# Patient Record
Sex: FEMALE | Race: WHITE | NOT HISPANIC OR LATINO | Employment: OTHER | URBAN - METROPOLITAN AREA
[De-identification: names, ages, dates, MRNs, and addresses within clinical notes are randomized per-mention and may not be internally consistent; named-entity substitution may affect disease eponyms.]

---

## 2017-02-07 ENCOUNTER — ALLSCRIPTS OFFICE VISIT (OUTPATIENT)
Dept: OTHER | Facility: OTHER | Age: 77
End: 2017-02-07

## 2017-02-21 DIAGNOSIS — D72.829 ELEVATED WHITE BLOOD CELL COUNT: ICD-10-CM

## 2017-02-23 LAB
BASOPHILS # BLD AUTO: 0.9 %
BASOPHILS # BLD AUTO: 72 CELLS/UL (ref 0–200)
DEPRECATED RDW RBC AUTO: 13.7 % (ref 11–15)
EOSINOPHIL # BLD AUTO: 672 CELLS/UL (ref 15–500)
EOSINOPHIL # BLD AUTO: 8.4 %
HCT VFR BLD AUTO: 35 % (ref 35–45)
HGB BLD-MCNC: 11.5 G/DL (ref 11.7–15.5)
LYMPHOCYTES # BLD AUTO: 2264 CELLS/UL (ref 850–3900)
LYMPHOCYTES # BLD AUTO: 28.3 %
MCH RBC QN AUTO: 29.9 PG (ref 27–33)
MCHC RBC AUTO-ENTMCNC: 32.8 G/DL (ref 32–36)
MCV RBC AUTO: 91.1 FL (ref 80–100)
MONOCYTES # BLD AUTO: 456 CELLS/UL (ref 200–950)
MONOCYTES (HISTORICAL): 5.7 %
NEUTROPHILS # BLD AUTO: 4536 CELLS/UL (ref 1500–7800)
NEUTROPHILS # BLD AUTO: 56.7 %
PLATELET # BLD AUTO: 404 THOUSAND/UL (ref 140–400)
PMV BLD AUTO: 8.3 FL (ref 7.5–12.5)
RBC # BLD AUTO: 3.85 MILLION/UL (ref 3.8–5.1)
WBC # BLD AUTO: 8 THOUSAND/UL (ref 3.8–10.8)

## 2017-02-24 ENCOUNTER — GENERIC CONVERSION - ENCOUNTER (OUTPATIENT)
Dept: OTHER | Facility: OTHER | Age: 77
End: 2017-02-24

## 2017-04-20 ENCOUNTER — LAB CONVERSION - ENCOUNTER (OUTPATIENT)
Dept: OTHER | Facility: OTHER | Age: 77
End: 2017-04-20

## 2017-04-20 LAB
CREATININE, RANDOM URINE (HISTORICAL): 146 MG/DL (ref 20–320)
HBA1C MFR BLD HPLC: 6.4 % OF TOTAL HGB
MAGNESIUM, UR (HISTORICAL): 1.7 MG/DL
MICROALBUMIN/CREATININE RATIO (HISTORICAL): 12 MCG/MG CREAT
TSH SERPL DL<=0.05 MIU/L-ACNC: 1.61 MIU/L (ref 0.4–4.5)

## 2017-04-25 ENCOUNTER — GENERIC CONVERSION - ENCOUNTER (OUTPATIENT)
Dept: OTHER | Facility: OTHER | Age: 77
End: 2017-04-25

## 2017-05-02 ENCOUNTER — ALLSCRIPTS OFFICE VISIT (OUTPATIENT)
Dept: OTHER | Facility: OTHER | Age: 77
End: 2017-05-02

## 2017-06-20 ENCOUNTER — GENERIC CONVERSION - ENCOUNTER (OUTPATIENT)
Dept: OTHER | Facility: OTHER | Age: 77
End: 2017-06-20

## 2017-06-20 ENCOUNTER — HOSPITAL ENCOUNTER (OUTPATIENT)
Dept: BONE DENSITY | Facility: MEDICAL CENTER | Age: 77
Discharge: HOME | End: 2017-06-20
Payer: COMMERCIAL

## 2017-06-20 ENCOUNTER — HOSPITAL ENCOUNTER (OUTPATIENT)
Dept: MAMMOGRAPHY | Facility: MEDICAL CENTER | Age: 77
Discharge: HOME | End: 2017-06-20
Payer: COMMERCIAL

## 2017-06-20 DIAGNOSIS — Z12.31 VISIT FOR SCREENING MAMMOGRAM: ICD-10-CM

## 2017-06-20 DIAGNOSIS — Z78.0 ASYMPTOMATIC MENOPAUSAL STATE: ICD-10-CM

## 2017-06-20 DIAGNOSIS — Z00.00 ENCOUNTER FOR GENERAL ADULT MEDICAL EXAMINATION WITHOUT ABNORMAL FINDINGS: ICD-10-CM

## 2017-06-20 PROCEDURE — 77080 DXA BONE DENSITY AXIAL: CPT

## 2017-06-20 PROCEDURE — G0202 SCR MAMMO BI INCL CAD: HCPCS

## 2017-06-23 ENCOUNTER — GENERIC CONVERSION - ENCOUNTER (OUTPATIENT)
Dept: OTHER | Facility: OTHER | Age: 77
End: 2017-06-23

## 2017-08-11 ENCOUNTER — APPOINTMENT (OUTPATIENT)
Dept: LAB | Facility: CLINIC | Age: 77
End: 2017-08-11
Payer: COMMERCIAL

## 2017-08-11 ENCOUNTER — ALLSCRIPTS OFFICE VISIT (OUTPATIENT)
Dept: OTHER | Facility: OTHER | Age: 77
End: 2017-08-11

## 2017-08-11 ENCOUNTER — TRANSCRIBE ORDERS (OUTPATIENT)
Dept: LAB | Facility: CLINIC | Age: 77
End: 2017-08-11

## 2017-08-11 DIAGNOSIS — M54.9 DORSALGIA: ICD-10-CM

## 2017-08-11 DIAGNOSIS — M79.602 PAIN OF LEFT ARM: ICD-10-CM

## 2017-08-11 DIAGNOSIS — E87.1 HYPO-OSMOLALITY AND HYPONATREMIA: ICD-10-CM

## 2017-08-11 DIAGNOSIS — M54.10 RADICULOPATHY: ICD-10-CM

## 2017-08-11 DIAGNOSIS — R53.83 OTHER FATIGUE: ICD-10-CM

## 2017-08-11 DIAGNOSIS — D72.829 ELEVATED WHITE BLOOD CELL COUNT: ICD-10-CM

## 2017-08-11 DIAGNOSIS — R70.0 ELEVATED ERYTHROCYTE SEDIMENTATION RATE: ICD-10-CM

## 2017-08-11 DIAGNOSIS — D64.9 ANEMIA: ICD-10-CM

## 2017-08-11 DIAGNOSIS — D47.3 ESSENTIAL HEMORRHAGIC THROMBOCYTHEMIA (HCC): ICD-10-CM

## 2017-08-11 DIAGNOSIS — R07.9 CHEST PAIN: ICD-10-CM

## 2017-08-11 DIAGNOSIS — R42 DIZZINESS AND GIDDINESS: ICD-10-CM

## 2017-08-11 DIAGNOSIS — N91.2 AMENORRHEA: ICD-10-CM

## 2017-08-11 DIAGNOSIS — R05.9 COUGH: ICD-10-CM

## 2017-08-11 LAB
25(OH)D3 SERPL-MCNC: 19.5 NG/ML (ref 30–100)
ALBUMIN SERPL BCP-MCNC: 3.9 G/DL (ref 3.5–5)
ALP SERPL-CCNC: 48 U/L (ref 46–116)
ALT SERPL W P-5'-P-CCNC: 43 U/L (ref 12–78)
ANION GAP SERPL CALCULATED.3IONS-SCNC: 8 MMOL/L (ref 4–13)
AST SERPL W P-5'-P-CCNC: 34 U/L (ref 5–45)
BACTERIA UR QL AUTO: NORMAL /HPF
BASOPHILS # BLD AUTO: 0.06 THOUSANDS/ΜL (ref 0–0.1)
BASOPHILS NFR BLD AUTO: 1 % (ref 0–1)
BILIRUB SERPL-MCNC: 0.32 MG/DL (ref 0.2–1)
BILIRUB UR QL STRIP: NEGATIVE
BUN SERPL-MCNC: 11 MG/DL (ref 5–25)
CALCIUM SERPL-MCNC: 9.6 MG/DL (ref 8.3–10.1)
CHLORIDE SERPL-SCNC: 95 MMOL/L (ref 100–108)
CLARITY UR: CLEAR
CO2 SERPL-SCNC: 25 MMOL/L (ref 21–32)
COLOR UR: YELLOW
CREAT SERPL-MCNC: 0.71 MG/DL (ref 0.6–1.3)
EOSINOPHIL # BLD AUTO: 0.44 THOUSAND/ΜL (ref 0–0.61)
EOSINOPHIL NFR BLD AUTO: 4 % (ref 0–6)
ERYTHROCYTE [DISTWIDTH] IN BLOOD BY AUTOMATED COUNT: 13.4 % (ref 11.6–15.1)
ERYTHROCYTE [SEDIMENTATION RATE] IN BLOOD: 38 MM/HOUR (ref 0–20)
GFR SERPL CREATININE-BSD FRML MDRD: 83 ML/MIN/1.73SQ M
GLUCOSE P FAST SERPL-MCNC: 93 MG/DL (ref 65–99)
GLUCOSE UR STRIP-MCNC: NEGATIVE MG/DL
HCT VFR BLD AUTO: 33.6 % (ref 34.8–46.1)
HGB BLD-MCNC: 11.3 G/DL
HGB BLD-MCNC: 11.5 G/DL (ref 11.5–15.4)
HGB UR QL STRIP.AUTO: NEGATIVE
HYALINE CASTS #/AREA URNS LPF: NORMAL /LPF
KETONES UR STRIP-MCNC: NEGATIVE MG/DL
LEUKOCYTE ESTERASE UR QL STRIP: ABNORMAL
LYMPHOCYTES # BLD AUTO: 2.53 THOUSANDS/ΜL (ref 0.6–4.47)
LYMPHOCYTES NFR BLD AUTO: 23 % (ref 14–44)
MCH RBC QN AUTO: 30.6 PG (ref 26.8–34.3)
MCHC RBC AUTO-ENTMCNC: 34.2 G/DL (ref 31.4–37.4)
MCV RBC AUTO: 89 FL (ref 82–98)
MONOCYTES # BLD AUTO: 1.09 THOUSAND/ΜL (ref 0.17–1.22)
MONOCYTES NFR BLD AUTO: 10 % (ref 4–12)
NEUTROPHILS # BLD AUTO: 6.99 THOUSANDS/ΜL (ref 1.85–7.62)
NEUTS SEG NFR BLD AUTO: 62 % (ref 43–75)
NITRITE UR QL STRIP: NEGATIVE
NON-SQ EPI CELLS URNS QL MICRO: NORMAL /HPF
NRBC BLD AUTO-RTO: 0 /100 WBCS
PH UR STRIP.AUTO: 6 [PH] (ref 4.5–8)
PLATELET # BLD AUTO: 459 THOUSANDS/UL (ref 149–390)
PMV BLD AUTO: 9.4 FL (ref 8.9–12.7)
POTASSIUM SERPL-SCNC: 4.3 MMOL/L (ref 3.5–5.3)
PROT SERPL-MCNC: 7.9 G/DL (ref 6.4–8.2)
PROT UR STRIP-MCNC: ABNORMAL MG/DL
RBC # BLD AUTO: 3.76 MILLION/UL (ref 3.81–5.12)
RBC #/AREA URNS AUTO: NORMAL /HPF
SODIUM SERPL-SCNC: 128 MMOL/L (ref 136–145)
SP GR UR STRIP.AUTO: 1.02 (ref 1–1.03)
TSH SERPL DL<=0.05 MIU/L-ACNC: 1.13 UIU/ML (ref 0.36–3.74)
UROBILINOGEN UR QL STRIP.AUTO: 0.2 E.U./DL
VIT B12 SERPL-MCNC: 571 PG/ML (ref 100–900)
WBC # BLD AUTO: 11.15 THOUSAND/UL (ref 4.31–10.16)
WBC #/AREA URNS AUTO: NORMAL /HPF

## 2017-08-11 PROCEDURE — 36415 COLL VENOUS BLD VENIPUNCTURE: CPT

## 2017-08-11 PROCEDURE — 82607 VITAMIN B-12: CPT

## 2017-08-11 PROCEDURE — 86618 LYME DISEASE ANTIBODY: CPT

## 2017-08-11 PROCEDURE — 84443 ASSAY THYROID STIM HORMONE: CPT

## 2017-08-11 PROCEDURE — 81001 URINALYSIS AUTO W/SCOPE: CPT

## 2017-08-11 PROCEDURE — 85652 RBC SED RATE AUTOMATED: CPT

## 2017-08-11 PROCEDURE — 80053 COMPREHEN METABOLIC PANEL: CPT

## 2017-08-11 PROCEDURE — 82306 VITAMIN D 25 HYDROXY: CPT

## 2017-08-11 PROCEDURE — 85025 COMPLETE CBC W/AUTO DIFF WBC: CPT

## 2017-08-13 LAB
B BURGDOR IGG SER IA-ACNC: 0.29
B BURGDOR IGM SER IA-ACNC: 0.21

## 2017-08-16 ENCOUNTER — ALLSCRIPTS OFFICE VISIT (OUTPATIENT)
Dept: OTHER | Facility: OTHER | Age: 77
End: 2017-08-16

## 2017-08-23 ENCOUNTER — TRANSCRIBE ORDERS (OUTPATIENT)
Dept: ADMINISTRATIVE | Facility: HOSPITAL | Age: 77
End: 2017-08-23

## 2017-08-23 ENCOUNTER — APPOINTMENT (OUTPATIENT)
Dept: RADIOLOGY | Facility: MEDICAL CENTER | Age: 77
End: 2017-08-23
Payer: COMMERCIAL

## 2017-08-23 DIAGNOSIS — M54.10 RADICULOPATHY: ICD-10-CM

## 2017-08-23 PROCEDURE — 72110 X-RAY EXAM L-2 SPINE 4/>VWS: CPT

## 2017-08-26 ENCOUNTER — GENERIC CONVERSION - ENCOUNTER (OUTPATIENT)
Dept: OTHER | Facility: OTHER | Age: 77
End: 2017-08-26

## 2017-08-31 LAB
BASOPHILS # BLD AUTO: 1.2 %
BASOPHILS # BLD AUTO: 97 CELLS/UL (ref 0–200)
BUN SERPL-MCNC: 10 MG/DL (ref 7–25)
BUN/CREA RATIO (HISTORICAL): ABNORMAL (CALC) (ref 6–22)
CALCIUM SERPL-MCNC: 9.3 MG/DL (ref 8.6–10.4)
CHLORIDE SERPL-SCNC: 96 MMOL/L (ref 98–110)
CO2 SERPL-SCNC: 23 MMOL/L (ref 20–31)
CREAT SERPL-MCNC: 0.68 MG/DL (ref 0.6–0.93)
DEPRECATED RDW RBC AUTO: 13 % (ref 11–15)
EGFR AFRICAN AMERICAN (HISTORICAL): 98 ML/MIN/1.73M2
EGFR-AMERICAN CALC (HISTORICAL): 85 ML/MIN/1.73M2
EOSINOPHIL # BLD AUTO: 591 CELLS/UL (ref 15–500)
EOSINOPHIL # BLD AUTO: 7.3 %
GLUCOSE (HISTORICAL): 93 MG/DL (ref 65–99)
HCT VFR BLD AUTO: 31.9 % (ref 35–45)
HGB BLD-MCNC: 10.7 G/DL (ref 11.7–15.5)
LYMPHOCYTES # BLD AUTO: 1766 CELLS/UL (ref 850–3900)
LYMPHOCYTES # BLD AUTO: 21.8 %
MCH RBC QN AUTO: 29.9 PG (ref 27–33)
MCHC RBC AUTO-ENTMCNC: 33.5 G/DL (ref 32–36)
MCV RBC AUTO: 89.1 FL (ref 80–100)
MONOCYTES # BLD AUTO: 778 CELLS/UL (ref 200–950)
MONOCYTES (HISTORICAL): 9.6 %
NEUTROPHILS # BLD AUTO: 4868 CELLS/UL (ref 1500–7800)
NEUTROPHILS # BLD AUTO: 60.1 %
PLATELET # BLD AUTO: 427 THOUSAND/UL (ref 140–400)
PMV BLD AUTO: 9.6 FL (ref 7.5–12.5)
POTASSIUM SERPL-SCNC: 4.9 MMOL/L (ref 3.5–5.3)
RBC # BLD AUTO: 3.58 MILLION/UL (ref 3.8–5.1)
SODIUM SERPL-SCNC: 128 MMOL/L (ref 135–146)
WBC # BLD AUTO: 8.1 THOUSAND/UL (ref 3.8–10.8)

## 2017-09-01 ENCOUNTER — GENERIC CONVERSION - ENCOUNTER (OUTPATIENT)
Dept: OTHER | Facility: OTHER | Age: 77
End: 2017-09-01

## 2017-09-06 ENCOUNTER — GENERIC CONVERSION - ENCOUNTER (OUTPATIENT)
Dept: OTHER | Facility: OTHER | Age: 77
End: 2017-09-06

## 2017-09-08 ENCOUNTER — ALLSCRIPTS OFFICE VISIT (OUTPATIENT)
Dept: OTHER | Facility: OTHER | Age: 77
End: 2017-09-08

## 2017-09-13 ENCOUNTER — ALLSCRIPTS OFFICE VISIT (OUTPATIENT)
Dept: OTHER | Facility: OTHER | Age: 77
End: 2017-09-13

## 2017-10-11 ENCOUNTER — GENERIC CONVERSION - ENCOUNTER (OUTPATIENT)
Dept: OTHER | Facility: OTHER | Age: 77
End: 2017-10-11

## 2017-10-16 ENCOUNTER — GENERIC CONVERSION - ENCOUNTER (OUTPATIENT)
Dept: OTHER | Facility: OTHER | Age: 77
End: 2017-10-16

## 2017-10-16 DIAGNOSIS — D47.3 ESSENTIAL HEMORRHAGIC THROMBOCYTHEMIA (HCC): ICD-10-CM

## 2017-10-16 DIAGNOSIS — E87.1 HYPO-OSMOLALITY AND HYPONATREMIA: ICD-10-CM

## 2017-10-16 DIAGNOSIS — R05.9 COUGH: ICD-10-CM

## 2017-10-16 DIAGNOSIS — N91.2 AMENORRHEA: ICD-10-CM

## 2017-10-16 DIAGNOSIS — R70.0 ELEVATED ERYTHROCYTE SEDIMENTATION RATE: ICD-10-CM

## 2017-10-20 ENCOUNTER — ALLSCRIPTS OFFICE VISIT (OUTPATIENT)
Dept: OTHER | Facility: OTHER | Age: 77
End: 2017-10-20

## 2017-10-23 ENCOUNTER — GENERIC CONVERSION - ENCOUNTER (OUTPATIENT)
Dept: OTHER | Facility: OTHER | Age: 77
End: 2017-10-23

## 2017-10-26 NOTE — PROGRESS NOTES
Assessment  1  [place] (626 0) (N91 2)   2  Thrombocytosis (238 71) (D47 3)   3  Cough (786 2) (R05)   4  Hyponatremia (276 1) (E87 1)   5  Elevated sed rate (790 1) (R70 0)    Plan  [place], Cough, Elevated sed rate, Hyponatremia, Thrombocytosis    · Iron 325 (65 Fe) MG Oral Tablet; TAKE 1 TABLET Daily with stool softener of choice   Rx By: Debora Charles; Dispense: 100 Days ; #:100 Tablet; Refill: 0;For: [place], Cough, Elevated sed rate, Hyponatremia, Thrombocytosis; NANCY = N; Print Rx   · (1) CBC/PLT/DIFF; Status:Active; Requested BTM:46QGZ1737;    Perform:formerly [facility]; Due:16Oct2018; Ordered; For:[place], Cough, Elevated sed rate, Hyponatremia, Thrombocytosis; Ordered By:Suma Butler;   · (1) FOLATE; Status:Active; Requested MERRITT:66WJQ0710;    Perform:formerly [facility]; KMK:45MAQ8866; Ordered; For:[place], Cough, Elevated sed rate, Hyponatremia, Thrombocytosis; Ordered By:Scout Butler;   · (1) IRON SATURATION %, TIBC; Status:Active; Requested AQA:79OWL4868;    Perform:formerly [facility]; TGZ:22QSB9421; Ordered; For:[place], Cough, Elevated sed rate, Hyponatremia, Thrombocytosis; Ordered By:Suma Butler;   · (1) IRON; Status:Active; Requested NEW:02DXI7209;    Perform:formerly [facility]; QQX:79PFF3623; Ordered; For:[place], Cough, Elevated sed rate, Hyponatremia, Thrombocytosis; Ordered By:Scout Butler;   · (1) LD (LDH); Status:Active; Requested QSB:77GDO7125;    Perform:formerly [facility]; FVI:58VWM1104; Ordered; For:[place], Cough, Elevated sed rate, Hyponatremia, Thrombocytosis; Ordered By:Scout Butler;   · (1) PROTEIN ELECTRO, SERUM; Status:Active; Requested ALA:98HKP0037;    Perform:formerly [facility]; CCM:57ZDU9509; Ordered; For:[place], Cough, Elevated sed rate, Hyponatremia, Thrombocytosis; Ordered By:Scout Butler;   · (1) PROTEIN ELECTRO, URINE; Status:Active; Requested QFM:13UJF9267;    Perform:formerly [facility]; KFN:52EVU0902; Ordered; Roena Babinski, Cough, Elevated sed rate, Hyponatremia, Thrombocytosis; Ordered By:Abdulaziz Butler;   · (1) RETICULOCYTE COUNT; Status:Active; Requested ADP:45PDT4028;    Perform:[facility]; Due:29Cge1956; Ordered; For:[place], Cough, Elevated sed rate, Hyponatremia, Thrombocytosis; Ordered By:Abdulaziz Butler;   · * XR CHEST PA & LATERAL; Status:Active; Requested TTI:21LDN1098;    Perform:[facility]; EOC:36IUF9183; Ordered; For:[place], Cough, Elevated sed rate, Hyponatremia, Thrombocytosis; Ordered By:Abdulaziz Butler;   · Follow-up visit in 6 weeks Evaluation and Treatment  Follow-up  Status: Complete  Done:  81WID1587 10:00AM   Ordered; For: [place], Cough, Elevated sed rate, Hyponatremia, Thrombocytosis; Ordered By: Rachelle Rosas Performed:  Due: 93AIS7880; Last Updated By: Michel Day; 9/8/2017 3:10:59 PM  Wrist pain, right    · Hospital Referral Other Co-Management  *  Status: Active  Requested for: 95Kfz4076   Ordered; For: Wrist pain, right; Ordered By: Dany Crawford Performed:  Due: 66Rsl3354  are Referring to a non- Preferred Provider : Patient refused suggestion for      recommended provider  Care Summary provided  : Yes    Discussion/Summary  Discussion Summary:   Lily Casanova notes mild fatigue in the past year  On laboratory testing there is mild anemia i e  hemoglobin 10 7 and mild thrombocytosis i e  platelet count 135  There is no known GI blood loss  Further evaluation with fecal occult blood testing is recommended, however, the patient declines fecal occult blood testing  Iron studies and folic acid level are to be obtained and deficiencies corrected accordingly  SPEP and UPEP are to be obtained and in the case of monoclonal gammopathy, suspicion for an underlying plasma cell dyscrasia or chronic lymphoproliferative disorder would be raised   If these studies are unremarkable then bone marrow examination may be helpful to assess for potential underlying primary bone marrow disorder such as a myelodysplastic syndrome or myeloproliferative neoplasm  The patient prefers to defer bone marrow examination at this time  She is in agreement as to a therapeutic trial of iron sulfate 325 mg daily taken with a stool softener choice for the next 6 weeks  cough is likely a result of allergies, a chest x-ray is recommended, the patient declines chest x-ray at this time having had a chest x-ray done in 2017 while an inpatient at [facility], results of the previous chest x-ray have been requested  patient and her daughter [facility] who is with her in the office today are aware to seek medical attention for progressive cough, shortness of breath, excessive fatigue, or if other new problems arise  Otherwise, I plan to see her again in 6 weeks  Chief Complaint  Chief Complaint Free Text Note Form: Clem Sanchez was seen in consultation today in regards to mild anemia and mild thrombocytosis  History of Present Illness  HPI: She is 68years old and notes mild fatigue in the past year  She notes cough a few times per day which she attributes to allergies as well as sinus stuffiness  She accidentally tripped over and on even surface and fell forward onto outstretched right arm recently sustaining fracture to the right ulnar and radius requiring a plate and pins and mild facial bruising  Denies lightheadedness and was fully conscious with a falling episode  Interval History: medical history: Hyponatremia, mild, dates back to at least 2014 at which time serum sodium was 132, she has been following a fluid restricted diet  surgery history: Appendectomy while in high school  Left hand radial tendon release surgery  Right hand trigger finger surgery  history: Her father  at age 61 with heart disease  Her mother  at age 39 with heart disease  She has 2 sisters and a brother, all 3 of her siblings are in good health  She has 2 daughters both of whom are in good health     history: She smoked one pack of per day of cigarettes for approximately 30 years and quit cigarette smoking in 1992  She has about 1 alcoholic beverage per month at most  She worked with  for a [address] ([employer])  She's been  since 2003  Review of Systems  Complete-Female:   Constitutional: She notes mild fatigue in the past year  Eyes: no eyesight problems  ENT: She has sinus stuffiness attributable to allergies  , but-- no nosebleeds-- and-- no hearing loss  Cardiovascular: no chest pain-- and-- no lower extremity edema  Respiratory: She has cough a few times per day attributable to allergies  Gastrointestinal: Her appetite is been good  , but-- no abdominal pain,-- no constipation-- and-- no diarrhea  Musculoskeletal: She has arthritic pain of the hands, left shoulder and lower back  Integumentary: no rashes  Neurological: no headache-- and-- no difficulty walking  Hematologic/Lymphatic: no tendency for easy bruising  Active Problems  1  Acute UTI (599 0) (N39 0)   2  [place] (626 0) (N91 2)   3  Asymptomatic age-related postmenopausal state (V49 81) (Z78 0)   4  Benign essential hypertension (401 1) (I10)   5  Carotid artery plaque (433 10) (I65 29)   6  Carotid bruit (785 9) (R09 89)   7  Cataract, left (366 9) (H26 9)   8  Cataract, nuclear sclerotic senile, left (366 16) (H25 12)   9  Chest pain (786 50) (R07 9)   10  Cough (786 2) (R05)   11  Diabetes mellitus type II, controlled (250 00) (E11 9)   12  Displaced fracture of distal phalanx of right thumb, initial encounter   13  Dizziness (780 4) (R42)   14  Elevated CPK (790 5) (R74 8)   15  Elevated sed rate (790 1) (R70 0)   16  Encounter for screening mammogram for malignant neoplasm of breast (V76 12)    (Z12 31)   17  Encounter for special screening examination for genitourinary disorder (V81 6) (Z13 89)   18  Fatigue (780 79) (R53 83)   19  GERD without esophagitis (530 81) (K21 9)   20  Denied: History of mental disorder   21  Hyperlipidemia (272 4) (E78 5)   22  Hyponatremia (276 1) (E87 1)   23  Incontinence of urine in female (788 30) (R32)   24  Left arm pain (729 5) (M79 602)   25  Leukocytosis (288 60) (D72 829)   26  Levoscoliosis (737 39) (M41 80)   27  Low hemoglobin (285 9) (D64 9)   28  Pathological fracture, right radius, initial encounter for fracture (733 12) (M84 433A)   29  Radiculopathy (729 2) (M54 10)   30  Thrombocytosis (238 71) (D47 3)   31  Vasovagal syncope (780 2) (R55)   32  Vitamin D deficiency (268 9) (E55 9)   33  Wrist pain, right (719 43) (M25 531)    Past Medical History  1  History of Common cold (460) (J00)   2  Denied: History of mental disorder   3  History of Influenza vaccination administered at current visit (V04 81) (Z23)   4  History of Preoperative clearance (V72 84) (Z01 818)   5  History of Screening for colon cancer (V76 51) (Z12 11)   6  History of Visit for gynecologic examination (V72 31) (Z01 419)   7  History of Visit for screening mammogram (V76 12) (Z12 31)    Surgical History  1  History of Appendectomy   2  History of Cataract Surgery   3  History of Septoplasty    Family History  Mother    1  Family history of myocardial infarction (V17 3) (Z82 49)  Father    2  Family history of myocardial infarction (V17 3) (Z82 49)  Other    3  No family history of mental disorder    Social History   · Does not use illicit drugs (P07 88) (Z78 9)   · Former smoker (V15 82) (O30 427)   · Living alone (V60 3) (Z60 2)   · No secondhand smoke exposure (V49 89) (Z78 9)   · Retired   ·     Current Meds   1  Aspirin 81 MG TABS; TAKE 1 TABLET DAILY; Therapy: 47XPX1143 to (Evaluate:85Xod3601) Recorded   2  Atorvastatin Calcium 40 MG Oral Tablet; TAKE 1 TABLET DAILY (OFFICE VISIT NEEDED); Therapy: 06VWZ8250 to (Last Lera Homans)  Requested for: 25Oct2016 Ordered   3  BL Flax Seed Oil CAPS Recorded   4  Calcium + D 600-200 MG-UNIT TABS Recorded   5   [facility] Vitamin D TABS Recorded   6  Hydrocodone-Acetaminophen 5-325 MG Oral Tablet; Therapy: 15ZQD4297 to Recorded   7  Lisinopril 40 MG Oral Tablet; Take 1 tablet daily; Therapy: 94Qxq2048 to (Last Rx:64Crs7717)  Requested for: 11Vvc8932 Ordered   8  Meloxicam 15 MG Oral Tablet; TAKE 1 TABLET DAILY WITH FOOD; Therapy: 35Euc7075 to (Last Rx:46Npc4699)  Requested for: 68Osg8071 Ordered   9  Omeprazole 20 MG Oral Capsule Delayed Release; TAKE 1 CAPSULE Daily; Therapy: 44PBM4668 to (Evaluate:18Jfr8180)  Requested for: 56Ctf4582; Last   Rx:93Xrd3840 Ordered   10  Vitamin D3 13396 UNIT Oral Capsule; TAKE 1 CAPSULE Weekly; Therapy: 11SXK1437 to (Last Rx:17Ifk5988)  Requested for: 31Epk2258 Ordered    Allergies  1  No Known Drug Allergies    Vitals  Vital Signs    Recorded: 08Sep2017 02:21PM   Temperature 99 6 F   Heart Rate 120   Respiration 16   Systolic 652   Diastolic 68   Height 5 ft 1 8 in   Weight 145 lb    BMI Calculated 26 69   BSA Calculated 1 66   O2 Saturation 96   Pain Scale 0     Physical Exam    Constitutional She appears well  Eyes EOMI  Ears, Nose, Mouth, and Throat Oropharynx is clear  Pulmonary   Auscultation of lungs: Clear to auscultation  Cardiovascular Heart has regular rate and rhythm  -- No lower extremity edema  Abdomen   Abdomen: Non-tender, no masses  Liver and spleen: No hepatomegaly or splenomegaly  Lymphatic   Palpation of lymph nodes in neck: No lymphadenopathy  -- No axillary or inguinal lymphadenopathy  Musculoskeletal   Gait and station: Normal     Skin No ecchymoses  Neurologic Neurological is grossly intact  Psychiatric   Orientation to person, place, and time: Normal     Mood and affect: Normal     Additional Exam:  (Breast examination was fine  )         ECOG 1       Results/Data  Results Form:   Results   Radiology DEXA scan from June 20, 2017: Lumbar spine T score is -1 4 and is 18% higher than 2007, total hip T score is 0 1 and femoral neck T score is -1 0 and 5% higher than 2007  screening mammogram from June 20, 2017: There are scattered fibroglandular densities, no evidence of malignancy  Lab Results From August 30, 2017: Creatinine 0 68, sodium is 128, WBC is 8 1, hemoglobin 10 7 with MCV 89 1, platelets 933, ESR 38 (0?20)  August 11, 2017: Alkaline phosphatase is 48, AST 34, ALT 43, bili 0 32, vitamin B-12 is 571, TSH is 1 130  Health Management  Diabetes mellitus type II, controlled   *VB - Eye Exam; every 1 year; Next Due: I8042201; Overdue  *VB - Foot Exam; every 1 year; Last 67XWO6421; Next Due: 97TLC5484; Active  Urine Microalbumin/Creatinine - POC; every 1 year; Next Due: 76ZUI1309; Overdue  History of Screening for colon cancer   COLONOSCOPY; every 10 years; Next Due: 80HPR9677; Overdue  History of Visit for screening mammogram   Digital Bilateral Screening Mammogram With CAD; every 1 year; Last 64MAI4910; Next Due:  89MUU6443; Overdue  Health Maintenance   Medicare Annual Wellness Visit; every 1 year; Next Due: 85JZC6516; Overdue    Future Appointments    Date/Time Provider Specialty Site   10/20/2017 10:00 AM Vana Buerger, M D   Hematology [facility]cology [facility]   09/13/2017 09:00 AM Mary Beth Barrera MD [address] [place] [address]   11/07/2017 10:30 AM Mary Beth Barrera MD Family Medicine [address] [facility]     Signatures   Electronically signed by : KAYY Blankenship ; Sep  8 2017  3:42PM EST                       (Author)

## 2017-11-02 DIAGNOSIS — D72.829 ELEVATED WHITE BLOOD CELL COUNT: ICD-10-CM

## 2017-11-02 DIAGNOSIS — E11.9 TYPE 2 DIABETES MELLITUS WITHOUT COMPLICATIONS (HCC): ICD-10-CM

## 2017-11-02 DIAGNOSIS — D64.9 ANEMIA: ICD-10-CM

## 2017-11-03 ENCOUNTER — GENERIC CONVERSION - ENCOUNTER (OUTPATIENT)
Dept: FAMILY MEDICINE CLINIC | Facility: CLINIC | Age: 77
End: 2017-11-03

## 2017-12-05 ENCOUNTER — GENERIC CONVERSION - ENCOUNTER (OUTPATIENT)
Dept: OTHER | Facility: OTHER | Age: 77
End: 2017-12-05

## 2017-12-05 ENCOUNTER — LAB CONVERSION - ENCOUNTER (OUTPATIENT)
Dept: OTHER | Facility: OTHER | Age: 77
End: 2017-12-05

## 2017-12-05 LAB
A/G RATIO (HISTORICAL): 1.6 (CALC) (ref 1–2.5)
ALBUMIN SERPL BCP-MCNC: 4.6 G/DL (ref 3.6–5.1)
ALP SERPL-CCNC: 40 U/L (ref 33–130)
ALT SERPL W P-5'-P-CCNC: 19 U/L (ref 6–29)
ANA PATTERN 1 (HISTORICAL): ABNORMAL
ANA TITER 1 (HISTORICAL): ABNORMAL TITER
ANTI-NUCLEAR ANTIBODY (ANA) (HISTORICAL): POSITIVE
AST SERPL W P-5'-P-CCNC: 20 U/L (ref 10–35)
BASOPHILS # BLD AUTO: 1.4 %
BASOPHILS # BLD AUTO: 119 CELLS/UL (ref 0–200)
BILIRUB SERPL-MCNC: 0.3 MG/DL (ref 0.2–1.2)
BUN SERPL-MCNC: 14 MG/DL (ref 7–25)
BUN/CREA RATIO (HISTORICAL): ABNORMAL (CALC) (ref 6–22)
CALCIUM SERPL-MCNC: 10 MG/DL (ref 8.6–10.4)
CHLORIDE SERPL-SCNC: 99 MMOL/L (ref 98–110)
CHOLEST SERPL-MCNC: 165 MG/DL
CHOLEST/HDLC SERPL: 3.6 (CALC)
CK SERPL-CCNC: 157 U/L (ref 29–143)
CO2 SERPL-SCNC: 24 MMOL/L (ref 20–31)
CREAT SERPL-MCNC: 0.72 MG/DL (ref 0.6–0.93)
DEPRECATED RDW RBC AUTO: 14.1 % (ref 11–15)
EGFR AFRICAN AMERICAN (HISTORICAL): 94 ML/MIN/1.73M2
EGFR-AMERICAN CALC (HISTORICAL): 81 ML/MIN/1.73M2
EOSINOPHIL # BLD AUTO: 298 CELLS/UL (ref 15–500)
EOSINOPHIL # BLD AUTO: 3.5 %
ERYTHROCYTE SEDIMENTATION RATE (HISTORICAL): 39 MM/H
GAMMA GLOBULIN (HISTORICAL): 2.8 G/DL (CALC) (ref 1.9–3.7)
GLUCOSE (HISTORICAL): 99 MG/DL (ref 65–99)
HBA1C MFR BLD HPLC: 6 % OF TOTAL HGB
HCT VFR BLD AUTO: 34.3 % (ref 35–45)
HDLC SERPL-MCNC: 46 MG/DL
HGB BLD-MCNC: 11.5 G/DL (ref 11.7–15.5)
LYMPHOCYTES # BLD AUTO: 2244 CELLS/UL (ref 850–3900)
LYMPHOCYTES # BLD AUTO: 26.4 %
MCH RBC QN AUTO: 31.2 PG (ref 27–33)
MCHC RBC AUTO-ENTMCNC: 33.5 G/DL (ref 32–36)
MCV RBC AUTO: 93 FL (ref 80–100)
MONOCYTES # BLD AUTO: 629 CELLS/UL (ref 200–950)
MONOCYTES (HISTORICAL): 7.4 %
NEUTROPHILS # BLD AUTO: 5211 CELLS/UL (ref 1500–7800)
NEUTROPHILS # BLD AUTO: 61.3 %
NON-HDL-CHOL (CHOL-HDL) (HISTORICAL): 119 MG/DL (CALC)
PLATELET # BLD AUTO: 572 THOUSAND/UL (ref 140–400)
PMV BLD AUTO: 9.1 FL (ref 7.5–12.5)
POTASSIUM SERPL-SCNC: 4.8 MMOL/L (ref 3.5–5.3)
RBC # BLD AUTO: 3.69 MILLION/UL (ref 3.8–5.1)
RHEUMATOID FACTOR (HISTORICAL): <14 IU/ML
SODIUM SERPL-SCNC: 131 MMOL/L (ref 135–146)
TOTAL PROTEIN (HISTORICAL): 7.4 G/DL (ref 6.1–8.1)
TRIGL SERPL-MCNC: 467 MG/DL
WBC # BLD AUTO: 8.5 THOUSAND/UL (ref 3.8–10.8)

## 2017-12-15 ENCOUNTER — GENERIC CONVERSION - ENCOUNTER (OUTPATIENT)
Dept: OTHER | Facility: OTHER | Age: 77
End: 2017-12-15

## 2018-01-10 NOTE — RESULT NOTES
Message   WBC normalized, most likely it was a transiet elevatio ndue ot viral infection or so, no worries        Verified Results  (1) CBC/PLT/DIFF 36TUA2632 09:11AM Annabelle Sanders   REPORT COMMENT:  FASTING:NO     Test Name Result Flag Reference   WHITE BLOOD CELL COUNT 8 0 Thousand/uL  3 8-10 8   RED BLOOD CELL COUNT 3 85 Million/uL  3 80-5 10   HEMOGLOBIN 11 5 g/dL L 11 7-15 5   HEMATOCRIT 35 0 %  35 0-45 0   MCV 91 1 fL  80 0-100 0   MCH 29 9 pg  27 0-33 0   EOSINOPHILS 8 4 %     BASOPHILS 0 9 %     ABSOLUTE MONOCYTES 456 cells/uL  200-950   ABSOLUTE EOSINOPHILS 672 cells/uL H    ABSOLUTE BASOPHILS 72 cells/uL  0-200   NEUTROPHILS 56 7 %     LYMPHOCYTES 28 3 %     MONOCYTES 5 7 %     MCHC 32 8 g/dL  32 0-36 0   RDW 13 7 %  11 0-15 0   PLATELET COUNT 680 Thousand/uL H 140-400   MPV 8 3 fL  7 5-12 5   ABSOLUTE NEUTROPHILS 4536 cells/uL  9792-3352   ABSOLUTE LYMPHOCYTES 2264 cells/uL  850-3900

## 2018-01-11 NOTE — RESULT NOTES
Discussion/Summary   pt has an appointment to see Hem/onc on 9/8/17 to be discussed with the hematology   also pt will fu with me next week will discuss it then    I called pt and she feel better after her recent wirst surgery      Verified Results  (1) CBC/PLT/DIFF 81Ysm4323 10:35AM Annabelle Sanders   REPORT COMMENT:  FASTING:NO     Test Name Result Flag Reference   WHITE BLOOD CELL COUNT 8 1 Thousand/uL  3 8-10 8   RED BLOOD CELL COUNT 3 58 Million/uL L 3 80-5 10   HEMOGLOBIN 10 7 g/dL L 11 7-15 5   HEMATOCRIT 31 9 % L 35 0-45 0   MCV 89 1 fL  80 0-100 0   MCH 29 9 pg  27 0-33 0   MCHC 33 5 g/dL  32 0-36 0   RDW 13 0 %  11 0-15 0   PLATELET COUNT 556 Thousand/uL H 140-400   ABSOLUTE NEUTROPHILS 4868 cells/uL  6861-9405   ABSOLUTE LYMPHOCYTES 1766 cells/uL  850-3900   ABSOLUTE MONOCYTES 778 cells/uL  200-950   ABSOLUTE EOSINOPHILS 591 cells/uL H    ABSOLUTE BASOPHILS 97 cells/uL  0-200   NEUTROPHILS 60 1 %     LYMPHOCYTES 21 8 %     MONOCYTES 9 6 %     EOSINOPHILS 7 3 %     BASOPHILS 1 2 %     MPV 9 6 fL  7 5-12 5     (1) BASIC METABOLIC PROFILE 12BKT2104 10:35AM Erica Hair     Test Name Result Flag Reference   GLUCOSE 93 mg/dL  65-99   Fasting reference interval   UREA NITROGEN (BUN) 10 mg/dL  7-25   CREATININE 0 68 mg/dL  0 60-0 93   For patients >52years of age, the reference limit  for Creatinine is approximately 13% higher for people  identified as -American  eGFR NON-AFR  AMERICAN 85 mL/min/1 73m2  > OR = 60   eGFR AFRICAN AMERICAN 98 mL/min/1 73m2  > OR = 60   BUN/CREATININE RATIO   2-35   NOT APPLICABLE (calc)   SODIUM 128 mmol/L L 135-146   POTASSIUM 4 9 mmol/L  3 5-5 3   CHLORIDE 96 mmol/L L    CARBON DIOXIDE 23 mmol/L  20-31   CALCIUM 9 3 mg/dL  8 6-10 4   We received your handwritten test order for a chemistry  panel containing 8 to 13 analytes  We performed the AMA   defined Basic Metabolic Panel   If this is not what you   intended to order, please contact your local client   immediately so that we can  adjust our billing appropriately  You may also inquire  about alternative or additional testing

## 2018-01-11 NOTE — RESULT NOTES
Message   carotid us not changed from 2014      Verified Results  VAS CAROTID COMPLETE STUDY [id number] 08:29AM Shanae Cordoba Order Number: [id number]     Test Name Result Flag Reference   VAS CAROTID COMPLETE STUDY (Report)     [facility] REPORT   CLINICAL:   Indications: Bruit [R09 89]  Asymptomatic  Risk Factors   The patient has history of HTN, Diabetes, previous remote smoking, and   hyperlipidemia  Clinical   Left Pressure: 122/60 mm Hg  FINDINGS:      Right    Impression PSV EDV (cm/s) Ratio    Dist  ICA        140     32  1 12    Mid  ICA         95     28  0 76    Prox  ICA  1 - 49%   103     27  0 82    Dist CCA         90     21        Mid CCA         126     25  0 94    Prox CCA         133     21        Ext Carotid       122      7  0 97    Prox Vert         49      8        Subclavian        142      6           Left     Impression PSV EDV (cm/s) Ratio    Dist  ICA        128     36  1 15    Mid  ICA         109     31  0 98    Prox  ICA  1 - 49%   102     32  0 92    Dist CCA         121     30        Mid CCA         111     24  0 77    Prox CCA         144     25        Ext Carotid        95      9  0 86    Prox Vert         38      9        Subclavian        196     18                 CONCLUSION:      Impression      RIGHT SIDE: There is a less than 50% stenosis in the internal carotid artery  Vertebral artery flow is antegrade  No significant subclavian artery disease  Plaque Structure: Heterogenous      LEFT SIDE: There is a less than 50% stenosis in the internal carotid artery  Vertebral artery flow is antegrade  No significant subclavian artery disease  Plaque Structure: Homogenous      In comparison to the study of 7/21/2014, there is no significant interval   change in the disease process        Internal carotid artery stenosis determination by consensus criteria from:   Amanda Alcantar et al  Carotid Artery Stenosis: Gray-Scale and Doppler US Diagnosis   - Society of Radiologists in [address] [place] [address], Radiology 2003;   [phone]        SIGNATURE:   Electronically Signed by: Cristiane Aguirre on 2016-05-11 01:44:25 PM

## 2018-01-12 VITALS
HEART RATE: 120 BPM | TEMPERATURE: 99.6 F | OXYGEN SATURATION: 96 % | WEIGHT: 145 LBS | BODY MASS INDEX: 26.68 KG/M2 | SYSTOLIC BLOOD PRESSURE: 180 MMHG | HEIGHT: 62 IN | DIASTOLIC BLOOD PRESSURE: 68 MMHG | RESPIRATION RATE: 16 BRPM

## 2018-01-13 VITALS
HEIGHT: 62 IN | BODY MASS INDEX: 27.64 KG/M2 | SYSTOLIC BLOOD PRESSURE: 138 MMHG | HEART RATE: 88 BPM | WEIGHT: 150.2 LBS | DIASTOLIC BLOOD PRESSURE: 60 MMHG

## 2018-01-14 VITALS
SYSTOLIC BLOOD PRESSURE: 156 MMHG | BODY MASS INDEX: 27.43 KG/M2 | HEART RATE: 84 BPM | WEIGHT: 149 LBS | DIASTOLIC BLOOD PRESSURE: 84 MMHG

## 2018-01-14 VITALS
WEIGHT: 152.25 LBS | HEIGHT: 62 IN | DIASTOLIC BLOOD PRESSURE: 62 MMHG | HEART RATE: 68 BPM | RESPIRATION RATE: 16 BRPM | SYSTOLIC BLOOD PRESSURE: 118 MMHG | BODY MASS INDEX: 28.02 KG/M2

## 2018-01-14 NOTE — MISCELLANEOUS
Assessment    1  Benign essential hypertension (401 1) (I10)   2  Vasovagal syncope (780 2) (R55)   3  Hyponatremia (276 1) (E87 1)   4  Hyperlipidemia (272 4) (E78 5)   5  Diabetes mellitus type II, controlled (250 00) (E11 9)   6  Leukocytosis (288 60) (D72 829)    Plan  Leukocytosis    · (1) CBC/PLT/DIFF; Status:Active; Requested for:36Cbk6303;    Perform:[facility]; WAQ:38QOR9370; Ordered; For:Leukocytosis; Ordered By:Annabelle Sanders; Discussion/Summary  Discussion Summary:   1- HTN: very stable, cont with her current meds   off HCTZ due to her hyponatremia    2- Vasovagal syncope: had all the appropriate workup in the hospital   no further episodes   doing well overall   advised on well hydration, and changing position slowly     3- Leukocytosis; unclear etiology pt denied any sx and no sign of infection, fu on WBC in 2 weeks     4- Hyponatremia: stable, baseline     5- Hyperlipidemia: stable, cont with meds  Goals and Barriers: The patient has the current Goals: Weight control  Patient's Capacity to Self-Care: Patient is able to Self-Care  Medication SE Review and Pt Understands Tx: Possible side effects of new medications were reviewed with the patient/guardian today  The treatment plan was reviewed with the patient/guardian  The patient/guardian understands and agrees with the treatment plan      Chief Complaint  Chief Complaint Free Text Note Form: Hospital follow up re: Vasovagal Syncope  Pt states she was told she fainted and she was taken via ambulance to [facility]  All Cardiac testing and BW normal except a low sodium level with is normal for pt  kw   Chief Complaint Chronic Condition [facility]: The patient is here for an emergency room follow-up  History of Present Illness  TCM Communication [facility]: The patient is being contacted for follow-up after hospitalization and DISCHARGED FROM [facility] ON 01/29/17  She was hospitalized at [address]   The dates of hospitalization: 01/28/17- 01/29/17, date of admission: 01/28/17, date of discharge: 01/29/17  Diagnosis: SYNCOPE  She was discharged to home  She scheduled a follow up appointment  Follow-up appointments with other specialists: [facility]  The patient is currently asymptomatic  Counseling was provided to the patient  Communication performed and completed by Judah Hernandez   HPI: pt was at dinner drink small glass of wine and eat her usual dinner then she felt flushed and sweaty and lost her consciousness she don't remember but then she remember when she awaken on the wheel chair at the [employer]   pt went to ER and complete neuro and cardiac eval done and everything was negative      Review of Systems  Complete-Female:   Constitutional: No fever, no chills, feels well, no tiredness, no recent weight gain or weight loss  Eyes: No complaints of eye pain, no red eyes, no eyesight problems, no discharge, no dry eyes, no itching of eyes  ENT: no complaints of earache, no loss of hearing, no nose bleeds, no nasal discharge, no sore throat, no hoarseness  Cardiovascular: No complaints of slow heart rate, no fast heart rate, no chest pain, no palpitations, no leg claudication, no lower extremity edema  Respiratory: No complaints of shortness of breath, no wheezing, no cough, no SOB on exertion, no orthopnea, no PND  Gastrointestinal: No complaints of abdominal pain, no constipation, no nausea or vomiting, no diarrhea, no bloody stools  Genitourinary: No complaints of dysuria, no incontinence, no pelvic pain, no dysmenorrhea, no vaginal discharge or bleeding  Musculoskeletal: No complaints of arthralgias, no myalgias, no joint swelling or stiffness, no limb pain or swelling  Integumentary: No complaints of skin rash or lesions, no itching, no skin wounds, no breast pain or lump     Neurological: No complaints of headache, no confusion, no convulsions, no numbness, no dizziness or fainting, no tingling, no limb weakness, no difficulty walking  Psychiatric: Not suicidal, no sleep disturbance, no anxiety or depression, no change in personality, no emotional problems  Endocrine: No complaints of proptosis, no hot flashes, no muscle weakness, no deepening of the voice, no feelings of weakness  Hematologic/Lymphatic: No complaints of swollen glands, no swollen glands in the neck, does not bleed easily, does not bruise easily  Active Problems    1  Benign essential hypertension (401 1) (I10)   2  Carotid artery plaque (433 10) (I65 29)   3  Carotid bruit (785 9) (R09 89)   4  Cataract, left (366 9) (H26 9)   5  Cataract, nuclear sclerotic senile, left (366 16) (H25 12)   6  Diabetes mellitus type II, controlled (250 00) (E11 9)   7  Elevated CPK (790 5) (R74 8)   8  Denied: History of mental disorder   9  Hyperlipidemia (272 4) (E78 5)   10  Hyponatremia (276 1) (E87 1)   11  Incontinence of urine in female (788 30) (R32)    Past Medical History    1  History of Common cold (460) (J00)   2  Denied: History of mental disorder   3  History of Influenza vaccination administered at current visit (V04 81) (Z23)   4  History of Preoperative clearance (V72 84) (Z01 818)   5  History of Screening for colon cancer (V76 51) (Z12 11)   6  History of Visit for gynecologic examination (V72 31) (Z01 419)   7  History of Visit for screening mammogram (V76 12) (Z12 31)    Surgical History    1  History of Appendectomy   2  History of Cataract Surgery   3  History of Septoplasty  Surgical History Reviewed: The surgical history was reviewed and updated today  Family History  Mother    1  Family history of myocardial infarction (V17 3) (Z82 49)  Father    2  Family history of myocardial infarction (V17 3) (Z82 49)  Other    3  No family history of mental disorder  Family History Reviewed: The family history was reviewed and updated today         Social History    · Does not use illicit drugs (D38 21) (Z78 9)   · Former smoker (V15 82) (D29 291)   · Living alone (V60 3) (Z60 2)   · No secondhand smoke exposure (V49 89) (Z78 9)   · Retired   ·   Social History Reviewed: The social history was reviewed and updated today  The social history was reviewed and is unchanged  Current Meds   1  Aspirin 81 MG TABS; TAKE 1 TABLET DAILY; Therapy: 55QGO6675 to (Evaluate:17May2015) Recorded   2  Atenolol 50 MG Oral Tablet; take one tablet by mouth every day; Therapy: 89Azy7818 to (Evaluate:18Mar2017)  Requested for: 75OIH6599; Last   Rx:89Uff2535 Ordered   3  Atorvastatin Calcium 40 MG Oral Tablet; TAKE 1 TABLET DAILY (OFFICE VISIT NEEDED); Therapy: 85TUP8819 to (Last Kris Ember)  Requested for: 34Kse8625 Ordered   4  BL Flax Seed Oil CAPS Recorded   5  Calcium + D 600-200 MG-UNIT TABS Recorded   6  [employer] Vitamin D TABS Recorded   7  Lisinopril 40 MG Oral Tablet; Take 1 tablet daily; Therapy: 59Fud7829 to (Last Kris Ember)  Requested for: 61Vud0242 Ordered  Medication List Reviewed: The medication list was reviewed and updated today  Allergies    1  No Known Drug Allergies    Vitals  Signs   Recorded: 18ABC4671 10:39AM   Heart Rate: 76  Respiration: 16  Systolic: 734  Diastolic: 68  Height: 5 ft 1 8 in  Weight: 152 lb   BMI Calculated: 27 98  BSA Calculated: 1 7    Physical Exam    Constitutional   General appearance: No acute distress, well appearing and well nourished  Eyes   Conjunctiva and lids: No swelling, erythema or discharge  Pupils and irises: Equal, round and reactive to light  Ears, Nose, Mouth, and Throat   External inspection of ears and nose: Normal     Otoscopic examination: Tympanic membranes translucent with normal light reflex  Canals patent without erythema  Nasal mucosa, septum, and turbinates: Normal without edema or erythema  Oropharynx: Normal with no erythema, edema, exudate or lesions      Pulmonary   Respiratory effort: No increased work of breathing or signs of respiratory distress  Auscultation of lungs: Clear to auscultation  Cardiovascular   Palpation of heart: Normal PMI, no thrills  Auscultation of heart: Normal rate and rhythm, normal S1 and S2, without murmurs  Examination of extremities for edema and/or varicosities: Normal     Carotid pulses: Normal     Abdomen   Abdomen: Non-tender, no masses  Liver and spleen: No hepatomegaly or splenomegaly  Lymphatic   Palpation of lymph nodes in neck: No lymphadenopathy  Musculoskeletal   Gait and station: Normal     Digits and nails: Normal without clubbing or cyanosis  Inspection/palpation of joints, bones, and muscles: Normal     Skin   Skin and subcutaneous tissue: Normal without rashes or lesions  Psychiatric   Orientation to person, place, and time: Normal     Mood and affect: Normal          Health Management  Diabetes mellitus type II, controlled   *VB - Eye Exam; every 1 year; Next Due: [id number]; Active  *VB - Foot Exam; every 1 year; Last 43VEZ9011; Next Due: 35Gms0798; Active  Urine Microalbumin/Creatinine - POC; every 1 year; Next Due: 18TJB3464; Overdue  History of Screening for colon cancer   COLONOSCOPY; every 10 years; Next Due: 87ANJ0381; Overdue  History of Visit for screening mammogram   Digital Bilateral Screening Mammogram With CAD; every 1 year; Last 23ETP2001; Next Due:  83TEZ7444; Overdue  Health Maintenance   Medicare Annual Wellness Visit; every 1 year; Next Due: 36SVG7980;  Overdue    Future Appointments    Date/Time Provider Specialty Site   05/02/2017 09:30 AM Surendra Wallace MD [facility]     Signatures   Electronically signed by : Fidel Payne MD; Feb 7 2017 11:27AM EST                       (Author)

## 2018-01-15 VITALS
BODY MASS INDEX: 27.61 KG/M2 | DIASTOLIC BLOOD PRESSURE: 64 MMHG | SYSTOLIC BLOOD PRESSURE: 140 MMHG | TEMPERATURE: 98.7 F | HEART RATE: 120 BPM | WEIGHT: 150 LBS

## 2018-01-15 NOTE — MISCELLANEOUS
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)   3  Palpitation (785 1) (R00 2)   4  Sinus tachycardia (427 89) (R00 0)   5  Hyponatremia (276 1) (E87 1)   6  Leukocytosis, unspecified type (288 60) (D72 829)   7  Anxiety (300 00) (F41 9)   8  Benign essential hypertension (401 1) (I10)    Plan  Anxiety    · LORazepam 0 5 MG Oral Tablet; TAKE 1 TABLET EVERY 8 HOURS AS NEEDED   Rx By: Marga Ordaz; Dispense: 30 Days ; #:90 Tablet; Refill: 0; For: Anxiety; NANCY = N; Print Rx  Benign essential hypertension    · Lisinopril 40 MG Oral Tablet; Take 1 tablet daily   Rx By: Marga Ordaz; Dispense: 0 Days ; #:30 Tablet; Refill: 11; For: Benign essential hypertension; NANCY = N; Verified Transmission to [address] [place] [address]; Last Updated By: System Maktoob; 10/20/2017 1:19:58 PM  Fatigue, PMH: History of dizziness, Radiculopathy    · Meloxicam 15 MG Oral Tablet (Mobic)   Rx By: Marga Ordaz; Dispense: 0 Days ; #:15 Tablet; Refill: 0; For: Fatigue, PMH: History of dizziness, Radiculopathy; NANCY = N; Sent To: [facility]  Palpitation    · Metoprolol Succinate ER 25 MG Oral Tablet Extended Release 24 Hour   Rx By: Marga Ordaz; Dispense: 30 Days ; #:30 Tablet Extended Release 24 Hour; Refill: 5; For: Palpitation; NANCY = N; Record  Sinus tachycardia    · Metoprolol Tartrate 25 MG Oral Tablet; TAKE 1 TABLET TWICE DAILY   Rx By: Marga Ordaz; Dispense: 15 Days ; #:30 Tablet; Refill: 0; For: Sinus tachycardia; NANCY = N; Record  Vitamin D deficiency    · Vitamin D3 28579 UNIT Oral Capsule   Rx By: Marga Ordaz; Dispense: 0 Days ; #:8 Capsule; Refill: 0; For: Vitamin D deficiency; NANCY = N; Sent To: [facility]  Unlinked    · Hydrocodone-Acetaminophen 5-325 MG Oral Tablet   Dispense: 3 Days ; #:20 TABS; Refill: 0; NANCY = N; Record;  Last Updated By: Marga Ordaz; 10/20/2017 1:16:25 PM    Discussion/Summary  Discussion Summary:   Patient was admitted to [facility] from October 11 to October 16 2017   1  Left upper quadrant pain: Completely resolved, it was due to her splenic infarction but it resolved  2  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, patient was started on aspirin, her blood thinner was discontinued  3  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, patient will follow-up with the vascular surgeon next week  To continue with the aspirin  4  Hyponatremia: Patient's sodium was 124 at the time of the admission, Nephrology was consulted, need to obtain some of her records, patient was placed on fluid restriction, Then her sodium normalized by the time she left the hospital, her sodium was 136    5  Elevated heart rate: Cardiac workup and Cardiology consultation during her hospitalization was done, so to be sinus tachycardia patient was started on metoprolol 25 milligram b i d , her blood pressure is tolerating it well, to continue with that for now, patient is on Holter monitor for 2 weeks, have follow-up with Cardiology next week, she is scheduled for stress test as well next week, follow up on those studies and decide after  6  Hypertension: Blood pressure is very well controlled on the lisinopril, patient has been taking beta-blocker for the last few days and her blood pressure is doing well to continue for now, if dizziness, lightheadedness, patient to stop the metoprolol and to notify us immediately  7  Anemia, leukocytosis: Stable, her numbers normalized by the time of discharge, patient has regular follow-up with the Hematology as an outpatient  To continue with iron supplement      8  Questionable anxiety: Her palpitation response to the Ativan during the hospital admission, started on Ativan every 8 hours as needed, had a very long discussion with patient and her daughter about to use the medication while the to avoid any fall or drowsiness, patient to follow up in 2 months to discuss her symptoms of anxiety, if patient using Ativan a regular basis we may need to consider long-term antianxiety medication  Medication SE Review and Pt Understands Tx: Possible side effects of new medications were reviewed with the patient/guardian today  The treatment plan was reviewed with the patient/guardian  The patient/guardian understands and agrees with the treatment plan   Self Referrals:   Self Referrals: Yes Cardiology      Chief Complaint  Chief Complaint Free Text Note Form: pt is here for rupinder following hospital stay at [facility] for what started out as ruq pain   she now has a holter monitor on for two weeks   she states she was told there is blockage to a vessel in her spleen   she has upcomming appts with vascular and cardiology   cd      History of Present Illness  TCM Communication [facility]: The patient is being contacted for follow-up after hospitalization  She was hospitalized at [facility]  The date of admission: 10/11/2017, date of discharge: 10/16/2017  Diagnosis: abdominal pain, blood clot in spleen  She was discharged to home  Follow-up appointments with other specialists: Dr Gauri Jensen MD on 10/20/2017  Communication performed and completed by Mariah Rivera   HPI: Patient is here for follow-up after her recent hospitalization to [facility], patient was hospitalized for right upper quadrant pain, diagnosed with splenic infarction, and celiac artery occlusion, patient started on aspirin, patient feels okay, denied any dizziness, patient is confused about her medication, she was started on metoprolol and continue her lisinopril, her blood pressure is stable today,      Review of Systems  Complete-Female:   Constitutional: No fever, no chills, feels well, no tiredness, no recent weight gain or weight loss  Eyes: No complaints of eye pain, no red eyes, no eyesight problems, no discharge, no dry eyes, no itching of eyes     ENT: no complaints of earache, no loss of hearing, no nose bleeds, no nasal discharge, no sore throat, no hoarseness  Cardiovascular: No complaints of slow heart rate, no fast heart rate, no chest pain, no palpitations, no leg claudication, no lower extremity edema  Respiratory: No complaints of shortness of breath, no wheezing, no cough, no SOB on exertion, no orthopnea, no PND  Gastrointestinal: No complaints of abdominal pain, no constipation, no nausea or vomiting, no diarrhea, no bloody stools  Genitourinary: No complaints of dysuria, no incontinence, no pelvic pain, no dysmenorrhea, no vaginal discharge or bleeding  Musculoskeletal: No complaints of arthralgias, no myalgias, no joint swelling or stiffness, no limb pain or swelling  Integumentary: No complaints of skin rash or lesions, no itching, no skin wounds, no breast pain or lump  Neurological: No complaints of headache, no confusion, no convulsions, no numbness, no dizziness or fainting, no tingling, no limb weakness, no difficulty walking  Psychiatric: Not suicidal, no sleep disturbance, no anxiety or depression, no change in personality, no emotional problems  Endocrine: No complaints of proptosis, no hot flashes, no muscle weakness, no deepening of the voice, no feelings of weakness  Hematologic/Lymphatic: No complaints of swollen glands, no swollen glands in the neck, does not bleed easily, does not bruise easily  Active Problems    1  [place] (626 0) (N91 2)   2  Anemia (285 9) (D64 9)   3  Asymptomatic age-related postmenopausal state (V49 81) (Z78 0)   4  Benign essential hypertension (401 1) (I10)   5  Carotid artery plaque (433 10) (I65 29)   6  Carotid bruit (785 9) (R09 89)   7  Cataract, left (366 9) (H26 9)   8  Cataract, nuclear sclerotic senile, left (366 16) (H25 12)   9  Closed displaced fracture of carpal bone of right wrist with routine healing (V54 19)   (S62 101D)   10   Diabetes mellitus type II, controlled (250 00) (E11 9)   11  Elevated CPK (790 5) (R74 8)   12  Elevated sed rate (790 1) (R70 0)   13  Fatigue (780 79) (R53 83)   14  GERD without esophagitis (530 81) (K21 9)   15  Denied: History of mental disorder   16  Hyperlipidemia (272 4) (E78 5)   17  Hyponatremia (276 1) (E87 1)   18  Incontinence of urine in female (788 30) (R32)   19  Levoscoliosis (737 39) (M41 80)   20  Low hemoglobin (285 9) (D64 9)   21  Pathological fracture of right radius (733 12) (M84 433A)   22  Radiculopathy (729 2) (M54 10)   23  Thrombocytosis (238 71) (D47 3)   24  Vasovagal syncope (780 2) (R55)   25  Vitamin D deficiency (268 9) (E55 9)    Past Medical History    1  History of Acute UTI (599 0) (N39 0)   2  History of Common cold (460) (J00)   3  History of Displaced fracture of distal phalanx of right thumb, initial encounter   4  History of Encounter for special screening examination for genitourinary disorder (V81 6)   (Z13 89)   5  History of Encounter for special screening examination for genitourinary disorder (V81 6)   (Z13 89)   6  History of chest pain (V13 89) (Z87 898)   7  History of cough   8  History of dizziness (V13 89) (Z87 898)   9  Denied: History of mental disorder   10  History of screening mammography (V15 89) (Z92 89)   11  History of Influenza vaccination administered at current visit (V04 81) (Z23)   12  History of Left arm pain (729 5) (M79 602)   13  History of Medicare annual wellness visit, initial (V70 0) (Z00 00)   14  History of Preoperative clearance (V72 84) (Z01 818)   15  History of Screening for colon cancer (V76 51) (Z12 11)   16  History of Visit for gynecologic examination (V72 31) (Z01 419)   17  History of Visit for screening mammogram (V76 12) (Z12 31)   18  History of Wrist pain, right (719 43) (M25 531)    Surgical History    1  History of Appendectomy   2  History of Cataract Surgery   3  History of Septoplasty  Surgical History Reviewed:    The surgical history was reviewed and updated today  Family History  Mother    1  Family history of myocardial infarction (V17 3) (Z82 49)  Father    2  Family history of myocardial infarction (V17 3) (Z82 49)  Other    3  No family history of mental disorder  Family History Reviewed: The family history was reviewed and updated today  Social History    · Does not use illicit drugs (V97 30) (Z78 9)   · Former smoker (V15 82) (P25 210)   · Living alone (V60 3) (Z60 2)   · No secondhand smoke exposure (V49 89) (Z78 9)   · Retired   ·   Social History Reviewed: The social history was reviewed and updated today  The social history was reviewed and is unchanged  Current Meds   1  Aspirin 81 MG TABS; TAKE 1 TABLET DAILY; Therapy: 52VUM6064 to (Evaluate:20Sjg4939) Recorded   2  Atorvastatin Calcium 40 MG Oral Tablet; TAKE 1 TABLET DAILY (OFFICE VISIT NEEDED); Therapy: 41PPZ9963 to (Last Rx:12Oct2017)  Requested for: 34Wrs0103 Ordered   3  BL Flax Seed Oil CAPS Recorded   4  Calcium + D 600-200 MG-UNIT TABS Recorded   5  Hydrocodone-Acetaminophen 5-325 MG Oral Tablet; Therapy: 13BPU7117 to Recorded   6  Iron 325 (65 Fe) MG Oral Tablet; TAKE 1 TABLET Daily with stool softener of choice; Therapy: 99DAT3807 to (Evaluate:28Vdr9709); Last Rx:15Row0155 Ordered   7  Lisinopril 40 MG Oral Tablet; Take 1 tablet daily; Therapy: 43Vzr3234 to (Last Rx:05Sbc0700)  Requested for: 37Xwz5318 Ordered   8  Meloxicam 15 MG Oral Tablet; TAKE 1 TABLET DAILY WITH FOOD; Therapy: 73Lvq7656 to (Last Rx:02Opp9255)  Requested for: 56Vuh7253 Ordered   9  Omeprazole 20 MG Oral Capsule Delayed Release; TAKE 1 CAPSULE Daily; Therapy: 55PGW6445 to (Evaluate:10Zxh9546)  Requested for: 94Aaq0352; Last   Rx:10Yxu2914 Ordered   10  Vitamin C 500 MG Oral Capsule; Therapy: 94Kbw0151 to Recorded   11  Vitamin D3 48717 UNIT Oral Capsule; TAKE 1 CAPSULE Weekly;     Therapy: 89CDT2304 to (Last Rx:09Boc6721)  Requested for: 74Rrd7430 Ordered  Medication List Reviewed: The medication list was reviewed and updated today  Allergies    1  No Known Drug Allergies    Vitals  Signs   Recorded: 18CLX1030 12:44PM   Heart Rate: 80, L Radial  Pulse Quality: Regular, L Radial  Systolic: 062, LUE, Sitting  Diastolic: 70, LUE, Sitting  Height: 5 ft 1 8 in  Weight: 141 lb   BMI Calculated: 25 96  BSA Calculated: 1 64    Physical Exam    Constitutional   General appearance: No acute distress, well appearing and well nourished  Eyes   Conjunctiva and lids: No swelling, erythema or discharge  Ears, Nose, Mouth, and Throat   External inspection of ears and nose: Normal     Oropharynx: Normal with no erythema, edema, exudate or lesions  Pulmonary   Respiratory effort: No increased work of breathing or signs of respiratory distress  Auscultation of lungs: Clear to auscultation  Cardiovascular   Palpation of heart: Normal PMI, no thrills  Auscultation of heart: Normal rate and rhythm, normal S1 and S2, without murmurs  Examination of extremities for edema and/or varicosities: Normal     Carotid pulses: Normal     Abdomen   Abdomen: Non-tender, no masses  Liver and spleen: No hepatomegaly or splenomegaly  Lymphatic   Palpation of lymph nodes in neck: No lymphadenopathy  Musculoskeletal   Gait and station: Normal     Digits and nails: Normal without clubbing or cyanosis  Inspection/palpation of joints, bones, and muscles: Normal     Skin   Skin and subcutaneous tissue: Normal without rashes or lesions      Psychiatric   Orientation to person, place, and time: Normal     Mood and affect: Normal          Future Appointments    Date/Time Provider Specialty Site   12/14/2017 09:30 AM Raza Eduardo MD Family [facility] [place] [address]     Signatures   Electronically signed by : Lamin Cintron MD; Oct 20 2017  2:13PM EST                       (Author)

## 2018-01-16 NOTE — RESULT NOTES
Verified Results  * MAMMO SCREENING BILATERAL W CAD 20Jun2017 12:25PM Gena Joseph Order Number: [id number]    - Patient Instructions: To schedule this appointment, please contact Central Scheduling at [phone]  Do not wear any perfume, powder, lotion or deodorant on breast or underarm area  Please bring your doctors order, referral (if needed) and insurance information with you on the day of the test  Failure to bring this information may result in this test being rescheduled  Arrive 15 minutes prior to your appointment time to register  On the day of your test, please bring any prior mammogram or breast studies with you that were not performed at a [facility]  Failure to bring prior exams may result in your test needing to be rescheduled  Test Name Result Flag Reference   MAMMO SCREENING BILATERAL W CAD (Report)     Patient History:   Patient is postmenopausal    Family history of premenopausal breast cancer at age 55 in    sister, breast cancer at age 71 in paternal cousin  Patient is a former smoker  Patient's BMI is 26 5  Reason for exam: screening, asymptomatic  Mammo Screening Bilateral W CAD: June 20, 2017 - Check In #:    [de-identified]   Bilateral CC and MLO view(s) were taken  Technologist: SHY Bustamante (R)(M)   Prior study comparison: July 16, 2013, bilateral digital    screening mammogram performed at [address]  July 13, 2012, bilateral digital screening    mammogram performed at [address]  July 11, 2011, bilateral digital screening mammogram    performed at [address]  July 7, 2010, bilateral digital screening mammogram performed at [address]  July 6, 2009, bilateral    digital screening mammogram performed at [address]  There are scattered fibroglandular densities       No dominant soft tissue mass, architectural distortion or    suspicious calcifications are noted in either breast  The skin    and nipple contours are within normal limits  No evidence of malignancy  No significant changes when compared with prior studies  ACR BI-RADSï¾® Assessments: BiRad:1 - Negative     Recommendation:   Routine screening mammogram of both breasts in 1 year  A    reminder letter will be scheduled  Analyzed by CAD     8-10% of cancers will be missed on mammography  Management of a    palpable abnormality must be based on clinical grounds  Patients   will be notified of their results via letter from our facility  Accredited by [employer] of [employer] and FDA  Transcription Location:  M[address] Angel [address]: [id number]     Risk Value(s):   Tyrer-Cuzick 10 Year: 5 000%, Tyrer-Cuzick Lifetime: 5 000%,    Myriad Table: 2 6%, ROMEL 5 Year: 3 0%, NCI Lifetime: 6 0%, MRS    : Based on personal and/or family history,    consideration of hereditary risk assessment may be warranted     Signed by:   Verlin Felty, MD   6/20/17

## 2018-01-16 NOTE — RESULT NOTES
Discussion/Summary   xray showed Degenrative changes and levoscolisosis    I prefer she is seen by orthopedics      Verified Results  * XR SPINE LUMBAR MINIMUM 4 VIEWS NON INJURY [id number] 09:41AM Rigoberto Miller    Order Number: [id number]     Test Name Result Flag Reference   XR SPINE LUMBAR MINIMUM 4 VIEWS (Report)     LUMBAR SPINE     INDICATION: Lower back pain  Radiculopathy     COMPARISON: None     VIEWS: AP, lateral, bilateral oblique and coned down projections     IMAGES: 5     FINDINGS:     Levoscoliosis measuring approximately 44 degrees from T12 to L4, apex L2  There is no radiographic evidence of acute fracture or destructive osseous lesion  Severe degenerative changes throughout the lumbar spine, with disc space narrowing, posterior facet hypertrophy  Mild grade 1 anterolisthesis L5 on S1  Spondylolysis at L5 not excluded  Mild retrolisthesis L1 on L2 is likely degenerative  Visualized soft tissues appear unremarkable  IMPRESSION:   Levoscoliosis and severe degenerative changes above         Workstation performed: [id number]     Signed by:   Mary Fletcher MD   8/25/17

## 2018-01-16 NOTE — RESULT NOTES
Verified Results  (1) LIPID PANEL, FASTING 19Apr2017 09:10AM Annabelle Sanders     Test Name Result Flag Reference   CHOLESTEROL, TOTAL 149 mg/dL  125-200   HDL CHOLESTEROL 37 mg/dL L > OR = 46   TRIGLICERIDES 637 mg/dL H <150   LDL-CHOLESTEROL 68 mg/dL (calc)  <130   Desirable range <100 mg/dL for patients with CHD or  diabetes and <70 mg/dL for diabetic patients with  known heart disease  CHOL/HDLC RATIO 4 0 (calc)  < OR = 5 0   NON HDL CHOLESTEROL 112 mg/dL (calc)     Target for non-HDL cholesterol is 30 mg/dL higher than   LDL cholesterol target  (1) CK (CPK) 19Apr2017 09:10AM Sebastián Tomlinson     Test Name Result Flag Reference   CREATINE KINASE, TOTAL 393 U/L H      (Q) MICROALBUMIN, RANDOM URINE (W/CREATININE) [id number] 09:10AM Annabelle Sanders     Test Name Result Flag Reference   CREATININE, RANDOM URINE 146 mg/dL     MICROALBUMIN 1 7 mg/dL     Reference Range  Not established   MICROALBUMIN/CREATININE$RATIO, RANDOM URINE 12 mcg/mg creat  <30   The ADA defines abnormalities in albumin  excretion as follows:     Category         Result (mcg/mg creatinine)     Normal                    <30  Microalbuminuria            Clinical albuminuria   > OR = 300     The ADA recommends that at least two of three  specimens collected within a 3-6 month period be  abnormal before considering a patient to be  within a diagnostic category       (Q) TSH, 3RD GENERATION W/REFLEX TO FT4 19Apr2017 09:10AM Annabelle Sanders     Test Name Result Flag Reference   TSH W/REFLEX TO FT4 1 61 mIU/L  0 40-4 50     (Q) HEMOGLOBIN A1c 19Apr2017 09:10AM Annabelle Sanders   REPORT COMMENT:  FASTING:YES     Test Name Result Flag Reference   HEMOGLOBIN A1c 6 4 % of total Hgb H <5 7   For someone without known diabetes, a hemoglobin   A1c value between 5 7% and 6 4% is consistent with  prediabetes and should be confirmed with a   follow-up test      For someone with known diabetes, a value <7%  indicates that their diabetes is well controlled  A1c  targets should be individualized based on duration of  diabetes, age, comorbid conditions, and other  considerations  This assay result is consistent with an increased risk  of diabetes  Currently, no consensus exists regarding use of  hemoglobin A1c for diagnosis of diabetes for children

## 2018-01-18 NOTE — PROGRESS NOTES
Assessment    1  Medicare annual wellness visit, initial (V70 0) (Z00 00)   2  Closed displaced fracture of carpal bone of right wrist with routine healing (V54 19)   (S62 101D)   3  Fatigue (780 79) (R53 83)   4  Pathological fracture of right radius (733 12) (M84 433A)   5  Hyponatremia (276 1) (E87 1)   6  Anemia (285 9) (D64 9)   7  Benign essential hypertension (401 1) (I10)    Plan  PMH: Encounter for special screening examination for genitourinary disorder    · *VB - Urinary Incontinence Screen (Dx Z13 89 Screen for UI); Status:Complete -  Retrospective By Protocol Authorization;   Done: 65WIG4714 09:19AM    Discussion/Summary  Impression: Initial Annual Wellness Visit  Cardiovascular screening and counseling: the risks and benefits of screening were discussed and screening is current  Diabetes screening and counseling: the risks and benefits of screening were discussed and screening is current  Colorectal cancer screening and counseling: the risks and benefits of screening were discussed and screening is current  Breast cancer screening and counseling: the risks and benefits of screening were discussed and screening is current  Osteoporosis screening and counseling: the risks and benefits of screening were discussed and screening is current  Abdominal aortic aneurysm screening and counseling: the risks and benefits of screening were discussed  Immunizations: the risks and benefits of influenza vaccination were discussed with the patient, influenza vaccination is recommended annually, the risks and benefits of pneumococcal vaccination were discussed with the patient, the lifetime pneumococcal vaccine has been completed, the risks and benefits of the Tdap vaccine were discussed with the patient and Tdap vaccination up to date  Advance Directive Planning: complete and up to date  Advice and education were given regarding fall risk reduction and increasing physical activity   She was referred to hematology/oncology  Patient Discussion: plan discussed with the patient, follow-up visit needed in one year  Chief Complaint  patient here for an AWV  ak      History of Present Illness  The patient is being seen for the initial annual wellness visit  Medicare Screening and Risk Factors   Hospitalizations: she has been previously hospitalizied  Medicare Screening Tests Risk Questions   Drug and Alcohol Use: The patient is a former cigarette smoker and has never used smokeless tobacco  The patient reports never drinking alcohol  Alcohol concern:   The patient has no concerns about alcohol abuse  She has never used illicit drugs  Diet and Physical Activity: Current diet includes well balanced meals, 1 servings of fruit per day, 1 servings of vegetables per day, 1 servings of meat per day, 0 servings of whole grains per day, 2-3 servings of dairy products per day, 1 cups of coffee per day and 4 glasses water  She exercises infrequently  Exercise: walking  Mood Disorder and Cognitive Impairment Screening: She denies feeling down, depressed, or hopeless over the past two weeks  She denies feeling little interest or pleasure in doing things over the past two weeks  Cognitive impairment screening: denies difficulty learning/retaining new information, difficulty handling complex tasks, denies difficulty with reasoning, denies difficulty with spatial ability and orientation, denies difficulty with language and denies difficulty with behavior  Functional Ability/Level of Safety: Hearing is normal bilaterally, normal in the right ear and normal in the left ear  She denies hearing difficulties  She does not use a hearing aid   Activities of daily living details: does not need help using the phone, no transportation help needed, does not need help shopping, no meal preparation help needed, does not need help doing housework, does not need help doing laundry, does not need help managing medications and does not need help managing money  Fall risk factors: The patient fell 1 times in the past 12 months  Home safety risk factors:  no grab bars in the bathroom and no handrails on the stairs, but no unfamiliar surroundings, no loose rugs, no poor household lighting, no uneven floors and no household clutter  Advance Directives: Advance directives: living will and durable power of  for health care directives  Co-Managers and Medical Equipment/Suppliers: See Patient Care Team      Patient Care Team    Care Team Member Role Specialty Office Number   [facility]  Physician Assistant [phone] [address] [facility]  Physician Assistant [phone]   [facility]  Physician Assistant [phone]   Shruthi ABBOTT  Specialist Hematology Oncology [phone]     Review of Systems    Constitutional: malaise and fatigue, but no fever and no chills  Head and Face: negative  Eyes: negative  ENT: negative  Cardiovascular: negative  Respiratory: negative and no shortness of breath  Gastrointestinal: negative  Genitourinary: negative  Musculoskeletal: negative  Integumentary and Breasts: negative  Neurological: confusion and dizziness, but no fainting, no paresthesias, no saddle paresthesia and no leg numbness  Active Problems    1  [place] (626 0) (N91 2)   2  Asymptomatic age-related postmenopausal state (V49 81) (Z78 0)   3  Benign essential hypertension (401 1) (I10)   4  Carotid artery plaque (433 10) (I65 29)   5  Carotid bruit (785 9) (R09 89)   6  Cataract, left (366 9) (H26 9)   7  Cataract, nuclear sclerotic senile, left (366 16) (H25 12)   8  Closed displaced fracture of carpal bone of right wrist with routine healing (V54 19)   (S62 101D)   9  Diabetes mellitus type II, controlled (250 00) (E11 9)   10  Displaced fracture of distal phalanx of right thumb, initial encounter   11  Elevated CPK (790 5) (R74 8)   12  Elevated sed rate (790 1) (R70 0)   13  Fatigue (780 79) (R53 83)   14  GERD without esophagitis (530 81) (K21 9)   15  Denied: History of mental disorder   16  Hyperlipidemia (272 4) (E78 5)   17  Hyponatremia (276 1) (E87 1)   18  Incontinence of urine in female (788 30) (R32)   19  Levoscoliosis (737 39) (M41 80)   20  Low hemoglobin (285 9) (D64 9)   21  Pathological fracture of right radius (733 12) (M84 433A)   22  Radiculopathy (729 2) (M54 10)   23  Thrombocytosis (238 71) (D47 3)   24  Vasovagal syncope (780 2) (R55)   25  Vitamin D deficiency (268 9) (E55 9)   26  Wrist pain, right (719 43) (M25 531)    Past Medical History    1  History of Common cold (460) (J00)   2  Denied: History of mental disorder   3  History of Influenza vaccination administered at current visit (V04 81) (Z23)   4  History of Preoperative clearance (V72 84) (Z01 818)   5  History of Screening for colon cancer (V76 51) (Z12 11)   6  History of Visit for gynecologic examination (V72 31) (Z01 419)   7  History of Visit for screening mammogram (V76 12) (Z12 31)    The active problems and past medical history were reviewed and updated today  Surgical History    1  History of Appendectomy   2  History of Cataract Surgery   3  History of Septoplasty    The surgical history was reviewed and updated today  Family History  Mother    1  Family history of myocardial infarction (V17 3) (Z82 49)  Father    2  Family history of myocardial infarction (V17 3) (Z82 49)  Other    3  No family history of mental disorder    The family history was reviewed and updated today  Social History    · Does not use illicit drugs (R52 81) (Z78 9)   · Former smoker (V15 82) (A85 033)   · Living alone (V60 3) (Z60 2)   · No secondhand smoke exposure (V49 89) (Z78 9)   · Retired   ·   The social history was reviewed and updated today  The social history was reviewed and is unchanged  Current Meds   1  Aspirin 81 MG TABS; TAKE 1 TABLET DAILY;    Therapy: 45DBR9904 to (Evaluate:97Vkp5564) Recorded   2  Atorvastatin Calcium 40 MG Oral Tablet; TAKE 1 TABLET DAILY (OFFICE VISIT NEEDED); Therapy: 91QTB3585 to (Last Jose Owens)  Requested for: 98Ccw8963 Ordered   3  BL Flax Seed Oil CAPS Recorded   4  Calcium + D 600-200 MG-UNIT TABS Recorded   5  Hydrocodone-Acetaminophen 5-325 MG Oral Tablet; Therapy: 29KWL1005 to Recorded   6  Iron 325 (65 Fe) MG Oral Tablet; TAKE 1 TABLET Daily with stool softener of choice; Therapy: 85AHE4309 to (Evaluate:50Mco0582); Last Rx:17Lgh0211 Ordered   7  Lisinopril 40 MG Oral Tablet; Take 1 tablet daily; Therapy: 21Slh4811 to (Last Rx:37Wtm7025)  Requested for: 68Agk6097 Ordered   8  Meloxicam 15 MG Oral Tablet; TAKE 1 TABLET DAILY WITH FOOD; Therapy: 82Xvu5606 to (Last Rx:37Jyx8180)  Requested for: 22Dlw2332 Ordered   9  Omeprazole 20 MG Oral Capsule Delayed Release; TAKE 1 CAPSULE Daily; Therapy: 97CMW6682 to (Evaluate:89Zxp7520)  Requested for: 05Pbi6158; Last   Rx:96Bmu4784 Ordered   10  Vitamin C 500 MG Oral Capsule; Therapy: 65Tgj7630 to Recorded   11  Vitamin D3 35373 UNIT Oral Capsule; TAKE 1 CAPSULE Weekly; Therapy: 47RML3089 to (Last Rx:95Hca6017)  Requested for: 30Civ5920 Ordered    The medication list was reviewed and updated today  Allergies    1  No Known Drug Allergies    Immunizations  Influenza --- Angella Vasquez: [id number] Stakes: 07-Oct-2015; Series3: 25-Oct-2016   PPSV --- Angella Vasquez: 03-Nov-2005   Tetanus --- Series1: 20-May-1995     Vitals  Signs   Recorded: 13Sep2017 09:24AM   Heart Rate: 84  Systolic: 607  Diastolic: 84  Weight: 334 lb   BMI Calculated: 27 43  BSA Calculated: 1 68    Results/Data  PHQ-9 Adult Depression Screening 13Sep2017 09:22AM User, s     Test Name Result Flag Reference   PHQ-9 Adult Depression Score 1     Over the last two weeks, how often have you been bothered by any of the following problems?   Little interest or pleasure in doing things: Not at all - 0  Feeling down, depressed, or hopeless: Not at all - 0  Trouble falling or staying asleep, or sleeping too much: Not at all - 0  Feeling tired or having little energy: Several days - 1  Poor appetite or over eating: Not at all - 0  Feeling bad about yourself - or that you are a failure or have let yourself or your family down: Not at all - 0  Trouble concentrating on things, such as reading the newspaper or watching television: Not at all - 0  Moving or speaking so slowly that other people could have noticed  Or the opposite -  being so fidgety or restless that you have been moving around a lot more than usual: Not at all - 0  Thoughts that you would be better off dead, or of hurting yourself in some way: Not at all - 0   PHQ-9 Adult Depression Screening Negative     PHQ-9 Difficulty Level Not difficult at all     PHQ-9 Severity Minimal Depression       *VB - Urinary Incontinence Screen (Dx Z13 89 Screen for UI) [id number] 09:19AM Teresa Perera     Test Name Result Flag Reference   Urinary Incontinence Assessment 82Haz9782         Health Management  Diabetes mellitus type II, controlled   *VB - Eye Exam; every 1 year; Next Due: [id number]; Overdue  *VB - Foot Exam; every 1 year; Last 64SWB7016; Next Due: 74IMD1274; Active  Urine Microalbumin/Creatinine - POC; every 1 year; Next Due: 76BYP5960; Overdue  History of Screening for colon cancer   COLONOSCOPY; every 10 years; Next Due: 27OLS0503; Overdue  History of Visit for screening mammogram   Digital Bilateral Screening Mammogram With CAD; every 1 year; Last 05ZNN4066; Next Due:  09TFC5787; Overdue  Health Maintenance   Medicare Annual Wellness Visit; every 1 year; Next Due: 32NFU0126; Overdue    Future Appointments    Date/Time Provider Specialty Site   10/20/2017 10:00 AM KAYY Guy   Hematology Oncology [facility]   12/13/2017 09:30 AM Teresa Perera MD Family Medicine [facility]     Signatures   Electronically signed by : Jared Cuba, MD; Sep 14 2017 10:13PM EST                       (Author)

## 2018-01-18 NOTE — RESULT NOTES
Discussion/Summary   Dexa scan is normal there is alot of improvment since 2013     Verified Results  * DXA BONE DENSITY SPINE HIP AND PELVIS 20Jun2017 12:26PM Georgette Walsh Order Number: [id number]    - Patient Instructions: To schedule this appointment, please contact Central Scheduling at [phone]  Test Name Result Flag Reference   DXA BONE DENSITY SPINE HIP AND PELVIS (Report)     CENTRAL DXA SCAN     CLINICAL HISTORY:  68year old post-menopausal  female risk factors include estrogen deficiency  Osteopenia  TECHNIQUE: Bone densitometry was performed using a [employer] W bone densitometer  Regions of interest appear properly placed  There are no obvious fractures or other confounding variables which could limit the study  Degenerative or    osteoarthritic changes are noted on the spine image including scoliosis making the T score result probably unreliable  L2 is excluded from evaluation by the computer  COMPARISON: Follow-up     RESULTS:    LUMBAR SPINE: L1-L4:   BMD 1 005 gm/cm2   T-score normal, -0 4 and 18% higher than in 2007 and 13% higher than in 2003 statistically significant change   Z-score +2 1     LEFT TOTAL HIP:   BMD 0 953 gm/cm2   T-score normal, 0 1   Z-score +2 0     LEFT FEMORAL NECK:   BMD 0 740 gm/cm2   T-score below normal, -1 0 and 5% higher than in 2007 and unchanged from 2003  Z-score +1 2     The forearm BMD is 0 76 and the T score is normal, 0 4 and unchanged from 2007 and 4% higher than in 2003  The Z score is +3 1       IMPRESSION:   1  Based on the [facility] classification, this study identifies a low normal femoral neck density and the patient is considered at low risk for fracture  2  A daily intake of calcium of at least 1200 mg and vitamin D, 800-1000 IU, as well as weight bearing and muscle strengthening exercise, fall prevention and avoidance of tobacco and excessive alcohol intake as basic preventive measures are recommended       3  Repeat DXA scan on the same equipment in 24-36 months as clinically indicated  The 10 year risk of hip fracture is 1 7%, with the 10 year risk of major osteoporotic fracture being 10%, as calculated by the [facility] fracture risk assessment tool (FRAX)  The current NOF guidelines recommend treating patients with FRAX 10 year risk score    of >3% for hip fracture and >20% for major osteoporotic fracture  WHO CLASSIFICATION:   Normal (a T-score of -1 0 or higher)   Low bone mineral density (a T-score of less than -1 0 but higher than -2 5)   Osteoporosis (a T-score of -2 5 or less)   Severe osteoporosis (a T-score of -2 5 or less with a fragility fracture)      Thank you for allowing us the opportunity to participate in your patient care  The expanded DEXA report will no longer be arriving in your mail  If you desire to view the full report please contact [address] [place] [address] or access the PACS system         Workstation performed: [id number]     Signed by:   Jenn Cruz MD   6/22/17

## 2018-01-22 VITALS
RESPIRATION RATE: 16 BRPM | BODY MASS INDEX: 27.97 KG/M2 | HEART RATE: 76 BPM | WEIGHT: 152 LBS | SYSTOLIC BLOOD PRESSURE: 114 MMHG | HEIGHT: 62 IN | DIASTOLIC BLOOD PRESSURE: 68 MMHG

## 2018-01-22 VITALS
HEART RATE: 80 BPM | WEIGHT: 141 LBS | BODY MASS INDEX: 25.95 KG/M2 | SYSTOLIC BLOOD PRESSURE: 142 MMHG | HEIGHT: 62 IN | DIASTOLIC BLOOD PRESSURE: 70 MMHG

## 2018-01-23 NOTE — RESULT NOTES
Discussion/Summary   will discuss abnormal result at [address] 12/14/17      Verified Results  (1) CBC/PLT/DIFF [id number] 09:06AM Annabelle Sanders     Test Name Result Flag Reference   WHITE BLOOD CELL COUNT 8 5 Thousand/uL  3 8-10 8   RED BLOOD CELL COUNT 3 69 Million/uL L 3 80-5 10   HEMOGLOBIN 11 5 g/dL L 11 7-15 5   HEMATOCRIT 34 3 % L 35 0-45 0   MCV 93 0 fL  80 0-100 0   MCH 31 2 pg  27 0-33 0   MCHC 33 5 g/dL  32 0-36 0   RDW 14 1 %  11 0-15 0   PLATELET COUNT 882 Thousand/uL H 140-400   ABSOLUTE NEUTROPHILS 5211 cells/uL  8523-3287   ABSOLUTE LYMPHOCYTES 2244 cells/uL  850-3900   ABSOLUTE MONOCYTES 629 cells/uL  200-950   ABSOLUTE EOSINOPHILS 298 cells/uL     ABSOLUTE BASOPHILS 119 cells/uL  0-200   NEUTROPHILS 61 3 %     LYMPHOCYTES 26 4 %     MONOCYTES 7 4 %     EOSINOPHILS 3 5 %     BASOPHILS 1 4 %     MPV 9 1 fL  7 5-12 5     (1) LIPID PANEL, FASTING [id number] 09:06AM Annabelle Sanders     Test Name Result Flag Reference   CHOLESTEROL, TOTAL 165 mg/dL  <200   HDL CHOLESTEROL 46 mg/dL L >42   TRIGLICERIDES 815 mg/dL H <150   LDL-CHOLESTEROL (Report)     LDL cholesterol not calculated  Triglyceride levels  greater than 400 mg/dL invalidate calculated LDL results  Reference range: <100     Desirable range <100 mg/dL for patients with CHD or  diabetes and <70 mg/dL for diabetic patients with  known heart disease  LDL-C is now calculated using the Faisal-Multani   calculation, which is a validated novel method providing   better accuracy than the Friedewald equation in the   estimation of LDL-C  Eddie Marrero  [facility]  5217;[id number]): [id number]   ([email or web address])   CHOL/HDLC RATIO 3 6 (calc)  <5 0   NON HDL CHOLESTEROL 119 mg/dL (calc)  <130   For patients with diabetes plus 1 major ASCVD risk   factor, treating to a non-HDL-C goal of <100 mg/dL   (LDL-C of <70 mg/dL) is considered a therapeutic   option       (1) CK (CPK) [id number] 09:06AM Gilbert Bascom     Test Name Result Flag Reference   CREATINE KINASE, TOTAL 157 U/L H      (1) COMPREHENSIVE METABOLIC PANEL [id number] 92:04SP Potter Babinski     Test Name Result Flag Reference   GLUCOSE 99 mg/dL  65-99   Fasting reference interval   UREA NITROGEN (BUN) 14 mg/dL  7-25   CREATININE 0 72 mg/dL  0 60-0 93   For patients >52years of age, the reference limit  for Creatinine is approximately 13% higher for people  identified as -American  eGFR NON-AFR  AMERICAN 81 mL/min/1 73m2  > OR = 60   eGFR AFRICAN AMERICAN 94 mL/min/1 73m2  > OR = 60   BUN/CREATININE RATIO   4-09   NOT APPLICABLE (calc)   SODIUM 131 mmol/L L 135-146   POTASSIUM 4 8 mmol/L  3 5-5 3   CHLORIDE 99 mmol/L     CARBON DIOXIDE 24 mmol/L  20-31   CALCIUM 10 0 mg/dL  8 6-10 4   PROTEIN, TOTAL 7 4 g/dL  6 1-8 1   ALBUMIN 4 6 g/dL  3 6-5 1   GLOBULIN 2 8 g/dL (calc)  1 9-3 7   ALBUMIN/GLOBULIN RATIO 1 6 (calc)  1 0-2 5   BILIRUBIN, TOTAL 0 3 mg/dL  0 2-1 2   ALKALINE PHOSPHATASE 40 U/L     AST 20 U/L  10-35   ALT 19 U/L  6-29     (Q) SED RATE BY MODIFIED WESTERGREN [id number] 09:06AM Annabelle Sanders     Test Name Result Flag Reference   SED RATE BY MODIFIED$WESTERGREN 39 mm/h H < OR = 30     (Q) ROSALINDA SCREEN, IFA, WITH REFLEX TO TITER AND PATTERN [id number] 09:06AM Annabelle Sanders     Test Name Result Flag Reference   ROSALINDA SCREEN, IFA POSITIVE A NEGATIVE   ROSALINDA IFA is a first line screen for detecting the  presence of up to approximately 150 autoantibodies in  various autoimmune diseases  A positive ROSALINDA IFA result  is suggestive of autoimmune disease and reflexes to  titer and pattern  Further laboratory testing may be  considered if clinically indicated  Visit Physician FAQs for interpretation of all  antibodies in the [place], prevalence, and association  with diseases at [email or web address]  [email or web address]   ROSALINDA PATTERN HOMOGENEOUS A    Homogeneous pattern is associated with systemic lupus  erythematosus (SLE), drug induced lupus and juvenile  idiopathic arthritis  ROSALINDA TITER 1:40 titer H    A low level ROSALINDA titer may be present in pre-clinical  autoimmune diseases and normal individuals  Reference Range                  <1:40        Negative                  1:40-1:80    Low Antibody Level                  >1:80        Elevated Antibody Level     (1) RF QUANTITATION [id number] 09:06AM Annabelle Sanders     Test Name Result Flag Reference   RHEUMATOID FACTOR <14 IU/mL  <14     (Q) HEMOGLOBIN A1c [id number] 09:06AM Annabelle Sanders   REPORT COMMENT:  FASTING:YES     Test Name Result Flag Reference   HEMOGLOBIN A1c 6 0 % of total Hgb H <5 7   For someone without known diabetes, a hemoglobin   A1c value between 5 7% and 6 4% is consistent with  prediabetes and should be confirmed with a   follow-up test      For someone with known diabetes, a value <7%  indicates that their diabetes is well controlled  A1c  targets should be individualized based on duration of  diabetes, age, comorbid conditions, and other  considerations  This assay result is consistent with an increased risk  of diabetes  Currently, no consensus exists regarding use of  hemoglobin A1c for diagnosis of diabetes for children

## 2018-01-24 VITALS
DIASTOLIC BLOOD PRESSURE: 72 MMHG | SYSTOLIC BLOOD PRESSURE: 140 MMHG | HEIGHT: 61 IN | HEART RATE: 80 BPM | BODY MASS INDEX: 27.56 KG/M2 | WEIGHT: 146 LBS | RESPIRATION RATE: 16 BRPM

## 2018-02-21 DIAGNOSIS — K21.9 GERD WITHOUT ESOPHAGITIS: Primary | ICD-10-CM

## 2018-02-21 DIAGNOSIS — M54.10 RADICULOPATHY, UNSPECIFIED SPINAL REGION: ICD-10-CM

## 2018-02-21 PROBLEM — E55.9 VITAMIN D DEFICIENCY: Status: ACTIVE | Noted: 2017-08-16

## 2018-02-21 PROBLEM — R26.2 AMBULATORY DYSFUNCTION: Status: ACTIVE | Noted: 2017-12-15

## 2018-02-21 PROBLEM — R70.0 ELEVATED SED RATE: Status: ACTIVE | Noted: 2017-08-16

## 2018-02-21 PROBLEM — F41.9 ANXIETY: Status: ACTIVE | Noted: 2017-10-20

## 2018-02-21 PROBLEM — I70.8 OCCLUSION OF CELIAC ARTERY: Status: ACTIVE | Noted: 2017-10-20

## 2018-02-21 PROBLEM — M41.80 LEVOSCOLIOSIS: Status: ACTIVE | Noted: 2017-08-26

## 2018-02-21 RX ORDER — MELOXICAM 15 MG/1
TABLET ORAL
Qty: 30 TABLET | Refills: 1 | Status: SHIPPED | OUTPATIENT
Start: 2018-02-21 | End: 2018-04-28 | Stop reason: SDUPTHER

## 2018-02-21 RX ORDER — OMEPRAZOLE 20 MG/1
CAPSULE, DELAYED RELEASE ORAL
Qty: 30 CAPSULE | Refills: 5 | Status: SHIPPED | OUTPATIENT
Start: 2018-02-21 | End: 2018-08-06 | Stop reason: SDUPTHER

## 2018-03-08 DIAGNOSIS — I10 ESSENTIAL HYPERTENSION: Primary | ICD-10-CM

## 2018-04-09 ENCOUNTER — TELEPHONE (OUTPATIENT)
Dept: FAMILY MEDICINE CLINIC | Facility: CLINIC | Age: 78
End: 2018-04-09

## 2018-04-09 NOTE — TELEPHONE ENCOUNTER
Pt was last seen December, there were no labs ordered, her apt is next Wednesday if she needs labs can you put them in the computer and have one of the MA's call pt  Thank you!

## 2018-04-12 DIAGNOSIS — E78.5 HYPERLIPIDEMIA, UNSPECIFIED HYPERLIPIDEMIA TYPE: ICD-10-CM

## 2018-04-12 DIAGNOSIS — E11.9 CONTROLLED TYPE 2 DIABETES MELLITUS WITHOUT COMPLICATION, WITHOUT LONG-TERM CURRENT USE OF INSULIN (HCC): Primary | ICD-10-CM

## 2018-04-12 DIAGNOSIS — I10 BENIGN ESSENTIAL HYPERTENSION: ICD-10-CM

## 2018-04-12 DIAGNOSIS — R26.2 AMBULATORY DYSFUNCTION: ICD-10-CM

## 2018-04-12 DIAGNOSIS — E55.9 VITAMIN D DEFICIENCY: ICD-10-CM

## 2018-04-12 DIAGNOSIS — R70.0 ELEVATED SED RATE: ICD-10-CM

## 2018-04-12 NOTE — TELEPHONE ENCOUNTER
LMOM for pt  That blood work was ordered and that she can pick it up at office or if she goes to [facility] they will be able to look it up in the system

## 2018-04-17 LAB
ALBUMIN SERPL-MCNC: 4.5 G/DL (ref 3.6–5.1)
ALBUMIN/CREAT UR: 63 MCG/MG CREAT
ALBUMIN/GLOB SERPL: 1.5 (CALC) (ref 1–2.5)
ALP SERPL-CCNC: 48 U/L (ref 33–130)
ALT SERPL-CCNC: 32 U/L (ref 6–29)
AST SERPL-CCNC: 28 U/L (ref 10–35)
BASOPHILS # BLD AUTO: 112 CELLS/UL (ref 0–200)
BASOPHILS NFR BLD AUTO: 1.2 %
BILIRUB SERPL-MCNC: 0.3 MG/DL (ref 0.2–1.2)
BUN SERPL-MCNC: 15 MG/DL (ref 7–25)
BUN/CREAT SERPL: ABNORMAL (CALC) (ref 6–22)
CALCIUM SERPL-MCNC: 10 MG/DL (ref 8.6–10.4)
CHLORIDE SERPL-SCNC: 96 MMOL/L (ref 98–110)
CHOLEST SERPL-MCNC: 141 MG/DL
CHOLEST/HDLC SERPL: 3.2 (CALC)
CO2 SERPL-SCNC: 22 MMOL/L (ref 20–31)
CREAT SERPL-MCNC: 0.72 MG/DL (ref 0.6–0.93)
CREAT UR-MCNC: 136 MG/DL (ref 20–320)
EOSINOPHIL # BLD AUTO: 446 CELLS/UL (ref 15–500)
EOSINOPHIL NFR BLD AUTO: 4.8 %
ERYTHROCYTE [DISTWIDTH] IN BLOOD BY AUTOMATED COUNT: 12.2 % (ref 11–15)
ERYTHROCYTE [SEDIMENTATION RATE] IN BLOOD BY WESTERGREN METHOD: 29 MM/H
EST. AVERAGE GLUCOSE BLD GHB EST-MCNC: 131 (CALC)
EST. AVERAGE GLUCOSE BLD GHB EST-SCNC: 7.3 (CALC)
GLOBULIN SER CALC-MCNC: 3.1 G/DL (CALC) (ref 1.9–3.7)
GLUCOSE SERPL-MCNC: 111 MG/DL (ref 65–99)
HBA1C MFR BLD: 6.2 % OF TOTAL HGB
HCT VFR BLD AUTO: 34.5 % (ref 35–45)
HDLC SERPL-MCNC: 44 MG/DL
HGB BLD-MCNC: 11.8 G/DL (ref 11.7–15.5)
LDLC SERPL CALC-MCNC: 65 MG/DL (CALC)
LYMPHOCYTES # BLD AUTO: 2176 CELLS/UL (ref 850–3900)
LYMPHOCYTES NFR BLD AUTO: 23.4 %
MCH RBC QN AUTO: 31.8 PG (ref 27–33)
MCHC RBC AUTO-ENTMCNC: 34.2 G/DL (ref 32–36)
MCV RBC AUTO: 93 FL (ref 80–100)
MICROALBUMIN UR-MCNC: 8.5 MG/DL
MONOCYTES # BLD AUTO: 828 CELLS/UL (ref 200–950)
MONOCYTES NFR BLD AUTO: 8.9 %
NEUTROPHILS # BLD AUTO: 5738 CELLS/UL (ref 1500–7800)
NEUTROPHILS NFR BLD AUTO: 61.7 %
NONHDLC SERPL-MCNC: 97 MG/DL (CALC)
PLATELET # BLD AUTO: 517 THOUSAND/UL (ref 140–400)
PMV BLD REES-ECKER: 9.5 FL (ref 7.5–12.5)
POTASSIUM SERPL-SCNC: 4.9 MMOL/L (ref 3.5–5.3)
PROT SERPL-MCNC: 7.6 G/DL (ref 6.1–8.1)
RBC # BLD AUTO: 3.71 MILLION/UL (ref 3.8–5.1)
SL AMB EGFR AFRICAN AMERICAN: 94 ML/MIN/1.73M2
SL AMB EGFR NON AFRICAN AMERICAN: 81 ML/MIN/1.73M2
SODIUM SERPL-SCNC: 130 MMOL/L (ref 135–146)
TRIGL SERPL-MCNC: 275 MG/DL
TSH SERPL-ACNC: 1.06 MIU/L (ref 0.4–4.5)
WBC # BLD AUTO: 9.3 THOUSAND/UL (ref 3.8–10.8)

## 2018-04-18 ENCOUNTER — OFFICE VISIT (OUTPATIENT)
Dept: FAMILY MEDICINE CLINIC | Facility: CLINIC | Age: 78
End: 2018-04-18
Payer: COMMERCIAL

## 2018-04-18 VITALS
WEIGHT: 151 LBS | HEART RATE: 92 BPM | DIASTOLIC BLOOD PRESSURE: 58 MMHG | SYSTOLIC BLOOD PRESSURE: 130 MMHG | BODY MASS INDEX: 28.51 KG/M2 | HEIGHT: 61 IN

## 2018-04-18 DIAGNOSIS — E78.5 HYPERLIPIDEMIA, UNSPECIFIED HYPERLIPIDEMIA TYPE: ICD-10-CM

## 2018-04-18 DIAGNOSIS — E11.9 CONTROLLED TYPE 2 DIABETES MELLITUS WITHOUT COMPLICATION, WITHOUT LONG-TERM CURRENT USE OF INSULIN (HCC): ICD-10-CM

## 2018-04-18 DIAGNOSIS — I70.8 OCCLUSION OF CELIAC ARTERY: ICD-10-CM

## 2018-04-18 DIAGNOSIS — E55.9 VITAMIN D DEFICIENCY: ICD-10-CM

## 2018-04-18 DIAGNOSIS — K21.9 GERD WITHOUT ESOPHAGITIS: ICD-10-CM

## 2018-04-18 DIAGNOSIS — E87.1 HYPONATREMIA: ICD-10-CM

## 2018-04-18 DIAGNOSIS — F41.9 ANXIETY: Primary | ICD-10-CM

## 2018-04-18 DIAGNOSIS — I10 BENIGN ESSENTIAL HYPERTENSION: ICD-10-CM

## 2018-04-18 PROCEDURE — 99214 OFFICE O/P EST MOD 30 MIN: CPT | Performed by: FAMILY MEDICINE

## 2018-04-18 RX ORDER — LORAZEPAM 0.5 MG/1
0.5 TABLET ORAL EVERY 8 HOURS PRN
Qty: 30 TABLET | Refills: 0 | Status: SHIPPED | OUTPATIENT
Start: 2018-04-18 | End: 2018-05-07 | Stop reason: SDUPTHER

## 2018-04-18 RX ORDER — FAMOTIDINE 20 MG
1 TABLET ORAL EVERY EVENING
COMMUNITY
End: 2019-10-21 | Stop reason: ALTCHOICE

## 2018-04-18 RX ORDER — ATORVASTATIN CALCIUM 40 MG/1
1 TABLET, FILM COATED ORAL
COMMUNITY
Start: 2014-11-18 | End: 2018-08-13 | Stop reason: SDUPTHER

## 2018-04-18 RX ORDER — LISINOPRIL 40 MG/1
1 TABLET ORAL DAILY
COMMUNITY
Start: 2016-04-19 | End: 2018-07-25

## 2018-04-18 RX ORDER — LORAZEPAM 0.5 MG/1
1 TABLET ORAL EVERY 8 HOURS PRN
COMMUNITY
Start: 2017-10-20 | End: 2018-04-18 | Stop reason: SDUPTHER

## 2018-04-18 RX ORDER — DIPHENOXYLATE HYDROCHLORIDE AND ATROPINE SULFATE 2.5; .025 MG/1; MG/1
1 TABLET ORAL DAILY
COMMUNITY

## 2018-04-18 RX ORDER — ASCORBIC ACID 500 MG
TABLET ORAL
COMMUNITY
Start: 2017-09-13 | End: 2019-06-11

## 2018-04-18 NOTE — PATIENT INSTRUCTIONS
Fatigue   AMBULATORY CARE:   Fatigue  is mental and physical exhaustion that does not get better with rest  Fatigue may make daily activities difficult or cause extreme sleepiness  It is normal to feel tired sometimes, but long-term fatigue may be a sign of serious illness  Seek care immediately if:   · You have chest pain  · You have difficulty breathing  Contact your healthcare provider if:   · You have a cough that gets worse, or does not go away  · You see blood in your urine or bowel movement  · You have numbness or tingling around your mouth or in an arm or leg  · You faint, feel dizzy, or have vision changes  · You have swelling in your lymph nodes  · You are a woman and have vaginal bleeding that is not normal for you, or is not expected  · You lose weight without trying, or you have trouble eating  · You feel weak or have muscle pain  · You have pain or swelling in your joints  · You have questions or concerns about your condition or care  Manage fatigue:   · Keep a fatigue diary  Include anything that makes you feel more tired or less tired  Bring the diary with you to follow-up visits with your provider  · Exercise as directed  Exercise can help you feel more alert  Exercise can also help you manage stress or relieve depression  Try to get at least 30 minutes of exercise most days of the week  · Keep a regular sleep schedule  Go to bed and wake up at the same times every day  Limit naps to 1 hour each day  A nap can improve fatigue, but a long nap may make it harder to go to sleep at night  · Plan and limit your activities  Limit the number of activities such as shopping and cleaning you do each day  If possible, try to spread out your trips throughout the week  Plan ahead so you are not rushing to get something done  Only do activities that you have the energy to complete  Take breaks between activities  Ask for help if you need it   Another person may be able to drive you or help with daily activities  · Eat a variety of healthy foods  Healthy foods include fruits, vegetables, whole-grain breads, low-fat dairy products, beans, lean meats, and fish  Good nutrition can help manage fatigue  · Limit caffeine and alcohol  These can make it difficult to fall or stay asleep  Women should limit alcohol to 1 drink a day  Men should limit alcohol to 2 drinks a day  A drink of alcohol is 12 ounces of beer, 5 ounces of wine, or 1½ ounces of liquor  Ask our healthcare provider how much caffeine is safe for you  · Do not smoke  Nicotine and other chemicals in cigarettes and cigars can cause lung damage and increase fatigue  Ask your healthcare provider for information if you currently smoke and need help to quit  E-cigarettes or smokeless tobacco still contain nicotine  Talk to your healthcare provider before you use these products  Follow up with your healthcare provider as directed: You may need more tests  Your healthcare provider may refer you to a specialist  Write down your questions so you remember to ask them during your visits  © 2017 [address] [employer] Information is for End User's use only and may not be sold, redistributed or otherwise used for commercial purposes  All illustrations and images included in CareNotes® are the copyrighted property of [employer]  or Nicola Jha  The above information is an  only  It is not intended as medical advice for individual conditions or treatments  Talk to your doctor, nurse or pharmacist before following any medical regimen to see if it is safe and effective for you

## 2018-04-18 NOTE — PROGRESS NOTES
Assessment/Plan:    Anxiety  Well controlled, patient using lorazepam as needed and she use it rarely refills was given  Diabetes mellitus type II, controlled ([address] )  Very well controlled, continue with the diet modification, hemoglobin A1c at goal     Hyponatremia  Sodium is 130, it is her baseline, to continue with monitoring  Hyperlipidemia  Stable continue with statin  Elevated sed rate  Elevated sed rate, with a positive ROSALINDA, with fatigue and elevated CPK, patient need to be evaluated by the Rheumatology  Diagnoses and all orders for this visit:    Anxiety  -     LORazepam (ATIVAN) 0 5 mg tablet; Take 1 tablet (0 5 mg total) by mouth every 8 (eight) hours as needed for anxiety    Controlled type 2 diabetes mellitus without complication, without long-term current use of insulin ([facility])    Benign essential hypertension    Hyperlipidemia, unspecified hyperlipidemia type    Hyponatremia    Vitamin D deficiency    Occlusion of celiac artery    GERD without esophagitis    Other orders  -     aspirin 81 MG tablet; Take 1 tablet by mouth daily  -     atorvastatin (LIPITOR) 40 mg tablet; Take 1 tablet by mouth  -     Flaxseed, Linseed, (BL FLAX SEED OIL PO); Take by mouth  -     Calcium Carb-Cholecalciferol (CALCIUM + D3) 600-200 MG-UNIT TABS; Take by mouth  -     Vitamin D, Cholecalciferol, 1000 units CAPS; Take 1 tablet by mouth  -     Omega-3 Fatty Acids (FISH OIL PO); Take 2 g by mouth  -     lisinopril (ZESTRIL) 40 mg tablet; Take 1 tablet by mouth daily  -     Discontinue: LORazepam (ATIVAN) 0 5 mg tablet; Take 1 tablet by mouth every 8 (eight) hours as needed  -     multivitamin (THERAGRAN) TABS; Take 1 tablet by mouth  -     ascorbic acid (VITAMIN C) 500 mg tablet; Take by mouth          Subjective: Cc: patient here for a 4 month f/u re: ambulatory dysfunction, anxiety, HTN, Dm2, GERD, hyperlipidemia, Vitamin D deficiency   Patient states she is seeing Rheumatology on May 9th and is having carpal tunnel surgery on 4/26/18  Patient needs to review bloodwork results  ak     Patient ID: Jenniffer Hitchcock is a 68 y o  female  Patient is here for follow-up on her chronic condition include diabetes, hypertension, acid reflux, hyperlipidemia, patient is doing well overall, patient was diagnosed recently with carpal tunnel syndrome status post steroid injection, patient is scheduled for surgery next week  Patient has an appointment to see the Rheumatology in 2 weeks for her elevated sed rate  The following portions of the patient's history were reviewed and updated as appropriate: allergies, current medications, past family history, past medical history, past social history, past surgical history and problem list     Review of Systems   Constitutional: Negative for appetite change, chills and fever  HENT: Negative for congestion, sore throat and voice change  Eyes: Negative for pain and visual disturbance  Respiratory: Negative for cough  Cardiovascular: Negative for chest pain and palpitations  Gastrointestinal: Negative for abdominal pain, constipation, diarrhea and nausea  Endocrine: Negative for cold intolerance, heat intolerance and polyuria  Genitourinary: Negative for dysuria, enuresis, flank pain and frequency  Musculoskeletal: Negative for gait problem, joint swelling and neck pain  Skin: Negative for color change and wound  Neurological: Negative for dizziness, syncope, speech difficulty, numbness and headaches  Psychiatric/Behavioral: Negative for behavioral problems and confusion  The patient is not nervous/anxious  Objective:  Vitals:    04/18/18 1112   BP: 130/58   Pulse: 92   Weight: 68 5 kg (151 lb)   Height: 5' 1 2" (1 554 m)      Physical Exam   Constitutional: She is oriented to person, place, and time  She appears well-developed and well-nourished  HENT:   Head: Normocephalic and atraumatic     Eyes: Conjunctivae and EOM are normal  Pupils are equal, round, and reactive to light  Neck: Normal range of motion  Neck supple  Cardiovascular: Normal rate, regular rhythm, normal heart sounds and intact distal pulses  Pulmonary/Chest: Effort normal and breath sounds normal    Abdominal: Soft  Bowel sounds are normal    Musculoskeletal: Normal range of motion  Neurological: She is alert and oriented to person, place, and time  She has normal reflexes  Skin: Skin is warm and dry  Psychiatric: She has a normal mood and affect  Her behavior is normal    Vitals reviewed

## 2018-04-19 NOTE — ASSESSMENT & PLAN NOTE
Elevated sed rate, with a positive ROSALINDA, with fatigue and elevated CPK, patient need to be evaluated by the Rheumatology

## 2018-04-28 DIAGNOSIS — M54.10 RADICULOPATHY, UNSPECIFIED SPINAL REGION: ICD-10-CM

## 2018-05-02 RX ORDER — MELOXICAM 15 MG/1
TABLET ORAL
Qty: 30 TABLET | Refills: 1 | Status: SHIPPED | OUTPATIENT
Start: 2018-05-02 | End: 2018-06-21 | Stop reason: SDUPTHER

## 2018-05-07 DIAGNOSIS — F41.9 ANXIETY: ICD-10-CM

## 2018-05-08 RX ORDER — LORAZEPAM 0.5 MG/1
0.5 TABLET ORAL EVERY 8 HOURS PRN
Qty: 30 TABLET | Refills: 0 | Status: SHIPPED | OUTPATIENT
Start: 2018-05-08 | End: 2018-05-21 | Stop reason: SDUPTHER

## 2018-05-21 DIAGNOSIS — F41.9 ANXIETY: ICD-10-CM

## 2018-05-23 RX ORDER — LORAZEPAM 0.5 MG/1
0.5 TABLET ORAL EVERY 8 HOURS PRN
Qty: 30 TABLET | Refills: 0 | Status: SHIPPED | OUTPATIENT
Start: 2018-05-23 | End: 2018-06-19 | Stop reason: SDUPTHER

## 2018-06-15 ENCOUNTER — TELEPHONE (OUTPATIENT)
Dept: FAMILY MEDICINE CLINIC | Facility: CLINIC | Age: 78
End: 2018-06-15

## 2018-06-15 NOTE — TELEPHONE ENCOUNTER
BT Sorry this is a DM pt requesting a refill for her Lorazepam but it looks like pt  Is using too much May we refill if so pt uses [facility] @ [phone]

## 2018-06-16 NOTE — TELEPHONE ENCOUNTER
The patient may have 1 refill of her lorazepam but she needs to make an appointment with Dr Mitra Mendez when she gets back because she is using too much lorazepam

## 2018-06-19 DIAGNOSIS — F41.9 ANXIETY: ICD-10-CM

## 2018-06-19 RX ORDER — LORAZEPAM 0.5 MG/1
0.5 TABLET ORAL EVERY 8 HOURS PRN
Qty: 30 TABLET | Refills: 0 | Status: SHIPPED | OUTPATIENT
Start: 2018-06-19 | End: 2018-07-11 | Stop reason: SDUPTHER

## 2018-06-19 NOTE — TELEPHONE ENCOUNTER
Pt aware as per BT pt needs an appt  With DM to discuss the usage of the Lorazepam, Pt states that she has a f/u in July

## 2018-06-21 DIAGNOSIS — I10 ESSENTIAL HYPERTENSION: ICD-10-CM

## 2018-06-21 DIAGNOSIS — M54.10 RADICULOPATHY, UNSPECIFIED SPINAL REGION: ICD-10-CM

## 2018-06-21 RX ORDER — MELOXICAM 15 MG/1
TABLET ORAL
Qty: 30 TABLET | Refills: 1 | Status: SHIPPED | OUTPATIENT
Start: 2018-06-21 | End: 2018-06-22

## 2018-06-22 DIAGNOSIS — I10 ESSENTIAL HYPERTENSION: ICD-10-CM

## 2018-06-22 DIAGNOSIS — M54.10 RADICULOPATHY, UNSPECIFIED SPINAL REGION: ICD-10-CM

## 2018-06-22 RX ORDER — MELOXICAM 15 MG/1
TABLET ORAL
Qty: 30 TABLET | Refills: 1 | Status: SHIPPED | OUTPATIENT
Start: 2018-06-22 | End: 2018-10-04

## 2018-06-23 DIAGNOSIS — M54.10 RADICULOPATHY, UNSPECIFIED SPINAL REGION: ICD-10-CM

## 2018-06-23 DIAGNOSIS — I10 ESSENTIAL HYPERTENSION: ICD-10-CM

## 2018-06-24 RX ORDER — MELOXICAM 15 MG/1
TABLET ORAL
Qty: 30 TABLET | Refills: 1 | Status: SHIPPED | OUTPATIENT
Start: 2018-06-24 | End: 2018-07-11

## 2018-07-11 ENCOUNTER — OFFICE VISIT (OUTPATIENT)
Dept: FAMILY MEDICINE CLINIC | Facility: CLINIC | Age: 78
End: 2018-07-11
Payer: COMMERCIAL

## 2018-07-11 VITALS
SYSTOLIC BLOOD PRESSURE: 136 MMHG | BODY MASS INDEX: 27.97 KG/M2 | WEIGHT: 149 LBS | DIASTOLIC BLOOD PRESSURE: 62 MMHG | HEART RATE: 96 BPM

## 2018-07-11 DIAGNOSIS — I10 BENIGN ESSENTIAL HYPERTENSION: ICD-10-CM

## 2018-07-11 DIAGNOSIS — R42 DIZZINESS: Primary | ICD-10-CM

## 2018-07-11 DIAGNOSIS — E11.9 CONTROLLED TYPE 2 DIABETES MELLITUS WITHOUT COMPLICATION, WITHOUT LONG-TERM CURRENT USE OF INSULIN (HCC): ICD-10-CM

## 2018-07-11 DIAGNOSIS — F41.9 ANXIETY: ICD-10-CM

## 2018-07-11 DIAGNOSIS — E78.5 HYPERLIPIDEMIA, UNSPECIFIED HYPERLIPIDEMIA TYPE: ICD-10-CM

## 2018-07-11 DIAGNOSIS — E87.1 HYPONATREMIA: ICD-10-CM

## 2018-07-11 DIAGNOSIS — R53.83 FATIGUE, UNSPECIFIED TYPE: ICD-10-CM

## 2018-07-11 PROCEDURE — 99214 OFFICE O/P EST MOD 30 MIN: CPT | Performed by: FAMILY MEDICINE

## 2018-07-11 RX ORDER — LORAZEPAM 0.5 MG/1
0.5 TABLET ORAL EVERY 8 HOURS PRN
Qty: 30 TABLET | Refills: 0 | Status: SHIPPED | OUTPATIENT
Start: 2018-07-11 | End: 2018-08-08 | Stop reason: SDUPTHER

## 2018-07-11 RX ORDER — ATENOLOL 25 MG/1
25 TABLET ORAL DAILY
Qty: 30 TABLET | Refills: 5 | Status: SHIPPED | OUTPATIENT
Start: 2018-07-11 | End: 2018-07-25 | Stop reason: SDUPTHER

## 2018-07-11 NOTE — PROGRESS NOTES
Assessment/Plan:    Dizziness  Patient described it  as unsteadiness, she denied dizziness or lightheadedness  Cardiac and new logical examination is completely unremarkable, patient symptoms started in August when we change her medication from atenolol to metoprolol due to medication being in back order:  I advised patient to switch back from metoprolol to atenolol to see if that improve her overall symptoms  Metoprolol was completely discontinued written instruction was given to the patient to avoid medication confusion, restarted back on atenolol 25 mg daily, to return to the office in 2 weeks to assess her symptoms and to monitor her blood pressure  Fatigue  Patient feel not herself, tired most of the time, not motivated as per patient daughter:  Patient denied any depression but she cannot explain why she is tired, I advised patient to increase her fluid intake, switch from metoprolol to atenolol, sometimes with he a lot of fatigue and tiredness with the metoprolol in compared to the atenolol, if her fatigue continues will consider Cardiology and further workup  Follow up on the CBC to rule out anemia  Her hemoglobin from April was 11 6     Benign essential hypertension  Stable, continue with lisinopril 40 mg daily, switch from metoprolol to atenolol, to return to the office in 2 weeks to check her blood pressure after the medication change  Anxiety  Patient anxiety is controlled, she is taking Ativan more frequent than usual, we need to discuss that on the further visit to prevent overuse of benzos  Hyponatremia  Follow up on a BMP to rule out worsening hyponatremia  If sodium continued to be low will consider Nephrology consult  [facility])  Patient has regular follow-up with the vascular surgery, patient on aspirin and statin  Last seen in May 2018, scheduled for 1 year follow-up in 2019  Patient is symptoms free      Coronary atherosclerosis  Stable, patient has regular follow-up with the cardiologist   Last seen February 2018  Infarction of spleen  Was unclear if this traumatic or due to celiac artery stenosis/splenic artery stenosis:  Patient has regular follow-up with vascular surgeon, patient has no symptoms of pain  Diagnoses and all orders for this visit:    Dizziness  -     Basic metabolic panel    Benign essential hypertension  -     atenolol (TENORMIN) 25 mg tablet; Take 1 tablet (25 mg total) by mouth daily    Anxiety  -     LORazepam (ATIVAN) 0 5 mg tablet; Take 1 tablet (0 5 mg total) by mouth every 8 (eight) hours as needed for anxiety    Hyperlipidemia, unspecified hyperlipidemia type    Hyponatremia    Controlled type 2 diabetes mellitus without complication, without long-term current use of insulin ([facility])    Fatigue, unspecified type          Subjective: patient c/o feeling more unsteady and dizzy in the last few months  It has been happening since last August but getting worse and more frequent  Patient c/o feeling exhausted lately  Patient fell 3 times in the last year due to this issue  ak     Patient ID: Theodore Roach is a 68 y o  female  Patient is here with her daughter she does not feel well patient feel unsteady she has fallen 3 of 4 times lately, patient feel her symptoms started in August 2017, she does not remember any change since then, the only change that was done in August 2017 which change her atenolol to metoprolol due to the atenolol was on back order, denied any headache, shortness of breath, chest pain, numbness or weakness, change in her vision  Patient feel more anxious than usual, patient has very good family support  The following portions of the patient's history were reviewed and updated as appropriate: allergies, current medications, past family history, past medical history, past social history, past surgical history and problem list     Review of Systems   Constitutional: Negative for appetite change, chills and fever     HENT: Negative for congestion, sore throat and voice change  Eyes: Negative for pain and visual disturbance  Respiratory: Negative for cough  Cardiovascular: Negative for chest pain and palpitations  Gastrointestinal: Negative for abdominal pain, constipation, diarrhea and nausea  Endocrine: Negative for cold intolerance, heat intolerance and polyuria  Genitourinary: Negative for dysuria, enuresis, flank pain and frequency  Musculoskeletal: Negative for gait problem, joint swelling and neck pain  Skin: Negative for color change and wound  Neurological: Positive for dizziness and light-headedness  Negative for tremors, seizures, syncope, facial asymmetry, speech difficulty, weakness, numbness and headaches  Psychiatric/Behavioral: Negative for behavioral problems and confusion  The patient is not nervous/anxious  Objective:    /62 (Patient Position: Standing)   Pulse 96   Wt 67 6 kg (149 lb)   BMI 27 97 kg/m²      Physical Exam   Constitutional: She is oriented to person, place, and time  She appears well-developed and well-nourished  HENT:   Head: Normocephalic and atraumatic  Eyes: Conjunctivae and EOM are normal  Pupils are equal, round, and reactive to light  Neck: Normal range of motion  Neck supple  Cardiovascular: Normal rate, regular rhythm, normal heart sounds and intact distal pulses  Pulmonary/Chest: Effort normal and breath sounds normal    Abdominal: Soft  Bowel sounds are normal    Musculoskeletal: Normal range of motion  Neurological: She is alert and oriented to person, place, and time  She has normal reflexes  Skin: Skin is warm and dry  Psychiatric: She has a normal mood and affect  Her behavior is normal    Vitals reviewed

## 2018-07-13 PROBLEM — D75.839 THROMBOCYTOSIS: Status: ACTIVE | Noted: 2017-08-16

## 2018-07-13 PROBLEM — R53.83 FATIGUE: Status: ACTIVE | Noted: 2017-08-11

## 2018-07-13 PROBLEM — K55.1 SMA STENOSIS: Status: ACTIVE | Noted: 2017-10-23

## 2018-07-13 PROBLEM — R42 DIZZINESS: Status: ACTIVE | Noted: 2018-07-13

## 2018-07-13 PROBLEM — R00.0 TACHYCARDIA: Status: ACTIVE | Noted: 2017-11-07

## 2018-07-13 PROBLEM — D64.9 ANEMIA: Status: ACTIVE | Noted: 2017-09-13

## 2018-07-13 PROBLEM — D73.5 INFARCTION OF SPLEEN: Status: ACTIVE | Noted: 2017-10-11

## 2018-07-13 PROBLEM — R76.8 POSITIVE ANA (ANTINUCLEAR ANTIBODY): Status: ACTIVE | Noted: 2017-12-15

## 2018-07-13 PROBLEM — R00.0 SINUS TACHYCARDIA: Status: ACTIVE | Noted: 2017-10-20

## 2018-07-13 NOTE — ASSESSMENT & PLAN NOTE
Patient feel not herself, tired most of the time, not motivated as per patient daughter:  Patient denied any depression but she cannot explain why she is tired, I advised patient to increase her fluid intake, switch from metoprolol to atenolol, sometimes with he a lot of fatigue and tiredness with the metoprolol in compared to the atenolol, if her fatigue continues will consider Cardiology and further workup    Follow up on the CBC to rule out anemia  Her hemoglobin from April was 11 6

## 2018-07-13 NOTE — ASSESSMENT & PLAN NOTE
Patient described it  as unsteadiness, she denied dizziness or lightheadedness  Cardiac and new logical examination is completely unremarkable, patient symptoms started in August when we change her medication from atenolol to metoprolol due to medication being in back order:  I advised patient to switch back from metoprolol to atenolol to see if that improve her overall symptoms  Metoprolol was completely discontinued written instruction was given to the patient to avoid medication confusion, restarted back on atenolol 25 mg daily, to return to the office in 2 weeks to assess her symptoms and to monitor her blood pressure

## 2018-07-13 NOTE — ASSESSMENT & PLAN NOTE
Patient anxiety is controlled, she is taking Ativan more frequent than usual, we need to discuss that on the further visit to prevent overuse of benzos

## 2018-07-13 NOTE — ASSESSMENT & PLAN NOTE
Stable, continue with lisinopril 40 mg daily, switch from metoprolol to atenolol, to return to the office in 2 weeks to check her blood pressure after the medication change

## 2018-07-13 NOTE — ASSESSMENT & PLAN NOTE
Follow up on a BMP to rule out worsening hyponatremia  If sodium continued to be low will consider Nephrology consult

## 2018-07-13 NOTE — ASSESSMENT & PLAN NOTE
Was unclear if this traumatic or due to celiac artery stenosis/splenic artery stenosis:  Patient has regular follow-up with vascular surgeon, patient has no symptoms of pain

## 2018-07-13 NOTE — ASSESSMENT & PLAN NOTE
Patient has regular follow-up with the vascular surgery, patient on aspirin and statin  Last seen in May 2018, scheduled for 1 year follow-up in 2019  Patient is symptoms free

## 2018-07-13 NOTE — PATIENT INSTRUCTIONS
Lightheadedness   WHAT YOU NEED TO KNOW:   Lightheadedness is the feeling that you may faint, but you do not  Your heartbeat may be fast or feel like it flutters  Lightheadedness may occur when you take certain medicines, such as medicine to lower your blood pressure  Dehydration, low sodium, low blood sugar, an abnormal heart rhythm, and anxiety are other common causes  DISCHARGE INSTRUCTIONS:   Return to the emergency department if:   · You have sudden chest pain  · You have trouble breathing or shortness of breath  · You have vision changes, are sweating, and have nausea while you are sitting or lying down  · You feel flushed and your heart is fluttering  · You faint  Contact your healthcare provider if:   · You feel lightheaded often  · Your heart beats faster or slower than usual      · You have questions or concerns about your condition or care  Follow up with your healthcare provider as directed: You may need more tests to help find the cause of your lightheadedness  The tests will help healthcare providers plan the best treatment for you  Write down your questions so you remember to ask them during your visits  Self-care:  Talk with your healthcare provider about these and other ways to manage your symptoms:  · Lie down  when you feel lightheaded, your throat gets tight, or your vision changes  Raise your legs above the level of your heart  · Stand up slowly  Sit on the side of the bed or couch for a few minutes before you stand up  · Take slow, deep breaths when you feel lightheaded  This can help decrease the feeling that you might faint  · Ask if you need to avoid hot baths and saunas  These may make your symptoms worse  Watch for signs of low blood sugar: These include hunger, nervousness, sweating, and fast or fluttery heartbeats  Talk with your healthcare provider about ways to keep your blood sugar level steady    Check your blood pressure often:  You should do this especially if you take medicine to lower your blood pressure  Check your blood pressure when you are lying down and when you are standing  Ask how often to check during the day  Keep a record of your blood pressure numbers  Your healthcare provider may use the record to help plan your treatment  Keep a record of your lightheadedness episodes:  Include your symptoms and your activity before and after the episode  The record can help your healthcare provider find the cause of your lightheadedness and help you manage episodes  © 2017 [facility] Information is for End User's use only and may not be sold, redistributed or otherwise used for commercial purposes  All illustrations and images included in CareNotes® are the copyrighted property of A D [employer] M [employer]  or Nicola Jha  The above information is an  only  It is not intended as medical advice for individual conditions or treatments  Talk to your doctor, nurse or pharmacist before following any medical regimen to see if it is safe and effective for you

## 2018-07-25 ENCOUNTER — OFFICE VISIT (OUTPATIENT)
Dept: FAMILY MEDICINE CLINIC | Facility: CLINIC | Age: 78
End: 2018-07-25
Payer: COMMERCIAL

## 2018-07-25 VITALS
HEART RATE: 88 BPM | SYSTOLIC BLOOD PRESSURE: 128 MMHG | DIASTOLIC BLOOD PRESSURE: 68 MMHG | BODY MASS INDEX: 28.17 KG/M2 | WEIGHT: 149.2 LBS | HEIGHT: 61 IN

## 2018-07-25 DIAGNOSIS — R76.8 POSITIVE ANA (ANTINUCLEAR ANTIBODY): ICD-10-CM

## 2018-07-25 DIAGNOSIS — R53.83 FATIGUE, UNSPECIFIED TYPE: ICD-10-CM

## 2018-07-25 DIAGNOSIS — R05.9 COUGH: ICD-10-CM

## 2018-07-25 DIAGNOSIS — R00.0 SINUS TACHYCARDIA: ICD-10-CM

## 2018-07-25 DIAGNOSIS — E87.1 HYPONATREMIA: ICD-10-CM

## 2018-07-25 DIAGNOSIS — Z12.11 SCREENING FOR COLON CANCER: ICD-10-CM

## 2018-07-25 DIAGNOSIS — I10 BENIGN ESSENTIAL HYPERTENSION: Primary | ICD-10-CM

## 2018-07-25 PROCEDURE — 99214 OFFICE O/P EST MOD 30 MIN: CPT | Performed by: FAMILY MEDICINE

## 2018-07-25 RX ORDER — ATENOLOL 25 MG/1
50 TABLET ORAL DAILY
Qty: 30 TABLET | Refills: 0
Start: 2018-07-25 | End: 2018-08-01

## 2018-07-25 NOTE — PATIENT INSTRUCTIONS
Lightheadedness   AMBULATORY CARE:   Lightheadedness  is the feeling that you may faint, but you do not  Your heartbeat may be fast or feel like it flutters  Lightheadedness may occur when you take certain medicines, such as medicine to lower your blood pressure  Dehydration, low sodium, low blood sugar, an abnormal heart rhythm, and anxiety are other common causes  Seek care immediately if:   · You have sudden chest pain  · You have trouble breathing or shortness of breath  · You have vision changes, are sweating, and have nausea while you are sitting or lying down  · You feel flushed and your heart is fluttering  · You pass out  Contact your healthcare provider if:   · You feel lightheaded often  · Your heart beats faster or slower than usual      · You have questions or concerns about your condition or care  Manage your symptoms:  Talk with your healthcare provider about these and other ways to manage your symptoms:  · Lie down  when you feel lightheaded, your throat gets tight, or your vision changes  Raise your legs above the level of your heart  · Stand up slowly  Sit on the side of the bed or couch for a few minutes before you stand up  · Take slow, deep breaths when you feel lightheaded  This can help decrease the feeling that you might faint  · Ask if you need to avoid hot baths and saunas  These may make your symptoms worse  Watch for signs of low blood sugar: These include hunger, nervousness, sweating, and fast or fluttery heartbeats  Talk with your healthcare provider about ways to keep your blood sugar level steady  Check your blood pressure often:  You should do this especially if you take medicine to lower your blood pressure  Check your blood pressure when you are lying down and when you are standing  Ask how often to check during the day  Keep a record of your blood pressure numbers   Your healthcare provider may use the record to help plan your treatment  Keep a record of your lightheadedness episodes:  Include your symptoms and your activity before and after the episode  The record can help your healthcare provider find the cause of your lightheadedness and help you manage episodes  Follow up with your healthcare provider as directed: You may need more tests to help find the cause of your lightheadedness  The tests will help healthcare providers plan the best treatment for you  Write down your questions so you remember to ask them during your visits  © 2017 [facility] [facility] Information is for End User's use only and may not be sold, redistributed or otherwise used for commercial purposes  All illustrations and images included in CareNotes® are the copyrighted property of A [employer] A M [employer]  or Nicola Jha  The above information is an  only  It is not intended as medical advice for individual conditions or treatments  Talk to your doctor, nurse or pharmacist before following any medical regimen to see if it is safe and effective for you

## 2018-07-25 NOTE — PROGRESS NOTES
Assessment/Plan:    Benign essential hypertension  Blood pressure is very well controlled, lisinopril was discontinued due to the presence of a chronic cough which could be the reason for her cough, atenolol was increased to 50 mg daily, to follow up in 2 weeks for a nurse visit for blood pressure check  Patient to monitor her blood pressure closely at home  Dizziness  Patient denied dizziness but she described her symptoms are more of unsteadiness, unclear the etiology, follow up on a BMP to rule out worsening hyponatremia, blood pressure medication was adjusted  If symptoms continue to may need to consider neuro evaluation  Fatigue  Again this is unexplained, patient to follow up with Rheumatology, to return to the office in 3 months for another evaluation, follow up on a BMP  Cough  Chronic, patient using over-the-counter cough medication with some help, I am suspecting this could be due to the Ace inhibitor, the medication was discontinued, if no improvement in her symptoms in 3 months will consider further testing include chest x-ray and we may consider allergy medication trial        Diagnoses and all orders for this visit:    Benign essential hypertension  -     atenolol (TENORMIN) 25 mg tablet; Take 2 tablets (50 mg total) by mouth daily    Screening for colon cancer  -     Occult Blood, Fecal, IA; Future  -     Occult Blood, Fecal, IA    Sinus tachycardia    Positive ROSALINDA (antinuclear antibody)    Hyponatremia    Cough    Fatigue, unspecified type        Subjective: Follow up  Pt states she is still very fatigued and not feeling herself  She states she has no motivation to get up and moving and is still very unsteady on her feet  She has lost her balance and fallen with no injury recently  kw     Patient ID: Elvin Garcia is a 68 y o  female      For follow-up on fatigue and, unsteadiness, patient continued to have the same symptoms, patient had 1 fall over the last 2 weeks, no injuries, patient on lisinopril 40 mg daily, on atenolol 25 mg that started 2 weeks ago  Patient also complaining of dry cough going on for few months, patient using Mucinex DM with some help  No other symptoms  No shortness of breath no chest pain  The following portions of the patient's history were reviewed and updated as appropriate: allergies, current medications, past family history, past medical history, past social history, past surgical history and problem list     Review of Systems   Constitutional: Negative for appetite change, chills and fever  HENT: Negative for congestion, sore throat and voice change  Eyes: Negative for pain and visual disturbance  Respiratory: Positive for cough  Negative for apnea, chest tightness, shortness of breath, wheezing and stridor  Cardiovascular: Negative for chest pain and palpitations  Gastrointestinal: Negative for abdominal pain, constipation, diarrhea and nausea  Endocrine: Negative for cold intolerance, heat intolerance and polyuria  Genitourinary: Negative for dysuria, enuresis, flank pain and frequency  Musculoskeletal: Negative for gait problem, joint swelling and neck pain  Skin: Negative for color change and wound  Neurological: Negative for dizziness, syncope, speech difficulty, numbness and headaches  Psychiatric/Behavioral: Negative for behavioral problems and confusion  The patient is not nervous/anxious  Objective:      /68 (BP Location: Left arm)   Pulse 88   Ht 5' 1 2" (1 554 m)   Wt 67 7 kg (149 lb 3 2 oz)   BMI 28 01 kg/m²          Physical Exam   Constitutional: She is oriented to person, place, and time  She appears well-developed and well-nourished  HENT:   Head: Normocephalic and atraumatic  Eyes: Conjunctivae and EOM are normal  Pupils are equal, round, and reactive to light  Neck: Normal range of motion  Neck supple     Cardiovascular: Normal rate, regular rhythm, normal heart sounds and intact distal pulses  Pulses are no weak pulses  Pulses:       Dorsalis pedis pulses are 2+ on the right side, and 2+ on the left side  Posterior tibial pulses are 2+ on the right side, and 2+ on the left side  Pulmonary/Chest: Effort normal and breath sounds normal    Abdominal: Soft  Bowel sounds are normal    Musculoskeletal: Normal range of motion  Feet:   Right Foot:   Skin Integrity: Negative for ulcer, skin breakdown, erythema, warmth, callus or dry skin  Left Foot:   Skin Integrity: Negative for ulcer, skin breakdown, erythema, warmth, callus or dry skin  Neurological: She is alert and oriented to person, place, and time  She has normal reflexes  Skin: Skin is warm and dry  Psychiatric: She has a normal mood and affect  Her behavior is normal    Vitals reviewed  Patient's shoes and socks removed  Right Foot/Ankle   Right Foot Inspection  Skin Exam: skin normal and skin intact no dry skin, no warmth, no callus, no erythema, no maceration, no abnormal color, no pre-ulcer, no ulcer and no callus                          Toe Exam: ROM and strength within normal limitsno swelling, no tenderness, erythema and  no right toe deformity  Sensory   Vibration: intact  Proprioception: intact   Monofilament testing: intact  Vascular    The right DP pulse is 2+  The right PT pulse is 2+  Right Toe  - Comprehensive Exam  Ecchymosis: none    Left Foot/Ankle  Left Foot Inspection  Skin Exam: skin normal and skin intactno dry skin, no warmth, no erythema, no maceration, normal color, no pre-ulcer, no ulcer and no callus                         Toe Exam: ROM and strength within normal limitsno swelling, no tenderness, no erythema and no left toe deformity                   Sensory   Vibration: intact  Proprioception: intact  Monofilament: intact  Vascular    The left DP pulse is 2+  The left PT pulse is 2+  Left Toe  - Comprehensive Exam  Ecchymosis: none  Assign Risk Category:  No deformity present;  No loss of protective sensation;  No weak pulses       Risk: 0

## 2018-07-25 NOTE — ASSESSMENT & PLAN NOTE
Blood pressure is very well controlled, lisinopril was discontinued due to the presence of a chronic cough which could be the reason for her cough, atenolol was increased to 50 mg daily, to follow up in 2 weeks for a nurse visit for blood pressure check  Patient to monitor her blood pressure closely at home

## 2018-07-25 NOTE — ASSESSMENT & PLAN NOTE
Again this is unexplained, patient to follow up with Rheumatology, to return to the office in 3 months for another evaluation, follow up on a BMP

## 2018-07-25 NOTE — ASSESSMENT & PLAN NOTE
Chronic, patient using over-the-counter cough medication with some help, I am suspecting this could be due to the Ace inhibitor, the medication was discontinued, if no improvement in her symptoms in 3 months will consider further testing include chest x-ray and we may consider allergy medication trial

## 2018-07-25 NOTE — ASSESSMENT & PLAN NOTE
Patient denied dizziness but she described her symptoms are more of unsteadiness, unclear the etiology, follow up on a BMP to rule out worsening hyponatremia, blood pressure medication was adjusted  If symptoms continue to may need to consider neuro evaluation

## 2018-08-01 ENCOUNTER — TELEPHONE (OUTPATIENT)
Dept: FAMILY MEDICINE CLINIC | Facility: CLINIC | Age: 78
End: 2018-08-01

## 2018-08-01 DIAGNOSIS — I10 BENIGN ESSENTIAL HYPERTENSION: Primary | ICD-10-CM

## 2018-08-01 DIAGNOSIS — I10 HYPERTENSION, UNSPECIFIED TYPE: Primary | ICD-10-CM

## 2018-08-01 RX ORDER — ATENOLOL 50 MG/1
50 TABLET ORAL DAILY
Qty: 30 TABLET | Refills: 5 | Status: SHIPPED | OUTPATIENT
Start: 2018-08-01 | End: 2019-01-27 | Stop reason: SDUPTHER

## 2018-08-01 NOTE — TELEPHONE ENCOUNTER
Becky Mendoza [phone] called and said that atenolol was increased to 50mg once daily but the script was never sent to the pharmacy  She uses [facility] [address]  [facility] C[facility] is completely out of the medication  She also had a question as to when she should be giving the meds, am or pm  Please advise   Thank you

## 2018-08-01 NOTE — TELEPHONE ENCOUNTER
LMOM for patient that her Atenolol was sent to [facility]  And to take it in the morning per Dr Mckeon Murali

## 2018-08-04 DIAGNOSIS — K21.9 GERD WITHOUT ESOPHAGITIS: ICD-10-CM

## 2018-08-06 RX ORDER — OMEPRAZOLE 20 MG/1
CAPSULE, DELAYED RELEASE ORAL
Qty: 30 CAPSULE | Refills: 5 | Status: SHIPPED | OUTPATIENT
Start: 2018-08-06 | End: 2019-01-14 | Stop reason: SDUPTHER

## 2018-08-08 ENCOUNTER — CLINICAL SUPPORT (OUTPATIENT)
Dept: FAMILY MEDICINE CLINIC | Facility: CLINIC | Age: 78
End: 2018-08-08

## 2018-08-08 VITALS
HEART RATE: 80 BPM | WEIGHT: 148 LBS | DIASTOLIC BLOOD PRESSURE: 74 MMHG | HEIGHT: 60 IN | SYSTOLIC BLOOD PRESSURE: 160 MMHG | BODY MASS INDEX: 29.06 KG/M2

## 2018-08-08 DIAGNOSIS — I10 ESSENTIAL HYPERTENSION: Primary | ICD-10-CM

## 2018-08-08 DIAGNOSIS — F41.9 ANXIETY: ICD-10-CM

## 2018-08-08 RX ORDER — LOSARTAN POTASSIUM 50 MG/1
25 TABLET ORAL DAILY
Qty: 30 TABLET | Refills: 3 | Status: SHIPPED | OUTPATIENT
Start: 2018-08-08 | End: 2018-08-28 | Stop reason: SDUPTHER

## 2018-08-08 RX ORDER — LORAZEPAM 0.5 MG/1
0.5 TABLET ORAL DAILY PRN
Qty: 30 TABLET | Refills: 0 | Status: SHIPPED | OUTPATIENT
Start: 2018-08-08 | End: 2018-08-28 | Stop reason: SDUPTHER

## 2018-08-08 NOTE — PROGRESS NOTES
Pt presents for BP check per DM since med change  She reports feeling jittery today and feeling unstable walking  She did take her morning meds today including a lorazepam tablet  Her BP at present is 160/74  DM aware and has ordered Losartan 25mg to be taken in the morning  Pt is also instructed to cut down or eliminate cough syrup as she reports improvement in cough  If she absolutely needs to use a cough syrup she is to use one formulated for HBP  She is to schedule appt with DM in 2 weeks for follow up  --bb

## 2018-08-10 RX ORDER — ATENOLOL 50 MG/1
50 TABLET ORAL DAILY
Qty: 30 TABLET | Refills: 5 | Status: SHIPPED | OUTPATIENT
Start: 2018-08-10 | End: 2018-08-28

## 2018-08-13 DIAGNOSIS — E78.5 HYPERLIPIDEMIA, UNSPECIFIED HYPERLIPIDEMIA TYPE: Primary | ICD-10-CM

## 2018-08-13 RX ORDER — ATORVASTATIN CALCIUM 40 MG/1
TABLET, FILM COATED ORAL
Qty: 90 TABLET | Refills: 3 | Status: SHIPPED | OUTPATIENT
Start: 2018-08-13 | End: 2020-01-27

## 2018-08-17 ENCOUNTER — TRANSCRIBE ORDERS (OUTPATIENT)
Dept: FAMILY MEDICINE CLINIC | Facility: CLINIC | Age: 78
End: 2018-08-17

## 2018-08-17 DIAGNOSIS — R74.8 ABNORMAL LEVELS OF OTHER SERUM ENZYMES: ICD-10-CM

## 2018-08-17 DIAGNOSIS — R76.8 OTHER SPECIFIED ABNORMAL IMMUNOLOGICAL FINDINGS IN SERUM: ICD-10-CM

## 2018-08-17 DIAGNOSIS — D64.9 ANEMIA: Primary | ICD-10-CM

## 2018-08-17 DIAGNOSIS — D73.5 INFARCTION OF SPLEEN: ICD-10-CM

## 2018-08-28 ENCOUNTER — OFFICE VISIT (OUTPATIENT)
Dept: FAMILY MEDICINE CLINIC | Facility: CLINIC | Age: 78
End: 2018-08-28
Payer: COMMERCIAL

## 2018-08-28 VITALS
HEIGHT: 65 IN | BODY MASS INDEX: 24.32 KG/M2 | WEIGHT: 146 LBS | RESPIRATION RATE: 20 BRPM | HEART RATE: 72 BPM | SYSTOLIC BLOOD PRESSURE: 130 MMHG | DIASTOLIC BLOOD PRESSURE: 62 MMHG

## 2018-08-28 DIAGNOSIS — R76.8 POSITIVE ANA (ANTINUCLEAR ANTIBODY): ICD-10-CM

## 2018-08-28 DIAGNOSIS — R53.83 FATIGUE, UNSPECIFIED TYPE: ICD-10-CM

## 2018-08-28 DIAGNOSIS — I10 BENIGN ESSENTIAL HYPERTENSION: ICD-10-CM

## 2018-08-28 DIAGNOSIS — R05.9 COUGH: ICD-10-CM

## 2018-08-28 DIAGNOSIS — E11.9 TYPE 2 DIABETES MELLITUS WITHOUT COMPLICATION, WITH LONG-TERM CURRENT USE OF INSULIN (HCC): Primary | ICD-10-CM

## 2018-08-28 DIAGNOSIS — I10 ESSENTIAL HYPERTENSION: ICD-10-CM

## 2018-08-28 DIAGNOSIS — F41.9 ANXIETY: ICD-10-CM

## 2018-08-28 DIAGNOSIS — Z79.4 TYPE 2 DIABETES MELLITUS WITHOUT COMPLICATION, WITH LONG-TERM CURRENT USE OF INSULIN (HCC): Primary | ICD-10-CM

## 2018-08-28 PROBLEM — R68.89 FORGETFULNESS: Status: ACTIVE | Noted: 2018-08-28

## 2018-08-28 LAB
LEFT EYE DIABETIC RETINOPATHY: NORMAL
LEFT EYE DIABETIC RETINOPATHY: NORMAL
LEFT EYE IMAGE QUALITY: NORMAL
RIGHT EYE DIABETIC RETINOPATHY: NORMAL
RIGHT EYE DIABETIC RETINOPATHY: NORMAL
RIGHT EYE IMAGE QUALITY: NORMAL
SEVERITY (EYE EXAM): NORMAL

## 2018-08-28 PROCEDURE — 92250 FUNDUS PHOTOGRAPHY W/I&R: CPT | Performed by: FAMILY MEDICINE

## 2018-08-28 PROCEDURE — 3075F SYST BP GE 130 - 139MM HG: CPT | Performed by: FAMILY MEDICINE

## 2018-08-28 PROCEDURE — 99214 OFFICE O/P EST MOD 30 MIN: CPT | Performed by: FAMILY MEDICINE

## 2018-08-28 PROCEDURE — 3008F BODY MASS INDEX DOCD: CPT | Performed by: FAMILY MEDICINE

## 2018-08-28 PROCEDURE — 3078F DIAST BP <80 MM HG: CPT | Performed by: FAMILY MEDICINE

## 2018-08-28 RX ORDER — LORAZEPAM 0.5 MG/1
0.5 TABLET ORAL DAILY PRN
Qty: 30 TABLET | Refills: 0 | Status: SHIPPED | OUTPATIENT
Start: 2018-08-28 | End: 2018-09-07 | Stop reason: SDUPTHER

## 2018-08-28 RX ORDER — ESCITALOPRAM OXALATE 5 MG/1
5 TABLET ORAL DAILY
Qty: 30 TABLET | Refills: 3 | Status: SHIPPED | OUTPATIENT
Start: 2018-08-28 | End: 2018-12-13 | Stop reason: SDUPTHER

## 2018-08-28 RX ORDER — LOSARTAN POTASSIUM 50 MG/1
50 TABLET ORAL DAILY
Qty: 30 TABLET | Refills: 0
Start: 2018-08-28 | End: 2018-10-02 | Stop reason: SDUPTHER

## 2018-08-28 NOTE — ASSESSMENT & PLAN NOTE
Much improved with the discontinuation of the lisinopril  To continue off the lisinopril, patient still have a tickle in the throat specially after prolonged talking    If her symptoms continue we may consider ENT referral

## 2018-08-28 NOTE — ASSESSMENT & PLAN NOTE
This seem to be out of control lately, patient need to use her Ativan on daily basis, patient actually use 30 day supply in a 20 days period, I had a very long discussion with patient about the use of the Ativan, if patient need her Ativan on daily bases that may she need to be on long-term antianxiety medication decided to start patient on Lexapro 5 milligram daily,  To return to the office in 3 months to re-evaluate

## 2018-08-28 NOTE — ASSESSMENT & PLAN NOTE
Blood pressure is controlled today in the office, but the home readings were elevated consistently, advised patient to increase her losartan to 50 milligram daily  Patient to keep checking her blood pressure and if it continued to be elevated to call us for further medication adjustment

## 2018-08-28 NOTE — PROGRESS NOTES
Assessment/Plan:    Benign essential hypertension  Blood pressure is controlled today in the office, but the home readings were elevated consistently, advised patient to increase her losartan to 50 milligram daily  Patient to keep checking her blood pressure and if it continued to be elevated to call us for further medication adjustment  Cough  Much improved with the discontinuation of the lisinopril  To continue off the lisinopril, patient still have a tickle in the throat specially after prolonged talking  If her symptoms continue we may consider ENT referral     Positive ROSALINDA (antinuclear antibody)  Patient was seen by the rheumatologist, more blood work to be done  Anxiety  This seem to be out of control lately, patient need to use her Ativan on daily basis, patient actually use 30 day supply in a 20 days period, I had a very long discussion with patient about the use of the Ativan, if patient need her Ativan on daily bases that may she need to be on long-term antianxiety medication decided to start patient on Lexapro 5 milligram daily,  To return to the office in 3 months to re-evaluate  Forgetfulness  Patient and daughter are concerned about it, it is not affecting her ADLs or her overall function,  Discussed with patient if it is a concern we could do a formal testing for her memory including mini-mental status exam and referred to [facility] patient would like to hold off that now  I explained to the patient also this could be due to her uncontrolled anxiety/depression, with the medication things can be reversed  Diagnoses and all orders for this visit:    Benign essential hypertension    Cough    Fatigue, unspecified type    Positive ROSALINDA (antinuclear antibody)    Anxiety  -     escitalopram (LEXAPRO) 5 mg tablet; Take 1 tablet (5 mg total) by mouth daily  -     LORazepam (ATIVAN) 0 5 mg tablet;  Take 1 tablet (0 5 mg total) by mouth daily as needed for anxiety for up to 1 dose    Essential hypertension  -     losartan (COZAAR) 50 mg tablet; Take 1 tablet (50 mg total) by mouth daily          Subjective: pt here for a follow up and to review blood pressure pressure  SHY Bates     Patient ID: Charna Olszewski is a 68 y o  female  Patient is here for follow-up on hypertension, medication adjustment was done recently, patient on losartan 25 milligram as well as atenolol, patient complaining of her anxiety and jitteriness every morning,, patient need to use Ativan on daily basis  She is asking for refills  Hypertension   This is a chronic problem  The current episode started more than 1 year ago  The problem has been waxing and waning since onset  The problem is uncontrolled  Pertinent negatives include no anxiety, blurred vision, chest pain, headaches, malaise/fatigue, neck pain, orthopnea, palpitations, peripheral edema, PND, shortness of breath or sweats  There are no associated agents to hypertension  Risk factors for coronary artery disease include diabetes mellitus  The current treatment provides mild improvement  There are no compliance problems  The following portions of the patient's history were reviewed and updated as appropriate: allergies, current medications, past family history, past medical history, past social history, past surgical history and problem list     Review of Systems   Constitutional: Negative for appetite change, chills, fever and malaise/fatigue  HENT: Negative for congestion, sore throat and voice change  Eyes: Negative for blurred vision, pain and visual disturbance  Respiratory: Negative for cough and shortness of breath  Cardiovascular: Negative for chest pain, palpitations, orthopnea and PND  Gastrointestinal: Negative for abdominal pain, constipation, diarrhea and nausea  Endocrine: Negative for cold intolerance, heat intolerance and polyuria  Genitourinary: Negative for dysuria, enuresis, flank pain and frequency  Musculoskeletal: Negative for gait problem, joint swelling and neck pain  Skin: Negative for color change and wound  Neurological: Negative for dizziness, syncope, speech difficulty, numbness and headaches  Psychiatric/Behavioral: Negative for behavioral problems and confusion  The patient is not nervous/anxious  Objective:    Vitals:    08/28/18 1337   BP: 130/62   BP Location: Left arm   Patient Position: Sitting   Cuff Size: Large   Pulse: 72   Resp: 20   Weight: 66 2 kg (146 lb)   Height: 5' 5" (1 651 m)      Physical Exam   Constitutional: She is oriented to person, place, and time  She appears well-developed and well-nourished  HENT:   Head: Normocephalic and atraumatic  Eyes: Conjunctivae and EOM are normal  Pupils are equal, round, and reactive to light  Neck: Normal range of motion  Neck supple  Cardiovascular: Normal rate, regular rhythm, normal heart sounds and intact distal pulses  Pulmonary/Chest: Effort normal and breath sounds normal    Abdominal: Soft  Bowel sounds are normal    Musculoskeletal: Normal range of motion  Neurological: She is alert and oriented to person, place, and time  She has normal reflexes  Skin: Skin is warm and dry  Psychiatric: She has a normal mood and affect  Her behavior is normal    Vitals reviewed

## 2018-08-28 NOTE — ASSESSMENT & PLAN NOTE
Patient and daughter are concerned about it, it is not affecting her ADLs or her overall function,  Discussed with patient if it is a concern we could do a formal testing for her memory including mini-mental status exam and referred to [facility] patient would like to hold off that now  I explained to the patient also this could be due to her uncontrolled anxiety/depression, with the medication things can be reversed

## 2018-08-28 NOTE — PATIENT INSTRUCTIONS
Anxiety   WHAT YOU SHOULD KNOW:   Anxiety is a condition that causes you to feel excessive worry, uneasiness, or fear  Family or work stress, smoking, caffeine, and alcohol can increase your risk for anxiety  Certain medicines or health conditions can also increase your risk  Anxiety may begin gradually, and can become a long-term condition if it is not managed or treated  AFTER YOU LEAVE:   Medicines:   · Medicines  can help you feel more calm and relaxed, and decrease your symptoms  · Take your medicine as directed  Contact your healthcare provider if you think your medicine is not helping or if you have side effects  Tell him if you are allergic to any medicine  Keep a list of the medicines, vitamins, and herbs you take  Include the amounts, and when and why you take them  Bring the list or the pill bottles to follow-up visits  Carry your medicine list with you in case of an emergency  Follow up with your healthcare provider within 2 weeks or as directed:  Write down your questions so you remember to ask them during your visits  Manage anxiety:   · Go to counseling as directed  Cognitive behavioral therapy can help you understand and change how you react to events that trigger your symptoms  · Find ways to manage your symptoms  Activities such as exercise, meditation, or listening to music can help you relax  · Practice deep breathing  Breathing can change how your body reacts to stress  Focus on taking slow, deep breaths several times a day, or during an anxiety attack  Breathe in through your nose, and out through your mouth  · Avoid caffeine  Caffeine can make your symptoms worse  Avoid foods or drinks that are meant to increase your energy level  · Limit or avoid alcohol  Ask your healthcare provider if alcohol is safe for you  You may not be able to drink alcohol if you take certain anxiety or depression medicines  Limit alcohol to 1 drink per day if you are a woman   Limit alcohol to 2 drinks per day if you are a man  A drink of alcohol is 12 ounces of beer, 5 ounces of wine, or 1½ ounces of liquor  Contact your healthcare provider if:   · Your symptoms get worse or do not get better with treatment  · You think your medicine may be causing side effects  · Your anxiety keeps you from doing your regular daily activities  · You have new symptoms since your last visit  · You have questions or concerns about your condition or care  Seek care immediately or call 911 if:   · You have chest pain, tightness, or heaviness that may spread to your shoulders, arms, jaw, neck, or back  · You feel like hurting yourself or someone else  · You feel dizzy, lightheaded, or faint  © 2014 [address] [employer] is for End User's use only and may not be sold, redistributed or otherwise used for commercial purposes  All illustrations and images included in CareNotes® are the copyrighted property of A D A M [employer] Inc  or Nicola Jha  The above information is an  only  It is not intended as medical advice for individual conditions or treatments  Talk to your doctor, nurse or pharmacist before following any medical regimen to see if it is safe and effective for you

## 2018-09-04 DIAGNOSIS — M54.10 RADICULOPATHY, UNSPECIFIED SPINAL REGION: ICD-10-CM

## 2018-09-04 RX ORDER — MELOXICAM 15 MG/1
TABLET ORAL
Qty: 30 TABLET | Refills: 1 | Status: SHIPPED | OUTPATIENT
Start: 2018-09-04 | End: 2018-10-04

## 2018-09-06 DIAGNOSIS — F41.9 ANXIETY: ICD-10-CM

## 2018-09-06 NOTE — TELEPHONE ENCOUNTER
DM, Pharmacy states that pts Rx for Ativan 0 5mg is on b/o, Can we prescribe 1 mg and have pt  Take 1/2? Pt uses [facility] @ [phone]

## 2018-09-12 RX ORDER — LORAZEPAM 1 MG/1
0.5 TABLET ORAL DAILY PRN
Qty: 30 TABLET | Refills: 0 | Status: SHIPPED | OUTPATIENT
Start: 2018-09-12 | End: 2019-02-25 | Stop reason: SDUPTHER

## 2018-10-02 ENCOUNTER — OFFICE VISIT (OUTPATIENT)
Dept: FAMILY MEDICINE CLINIC | Facility: CLINIC | Age: 78
End: 2018-10-02
Payer: COMMERCIAL

## 2018-10-02 VITALS
DIASTOLIC BLOOD PRESSURE: 60 MMHG | TEMPERATURE: 99.5 F | HEART RATE: 76 BPM | WEIGHT: 147 LBS | SYSTOLIC BLOOD PRESSURE: 152 MMHG | BODY MASS INDEX: 24.46 KG/M2

## 2018-10-02 DIAGNOSIS — I10 ESSENTIAL HYPERTENSION: ICD-10-CM

## 2018-10-02 DIAGNOSIS — R42 LOSS OF EQUILIBRIUM: ICD-10-CM

## 2018-10-02 DIAGNOSIS — R42 DIZZINESS: Primary | ICD-10-CM

## 2018-10-02 DIAGNOSIS — R41.0 CONFUSION: ICD-10-CM

## 2018-10-02 DIAGNOSIS — R53.83 FATIGUE, UNSPECIFIED TYPE: ICD-10-CM

## 2018-10-02 DIAGNOSIS — I10 BENIGN ESSENTIAL HYPERTENSION: ICD-10-CM

## 2018-10-02 DIAGNOSIS — R70.0 ELEVATED SED RATE: ICD-10-CM

## 2018-10-02 DIAGNOSIS — E78.5 HYPERLIPIDEMIA, UNSPECIFIED HYPERLIPIDEMIA TYPE: ICD-10-CM

## 2018-10-02 DIAGNOSIS — R25.1 EPISODE OF SHAKING: ICD-10-CM

## 2018-10-02 DIAGNOSIS — R26.89 BALANCE PROBLEM: ICD-10-CM

## 2018-10-02 PROCEDURE — 99215 OFFICE O/P EST HI 40 MIN: CPT | Performed by: FAMILY MEDICINE

## 2018-10-02 RX ORDER — LOSARTAN POTASSIUM 50 MG/1
50 TABLET ORAL 2 TIMES DAILY
Qty: 60 TABLET | Refills: 3 | Status: SHIPPED | OUTPATIENT
Start: 2018-10-02 | End: 2019-01-27 | Stop reason: SDUPTHER

## 2018-10-02 RX ORDER — LOSARTAN POTASSIUM 50 MG/1
50 TABLET ORAL 2 TIMES DAILY
Qty: 30 TABLET | Refills: 0
Start: 2018-10-02 | End: 2018-10-02 | Stop reason: SDUPTHER

## 2018-10-02 NOTE — PATIENT INSTRUCTIONS
Fall Prevention for Older Adults   WHAT YOU NEED TO KNOW:   What is fall prevention? Fall prevention includes ways to make your home and other areas safer  It also includes ways you can move more carefully to prevent a fall  What increases my risk for falls? As you age, your muscles weaken and your risk for falls increases  Your risk also increases if you take medicines that make you sleepy or dizzy  You may also be at risk if you have vision or joint problems, have low blood pressure, or are not active  How can I help protect myself from falls? Falls are a common cause of injury for older adults  Do the following to help protect yourself:  · Stay active  Exercise can help strengthen your muscles and improve your balance  Your healthcare provider may recommend water aerobics, walking, or [employer]  He may also recommend physical therapy to improve your coordination  Never start an exercise program without asking your healthcare provider first      · Wear shoes that fit well and have soles that   Wear shoes both inside and outside  Use slippers with good   Avoid shoes with high heels  · Use assistive devices as directed  Your healthcare provider may suggest that you use a cane or walker to help you keep your balance  You may need to have grab bars put in your bathroom near the toilet or in the shower  · Stand or sit up slowly  This may help you keep your balance and prevent falls  · Wear a personal alarm  This is a device that allows you to call 911 if you need help  Ask your healthcare provider for more information  · Manage your medical conditions  Keep all appointments with your healthcare providers  Visit your eye doctor as directed  How can I make my home safer? · Add items to prevent falls in the bathroom  Put nonslip strips on your bath or shower floor to prevent you from slipping  Use a bath mat if you do not have carpet in the bathroom   This will prevent you from falling when you step out of the bath or shower  Use a shower seat so you do not need to stand while you shower  Sit on the toilet or a chair in your bathroom to dry yourself and put on clothing  This will prevent you from losing your balance from drying or dressing yourself while you are standing  · Keep paths clear  Remove books, shoes, and other objects from walkways and stairs  Place cords for telephones and lamps out of the way so that you do not need to walk over them  Tape them down if you cannot move them  Remove small rugs  If you cannot remove a rug, secure it with double-sided tape  This will prevent you from tripping  · Install bright lights in your home  Use night lights to help light paths to the bathroom or kitchen  Always turn on the light before you start walking  · Keep items you use often on shelves within reach  Do not use a step stool to help you reach an item  · Paint or place reflective tape on the edges of your stairs  This will help you see the stairs better  Call 911 or have someone else call if:   · You have fallen and are unconscious  · You have fallen and cannot move part of your body  When should I contact my healthcare provider? · You have fallen and have pain or a headache  · You have questions or concerns about your condition or care  CARE AGREEMENT:   You have the right to help plan your care  Learn about your health condition and how it may be treated  Discuss treatment options with your caregivers to decide what care you want to receive  You always have the right to refuse treatment  The above information is an  only  It is not intended as medical advice for individual conditions or treatments  Talk to your doctor, nurse or pharmacist before following any medical regimen to see if it is safe and effective for you    © 2017 [employer] Information is for End User's use only and may not be sold, redistributed or otherwise used for commercial purposes  All illustrations and images included in CareNotes® are the copyrighted property of [employer]  or [employer]

## 2018-10-02 NOTE — PROGRESS NOTES
Assessment/Plan:    Benign essential hypertension  Blood pressure seems to be out control lately, advised patient to increase her losartan to 50 mg twice a day, to continue with atenolol 50 mg daily  To keep monitoring her blood pressure  Balance problem  Patient seem to be unsteady alot recently, unclear why, discussed with patient and her daughter about the fall precautions, home physical therapy to be started to assess her gait and her need for any assistive devices  Episode of shaking  Episode of shaking on early in the morning unclear will need due for this, follow up on routine blood work, MRI to be done, patient to check her blood sugar in the morning to rule out hypoglycemic reason for her shaking, initially it was thought to be anxiety related and that why patient taking Lexapro but if it continue on the medication will consider discontinuation of the Lexapro next visit  Confusion  Daughter noticed daytime confusion, this could be medication related, I advised patient to take Lexapro at night, to take Ativan as needed at night, follow up on MRI, routine blood work, if her symptoms continue with the medication change then will consider discontinuation of the Lexapro  [facility]  Diagnoses and all orders for this visit:    Dizziness  -     CBC and differential  -     Comprehensive metabolic panel  -     MRI brain w wo contrast; Future  -     Ambulatory referral to Neurology; Future    Essential hypertension  -     Discontinue: losartan (COZAAR) 50 mg tablet; Take 1 tablet (50 mg total) by mouth 2 (two) times a day  -     losartan (COZAAR) 50 mg tablet;  Take 1 tablet (50 mg total) by mouth 2 (two) times a day for 30 days    Fatigue, unspecified type  -     Ferritin    Hyperlipidemia, unspecified hyperlipidemia type  -     Lipid panel    Elevated sed rate    Benign essential hypertension    Balance problem  -     MRI brain w wo contrast; Future  -     Ambulatory referral to Neurology; Future  -     Ambulatory referral to Physical Therapy; Future    Loss of equilibrium  -     MRI brain w wo contrast; Future  -     Ambulatory referral to Neurology; Future  -     Ambulatory referral to Physical Therapy; Future    Confusion  -     Folate  -     Vitamin B12  -     Vitamin D 25 hydroxy  -     TSH, 3rd generation with Free T4 reflex  -     Sedimentation rate, automated  -     MRI brain w wo contrast; Future  -     Ambulatory referral to Neurology; Future    Episode of shaking  -     Hemoglobin A1C  -     MRI brain w wo contrast; Future  -     Ambulatory referral to Neurology; Future          Subjective: patient would like to review meds due to her BP's being high intermittently  Patient has almost fallen when bending over as well as being very unsteady when walking that is daily  ak     Patient ID: Emma Boyce is a 68 y o  female  Patient is here with her 2 daughters, patient is concerned about her elevated blood pressure numbers at home, patient is compliant with her medication  Patient continued to complain of shaking episodes specially in early morning, no seizure activity,  Patient daughter noticed that she is more confused during the day for, she is back to her baseline in the evening and at night  Patient feel unsteady and have trouble balancing herself  The following portions of the patient's history were reviewed and updated as appropriate: allergies, current medications, past family history, past medical history, past social history, past surgical history and problem list     Review of Systems   Constitutional: Negative for appetite change, chills and fever  Patient do not feel herself   HENT: Negative for congestion, ear discharge, ear pain, sore throat and voice change  Eyes: Negative for pain and visual disturbance     Respiratory: Negative for cough, choking, chest tightness, shortness of breath, wheezing and stridor  Cardiovascular: Negative for chest pain and palpitations  Gastrointestinal: Negative for abdominal pain, constipation, diarrhea and nausea  Endocrine: Negative for cold intolerance, heat intolerance and polyuria  Genitourinary: Negative for dysuria, enuresis, flank pain and frequency  Musculoskeletal: Negative for gait problem, joint swelling and neck pain  Skin: Negative for color change and wound  Neurological: Positive for tremors and light-headedness  Negative for dizziness, seizures, syncope, speech difficulty, weakness, numbness and headaches  Psychiatric/Behavioral: Positive for confusion and decreased concentration  Negative for behavioral problems  The patient is not nervous/anxious  Objective:    /60   Pulse 76   Temp 99 5 °F (37 5 °C)   Wt 66 7 kg (147 lb)   BMI 24 46 kg/m²      Physical Exam   Constitutional: She is oriented to person, place, and time  She appears well-developed and well-nourished  HENT:   Head: Normocephalic and atraumatic  Right Ear: External ear normal    Left Ear: External ear normal    Eyes: Pupils are equal, round, and reactive to light  Conjunctivae and EOM are normal    Neck: Normal range of motion  Neck supple  Cardiovascular: Normal rate, regular rhythm, normal heart sounds and intact distal pulses  Pulmonary/Chest: Effort normal and breath sounds normal    Abdominal: Soft  Bowel sounds are normal    Musculoskeletal: Normal range of motion  Walk with  A cane   Neurological: She is alert and oriented to person, place, and time  She has normal reflexes  Skin: Skin is warm and dry  Psychiatric: She has a normal mood and affect  Her behavior is normal    Vitals reviewed

## 2018-10-03 ENCOUNTER — TELEPHONE (OUTPATIENT)
Dept: NEUROLOGY | Facility: CLINIC | Age: 78
End: 2018-10-03

## 2018-10-04 LAB
25(OH)D3 SERPL-MCNC: 44 NG/ML (ref 30–100)
ALBUMIN SERPL-MCNC: 4.3 G/DL (ref 3.6–5.1)
ALBUMIN/GLOB SERPL: 1.5 (CALC) (ref 1–2.5)
ALP SERPL-CCNC: 48 U/L (ref 33–130)
ALT SERPL-CCNC: 31 U/L (ref 6–29)
AST SERPL-CCNC: 28 U/L (ref 10–35)
BASOPHILS # BLD AUTO: 125 CELLS/UL (ref 0–200)
BASOPHILS NFR BLD AUTO: 1.4 %
BILIRUB SERPL-MCNC: 0.4 MG/DL (ref 0.2–1.2)
BUN SERPL-MCNC: 8 MG/DL (ref 7–25)
BUN/CREAT SERPL: ABNORMAL (CALC) (ref 6–22)
CALCIUM SERPL-MCNC: 9.6 MG/DL (ref 8.6–10.4)
CHLORIDE SERPL-SCNC: 94 MMOL/L (ref 98–110)
CHOLEST SERPL-MCNC: 129 MG/DL
CHOLEST/HDLC SERPL: 2.6 (CALC)
CO2 SERPL-SCNC: 27 MMOL/L (ref 20–32)
CREAT SERPL-MCNC: 0.64 MG/DL (ref 0.6–0.93)
EOSINOPHIL # BLD AUTO: 418 CELLS/UL (ref 15–500)
EOSINOPHIL NFR BLD AUTO: 4.7 %
ERYTHROCYTE [DISTWIDTH] IN BLOOD BY AUTOMATED COUNT: 12.4 % (ref 11–15)
ERYTHROCYTE [SEDIMENTATION RATE] IN BLOOD BY WESTERGREN METHOD: 17 MM/H
FERRITIN SERPL-MCNC: 54 NG/ML (ref 20–288)
FOLATE SERPL-MCNC: >24 NG/ML
GLOBULIN SER CALC-MCNC: 2.9 G/DL (CALC) (ref 1.9–3.7)
GLUCOSE SERPL-MCNC: 115 MG/DL (ref 65–99)
HBA1C MFR BLD: 6.1 % OF TOTAL HGB
HCT VFR BLD AUTO: 34.6 % (ref 35–45)
HDLC SERPL-MCNC: 50 MG/DL
HGB BLD-MCNC: 11.8 G/DL (ref 11.7–15.5)
LDLC SERPL CALC-MCNC: 52 MG/DL (CALC)
LYMPHOCYTES # BLD AUTO: 2092 CELLS/UL (ref 850–3900)
LYMPHOCYTES NFR BLD AUTO: 23.5 %
MCH RBC QN AUTO: 31.9 PG (ref 27–33)
MCHC RBC AUTO-ENTMCNC: 34.1 G/DL (ref 32–36)
MCV RBC AUTO: 93.5 FL (ref 80–100)
MONOCYTES # BLD AUTO: 712 CELLS/UL (ref 200–950)
MONOCYTES NFR BLD AUTO: 8 %
NEUTROPHILS # BLD AUTO: 5554 CELLS/UL (ref 1500–7800)
NEUTROPHILS NFR BLD AUTO: 62.4 %
NONHDLC SERPL-MCNC: 79 MG/DL (CALC)
PLATELET # BLD AUTO: 462 THOUSAND/UL (ref 140–400)
PMV BLD REES-ECKER: 9.3 FL (ref 7.5–12.5)
POTASSIUM SERPL-SCNC: 4.3 MMOL/L (ref 3.5–5.3)
PROT SERPL-MCNC: 7.2 G/DL (ref 6.1–8.1)
RBC # BLD AUTO: 3.7 MILLION/UL (ref 3.8–5.1)
SL AMB EGFR AFRICAN AMERICAN: 100 ML/MIN/1.73M2
SL AMB EGFR NON AFRICAN AMERICAN: 86 ML/MIN/1.73M2
SODIUM SERPL-SCNC: 131 MMOL/L (ref 135–146)
TRIGL SERPL-MCNC: 206 MG/DL
TSH SERPL-ACNC: 1.14 MIU/L (ref 0.4–4.5)
WBC # BLD AUTO: 8.9 THOUSAND/UL (ref 3.8–10.8)

## 2018-10-04 NOTE — ASSESSMENT & PLAN NOTE
Daughter noticed daytime confusion, this could be medication related, I advised patient to take Lexapro at night, to take Ativan as needed at night, follow up on MRI, routine blood work, if her symptoms continue with the medication change then will consider discontinuation of the Lexapro  Also [facility]

## 2018-10-04 NOTE — ASSESSMENT & PLAN NOTE
Blood pressure seems to be out control lately, advised patient to increase her losartan to 50 mg twice a day, to continue with atenolol 50 mg daily  To keep monitoring her blood pressure

## 2018-10-04 NOTE — ASSESSMENT & PLAN NOTE
Patient seem to be unsteady alot recently, unclear why, discussed with patient and her daughter about the fall precautions, home physical therapy to be started to assess her gait and her need for any assistive devices

## 2018-10-04 NOTE — ASSESSMENT & PLAN NOTE
Episode of shaking on early in the morning unclear will need due for this, follow up on routine blood work, MRI to be done, patient to check her blood sugar in the morning to rule out hypoglycemic reason for her shaking, initially it was thought to be anxiety related and that why patient taking Lexapro but if it continue on the medication will consider discontinuation of the Lexapro next visit

## 2018-10-09 NOTE — PROGRESS NOTES
DEPARTMENT OF NEUROLOGICAL SCIENCES  [address] DISORDERS [facility]         NEW PATIENT EVALUATION NOTE    Patient: Kia Ford  Medical Record Number: # [id number]  YOB: 1940  Date of visit: 10/10/2018    Referring provider: Deon Pimentel MD        HISTORY OF PRESENT ILLNESS:  Ms Janet Rodriguez is a 68 y o  right handed female who has been referred to the [facility] for evaluation of gait imbalance and memory  The patient was accompanied today  History was obtained from patient and daughter    Main bothersome symptoms are:   1  Balance and gait instability  She started in Aug 2017 with a fall and broke her wrist  She was hospitalized for the wrist surgery through Oct 2017  One of the daughters recounts patient was told that pt was given an accidental extra dose of fentanyl while in the recovery room and afterwards her daughters think she has been less active as a result, "sitting around more" and then with more unsteadiness and feeling uncomfortable about walking  She endorses higher chance of falling when she bends forward to  items, including difficulty to get back up; frequency about once a month where she would fall "flat on my face" and forwards  Daughter endorse she tends to walk faster and unable to catch herself  No known family history of parkinson's disease  She is out of breath when she navigates stairs  Patient herself states she is watching more TV by choice, complaining of fatigue during the day  She does not go to [employer] as often as she did before because she is self-conscious about her walking  2  Memory  Starting since Spring 2018 with slow decline of memory; forgetful at times  Denies misplacing personal objects, denies forgetting people's names  Endorses some word finding difficulty   She stopped driving after the fall in Aug 2017, and denies wandering   At baseline now, does not cook and does not do any task alone but with her daughter  All ADLs ok, except she needs to have someone near her while showering  No known family history of dementia  No treatment yet for the memory started before  Patient herself is deferring the history to both her daughters today  She was started on Lexapro by her PCP for anxiety; denies depression right now  She admits she is not eating well; consuming TV dinners often and fast food  - MRI Brain w/wo contrast ordered and pending  Hyposmia: denies  RBD (REM semi-purposeful body movements): denies  Gait Disturbance:  See above  Constipation:   denies  Dementia:  Forgetfulness as above  Swallowing difficulties:  denies  Dysautonomia:  denies  Gaze Palsy: denies  Exercise Therapy:  Mostly sedentary during the day, preferring to watch television and passively spend the day  She endorses reluctance to get up as "watching tv is so much more fun"       Family History: Number of First Degree Relatives With Parkinsonism: none    REVIEW OF PAST MEDICAL, SOCIAL AND FAMILY HISTORY:  This is the list of problems as per our Medical Records:    Patient Active Problem List    Diagnosis Date Noted    Balance problem 10/02/2018    Confusion 10/02/2018    Episode of shaking 10/02/2018    Forgetfulness 08/28/2018    Cough 07/25/2018    Dizziness 07/13/2018    Ambulatory dysfunction 12/15/2017    Positive ROSALINDA (antinuclear antibody) 12/15/2017    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    Anxiety 10/20/2017    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    Sinus tachycardia 10/20/2017    Infarction of spleen 10/11/2017    Anemia 09/13/2017    Levoscoliosis 08/26/2017    Elevated sed rate 08/16/2017    Vitamin D deficiency 08/16/2017    Radiculopathy 08/16/2017    Thrombocytosis ([address] ) 08/16/2017    Fatigue 08/11/2017    GERD without esophagitis 02/07/2017    Carotid artery plaque 04/19/2016    Diabetes mellitus type II, controlled ([address] ) 03/24/2015    Hyponatremia 11/18/2014    Benign essential hypertension 07/17/2014    Hyperlipidemia 07/17/2014    Carotid bruit 07/17/2014    Asthma 11/03/2009    Coronary atherosclerosis 11/03/2009       Past Medical History:   Diagnosis Date    Arthritis     Confusion 10/2/2018    Depression     Displaced fracture of distal phalanx of right thumb     GERD (gastroesophageal reflux disease)     Heart attack ([facility] [address]     Hyperlipidemia     Hypertension         Past Surgical History:   Procedure Laterality Date    APPENDECTOMY      CATARACT EXTRACTION      SEPTOPLASTY          No Known Allergies     Outpatient Encounter Prescriptions as of 10/10/2018   Medication Sig Dispense Refill    ascorbic acid (VITAMIN C) 500 mg tablet Take by mouth      aspirin 81 MG tablet Take 1 tablet by mouth daily      atenolol (TENORMIN) 50 mg tablet Take 1 tablet (50 mg total) by mouth daily 30 tablet 5    atorvastatin (LIPITOR) 40 mg tablet TAKE ONE TABLET BY MOUTH EVERY DAY   OFFICE VISIT NEEDED 90 tablet 3    Calcium Carb-Cholecalciferol (CALCIUM + D3) 600-200 MG-UNIT TABS Take by mouth      escitalopram (LEXAPRO) 5 mg tablet Take 1 tablet (5 mg total) by mouth daily 30 tablet 3    Flaxseed, Linseed, (BL FLAX SEED OIL PO) Take by mouth      LORazepam (ATIVAN) 1 mg tablet Take 0 5 tablets (0 5 mg total) by mouth daily as needed for anxiety 30 tablet 0    losartan (COZAAR) 50 mg tablet Take 1 tablet (50 mg total) by mouth 2 (two) times a day for 30 days 60 tablet 3    multivitamin (THERAGRAN) TABS Take 1 tablet by mouth      Omega-3 Fatty Acids (FISH OIL PO) Take 2 g by mouth      omeprazole (PriLOSEC) 20 mg delayed release capsule TAKE ONE CAPSULE DAILY 30 capsule 5    Vitamin D, Cholecalciferol, 1000 units CAPS Take 1 tablet by mouth       No facility-administered encounter medications on file as of 10/10/2018          Social History   Substance Use Topics    Smoking status: Former Smoker     Quit date: 1996    Smokeless tobacco: Never Used      Comment: no secondhand smoke exposure    Alcohol use Yes      Comment: social   Living alone, retired  worker  High school graduate  Family History   Problem Relation Age of Onset    Heart attack Mother 39    Heart attack Father 61    No Known Problems Other     Breast cancer Sister     Alcohol abuse Daughter         history of        REVIEW OF SYSTEMS:  The patient has entered data on an intake form regarding present illness, past medical and surgical history, medications, allergies, family and social history, and a full review of 14 systems  I have reviewed this form with the patient, and all the relevant information has been included on this note  The full review of systems was negative except as stated in HPI and below  Constitutional: Positive for fatigue  Negative for appetite change and fever  HENT: Negative  Negative for hearing loss, tinnitus, trouble swallowing and voice change  Eyes: Negative  Negative for photophobia and pain  Respiratory: Positive for shortness of breath  Cardiovascular: Negative  Negative for palpitations  Gastrointestinal: Negative  Negative for nausea  Endocrine: Negative  Negative for cold intolerance and heat intolerance  Genitourinary: Positive for frequency  Negative for dysuria and urgency  Musculoskeletal: Positive for back pain and gait problem (balance problems, difficulty walking, immobility or loss of function, falls)  Negative for myalgias and neck pain  Skin: Negative  Allergic/Immunologic: Negative  Neurological: Negative for dizziness, tremors, seizures, syncope, facial asymmetry, speech difficulty, weakness and numbness  Hematological: Negative  Does not bruise/bleed easily  Psychiatric/Behavioral: Positive for confusion and sleep disturbance (Snoring, waking up at night)  Negative for hallucinations  The patient is nervous/anxious           Mood Swings      PHYSICAL EXAMINATION:     Vital signs:  /71 (BP Location: Right arm, Patient Position: Sitting, Cuff Size: Standard)   Pulse (!) 118   Ht 5' 2" (1 575 m)   Wt 67 kg (147 lb 12 8 oz)   BMI 27 03 kg/m²     General:  Well-appearing, well nourished, pleasant patient in no acute distress  Mood and Fund of Knowledge are appropriate  Head:  Normocephalic, atraumatic  Oropharynx and conjunctiva are clear  Speech  No hypophonia or bradylalia  No scanning speech  Language: Comprehension intact  Neck:  Supple, strong 5/5 forward flexion and retroflexion  Extremities: Range of motion is normal       Cognitive and Mental Exam:  - Initially said president was "Latasha Dean" but then picked "Trump" out of a multiple choice    Points MAX   Visuospatial  ----------   Trails A  0 1   Cube Drawing  0 1   Clock Drawing  1 3   Naming Objects  2 3   Attention  ----------   Digit Span  2 2   Letter Reading  1 1   Serial 7s  2 3   Language  ----------   Repetition  1 2   Fluency  0 1   Abstraction  2 2   Delayed Recall  2 5   Orientation                6            6               19 30    +1 high school education so 19-20   Had issues with drawing the clock properly and overall with visuospatial, attention and memory deficits  Cranial Nerves:    Funduscopic examination reveals no papilledema  Direct and consensual light reflexes were normal  No afferent pupillary defect  Visual fields are full to confrontation  Extraocular movements were full, with normal pursuit and saccades  Pupils were equal, reactive to light symmetrically  Facial sensation to light touch was intact  Face is symmetric with normal strength  Hearing was assessed using the Calibrated Finger Rub Auditory Screening Test (CALFRAST) and was not abnormal (Better than CALFRAST-Strong-70)  Palate is up going bilaterally and symmetrically  Neck muscles are strong  Tongue protrusion is at midline with normal movements  No dysarthria        Motor:    MDS-UPDRS III:   Speech: 0  Facial Expression: 0  Tremor (Head): 0  Rest Tremor Severity (RUE/LUE/RLE/LLE/Lip): 0  Action Tremor of Hands (R): 0  Action Tremor of Hands (L): 0  Finger tapping (R): 1  Finger tapping (L): 1  Hand clenching (R): 0  Hand clenching (L): 0  (2nd finger with dystonic posturing, slightly bent)  NICK Hand (R): 0  NICK Hand (L): 0  Toe Tapping (R):  0  Toe Tapping (L):  0  Leg Agility (R):  0  Leg Agility (L):  0  Rigidity (Neck): 2  Rigidity (RUE): 1  Rigidity (LUE): 1  Rigidity (RLE): 0  (poor relaxation, required multiple coaxing)  Rigidity (LLE): 0  Arising From Chair: 0  Posture: 1  Gait: 0  Freezing of Gait: 0  Postural Stability: 2  Body Bradykinesia: 2  -------------------------------------------------------------------------------------  11    Muscle Strength Right Left  Muscle Strength Right Left   Deltoid 5/5 5/5  Hip Adductors 5/5 5/5   Biceps 5/5 5/5  Hip Abductors 5/5 5/5   Triceps 5/5 5/5  Knee Extensors 5/5 5/5   Wrist Extensors 5/5 5/5  Knee Flexors 5/5 5/5   Wrist Flexors 5/5 5/5  Ankle Extensors 5/5 5/5    5/5 5/5  Ankle Flexors 5/5 5/5   Finger Abductors 5/5 5/5       Hip Flexors 5/5 5/5   Hip Extensors 5/5 5/5     Sensory   Intact to LT, vibration    Coordination:  Finger-to-nose-finger: intention tremor bilaterally with distinct phases, slows down when approaches target  Sequential Finger Movements: normal  Hand rhythm tapping: normal  Finger-following-finger: normal     Gait:  Normal Rise from chair  Walks with cane, small hesitant steps without swaying, straight path and mild difficulty turning, Pull test of 2  Mildly decreased left arm swing  Reflexes:    Right Left   Biceps 2/4 2/4   Brachioradialis 2/4 2/4   Triceps 2/4 2/4   Knee 2/4 2/4   Ankle 2/4 2/4      Plantar cutaneous reflex:  Right: flexor  Left: flexor      REVIEW OF ANCILLARY TESTS:     No results found for this or any previous visit  Diagnoses for this encounter:  1   Unstable gait  Ambulatory referral to Neurology    TSH, 3rd generation with Free T4 reflex    Vitamin D Panel    Vitamin B12    Comprehensive metabolic panel    Magnesium    PTH, intact    Vitamin B1, whole blood   2  Inactivity  Ambulatory referral to Neurology   3  Forgetfulness  Ambulatory referral to Neurology   4  Exercise intolerance  Ambulatory referral to Neurology   5  Falls infrequently  Ambulatory referral to Neurology       ASSESSMENT     Impression of this lady who was previously very independent with acute worsening of gait and balance post-surgical recovery from wrist operation  She also had a more recent history of forgetfulness per patient's daughters, who also endorse an observed lack of motivation to break out of a comfortable, passive lifestyle she is currently living  Patient says she is embarrassed about being seen in public with her walking issues, and has not tried Physical Therapy yet nor is she active at home  On examination she is actually strong in her muscle groups and no clear ataxia  Discussed with patient and family that given the acuity of symptoms and persistence of her issues, she most likely had deconditioning post-op and has remained at a lower area of functioning partly by choice  She tells me she does not enjoy cooking as much and prefers eating instant meals due to the convenience as cooking is too "difficult" and "tv is too fun to give up " Her motivation to exercise is low because of low energy, which itself is caused by inactivity  Her wrist surgery does not explain her overall limitation of function  She does have some slowness in her movements but does not resemble parkinsonism  Her MOCA ws 20/30 and low but part of it may be lower motivation rather a progressive dementia  Will continue to observe  PLAN     · Check TSH, free T4, PTH, AST, ALT, magnesium, vitamin D, B1, B12, E,   · She has Physical Therapy ordered already by PCP, but told that home PT would not be covered by insurance   Her daughters will inquire to insurance company if going to a site for PT would be covered instead  This is a very important part of her recovery and safety at home, both in getting motivated to exercise and safe techniques to try at home  · Will check the MRI brain when completed  · Encouraged adherence to better nutrition and avoidance of tv dinners  · Encouraged continued hydration at home  · Thank you very much for sending me this interesting patient  · The patient has been instructed to call us about any new neurological problems or medication side effects  · Return to Clinic on 3-4 months    Darren Bunch MD  Movement Disorders  Department of Neurological [address] [facility]    A total of 60 minutes were spent face-to-face with this patient, of which at least 50% was spent on counseling and coordination of care  We discussed the natural history of the patient's condition, differential diagnosis, level of diagnostic certainty, treatment alternatives and their side effects and possible complications

## 2018-10-10 ENCOUNTER — OFFICE VISIT (OUTPATIENT)
Dept: NEUROLOGY | Facility: CLINIC | Age: 78
End: 2018-10-10
Payer: COMMERCIAL

## 2018-10-10 VITALS
SYSTOLIC BLOOD PRESSURE: 158 MMHG | HEART RATE: 118 BPM | DIASTOLIC BLOOD PRESSURE: 71 MMHG | WEIGHT: 147.8 LBS | BODY MASS INDEX: 27.2 KG/M2 | HEIGHT: 62 IN

## 2018-10-10 DIAGNOSIS — Z72.3 INACTIVITY: ICD-10-CM

## 2018-10-10 DIAGNOSIS — R26.81 UNSTABLE GAIT: Primary | ICD-10-CM

## 2018-10-10 DIAGNOSIS — R68.89 EXERCISE INTOLERANCE: ICD-10-CM

## 2018-10-10 DIAGNOSIS — R68.89 FORGETFULNESS: ICD-10-CM

## 2018-10-10 DIAGNOSIS — Z91.81 FALLS INFREQUENTLY: ICD-10-CM

## 2018-10-10 PROCEDURE — 99205 OFFICE O/P NEW HI 60 MIN: CPT | Performed by: PSYCHIATRY & NEUROLOGY

## 2018-10-10 NOTE — LETTER
October 10, 2018     [facility], [address]    Patient: Talya Torres   YOB: 1940   Date of Visit: 10/10/2018       Dear Dr Kimberly Tavera: Thank you for referring Emmanuelle Berg to me for evaluation  Below are my notes for this consultation  If you have questions, please do not hesitate to call me  I look forward to following your patient along with you  Sincerely,        Beverley Mcduffie MD        CC: No Recipients  [facility] [place]  10/10/2018  2:33 PM  Sign at close encounter  Review of Systems   Constitutional: Positive for fatigue  Negative for appetite change and fever  HENT: Negative  Negative for hearing loss, tinnitus, trouble swallowing and voice change  Eyes: Negative  Negative for photophobia and pain  Respiratory: Positive for shortness of breath  Cardiovascular: Negative  Negative for palpitations  Gastrointestinal: Negative  Negative for nausea  Endocrine: Negative  Negative for cold intolerance and heat intolerance  Genitourinary: Positive for frequency  Negative for dysuria and urgency  Musculoskeletal: Positive for back pain and gait problem (balance problems, difficulty walking, immobility or loss of function, falls)  Negative for myalgias and neck pain  Skin: Negative  Allergic/Immunologic: Negative  Neurological: Negative for dizziness, tremors, seizures, syncope, facial asymmetry, speech difficulty, weakness and numbness  Hematological: Negative  Does not bruise/bleed easily  Psychiatric/Behavioral: Positive for confusion and sleep disturbance (Snoring, waking up at night)  Negative for hallucinations  The patient is nervous/anxious           Mood Swings         Beverley Mcduffie MD  10/10/2018  5:31 PM  Sign at close encounter  [facility] PATIENT EVALUATION NOTE    Patient: [address] [facility] Record Number: # [id number]  YOB: 1940  Date of visit: 10/10/2018    Referring provider: Karla Saravia MD        HISTORY OF PRESENT ILLNESS:  Ms Phyllis Eduardo is a 68 y o  right handed female who has been referred to the [facility] for evaluation of gait imbalance and memory  The patient was accompanied today  History was obtained from patient and daughter    Main bothersome symptoms are:   1  Balance and gait instability  She started in Aug 2017 with a fall and broke her wrist  She was hospitalized for the wrist surgery through Oct 2017  One of the daughters recounts patient was told that pt was given an accidental extra dose of fentanyl while in the recovery room and afterwards her daughters think she has been less active as a result, "sitting around more" and then with more unsteadiness and feeling uncomfortable about walking  She endorses higher chance of falling when she bends forward to  items, including difficulty to get back up; frequency about once a month where she would fall "flat on my face" and forwards  Daughter endorse she tends to walk faster and unable to catch herself  No known family history of parkinson's disease  She is out of breath when she navigates stairs  Patient herself states she is watching more TV by choice, complaining of fatigue during the day  She does not go to [employer] as often as she did before because she is self-conscious about her walking  2  Memory  Starting since Spring 2018 with slow decline of memory; forgetful at times  Denies misplacing personal objects, denies forgetting people's names  Endorses some word finding difficulty  She stopped driving after the fall in Aug 2017, and denies wandering   At baseline now, does not cook and does not do any task alone but with her daughter  All ADLs ok, except she needs to have someone near her while showering  No known family history of dementia  No treatment yet for the memory started before   Patient herself is deferring the history to both her daughters today  She was started on Lexapro by her PCP for anxiety; denies depression right now  She admits she is not eating well; consuming TV dinners often and fast food  - MRI Brain w/wo contrast ordered and pending  Hyposmia: denies  RBD (REM semi-purposeful body movements): denies  Gait Disturbance:  See above  Constipation:   denies  Dementia:  Forgetfulness as above  Swallowing difficulties:  denies  Dysautonomia:  denies  Gaze Palsy: denies  Exercise Therapy:  Mostly sedentary during the day, preferring to watch television and passively spend the day  She endorses reluctance to get up as "watching tv is so much more fun"       Family History: Number of First Degree Relatives With Parkinsonism: none    REVIEW OF PAST MEDICAL, SOCIAL AND FAMILY HISTORY:  This is the list of problems as per our Medical Records:    Patient Active Problem List    Diagnosis Date Noted    Balance problem 10/02/2018    Confusion 10/02/2018    Episode of shaking 10/02/2018    Forgetfulness 08/28/2018    Cough 07/25/2018    Dizziness 07/13/2018    Ambulatory dysfunction 12/15/2017    Positive ROSALINDA (antinuclear antibody) 12/15/2017    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[facility] [address] ) 10/23/2017    Anxiety 10/20/2017    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    Sinus tachycardia 10/20/2017    Infarction of spleen 10/11/2017    Anemia 09/13/2017    Levoscoliosis 08/26/2017    Elevated sed rate 08/16/2017    Vitamin D deficiency 08/16/2017    Radiculopathy 08/16/2017    Thrombocytosis ([address] ) 08/16/2017    Fatigue 08/11/2017    GERD without esophagitis 02/07/2017    Carotid artery plaque 04/19/2016    Diabetes mellitus type II, controlled ([facility] [address] ) 03/24/2015    Hyponatremia 11/18/2014    Benign essential hypertension 07/17/2014    Hyperlipidemia 07/17/2014    Carotid bruit 07/17/2014    Asthma 11/03/2009    Coronary atherosclerosis 11/03/2009       Past Medical History:   Diagnosis Date    Arthritis     Confusion 10/2/2018    Depression     Displaced fracture of distal phalanx of right thumb     GERD (gastroesophageal reflux disease)     Heart attack ([facility] [address] )     Hyperlipidemia     Hypertension         Past Surgical History:   Procedure Laterality Date    APPENDECTOMY      CATARACT EXTRACTION      SEPTOPLASTY          No Known Allergies     Outpatient Encounter Prescriptions as of 10/10/2018   Medication Sig Dispense Refill    ascorbic acid (VITAMIN C) 500 mg tablet Take by mouth      aspirin 81 MG tablet Take 1 tablet by mouth daily      atenolol (TENORMIN) 50 mg tablet Take 1 tablet (50 mg total) by mouth daily 30 tablet 5    atorvastatin (LIPITOR) 40 mg tablet TAKE ONE TABLET BY MOUTH EVERY DAY   OFFICE VISIT NEEDED 90 tablet 3    Calcium Carb-Cholecalciferol (CALCIUM + D3) 600-200 MG-UNIT TABS Take by mouth      escitalopram (LEXAPRO) 5 mg tablet Take 1 tablet (5 mg total) by mouth daily 30 tablet 3    Flaxseed, Linseed, (BL FLAX SEED OIL PO) Take by mouth      LORazepam (ATIVAN) 1 mg tablet Take 0 5 tablets (0 5 mg total) by mouth daily as needed for anxiety 30 tablet 0    losartan (COZAAR) 50 mg tablet Take 1 tablet (50 mg total) by mouth 2 (two) times a day for 30 days 60 tablet 3    multivitamin (THERAGRAN) TABS Take 1 tablet by mouth      Omega-3 Fatty Acids (FISH OIL PO) Take 2 g by mouth      omeprazole (PriLOSEC) 20 mg delayed release capsule TAKE ONE CAPSULE DAILY 30 capsule 5    Vitamin D, Cholecalciferol, 1000 units CAPS Take 1 tablet by mouth       No facility-administered encounter medications on file as of 10/10/2018  Social History   Substance Use Topics    Smoking status: Former Smoker     Quit date: 1996    Smokeless tobacco: Never Used      Comment: no secondhand smoke exposure    Alcohol use Yes      Comment: social   Living alone, retired  worker  High school graduate       Family History   Problem Relation Age of Onset    Heart attack Mother 39    Heart attack Father 61    No Known Problems Other     Breast cancer Sister     Alcohol abuse Daughter         history of        REVIEW OF SYSTEMS:  The patient has entered data on an intake form regarding present illness, past medical and surgical history, medications, allergies, family and social history, and a full review of 14 systems  I have reviewed this form with the patient, and all the relevant information has been included on this note  The full review of systems was negative except as stated in HPI and below  Constitutional: Positive for fatigue  Negative for appetite change and fever  HENT: Negative  Negative for hearing loss, tinnitus, trouble swallowing and voice change  Eyes: Negative  Negative for photophobia and pain  Respiratory: Positive for shortness of breath  Cardiovascular: Negative  Negative for palpitations  Gastrointestinal: Negative  Negative for nausea  Endocrine: Negative  Negative for cold intolerance and heat intolerance  Genitourinary: Positive for frequency  Negative for dysuria and urgency  Musculoskeletal: Positive for back pain and gait problem (balance problems, difficulty walking, immobility or loss of function, falls)  Negative for myalgias and neck pain  Skin: Negative  Allergic/Immunologic: Negative  Neurological: Negative for dizziness, tremors, seizures, syncope, facial asymmetry, speech difficulty, weakness and numbness  Hematological: Negative  Does not bruise/bleed easily  Psychiatric/Behavioral: Positive for confusion and sleep disturbance (Snoring, waking up at night)  Negative for hallucinations  The patient is nervous/anxious           Mood Swings      PHYSICAL EXAMINATION:     Vital signs:  /71 (BP Location: Right arm, Patient Position: Sitting, Cuff Size: Standard)   Pulse (!) 118   Ht 5' 2" (1 575 m)   Wt 67 kg (147 lb 12 8 oz)   BMI 27 03 kg/m²      General:  Well-appearing, well nourished, pleasant patient in no acute distress  Mood and Fund of Knowledge are appropriate  Head:  Normocephalic, atraumatic  Oropharynx and conjunctiva are clear  Speech  No hypophonia or bradylalia  No scanning speech  Language: Comprehension intact  Neck:  Supple, strong 5/5 forward flexion and retroflexion  Extremities: Range of motion is normal       Cognitive and Mental Exam:  - Initially said president was "Abena Frausto" but then picked "Trump" out of a multiple choice    Points MAX   Visuospatial  ----------   Trails A  0 1   Cube Drawing  0 1   Clock Drawing  1 3   Naming Objects  2 3   Attention  ----------   Digit Span  2 2   Letter Reading  1 1   Serial 7s  2 3   Language  ----------   Repetition  1 2   Fluency  0 1   Abstraction  2 2   Delayed Recall  2 5   Orientation                6            6               19 30    +1 high school education so 19-20   Had issues with drawing the clock properly and overall with visuospatial, attention and memory deficits  Cranial Nerves:    Funduscopic examination reveals no papilledema  Direct and consensual light reflexes were normal  No afferent pupillary defect  Visual fields are full to confrontation  Extraocular movements were full, with normal pursuit and saccades  Pupils were equal, reactive to light symmetrically  Facial sensation to light touch was intact  Face is symmetric with normal strength  Hearing was assessed using the Calibrated Finger Rub Auditory Screening Test (CALFRAST) and was not abnormal (Better than CALFRAST-Strong-70)  Palate is up going bilaterally and symmetrically  Neck muscles are strong  Tongue protrusion is at midline with normal movements  No dysarthria        Motor:    MDS-UPDRS III:   Speech: 0  Facial Expression: 0  Tremor (Head):  0  Rest Tremor Severity (RUE/LUE/RLE/LLE/Lip): 0  Action Tremor of Hands (R): 0  Action Tremor of Hands (L): 0  Finger tapping (R): 1  Finger tapping (L): 1  Hand clenching (R): 0  Hand clenching (L): 0  (2nd finger with dystonic posturing, slightly bent)  NICK Hand (R): 0  NICK Hand (L): 0  Toe Tapping (R):   0  Toe Tapping (L):   0  Leg Agility (R):   0  Leg Agility (L):   0  Rigidity (Neck): 2  Rigidity (RUE): 1  Rigidity (LUE): 1  Rigidity (RLE): 0  (poor relaxation, required multiple coaxing)  Rigidity (LLE): 0  Arising From Chair: 0  Posture: 1  Gait: 0  Freezing of Gait: 0  Postural Stability: 2  Body Bradykinesia: 2  -------------------------------------------------------------------------------------  11    Muscle Strength Right Left  Muscle Strength Right Left   Deltoid 5/5 5/5  Hip Adductors 5/5 5/5   Biceps 5/5 5/5  Hip Abductors 5/5 5/5   Triceps 5/5 5/5  Knee Extensors 5/5 5/5   Wrist Extensors 5/5 5/5  Knee Flexors 5/5 5/5   Wrist Flexors 5/5 5/5  Ankle Extensors 5/5 5/5    5/5 5/5  Ankle Flexors 5/5 5/5   Finger Abductors 5/5 5/5       Hip Flexors 5/5 5/5   Hip Extensors 5/5 5/5     Sensory   Intact to LT, vibration    Coordination:  Finger-to-nose-finger: intention tremor bilaterally with distinct phases, slows down when approaches target  Sequential Finger Movements: normal  Hand rhythm tapping: normal  Finger-following-finger: normal     Gait:  Normal Rise from chair  Walks with cane, small hesitant steps without swaying, straight path and mild difficulty turning, Pull test of 2  Mildly decreased left arm swing  Reflexes:    Right Left   Biceps 2/4 2/4   Brachioradialis 2/4 2/4   Triceps 2/4 2/4   Knee 2/4 2/4   Ankle 2/4 2/4      Plantar cutaneous reflex:  Right: flexor  Left: flexor      REVIEW OF ANCILLARY TESTS:     No results found for this or any previous visit  Diagnoses for this encounter:  1  Unstable gait  Ambulatory referral to Neurology    TSH, 3rd generation with Free T4 reflex    Vitamin D Panel    Vitamin B12    Comprehensive metabolic panel    Magnesium    PTH, intact    Vitamin B1, whole blood   2  Inactivity  Ambulatory referral to Neurology   3  Forgetfulness  Ambulatory referral to Neurology   4  Exercise intolerance  Ambulatory referral to Neurology   5  Falls infrequently  Ambulatory referral to Neurology       ASSESSMENT     Impression of this lady who was previously very independent with acute worsening of gait and balance post-surgical recovery from wrist operation  She also had a more recent history of forgetfulness per patient's daughters, who also endorse an observed lack of motivation to break out of a comfortable, passive lifestyle she is currently living  Patient says she is embarrassed about being seen in public with her walking issues, and has not tried Physical Therapy yet nor is she active at home  On examination she is actually strong in her muscle groups and no clear ataxia  Discussed with patient and family that given the acuity of symptoms and persistence of her issues, she most likely had deconditioning post-op and has remained at a lower area of functioning partly by choice  She tells me she does not enjoy cooking as much and prefers eating instant meals due to the convenience as cooking is too "difficult" and "tv is too fun to give up " Her motivation to exercise is low because of low energy, which itself is caused by inactivity  Her wrist surgery does not explain her overall limitation of function  She does have some slowness in her movements but does not resemble parkinsonism  Her MOCA ws 20/30 and low but part of it may be lower motivation rather a progressive dementia  Will continue to observe  PLAN     · Check TSH, free T4, PTH, AST, ALT, magnesium, vitamin D, B1, B12, E,   · She has Physical Therapy ordered already by PCP, but told that home PT would not be covered by insurance  Her daughters will inquire to insurance company if going to a site for PT would be covered instead  This is a very important part of her recovery and safety at home, both in getting motivated to exercise and safe techniques to try at home  · Will check the MRI brain when completed  · Encouraged adherence to better nutrition and avoidance of tv dinners  · Encouraged continued hydration at home  · Thank you very much for sending me this interesting patient  · The patient has been instructed to call us about any new neurological problems or medication side effects  · Return to Clinic on 3-4 months    Stacy Marquez MD  Movement Disorders  Department of Neurological [address] [facility]    A total of 60 minutes were spent face-to-face with this patient, of which at least 50% was spent on counseling and coordination of care  We discussed the natural history of the patient's condition, differential diagnosis, level of diagnostic certainty, treatment alternatives and their side effects and possible complications

## 2018-10-10 NOTE — PROGRESS NOTES
Review of Systems   Constitutional: Positive for fatigue  Negative for appetite change and fever  HENT: Negative  Negative for hearing loss, tinnitus, trouble swallowing and voice change  Eyes: Negative  Negative for photophobia and pain  Respiratory: Positive for shortness of breath  Cardiovascular: Negative  Negative for palpitations  Gastrointestinal: Negative  Negative for nausea  Endocrine: Negative  Negative for cold intolerance and heat intolerance  Genitourinary: Positive for frequency  Negative for dysuria and urgency  Musculoskeletal: Positive for back pain and gait problem (balance problems, difficulty walking, immobility or loss of function, falls)  Negative for myalgias and neck pain  Skin: Negative  Allergic/Immunologic: Negative  Neurological: Negative for dizziness, tremors, seizures, syncope, facial asymmetry, speech difficulty, weakness and numbness  Hematological: Negative  Does not bruise/bleed easily  Psychiatric/Behavioral: Positive for confusion and sleep disturbance (Snoring, waking up at night)  Negative for hallucinations  The patient is nervous/anxious           Mood Swings

## 2018-10-10 NOTE — PATIENT INSTRUCTIONS
· Check TSH, free T4, PTH, AST, ALT, magnesium, vitamin D, B1, B12, E,   · She has Physical Therapy ordered already by PCP, but told that home PT would not be covered by insurance  Her daughters will inquire to insurance company if going to a site for PT would be covered instead  This is a very important part of her recovery and safety at home  · Will check the MRI brain when completed  · Encouraged adherence to better nutrition and avoidance of tv dinners  · Thank you very much for sending me this interesting patient  · The patient has been instructed to call us about any new neurological problems or medication side effects    · Return to Clinic on 3-4 months

## 2018-10-14 ENCOUNTER — HOSPITAL ENCOUNTER (OUTPATIENT)
Dept: MRI IMAGING | Facility: HOSPITAL | Age: 78
Discharge: HOME | End: 2018-10-14
Payer: COMMERCIAL

## 2018-10-14 DIAGNOSIS — R41.0 CONFUSION: ICD-10-CM

## 2018-10-14 DIAGNOSIS — R42 LOSS OF EQUILIBRIUM: ICD-10-CM

## 2018-10-14 DIAGNOSIS — R25.1 EPISODE OF SHAKING: ICD-10-CM

## 2018-10-14 DIAGNOSIS — R26.89 BALANCE PROBLEM: ICD-10-CM

## 2018-10-14 DIAGNOSIS — R42 DIZZINESS: ICD-10-CM

## 2018-10-14 PROCEDURE — 70553 MRI BRAIN STEM W/O & W/DYE: CPT

## 2018-10-14 PROCEDURE — A9585 GADOBUTROL INJECTION: HCPCS | Performed by: FAMILY MEDICINE

## 2018-10-14 RX ADMIN — GADOBUTROL 7 ML: 604.72 INJECTION INTRAVENOUS at 14:27

## 2018-10-15 ENCOUNTER — TELEPHONE (OUTPATIENT)
Dept: FAMILY MEDICINE CLINIC | Facility: CLINIC | Age: 78
End: 2018-10-15

## 2018-10-16 ENCOUNTER — TELEPHONE (OUTPATIENT)
Dept: FAMILY MEDICINE CLINIC | Facility: CLINIC | Age: 78
End: 2018-10-16

## 2018-10-16 NOTE — TELEPHONE ENCOUNTER
Patient MRI was reviewed showed normal pressure hydrocephalus, I called patient daughter  Janay Meadows to her about the finding on the MRI, patient to be seen by Neurology sooner than 3 months  I will forward the result to the neurologist as well

## 2018-10-17 ENCOUNTER — TELEPHONE (OUTPATIENT)
Dept: NEUROLOGY | Facility: CLINIC | Age: 78
End: 2018-10-17

## 2018-10-17 NOTE — TELEPHONE ENCOUNTER
----- Message from Brian Weston MD sent at 10/16/2018  7:49 PM EDT -----  Regarding: FW: MRI ABNORMALITY  Hello, please call this patient's family back to see if can schedule a return visit with me in 1-2 weeks regarding the MRI results and treatment options  PCP has already reviewed the MRI results with them, but I agree that we should discuss it sooner  Do you know if I would have privileges or equipment to do outpatient lumbar puncture if needed? Thank you    ----- Message -----  From: Zonia Amado MD  Sent: 10/16/2018   5:24 PM  To: Brian Weston MD  Subject: MRI ABNORMALITY                                  Dear Dr Kwadwo Douglas saw my patient Tin Yañez last week   I ordered MRI of the brain which have some abnormalities, NPH is the most significant finding and it explains her symptoms well  I called the patient daughter and notified with the MRI, they mentioned that you would like to see them back in 3 months, I feel that she may need to be seen sooner, I advised her if she did not hear back from you office to call your office to schedule sooner appointment  I do know if you recommend neurosurgery follow-up right away or after she see you  Let me know your opinion       Zonia ABBOTT

## 2018-10-24 ENCOUNTER — OFFICE VISIT (OUTPATIENT)
Dept: NEUROLOGY | Facility: CLINIC | Age: 78
End: 2018-10-24
Payer: COMMERCIAL

## 2018-10-24 VITALS
WEIGHT: 149.4 LBS | HEIGHT: 62 IN | BODY MASS INDEX: 27.49 KG/M2 | DIASTOLIC BLOOD PRESSURE: 58 MMHG | HEART RATE: 84 BPM | SYSTOLIC BLOOD PRESSURE: 150 MMHG

## 2018-10-24 DIAGNOSIS — F02.80 DEMENTIA ASSOCIATED WITH NORMAL PRESSURE HYDROCEPHALUS (HCC): Primary | ICD-10-CM

## 2018-10-24 DIAGNOSIS — G91.2 NPH (NORMAL PRESSURE HYDROCEPHALUS) (HCC): ICD-10-CM

## 2018-10-24 DIAGNOSIS — G91.2 DEMENTIA ASSOCIATED WITH NORMAL PRESSURE HYDROCEPHALUS (HCC): Primary | ICD-10-CM

## 2018-10-24 DIAGNOSIS — R26.81 GAIT INSTABILITY: ICD-10-CM

## 2018-10-24 DIAGNOSIS — E87.1 HYPONATREMIA: ICD-10-CM

## 2018-10-24 PROCEDURE — 99214 OFFICE O/P EST MOD 30 MIN: CPT | Performed by: PSYCHIATRY & NEUROLOGY

## 2018-10-24 NOTE — LETTER
October 24, 2018     Olu Cowart, [address]    Patient: Emani Mora   YOB: 1940   Date of Visit: 10/24/2018       Dear Dr Emily Lamb:    I saw Domi Zhu today and here are the notes from the discussion I had with her about the MRI findings  She preferred to see you first before arriving at an answer  She has an appointment with you next week  If you have questions, please do not hesitate to call me  I look forward to following your patient along with you  Sincerely,        Janiya Yoon MD        CC: No Recipients  Janiya Yoon MD  10/24/2018  7:55 PM  Sign at close encounter  [address] PATIENT EVALUATION NOTE    Patient: [facility] Record Number: # [id number]  YOB: 1940  Date of visit: 10/24/2018    Referring provider: No ref  provider found    ASSESSMENT     1  Dementia associated with normal pressure hydrocephalus      possible   2  Gait instability       Discussed with patient regarding the MRI images and the presence of marked cortical atrophy with also the presence of larger ventricles than expected for age  There is the question of hydrocephalus ex vacuo but cannot necessarily rule out normal pressure hydrocephalus  She does have the triad of gait instability, memory changes, and also urinary incontinence that was not previously explored (requiring a pad underneath her)  Part of her symptoms may still be deconditioning and by her own apathy, but she would be better served by a NPH evaluation with a high volume spinal tap and opening pressure measurement to rule out this reversible cause of dementia  Discussed the procedure and risks and benefits thereof including pain, bleeding, low pressure headache, and infection   The family agreed that it would potentially help with diagnosis and improve her quality of life if NPH was confirmed and moving onto a neurosurgical consultation for shunt placement  Patient herself wished to defer final decision to proceed until discussing further with her PCP Dr Jonelle Kocher first  Inquired with office staff and outpatient clinic not set up to accommodate LP - usually done in the [facility] itself  PLAN     · Discussed the risks, benefits and indications of the spinal tap procedure  Patient preferred to speak to Dr Jonelle Kocher PCP again before arriving at a conclusion  She have an upcoming appt with her 10/31/18  After doing so, they will contact us and we will then schedule an appointment at [facility] for high volume spinal tap procedure (30-60cc CSF removed) with opening pressure measurement and then return to clinic for re-evaluation  Should examination show improvement post-tap, then will proceed with NSGY evaluation for  shunt  · They will continue to obtain the previous lab work ordered: TSH, free T4, PTH, AST, ALT, magnesium, vitamin D, B1, B12, E,   · She should proceed with Physical Therapy if able to  Her daughters will inquire to insurance company if going to a site for PT would be covered instead  This is a very important part of her recovery and safety at home, both in getting motivated to exercise and safe techniques to try at home  · Encouraged adherence to better nutrition and avoidance of tv dinners  · Encouraged continued hydration at home  · The patient has been instructed to call us about any new neurological problems or medication side effects  A total of 30 minutes were spent face-to-face with this patient, of which at least 50% was spent on counseling and coordination of care  We discussed the natural history of the patient's condition, differential diagnosis, level of diagnostic certainty, treatment alternatives and their side effects and possible complications       SUBJECTIVE:  Ms Hector Donohue is a 68 y o  right handed female who [facility] imbalance and memory; MRI f/u  In the interim, she and her family report no apparent change from before  They are here after provider requested to discuss the MRI results with them       REVIEW OF PAST MEDICAL, SOCIAL AND FAMILY HISTORY:  This is the list of problems as per our Medical Records:    Patient Active Problem List    Diagnosis Date Noted    Balance problem 10/02/2018    Confusion 10/02/2018    Episode of shaking 10/02/2018    Forgetfulness 08/28/2018    Cough 07/25/2018    Dizziness 07/13/2018    Ambulatory dysfunction 12/15/2017    Positive ROSALINDA (antinuclear antibody) 12/15/2017    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    Anxiety 10/20/2017    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    Sinus tachycardia 10/20/2017    Infarction of spleen 10/11/2017    Anemia 09/13/2017    Levoscoliosis 08/26/2017    Elevated sed rate 08/16/2017    Vitamin D deficiency 08/16/2017    Radiculopathy 08/16/2017    Thrombocytosis ([address] ) 08/16/2017    Fatigue 08/11/2017    GERD without esophagitis 02/07/2017    Carotid artery plaque 04/19/2016    Diabetes mellitus type II, controlled ([address] ) 03/24/2015    Hyponatremia 11/18/2014    Benign essential hypertension 07/17/2014    Hyperlipidemia 07/17/2014    Carotid bruit 07/17/2014    Asthma 11/03/2009    Coronary atherosclerosis 11/03/2009       Past Medical History:   Diagnosis Date    Arthritis     Confusion 10/2/2018    Depression     Displaced fracture of distal phalanx of right thumb     GERD (gastroesophageal reflux disease)     Heart attack ([address] )     Hyperlipidemia     Hypertension         Past Surgical History:   Procedure Laterality Date    APPENDECTOMY      CATARACT EXTRACTION      SEPTOPLASTY          No Known Allergies     Outpatient Encounter Prescriptions as of 10/24/2018   Medication Sig Dispense Refill    ascorbic acid (VITAMIN C) 500 mg tablet Take by mouth      aspirin 81 MG tablet Take 1 tablet by mouth daily      atenolol (TENORMIN) 50 mg tablet Take 1 tablet (50 mg total) by mouth daily 30 tablet 5    atorvastatin (LIPITOR) 40 mg tablet TAKE ONE TABLET BY MOUTH EVERY DAY   OFFICE VISIT NEEDED 90 tablet 3    Calcium Carb-Cholecalciferol (CALCIUM + D3) 600-200 MG-UNIT TABS Take by mouth      escitalopram (LEXAPRO) 5 mg tablet Take 1 tablet (5 mg total) by mouth daily 30 tablet 3    Flaxseed, Linseed, (BL FLAX SEED OIL PO) Take by mouth      LORazepam (ATIVAN) 1 mg tablet Take 0 5 tablets (0 5 mg total) by mouth daily as needed for anxiety 30 tablet 0    losartan (COZAAR) 50 mg tablet Take 1 tablet (50 mg total) by mouth 2 (two) times a day for 30 days 60 tablet 3    multivitamin (THERAGRAN) TABS Take 1 tablet by mouth      Omega-3 Fatty Acids (FISH OIL PO) Take 2 g by mouth      omeprazole (PriLOSEC) 20 mg delayed release capsule TAKE ONE CAPSULE DAILY 30 capsule 5    Vitamin D, Cholecalciferol, 1000 units CAPS Take 1 tablet by mouth       No facility-administered encounter medications on file as of 10/24/2018  Social History   Substance Use Topics    Smoking status: Former Smoker     Quit date: 1996    Smokeless tobacco: Never Used      Comment: no secondhand smoke exposure    Alcohol use Yes      Comment: social   Living alone, retired  worker  High school graduate  Family History   Problem Relation Age of Onset    Heart attack Mother 39    Heart attack Father 61    No Known Problems Other     Breast cancer Sister     Alcohol abuse Daughter         history of        REVIEW OF SYSTEMS:  The patient has entered data on an intake form regarding present illness, past medical and surgical history, medications, allergies, family and social history, and a full review of 14 systems  I have reviewed this form with the patient, and all the relevant information has been included on this note  The full review of systems was negative except as stated in HPI and below  Constitutional: Negative  Negative for appetite change and fever  HENT: Negative  Negative for hearing loss, tinnitus, trouble swallowing and voice change  Eyes: Negative  Negative for photophobia and pain  Respiratory: Negative  Negative for shortness of breath  Cardiovascular: Negative  Negative for palpitations  Gastrointestinal: Negative  Negative for nausea  Endocrine: Negative  Negative for cold intolerance and heat intolerance  Genitourinary: Positive for frequency  Negative for dysuria and urgency  Loss of bladder control     Musculoskeletal: Positive for gait problem (immobility or loss of function, difficulty walking, falls)  Negative for myalgias and neck pain  Skin: Negative  Allergic/Immunologic: Negative  Neurological: Negative for dizziness, tremors, seizures, syncope, facial asymmetry, speech difficulty, weakness and numbness  Memory problems     Hematological: Negative  Does not bruise/bleed easily  Psychiatric/Behavioral: Positive for confusion  Negative for hallucinations  PHYSICAL EXAMINATION:     Vital signs:  /58 (BP Location: Right arm, Patient Position: Sitting, Cuff Size: Standard)   Pulse 84   Ht 5' 2" (1 575 m)   Wt 67 8 kg (149 lb 6 4 oz)   BMI 27 33 kg/m²      General:  Well-appearing, well nourished, pleasant patient in no acute distress  Mood and Fund of Knowledge are appropriate  Head:  Normocephalic, atraumatic  Oropharynx and conjunctiva are clear  Speech  No hypophonia or bradylalia  No scanning speech  Language: Comprehension intact  Neck:  Supple, strong 5/5 forward flexion and retroflexion  Extremities: Range of motion is normal       Cognitive and Mental Exam:  Alert, awake, responsive and follows all commands        Cranial Nerves:    Funduscopic examination reveals no papilledema, discs visualized  Direct and consensual light reflexes were normal  No afferent pupillary defect  Visual fields are full to confrontation  Extraocular movements were full, with normal pursuit and saccades  Pupils were equal, reactive to light symmetrically  Facial sensation to light touch was intact  Face is symmetric with normal strength  Hearing was assessed using the Calibrated Finger Rub Auditory Screening Test (CALFRAST) and was not abnormal (Better than CALFRAST-Strong-70)  Palate is up going bilaterally and symmetrically  Neck muscles are strong  Tongue protrusion is at midline with normal movements  No dysarthria  Motor:    Minimal rigidity, no significant bradykinesia and no tremor  Muscle Strength Right Left  Muscle Strength Right Left   Deltoid 5/5 5/5  Hip Adductors 5/5 5/5   Biceps 5/5 5/5  Hip Abductors 5/5 5/5   Triceps 5/5 5/5  Knee Extensors 5/5 5/5   Wrist Extensors 5/5 5/5  Knee Flexors 5/5 5/5   Wrist Flexors 5/5 5/5  Ankle Extensors 5/5 5/5    5/5 5/5  Ankle Flexors 5/5 5/5   Finger Abductors 5/5 5/5       Hip Flexors 5/5 5/5   Hip Extensors 5/5 5/5     Coordination:  Finger-to-nose-finger: intention tremor bilaterally with distinct phases, slows down when approaches target  Gait:  Normal Rise from chair  Walks with cane, small hesitant steps without swaying, straight path and mild difficulty turning, Pull test of 2  Mildly decreased left arm swing  Reflexes:    Right Left   Biceps 2/4 2/4   Brachioradialis 2/4 2/4   Triceps 2/4 2/4   Knee 2/4 2/4   Ankle 2/4 2/4      Plantar cutaneous reflex:  Right: flexor  Left: flexor      REVIEW OF ANCILLARY TESTS:   MRI brain 10/14/18: 1  Imaging findings highly suggestive of normal pressure hydrocephalus  Mild periventricular T2 prolongation raising the question of mild transependymal flow CSF  2  No pathologic intracranial enhancement or evidence of acute infarction  3  Nonspecific cerebral white matter signal abnormality suggestive of mild chronic small vessel ischemic disease likely related to hypertension and/or diabetes

## 2018-10-24 NOTE — PROGRESS NOTES
Review of Systems   Constitutional: Negative  Negative for appetite change and fever  HENT: Negative  Negative for hearing loss, tinnitus, trouble swallowing and voice change  Eyes: Negative  Negative for photophobia and pain  Respiratory: Negative  Negative for shortness of breath  Cardiovascular: Negative  Negative for palpitations  Gastrointestinal: Negative  Negative for nausea  Endocrine: Negative  Negative for cold intolerance and heat intolerance  Genitourinary: Positive for frequency  Negative for dysuria and urgency  Loss of bladder control     Musculoskeletal: Positive for gait problem (immobility or loss of function, difficulty walking, falls)  Negative for myalgias and neck pain  Skin: Negative  Allergic/Immunologic: Negative  Neurological: Negative for dizziness, tremors, seizures, syncope, facial asymmetry, speech difficulty, weakness and numbness  Memory problems     Hematological: Negative  Does not bruise/bleed easily  Psychiatric/Behavioral: Positive for confusion  Negative for hallucinations

## 2018-11-01 ENCOUNTER — OFFICE VISIT (OUTPATIENT)
Dept: FAMILY MEDICINE CLINIC | Facility: CLINIC | Age: 78
End: 2018-11-01
Payer: COMMERCIAL

## 2018-11-01 VITALS
BODY MASS INDEX: 27.7 KG/M2 | SYSTOLIC BLOOD PRESSURE: 150 MMHG | HEIGHT: 62 IN | HEART RATE: 88 BPM | WEIGHT: 150.5 LBS | DIASTOLIC BLOOD PRESSURE: 70 MMHG

## 2018-11-01 DIAGNOSIS — I10 BENIGN ESSENTIAL HYPERTENSION: ICD-10-CM

## 2018-11-01 DIAGNOSIS — G91.2 NORMAL PRESSURE HYDROCEPHALUS (HCC): Primary | ICD-10-CM

## 2018-11-01 PROCEDURE — 99214 OFFICE O/P EST MOD 30 MIN: CPT | Performed by: FAMILY MEDICINE

## 2018-11-01 NOTE — PATIENT INSTRUCTIONS
Ventriculoperitoneal Shunt Placement for Hydrocephalus in Adults   WHAT YOU NEED TO KNOW:   Ventriculoperitoneal () shunt placement is surgery to help remove extra cerebrospinal fluid (CSF) in your brain  DISCHARGE INSTRUCTIONS:   Call 911 for any of the following:   · You have a seizure  Seek care immediately if:   · Blood soaks through your bandage  · Your stitches or staples come apart  · You have a stiff neck or trouble thinking clearly  · You have a severe headache  · You suddenly start to vomit  Contact your healthcare provider if:   · You have a fever  · Your wounds become swollen, red, more painful, or have pus coming from them  · You have questions or concerns about your condition or care  Care for your wound as directed: You may need to carefully wash the wound with soap and water  Dry the area and put on new, clean bandages as directed  Change your bandages when they get wet or dirty  Activity:  You may need to rest after surgery  Your healthcare provider will tell you when you can return to your daily activities  Follow up with your healthcare provider as directed: You will need to return to have your stitches removed  Your healthcare provider will also check your progress  Write down your questions so you remember to ask them during your visits  © 2017 [address] [facility] Information is for End User's use only and may not be sold, redistributed or otherwise used for commercial purposes  All illustrations and images included in CareNotes® are the copyrighted property of [employer]  or [employer]  The above information is an  only  It is not intended as medical advice for individual conditions or treatments  Talk to your doctor, nurse or pharmacist before following any medical regimen to see if it is safe and effective for you

## 2018-11-01 NOTE — PROGRESS NOTES
Assessment/Plan:    Normal pressure hydrocephalus  Today visit was mainly counseling about the recent diagnosis, treatment options and consequences, discussed with patient and her family members include her sister and her 2 daughters  About normal pressure hydrocephalus, patient was seen by the neurologist and advised on lumbar puncture, patient was asking if it is safe to proceed with a lumbar puncture, explained to the patient the benefit outweighs the risk, it is a diagnostic test to help confirm the diagnosis and to see if her symptoms will improve, patient understand the risk the side effect of the procedure and she decided not to proceed with spinal tap, if the spinal tap show improvement in her symptoms patient then to proceed with  shunt after discussing with the neurosurgeon then  I explained to the patient the side effect of the lumbar puncture include post lumbar puncture headache,  Patient understand and decided to proceed with the spinal tap, I called the neurologist office and spoke with Dr Deepika Russo, the neurologist office will contact the patient to arrange for the lumbar puncture  Spent 40 minutes answering questions and discussing with the family member  Benign essential hypertension  Blood pressure is elevated today but this could be all due to the stress from the recent diagnosis and treatment options, blood pressure at home seem to be more reasonable, advised patient and her daughter to keep monitoring her blood pressure if continue to be high to increase the atenolol to 100 mg a day  Diagnoses and all orders for this visit:    Normal pressure hydrocephalus    Benign essential hypertension          Subjective: pt is here to review test results and treatment~cd     Patient ID: Maximiliano Sahni is a 68 y o  female      Patient is here with her sister and her 2 daughters to discuss her recent diagnosis of normal pressure hydrocephalus, patient was seen by the neurologist, and recommended to have spinal tap, I may be shunt depend on her response, patient is very hesitant to go for any invasive procedure, she is very concerned, she does not like needles in general, she still struggle with her gait difficulties and imbalance, she sways, she continued to be forgetful, she been incontinent for a while but never mention it  Denied any other symptoms        The following portions of the patient's history were reviewed and updated as appropriate: allergies, current medications, past family history, past medical history, past social history, past surgical history and problem list     Review of Systems   Constitutional: Negative for activity change, appetite change, chills, diaphoresis, fatigue and fever  HENT: Negative for congestion, postnasal drip, sinus pressure, sore throat and voice change  Eyes: Negative for pain, redness and visual disturbance  Respiratory: Negative for cough, chest tightness, shortness of breath and wheezing  Cardiovascular: Negative for chest pain, palpitations and leg swelling  Gastrointestinal: Negative for abdominal pain, constipation, diarrhea and nausea  Endocrine: Negative for cold intolerance, heat intolerance and polyuria  Genitourinary: Negative for dysuria, enuresis, flank pain and frequency  Musculoskeletal: Negative for gait problem, joint swelling and neck pain  Skin: Negative for color change and wound  Neurological: Negative for dizziness, syncope, speech difficulty, numbness and headaches  Psychiatric/Behavioral: Negative for behavioral problems and confusion  The patient is not nervous/anxious  Objective:    Vitals:    11/01/18 1140 11/01/18 1332   BP: (!) 180/60 150/70   BP Location: Left arm    Patient Position: Sitting    Cuff Size: Standard    Pulse: 88    Weight: 68 3 kg (150 lb 8 oz)    Height: 5' 2" (1 575 m)       Physical Exam   Constitutional: She is oriented to person, place, and time   She appears well-developed and well-nourished  HENT:   Head: Normocephalic and atraumatic  Eyes: Pupils are equal, round, and reactive to light  Conjunctivae and EOM are normal    Neck: Normal range of motion  Neck supple  Cardiovascular: Normal rate, regular rhythm, normal heart sounds and intact distal pulses  Pulmonary/Chest: Effort normal and breath sounds normal    Abdominal: Soft  Bowel sounds are normal    Musculoskeletal: Normal range of motion  Neurological: She is alert and oriented to person, place, and time  She has normal reflexes  Skin: Skin is warm and dry  Psychiatric: She has a normal mood and affect  Her behavior is normal    Nursing note and vitals reviewed

## 2018-11-01 NOTE — ASSESSMENT & PLAN NOTE
Today visit was mainly counseling about the recent diagnosis, treatment options and consequences, discussed with patient and her family members include her sister and her 2 daughters  About normal pressure hydrocephalus, patient was seen by the neurologist and advised on lumbar puncture, patient was asking if it is safe to proceed with a lumbar puncture, explained to the patient the benefit outweighs the risk, it is a diagnostic test to help confirm the diagnosis and to see if her symptoms will improve, patient understand the risk the side effect of the procedure and she decided not to proceed with spinal tap, if the spinal tap show improvement in her symptoms patient then to proceed with  shunt after discussing with the neurosurgeon then  I explained to the patient the side effect of the lumbar puncture include post lumbar puncture headache,  Patient understand and decided to proceed with the spinal tap, I called the neurologist office and spoke with Dr Allison Sousa, the neurologist office will contact the patient to arrange for the lumbar puncture  Spent 40 minutes answering questions and discussing with the family member

## 2018-11-01 NOTE — ASSESSMENT & PLAN NOTE
Blood pressure is elevated today but this could be all due to the stress from the recent diagnosis and treatment options, blood pressure at home seem to be more reasonable, advised patient and her daughter to keep monitoring her blood pressure if continue to be high to increase the atenolol to 100 mg a day

## 2018-11-03 LAB
1,25(OH)2D SERPL-MCNC: 18 PG/ML (ref 18–72)
1,25(OH)2D2 SERPL-MCNC: <8 PG/ML
1,25(OH)2D3 SERPL-MCNC: 18 PG/ML
25(OH)D3+25(OH)D2 SERPL-MCNC: 47 NG/ML (ref 30–100)
ALBUMIN SERPL-MCNC: 4.2 G/DL (ref 3.6–5.1)
ALBUMIN/GLOB SERPL: 1.4 (CALC) (ref 1–2.5)
ALP SERPL-CCNC: 48 U/L (ref 33–130)
ALT SERPL-CCNC: 24 U/L (ref 6–29)
AST SERPL-CCNC: 24 U/L (ref 10–35)
BILIRUB SERPL-MCNC: 0.3 MG/DL (ref 0.2–1.2)
BUN SERPL-MCNC: 12 MG/DL (ref 7–25)
BUN/CREAT SERPL: ABNORMAL (CALC) (ref 6–22)
CALCIUM SERPL-MCNC: 10 MG/DL (ref 8.6–10.4)
CHLORIDE SERPL-SCNC: 96 MMOL/L (ref 98–110)
CO2 SERPL-SCNC: 26 MMOL/L (ref 20–32)
CREAT SERPL-MCNC: 0.81 MG/DL (ref 0.6–0.93)
GLOBULIN SER CALC-MCNC: 3 G/DL (CALC) (ref 1.9–3.7)
GLUCOSE SERPL-MCNC: 102 MG/DL (ref 65–139)
MAGNESIUM SERPL-MCNC: 1.5 MG/DL (ref 1.5–2.5)
POTASSIUM SERPL-SCNC: 4.6 MMOL/L (ref 3.5–5.3)
PROT SERPL-MCNC: 7.2 G/DL (ref 6.1–8.1)
SL AMB EGFR AFRICAN AMERICAN: 81 ML/MIN/1.73M2
SL AMB EGFR NON AFRICAN AMERICAN: 70 ML/MIN/1.73M2
SODIUM SERPL-SCNC: 132 MMOL/L (ref 135–146)
TSH SERPL-ACNC: 1.4 MIU/L (ref 0.4–4.5)
VIT B1 BLD-SCNC: 111 NMOL/L (ref 78–185)
VIT B12 SERPL-MCNC: 761 PG/ML (ref 200–1100)
VITAMIN D2 SERPL-MCNC: 4 NG/ML
VITAMIN D3 SERPL-MCNC: 43 NG/ML

## 2018-11-05 ENCOUNTER — TELEPHONE (OUTPATIENT)
Dept: NEUROLOGY | Facility: CLINIC | Age: 78
End: 2018-11-05

## 2018-11-05 NOTE — TELEPHONE ENCOUNTER
----- Message from Dno Quijano MD sent at 11/3/2018  6:26 PM EDT -----  Regarding: Lab results / New labs / NPH eval  Please let patient or her family know know that her blood tests were normal except for a consistently mild to moderate hyponatremia that is not new, but not worse than before  A low sodium has the potential for confusional state as well as making even seizures more likely  I will order some urine and serum studies to help characterize this and contact Dr Aysha Seth  She had mentioned Nephrology consult for this, and will have her opinion  I've also contacted my colleagues and I am putting in a consult to Neurosurgery in the system for the normal pressure hydrocephalus evaluation - it involves a several day stay with the spinal tap followed by examination and it is done at the [address] [place] [address] I believe  It usually is done with a Dr Aiyana Arthur, a neurosurgeon who oversee that        Thank you    ----- Message -----  From: Jose Us Results In  Sent: 10/30/2018  12:15 AM  To: Don Quijano MD

## 2018-11-05 NOTE — TELEPHONE ENCOUNTER
Patients daughter Jamal Gonzalez made aware of below  Requesting that labwork be sent home  Completed at this time  Informed Jamal Gonzalez that neurosurgery will contact her to schedule  Verbalized clear understanding

## 2018-11-07 ENCOUNTER — TELEPHONE (OUTPATIENT)
Dept: NEUROSURGERY | Facility: CLINIC | Age: 78
End: 2018-11-07

## 2018-11-07 DIAGNOSIS — R26.9 GAIT DISTURBANCE: Primary | ICD-10-CM

## 2018-11-07 NOTE — PROGRESS NOTES
Patient scheduled for the following appts:    Tuesday, November 27th (SLT):    PT @ 0680  APPT WITH DR @ 4438    Verified info over the phone with patient's daughter, Gale Riedel

## 2018-11-27 ENCOUNTER — OFFICE VISIT (OUTPATIENT)
Dept: PHYSICAL THERAPY | Facility: CLINIC | Age: 78
End: 2018-11-27
Payer: COMMERCIAL

## 2018-11-27 ENCOUNTER — OFFICE VISIT (OUTPATIENT)
Dept: NEUROSURGERY | Facility: CLINIC | Age: 78
End: 2018-11-27
Payer: COMMERCIAL

## 2018-11-27 VITALS
WEIGHT: 150 LBS | BODY MASS INDEX: 27.6 KG/M2 | RESPIRATION RATE: 16 BRPM | DIASTOLIC BLOOD PRESSURE: 76 MMHG | HEIGHT: 62 IN | SYSTOLIC BLOOD PRESSURE: 118 MMHG | TEMPERATURE: 98.6 F

## 2018-11-27 DIAGNOSIS — G91.2 NPH (NORMAL PRESSURE HYDROCEPHALUS) (HCC): ICD-10-CM

## 2018-11-27 DIAGNOSIS — G91.2 DEMENTIA ASSOCIATED WITH NORMAL PRESSURE HYDROCEPHALUS (HCC): ICD-10-CM

## 2018-11-27 DIAGNOSIS — R26.89 BALANCE PROBLEM: Primary | ICD-10-CM

## 2018-11-27 DIAGNOSIS — F02.80 DEMENTIA ASSOCIATED WITH NORMAL PRESSURE HYDROCEPHALUS (HCC): ICD-10-CM

## 2018-11-27 DIAGNOSIS — R26.9 GAIT DISTURBANCE: Primary | ICD-10-CM

## 2018-11-27 PROCEDURE — 97162 PT EVAL MOD COMPLEX 30 MIN: CPT | Performed by: PHYSICAL THERAPIST

## 2018-11-27 PROCEDURE — G8979 MOBILITY GOAL STATUS: HCPCS | Performed by: PHYSICAL THERAPIST

## 2018-11-27 PROCEDURE — 99205 OFFICE O/P NEW HI 60 MIN: CPT | Performed by: NEUROLOGICAL SURGERY

## 2018-11-27 PROCEDURE — G8978 MOBILITY CURRENT STATUS: HCPCS | Performed by: PHYSICAL THERAPIST

## 2018-11-27 PROCEDURE — G8980 MOBILITY D/C STATUS: HCPCS | Performed by: PHYSICAL THERAPIST

## 2018-11-27 PROCEDURE — 4040F PNEUMOC VAC/ADMIN/RCVD: CPT | Performed by: NEUROLOGICAL SURGERY

## 2018-11-27 NOTE — PROGRESS NOTES
Office Note - Neurosurgery   Emani Mora 66 y o  female MRN: [id number]      Assessment:    Patient is gradually worsening  I reviewed the history, physical examination and imaging in detail today  The clinical, radiographic and investigations support the diagnosis of possible to probable NPH based on 2005 INPH Guidelines  We discussed that normal pressure hydrocephalus is generally progressive  Management involved placement of right-sided ventriculoperitoneal shunt  However, high-volume lumbar puncture may provide some indication as to the extent to which a shunt may be helpful in managing her symptoms  The relative risks, benefits alternatives to both were discussed in detail with the patient and her family today  I also encouraged the patient and family to follow up with her PCP with respect to home safety evaluation and consideration for assisted living situations given her apparent high fall risk  Once all questions were answered to their satisfaction, they wish to take some time to consider their options  They will contact this office should they have additional questions or should they wish to proceed with surgery versus high-volume lumbar puncture  Otherwise, they will follow up on a p r n  basis  Thank you for referring your patient to the [address] [place] [address] Normal Pressure Hydrocephalus Program      Expected postoperative course, including activity restrictions, expected pain and postoperative medication were reviewed  Patient provided verbal consent to surgical procedure and signed consent form: No    History, physical examination and diagnostic tests were reviewed and questions answered  Diagnosis, care plan and treatment options were discussed  The patient and family member patient, sister and daughter understand instructions and will follow up as directed      Plan:    Follow-up: prn    Problem List Items Addressed This Visit        Other    Balance problem - Primary Other Visit Diagnoses     Dementia associated with normal pressure hydrocephalus        possible    NPH (normal pressure hydrocephalus)              Subjective/Objective     Chief Complaint    Difficulties with gait and balance  HPI    Pleasant 77-year-old woman accompanied by her sister and daughter today  She has a 1-2 year history of progressive difficulties with gait and walking and balance  This has become much worse over the past 6 months  She has also developed urinary incontinence and seems to be somewhat more forgetful  While she is still living independently at home, she has had increasing falls  There is no significant history of severe traumatic brain injury, intracranial hemorrhage or meningitis  She denies any pain, weakness or numbness in her legs but describes difficulties coordinating lower extremity movement when standing and walking  She presents today for evaluation at the integrated normal pressure hydrocephalus program         ROS    Review of Systems   HENT: Negative  Eyes: Negative  Respiratory: Positive for chest tightness and shortness of breath  Cardiovascular: Negative  Gastrointestinal: Negative  Endocrine: Negative  Genitourinary: Positive for urgency (urinary incontinence)  Musculoskeletal: Negative  Skin: Negative  Allergic/Immunologic: Negative  Neurological: Positive for weakness (Patient fell yesterday  lost balance  )  Hematological: Negative  Psychiatric/Behavioral: Positive for decreased concentration  The patient is nervous/anxious  Family History    Family History   Problem Relation Age of Onset    Heart attack Mother 39    Heart attack Father 61    No Known Problems Other     Breast cancer Sister     Alcohol abuse Daughter         history of       Social History    Social History     Social History    Marital status:       Spouse name: N/A    Number of children: N/A    Years of education: N/A Occupational History    Retired      Social History Main Topics    Smoking status: Former Smoker     Quit date: 1996    Smokeless tobacco: Never Used      Comment: no secondhand smoke exposure    Alcohol use Yes      Comment: social    Drug use: No    Sexual activity: Not on file     Other Topics Concern    Not on file     Social History Narrative    Living alone           Past Medical History    Past Medical History:   Diagnosis Date    Arthritis     Confusion 10/2/2018    Depression     Displaced fracture of distal phalanx of right thumb     GERD (gastroesophageal reflux disease)     Heart attack ([facility] [address]     Hyperlipidemia     Hypertension        Surgical History    Past Surgical History:   Procedure Laterality Date    APPENDECTOMY      CATARACT EXTRACTION      SEPTOPLASTY         Medications      Current Outpatient Prescriptions:     ascorbic acid (VITAMIN C) 500 mg tablet, Take by mouth, Disp: , Rfl:     aspirin 81 MG tablet, Take 1 tablet by mouth daily, Disp: , Rfl:     atenolol (TENORMIN) 50 mg tablet, Take 1 tablet (50 mg total) by mouth daily, Disp: 30 tablet, Rfl: 5    atorvastatin (LIPITOR) 40 mg tablet, TAKE ONE TABLET BY MOUTH EVERY DAY    OFFICE VISIT NEEDED, Disp: 90 tablet, Rfl: 3    Calcium Carb-Cholecalciferol (CALCIUM + D3) 600-200 MG-UNIT TABS, Take by mouth, Disp: , Rfl:     escitalopram (LEXAPRO) 5 mg tablet, Take 1 tablet (5 mg total) by mouth daily, Disp: 30 tablet, Rfl: 3    Flaxseed, Linseed, (BL FLAX SEED OIL PO), Take by mouth, Disp: , Rfl:     multivitamin (THERAGRAN) TABS, Take 1 tablet by mouth, Disp: , Rfl:     Omega-3 Fatty Acids (FISH OIL PO), Take 2 g by mouth, Disp: , Rfl:     omeprazole (PriLOSEC) 20 mg delayed release capsule, TAKE ONE CAPSULE DAILY, Disp: 30 capsule, Rfl: 5    Vitamin D, Cholecalciferol, 1000 units CAPS, Take 1 tablet by mouth, Disp: , Rfl:     LORazepam (ATIVAN) 1 mg tablet, Take 0 5 tablets (0 5 mg total) by mouth daily as needed for anxiety (Patient not taking: Reported on 11/27/2018 ), Disp: 30 tablet, Rfl: 0    losartan (COZAAR) 50 mg tablet, Take 1 tablet (50 mg total) by mouth 2 (two) times a day for 30 days, Disp: 60 tablet, Rfl: 3    Allergies    No Known Allergies    The following portions of the patient's history were reviewed and updated as appropriate: allergies, current medications, past family history, past medical history, past social history, past surgical history and problem list     Investigations    I personally reviewed the MRI, NPH PT and NPH Cognitive results with the patient:    MRI of the brain without contrast dated October 14th, 2018  Age-appropriate degree of cortical atrophy  Ventricular system is somewhat enlarged but not necessarily under proportion to degree of cortical atrophy  Minimal white matter signal change  No intra-axial or extra-axial lesions  NPH scale dated 11/27/2018, 10/15  PT gait assessment dated 11/27/2018  TUG 23 sec , TUGc 40 sec, DGI 13/24  MoCA dated 11/27/2018, 25/30  Physical Exam    Vitals:  Blood pressure 118/76, temperature 98 6 °F (37 °C), resp  rate 16, height 5' 2" (1 575 m), weight 68 kg (150 lb)  ,Body mass index is 27 44 kg/m²  Physical Exam   Constitutional: She is oriented to person, place, and time  She appears well-developed and well-nourished  No distress  Neurological: She is alert and oriented to person, place, and time  No cranial nerve deficit or sensory deficit  Coordination abnormal    No pronator or parietal drift  Full power in lower extremities  Normal vibration sensation at both medial malleoli  Able to stand with minimal assistance  Walks with a somewhat short shuffling gait with toes pointed outward  On bloc turn in 3 steps  Skin: Skin is warm and dry  Psychiatric: She has a normal mood and affect  Her behavior is normal    Vitals reviewed  Neurologic Exam     Mental Status   Oriented to person, place, and time

## 2018-11-27 NOTE — PROGRESS NOTES
PT Evaluation     Today's date: 2018  Patient name: Yulia Bergeron  : 1940  MRN: [id number]  Referring provider: Ming Boston MD  Dx:   Encounter Diagnosis     ICD-10-CM    1  Gait disturbance R26 9 Ambulatory referral to Physical Therapy                  Assessment  Assessment details: Please refer to the NPH Exam form for all objective measures  Patient was seen for a gait and balance assessment/screen w/ signs of LOB during ambulation, based on functional testing Pt presents as a moderate fall risk, for further details refer to objective information  She will be seen for re-assessment of her gait/balance as needed as part of the management of her condition  It is also recommended, for potential HHA for assistance at home or PT to assess the home and help w/ safety concerns  Thank you for this pleasant referral       Impairments: abnormal coordination, abnormal gait and activity intolerance    Goals  STG  1  Return as needed     LTG  1  Return to therapy PRN    Plan  Treatment plan discussed with: patient and family        Subjective Evaluation    History of Present Illness  Date of onset: 2016  Mechanism of injury: Pt is a 66 y o  female presenting w/ gait and ambulatory dysfunction w/ loss of balance and signs of minor memory loss w/o changes in behavior or cognition (A&Ox3)  Pt is starting not to be able to walk long distances and has progressively started to get worse w/ ambulation requiring use of RW inside the house  Pt lives alone at home in a 2 level home w/ 6+7 steps from entrance to main level, Pt's family tries to check in as often as possible w/ help for at least 1-2 hours a day  Pt states he can still shower (I) but prefers to have a family member there for safety, but requires assistance w/ dressing and changing for LE clothes  Has noticed weakness in UE and LE, w/o any symptoms of numbness and tingling, but does have bladder incontinence occasionally       Neurological signs: carpal tunnel syndrome R side w/ increased cold sensitivity 1 year ago after fracture, bladder incontinence, min memory loss  Red Flags: none  PMH: distal phalanx fx of R side, MI in 40s  Recurrent probem    Quality of life: good    Social Support  Steps to enter house: no  Stairs in house: yes (6+7)   Lives in: multiple-level home  Lives with: alone    Employment status: not working    Diagnostic Tests  MRI studies: abnormal  Patient Goals  Patient goals for therapy: decreased edema, improved balance and increased strength          Objective     Ambulation     Comments     Gait:Pt presents w/ gait changes requiring assistance from RW and (S) w/ decreased step height, foot clearance, renita, and poor rotation of shoulders  TUG: RW: 30 07 seconds required cues to sit down   SPC: 23 48 seconds decreased safety  Both trials required (B) arm rests for sit to stand  TUG-co seconds requiring RW and cuing to continue w/ counting down from previous number  DGI:  increased fall risk, w/ showing signs of LOB in vertical head turns, changing gait speed, turning, and overall use of RW for all ambulation  Stairs: (B) use of hand rails w/ (S) for safety  Retropulsion:-       PT/OT Neuro Exam  Neurologic Exam     Gait, Coordination, and Reflexes   Gait:Pt presents w/ gait changes requiring assistance from RW and (S) w/ decreased step height, foot clearance, renita, and poor rotation of shoulders  TUG: RW: 30 07 seconds required cues to sit down   SPC: 23 48 seconds decreased safety   Both trials required (B) arm rests for sit to stand  TUG-co seconds requiring RW and cuing to continue w/ counting down from previous number  DGI:  increased fall risk, w/ showing signs of LOB in vertical head turns, changing gait speed, turning, and overall use of RW for all ambulation  Stairs: (B) use of hand rails w/ (S) for safety  Retropulsion:-

## 2018-11-27 NOTE — LETTER
November 27, 2018     [address], [address] [facility] [address]    Patient: Allen Couch   YOB: 1940   Date of Visit: 11/27/2018       Dear Dr Rosenberg Blissfield:    Thank you for referring Vianney Patient to me for evaluation  Below are my notes for this consultation  If you have questions, please do not hesitate to call me  I look forward to following your patient along with you  Sincerely,        Hyacinth Kat MD        CC: MD Hyacinth Joaquin MD  11/27/2018  1:52 PM  Sign at close encounter  Office Note - Neurosurgery   Allen Couch 66 y o  female MRN: [id number]      Assessment:    Patient is gradually worsening  I reviewed the history, physical examination and imaging in detail today  The clinical, radiographic and investigations support the diagnosis of possible to probable NPH based on 2005 INPH Guidelines  We discussed that normal pressure hydrocephalus is generally progressive  Management involved placement of right-sided ventriculoperitoneal shunt  However, high-volume lumbar puncture may provide some indication as to the extent to which a shunt may be helpful in managing her symptoms  The relative risks, benefits alternatives to both were discussed in detail with the patient and her family today  I also encouraged the patient and family to follow up with her PCP with respect to home safety evaluation and consideration for assisted living situations given her apparent high fall risk  Once all questions were answered to their satisfaction, they wish to take some time to consider their options  They will contact this office should they have additional questions or should they wish to proceed with surgery versus high-volume lumbar puncture  Otherwise, they will follow up on a p r n  basis      Thank you for referring your patient to the [address] [place] [address] Normal Pressure Hydrocephalus Program      Expected postoperative course, including activity restrictions, expected pain and postoperative medication were reviewed  Patient provided verbal consent to surgical procedure and signed consent form: No    History, physical examination and diagnostic tests were reviewed and questions answered  Diagnosis, care plan and treatment options were discussed  The patient and family member patient, sister and daughter understand instructions and will follow up as directed  Plan:    Follow-up: prn    Problem List Items Addressed This Visit        Other    Balance problem - Primary      Other Visit Diagnoses     Dementia associated with normal pressure hydrocephalus        possible    NPH (normal pressure hydrocephalus)              Subjective/Objective     Chief Complaint    Difficulties with gait and balance  HPI    Pleasant 29-year-old woman accompanied by her sister and daughter today  She has a 1-2 year history of progressive difficulties with gait and walking and balance  This has become much worse over the past 6 months  She has also developed urinary incontinence and seems to be somewhat more forgetful  While she is still living independently at home, she has had increasing falls  There is no significant history of severe traumatic brain injury, intracranial hemorrhage or meningitis  She denies any pain, weakness or numbness in her legs but describes difficulties coordinating lower extremity movement when standing and walking  She presents today for evaluation at the integrated normal pressure hydrocephalus program         ROS    Review of Systems   HENT: Negative  Eyes: Negative  Respiratory: Positive for chest tightness and shortness of breath  Cardiovascular: Negative  Gastrointestinal: Negative  Endocrine: Negative  Genitourinary: Positive for urgency (urinary incontinence)  Musculoskeletal: Negative  Skin: Negative  Allergic/Immunologic: Negative  Neurological: Positive for weakness (Patient fell yesterday    lost balance  )  Hematological: Negative  Psychiatric/Behavioral: Positive for decreased concentration  The patient is nervous/anxious  Family History    Family History   Problem Relation Age of Onset    Heart attack Mother 39    Heart attack Father 61    No Known Problems Other     Breast cancer Sister     Alcohol abuse Daughter         history of       Social History    Social History     Social History    Marital status:      Spouse name: N/A    Number of children: N/A    Years of education: N/A     Occupational History    Retired      Social History Main Topics    Smoking status: Former Smoker     Quit date: 1996    Smokeless tobacco: Never Used      Comment: no secondhand smoke exposure    Alcohol use Yes      Comment: social    Drug use: No    Sexual activity: Not on file     Other Topics Concern    Not on file     Social History Narrative    Living alone           Past Medical History    Past Medical History:   Diagnosis Date    Arthritis     Confusion 10/2/2018    Depression     Displaced fracture of distal phalanx of right thumb     GERD (gastroesophageal reflux disease)     Heart attack ([facility] [address]     Hyperlipidemia     Hypertension        Surgical History    Past Surgical History:   Procedure Laterality Date    APPENDECTOMY      CATARACT EXTRACTION      SEPTOPLASTY         Medications      Current Outpatient Prescriptions:     ascorbic acid (VITAMIN C) 500 mg tablet, Take by mouth, Disp: , Rfl:     aspirin 81 MG tablet, Take 1 tablet by mouth daily, Disp: , Rfl:     atenolol (TENORMIN) 50 mg tablet, Take 1 tablet (50 mg total) by mouth daily, Disp: 30 tablet, Rfl: 5    atorvastatin (LIPITOR) 40 mg tablet, TAKE ONE TABLET BY MOUTH EVERY DAY    OFFICE VISIT NEEDED, Disp: 90 tablet, Rfl: 3    Calcium Carb-Cholecalciferol (CALCIUM + D3) 600-200 MG-UNIT TABS, Take by mouth, Disp: , Rfl:     escitalopram (LEXAPRO) 5 mg tablet, Take 1 tablet (5 mg total) by mouth daily, Disp: 30 tablet, Rfl: 3    Flaxseed, Linseed, (BL FLAX SEED OIL PO), Take by mouth, Disp: , Rfl:     multivitamin (THERAGRAN) TABS, Take 1 tablet by mouth, Disp: , Rfl:     Omega-3 Fatty Acids (FISH OIL PO), Take 2 g by mouth, Disp: , Rfl:     omeprazole (PriLOSEC) 20 mg delayed release capsule, TAKE ONE CAPSULE DAILY, Disp: 30 capsule, Rfl: 5    Vitamin D, Cholecalciferol, 1000 units CAPS, Take 1 tablet by mouth, Disp: , Rfl:     LORazepam (ATIVAN) 1 mg tablet, Take 0 5 tablets (0 5 mg total) by mouth daily as needed for anxiety (Patient not taking: Reported on 11/27/2018 ), Disp: 30 tablet, Rfl: 0    losartan (COZAAR) 50 mg tablet, Take 1 tablet (50 mg total) by mouth 2 (two) times a day for 30 days, Disp: 60 tablet, Rfl: 3    Allergies    No Known Allergies    The following portions of the patient's history were reviewed and updated as appropriate: allergies, current medications, past family history, past medical history, past social history, past surgical history and problem list     Investigations    I personally reviewed the MRI, NPH PT and NPH Cognitive results with the patient:    MRI of the brain without contrast dated October 14th, 2018  Age-appropriate degree of cortical atrophy  Ventricular system is somewhat enlarged but not necessarily under proportion to degree of cortical atrophy  Minimal white matter signal change  No intra-axial or extra-axial lesions  NPH scale dated 11/27/2018, 10/15  PT gait assessment dated 11/27/2018  TUG 23 sec , TUGc 40 sec, DGI 13/24  MoCA dated 11/27/2018, 25/30  Physical Exam    Vitals:  Blood pressure 118/76, temperature 98 6 °F (37 °C), resp  rate 16, height 5' 2" (1 575 m), weight 68 kg (150 lb)  ,Body mass index is 27 44 kg/m²  Physical Exam   Constitutional: She is oriented to person, place, and time  She appears well-developed and well-nourished  No distress  Neurological: She is alert and oriented to person, place, and time   No cranial nerve deficit or sensory deficit  Coordination abnormal    No pronator or parietal drift  Full power in lower extremities  Normal vibration sensation at both medial malleoli  Able to stand with minimal assistance  Walks with a somewhat short shuffling gait with toes pointed outward  On bloc turn in 3 steps  Skin: Skin is warm and dry  Psychiatric: She has a normal mood and affect  Her behavior is normal    Vitals reviewed  Neurologic Exam     Mental Status   Oriented to person, place, and time

## 2018-11-28 ENCOUNTER — TELEPHONE (OUTPATIENT)
Dept: NEUROLOGY | Facility: CLINIC | Age: 78
End: 2018-11-28

## 2018-11-28 NOTE — TELEPHONE ENCOUNTER
----- Message from Cuca Patterson MD sent at 11/27/2018  2:27 PM EST -----  Regarding: follow up  Given the evaluation today, please ask them to either  1  Contact the Neurosurgical Team for the shunt vs high volume LP if they decide to proceed with it and then call us afterwards to make a follow up appt 1 month after the procedure is done  2  Trena Chairez, follow up with me in 2 months from now       Thanks    ----- Message -----  From: Cynthia Noe MD  Sent: 11/27/2018   1:52 PM  To: Brittnee Melchor MD, Cuca Patterson MD

## 2018-11-28 NOTE — TELEPHONE ENCOUNTER
I called and spoke with pt's daughter Devorah James, she plans to contact NS to set up LP  Transferred her to scheduling to set up f/u with dr Rod Sake

## 2018-12-05 ENCOUNTER — TELEPHONE (OUTPATIENT)
Dept: NEUROSURGERY | Facility: CLINIC | Age: 78
End: 2018-12-05

## 2018-12-05 DIAGNOSIS — R41.3 MEMORY IMPAIRMENT: ICD-10-CM

## 2018-12-05 DIAGNOSIS — R26.9 GAIT DISTURBANCE: Primary | ICD-10-CM

## 2018-12-05 LAB — OSMOLALITY SERPL: 272 MOSM/KG (ref 278–305)

## 2018-12-05 NOTE — TELEPHONE ENCOUNTER
Spoke with Justin Waite patients daughter who states she is confused and thought that our team was going to call her back once we clarified with Dr Sergio Medel regarding orders for LP  I apologized to patient for the confusion and instructed that she will need to speak with Neurosurgery regarding scheduling her LP per Dr Mari Genao note  Verbalized clear understanding  I provided phone number for NS and transferred patient to NS to schedule LP at this time  Justin Waite will call back to schedule with Dr Sergio Medel after LP scheduled

## 2018-12-06 ENCOUNTER — TELEPHONE (OUTPATIENT)
Dept: NEUROSURGERY | Facility: CLINIC | Age: 78
End: 2018-12-06

## 2018-12-06 ENCOUNTER — DOCUMENTATION (OUTPATIENT)
Dept: NEUROSURGERY | Facility: CLINIC | Age: 78
End: 2018-12-06

## 2018-12-06 LAB
OSMOLALITY UR: 239 MOSM/KG (ref 50–1200)
SODIUM UR-SCNC: 59 MMOL/L (ref 28–272)

## 2018-12-06 NOTE — TELEPHONE ENCOUNTER
Patient's daughter, Devorah James called and was interested in scheduling a high volume LP for the patient  The following appts were scheduled:    Thurs, Jan 10th (SLT):    LP @ 0800 (arrive 0630)    PT @ 10am     Appt with  @ 11am     Confirmed info over phone with daughter and emailed her letter to [email or web address] per her request

## 2018-12-13 DIAGNOSIS — F41.9 ANXIETY: ICD-10-CM

## 2018-12-13 RX ORDER — ESCITALOPRAM OXALATE 5 MG/1
TABLET ORAL
Qty: 30 TABLET | Refills: 3 | Status: SHIPPED | OUTPATIENT
Start: 2018-12-13 | End: 2019-04-10 | Stop reason: SDUPTHER

## 2018-12-18 ENCOUNTER — TELEPHONE (OUTPATIENT)
Dept: NEUROLOGY | Facility: CLINIC | Age: 78
End: 2018-12-18

## 2018-12-18 NOTE — TELEPHONE ENCOUNTER
----- Message from Joey Armstrong MD sent at 12/16/2018  2:36 PM EST -----  Regarding: Lab results, plan  I had reviewed her latest urine sodium and osmolality labs taken for evaluation of her persistent mild hyponatremia in order to assess for a neurologic cause for it  Given the algorithm found at:  Diagnosis and Management of Sodium Disorders: Hyponatremia and Hypernatremia  [facility]  2015 Mar 1;91(5):299-307  [email or web address]  [email or web address] html     She has a low serum osmolality of 272 mOsm/kg, normal urine osmolality, likely hypovolemia and urinary sodium of 59 mEq/L (under 100mEq)  She does not take any diuretics, have a hx of brain trauma or complain of diarrhea/vomiting to my memory  This may be related to decreased sodium intake or renal loss and not a syndrome of inappropriate ADH release or cerebral salt wasting  I will forward this to Dr Isabel Berry for her review  I see they have arranged for an LP on Jan 10th already, and should keep that appointment  We should make a return visit with me following the LP, perhaps within a month   Thanks    ----- Message -----  From: Eleonora Chacon Results In  Sent: 12/6/2018  10:15 AM  To: Joey Armstrong MD

## 2018-12-19 ENCOUNTER — OFFICE VISIT (OUTPATIENT)
Dept: FAMILY MEDICINE CLINIC | Facility: CLINIC | Age: 78
End: 2018-12-19
Payer: COMMERCIAL

## 2018-12-19 VITALS
HEART RATE: 76 BPM | BODY MASS INDEX: 28.71 KG/M2 | RESPIRATION RATE: 16 BRPM | WEIGHT: 156 LBS | HEIGHT: 62 IN | DIASTOLIC BLOOD PRESSURE: 64 MMHG | SYSTOLIC BLOOD PRESSURE: 118 MMHG

## 2018-12-19 DIAGNOSIS — E87.1 HYPONATREMIA: Primary | ICD-10-CM

## 2018-12-19 DIAGNOSIS — F41.9 ANXIETY: ICD-10-CM

## 2018-12-19 DIAGNOSIS — G91.2 NORMAL PRESSURE HYDROCEPHALUS (HCC): ICD-10-CM

## 2018-12-19 DIAGNOSIS — E78.5 HYPERLIPIDEMIA, UNSPECIFIED HYPERLIPIDEMIA TYPE: ICD-10-CM

## 2018-12-19 DIAGNOSIS — R05.9 COUGH: ICD-10-CM

## 2018-12-19 DIAGNOSIS — I25.10 ATHEROSCLEROSIS OF CORONARY ARTERY OF NATIVE HEART, ANGINA PRESENCE UNSPECIFIED, UNSPECIFIED VESSEL OR LESION TYPE: ICD-10-CM

## 2018-12-19 DIAGNOSIS — I10 BENIGN ESSENTIAL HYPERTENSION: ICD-10-CM

## 2018-12-19 PROBLEM — R25.1 EPISODE OF SHAKING: Status: RESOLVED | Noted: 2018-10-02 | Resolved: 2018-12-19

## 2018-12-19 PROBLEM — R42 DIZZINESS: Status: RESOLVED | Noted: 2018-07-13 | Resolved: 2018-12-19

## 2018-12-19 PROBLEM — R00.0 SINUS TACHYCARDIA: Status: RESOLVED | Noted: 2017-10-20 | Resolved: 2018-12-19

## 2018-12-19 PROCEDURE — 1160F RVW MEDS BY RX/DR IN RCRD: CPT | Performed by: FAMILY MEDICINE

## 2018-12-19 PROCEDURE — 3074F SYST BP LT 130 MM HG: CPT | Performed by: FAMILY MEDICINE

## 2018-12-19 PROCEDURE — 1036F TOBACCO NON-USER: CPT | Performed by: FAMILY MEDICINE

## 2018-12-19 PROCEDURE — 3008F BODY MASS INDEX DOCD: CPT | Performed by: FAMILY MEDICINE

## 2018-12-19 PROCEDURE — 99214 OFFICE O/P EST MOD 30 MIN: CPT | Performed by: FAMILY MEDICINE

## 2018-12-19 PROCEDURE — 3078F DIAST BP <80 MM HG: CPT | Performed by: FAMILY MEDICINE

## 2018-12-19 RX ORDER — FLUTICASONE PROPIONATE 50 MCG
1 SPRAY, SUSPENSION (ML) NASAL DAILY
Qty: 16 G | Refills: 0 | Status: SHIPPED | OUTPATIENT
Start: 2018-12-19 | End: 2019-03-27

## 2018-12-19 NOTE — PROGRESS NOTES
Assessment/Plan:    Hyponatremia  Chronic, complete evaluation but was done by the neurologist include urine and serum osmolality which showing the trend of hypovolemic hyponatremia  Advised patient on adequate hydration  Follow-up on routine blood work  If continued to be low or new symptoms then will consider nephrology follow-up  Cough  Thought to be related to the lisinopril, her symptoms improve much after the discontinuation of the lisinopril, but now her symptoms are back, it could be allergy related, advised to use Flonase for the next 2 weeks  Chest x-ray, CT lung from October 2017 were not significant  I was concerned about the cough with the hyponatremia but imaging was normal   If continued then will consider repeat imaging of the chest     Hyperlipidemia  Stable continue with statin    Normal pressure hydrocephalus  Has regular follow-up with the neurologist, patient has an appointment for lumbar puncture on January 10th, to proceed with that procedure, the discuss the shunt after the lumbar puncture    Benign essential hypertension  Very stable continue with the atenolol and losartan  Anxiety  Seem to be very stable on the Lexapro 5 mg daily  Diagnoses and all orders for this visit:    Hyponatremia    Normal pressure hydrocephalus    Cough  -     fluticasone (FLONASE) 50 mcg/act nasal spray; 1 spray into each nostril daily    Benign essential hypertension    Atherosclerosis of coronary artery of native heart, angina presence unspecified, unspecified vessel or lesion type    Hyperlipidemia, unspecified hyperlipidemia type    Anxiety          Subjective: Pt here for a follow up and to review blood work results  SHY Bates     Patient ID: Liz Escamilla is a 66 y o  female      Patient here for follow-up on her chronic condition include hypertension hyperlipidemia, anxiety, normal pressure hydrocephalus for which undergoing complete evaluation by the neurologist and the neurosurgeon, patient is scheduled for lumbar puncture on January 10, 2019, and ended to be followed by shin it afterward, patient also here for follow-up on hyponatremia which is chronic, patient has no symptoms of dizziness lightheadedness confusion or seizure, patient drink enough amount of water per day, estimated about 36-40 oz a day  Her early morning shakiness resolved completely after the use of the Lexapro, she use Ativan less frequently  Her cough improved for a while after we stopped the lisinopril but over the last few weeks she started having a dry cough again feel her mouth dry  Denied any shortness of breath chest pain or wheezing  The following portions of the patient's history were reviewed and updated as appropriate: allergies, current medications, past family history, past medical history, past social history, past surgical history and problem list     Review of Systems   Constitutional: Negative for activity change, appetite change, chills, diaphoresis, fatigue and fever  HENT: Negative for congestion, postnasal drip, sinus pressure, sore throat and voice change  Eyes: Negative for pain, redness and visual disturbance  Respiratory: Negative for cough, chest tightness, shortness of breath and wheezing  Cardiovascular: Negative for chest pain, palpitations and leg swelling  Gastrointestinal: Negative for abdominal pain, constipation, diarrhea and nausea  Endocrine: Negative for cold intolerance, heat intolerance and polyuria  Genitourinary: Negative for dysuria, enuresis, flank pain and frequency  Musculoskeletal: Negative for gait problem, joint swelling and neck pain  Skin: Negative for color change and wound  Neurological: Negative for dizziness, syncope, speech difficulty, numbness and headaches  Psychiatric/Behavioral: Negative for behavioral problems and confusion  The patient is not nervous/anxious            Objective:      Vitals:    12/19/18 1133   BP: 118/64   BP Location: Left arm   Patient Position: Sitting   Cuff Size: Large   Pulse: 76   Resp: 16   Weight: 70 8 kg (156 lb)   Height: 5' 2" (1 575 m)      Physical Exam   Constitutional: She is oriented to person, place, and time  She appears well-developed and well-nourished  HENT:   Head: Normocephalic and atraumatic  Eyes: Pupils are equal, round, and reactive to light  Conjunctivae and EOM are normal    Neck: Normal range of motion  Neck supple  Cardiovascular: Normal rate, regular rhythm, normal heart sounds and intact distal pulses  Pulmonary/Chest: Effort normal and breath sounds normal    Abdominal: Soft  Bowel sounds are normal    Musculoskeletal: Normal range of motion  Neurological: She is alert and oriented to person, place, and time  She has normal reflexes  Skin: Skin is warm and dry  Psychiatric: She has a normal mood and affect  Her behavior is normal    Nursing note and vitals reviewed

## 2018-12-19 NOTE — ASSESSMENT & PLAN NOTE
Has regular follow-up with the neurologist, patient has an appointment for lumbar puncture on January 10th, to proceed with that procedure, the discuss the shunt after the lumbar puncture

## 2018-12-19 NOTE — ASSESSMENT & PLAN NOTE
Chronic, complete evaluation but was done by the neurologist include urine and serum osmolality which showing the trend of hypovolemic hyponatremia  Advised patient on adequate hydration  Follow-up on routine blood work  If continued to be low or new symptoms then will consider nephrology follow-up

## 2018-12-19 NOTE — ASSESSMENT & PLAN NOTE
Thought to be related to the lisinopril, her symptoms improve much after the discontinuation of the lisinopril, but now her symptoms are back, it could be allergy related, advised to use Flonase for the next 2 weeks    Chest x-ray, CT lung from October 2017 were not significant  I was concerned about the cough with the hyponatremia but imaging was normal   If continued then will consider repeat imaging of the chest

## 2018-12-19 NOTE — PATIENT INSTRUCTIONS
Chronic Cough   AMBULATORY CARE:   A chronic cough  is a cough that lasts more than 4 weeks in children or 8 weeks in adults  Signs and symptoms you may also have:   · Wheezing and shortness of breath    · A runny or stuffy nose    · Pain or itching in your throat    · Red, swollen, watery eyes    · A raspy or hoarse voice    · Heartburn or a sour taste in your mouth  Call 911 for any of the following:   · You cough up blood  · You faint when you cough  · You have trouble breathing  Contact your healthcare provider if:   · You have new or worsening symptoms  · You have severe pain when you take a deep breath  · You become very tired after a coughing fit  · You have trouble sleeping because of the coughing  · You have questions or concerns about your condition or care  Treatment for a chronic cough  may depend on the cause  You may need medicine to stop your cough, treat allergies or acid reflux, or decrease swelling in your airways  You will need antibiotics if your cough is caused by a respiratory infection  If you take medicine that causes a chronic cough, it may be stopped or changed  You may need speech therapy  A speech therapist can teach you ways to control your cough  Self-care:   · Prevent acid reflex  Acid reflux can make your chronic cough worse  Raise your head and upper back when you sleep  Place 2 or more pillows behind your head or sleep in a recliner  Do not lie down for at least 1 hour after you eat  Do not have foods or drinks that increase heartburn  Ask your healthcare provider for other ways to prevent acid reflux  · Do not smoke  Encourage your adolescent child not to smoke  Nicotine and other chemicals in cigarettes and cigars can cause lung damage  They can also make your cough worse  Ask your healthcare provider for information if you currently smoke and need help to quit  E-cigarettes or smokeless tobacco still contain nicotine   Talk to your healthcare provider before you use these products  · Stay away from secondhand smoke  Do not let people smoke in your car, home, or near your child  Do not stand near someone that is smoking  This includes anyone that is smoking an E-cigarrete  · Avoid anything that triggers your allergies or irritates your throat  Allergens and irritants can make your chronic cough worse  Allergens may include dust mites, pollen, pet dander, or mold  Wear a mask if you work around pollutants or irritants  Ask your healthcare provider for more ways to decrease your exposure to allergens or irritants  · Drink plenty of liquids as directed  Liquids may help relieve throat discomfort that causes you to cough  Add honey to tea or hot water to help ease your throat pain  Ask how much liquid to drink each day and which liquids are best for you  Follow up with your healthcare provider as directed: You may need to return for more tests  Your healthcare provider may refer to you other specialists  Write down your questions so you remember to ask them during your visits  © 2017 [facility] Information is for End User's use only and may not be sold, redistributed or otherwise used for commercial purposes  All illustrations and images included in CareNotes® are the copyrighted property of [employer]  or [employer]ola J[employer]  The above information is an  only  It is not intended as medical advice for individual conditions or treatments  Talk to your doctor, nurse or pharmacist before following any medical regimen to see if it is safe and effective for you

## 2018-12-31 NOTE — PROGRESS NOTES
Dee Simon will be discharged from PT as she is not in further need but will follow up as needed for further gait evaluation

## 2019-01-03 ENCOUNTER — TELEPHONE (OUTPATIENT)
Dept: RADIOLOGY | Facility: HOSPITAL | Age: 79
End: 2019-01-03

## 2019-01-10 ENCOUNTER — OFFICE VISIT (OUTPATIENT)
Dept: NEUROSURGERY | Facility: CLINIC | Age: 79
End: 2019-01-10
Payer: COMMERCIAL

## 2019-01-10 ENCOUNTER — HOSPITAL ENCOUNTER (OUTPATIENT)
Dept: RADIOLOGY | Facility: HOSPITAL | Age: 79
Discharge: HOME | End: 2019-01-10
Attending: NEUROLOGICAL SURGERY
Payer: COMMERCIAL

## 2019-01-10 ENCOUNTER — HOSPITAL ENCOUNTER (OUTPATIENT)
Dept: RADIOLOGY | Facility: HOSPITAL | Age: 79
Discharge: HOME | End: 2019-01-10
Attending: NEUROLOGICAL SURGERY | Admitting: NEUROLOGICAL SURGERY
Payer: COMMERCIAL

## 2019-01-10 ENCOUNTER — OFFICE VISIT (OUTPATIENT)
Dept: PHYSICAL THERAPY | Facility: CLINIC | Age: 79
End: 2019-01-10
Payer: COMMERCIAL

## 2019-01-10 VITALS
WEIGHT: 156 LBS | SYSTOLIC BLOOD PRESSURE: 166 MMHG | HEIGHT: 62 IN | BODY MASS INDEX: 28.71 KG/M2 | OXYGEN SATURATION: 98 % | DIASTOLIC BLOOD PRESSURE: 69 MMHG | RESPIRATION RATE: 18 BRPM | HEART RATE: 75 BPM | TEMPERATURE: 98.1 F

## 2019-01-10 VITALS
SYSTOLIC BLOOD PRESSURE: 158 MMHG | BODY MASS INDEX: 29.08 KG/M2 | RESPIRATION RATE: 16 BRPM | WEIGHT: 158 LBS | TEMPERATURE: 98.8 F | DIASTOLIC BLOOD PRESSURE: 85 MMHG | HEART RATE: 72 BPM | HEIGHT: 62 IN

## 2019-01-10 DIAGNOSIS — G91.9 HYDROCEPHALUS (HCC): ICD-10-CM

## 2019-01-10 DIAGNOSIS — G91.2 NORMAL PRESSURE HYDROCEPHALUS (HCC): Primary | ICD-10-CM

## 2019-01-10 DIAGNOSIS — R26.9 GAIT ABNORMALITY: Primary | ICD-10-CM

## 2019-01-10 LAB
APTT PPP: 25 SECONDS (ref 26–38)
INR PPP: 0.96 (ref 0.86–1.17)
PLATELET # BLD AUTO: 387 THOUSANDS/UL (ref 149–390)
PMV BLD AUTO: 9.6 FL (ref 8.9–12.7)
PROTHROMBIN TIME: 12.5 SECONDS (ref 11.8–14.2)

## 2019-01-10 PROCEDURE — 62270 DX LMBR SPI PNXR: CPT

## 2019-01-10 PROCEDURE — 99215 OFFICE O/P EST HI 40 MIN: CPT | Performed by: NEUROLOGICAL SURGERY

## 2019-01-10 PROCEDURE — 97161 PT EVAL LOW COMPLEX 20 MIN: CPT | Performed by: PHYSICAL THERAPIST

## 2019-01-10 PROCEDURE — 85610 PROTHROMBIN TIME: CPT | Performed by: RADIOLOGY

## 2019-01-10 PROCEDURE — 85049 AUTOMATED PLATELET COUNT: CPT | Performed by: RADIOLOGY

## 2019-01-10 PROCEDURE — G8979 MOBILITY GOAL STATUS: HCPCS | Performed by: PHYSICAL THERAPIST

## 2019-01-10 PROCEDURE — 85730 THROMBOPLASTIN TIME PARTIAL: CPT | Performed by: RADIOLOGY

## 2019-01-10 PROCEDURE — G8978 MOBILITY CURRENT STATUS: HCPCS | Performed by: PHYSICAL THERAPIST

## 2019-01-10 RX ORDER — LIDOCAINE HYDROCHLORIDE 10 MG/ML
5 INJECTION, SOLUTION INFILTRATION; PERINEURAL ONCE
Status: COMPLETED | OUTPATIENT
Start: 2019-01-10 | End: 2019-01-10

## 2019-01-10 RX ADMIN — LIDOCAINE HYDROCHLORIDE ANHYDROUS 5 ML: 10 INJECTION, SOLUTION INFILTRATION at 08:30

## 2019-01-10 NOTE — PROGRESS NOTES
PT Evaluation     Today's date: 1/10/2019  Patient name: Florian Brasher  : 1940  MRN: [id number]  Referring provider: Levon Engle MD  Dx:   Encounter Diagnosis     ICD-10-CM    1  Gait abnormality R26 9                   Assessment  Assessment details: Pt presents with signs of high fall risk given her recent history as well as objective findings today  It appears pt has excellent home support even though she lives independently  Pt at this time if not receiving PT intervention, she may benefit consultation of this as well as what Dr Mara Rust may deem necessary at this time  Pt requires no follow up at this time unless there is another meeting with physician or requires PT evaluation however this facility may not be likely most appropriate given home location in [place]  Thank you very much for this kind and motivated referral    Impairments: abnormal gait  Understanding of Dx/Px/POC: good   Prognosis: good    Goals  STG 4 weeks: Follow up prn  LTG 8 Weeks: Follow up prn  Plan  Plan details: Follow up prn  Patient would benefit from: skilled physical therapy  Duration in weeks: 6  Treatment plan discussed with: patient and family        Subjective Evaluation    History of Present Illness  Date of onset: 1/10/2019  Mechanism of injury: Pt is a 66 yofemale who presents today s/o lumbar Puncture with her sister Anna Jewell and her daughter Scotty aButista  She states that she is feeling well, no HA, no dizziness, no Light headedness and overall feeling well  She presents with RW and denies any falls within the last month  Pt reports that she lives by her self within a bi level home  She states that there is a first floor set up  Pt reports that there are railings on both sides, she indicates that she does stairs independently, but she is not driving  She follows up with Dr Mara Rust later today      Pain  No pain reported    Social Support  Steps to enter house: yes ([place]ailing)  Lives in: multiple-level home  Lives with: alone      Diagnostic Tests  MRI studies: abnormal  Patient Goals  Patient/family treatment goals: Improve balance  Objective     Ambulation     Comments   TUG: with RW 28 3" with RW CG  TUG Co 45" with RW CG  DGI:   For all other details please refer to NPH form                Precautions: Falls, NPH, Hx of MI, HTN

## 2019-01-10 NOTE — PROGRESS NOTES
Office Note - Neurosurgery   Emma Boyce 66 y o  female MRN: [id number]      Assessment:    Patient is stable  I reviewed the history, physical examination and imaging in detail today  The clinical, radiographic and investigations support the diagnosis of possible NPH based on 2005 INPH Guidelines  She seems to have had some subjective improvement in her gait, though there is no objective improvement  Would recommend the patient and her family consider any improvements in her gait, balance and cognition over the next 1-2 days prior to considering any surgical intervention    This patient is a candidate for a right frontal ventriculoperitoneal shunt insertion  The degree of improvement is difficult to estimate and may be related to the extent that other diagnoses are contributing to clinical presentation  There is no alternative treatment for NPH, however overall function may be improved with additional treatment for other diagnoses  The goals of surgery:    1  Gait and balance are most likely to improve  Cognitive changes and incontinence are less likely to improve  The degree of improvement is difficult to predict and may be partial or negligible  The risks of surgery:    1  Risk of general anesthetic, infection and bleeding  2  Risk of neurological injury with new pain, weakness, numbness, speech or vision difficulties  Stroke, hemorrhage or seizures can occur  3  Risk of shunt malfunction or over-drainage, producing subdural hematoma,  requiring additional surgery or reprogramming  4  Risk of injury to thoracic and intraperitoneal structures  Once all questions were answered to their satisfaction, they wish to take some time to consider their options      Thank you for referring your patient to the [address] [place] [address] Normal Pressure Hydrocephalus Program        Expected postoperative course, including activity restrictions, expected pain and postoperative medication were reviewed  Patient provided verbal consent to surgical procedure and signed consent form: Yes  The patient and family will see whether not the LP significantly improve her function and ambulation over the next 1-2 days prior to deciding whether not to proceed with surgery  History, physical examination and diagnostic tests were reviewed and questions answered  Diagnosis, care plan and treatment options were discussed  The patient, daughter and sister understand instructions and will follow up as directed  Plan:    Follow-up: prn    Problem List Items Addressed This Visit        Nervous and Auditory    Normal pressure hydrocephalus - Primary          Subjective/Objective     Chief Complaint    Difficulties with gait and balance  HPI    Patient underwent high-volume lumbar puncture earlier today  She currently denies any headaches  She presents today for ongoing follow-up through the integrated normal pressure hydrocephalus program   Both she and her family feels that her gait is somewhat improved following the lumbar puncture compared to baseline  They have noticed no change in cognition  ROS  ROS personally reviewed  Review of Systems   Constitutional: Negative for fatigue  HENT: Negative  Eyes: Negative  Respiratory: Positive for chest tightness (with activity ) and shortness of breath (with activity )  Cardiovascular: Negative  Gastrointestinal: Negative  Endocrine: Negative  Genitourinary: Positive for urgency (urninary incontinence )  Negative for frequency  Musculoskeletal: Positive for gait problem  Skin: Negative  Allergic/Immunologic: Negative  Neurological: Positive for weakness  Hematological: Bruises/bleeds easily (patient on ASA 81 and fish oil)  Psychiatric/Behavioral: Positive for decreased concentration  The patient is nervous/anxious          Family History    Family History   Problem Relation Age of Onset    Heart attack Mother 39    Heart attack Father 61    No Known Problems Other     Breast cancer Sister     Alcohol abuse Daughter         history of    Heart attack Brother        Social History    Social History     Social History    Marital status:      Spouse name: N/A    Number of children: N/A    Years of education: N/A     Occupational History    Retired      Social History Main Topics    Smoking status: Former Smoker     Quit date: 1996    Smokeless tobacco: Never Used      Comment: no secondhand smoke exposure    Alcohol use Yes      Comment: social    Drug use: No    Sexual activity: Not on file     Other Topics Concern    Not on file     Social History Narrative    Living alone           Past Medical History    Past Medical History:   Diagnosis Date    Arthritis     Confusion 10/2/2018    Depression     Displaced fracture of distal phalanx of right thumb     GERD (gastroesophageal reflux disease)     Heart attack ([facility] [address] )     Hyperlipidemia     Hypertension     Shortness of breath        Surgical History    Past Surgical History:   Procedure Laterality Date    APPENDECTOMY      CATARACT EXTRACTION      COLONOSCOPY      SEPTOPLASTY      WRIST FRACTURE SURGERY Right        Medications      Current Outpatient Prescriptions:     ascorbic acid (VITAMIN C) 500 mg tablet, Take by mouth, Disp: , Rfl:     aspirin 81 MG tablet, Take 1 tablet by mouth daily, Disp: , Rfl:     atenolol (TENORMIN) 50 mg tablet, Take 1 tablet (50 mg total) by mouth daily, Disp: 30 tablet, Rfl: 5    atorvastatin (LIPITOR) 40 mg tablet, TAKE ONE TABLET BY MOUTH EVERY DAY    OFFICE VISIT NEEDED, Disp: 90 tablet, Rfl: 3    Calcium Carb-Cholecalciferol (CALCIUM + D3) 600-200 MG-UNIT TABS, Take by mouth, Disp: , Rfl:     escitalopram (LEXAPRO) 5 mg tablet, TAKE ONE TABLET BY MOUTH EVERY DAY, Disp: 30 tablet, Rfl: 3    Flaxseed, Linseed, (BL FLAX SEED OIL PO), Take by mouth, Disp: , Rfl:     fluticasone (FLONASE) 50 mcg/act nasal spray, 1 spray into each nostril daily, Disp: 16 g, Rfl: 0    LORazepam (ATIVAN) 1 mg tablet, Take 0 5 tablets (0 5 mg total) by mouth daily as needed for anxiety, Disp: 30 tablet, Rfl: 0    losartan (COZAAR) 50 mg tablet, Take 1 tablet (50 mg total) by mouth 2 (two) times a day for 30 days, Disp: 60 tablet, Rfl: 3    multivitamin (THERAGRAN) TABS, Take 1 tablet by mouth, Disp: , Rfl:     Omega-3 Fatty Acids (FISH OIL PO), Take 2 g by mouth, Disp: , Rfl:     omeprazole (PriLOSEC) 20 mg delayed release capsule, TAKE ONE CAPSULE DAILY, Disp: 30 capsule, Rfl: 5    Vitamin D, Cholecalciferol, 1000 units CAPS, Take 1 tablet by mouth, Disp: , Rfl:   No current facility-administered medications for this visit  Allergies    No Known Allergies    The following portions of the patient's history were reviewed and updated as appropriate: allergies, current medications, past family history, past medical history, past social history, past surgical history and problem list     Investigations    I personally reviewed the LP, NPH PT and NPH Cognitive results with the patient:    High-volume lumbar puncture dated January 10th, 2019  Opening pressure 28 cm of water  20 mL of clear CSF removed  NPH scale dated 1/10/2019, 7/15  PT gait assessment dated 1/10/2019  TUG 28 sec , TUGc 37 sec, DGI 13/24   MoCA dated 1/10/2019, 22/30  Physical Exam    Vitals:  Blood pressure 158/85, pulse 72, temperature 98 8 °F (37 1 °C), resp  rate 16, height 5' 2" (1 575 m), weight 71 7 kg (158 lb), not currently breastfeeding  ,Body mass index is 28 9 kg/m²  Physical Exam   Constitutional: She appears well-developed and well-nourished  No distress  Cardiovascular: Normal rate, regular rhythm and normal heart sounds  Pulmonary/Chest: Effort normal and breath sounds normal    Abdominal: Soft  She exhibits no distension  There is no tenderness  Neurological: She is alert  No cranial nerve deficit or sensory deficit   Coordination abnormal    Able to stand without assistant  Walks with a wide-based gait  Shuffling appears to be improved compared to last visit  On bloc turn in 3 steps  Skin: Skin is warm and dry  Psychiatric: She has a normal mood and affect  Her behavior is normal    Vitals reviewed      Neurologic Exam

## 2019-01-10 NOTE — LETTER
January 10, 2019     [place] Deng, [address]    Patient: Naida Pino   YOB: 1940   Date of Visit: 1/10/2019       Dear Dr Joan Aguilar: Thank you for referring Fransisco Curry to me for evaluation  Below are my notes for this consultation  If you have questions, please do not hesitate to call me  I look forward to following your patient along with you  Sincerely,        Graciela Zhang MD        CC: No Recipients  Graciela Zhang MD  1/10/2019  1:48 PM  Addendum  Office Note - Neurosurgery   Naida Pino 66 y o  female MRN: [id number]      Assessment:    Patient is stable  I reviewed the history, physical examination and imaging in detail today  The clinical, radiographic and investigations support the diagnosis of possible NPH based on 2005 INPH Guidelines  She seems to have had some subjective improvement in her gait, though there is no objective improvement  Would recommend the patient and her family consider any improvements in her gait, balance and cognition over the next 1-2 days prior to considering any surgical intervention    This patient is a candidate for a right frontal ventriculoperitoneal shunt insertion  The degree of improvement is difficult to estimate and may be related to the extent that other diagnoses are contributing to clinical presentation  There is no alternative treatment for NPH, however overall function may be improved with additional treatment for other diagnoses  The goals of surgery:    1  Gait and balance are most likely to improve  Cognitive changes and incontinence are less likely to improve  The degree of improvement is difficult to predict and may be partial or negligible  The risks of surgery:    1  Risk of general anesthetic, infection and bleeding  2  Risk of neurological injury with new pain, weakness, numbness, speech or vision difficulties  Stroke, hemorrhage or seizures can occur    3  Risk of shunt malfunction or over-drainage, producing subdural hematoma,  requiring additional surgery or reprogramming  4  Risk of injury to thoracic and intraperitoneal structures  Once all questions were answered to their satisfaction, they wish to take some time to consider their options  Thank you for referring your patient to the [address] [place] [address] Normal Pressure Hydrocephalus Program        Expected postoperative course, including activity restrictions, expected pain and postoperative medication were reviewed  Patient provided verbal consent to surgical procedure and signed consent form: Yes  The patient and family will see whether not the LP significantly improve her function and ambulation over the next 1-2 days prior to deciding whether not to proceed with surgery  History, physical examination and diagnostic tests were reviewed and questions answered  Diagnosis, care plan and treatment options were discussed  The patient, daughter and sister understand instructions and will follow up as directed  Plan:    Follow-up: prn    Problem List Items Addressed This Visit        Nervous and Auditory    Normal pressure hydrocephalus - Primary          Subjective/Objective     Chief Complaint    Difficulties with gait and balance  HPI    Patient underwent high-volume lumbar puncture earlier today  She currently denies any headaches  She presents today for ongoing follow-up through the integrated normal pressure hydrocephalus program   Both she and her family feels that her gait is somewhat improved following the lumbar puncture compared to baseline  They have noticed no change in cognition  ROS  ROS personally reviewed  Review of Systems   Constitutional: Negative for fatigue  HENT: Negative  Eyes: Negative  Respiratory: Positive for chest tightness (with activity ) and shortness of breath (with activity )  Cardiovascular: Negative  Gastrointestinal: Negative      Endocrine: Negative  Genitourinary: Positive for urgency (urninary incontinence )  Negative for frequency  Musculoskeletal: Positive for gait problem  Skin: Negative  Allergic/Immunologic: Negative  Neurological: Positive for weakness  Hematological: Bruises/bleeds easily (patient on ASA 81 and fish oil)  Psychiatric/Behavioral: Positive for decreased concentration  The patient is nervous/anxious  Family History    Family History   Problem Relation Age of Onset    Heart attack Mother 39    Heart attack Father 61    No Known Problems Other     Breast cancer Sister     Alcohol abuse Daughter         history of    Heart attack Brother        Social History    Social History     Social History    Marital status:       Spouse name: N/A    Number of children: N/A    Years of education: N/A     Occupational History    Retired      Social History Main Topics    Smoking status: Former Smoker     Quit date: 1996    Smokeless tobacco: Never Used      Comment: no secondhand smoke exposure    Alcohol use Yes      Comment: social    Drug use: No    Sexual activity: Not on file     Other Topics Concern    Not on file     Social History Narrative    Living alone           Past Medical History    Past Medical History:   Diagnosis Date    Arthritis     Confusion 10/2/2018    Depression     Displaced fracture of distal phalanx of right thumb     GERD (gastroesophageal reflux disease)     Heart attack ([address] )     Hyperlipidemia     Hypertension     Shortness of breath        Surgical History    Past Surgical History:   Procedure Laterality Date    APPENDECTOMY      CATARACT EXTRACTION      COLONOSCOPY      SEPTOPLASTY      WRIST FRACTURE SURGERY Right        Medications      Current Outpatient Prescriptions:     ascorbic acid (VITAMIN C) 500 mg tablet, Take by mouth, Disp: , Rfl:     aspirin 81 MG tablet, Take 1 tablet by mouth daily, Disp: , Rfl:     atenolol (TENORMIN) 50 mg tablet, Take 1 tablet (50 mg total) by mouth daily, Disp: 30 tablet, Rfl: 5    atorvastatin (LIPITOR) 40 mg tablet, TAKE ONE TABLET BY MOUTH EVERY DAY   OFFICE VISIT NEEDED, Disp: 90 tablet, Rfl: 3    Calcium Carb-Cholecalciferol (CALCIUM + D3) 600-200 MG-UNIT TABS, Take by mouth, Disp: , Rfl:     escitalopram (LEXAPRO) 5 mg tablet, TAKE ONE TABLET BY MOUTH EVERY DAY, Disp: 30 tablet, Rfl: 3    Flaxseed, Linseed, (BL FLAX SEED OIL PO), Take by mouth, Disp: , Rfl:     fluticasone (FLONASE) 50 mcg/act nasal spray, 1 spray into each nostril daily, Disp: 16 g, Rfl: 0    LORazepam (ATIVAN) 1 mg tablet, Take 0 5 tablets (0 5 mg total) by mouth daily as needed for anxiety, Disp: 30 tablet, Rfl: 0    losartan (COZAAR) 50 mg tablet, Take 1 tablet (50 mg total) by mouth 2 (two) times a day for 30 days, Disp: 60 tablet, Rfl: 3    multivitamin (THERAGRAN) TABS, Take 1 tablet by mouth, Disp: , Rfl:     Omega-3 Fatty Acids (FISH OIL PO), Take 2 g by mouth, Disp: , Rfl:     omeprazole (PriLOSEC) 20 mg delayed release capsule, TAKE ONE CAPSULE DAILY, Disp: 30 capsule, Rfl: 5    Vitamin D, Cholecalciferol, 1000 units CAPS, Take 1 tablet by mouth, Disp: , Rfl:   No current facility-administered medications for this visit  Allergies    No Known Allergies    The following portions of the patient's history were reviewed and updated as appropriate: allergies, current medications, past family history, past medical history, past social history, past surgical history and problem list     Investigations    I personally reviewed the LP, NPH PT and NPH Cognitive results with the patient:    High-volume lumbar puncture dated January 10th, 2019  Opening pressure 28 cm of water  20 mL of clear CSF removed  NPH scale dated 1/10/2019, 7/15  PT gait assessment dated 1/10/2019  TUG 28 sec , TUGc 37 sec, DGI 13/24   MoCA dated 1/10/2019, 22/30            Physical Exam    Vitals:  Blood pressure 158/85, pulse 72, temperature 98 8 °F (37 1 °C), resp  rate 16, height 5' 2" (1 575 m), weight 71 7 kg (158 lb), not currently breastfeeding  ,Body mass index is 28 9 kg/m²  Physical Exam   Constitutional: She appears well-developed and well-nourished  No distress  Cardiovascular: Normal rate, regular rhythm and normal heart sounds  Pulmonary/Chest: Effort normal and breath sounds normal    Abdominal: Soft  She exhibits no distension  There is no tenderness  Neurological: She is alert  No cranial nerve deficit or sensory deficit  Coordination abnormal    Able to stand without assistant  Walks with a wide-based gait  Shuffling appears to be improved compared to last visit  On bloc turn in 3 steps  Skin: Skin is warm and dry  Psychiatric: She has a normal mood and affect  Her behavior is normal    Vitals reviewed      Neurologic Exam

## 2019-01-14 DIAGNOSIS — K21.9 GERD WITHOUT ESOPHAGITIS: ICD-10-CM

## 2019-01-14 RX ORDER — OMEPRAZOLE 20 MG/1
20 CAPSULE, DELAYED RELEASE ORAL DAILY
Qty: 30 CAPSULE | Refills: 5 | Status: SHIPPED | OUTPATIENT
Start: 2019-01-14 | End: 2019-06-11

## 2019-01-27 DIAGNOSIS — I10 ESSENTIAL HYPERTENSION: ICD-10-CM

## 2019-01-27 DIAGNOSIS — I10 HYPERTENSION, UNSPECIFIED TYPE: ICD-10-CM

## 2019-01-28 RX ORDER — LOSARTAN POTASSIUM 50 MG/1
TABLET ORAL
Qty: 60 TABLET | Refills: 3 | Status: SHIPPED | OUTPATIENT
Start: 2019-01-28 | End: 2019-06-05 | Stop reason: SDUPTHER

## 2019-01-28 RX ORDER — ATENOLOL 50 MG/1
TABLET ORAL
Qty: 30 TABLET | Refills: 5 | Status: SHIPPED | OUTPATIENT
Start: 2019-01-28 | End: 2019-12-04

## 2019-01-29 NOTE — PROGRESS NOTES
PT Discharge    Today's date: 2019  Patient name: Nacho Ames  : 1940  MRN: [id number]  Referring provider: Bran Mustafa MD  Dx:   Encounter Diagnosis     ICD-10-CM    1  Gait abnormality R26 9 PT plan of care cert/re-cert       Start Time: 1000  Stop Time: 1030  Total time in clinic (min): 30 minutes    Assessment/Plan Pt has not been present since 1/10/19  Pt's chart will be DC in compliance of facility policy as all Charts are DC within 30 days of last scheduled visit          Subjective    Objective    Flowsheet Rows      Most Recent Value   PT/OT G-Codes   Assessment Type  Evaluation   G code set  Mobility: Walking & Moving Around   Mobility: Walking and Moving Around Current Status ()  CK   Mobility: Walking and Moving Around Goal Status ()  CK

## 2019-02-11 NOTE — PROGRESS NOTES
DEPARTMENT OF NEUROLOGICAL SCIENCES  [address] DISORDERS CLINIC         RETURN PATIENT NOTE    Patient: Yang Hernandez  Medical Record Number: # [id number]  YOB: 1940  Date of visit: 2/13/2019    Referring provider: No ref  provider found    ASSESSMENT     1  Balance problem  Ambulatory Referral to Home Health   2  Dementia without behavioral disturbance, unspecified dementia type  Ambulatory referral to Speech Therapy   3  Inactivity        This patient with 1 5 yr hx of balance and gait issues, still without consistent signs of a parkinsonism and having had 1x low-volume LP with modest subjective immediate benefit, now returns having symptoms mostly returned to previous levels  Pt remains apprehensive about shunt placement  She admits herself that she has not been very physically active; daughter agrees this is something to improve upon  Her children are concerned about her functioning as pt lives alone and daughters visit frequently to help her with various IADLs  Previous attempt to get her Home Health PT was rejected by insurance  Her hyponatremia is mild and not expected as severe enough to cause cognitive issues  At this point, it remains unclear as to the level of benefit she would thompson from the shunt - she had only 20 cc of CSF taken and less than usual amount due to arthritis in spine with mixed reports of only motor improvement per herself and her daughters  No improvement in cognition or urinary symptoms following LP however  Now that it has been a month she has returned to previous level  Given that pt still is not very amenable to a shunt, will not pursue this further now  This does not preclude our re-consideration of a shunt, however, should she change her mind later on  Instead, we will proceed as below  PLAN     · Referral to Speech Therapy for cognitive rehabilitation therapy     · Encouraged increased level of ambulation for longer distances, preferably under supervision, and avoiding compromising her hand  on walker to avoid spills and falls  Her one fall recently was due to this reason  · Written order for home safety evaluation as pt has stairs in home and lives alone  · Would discuss with Dr Gerri Brower about her urinary urge incontinence to be treated as possible overactive bladder  · The patient has been instructed to call us about any new neurological problems or medication side effects  · Return in 3 months  A total of 35 minutes were spent face-to-face with this patient, of which at least 50% was spent on counseling and coordination of care  We discussed the natural history of the patient's condition, differential diagnosis, level of diagnostic certainty, treatment alternatives and their side effects and possible complications  SUBJECTIVE:     Ms Nora Grimes is a 66 y o  right handed female who [facility] the [facility] for evaluation of gait imbalance and memory; f/u after spinal tap  Last visit in 10/24/18  Interim History  Her blood tests were normal except for a consistently mild to moderate hyponatremia that is not new, but not worse than before  Reviewed her latest urine sodium and osmolality labs taken for evaluation of her persistent mild hyponatremia, suggestive of decreased Na intake  She had high volume tap performed 1/10/19 by Dr Roberta Caceres (please refer to his note)  No objective difference seen immediately at the time but some subjective improvement in gait not cognition  They held off pursuing the shunt immediately as pt was reluctant to do so  In the interval month, she feels her gait is overall worsening towards her previous status now  She admits she is still not ambulating very much at home  She is dropping some things more  +Fall in Jan 2019, knocking over a chair  She endorses urinary urge frequency, wearing a pad, voiding small amounts each time  Overactive bladder       REVIEW OF PAST MEDICAL, SOCIAL AND FAMILY HISTORY:  This is the list of problems as per our Medical Records:    Patient Active Problem List    Diagnosis Date Noted    Normal pressure hydrocephalus 11/01/2018    Balance problem 10/02/2018    Confusion 10/02/2018    Forgetfulness 08/28/2018    Cough 07/25/2018    Ambulatory dysfunction 12/15/2017    Positive ROSALINDA (antinuclear antibody) 12/15/2017    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    Anxiety 10/20/2017    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    Infarction of spleen 10/11/2017    Anemia 09/13/2017    Levoscoliosis 08/26/2017    Elevated sed rate 08/16/2017    Vitamin D deficiency 08/16/2017    Radiculopathy 08/16/2017    Thrombocytosis ([address] ) 08/16/2017    Fatigue 08/11/2017    GERD without esophagitis 02/07/2017    Carotid artery plaque 04/19/2016    Hyponatremia 11/18/2014    Benign essential hypertension 07/17/2014    Hyperlipidemia 07/17/2014    Carotid bruit 07/17/2014    Asthma 11/03/2009    Coronary atherosclerosis 11/03/2009       Past Medical History:   Diagnosis Date    Arthritis     Confusion 10/2/2018    Depression     Displaced fracture of distal phalanx of right thumb     GERD (gastroesophageal reflux disease)     Heart attack ([address] )     Hyperlipidemia     Hypertension     Shortness of breath         Past Surgical History:   Procedure Laterality Date    APPENDECTOMY      CATARACT EXTRACTION      COLONOSCOPY      FL LUMBAR PUNCTURE  1/10/2019    SEPTOPLASTY      WRIST FRACTURE SURGERY Right         No Known Allergies     Outpatient Encounter Medications as of 2/13/2019   Medication Sig Dispense Refill    ascorbic acid (VITAMIN C) 500 mg tablet Take by mouth      aspirin 81 MG tablet Take 1 tablet by mouth daily      atenolol (TENORMIN) 50 mg tablet TAKE ONE TABLET BY MOUTH DAILY 30 tablet 5    atorvastatin (LIPITOR) 40 mg tablet TAKE ONE TABLET BY MOUTH EVERY DAY    OFFICE VISIT NEEDED 90 tablet 3    Calcium Carb-Cholecalciferol (CALCIUM + D3) 600-200 MG-UNIT TABS Take by mouth      escitalopram (LEXAPRO) 5 mg tablet TAKE ONE TABLET BY MOUTH EVERY DAY 30 tablet 3    Flaxseed, Linseed, (BL FLAX SEED OIL PO) Take by mouth      fluticasone (FLONASE) 50 mcg/act nasal spray 1 spray into each nostril daily 16 g 0    LORazepam (ATIVAN) 1 mg tablet Take 0 5 tablets (0 5 mg total) by mouth daily as needed for anxiety 30 tablet 0    losartan (COZAAR) 50 mg tablet TAKE ONE TABLET BY MOUTH TWO TIMES A DAY FOR 30 DAYS 60 tablet 3    multivitamin (THERAGRAN) TABS Take 1 tablet by mouth      Omega-3 Fatty Acids (FISH OIL PO) Take 2 g by mouth      omeprazole (PriLOSEC) 20 mg delayed release capsule Take 1 capsule (20 mg total) by mouth daily 30 capsule 5    Vitamin D, Cholecalciferol, 1000 units CAPS Take 1 tablet by mouth       No facility-administered encounter medications on file as of 2019  Social History     Tobacco Use    Smoking status: Former Smoker     Last attempt to quit:      Years since quittin 1    Smokeless tobacco: Never Used    Tobacco comment: no secondhand smoke exposure   Substance Use Topics    Alcohol use: Yes     Comment: social   Living alone, retired  worker  High school graduate  Family History   Problem Relation Age of Onset    Heart attack Mother 39    Heart attack Father 61    No Known Problems Other     Breast cancer Sister     Alcohol abuse Daughter         history of    Heart attack Brother         REVIEW OF SYSTEMS:  The patient has entered data on an intake form regarding present illness, past medical and surgical history, medications, allergies, family and social history, and a full review of 14 systems  I have reviewed this form with the patient, and all the relevant information has been included on this note  The full review of systems was negative except as stated in HPI and below  Constitutional: Positive for fatigue  HENT: Negative  Eyes: Negative  Respiratory: Negative  Cardiovascular: Negative  Gastrointestinal: Negative  Endocrine: Negative  Genitourinary: Negative  Musculoskeletal: Positive for back pain and gait problem  Patient is moving slower now than before  Patient states she had back pain a day ago  Skin: Negative  Allergic/Immunologic: Negative  Neurological: Positive for numbness  Patient states she has numbness in her hand right only  Hematological: Negative  Psychiatric/Behavioral:        Patient states that she is still the same with her memory  FOCUSED PHYSICAL EXAMINATION:     Vital signs:  /72 (BP Location: Left arm, Patient Position: Sitting, Cuff Size: Adult)   Pulse 82   Resp 16   Ht 5' 2" (1 575 m)   Wt 72 6 kg (160 lb)   BMI 29 26 kg/m²     General:  Utilizing front wheel walker  Well-appearing, well nourished, pleasant patient in no acute distress  Mood and Fund of Knowledge are appropriate  Head:  Normocephalic, atraumatic  Oropharynx and conjunctiva are clear  Speech  No hypophonia or bradylalia  No scanning speech  Language: Comprehension intact  Neck:  Supple, strong 5/5 forward flexion and retroflexion  Extremities: Range of motion is normal       Cognitive and Mental Exam:  Alert, awake, responsive and follows all commands  She know location ("Neurology office") but took 2 tries to get year (said "[birth year]-" before correcting herself), and said it was "October" for the month before correcting herself to "February" later  After saying October, she said "Easter" as the main upcoming holiday  Cranial Nerves:    Visual fields are full to confrontation  Extraocular movements were full, with normal pursuit and saccades  Pupils were equal, reactive to light symmetrically  Facial sensation to light touch was intact  Face is symmetric with normal strength  Hearing is normal  Palate is up going bilaterally and symmetrically     Neck muscles are strong  Tongue protrusion is at midline with normal movements  No dysarthria  Motor:    MDS-UPDRS III:   Speech: 0  Facial Expression: 0  Tremor (Head):  0  Rest Tremor Severity (RUE/LUE/RLE/LLE/Lip): 0/0/0/0/0  Action Tremor of Hands (R): 0  Action Tremor of Hands (L): 0  Finger tapping (R): 1  Finger tapping (L): 1  Hand clenching (R): 1  Hand clenching (L): 1  NICK Hand (R): 1  NICK Hand (L): 1  Toe Tapping (R):  2  Toe Tapping (L):  2  Leg Agility (R):  2  Leg Agility (L):  2  Rigidity (Neck): 2   Significant paratonia present  Rigidity (RUE): 1 Significant paratonia present  Rigidity (LUE): 1 Significant paratonia present  Rigidity (RLE): 1 Significant paratonia present   Rigidity (LLE): 1 Significant paratonia present  Arising From Chair: 1  Posture: 2  Gait: 2  Freezing of Gait: 0  Postural Stability: 2  Body Bradykinesia: 2  Minimal rigidity, no significant bradykinesia and no tremor  Muscle Strength Right Left  Muscle Strength Right Left   Deltoid 5/5 5/5  Hip Adductors 5/5 5/5   Biceps 5/5 5/5  Hip Abductors 5/5 5/5   Triceps 5/5 5/5  Knee Extensors 5/5 5/5   Wrist Extensors 5/5 5/5  Knee Flexors 5/5 5/5   Wrist Flexors 5/5 5/5  Ankle Extensors 5/5 5/5    5/5 5/5  Ankle Flexors 5/5 5/5   Finger Abductors 5/5 5/5       Hip Flexors 5/5 5/5   Hip Extensors 5/5 5/5     Coordination:  Finger-to-nose-finger: intention tremor bilaterally with distinct phases, slows down when approaches target  Gait:  small hesitant steps without swaying BUT able to  feet better when attention is drawn to it  Mild difficulty turning, Pull test of 2 (falls straight back)  Mildly decreased left arm swing  Reflexes:    Right Left   Biceps 2/4 2/4   Brachioradialis 2/4 2/4   Triceps 2/4 2/4   Knee 2/4 2/4   Ankle 2/4 2/4      Plantar cutaneous reflex:  Right: flexor  Left: flexor      REVIEW OF ANCILLARY TESTS:   MRI brain 10/14/18: 1  Imaging findings highly suggestive of normal pressure hydrocephalus  Mild periventricular T2 prolongation raising the question of mild transependymal flow CSF  2  No pathologic intracranial enhancement or evidence of acute infarction  3  Nonspecific cerebral white matter signal abnormality suggestive of mild chronic small vessel ischemic disease likely related to hypertension and/or diabetes

## 2019-02-13 ENCOUNTER — OFFICE VISIT (OUTPATIENT)
Dept: NEUROLOGY | Facility: CLINIC | Age: 79
End: 2019-02-13
Payer: COMMERCIAL

## 2019-02-13 VITALS
HEART RATE: 82 BPM | DIASTOLIC BLOOD PRESSURE: 72 MMHG | BODY MASS INDEX: 29.44 KG/M2 | SYSTOLIC BLOOD PRESSURE: 165 MMHG | HEIGHT: 62 IN | RESPIRATION RATE: 16 BRPM | WEIGHT: 160 LBS

## 2019-02-13 DIAGNOSIS — R26.89 BALANCE PROBLEM: Primary | ICD-10-CM

## 2019-02-13 DIAGNOSIS — F03.90 DEMENTIA WITHOUT BEHAVIORAL DISTURBANCE, UNSPECIFIED DEMENTIA TYPE (HCC): ICD-10-CM

## 2019-02-13 DIAGNOSIS — Z72.3 INACTIVITY: ICD-10-CM

## 2019-02-13 PROCEDURE — 99214 OFFICE O/P EST MOD 30 MIN: CPT | Performed by: PSYCHIATRY & NEUROLOGY

## 2019-02-13 NOTE — PROGRESS NOTES
Review of Systems   Constitutional: Positive for fatigue  HENT: Negative  Eyes: Negative  Respiratory: Negative  Cardiovascular: Negative  Gastrointestinal: Negative  Endocrine: Negative  Genitourinary: Negative  Musculoskeletal: Positive for back pain and gait problem  Patient is moving slower now than before  Patient states she had back pain a day ago  Skin: Negative  Allergic/Immunologic: Negative  Neurological: Positive for numbness  Patient states she has numbness in her hand right only  Hematological: Negative  Psychiatric/Behavioral:        Patient states that she is still the same with her memory

## 2019-02-13 NOTE — PATIENT INSTRUCTIONS
· Referral to Speech Therapy for cognitive rehabilitation therapy  · Encouraged increased level of ambulation for longer distances, and avoiding compromising her hand  on walker to avoid spills  · Written order for home safety evaluation  · The patient has been instructed to call us about any new neurological problems or medication side effects  · Return in 3 months

## 2019-02-13 NOTE — LETTER
February 13, 2019     John Barker MD  [facility] [address] [address] [employer]    Patient: Blanca Aguilar   YOB: 1940   Date of Visit: 2/13/2019       Dear Dr Caro Paulino: Thank you for referring Oliver Jarrett to me for evaluation  Below are my notes for this consultation  The Lumbar Puncture had limited improvement in her symptoms - at this point was going to hold pursuing shunt placement unless pt more amenable and instead address issues separately  Her urinary symptoms resemble an urge incontinence - do you think she would benefit from being treated for as an overactive bladder? If you have questions, please do not hesitate to call me  I look forward to following your patient along with you  Sincerely,        Kallie Echavarria MD        CC: No Recipients  Kallie Echavarria MD  2/13/2019  6:57 PM  Sign at close encounter  [address] PATIENT NOTE    Patient: [facility] Record Number: # [id number]  YOB: 1940  Date of visit: 2/13/2019    Referring provider: No ref  provider found    ASSESSMENT     1  Balance problem  Ambulatory Referral to Home Health   2  Dementia without behavioral disturbance, unspecified dementia type  Ambulatory referral to Speech Therapy   3  Inactivity        This patient with 1 5 yr hx of balance and gait issues, still without consistent signs of a parkinsonism and having had 1x low-volume LP with modest subjective immediate benefit, now returns having symptoms mostly returned to previous levels  Pt remains apprehensive about shunt placement  She admits herself that she has not been very physically active; daughter agrees this is something to improve upon  Her children are concerned about her functioning as pt lives alone and daughters visit frequently to help her with various IADLs   Previous attempt to get her Home Health PT was rejected by insurance  Her hyponatremia is mild and not expected as severe enough to cause cognitive issues  At this point, it remains unclear as to the level of benefit she would thompson from the shunt - she had only 20 cc of CSF taken and less than usual amount due to arthritis in spine with mixed reports of only motor improvement per herself and her daughters  No improvement in cognition or urinary symptoms following LP however  Now that it has been a month she has returned to previous level  Given that pt still is not very amenable to a shunt, will not pursue this further now  This does not preclude our re-consideration of a shunt, however, should she change her mind later on  Instead, we will proceed as below  PLAN     · Referral to Speech Therapy for cognitive rehabilitation therapy  · Encouraged increased level of ambulation for longer distances, preferably under supervision, and avoiding compromising her hand  on walker to avoid spills and falls  Her one fall recently was due to this reason  · Written order for home safety evaluation as pt has stairs in home and lives alone  · Would discuss with Dr Belén Lepe about her urinary urge incontinence to be treated as possible overactive bladder  · The patient has been instructed to call us about any new neurological problems or medication side effects  · Return in 3 months  A total of 35 minutes were spent face-to-face with this patient, of which at least 50% was spent on counseling and coordination of care  We discussed the natural history of the patient's condition, differential diagnosis, level of diagnostic certainty, treatment alternatives and their side effects and possible complications  SUBJECTIVE:     Ms Violeta Perry is a 66 y o  right handed female who returns to the [facility] for evaluation of gait imbalance and memory; f/u after spinal tap  Last visit in 10/24/18       Interim History  Her blood tests were normal except for a consistently mild to moderate hyponatremia that is not new, but not worse than before  Reviewed her latest urine sodium and osmolality labs taken for evaluation of her persistent mild hyponatremia, suggestive of decreased Na intake  She had high volume tap performed 1/10/19 by Dr Aiyana Arthur (please refer to his note)  No objective difference seen immediately at the time but some subjective improvement in gait not cognition  They held off pursuing the shunt immediately as pt was reluctant to do so  In the interval month, she feels her gait is overall worsening towards her previous status now  She admits she is still not ambulating very much at home  She is dropping some things more  +Fall in Jan 2019, knocking over a chair  She endorses urinary urge frequency, wearing a pad, voiding small amounts each time  Overactive bladder       REVIEW OF PAST MEDICAL, SOCIAL AND FAMILY HISTORY:  This is the list of problems as per our Medical Records:    Patient Active Problem List    Diagnosis Date Noted    Normal pressure hydrocephalus 11/01/2018    Balance problem 10/02/2018    Confusion 10/02/2018    Forgetfulness 08/28/2018    Cough 07/25/2018    Ambulatory dysfunction 12/15/2017    Positive ROSALINDA (antinuclear antibody) 12/15/2017    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    Anxiety 10/20/2017    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    Infarction of spleen 10/11/2017    Anemia 09/13/2017    Levoscoliosis 08/26/2017    Elevated sed rate 08/16/2017    Vitamin D deficiency 08/16/2017    Radiculopathy 08/16/2017    Thrombocytosis ([address] ) 08/16/2017    Fatigue 08/11/2017    GERD without esophagitis 02/07/2017    Carotid artery plaque 04/19/2016    Hyponatremia 11/18/2014    Benign essential hypertension 07/17/2014    Hyperlipidemia 07/17/2014    Carotid bruit 07/17/2014    Asthma 11/03/2009    Coronary atherosclerosis 11/03/2009       Past Medical History:   Diagnosis Date    Arthritis     Confusion 10/2/2018  Depression     Displaced fracture of distal phalanx of right thumb     GERD (gastroesophageal reflux disease)     Heart attack ([address] )     Hyperlipidemia     Hypertension     Shortness of breath         Past Surgical History:   Procedure Laterality Date    APPENDECTOMY      CATARACT EXTRACTION      COLONOSCOPY      FL LUMBAR PUNCTURE  1/10/2019    SEPTOPLASTY      WRIST FRACTURE SURGERY Right         No Known Allergies     Outpatient Encounter Medications as of 2019   Medication Sig Dispense Refill    ascorbic acid (VITAMIN C) 500 mg tablet Take by mouth      aspirin 81 MG tablet Take 1 tablet by mouth daily      atenolol (TENORMIN) 50 mg tablet TAKE ONE TABLET BY MOUTH DAILY 30 tablet 5    atorvastatin (LIPITOR) 40 mg tablet TAKE ONE TABLET BY MOUTH EVERY DAY   OFFICE VISIT NEEDED 90 tablet 3    Calcium Carb-Cholecalciferol (CALCIUM + D3) 600-200 MG-UNIT TABS Take by mouth      escitalopram (LEXAPRO) 5 mg tablet TAKE ONE TABLET BY MOUTH EVERY DAY 30 tablet 3    Flaxseed, Linseed, (BL FLAX SEED OIL PO) Take by mouth      fluticasone (FLONASE) 50 mcg/act nasal spray 1 spray into each nostril daily 16 g 0    LORazepam (ATIVAN) 1 mg tablet Take 0 5 tablets (0 5 mg total) by mouth daily as needed for anxiety 30 tablet 0    losartan (COZAAR) 50 mg tablet TAKE ONE TABLET BY MOUTH TWO TIMES A DAY FOR 30 DAYS 60 tablet 3    multivitamin (THERAGRAN) TABS Take 1 tablet by mouth      Omega-3 Fatty Acids (FISH OIL PO) Take 2 g by mouth      omeprazole (PriLOSEC) 20 mg delayed release capsule Take 1 capsule (20 mg total) by mouth daily 30 capsule 5    Vitamin D, Cholecalciferol, 1000 units CAPS Take 1 tablet by mouth       No facility-administered encounter medications on file as of 2019          Social History     Tobacco Use    Smoking status: Former Smoker     Last attempt to quit:      Years since quittin 1    Smokeless tobacco: Never Used    Tobacco comment: no secondhand smoke exposure   Substance Use Topics    Alcohol use: Yes     Comment: social   Living alone, retired  worker  High school graduate  Family History   Problem Relation Age of Onset    Heart attack Mother 39    Heart attack Father 61    No Known Problems Other     Breast cancer Sister     Alcohol abuse Daughter         history of    Heart attack Brother         REVIEW OF SYSTEMS:  The patient has entered data on an intake form regarding present illness, past medical and surgical history, medications, allergies, family and social history, and a full review of 14 systems  I have reviewed this form with the patient, and all the relevant information has been included on this note  The full review of systems was negative except as stated in HPI and below  Constitutional: Positive for fatigue  HENT: Negative  Eyes: Negative  Respiratory: Negative  Cardiovascular: Negative  Gastrointestinal: Negative  Endocrine: Negative  Genitourinary: Negative  Musculoskeletal: Positive for back pain and gait problem  Patient is moving slower now than before  Patient states she had back pain a day ago  Skin: Negative  Allergic/Immunologic: Negative  Neurological: Positive for numbness  Patient states she has numbness in her hand right only  Hematological: Negative  Psychiatric/Behavioral:        Patient states that she is still the same with her memory  FOCUSED PHYSICAL EXAMINATION:     Vital signs:  /72 (BP Location: Left arm, Patient Position: Sitting, Cuff Size: Adult)   Pulse 82   Resp 16   Ht 5' 2" (1 575 m)   Wt 72 6 kg (160 lb)   BMI 29 26 kg/m²      General:  Utilizing front wheel walker  Well-appearing, well nourished, pleasant patient in no acute distress  Mood and Fund of Knowledge are appropriate  Head:  Normocephalic, atraumatic  Oropharynx and conjunctiva are clear  Speech  No hypophonia or bradylalia  No scanning speech  Language: Comprehension intact  Neck:  Supple, strong 5/5 forward flexion and retroflexion  Extremities: Range of motion is normal       Cognitive and Mental Exam:  Alert, awake, responsive and follows all commands  She know location ("Neurology office") but took 2 tries to get year (said "199-" before correcting herself), and said it was "October" for the month before correcting herself to "February" later  After saying October, she said "Easter" as the main upcoming holiday  Cranial Nerves:    Visual fields are full to confrontation  Extraocular movements were full, with normal pursuit and saccades  Pupils were equal, reactive to light symmetrically  Facial sensation to light touch was intact  Face is symmetric with normal strength  Hearing is normal  Palate is up going bilaterally and symmetrically  Neck muscles are strong  Tongue protrusion is at midline with normal movements  No dysarthria  Motor:    MDS-UPDRS III:   Speech: 0  Facial Expression: 0  Tremor (Head):  0  Rest Tremor Severity (RUE/LUE/RLE/LLE/Lip): 0/0/0/0/0  Action Tremor of Hands (R): 0  Action Tremor of Hands (L): 0  Finger tapping (R): 1  Finger tapping (L): 1  Hand clenching (R): 1  Hand clenching (L): 1  NICK Hand (R): 1  NICK Hand (L): 1  Toe Tapping (R):   2  Toe Tapping (L):   2  Leg Agility (R):   2  Leg Agility (L):   2  Rigidity (Neck): 2   Significant paratonia present  Rigidity (RUE): 1 Significant paratonia present  Rigidity (LUE): 1 Significant paratonia present  Rigidity (RLE): 1 Significant paratonia present   Rigidity (LLE): 1 Significant paratonia present  Arising From Chair: 1  Posture: 2  Gait: 2  Freezing of Gait: 0  Postural Stability: 2  Body Bradykinesia: 2  Minimal rigidity, no significant bradykinesia and no tremor       Muscle Strength Right Left  Muscle Strength Right Left   Deltoid 5/5 5/5  Hip Adductors 5/5 5/5   Biceps 5/5 5/5  Hip Abductors 5/5 5/5   Triceps 5/5 5/5  Knee Extensors 5/5 5/5   Wrist Extensors 5/5 5/5  Knee Flexors 5/5 5/5   Wrist Flexors 5/5 5/5  Ankle Extensors 5/5 5/5    5/5 5/5  Ankle Flexors 5/5 5/5   Finger Abductors 5/5 5/5       Hip Flexors 5/5 5/5   Hip Extensors 5/5 5/5     Coordination:  Finger-to-nose-finger: intention tremor bilaterally with distinct phases, slows down when approaches target  Gait:  small hesitant steps without swaying BUT able to  feet better when attention is drawn to it  Mild difficulty turning, Pull test of 2 (falls straight back)  Mildly decreased left arm swing  Reflexes:    Right Left   Biceps 2/4 2/4   Brachioradialis 2/4 2/4   Triceps 2/4 2/4   Knee 2/4 2/4   Ankle 2/4 2/4      Plantar cutaneous reflex:  Right: flexor  Left: flexor      REVIEW OF ANCILLARY TESTS:   MRI brain 10/14/18: 1  Imaging findings highly suggestive of normal pressure hydrocephalus  Mild periventricular T2 prolongation raising the question of mild transependymal flow CSF  2  No pathologic intracranial enhancement or evidence of acute infarction  3  Nonspecific cerebral white matter signal abnormality suggestive of mild chronic small vessel ischemic disease likely related to hypertension and/or diabetes

## 2019-02-14 ENCOUNTER — TELEPHONE (OUTPATIENT)
Dept: NEUROLOGY | Facility: CLINIC | Age: 79
End: 2019-02-14

## 2019-02-14 NOTE — TELEPHONE ENCOUNTER
The safety eval is no longer done w/[facility], we can see if social work is able to assist w/this    Re home health referals:   Based on face to face it does sound like pt meets homebound status, Krystal Loza will have therapist go out to home for PT & they will eval need for speech therapy, if she does meet criteria for home care speech they will contact us for a new order     [facility]

## 2019-02-14 NOTE — TELEPHONE ENCOUNTER
Call from home health  PT referal was for home health & speech was for outpatient, they are conflicting & must both be for home or outpatient    Is pt technically home bound?- she must be able to leave the house for doctor's visits only & it must be an effort for her to leave the house, if she does meet this criteria, please update speech for home health  If pt does not meet this criteria please update PT for outpatient    Ext [phone]

## 2019-02-14 NOTE — TELEPHONE ENCOUNTER
FELIPE phoned Renato at [facility] at  to get clarification regarding the safety evaluation  Renato stated that years ago, [facility] had received a kassie to just offer home safety evaluations, but same is no longer available  Renato did state that she was able to process the referral for patient under PT, as there was documentation to substantiate that patient was homebound, so that the PT will be conducting a home safety evaluation as part of their evaluation when seeing patient  Renato stated that if needs were identified that the therapist would likely consult the MSW  Renato did advise that there is about a 9 day delay in services for PT to start in patient's area  If this is not sufficient, FELIPE is aware of an agency that will come out to the home called [employer] that offers a "Safety and Security" assessment by a certified care manager but there is an out of pocket expense associated with this  Please advise

## 2019-02-14 NOTE — TELEPHONE ENCOUNTER
MSW discussed with Dr Alexis Preciado - he is agreeable to have [facility] provide PT related to her gait dysfunction and that while they are in the home, that they will conduct a home safety evaluation  Dr Alexis Preciado was made aware of the the 9 day delay and was ok with same, but asked that patient's family be made aware of this  MSW phoned patient's daughter Godfrey Coronado at [phone]  No answer  MSW did leave patient's daughter a detailed message of the information above  MSW did provide this writer's name and number and will be available to patient/family as needed

## 2019-02-14 NOTE — TELEPHONE ENCOUNTER
The referral was for someone to make look and evaluate for her home safety as pt does not wish to leave her home and nobody else can actively monitor her  She is a fall risk, with walker but is able to ambulate outside of her home  Daughters are concerned about her living alone  She is not home-bound  So with the above, it would be just "Ambulatory Referral to Physical Therapy" in Epic and then placing home safety evaluation in the comments, correct?  Thanks

## 2019-02-25 DIAGNOSIS — F41.9 ANXIETY: ICD-10-CM

## 2019-02-27 RX ORDER — LORAZEPAM 1 MG/1
0.5 TABLET ORAL DAILY PRN
Qty: 30 TABLET | Refills: 0 | Status: SHIPPED | OUTPATIENT
Start: 2019-02-27 | End: 2019-06-11

## 2019-03-01 ENCOUNTER — TELEPHONE (OUTPATIENT)
Dept: NEUROLOGY | Facility: CLINIC | Age: 79
End: 2019-03-01

## 2019-03-18 ENCOUNTER — TELEPHONE (OUTPATIENT)
Dept: FAMILY MEDICINE CLINIC | Facility: CLINIC | Age: 79
End: 2019-03-18

## 2019-03-18 NOTE — TELEPHONE ENCOUNTER
Pt left message on rx line for a refill on meloxicam 15mg one daily? Looks like pt was on this prior but not one thid any more  Please advise   SHY Bates

## 2019-03-19 NOTE — TELEPHONE ENCOUNTER
No refill on meloxicam   Pt had orthopedic surgery last week , she may need to call ortho for pain meds if needed

## 2019-03-27 ENCOUNTER — APPOINTMENT (OUTPATIENT)
Dept: RADIOLOGY | Facility: MEDICAL CENTER | Age: 79
End: 2019-03-27
Payer: COMMERCIAL

## 2019-03-27 ENCOUNTER — OFFICE VISIT (OUTPATIENT)
Dept: FAMILY MEDICINE CLINIC | Facility: CLINIC | Age: 79
End: 2019-03-27
Payer: COMMERCIAL

## 2019-03-27 VITALS
RESPIRATION RATE: 20 BRPM | SYSTOLIC BLOOD PRESSURE: 120 MMHG | DIASTOLIC BLOOD PRESSURE: 64 MMHG | HEIGHT: 62 IN | OXYGEN SATURATION: 98 % | HEART RATE: 72 BPM | WEIGHT: 150 LBS | BODY MASS INDEX: 27.6 KG/M2 | TEMPERATURE: 97.8 F

## 2019-03-27 DIAGNOSIS — R05.9 COUGH: ICD-10-CM

## 2019-03-27 DIAGNOSIS — R53.83 FATIGUE, UNSPECIFIED TYPE: ICD-10-CM

## 2019-03-27 DIAGNOSIS — Z00.00 MEDICARE ANNUAL WELLNESS VISIT, SUBSEQUENT: Primary | ICD-10-CM

## 2019-03-27 DIAGNOSIS — R41.0 CONFUSION: ICD-10-CM

## 2019-03-27 DIAGNOSIS — J18.9 WALKING PNEUMONIA: ICD-10-CM

## 2019-03-27 DIAGNOSIS — E78.5 HYPERLIPIDEMIA, UNSPECIFIED HYPERLIPIDEMIA TYPE: ICD-10-CM

## 2019-03-27 DIAGNOSIS — I10 BENIGN ESSENTIAL HYPERTENSION: ICD-10-CM

## 2019-03-27 DIAGNOSIS — G91.2 NORMAL PRESSURE HYDROCEPHALUS (HCC): ICD-10-CM

## 2019-03-27 PROCEDURE — 3074F SYST BP LT 130 MM HG: CPT | Performed by: FAMILY MEDICINE

## 2019-03-27 PROCEDURE — 1125F AMNT PAIN NOTED PAIN PRSNT: CPT | Performed by: FAMILY MEDICINE

## 2019-03-27 PROCEDURE — 1170F FXNL STATUS ASSESSED: CPT | Performed by: FAMILY MEDICINE

## 2019-03-27 PROCEDURE — 71046 X-RAY EXAM CHEST 2 VIEWS: CPT

## 2019-03-27 PROCEDURE — 99214 OFFICE O/P EST MOD 30 MIN: CPT | Performed by: FAMILY MEDICINE

## 2019-03-27 PROCEDURE — 3078F DIAST BP <80 MM HG: CPT | Performed by: FAMILY MEDICINE

## 2019-03-27 PROCEDURE — G0439 PPPS, SUBSEQ VISIT: HCPCS | Performed by: FAMILY MEDICINE

## 2019-03-27 RX ORDER — LEVOFLOXACIN 500 MG/1
500 TABLET, FILM COATED ORAL EVERY 24 HOURS
Qty: 7 TABLET | Refills: 0 | Status: SHIPPED | OUTPATIENT
Start: 2019-03-27 | End: 2019-04-03

## 2019-03-27 NOTE — ASSESSMENT & PLAN NOTE
Seem to be worsening lately, that could be due to delirium from her recent infection, to return to the office in 2 weeks to assess her cognition further    Discussed with patient and her daughter to stop some of the over-the-counter medication like fish oil and flaxseed tablet and calcium with vitamin-D tablet, patient to use the Ativan as needed

## 2019-03-27 NOTE — PROGRESS NOTES
Assessment and Plan:    Walking pneumonia  Pt with atypical pneumonia  Started on Levaquin 500 mg daily, follow up on stat chest x-ray  Can use over-the-counter Coricidin HBP for the cough  Advised on warm water and salt gargles  Patient energy and appetite will gradually improve,    If symptoms not better we may need to consider steroid  Confusion  Seem to be worsening lately, that could be due to delirium from her recent infection, to return to the office in 2 weeks to assess her cognition further  Discussed with patient and her daughter to stop some of the over-the-counter medication like fish oil and flaxseed tablet and calcium with vitamin-D tablet, patient to use the Ativan as needed    Normal pressure hydrocephalus  Patient has regular follow-up with the neurosurgeon status post lumbar puncture has regular follow-up with Neurology, patient and her specialist did not notice any improvement after lumbar function, the shunt was put on hold for now  Hyperlipidemia  Continue with statin, to start fish oil and flaxseed  Diagnoses and all orders for this visit:    Medicare annual wellness visit, subsequent    Walking pneumonia  -     XR chest pa & lateral; Future  -     levofloxacin (LEVAQUIN) 500 mg tablet; Take 1 tablet (500 mg total) by mouth every 24 hours for 7 days    Cough  -     XR chest pa & lateral; Future    Fatigue, unspecified type    Confusion  -     XR chest pa & lateral; Future    Benign essential hypertension    Normal pressure hydrocephalus    Hyperlipidemia, unspecified hyperlipidemia type              Subjective:      Patient ID: Shelley Guerrero is a 66 y o  female  CC:    Chief Complaint   Patient presents with    Follow-up     pt here today with her 2 daughter with concerns, they state she has little energy and doesnt want to eat      Cough     x 1 week    Fatigue    Cold Like Symptoms     x 1 week       HPI:    Cough, sore throat, confusion, fatigue, tired, has no appetite   Patient here also for follow-up on her confusion that is getting worse over the last few days, patient been losing a lot of weight has no appetite has no energy for the last 2 weeks, her daughters do not know if it is related to her acute illness or it is an ongoing process, Sx started last week   Patient here also for follow-up on hypertension hyperlipidemia  The following portions of the patient's history were reviewed and updated as appropriate: allergies, current medications, past family history, past medical history, past social history, past surgical history and problem list       Review of Systems   Constitutional: Positive for appetite change, chills, fatigue and unexpected weight change  Negative for activity change, diaphoresis and fever  HENT: Negative for congestion, ear discharge, ear pain, postnasal drip, sinus pressure, sore throat and voice change  Eyes: Negative for pain, redness and visual disturbance  Respiratory: Positive for cough  Negative for chest tightness, shortness of breath and wheezing  Cardiovascular: Negative for chest pain, palpitations and leg swelling  Gastrointestinal: Negative for abdominal pain, constipation, diarrhea and nausea  Endocrine: Negative for cold intolerance, heat intolerance and polyuria  Genitourinary: Positive for urgency  Negative for difficulty urinating, dyspareunia, dysuria, enuresis, flank pain, frequency and pelvic pain  Musculoskeletal: Positive for gait problem and myalgias  Negative for joint swelling and neck pain  Skin: Negative for color change and wound  Neurological: Negative for syncope, facial asymmetry, speech difficulty, light-headedness, numbness and headaches  Psychiatric/Behavioral: Positive for confusion and decreased concentration  Negative for behavioral problems  The patient is not nervous/anxious and is not hyperactive            Data to review:       Objective:    Vitals:    03/27/19 1139   BP: 120/64   BP Location: Left arm   Patient Position: Sitting   Cuff Size: Large   Pulse: 72   Resp: 20   Temp: 97 8 °F (36 6 °C)   TempSrc: Temporal   SpO2: 98%   Weight: 68 kg (150 lb)   Height: 5' 2" (1 575 m)        Physical Exam   Constitutional: She is oriented to person, place, and time  She appears well-developed and well-nourished  HENT:   Head: Normocephalic and atraumatic  Eyes: Pupils are equal, round, and reactive to light  Conjunctivae and EOM are normal    Neck: Normal range of motion  Neck supple  Cardiovascular: Normal rate, regular rhythm, normal heart sounds and intact distal pulses  Pulmonary/Chest: Effort normal  She has wheezes  She has rales  crep on the left lower base    Abdominal: Soft  Bowel sounds are normal    Musculoskeletal: Normal range of motion  Neurological: She is alert and oriented to person, place, and time  She has normal reflexes  Skin: Skin is warm and dry  Psychiatric: She has a normal mood and affect  Her behavior is normal    Nursing note and vitals reviewed

## 2019-03-27 NOTE — ASSESSMENT & PLAN NOTE
Patient has regular follow-up with the neurosurgeon status post lumbar puncture has regular follow-up with Neurology, patient and her specialist did not notice any improvement after lumbar function, the shunt was put on hold for now

## 2019-03-27 NOTE — PATIENT INSTRUCTIONS
Pneumonia   WHAT YOU NEED TO KNOW:   Pneumonia is an infection in your lungs caused by bacteria, viruses, fungi, or parasites  You can become infected if you come in contact with someone who is sick  You can get pneumonia if you recently had surgery or needed a ventilator to help you breathe  Pneumonia can also be caused by accidentally inhaling saliva or small pieces of food  Pneumonia may cause mild symptoms, or it can be severe and life-threatening  DISCHARGE INSTRUCTIONS:   Return to the emergency department if:   · You cough up blood  · Your heart beats more than 100 beats in 1 minute  · You are very tired, confused, and cannot think clearly  · You have chest pain or trouble breathing  · Your lips or fingernails turn gray or blue  Contact your healthcare provider if:   · Your symptoms are the same or get worse 48 hours after you start antibiotics  · Your fever is not below 99°F (37 2°C) 48 hours after you start antibiotics  · You have a fever higher than 101°F (38 3°C)  · You cannot eat, or you have loss of appetite, nausea, or are vomiting  · You have questions or concerns about your condition or care  Medicines:   · Antibiotics  treat pneumonia caused by bacteria  · Acetaminophen  decreases pain and fever  It is available without a doctor's order  Ask how much to take and how often to take it  Follow directions  Read the labels of all other medicines you are using to see if they also contain acetaminophen, or ask your doctor or pharmacist  Acetaminophen can cause liver damage if not taken correctly  Do not use more than 4 grams (4,000 milligrams) total of acetaminophen in one day  · NSAIDs , such as ibuprofen, help decrease swelling, pain, and fever  This medicine is available with or without a doctor's order  NSAIDs can cause stomach bleeding or kidney problems in certain people   If you take blood thinner medicine, always ask your healthcare provider if NSAIDs are safe for you  Always read the medicine label and follow directions  · Take your medicine as directed  Contact your healthcare provider if you think your medicine is not helping or if you have side effects  Tell him or her if you are allergic to any medicine  Keep a list of the medicines, vitamins, and herbs you take  Include the amounts, and when and why you take them  Bring the list or the pill bottles to follow-up visits  Carry your medicine list with you in case of an emergency  Follow up with your healthcare provider as directed: You will need to return for more tests  Write down your questions so you remember to ask them during your visits  Manage your symptoms:   · Rest as needed  Rest often throughout the day  Alternate times of activity with times of rest     · Drink liquids as directed  Ask how much liquid to drink each day and which liquids are best for you  Liquids help thin your mucus, which may make it easier for you to cough it up  · Do not smoke  Avoid secondhand smoke  Smoking increases your risk for pneumonia  Smoking also makes it harder for you to get better after you have had pneumonia  Ask your healthcare provider for information if you need help to quit smoking  · Use a cool mist humidifier  A humidifier will help increase air moisture in your home  This may make it easier for you to breathe and help decrease your cough  · Keep your head elevated  You may be able to breathe better if you lie down with the head of your bed up  Prevent pneumonia:   · Prevent the spread of germs  Wash your hands often with soap and water  Use gel hand cleanser when there is no soap and water available  Do not touch your eyes, nose, or mouth unless you have washed your hands first  Cover your mouth when you cough  Cough into a tissue or your shirtsleeve so you do not spread germs from your hands  If you are sick, stay away from others as much as possible  · Limit alcohol    Women should limit alcohol to 1 drink a day  Men should limit alcohol to 2 drinks a day  A drink of alcohol is 12 ounces of beer, 5 ounces of wine, or 1½ ounces of liquor  · Ask about vaccines  You may need a vaccine to help prevent pneumonia  Get an influenza (flu) vaccine every year as soon as it becomes available  © 2017 [facility] Information is for End User's use only and may not be sold, redistributed or otherwise used for commercial purposes  All illustrations and images included in CareNotes® are the copyrighted property of [employer] , [employer]  or Nicola Jha  The above information is an  only  It is not intended as medical advice for individual conditions or treatments  Talk to your doctor, nurse or pharmacist before following any medical regimen to see if it is safe and effective for you

## 2019-03-27 NOTE — PROGRESS NOTES
Assessment and Plan:    Problem List Items Addressed This Visit     Fatigue    Cough    Relevant Orders    XR chest pa & lateral    Confusion    Relevant Orders    XR chest pa & lateral      Other Visit Diagnoses     Medicare annual wellness visit, subsequent    -  Primary    Walking pneumonia        Relevant Medications    levofloxacin (LEVAQUIN) 500 mg tablet    Other Relevant Orders    XR chest pa & lateral        Health Maintenance Due   Topic Date Due    Medicare Annual Wellness Visit (AWV)  1940    SLP PLAN OF CARE  1940    BMI: Followup Plan  11/24/1958    Pneumococcal PPSV23/PCV13 65+ Years / High and Highest Risk (2 of 2 - PPSV23) 12/11/2017    HEMOGLOBIN A1C  04/03/2019         HPI:  Terry Mahajan is a 66 y o  female here for her Subsequent Wellness Visit      Patient Active Problem List   Diagnosis    Ambulatory dysfunction    Anxiety    Benign essential hypertension    Carotid artery plaque    Elevated sed rate    GERD without esophagitis    Hyperlipidemia    Hyponatremia    Levoscoliosis    Occlusion of celiac artery    Vitamin D deficiency    Radiculopathy    Anemia    Asthma    Carotid bruit    Coronary atherosclerosis    Infarction of spleen    Positive ROSALINDA (antinuclear antibody)    Thrombocytosis (HCC)    Fatigue    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HCC)    Cough    Forgetfulness    Balance problem    Confusion    Normal pressure hydrocephalus     Past Medical History:   Diagnosis Date    Arthritis     Confusion 10/2/2018    Depression     Displaced fracture of distal phalanx of right thumb     GERD (gastroesophageal reflux disease)     Heart attack ([address] )     Hyperlipidemia     Hypertension     Shortness of breath      Past Surgical History:   Procedure Laterality Date    APPENDECTOMY      CATARACT EXTRACTION      COLONOSCOPY      FL LUMBAR PUNCTURE  1/10/2019    SEPTOPLASTY      WRIST FRACTURE SURGERY Right      Family History   Problem Relation Age of Onset  Heart attack Mother 39    Heart attack Father 61    No Known Problems Other     Breast cancer Sister     Alcohol abuse Daughter         history of    Heart attack Brother      Social History     Tobacco Use   Smoking Status Former Smoker    Last attempt to quit:     Years since quittin 2   Smokeless Tobacco Never Used   Tobacco Comment    no secondhand smoke exposure     Social History     Substance and Sexual Activity   Alcohol Use Yes    Comment: social      Social History     Substance and Sexual Activity   Drug Use No       Current Outpatient Medications   Medication Sig Dispense Refill    ascorbic acid (VITAMIN C) 500 mg tablet Take by mouth      aspirin 81 MG tablet Take 1 tablet by mouth daily      atenolol (TENORMIN) 50 mg tablet TAKE ONE TABLET BY MOUTH DAILY 30 tablet 5    atorvastatin (LIPITOR) 40 mg tablet TAKE ONE TABLET BY MOUTH EVERY DAY   OFFICE VISIT NEEDED 90 tablet 3    escitalopram (LEXAPRO) 5 mg tablet TAKE ONE TABLET BY MOUTH EVERY DAY 30 tablet 3    fluticasone (FLONASE) 50 mcg/act nasal spray 1 spray into each nostril daily 16 g 0    LORazepam (ATIVAN) 1 mg tablet Take 0 5 tablets (0 5 mg total) by mouth daily as needed for anxiety 30 tablet 0    losartan (COZAAR) 50 mg tablet TAKE ONE TABLET BY MOUTH TWO TIMES A DAY FOR 30 DAYS 60 tablet 3    multivitamin (THERAGRAN) TABS Take 1 tablet by mouth      omeprazole (PriLOSEC) 20 mg delayed release capsule Take 1 capsule (20 mg total) by mouth daily 30 capsule 5    Vitamin D, Cholecalciferol, 1000 units CAPS Take 1 tablet by mouth      levofloxacin (LEVAQUIN) 500 mg tablet Take 1 tablet (500 mg total) by mouth every 24 hours for 7 days 7 tablet 0     No current facility-administered medications for this visit        No Known Allergies  Immunization History   Administered Date(s) Administered     Influenza (IM) Preservative Free 10/02/2018    INFLUENZA 2005, 2006, 10/09/2007, 10/09/2008, 2009, 10/21/2010, 11/04/2011, 09/20/2012, 09/13/2013, 10/07/2015, 10/25/2016, 10/12/2017, 10/02/2018    Influenza Split High Dose Preservative Free IM 10/07/2015, 10/25/2016, 10/12/2017    Influenza TIV (IM) 12/12/2014, 10/02/2017    Pneumococcal Conjugate 13-Valent 10/16/2017    Pneumococcal Polysaccharide PPV23 11/03/2005    TD (adult) Preservative Free 05/20/1995    Td (adult), Unspecified 05/20/1995    Td (adult), adsorbed 05/20/1995       Patient Care Team:  Bre Butterfield MD as PCP - General  GIOVANA Briggs PA-C Jessie Gens, PA-C Norene Leyland, MD    Medicare Screening Tests and Risk Assessments:  Julio Mary is here for her Subsequent Wellness visit  Last Medicare Wellness visit information reviewed, patient interviewed and updates made to the record today  Health Risk Assessment:  Patient rates overall health as poor  Patient feels that their physical health rating is Much worse  Eyesight was rated as Same  Hearing was rated as Same  Patient feels that their emotional and mental health rating is Same  Pain experienced by patient in the last 7 days has been None  Patient's pain rating has been 1/10  Patient states that she has experienced no weight loss or gain in last 6 months  Emotional/Mental Health:  Patient has been feeling nervous/anxious  PHQ-9 Depression Screening:    Frequency of the following problems over the past two weeks:      1  Little interest or pleasure in doing things: 0 - not at all  PHQ-2 Score: 0          Broken Bones/Falls: Fall Risk Assessment:    In the past year, patient has experienced: No history of falling in past year          Bladder/Bowel:  Patient has not leaked urine accidently in the last six months  Patient reports no loss of bowel control  Immunizations:  Patient has had a flu vaccination within the last year  Patient has received a pneumonia shot  Patient has received a shingles shot    Patient has received tetanus/diphtheria shot  Home Safety:  Patient has trouble with stairs inside or outside of their home  Patient currently reports that there are no safety hazards present in home, working smoke alarms, working carbon monoxide detectors  Preventative Screenings:   No breast cancer screening performed, no colon cancer screen completed, no cholesterol screen completed, no glaucoma eye exam completed    Nutrition:  Current diet: Regular and Limited junk food with servings of the following:    Medications:  Patient is not currently taking any over-the-counter supplements  Patient is not able to manage medications  Lifestyle Choices:  Patient reports no tobacco use  Patient has not smoked or used tobacco in the past   Patient reports no alcohol use  Patient does not drive a vehicle  Patient does not wear seat belt  Activities of Daily Living:  Can get out of bed by his or her self, able to dress self, able to make own meals, unable to do own shopping, able to bathe self, unable to do laundry/housekeeping, unable to manage own money and other related tasks    Previous Hospitalizations:  Hospitalization or ED visit in past 12 months  Number of hospitalizations within the last year: 1-2        Advanced Directives:  Patient has completed advanced directive          Preventative Screening/Counseling:      Cardiovascular:      General: Risks and Benefits Discussed and Screening Current          Diabetes:      General: Risks and Benefits Discussed and Screening Current          Colorectal Cancer:      General: Risks and Benefits Discussed and Patient Declines          Breast Cancer:      General: Risks and Benefits Discussed and Patient Declines          Cervical Cancer:      General: Risks and Benefits Discussed, Screening Not Indicated and Patient Declines          Osteoporosis:      General: Risks and Benefits Discussed and Screening Current          AAA:      General: Screening Not Indicated Glaucoma:      General: Risks and Benefits Discussed and Screening Current          HIV:      General: Risks and Benefits Discussed and Screening Not Indicated          Hepatitis C:      General: Screening Not Indicated and Risks and Benefits Discussed        Advanced Directives:   Patient has living will for healthcare, has durable POA for healthcare, patient has an advanced directive  Immunizations:      Influenza: Risks & Benefits Discussed and Influenza UTD This Year      Pneumococcal: Risks & Benefits Discussed and Lifetime Vaccine Completed      Other Preventative Counseling (Non-Medicare):   Fall Prevention and Increase physical activity      Referrals:  Referral(s) to: Neurology

## 2019-03-27 NOTE — ASSESSMENT & PLAN NOTE
Pt with atypical pneumonia  Started on Levaquin 500 mg daily, follow up on stat chest x-ray  Can use over-the-counter Coricidin HBP for the cough  Advised on warm water and salt gargles  Patient energy and appetite will gradually improve,    If symptoms not better we may need to consider steroid

## 2019-04-04 ENCOUNTER — TELEPHONE (OUTPATIENT)
Dept: FAMILY MEDICINE CLINIC | Facility: CLINIC | Age: 79
End: 2019-04-04

## 2019-04-10 ENCOUNTER — OFFICE VISIT (OUTPATIENT)
Dept: FAMILY MEDICINE CLINIC | Facility: CLINIC | Age: 79
End: 2019-04-10
Payer: COMMERCIAL

## 2019-04-10 VITALS
HEART RATE: 68 BPM | WEIGHT: 149 LBS | RESPIRATION RATE: 16 BRPM | HEIGHT: 62 IN | SYSTOLIC BLOOD PRESSURE: 118 MMHG | BODY MASS INDEX: 27.42 KG/M2 | DIASTOLIC BLOOD PRESSURE: 62 MMHG

## 2019-04-10 DIAGNOSIS — F33.1 MODERATE EPISODE OF RECURRENT MAJOR DEPRESSIVE DISORDER (HCC): ICD-10-CM

## 2019-04-10 DIAGNOSIS — R26.2 AMBULATORY DYSFUNCTION: ICD-10-CM

## 2019-04-10 DIAGNOSIS — E87.1 HYPONATREMIA: ICD-10-CM

## 2019-04-10 DIAGNOSIS — F41.9 ANXIETY: ICD-10-CM

## 2019-04-10 DIAGNOSIS — R53.83 FATIGUE, UNSPECIFIED TYPE: Primary | ICD-10-CM

## 2019-04-10 PROCEDURE — 1160F RVW MEDS BY RX/DR IN RCRD: CPT | Performed by: FAMILY MEDICINE

## 2019-04-10 PROCEDURE — 99214 OFFICE O/P EST MOD 30 MIN: CPT | Performed by: FAMILY MEDICINE

## 2019-04-10 PROCEDURE — 1036F TOBACCO NON-USER: CPT | Performed by: FAMILY MEDICINE

## 2019-04-10 RX ORDER — ESCITALOPRAM OXALATE 10 MG/1
10 TABLET ORAL DAILY
Qty: 30 TABLET | Refills: 1 | Status: SHIPPED | OUTPATIENT
Start: 2019-04-10 | End: 2019-04-16 | Stop reason: SDUPTHER

## 2019-04-11 LAB
25(OH)D3 SERPL-MCNC: 42 NG/ML (ref 30–100)
ALBUMIN SERPL-MCNC: 4.2 G/DL (ref 3.6–5.1)
ALBUMIN/GLOB SERPL: 1.6 (CALC) (ref 1–2.5)
ALP SERPL-CCNC: 45 U/L (ref 33–130)
ALT SERPL-CCNC: 36 U/L (ref 6–29)
AST SERPL-CCNC: 38 U/L (ref 10–35)
BASOPHILS # BLD AUTO: 96 CELLS/UL (ref 0–200)
BASOPHILS NFR BLD AUTO: 1.1 %
BILIRUB SERPL-MCNC: 0.4 MG/DL (ref 0.2–1.2)
BUN SERPL-MCNC: 18 MG/DL (ref 7–25)
BUN/CREAT SERPL: 18 (CALC) (ref 6–22)
CALCIUM SERPL-MCNC: 9.9 MG/DL (ref 8.6–10.4)
CHLORIDE SERPL-SCNC: 94 MMOL/L (ref 98–110)
CO2 SERPL-SCNC: 26 MMOL/L (ref 20–32)
CREAT SERPL-MCNC: 0.98 MG/DL (ref 0.6–0.93)
EOSINOPHIL # BLD AUTO: 209 CELLS/UL (ref 15–500)
EOSINOPHIL NFR BLD AUTO: 2.4 %
ERYTHROCYTE [DISTWIDTH] IN BLOOD BY AUTOMATED COUNT: 13 % (ref 11–15)
ERYTHROCYTE [SEDIMENTATION RATE] IN BLOOD BY WESTERGREN METHOD: 14 MM/H
GLOBULIN SER CALC-MCNC: 2.7 G/DL (CALC) (ref 1.9–3.7)
GLUCOSE SERPL-MCNC: 106 MG/DL (ref 65–139)
HBA1C MFR BLD: 6.3 % OF TOTAL HGB
HCT VFR BLD AUTO: 32.7 % (ref 35–45)
HGB BLD-MCNC: 11.3 G/DL (ref 11.7–15.5)
LYMPHOCYTES # BLD AUTO: 1897 CELLS/UL (ref 850–3900)
LYMPHOCYTES NFR BLD AUTO: 21.8 %
MCH RBC QN AUTO: 31.1 PG (ref 27–33)
MCHC RBC AUTO-ENTMCNC: 34.6 G/DL (ref 32–36)
MCV RBC AUTO: 90.1 FL (ref 80–100)
MONOCYTES # BLD AUTO: 931 CELLS/UL (ref 200–950)
MONOCYTES NFR BLD AUTO: 10.7 %
NEUTROPHILS # BLD AUTO: 5568 CELLS/UL (ref 1500–7800)
NEUTROPHILS NFR BLD AUTO: 64 %
PLATELET # BLD AUTO: 483 THOUSAND/UL (ref 140–400)
PMV BLD REES-ECKER: 9.9 FL (ref 7.5–12.5)
POTASSIUM SERPL-SCNC: 4.9 MMOL/L (ref 3.5–5.3)
PROT SERPL-MCNC: 6.9 G/DL (ref 6.1–8.1)
RBC # BLD AUTO: 3.63 MILLION/UL (ref 3.8–5.1)
SL AMB EGFR AFRICAN AMERICAN: 64 ML/MIN/1.73M2
SL AMB EGFR NON AFRICAN AMERICAN: 55 ML/MIN/1.73M2
SODIUM SERPL-SCNC: 129 MMOL/L (ref 135–146)
TSH SERPL-ACNC: 1.22 MIU/L (ref 0.4–4.5)
VIT B12 SERPL-MCNC: 750 PG/ML (ref 200–1100)
WBC # BLD AUTO: 8.7 THOUSAND/UL (ref 3.8–10.8)

## 2019-04-12 DIAGNOSIS — R76.8 POSITIVE ANA (ANTINUCLEAR ANTIBODY): Primary | ICD-10-CM

## 2019-04-12 DIAGNOSIS — E87.1 HYPONATREMIA: ICD-10-CM

## 2019-04-12 PROBLEM — F33.1 MODERATE EPISODE OF RECURRENT MAJOR DEPRESSIVE DISORDER (HCC): Status: ACTIVE | Noted: 2019-04-12

## 2019-04-12 PROBLEM — D75.839 THROMBOCYTOSIS: Status: ACTIVE | Noted: 2017-01-28

## 2019-04-12 PROBLEM — D64.9 LOW HEMOGLOBIN: Status: ACTIVE | Noted: 2017-05-02

## 2019-04-13 DIAGNOSIS — F41.9 ANXIETY: ICD-10-CM

## 2019-04-16 DIAGNOSIS — F41.9 ANXIETY: ICD-10-CM

## 2019-04-16 RX ORDER — ESCITALOPRAM OXALATE 5 MG/1
5 TABLET ORAL DAILY
Qty: 30 TABLET | Refills: 3 | OUTPATIENT
Start: 2019-04-16

## 2019-04-16 RX ORDER — ESCITALOPRAM OXALATE 10 MG/1
10 TABLET ORAL DAILY
Qty: 30 TABLET | Refills: 1 | Status: SHIPPED | OUTPATIENT
Start: 2019-04-16 | End: 2019-07-31 | Stop reason: SDUPTHER

## 2019-04-17 ENCOUNTER — OFFICE VISIT (OUTPATIENT)
Dept: FAMILY MEDICINE CLINIC | Facility: CLINIC | Age: 79
End: 2019-04-17
Payer: COMMERCIAL

## 2019-04-17 VITALS
SYSTOLIC BLOOD PRESSURE: 138 MMHG | DIASTOLIC BLOOD PRESSURE: 60 MMHG | BODY MASS INDEX: 27.79 KG/M2 | HEART RATE: 68 BPM | WEIGHT: 151 LBS | HEIGHT: 62 IN

## 2019-04-17 DIAGNOSIS — R26.2 AMBULATORY DYSFUNCTION: ICD-10-CM

## 2019-04-17 DIAGNOSIS — F33.1 MODERATE EPISODE OF RECURRENT MAJOR DEPRESSIVE DISORDER (HCC): ICD-10-CM

## 2019-04-17 DIAGNOSIS — E87.1 HYPONATREMIA: ICD-10-CM

## 2019-04-17 DIAGNOSIS — Z91.81 HISTORY OF RECENT FALL: Primary | ICD-10-CM

## 2019-04-17 PROCEDURE — 99214 OFFICE O/P EST MOD 30 MIN: CPT | Performed by: FAMILY MEDICINE

## 2019-04-19 ENCOUNTER — TELEPHONE (OUTPATIENT)
Dept: FAMILY MEDICINE CLINIC | Facility: CLINIC | Age: 79
End: 2019-04-19

## 2019-04-20 PROBLEM — Z91.81 HISTORY OF RECENT FALL: Status: ACTIVE | Noted: 2019-04-20

## 2019-04-22 ENCOUNTER — EVALUATION (OUTPATIENT)
Dept: PHYSICAL THERAPY | Facility: REHABILITATION | Age: 79
End: 2019-04-22
Payer: COMMERCIAL

## 2019-04-22 DIAGNOSIS — R26.9 GAIT ABNORMALITY: Primary | ICD-10-CM

## 2019-04-22 DIAGNOSIS — R29.898 WEAKNESS OF BOTH LOWER EXTREMITIES: ICD-10-CM

## 2019-04-22 PROCEDURE — 97163 PT EVAL HIGH COMPLEX 45 MIN: CPT | Performed by: PHYSICAL THERAPIST

## 2019-04-25 ENCOUNTER — OFFICE VISIT (OUTPATIENT)
Dept: PHYSICAL THERAPY | Facility: REHABILITATION | Age: 79
End: 2019-04-25
Payer: COMMERCIAL

## 2019-04-25 ENCOUNTER — CONSULT (OUTPATIENT)
Dept: NEPHROLOGY | Facility: CLINIC | Age: 79
End: 2019-04-25
Payer: COMMERCIAL

## 2019-04-25 VITALS
BODY MASS INDEX: 26.24 KG/M2 | SYSTOLIC BLOOD PRESSURE: 130 MMHG | WEIGHT: 142.6 LBS | HEART RATE: 94 BPM | HEIGHT: 62 IN | DIASTOLIC BLOOD PRESSURE: 72 MMHG

## 2019-04-25 DIAGNOSIS — R26.9 GAIT ABNORMALITY: Primary | ICD-10-CM

## 2019-04-25 DIAGNOSIS — R80.9 PROTEINURIA, UNSPECIFIED TYPE: ICD-10-CM

## 2019-04-25 DIAGNOSIS — E22.2 SIADH (SYNDROME OF INAPPROPRIATE ADH PRODUCTION) (HCC): ICD-10-CM

## 2019-04-25 DIAGNOSIS — E87.1 HYPONATREMIA: Primary | ICD-10-CM

## 2019-04-25 DIAGNOSIS — N18.2 CHRONIC KIDNEY DISEASE, STAGE 2 (MILD): ICD-10-CM

## 2019-04-25 DIAGNOSIS — R29.898 WEAKNESS OF BOTH LOWER EXTREMITIES: ICD-10-CM

## 2019-04-25 LAB
SL AMB  POCT GLUCOSE, UA: ABNORMAL
SL AMB LEUKOCYTE ESTERASE,UA: ABNORMAL
SL AMB POCT BILIRUBIN,UA: ABNORMAL
SL AMB POCT BLOOD,UA: ABNORMAL
SL AMB POCT CLARITY,UA: CLEAR
SL AMB POCT COLOR,UA: YELLOW
SL AMB POCT KETONES,UA: ABNORMAL
SL AMB POCT NITRITE,UA: ABNORMAL
SL AMB POCT PH,UA: 7
SL AMB POCT SPECIFIC GRAVITY,UA: 1.01
SL AMB POCT URINE PROTEIN: 15
SL AMB POCT UROBILINOGEN: ABNORMAL

## 2019-04-25 PROCEDURE — 97530 THERAPEUTIC ACTIVITIES: CPT

## 2019-04-25 PROCEDURE — 81002 URINALYSIS NONAUTO W/O SCOPE: CPT | Performed by: INTERNAL MEDICINE

## 2019-04-25 PROCEDURE — 97112 NEUROMUSCULAR REEDUCATION: CPT

## 2019-04-25 PROCEDURE — 97116 GAIT TRAINING THERAPY: CPT

## 2019-04-25 PROCEDURE — 99204 OFFICE O/P NEW MOD 45 MIN: CPT | Performed by: INTERNAL MEDICINE

## 2019-04-25 RX ORDER — SODIUM CHLORIDE 1000 MG
1 TABLET, SOLUBLE MISCELLANEOUS 2 TIMES DAILY
Qty: 60 TABLET | Refills: 4 | Status: SHIPPED | OUTPATIENT
Start: 2019-04-25 | End: 2019-04-30 | Stop reason: SDUPTHER

## 2019-04-29 ENCOUNTER — OFFICE VISIT (OUTPATIENT)
Dept: PHYSICAL THERAPY | Facility: REHABILITATION | Age: 79
End: 2019-04-29
Payer: COMMERCIAL

## 2019-04-29 DIAGNOSIS — R26.9 GAIT ABNORMALITY: Primary | ICD-10-CM

## 2019-04-29 DIAGNOSIS — R29.898 WEAKNESS OF BOTH LOWER EXTREMITIES: ICD-10-CM

## 2019-04-29 PROCEDURE — 97110 THERAPEUTIC EXERCISES: CPT | Performed by: PHYSICAL THERAPIST

## 2019-04-29 PROCEDURE — 97530 THERAPEUTIC ACTIVITIES: CPT | Performed by: PHYSICAL THERAPIST

## 2019-04-29 PROCEDURE — 97116 GAIT TRAINING THERAPY: CPT | Performed by: PHYSICAL THERAPIST

## 2019-04-30 ENCOUNTER — TELEPHONE (OUTPATIENT)
Dept: NEPHROLOGY | Facility: CLINIC | Age: 79
End: 2019-04-30

## 2019-04-30 DIAGNOSIS — E87.1 HYPONATREMIA: ICD-10-CM

## 2019-04-30 DIAGNOSIS — E22.2 SIADH (SYNDROME OF INAPPROPRIATE ADH PRODUCTION) (HCC): ICD-10-CM

## 2019-04-30 LAB
BUN SERPL-MCNC: 13 MG/DL (ref 7–25)
BUN/CREAT SERPL: ABNORMAL (CALC) (ref 6–22)
CALCIUM SERPL-MCNC: 9.8 MG/DL (ref 8.6–10.4)
CHLORIDE SERPL-SCNC: 93 MMOL/L (ref 98–110)
CO2 SERPL-SCNC: 27 MMOL/L (ref 20–32)
CORTIS AM PEAK SERPL-MCNC: 24.9 MCG/DL
CREAT SERPL-MCNC: 0.8 MG/DL (ref 0.6–0.93)
GLUCOSE SERPL-MCNC: 122 MG/DL (ref 65–99)
POTASSIUM SERPL-SCNC: 4.5 MMOL/L (ref 3.5–5.3)
SL AMB EGFR AFRICAN AMERICAN: 82 ML/MIN/1.73M2
SL AMB EGFR NON AFRICAN AMERICAN: 71 ML/MIN/1.73M2
SODIUM SERPL-SCNC: 130 MMOL/L (ref 135–146)
TSH SERPL-ACNC: 1.66 MIU/L (ref 0.4–4.5)
URATE SERPL-MCNC: 5.6 MG/DL (ref 2.5–7)

## 2019-04-30 RX ORDER — SODIUM CHLORIDE 1000 MG
1 TABLET, SOLUBLE MISCELLANEOUS 3 TIMES DAILY
Qty: 90 TABLET | Refills: 4 | Status: SHIPPED | OUTPATIENT
Start: 2019-04-30 | End: 2019-10-04 | Stop reason: SDUPTHER

## 2019-05-01 LAB
OSMOLALITY UR: 357 MOSM/KG (ref 50–1200)
SODIUM UR-SCNC: 59 MMOL/L (ref 28–272)

## 2019-05-02 ENCOUNTER — APPOINTMENT (OUTPATIENT)
Dept: PHYSICAL THERAPY | Facility: REHABILITATION | Age: 79
End: 2019-05-02
Payer: COMMERCIAL

## 2019-05-06 ENCOUNTER — OFFICE VISIT (OUTPATIENT)
Dept: PHYSICAL THERAPY | Facility: REHABILITATION | Age: 79
End: 2019-05-06
Payer: COMMERCIAL

## 2019-05-06 DIAGNOSIS — R29.898 WEAKNESS OF BOTH LOWER EXTREMITIES: ICD-10-CM

## 2019-05-06 DIAGNOSIS — R26.9 GAIT ABNORMALITY: Primary | ICD-10-CM

## 2019-05-06 PROCEDURE — 97112 NEUROMUSCULAR REEDUCATION: CPT

## 2019-05-06 PROCEDURE — 97116 GAIT TRAINING THERAPY: CPT

## 2019-05-06 PROCEDURE — 97530 THERAPEUTIC ACTIVITIES: CPT

## 2019-05-09 ENCOUNTER — APPOINTMENT (OUTPATIENT)
Dept: PHYSICAL THERAPY | Facility: REHABILITATION | Age: 79
End: 2019-05-09
Payer: COMMERCIAL

## 2019-05-09 ENCOUNTER — OFFICE VISIT (OUTPATIENT)
Dept: BEHAVIORAL/MENTAL HEALTH CLINIC | Facility: CLINIC | Age: 79
End: 2019-05-09
Payer: COMMERCIAL

## 2019-05-09 DIAGNOSIS — F32.4 MAJOR DEPRESSIVE DISORDER WITH SINGLE EPISODE, IN PARTIAL REMISSION (HCC): Primary | ICD-10-CM

## 2019-05-09 PROBLEM — F33.1 MODERATE EPISODE OF RECURRENT MAJOR DEPRESSIVE DISORDER (HCC): Status: RESOLVED | Noted: 2019-04-12 | Resolved: 2019-05-09

## 2019-05-09 PROCEDURE — 3725F SCREEN DEPRESSION PERFORMED: CPT | Performed by: PSYCHIATRY & NEUROLOGY

## 2019-05-09 PROCEDURE — 90834 PSYTX W PT 45 MINUTES: CPT | Performed by: PSYCHIATRY & NEUROLOGY

## 2019-05-13 ENCOUNTER — OFFICE VISIT (OUTPATIENT)
Dept: PHYSICAL THERAPY | Facility: REHABILITATION | Age: 79
End: 2019-05-13
Payer: COMMERCIAL

## 2019-05-13 DIAGNOSIS — R29.898 WEAKNESS OF BOTH LOWER EXTREMITIES: ICD-10-CM

## 2019-05-13 DIAGNOSIS — R26.9 GAIT ABNORMALITY: Primary | ICD-10-CM

## 2019-05-13 PROCEDURE — 97116 GAIT TRAINING THERAPY: CPT | Performed by: PHYSICAL THERAPIST

## 2019-05-13 PROCEDURE — 97530 THERAPEUTIC ACTIVITIES: CPT | Performed by: PHYSICAL THERAPIST

## 2019-05-13 PROCEDURE — 97112 NEUROMUSCULAR REEDUCATION: CPT | Performed by: PHYSICAL THERAPIST

## 2019-05-15 LAB
BUN SERPL-MCNC: 12 MG/DL (ref 7–25)
BUN/CREAT SERPL: ABNORMAL (CALC) (ref 6–22)
CALCIUM SERPL-MCNC: 9.5 MG/DL (ref 8.6–10.4)
CHLORIDE SERPL-SCNC: 96 MMOL/L (ref 98–110)
CO2 SERPL-SCNC: 28 MMOL/L (ref 20–32)
CREAT SERPL-MCNC: 0.75 MG/DL (ref 0.6–0.93)
GLUCOSE SERPL-MCNC: 95 MG/DL (ref 65–99)
MAGNESIUM SERPL-MCNC: 1.5 MG/DL (ref 1.5–2.5)
POTASSIUM SERPL-SCNC: 4.6 MMOL/L (ref 3.5–5.3)
SL AMB EGFR AFRICAN AMERICAN: 88 ML/MIN/1.73M2
SL AMB EGFR NON AFRICAN AMERICAN: 76 ML/MIN/1.73M2
SODIUM SERPL-SCNC: 133 MMOL/L (ref 135–146)

## 2019-05-16 ENCOUNTER — APPOINTMENT (OUTPATIENT)
Dept: PHYSICAL THERAPY | Facility: REHABILITATION | Age: 79
End: 2019-05-16
Payer: COMMERCIAL

## 2019-05-16 ENCOUNTER — TELEPHONE (OUTPATIENT)
Dept: NEPHROLOGY | Facility: CLINIC | Age: 79
End: 2019-05-16

## 2019-05-16 LAB
CREAT UR-MCNC: 118 MG/DL (ref 20–275)
PROT UR-MCNC: 34 MG/DL (ref 5–24)
PROT/CREAT UR: 288 MG/G CREAT (ref 21–161)

## 2019-05-20 ENCOUNTER — EVALUATION (OUTPATIENT)
Dept: PHYSICAL THERAPY | Facility: REHABILITATION | Age: 79
End: 2019-05-20
Payer: COMMERCIAL

## 2019-05-20 DIAGNOSIS — R29.898 WEAKNESS OF BOTH LOWER EXTREMITIES: ICD-10-CM

## 2019-05-20 DIAGNOSIS — R26.9 GAIT ABNORMALITY: Primary | ICD-10-CM

## 2019-05-20 PROCEDURE — 97116 GAIT TRAINING THERAPY: CPT | Performed by: PHYSICAL THERAPIST

## 2019-05-20 PROCEDURE — 97140 MANUAL THERAPY 1/> REGIONS: CPT | Performed by: PHYSICAL THERAPIST

## 2019-05-22 ENCOUNTER — OFFICE VISIT (OUTPATIENT)
Dept: NEUROLOGY | Facility: CLINIC | Age: 79
End: 2019-05-22
Payer: COMMERCIAL

## 2019-05-22 VITALS
HEIGHT: 62 IN | DIASTOLIC BLOOD PRESSURE: 68 MMHG | HEART RATE: 85 BPM | BODY MASS INDEX: 28.19 KG/M2 | SYSTOLIC BLOOD PRESSURE: 130 MMHG | WEIGHT: 153.2 LBS

## 2019-05-22 DIAGNOSIS — R46.89 COGNITIVE AND BEHAVIORAL CHANGES: ICD-10-CM

## 2019-05-22 DIAGNOSIS — R41.89 COGNITIVE AND BEHAVIORAL CHANGES: ICD-10-CM

## 2019-05-22 DIAGNOSIS — G91.2 NORMAL PRESSURE HYDROCEPHALUS (HCC): Primary | ICD-10-CM

## 2019-05-22 PROCEDURE — 99215 OFFICE O/P EST HI 40 MIN: CPT | Performed by: PSYCHIATRY & NEUROLOGY

## 2019-05-23 ENCOUNTER — APPOINTMENT (OUTPATIENT)
Dept: PHYSICAL THERAPY | Facility: REHABILITATION | Age: 79
End: 2019-05-23
Payer: COMMERCIAL

## 2019-05-29 ENCOUNTER — OFFICE VISIT (OUTPATIENT)
Dept: PHYSICAL THERAPY | Facility: REHABILITATION | Age: 79
End: 2019-05-29
Payer: COMMERCIAL

## 2019-05-29 DIAGNOSIS — R26.9 GAIT ABNORMALITY: Primary | ICD-10-CM

## 2019-05-29 DIAGNOSIS — R29.898 WEAKNESS OF BOTH LOWER EXTREMITIES: ICD-10-CM

## 2019-05-29 PROCEDURE — 97112 NEUROMUSCULAR REEDUCATION: CPT

## 2019-05-29 PROCEDURE — 97110 THERAPEUTIC EXERCISES: CPT

## 2019-06-03 ENCOUNTER — APPOINTMENT (OUTPATIENT)
Dept: PHYSICAL THERAPY | Facility: REHABILITATION | Age: 79
End: 2019-06-03
Payer: COMMERCIAL

## 2019-06-03 DIAGNOSIS — I10 ESSENTIAL HYPERTENSION: ICD-10-CM

## 2019-06-03 LAB
BUN SERPL-MCNC: 10 MG/DL (ref 7–25)
BUN/CREAT SERPL: ABNORMAL (CALC) (ref 6–22)
CALCIUM SERPL-MCNC: 9.8 MG/DL (ref 8.6–10.4)
CHLORIDE SERPL-SCNC: 95 MMOL/L (ref 98–110)
CO2 SERPL-SCNC: 28 MMOL/L (ref 20–32)
CREAT SERPL-MCNC: 0.7 MG/DL (ref 0.6–0.93)
GLUCOSE SERPL-MCNC: 102 MG/DL (ref 65–99)
MAGNESIUM SERPL-MCNC: 1.5 MG/DL (ref 1.5–2.5)
POTASSIUM SERPL-SCNC: 4.7 MMOL/L (ref 3.5–5.3)
SL AMB EGFR AFRICAN AMERICAN: 96 ML/MIN/1.73M2
SL AMB EGFR NON AFRICAN AMERICAN: 83 ML/MIN/1.73M2
SODIUM SERPL-SCNC: 133 MMOL/L (ref 135–146)

## 2019-06-04 ENCOUNTER — TELEPHONE (OUTPATIENT)
Dept: NEPHROLOGY | Facility: CLINIC | Age: 79
End: 2019-06-04

## 2019-06-04 DIAGNOSIS — I10 ESSENTIAL HYPERTENSION: ICD-10-CM

## 2019-06-04 LAB
CREAT UR-MCNC: 84 MG/DL (ref 20–275)
PROT UR-MCNC: 18 MG/DL (ref 5–24)
PROT/CREAT UR: 214 MG/G CREAT (ref 21–161)

## 2019-06-05 ENCOUNTER — OFFICE VISIT (OUTPATIENT)
Dept: PHYSICAL THERAPY | Facility: REHABILITATION | Age: 79
End: 2019-06-05
Payer: COMMERCIAL

## 2019-06-05 DIAGNOSIS — R26.9 GAIT ABNORMALITY: Primary | ICD-10-CM

## 2019-06-05 DIAGNOSIS — R29.898 WEAKNESS OF BOTH LOWER EXTREMITIES: ICD-10-CM

## 2019-06-05 DIAGNOSIS — I10 ESSENTIAL HYPERTENSION: ICD-10-CM

## 2019-06-05 PROCEDURE — 97110 THERAPEUTIC EXERCISES: CPT

## 2019-06-05 PROCEDURE — 97530 THERAPEUTIC ACTIVITIES: CPT

## 2019-06-05 PROCEDURE — 97112 NEUROMUSCULAR REEDUCATION: CPT

## 2019-06-05 PROCEDURE — 97116 GAIT TRAINING THERAPY: CPT

## 2019-06-06 RX ORDER — LOSARTAN POTASSIUM 50 MG/1
TABLET ORAL
Qty: 60 TABLET | Refills: 3 | Status: SHIPPED | OUTPATIENT
Start: 2019-06-06 | End: 2019-10-04 | Stop reason: SDUPTHER

## 2019-06-06 RX ORDER — LOSARTAN POTASSIUM 50 MG/1
50 TABLET ORAL DAILY
Qty: 60 TABLET | Refills: 5 | Status: SHIPPED | OUTPATIENT
Start: 2019-06-06 | End: 2019-06-11

## 2019-06-06 RX ORDER — LOSARTAN POTASSIUM 50 MG/1
50 TABLET ORAL 2 TIMES DAILY
Qty: 60 TABLET | Refills: 5 | Status: SHIPPED | OUTPATIENT
Start: 2019-06-06 | End: 2019-06-11

## 2019-06-10 ENCOUNTER — OFFICE VISIT (OUTPATIENT)
Dept: PHYSICAL THERAPY | Facility: REHABILITATION | Age: 79
End: 2019-06-10
Payer: COMMERCIAL

## 2019-06-10 DIAGNOSIS — R29.898 WEAKNESS OF BOTH LOWER EXTREMITIES: ICD-10-CM

## 2019-06-10 DIAGNOSIS — R26.9 GAIT ABNORMALITY: Primary | ICD-10-CM

## 2019-06-10 PROCEDURE — 97112 NEUROMUSCULAR REEDUCATION: CPT | Performed by: PHYSICAL THERAPIST

## 2019-06-10 PROCEDURE — 97110 THERAPEUTIC EXERCISES: CPT | Performed by: PHYSICAL THERAPIST

## 2019-06-10 PROCEDURE — 97116 GAIT TRAINING THERAPY: CPT | Performed by: PHYSICAL THERAPIST

## 2019-06-11 ENCOUNTER — OFFICE VISIT (OUTPATIENT)
Dept: FAMILY MEDICINE CLINIC | Facility: CLINIC | Age: 79
End: 2019-06-11
Payer: COMMERCIAL

## 2019-06-11 VITALS
HEIGHT: 62 IN | WEIGHT: 154 LBS | BODY MASS INDEX: 28.34 KG/M2 | SYSTOLIC BLOOD PRESSURE: 144 MMHG | HEART RATE: 80 BPM | DIASTOLIC BLOOD PRESSURE: 60 MMHG

## 2019-06-11 DIAGNOSIS — F41.9 ANXIETY: ICD-10-CM

## 2019-06-11 DIAGNOSIS — Z79.899 MEDICATION MANAGEMENT: ICD-10-CM

## 2019-06-11 DIAGNOSIS — R41.0 CONFUSION: Primary | ICD-10-CM

## 2019-06-11 DIAGNOSIS — E87.1 HYPONATREMIA: ICD-10-CM

## 2019-06-11 DIAGNOSIS — E78.5 HYPERLIPIDEMIA, UNSPECIFIED HYPERLIPIDEMIA TYPE: ICD-10-CM

## 2019-06-11 DIAGNOSIS — K21.9 GERD WITHOUT ESOPHAGITIS: ICD-10-CM

## 2019-06-11 DIAGNOSIS — K55.1 SMA STENOSIS: ICD-10-CM

## 2019-06-11 DIAGNOSIS — R68.89 FORGETFULNESS: ICD-10-CM

## 2019-06-11 DIAGNOSIS — R53.83 FATIGUE, UNSPECIFIED TYPE: ICD-10-CM

## 2019-06-11 DIAGNOSIS — I10 BENIGN ESSENTIAL HYPERTENSION: ICD-10-CM

## 2019-06-11 DIAGNOSIS — R26.89 BALANCE PROBLEM: ICD-10-CM

## 2019-06-11 DIAGNOSIS — G91.2 NORMAL PRESSURE HYDROCEPHALUS (HCC): ICD-10-CM

## 2019-06-11 PROBLEM — J18.9 WALKING PNEUMONIA: Status: RESOLVED | Noted: 2019-03-27 | Resolved: 2019-06-11

## 2019-06-11 PROCEDURE — 1160F RVW MEDS BY RX/DR IN RCRD: CPT | Performed by: FAMILY MEDICINE

## 2019-06-11 PROCEDURE — 99214 OFFICE O/P EST MOD 30 MIN: CPT | Performed by: FAMILY MEDICINE

## 2019-06-11 RX ORDER — RANITIDINE 150 MG/1
150 TABLET ORAL 2 TIMES DAILY
Qty: 60 TABLET | Refills: 2 | Status: SHIPPED | OUTPATIENT
Start: 2019-06-11 | End: 2019-08-02 | Stop reason: ALTCHOICE

## 2019-06-12 ENCOUNTER — OFFICE VISIT (OUTPATIENT)
Dept: PHYSICAL THERAPY | Facility: REHABILITATION | Age: 79
End: 2019-06-12
Payer: COMMERCIAL

## 2019-06-12 DIAGNOSIS — R26.9 GAIT ABNORMALITY: Primary | ICD-10-CM

## 2019-06-12 DIAGNOSIS — R29.898 WEAKNESS OF BOTH LOWER EXTREMITIES: ICD-10-CM

## 2019-06-12 PROBLEM — Z79.899 MEDICATION MANAGEMENT: Status: ACTIVE | Noted: 2019-06-12

## 2019-06-12 PROBLEM — R05.9 COUGH: Status: RESOLVED | Noted: 2018-07-25 | Resolved: 2019-06-12

## 2019-06-12 PROCEDURE — 97530 THERAPEUTIC ACTIVITIES: CPT

## 2019-06-12 PROCEDURE — 97110 THERAPEUTIC EXERCISES: CPT

## 2019-06-12 PROCEDURE — 97112 NEUROMUSCULAR REEDUCATION: CPT

## 2019-06-17 ENCOUNTER — EVALUATION (OUTPATIENT)
Dept: PHYSICAL THERAPY | Facility: REHABILITATION | Age: 79
End: 2019-06-17
Payer: COMMERCIAL

## 2019-06-17 DIAGNOSIS — R29.898 WEAKNESS OF BOTH LOWER EXTREMITIES: ICD-10-CM

## 2019-06-17 DIAGNOSIS — R26.9 GAIT ABNORMALITY: Primary | ICD-10-CM

## 2019-06-17 PROCEDURE — 97110 THERAPEUTIC EXERCISES: CPT | Performed by: PHYSICAL THERAPIST

## 2019-06-17 PROCEDURE — 97140 MANUAL THERAPY 1/> REGIONS: CPT | Performed by: PHYSICAL THERAPIST

## 2019-06-19 ENCOUNTER — APPOINTMENT (OUTPATIENT)
Dept: PHYSICAL THERAPY | Facility: REHABILITATION | Age: 79
End: 2019-06-19
Payer: COMMERCIAL

## 2019-06-26 ENCOUNTER — OFFICE VISIT (OUTPATIENT)
Dept: NEPHROLOGY | Facility: CLINIC | Age: 79
End: 2019-06-26
Payer: COMMERCIAL

## 2019-06-26 VITALS
HEIGHT: 62 IN | BODY MASS INDEX: 28.16 KG/M2 | DIASTOLIC BLOOD PRESSURE: 58 MMHG | WEIGHT: 153 LBS | HEART RATE: 62 BPM | SYSTOLIC BLOOD PRESSURE: 140 MMHG

## 2019-06-26 DIAGNOSIS — I10 BENIGN ESSENTIAL HYPERTENSION: Primary | ICD-10-CM

## 2019-06-26 DIAGNOSIS — E87.1 HYPONATREMIA: ICD-10-CM

## 2019-06-26 PROCEDURE — 99214 OFFICE O/P EST MOD 30 MIN: CPT | Performed by: NURSE PRACTITIONER

## 2019-07-10 ENCOUNTER — OFFICE VISIT (OUTPATIENT)
Dept: FAMILY MEDICINE CLINIC | Facility: CLINIC | Age: 79
End: 2019-07-10
Payer: COMMERCIAL

## 2019-07-10 ENCOUNTER — TELEPHONE (OUTPATIENT)
Dept: NEUROLOGY | Facility: CLINIC | Age: 79
End: 2019-07-10

## 2019-07-10 ENCOUNTER — TELEPHONE (OUTPATIENT)
Dept: FAMILY MEDICINE CLINIC | Facility: CLINIC | Age: 79
End: 2019-07-10

## 2019-07-10 VITALS
HEIGHT: 62 IN | BODY MASS INDEX: 28.34 KG/M2 | SYSTOLIC BLOOD PRESSURE: 142 MMHG | WEIGHT: 154 LBS | DIASTOLIC BLOOD PRESSURE: 60 MMHG | HEART RATE: 72 BPM

## 2019-07-10 DIAGNOSIS — R41.89 COGNITIVE AND BEHAVIORAL CHANGES: ICD-10-CM

## 2019-07-10 DIAGNOSIS — R26.2 AMBULATORY DYSFUNCTION: ICD-10-CM

## 2019-07-10 DIAGNOSIS — R46.89 COGNITIVE AND BEHAVIORAL CHANGES: ICD-10-CM

## 2019-07-10 DIAGNOSIS — G91.2 NORMAL PRESSURE HYDROCEPHALUS (HCC): Primary | ICD-10-CM

## 2019-07-10 DIAGNOSIS — E87.1 HYPONATREMIA: ICD-10-CM

## 2019-07-10 DIAGNOSIS — R26.81 UNSTABLE GAIT: ICD-10-CM

## 2019-07-10 DIAGNOSIS — I10 BENIGN ESSENTIAL HYPERTENSION: ICD-10-CM

## 2019-07-10 PROCEDURE — 99215 OFFICE O/P EST HI 40 MIN: CPT | Performed by: FAMILY MEDICINE

## 2019-07-10 RX ORDER — OMEPRAZOLE 20 MG/1
20 CAPSULE, DELAYED RELEASE ORAL
COMMUNITY
Start: 2019-07-08 | End: 2019-10-21 | Stop reason: ALTCHOICE

## 2019-07-10 NOTE — PATIENT INSTRUCTIONS
Problem List Items Addressed This Visit     Ambulatory dysfunction     Patient is having significant amount of issues with regard to ambulatory dysfunction  These are likely secondary to NPH  Continue PT  Follow with Neurosurgery and Neurology  Relevant Orders    Ambulatory referral to Neurology    Benign essential hypertension     BP today is stable  No changes  Relevant Orders    Ambulatory referral to Neurology    Basic metabolic panel    Hyponatremia     Last month was normal  I would recommend recheck BMP  Follow with Nephrology  Relevant Orders    Basic metabolic panel    Normal pressure hydrocephalus - Primary     Continued issues with memory  At this point, the biggest issue that I have with her safety  From the standpoint his safety, she probably does need to have more close monitoring  She currently lives alone  I recommend that her stove and microwave be considered for disconnecting  With regard to any chance of her staying in her home long term, I feel that the shunt represents her best chance  I did review this with her, her daughters, and her sister  At this point, we will have her see Neurology for LP, and Neurosurgery for re-evaluation for shunt           Relevant Orders    Ambulatory referral to Neurology    Ambulatory referral to Neurosurgery    Basic metabolic panel

## 2019-07-10 NOTE — TELEPHONE ENCOUNTER
Received a call from David kaplan/PCP's office  Patient had an appt today and PCP office is requesting sooner appt due to her worsening memory/cognition/balance sxs  Please review PCP's  7/10/19 office visit notes  Per note, patients daughter states she's fallen twice since last month due to balance issues and more confusion recently over the last month or so  Patient's f/u is 9/25/19     Please advise  PCP office requested we call patient directly

## 2019-07-10 NOTE — ASSESSMENT & PLAN NOTE
Continued issues with memory  At this point, the biggest issue that I have with her safety  From the standpoint his safety, she probably does need to have more close monitoring  She currently lives alone  I recommend that her stove and microwave be considered for disconnecting  With regard to any chance of her staying in her home long term, I feel that the shunt represents her best chance  I did review this with her, her daughters, and her sister  At this point, we will have her see Neurology for LP, and Neurosurgery for re-evaluation for shunt

## 2019-07-10 NOTE — PROGRESS NOTES
Assessment and Plan:    Problem List Items Addressed This Visit     Ambulatory dysfunction     Patient is having significant amount of issues with regard to ambulatory dysfunction  These are likely secondary to NPH  Continue PT  Follow with Neurosurgery and Neurology  Relevant Orders    Ambulatory referral to Neurology    Benign essential hypertension     BP today is stable  No changes  Relevant Orders    Ambulatory referral to Neurology    Basic metabolic panel    Hyponatremia     Last month was normal  I would recommend recheck BMP  Follow with Nephrology  Relevant Orders    Basic metabolic panel    Normal pressure hydrocephalus - Primary     Continued issues with memory  At this point, the biggest issue that I have with her safety  From the standpoint his safety, she probably does need to have more close monitoring  She currently lives alone  I recommend that her stove and microwave be considered for disconnecting  With regard to any chance of her staying in her home long term, I feel that the shunt represents her best chance  I did review this with her, her daughters, and her sister  At this point, we will have her see Neurology for LP, and Neurosurgery for re-evaluation for shunt  Relevant Orders    Ambulatory referral to Neurology    Ambulatory referral to Neurosurgery    Basic metabolic panel                 Diagnoses and all orders for this visit:    Normal pressure hydrocephalus  -     Ambulatory referral to Neurology; Future  -     Ambulatory referral to Neurosurgery; Future  -     Basic metabolic panel; Future  -     Basic metabolic panel    Hyponatremia  -     Basic metabolic panel; Future  -     Basic metabolic panel    Ambulatory dysfunction  -     Ambulatory referral to Neurology; Future    Benign essential hypertension  -     Ambulatory referral to Neurology; Future  -     Basic metabolic panel;  Future  -     Basic metabolic panel    Other orders  - omeprazole (PriLOSEC) 20 mg delayed release capsule              Subjective:      Patient ID: Theodore Roach is a 66 y o  female  CC:    Chief Complaint   Patient presents with    Memory Loss     patient;s daughter states her memory is getting worse, using deoderant on her legs, days are confusing to her sometimes, mixing toothpaste and denture cream, etc  Patients sister and daughter thought about a possiblity of  getting another spinal tap  ak    Medication Refill     patient would like to refill Prilosec instead of Zantac    Anxiety     patients daughter states her Lexapro isnt working and would like to switch her to [employer]  Daughter is giving her 1/3 of her pill once in a while  ak    Fall     patients daughter states she's fallen twice since last month due to  balance issues  ak       HPI:    Patient has been having more issues with regard to confusion lately  Mostly it appears to be related to date and time per her daughter  Unfortunately, there were some issues with this understanding what thing she should use for where  More specifically, she was using denture cream instead of toothpaste, and at another time she was using deodorant instead of lotion on her thighs  This has been getting worse over the last month or so  Reviewed NPH briefly  Reviewed hyponatremia  Her last sodium was in June and at that point was stable  Reviewed safety in the home as well  Using Lexapro 10mg for anxiety and depression  Seems to help per family            The following portions of the patient's history were reviewed and updated as appropriate: allergies, current medications, past family history, past medical history, past social history, past surgical history and problem list       Review of Systems   Unable to perform ROS: Dementia         Data to review:       Objective:    Vitals:    07/10/19 1202   BP: 142/60   Pulse: 72   Weight: 69 9 kg (154 lb)   Height: 5' 2" (1 575 m)        Physical Exam Constitutional: She appears well-developed and well-nourished  HENT:   Head: Normocephalic and atraumatic  Cardiovascular: Normal rate, regular rhythm and normal heart sounds  Pulses:       Carotid pulses are 2+ on the right side, and 2+ on the left side  Pulmonary/Chest: Effort normal and breath sounds normal  She has no wheezes  She has no rales  She exhibits no tenderness  Nursing note and vitals reviewed

## 2019-07-10 NOTE — TELEPHONE ENCOUNTER
From my last note, the plan was to make the referral back for repeat LP of higher volume than last time if she was seeing a decline  I did not see any signs consistent with a parkinsonism suspicion in visits past  She had been doing better as of 5/22 - please ask if she had changes in her meds or routine prior to the worsening  I will order another CT scan first  We can see her back probably next week 7/17 @ 11:30am OVL slot if it can be scheduled before then  If the main concern is the worsening of her previous symptoms and if the CT head findings support it, then I think they should proceed with another tap

## 2019-07-10 NOTE — ASSESSMENT & PLAN NOTE
Patient is having significant amount of issues with regard to ambulatory dysfunction  These are likely secondary to NPH  Continue PT  Follow with Neurosurgery and Neurology

## 2019-07-11 ENCOUNTER — TELEPHONE (OUTPATIENT)
Dept: NEUROSURGERY | Facility: CLINIC | Age: 79
End: 2019-07-11

## 2019-07-11 ENCOUNTER — TRANSCRIBE ORDERS (OUTPATIENT)
Dept: NEUROSURGERY | Facility: CLINIC | Age: 79
End: 2019-07-11

## 2019-07-11 DIAGNOSIS — R26.9 GAIT DISTURBANCE: Primary | ICD-10-CM

## 2019-07-11 LAB
BUN SERPL-MCNC: 10 MG/DL (ref 7–25)
BUN/CREAT SERPL: ABNORMAL (CALC) (ref 6–22)
CALCIUM SERPL-MCNC: 9.6 MG/DL (ref 8.6–10.4)
CHLORIDE SERPL-SCNC: 95 MMOL/L (ref 98–110)
CO2 SERPL-SCNC: 24 MMOL/L (ref 20–32)
CREAT SERPL-MCNC: 0.65 MG/DL (ref 0.6–0.93)
GLUCOSE SERPL-MCNC: 100 MG/DL (ref 65–139)
POTASSIUM SERPL-SCNC: 4.6 MMOL/L (ref 3.5–5.3)
SL AMB EGFR AFRICAN AMERICAN: 99 ML/MIN/1.73M2
SL AMB EGFR NON AFRICAN AMERICAN: 85 ML/MIN/1.73M2
SODIUM SERPL-SCNC: 130 MMOL/L (ref 135–146)

## 2019-07-11 NOTE — TELEPHONE ENCOUNTER
Received new referral for patient for NPH clinic  Called patient daughter to obtain more info  Patient had an LP in 01/2019 and at that time they were not interested in surgery  Now daughter said patient is re-thinking the surgery  Scheduled patient for a follow up with Dr Cristino Mosquera through [facility] for 8/2/19  PT @ [phone]  DR @ [phone]    She said patient had recent head CT at [facility] and she is going to work on getting the disc with the images in the meantime

## 2019-07-14 DIAGNOSIS — I10 BENIGN ESSENTIAL HYPERTENSION: ICD-10-CM

## 2019-07-14 RX ORDER — ATENOLOL 50 MG/1
TABLET ORAL
Qty: 30 TABLET | Refills: 5 | Status: SHIPPED | OUTPATIENT
Start: 2019-07-14 | End: 2019-08-02 | Stop reason: ALTCHOICE

## 2019-07-17 ENCOUNTER — OFFICE VISIT (OUTPATIENT)
Dept: NEPHROLOGY | Facility: CLINIC | Age: 79
End: 2019-07-17
Payer: COMMERCIAL

## 2019-07-17 VITALS
BODY MASS INDEX: 27.97 KG/M2 | SYSTOLIC BLOOD PRESSURE: 140 MMHG | HEIGHT: 62 IN | WEIGHT: 152 LBS | DIASTOLIC BLOOD PRESSURE: 52 MMHG | HEART RATE: 80 BPM

## 2019-07-17 DIAGNOSIS — E87.1 HYPONATREMIA: Primary | ICD-10-CM

## 2019-07-17 DIAGNOSIS — N18.2 STAGE 2 CHRONIC KIDNEY DISEASE: ICD-10-CM

## 2019-07-17 DIAGNOSIS — I10 BENIGN ESSENTIAL HYPERTENSION: ICD-10-CM

## 2019-07-17 PROCEDURE — 99214 OFFICE O/P EST MOD 30 MIN: CPT | Performed by: PHYSICIAN ASSISTANT

## 2019-07-17 NOTE — PATIENT INSTRUCTIONS
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  She takes salt tablets 1g three times a day and follows a fluid restriction of <1200ml/day  Usually her sodium ranges from 130 to 133  Patient/PCP is concerned that sodium level dropped from 133 to 130 on last blood work  I would recommend adding lasix 10mg daily in the future if it continues to drop  For now, drop in sodium level is likely secondary to her drinking more  She will get a jug to measure her fluid intake per day  Hypertension- BP acceptable  Chronic Kidney Disease stage II- Creatinine stable between 0 6-0 9  Follow up with Dr Wade Rollins in September  Please call the office with any questions or concerns

## 2019-07-17 NOTE — PROGRESS NOTES
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  She takes salt tablets 1g three times a day and follows a fluid restriction of <1200ml/day  Usually her sodium ranges from 130 to 133  Patient/PCP is concerned that sodium level dropped from 133 to 130 on last blood work  I would recommend adding lasix 10mg daily in the future if it continues to drop  For now, drop in sodium level is likely secondary to her drinking more  She will get a jug to measure her fluid intake per day  Hypertension- BP acceptable  Chronic Kidney Disease stage II- Creatinine stable between 0 6-0 9  Follow up with Dr English Degree in September  Please call the office with any questions or concerns  Reason for Visit: Follow-up and Hypertension    HPI: Dewey Alvarez is a 66 y o  female who is here for follow up of hyponatremia  She last saw Rolly Gallagher in late June  She has been feeling well and has no acute complaints  She presents with her daughter and her great grandson  She states she may have been drinking more fluid than usual due to the heat  We discussed getting a large container to fill with the amount of water she is allowed and pouring out from there throughout the day as needed  ROS: A complete review of systems was performed and was negative unless otherwise noted in the history of present illness  Allergies:   Patient has no known allergies      Medications:     Current Outpatient Medications:     aspirin 81 MG tablet, Take 1 tablet by mouth daily, Disp: , Rfl:     atenolol (TENORMIN) 50 mg tablet, TAKE ONE TABLET BY MOUTH DAILY, Disp: 30 tablet, Rfl: 5    atenolol (TENORMIN) 50 mg tablet, TAKE ONE TABLET BY MOUTH EVERY DAY, Disp: 30 tablet, Rfl: 5   atorvastatin (LIPITOR) 40 mg tablet, TAKE ONE TABLET BY MOUTH EVERY DAY   OFFICE VISIT NEEDED, Disp: 90 tablet, Rfl: 3    escitalopram (LEXAPRO) 10 mg tablet, Take 1 tablet (10 mg total) by mouth daily, Disp: 30 tablet, Rfl: 1    losartan (COZAAR) 50 mg tablet, TAKE ONE TABLET BY MOUTH TWO TIMES A DAY FOR 30 DAYS, Disp: 60 tablet, Rfl: 3    multivitamin (THERAGRAN) TABS, Take 1 tablet by mouth, Disp: , Rfl:     omeprazole (PriLOSEC) 20 mg delayed release capsule, Take 20 mg by mouth daily , Disp: , Rfl:     sodium chloride 1 g tablet, Take 1 tablet (1 g total) by mouth 3 (three) times a day, Disp: 90 tablet, Rfl: 4    Vitamin D, Cholecalciferol, 1000 units CAPS, Take 1 tablet by mouth, Disp: , Rfl:     ranitidine (ZANTAC) 150 mg tablet, Take 1 tablet (150 mg total) by mouth 2 (two) times a day for 30 days (Patient not taking: Reported on 7/17/2019), Disp: 60 tablet, Rfl: 2    Past Medical History:   Diagnosis Date    Arthritis     Confusion 10/2/2018    Depression     Displaced fracture of distal phalanx of right thumb     GERD (gastroesophageal reflux disease)     Heart attack (HCC)     Hyperlipidemia     Hypertension     Moderate episode of recurrent major depressive disorder (HCC) 4/12/2019    Shortness of breath     Stage 2 chronic kidney disease 7/17/2019     Past Surgical History:   Procedure Laterality Date    APPENDECTOMY      CATARACT EXTRACTION      COLONOSCOPY      FL LUMBAR PUNCTURE  1/10/2019    SEPTOPLASTY      WRIST FRACTURE SURGERY Right      Family History   Problem Relation Age of Onset    Heart attack Mother 39    Heart attack Father 61    No Known Problems Other     Breast cancer Sister     Alcohol abuse Daughter         history of    Heart attack Brother       reports that she quit smoking about 23 years ago  She has never used smokeless tobacco  She reports that she drinks alcohol  She reports that she does not use drugs      Physical Exam:   Vitals:    07/17/19 1312 07/17/19 1323   BP:  140/52   BP Location:  Left arm   Patient Position:  Sitting   Cuff Size:  Standard   Pulse:  80   Weight: 68 9 kg (152 lb)    Height: 5' 2" (1 575 m)      Body mass index is 27 8 kg/m²  General: NAD  Neuro: AAO  Neck: supple  Skin: no rash  Heart: RRR  Lungs: CTAB  Abdomen: soft nt nd  Extremities: no edema    Procedure:  No results found for this or any previous visit  Labs reviewed      Lab Results   Component Value Date    CALCIUM 9 6 07/10/2019     (L) 12/01/2017    K 4 6 07/10/2019    CO2 24 07/10/2019    CL 95 (L) 07/10/2019    BUN 10 07/10/2019    CREATININE 0 65 07/10/2019       Results from last 7 days   Lab Units 07/10/19  1407   POTASSIUM mmol/L 4 6   CHLORIDE mmol/L 95*   CO2 mmol/L 24   BUN mg/dL 10   CREATININE mg/dL 0 65   SL AMB CALCIUM mg/dL 9 6

## 2019-07-18 NOTE — TELEPHONE ENCOUNTER
Since the CT is during my PTO, I can best see them on 8/2 as well, in the morning say the 9:30am slot since [facility] is at 10:45am

## 2019-07-18 NOTE — TELEPHONE ENCOUNTER
I called home # and someone answered however bad connection and call disconnected  Will need to try again  It looks like CT scheduled 7/22  Pt was also referred to [facility] by PCP for NPH and has an appt with them on 8/2  Please advise on neuro appt

## 2019-07-19 NOTE — TELEPHONE ENCOUNTER
Spoke to the daughter Elbert Gomez  She stated she is currently driving and will call back later to schedule an appt with Dr Garza Creeusebio   Appointment time is on hold for patient 8/22/19 in the [facility]

## 2019-07-22 ENCOUNTER — HOSPITAL ENCOUNTER (OUTPATIENT)
Dept: CT IMAGING | Facility: HOSPITAL | Age: 79
Discharge: HOME | End: 2019-07-22
Attending: PSYCHIATRY & NEUROLOGY
Payer: COMMERCIAL

## 2019-07-22 DIAGNOSIS — R41.89 COGNITIVE AND BEHAVIORAL CHANGES: ICD-10-CM

## 2019-07-22 DIAGNOSIS — R26.81 UNSTABLE GAIT: ICD-10-CM

## 2019-07-22 DIAGNOSIS — G91.2 NORMAL PRESSURE HYDROCEPHALUS (HCC): ICD-10-CM

## 2019-07-22 DIAGNOSIS — R46.89 COGNITIVE AND BEHAVIORAL CHANGES: ICD-10-CM

## 2019-07-22 PROCEDURE — 70450 CT HEAD/BRAIN W/O DYE: CPT

## 2019-07-25 ENCOUNTER — TELEPHONE (OUTPATIENT)
Dept: NEPHROLOGY | Facility: CLINIC | Age: 79
End: 2019-07-25

## 2019-07-25 LAB
BUN SERPL-MCNC: 10 MG/DL (ref 7–25)
BUN/CREAT SERPL: ABNORMAL (CALC) (ref 6–22)
CALCIUM SERPL-MCNC: 9.7 MG/DL (ref 8.6–10.4)
CHLORIDE SERPL-SCNC: 98 MMOL/L (ref 98–110)
CO2 SERPL-SCNC: 28 MMOL/L (ref 20–32)
CREAT SERPL-MCNC: 0.72 MG/DL (ref 0.6–0.93)
GLUCOSE SERPL-MCNC: 108 MG/DL (ref 65–99)
POTASSIUM SERPL-SCNC: 4.6 MMOL/L (ref 3.5–5.3)
SL AMB EGFR AFRICAN AMERICAN: 93 ML/MIN/1.73M2
SL AMB EGFR NON AFRICAN AMERICAN: 80 ML/MIN/1.73M2
SODIUM SERPL-SCNC: 133 MMOL/L (ref 135–146)

## 2019-07-25 NOTE — TELEPHONE ENCOUNTER
----- Message from [address] [place] [address] sent at 7/25/2019  2:53 PM EDT -----  Please let Alex Levy know that sodium looks good- up to 133  No change to plan of care

## 2019-07-31 ENCOUNTER — TELEPHONE (OUTPATIENT)
Dept: NEUROLOGY | Facility: CLINIC | Age: 79
End: 2019-07-31

## 2019-07-31 DIAGNOSIS — F41.9 ANXIETY: ICD-10-CM

## 2019-07-31 RX ORDER — ESCITALOPRAM OXALATE 10 MG/1
TABLET ORAL
Qty: 30 TABLET | Refills: 1 | Status: SHIPPED | OUTPATIENT
Start: 2019-07-31 | End: 2019-10-04 | Stop reason: SDUPTHER

## 2019-07-31 NOTE — TELEPHONE ENCOUNTER
----- Message from Deepa Pritchard MD sent at 7/28/2019  6:25 PM EDT -----  Regarding: CT head results  I have reviewed the latest CT head results  Though the report did not directly comment on any comparison between this study and the 2018 MRI brain, on my viewing it appears that the ventricular dilatation remains prominent and similar to 2018  This is probably why she is worse again  I still believe that the re-evaluation with neurosurgery team as arranged may be the most helpful in this case and I will be following up with the pt and family after the discussion       If they have any further new events or symptoms, they can ask me      ----- Message -----  From: Interface, Radiology Results In  Sent: 7/24/2019  11:13 AM EDT  To: Deepa Pritchard MD

## 2019-07-31 NOTE — TELEPHONE ENCOUNTER
Pt's daughter Karlo Ames made aware of the below  She stated that the patient is starting to seem more agreeable to getting the shunt put in now and just wanted to make you aware

## 2019-08-01 DIAGNOSIS — F41.9 ANXIETY: ICD-10-CM

## 2019-08-01 RX ORDER — ESCITALOPRAM OXALATE 10 MG/1
TABLET ORAL
Qty: 30 TABLET | Refills: 1 | Status: SHIPPED | OUTPATIENT
Start: 2019-08-01 | End: 2019-08-02 | Stop reason: ALTCHOICE

## 2019-08-02 ENCOUNTER — OFFICE VISIT (OUTPATIENT)
Dept: PHYSICAL THERAPY | Facility: CLINIC | Age: 79
End: 2019-08-02
Payer: COMMERCIAL

## 2019-08-02 ENCOUNTER — OFFICE VISIT (OUTPATIENT)
Dept: NEUROSURGERY | Facility: CLINIC | Age: 79
End: 2019-08-02
Payer: COMMERCIAL

## 2019-08-02 VITALS
RESPIRATION RATE: 18 BRPM | HEIGHT: 62 IN | HEART RATE: 76 BPM | SYSTOLIC BLOOD PRESSURE: 158 MMHG | DIASTOLIC BLOOD PRESSURE: 74 MMHG | BODY MASS INDEX: 28.49 KG/M2 | TEMPERATURE: 99.6 F | WEIGHT: 154.8 LBS

## 2019-08-02 DIAGNOSIS — R26.9 ABNORMALITY OF GAIT: Primary | ICD-10-CM

## 2019-08-02 DIAGNOSIS — G91.2 NORMAL PRESSURE HYDROCEPHALUS (HCC): ICD-10-CM

## 2019-08-02 PROCEDURE — 97161 PT EVAL LOW COMPLEX 20 MIN: CPT | Performed by: PHYSICAL THERAPIST

## 2019-08-02 PROCEDURE — 99215 OFFICE O/P EST HI 40 MIN: CPT | Performed by: NEUROLOGICAL SURGERY

## 2019-08-02 PROCEDURE — 4040F PNEUMOC VAC/ADMIN/RCVD: CPT | Performed by: NEUROLOGICAL SURGERY

## 2019-08-02 RX ORDER — CEFAZOLIN SODIUM 2 G/50ML
2000 SOLUTION INTRAVENOUS ONCE
Status: CANCELLED | OUTPATIENT
Start: 2019-08-02 | End: 2019-08-02

## 2019-08-02 RX ORDER — CHLORHEXIDINE GLUCONATE 0.12 MG/ML
15 RINSE ORAL ONCE
Status: CANCELLED | OUTPATIENT
Start: 2019-08-02 | End: 2019-08-02

## 2019-08-02 NOTE — PROGRESS NOTES
PT Evaluation     Today's date: 2019  Patient name: Suresh Jansen  : 1940  MRN: [id number]  Referring provider: Zan Begum MD  Dx:   Encounter Diagnosis     ICD-10-CM    1  Abnormality of gait R26 9                   Assessment  Assessment details: Please refer to the NPH Exam form for all objective measures  Patient was seen for a gait and balance assessment/screen  She will be seen for re-assessment of their gait/balance as needed as part of the management of their condition  Thank you for this pleasant referral       Impairments: abnormal gait, impaired balance and safety issue    Goals  Patient to be reassessed as needed in 4-6 weeks    Plan  Plan details: Patient to return only as needed        Subjective Evaluation    History of Present Illness  Onset date: ~ 1 year  Mechanism of injury: Chief Complaint: Difficulty walking    History:  Pt was diagnosed with NPH about a year ago  She saw Dr Becka Newberry and was recommended to have a shunt placed  She declined this, but feels she has worsened and now feels she is ready to have a shunt placed so she is returning to Dr Becka Newberry  She lives alone in a bi-level with 13 steps to enter  Family lives close by  She does not drive  She uses a walker for home mobility and his a minimal community ambulator, basically not walking outside her home  She feels a worsening of her gait as well as more confusion  She has incontinence and notes this has also worsened        PMH:      :    Quality of life: good    Pain  No pain reported          Objective     Ambulation     Comments   NPH Exam Finding:  TU min 12 sec RW, path deviation, difficulty turning  TUG Cognitive: 1 min 11 sec, RW, same as above, unable to count  DGI:   Stairs: Step to, B handrails  Retropulsion: (+)               Precautions: NPH, Fall Risk

## 2019-08-08 ENCOUNTER — TRANSCRIBE ORDERS (OUTPATIENT)
Dept: FAMILY MEDICINE CLINIC | Facility: CLINIC | Age: 79
End: 2019-08-08

## 2019-08-08 DIAGNOSIS — R79.9 ABNORMAL FINDING OF BLOOD CHEMISTRY: Primary | ICD-10-CM

## 2019-08-12 ENCOUNTER — TELEPHONE (OUTPATIENT)
Dept: NEUROSURGERY | Facility: CLINIC | Age: 79
End: 2019-08-12

## 2019-08-12 DIAGNOSIS — Z98.2 S/P VP SHUNT: Primary | ICD-10-CM

## 2019-08-12 DIAGNOSIS — R26.9 GAIT DISTURBANCE: Primary | ICD-10-CM

## 2019-08-12 DIAGNOSIS — G91.2 NPH (NORMAL PRESSURE HYDROCEPHALUS) (HCC): ICD-10-CM

## 2019-08-12 DIAGNOSIS — R26.9 GAIT DISTURBANCE: ICD-10-CM

## 2019-08-12 NOTE — TELEPHONE ENCOUNTER
S/W patient's daughter, Eloisa Mondragon, scheduled patient for 2 week POV, CT Head, and 4 week POV      2 week POV with nurse, is scheduled for Tuesday, Oct 8 at 1 pm at the [address] [place] [address] Head scheduled for Tuesday, Oct 15 at 1:45 pm at the [facility]    4 week POV (PT at 10 am followed by appointment with Dr Radha Naranjo at 11 am) at the [facility], Eloisa Mondragon aware and agreeable

## 2019-08-14 ENCOUNTER — HOSPITAL ENCOUNTER (INPATIENT)
Facility: HOSPITAL | Age: 79
LOS: 6 days | Discharge: NURSING, REHAB OR LONG-TERM CARE | DRG: 057 | End: 2019-08-20
Attending: EMERGENCY MEDICINE | Admitting: INTERNAL MEDICINE
Payer: COMMERCIAL

## 2019-08-14 ENCOUNTER — HOSPITAL ENCOUNTER (OUTPATIENT)
Dept: NON INVASIVE DIAGNOSTICS | Facility: HOSPITAL | Age: 79
Discharge: HOME | DRG: 057 | End: 2019-08-14
Attending: NEUROLOGICAL SURGERY
Payer: COMMERCIAL

## 2019-08-14 ENCOUNTER — APPOINTMENT (OUTPATIENT)
Dept: LAB | Facility: HOSPITAL | Age: 79
DRG: 057 | End: 2019-08-14
Attending: NEUROLOGICAL SURGERY
Payer: COMMERCIAL

## 2019-08-14 ENCOUNTER — APPOINTMENT (EMERGENCY)
Dept: CT IMAGING | Facility: HOSPITAL | Age: 79
DRG: 057 | End: 2019-08-14
Payer: COMMERCIAL

## 2019-08-14 ENCOUNTER — APPOINTMENT (EMERGENCY)
Dept: RADIOLOGY | Facility: HOSPITAL | Age: 79
DRG: 057 | End: 2019-08-14
Payer: COMMERCIAL

## 2019-08-14 DIAGNOSIS — E87.1 CHRONIC HYPONATREMIA: Chronic | ICD-10-CM

## 2019-08-14 DIAGNOSIS — G91.2 NPH (NORMAL PRESSURE HYDROCEPHALUS) (HCC): ICD-10-CM

## 2019-08-14 DIAGNOSIS — M54.10 RADICULOPATHY, UNSPECIFIED SPINAL REGION: ICD-10-CM

## 2019-08-14 DIAGNOSIS — Z01.818 PRE-PROCEDURAL EXAMINATION: ICD-10-CM

## 2019-08-14 DIAGNOSIS — R26.2 AMBULATORY DYSFUNCTION: Primary | ICD-10-CM

## 2019-08-14 PROBLEM — E87.5 HYPERKALEMIA: Status: ACTIVE | Noted: 2019-08-14

## 2019-08-14 PROBLEM — D72.829 LEUKOCYTOSIS: Status: ACTIVE | Noted: 2019-08-14

## 2019-08-14 PROBLEM — R29.6 RECURRENT FALLS: Status: ACTIVE | Noted: 2019-08-14

## 2019-08-14 LAB
ALBUMIN SERPL BCP-MCNC: 3.4 G/DL (ref 3.5–5)
ALBUMIN SERPL BCP-MCNC: 3.7 G/DL (ref 3.5–5)
ALP SERPL-CCNC: 46 U/L (ref 46–116)
ALP SERPL-CCNC: 53 U/L (ref 46–116)
ALT SERPL W P-5'-P-CCNC: 41 U/L (ref 12–78)
ALT SERPL W P-5'-P-CCNC: 43 U/L (ref 12–78)
ANION GAP SERPL CALCULATED.3IONS-SCNC: 11 MMOL/L (ref 4–13)
ANION GAP SERPL CALCULATED.3IONS-SCNC: 11 MMOL/L (ref 4–13)
APTT PPP: 23 SECONDS (ref 23–37)
AST SERPL W P-5'-P-CCNC: 34 U/L (ref 5–45)
AST SERPL W P-5'-P-CCNC: 83 U/L (ref 5–45)
BASOPHILS # BLD AUTO: 0.08 THOUSANDS/ΜL (ref 0–0.1)
BASOPHILS # BLD MANUAL: 0 THOUSAND/UL (ref 0–0.1)
BASOPHILS NFR BLD AUTO: 1 % (ref 0–1)
BASOPHILS NFR MAR MANUAL: 0 % (ref 0–1)
BILIRUB SERPL-MCNC: 0.48 MG/DL (ref 0.2–1)
BILIRUB SERPL-MCNC: 0.54 MG/DL (ref 0.2–1)
BILIRUB UR QL STRIP: NEGATIVE
BUN SERPL-MCNC: 12 MG/DL (ref 5–25)
BUN SERPL-MCNC: 13 MG/DL (ref 5–25)
CALCIUM SERPL-MCNC: 9 MG/DL (ref 8.3–10.1)
CALCIUM SERPL-MCNC: 9.3 MG/DL (ref 8.3–10.1)
CHLORIDE SERPL-SCNC: 95 MMOL/L (ref 100–108)
CHLORIDE SERPL-SCNC: 97 MMOL/L (ref 100–108)
CLARITY UR: NORMAL
CO2 SERPL-SCNC: 21 MMOL/L (ref 21–32)
CO2 SERPL-SCNC: 25 MMOL/L (ref 21–32)
COLOR UR: YELLOW
CREAT SERPL-MCNC: 0.45 MG/DL (ref 0.6–1.3)
CREAT SERPL-MCNC: 0.73 MG/DL (ref 0.6–1.3)
EOSINOPHIL # BLD AUTO: 0.08 THOUSAND/ΜL (ref 0–0.61)
EOSINOPHIL # BLD MANUAL: 0 THOUSAND/UL (ref 0–0.4)
EOSINOPHIL NFR BLD AUTO: 1 % (ref 0–6)
EOSINOPHIL NFR BLD MANUAL: 0 % (ref 0–6)
ERYTHROCYTE [DISTWIDTH] IN BLOOD BY AUTOMATED COUNT: 12.5 % (ref 11.6–15.1)
ERYTHROCYTE [DISTWIDTH] IN BLOOD BY AUTOMATED COUNT: 12.5 % (ref 11.6–15.1)
EST. AVERAGE GLUCOSE BLD GHB EST-MCNC: 146 MG/DL
GFR SERPL CREATININE-BSD FRML MDRD: 79 ML/MIN/1.73SQ M
GFR SERPL CREATININE-BSD FRML MDRD: 96 ML/MIN/1.73SQ M
GLUCOSE P FAST SERPL-MCNC: 94 MG/DL (ref 65–99)
GLUCOSE SERPL-MCNC: 96 MG/DL (ref 65–140)
GLUCOSE UR STRIP-MCNC: NEGATIVE MG/DL
HBA1C MFR BLD: 6.7 % (ref 4.2–6.3)
HCT VFR BLD AUTO: 31.5 % (ref 34.8–46.1)
HCT VFR BLD AUTO: 35.5 % (ref 34.8–46.1)
HGB BLD-MCNC: 10.6 G/DL (ref 11.5–15.4)
HGB BLD-MCNC: 11.5 G/DL (ref 11.5–15.4)
HGB UR QL STRIP.AUTO: NEGATIVE
IMM GRANULOCYTES # BLD AUTO: 0.12 THOUSAND/UL (ref 0–0.2)
IMM GRANULOCYTES NFR BLD AUTO: 1 % (ref 0–2)
INR PPP: 0.97 (ref 0.84–1.19)
KETONES UR STRIP-MCNC: NEGATIVE MG/DL
LEUKOCYTE ESTERASE UR QL STRIP: NEGATIVE
LYMPHOCYTES # BLD AUTO: 1.41 THOUSANDS/ΜL (ref 0.6–4.47)
LYMPHOCYTES # BLD AUTO: 1.88 THOUSAND/UL (ref 0.6–4.47)
LYMPHOCYTES # BLD AUTO: 11 % (ref 14–44)
LYMPHOCYTES NFR BLD AUTO: 8 % (ref 14–44)
MCH RBC QN AUTO: 31.4 PG (ref 26.8–34.3)
MCH RBC QN AUTO: 31.6 PG (ref 26.8–34.3)
MCHC RBC AUTO-ENTMCNC: 32.4 G/DL (ref 31.4–37.4)
MCHC RBC AUTO-ENTMCNC: 33.7 G/DL (ref 31.4–37.4)
MCV RBC AUTO: 94 FL (ref 82–98)
MCV RBC AUTO: 97 FL (ref 82–98)
MONOCYTES # BLD AUTO: 0.17 THOUSAND/UL (ref 0–1.22)
MONOCYTES # BLD AUTO: 0.95 THOUSAND/ΜL (ref 0.17–1.22)
MONOCYTES NFR BLD AUTO: 6 % (ref 4–12)
MONOCYTES NFR BLD: 1 % (ref 4–12)
NEUTROPHILS # BLD AUTO: 14.29 THOUSANDS/ΜL (ref 1.85–7.62)
NEUTROPHILS # BLD MANUAL: 15 THOUSAND/UL (ref 1.85–7.62)
NEUTS BAND NFR BLD MANUAL: 18 % (ref 0–8)
NEUTS SEG NFR BLD AUTO: 70 % (ref 43–75)
NEUTS SEG NFR BLD AUTO: 83 % (ref 43–75)
NITRITE UR QL STRIP: NEGATIVE
NRBC BLD AUTO-RTO: 0 /100 WBCS
NRBC BLD AUTO-RTO: 0 /100 WBCS
PH UR STRIP.AUTO: 7.5 [PH]
PLATELET # BLD AUTO: 236 THOUSANDS/UL (ref 149–390)
PLATELET # BLD AUTO: 412 THOUSANDS/UL (ref 149–390)
PLATELET BLD QL SMEAR: ABNORMAL
PMV BLD AUTO: 10.3 FL (ref 8.9–12.7)
PMV BLD AUTO: 9.6 FL (ref 8.9–12.7)
POTASSIUM SERPL-SCNC: 4.9 MMOL/L (ref 3.5–5.3)
POTASSIUM SERPL-SCNC: 6 MMOL/L (ref 3.5–5.3)
PROT SERPL-MCNC: 7.1 G/DL (ref 6.4–8.2)
PROT SERPL-MCNC: 7.6 G/DL (ref 6.4–8.2)
PROT UR STRIP-MCNC: NEGATIVE MG/DL
PROTHROMBIN TIME: 13 SECONDS (ref 11.6–14.5)
RBC # BLD AUTO: 3.35 MILLION/UL (ref 3.81–5.12)
RBC # BLD AUTO: 3.66 MILLION/UL (ref 3.81–5.12)
RBC MORPH BLD: NORMAL
SODIUM SERPL-SCNC: 127 MMOL/L (ref 136–145)
SODIUM SERPL-SCNC: 133 MMOL/L (ref 136–145)
SP GR UR STRIP.AUTO: 1.02 (ref 1–1.03)
TOTAL CELLS COUNTED SPEC: 100
TSH SERPL DL<=0.05 MIU/L-ACNC: 1.49 UIU/ML (ref 0.36–3.74)
UROBILINOGEN UR QL STRIP.AUTO: 0.2 E.U./DL
WBC # BLD AUTO: 16.93 THOUSAND/UL (ref 4.31–10.16)
WBC # BLD AUTO: 17.05 THOUSAND/UL (ref 4.31–10.16)

## 2019-08-14 PROCEDURE — 81003 URINALYSIS AUTO W/O SCOPE: CPT | Performed by: EMERGENCY MEDICINE

## 2019-08-14 PROCEDURE — 85027 COMPLETE CBC AUTOMATED: CPT

## 2019-08-14 PROCEDURE — 80053 COMPREHEN METABOLIC PANEL: CPT | Performed by: EMERGENCY MEDICINE

## 2019-08-14 PROCEDURE — 85025 COMPLETE CBC W/AUTO DIFF WBC: CPT | Performed by: EMERGENCY MEDICINE

## 2019-08-14 PROCEDURE — 84443 ASSAY THYROID STIM HORMONE: CPT | Performed by: EMERGENCY MEDICINE

## 2019-08-14 PROCEDURE — 72125 CT NECK SPINE W/O DYE: CPT

## 2019-08-14 PROCEDURE — 99285 EMERGENCY DEPT VISIT HI MDM: CPT | Performed by: EMERGENCY MEDICINE

## 2019-08-14 PROCEDURE — 87086 URINE CULTURE/COLONY COUNT: CPT | Performed by: NURSE PRACTITIONER

## 2019-08-14 PROCEDURE — 99285 EMERGENCY DEPT VISIT HI MDM: CPT

## 2019-08-14 PROCEDURE — 73030 X-RAY EXAM OF SHOULDER: CPT

## 2019-08-14 PROCEDURE — 71046 X-RAY EXAM CHEST 2 VIEWS: CPT

## 2019-08-14 PROCEDURE — 85610 PROTHROMBIN TIME: CPT

## 2019-08-14 PROCEDURE — 80053 COMPREHEN METABOLIC PANEL: CPT

## 2019-08-14 PROCEDURE — 36415 COLL VENOUS BLD VENIPUNCTURE: CPT

## 2019-08-14 PROCEDURE — 85730 THROMBOPLASTIN TIME PARTIAL: CPT

## 2019-08-14 PROCEDURE — 93005 ELECTROCARDIOGRAM TRACING: CPT

## 2019-08-14 PROCEDURE — 85007 BL SMEAR W/DIFF WBC COUNT: CPT

## 2019-08-14 PROCEDURE — 70450 CT HEAD/BRAIN W/O DYE: CPT

## 2019-08-14 PROCEDURE — 99223 1ST HOSP IP/OBS HIGH 75: CPT | Performed by: NURSE PRACTITIONER

## 2019-08-14 PROCEDURE — 83036 HEMOGLOBIN GLYCOSYLATED A1C: CPT

## 2019-08-14 NOTE — PROGRESS NOTES
PT Discharge    Today's date: 2019  Patient name: Donna Cross  : 1940  MRN: [id number]  Referring provider: Peng Ramirez MD  Dx:   Encounter Diagnosis     ICD-10-CM    1  Abnormality of gait R26 9 PT plan of care cert/re-cert      patient was last seen on 19 with no further contact from patient  Patient to be discharged from formal therapy at this time    HANY

## 2019-08-15 LAB
ANION GAP SERPL CALCULATED.3IONS-SCNC: 12 MMOL/L (ref 4–13)
ATRIAL RATE: 84 BPM
ATRIAL RATE: 84 BPM
BASOPHILS # BLD AUTO: 0.09 THOUSANDS/ΜL (ref 0–0.1)
BASOPHILS NFR BLD AUTO: 1 % (ref 0–1)
BUN SERPL-MCNC: 12 MG/DL (ref 5–25)
CALCIUM SERPL-MCNC: 9.2 MG/DL (ref 8.3–10.1)
CHLORIDE SERPL-SCNC: 97 MMOL/L (ref 100–108)
CO2 SERPL-SCNC: 23 MMOL/L (ref 21–32)
CREAT SERPL-MCNC: 0.73 MG/DL (ref 0.6–1.3)
EOSINOPHIL # BLD AUTO: 0.11 THOUSAND/ΜL (ref 0–0.61)
EOSINOPHIL NFR BLD AUTO: 1 % (ref 0–6)
ERYTHROCYTE [DISTWIDTH] IN BLOOD BY AUTOMATED COUNT: 12.8 % (ref 11.6–15.1)
GFR SERPL CREATININE-BSD FRML MDRD: 79 ML/MIN/1.73SQ M
GLUCOSE SERPL-MCNC: 114 MG/DL (ref 65–140)
HCT VFR BLD AUTO: 33.1 % (ref 34.8–46.1)
HGB BLD-MCNC: 10.7 G/DL (ref 11.5–15.4)
IMM GRANULOCYTES # BLD AUTO: 0.07 THOUSAND/UL (ref 0–0.2)
IMM GRANULOCYTES NFR BLD AUTO: 1 % (ref 0–2)
LYMPHOCYTES # BLD AUTO: 1.48 THOUSANDS/ΜL (ref 0.6–4.47)
LYMPHOCYTES NFR BLD AUTO: 12 % (ref 14–44)
MCH RBC QN AUTO: 31.5 PG (ref 26.8–34.3)
MCHC RBC AUTO-ENTMCNC: 32.3 G/DL (ref 31.4–37.4)
MCV RBC AUTO: 97 FL (ref 82–98)
MONOCYTES # BLD AUTO: 0.98 THOUSAND/ΜL (ref 0.17–1.22)
MONOCYTES NFR BLD AUTO: 8 % (ref 4–12)
NEUTROPHILS # BLD AUTO: 9.49 THOUSANDS/ΜL (ref 1.85–7.62)
NEUTS SEG NFR BLD AUTO: 77 % (ref 43–75)
NRBC BLD AUTO-RTO: 0 /100 WBCS
P AXIS: 69 DEGREES
P AXIS: 76 DEGREES
PLATELET # BLD AUTO: 326 THOUSANDS/UL (ref 149–390)
PMV BLD AUTO: 9.7 FL (ref 8.9–12.7)
POTASSIUM SERPL-SCNC: 3.9 MMOL/L (ref 3.5–5.3)
POTASSIUM SERPL-SCNC: 4.1 MMOL/L (ref 3.5–5.3)
PR INTERVAL: 158 MS
PR INTERVAL: 174 MS
QRS AXIS: 32 DEGREES
QRS AXIS: 70 DEGREES
QRSD INTERVAL: 70 MS
QRSD INTERVAL: 80 MS
QT INTERVAL: 344 MS
QT INTERVAL: 366 MS
QTC INTERVAL: 406 MS
QTC INTERVAL: 432 MS
RBC # BLD AUTO: 3.4 MILLION/UL (ref 3.81–5.12)
SODIUM SERPL-SCNC: 132 MMOL/L (ref 136–145)
T WAVE AXIS: 64 DEGREES
T WAVE AXIS: 67 DEGREES
VENTRICULAR RATE: 84 BPM
VENTRICULAR RATE: 84 BPM
WBC # BLD AUTO: 12.22 THOUSAND/UL (ref 4.31–10.16)

## 2019-08-15 PROCEDURE — 80048 BASIC METABOLIC PNL TOTAL CA: CPT | Performed by: NURSE PRACTITIONER

## 2019-08-15 PROCEDURE — 93010 ELECTROCARDIOGRAM REPORT: CPT | Performed by: INTERNAL MEDICINE

## 2019-08-15 PROCEDURE — 99232 SBSQ HOSP IP/OBS MODERATE 35: CPT | Performed by: INTERNAL MEDICINE

## 2019-08-15 PROCEDURE — 85025 COMPLETE CBC W/AUTO DIFF WBC: CPT | Performed by: NURSE PRACTITIONER

## 2019-08-15 PROCEDURE — 99222 1ST HOSP IP/OBS MODERATE 55: CPT | Performed by: INTERNAL MEDICINE

## 2019-08-15 PROCEDURE — 99222 1ST HOSP IP/OBS MODERATE 55: CPT | Performed by: PHYSICIAN ASSISTANT

## 2019-08-15 PROCEDURE — 84132 ASSAY OF SERUM POTASSIUM: CPT | Performed by: NURSE PRACTITIONER

## 2019-08-15 RX ORDER — SODIUM CHLORIDE 1000 MG
1 TABLET, SOLUBLE MISCELLANEOUS 3 TIMES DAILY
Status: DISCONTINUED | OUTPATIENT
Start: 2019-08-15 | End: 2019-08-20 | Stop reason: HOSPADM

## 2019-08-15 RX ORDER — MELATONIN
1000 DAILY
Status: DISCONTINUED | OUTPATIENT
Start: 2019-08-15 | End: 2019-08-20 | Stop reason: HOSPADM

## 2019-08-15 RX ORDER — LOSARTAN POTASSIUM 50 MG/1
50 TABLET ORAL 2 TIMES DAILY
Status: DISCONTINUED | OUTPATIENT
Start: 2019-08-15 | End: 2019-08-20 | Stop reason: HOSPADM

## 2019-08-15 RX ORDER — ONDANSETRON 2 MG/ML
4 INJECTION INTRAMUSCULAR; INTRAVENOUS EVERY 6 HOURS PRN
Status: DISCONTINUED | OUTPATIENT
Start: 2019-08-15 | End: 2019-08-15 | Stop reason: SDUPTHER

## 2019-08-15 RX ORDER — PANTOPRAZOLE SODIUM 20 MG/1
20 TABLET, DELAYED RELEASE ORAL
Status: DISCONTINUED | OUTPATIENT
Start: 2019-08-15 | End: 2019-08-20 | Stop reason: HOSPADM

## 2019-08-15 RX ORDER — ATORVASTATIN CALCIUM 40 MG/1
40 TABLET, FILM COATED ORAL
Status: DISCONTINUED | OUTPATIENT
Start: 2019-08-15 | End: 2019-08-20 | Stop reason: HOSPADM

## 2019-08-15 RX ORDER — ASPIRIN 81 MG/1
81 TABLET, CHEWABLE ORAL DAILY
Status: DISCONTINUED | OUTPATIENT
Start: 2019-08-15 | End: 2019-08-20 | Stop reason: HOSPADM

## 2019-08-15 RX ORDER — ESCITALOPRAM OXALATE 10 MG/1
10 TABLET ORAL DAILY
Status: DISCONTINUED | OUTPATIENT
Start: 2019-08-15 | End: 2019-08-20 | Stop reason: HOSPADM

## 2019-08-15 RX ORDER — ONDANSETRON 2 MG/ML
4 INJECTION INTRAMUSCULAR; INTRAVENOUS EVERY 6 HOURS PRN
Status: DISCONTINUED | OUTPATIENT
Start: 2019-08-15 | End: 2019-08-20 | Stop reason: HOSPADM

## 2019-08-15 RX ORDER — ACETAMINOPHEN 325 MG/1
650 TABLET ORAL EVERY 6 HOURS PRN
Status: DISCONTINUED | OUTPATIENT
Start: 2019-08-15 | End: 2019-08-20 | Stop reason: HOSPADM

## 2019-08-15 RX ORDER — ACETAMINOPHEN 325 MG/1
650 TABLET ORAL EVERY 6 HOURS PRN
Status: DISCONTINUED | OUTPATIENT
Start: 2019-08-15 | End: 2019-08-15 | Stop reason: SDUPTHER

## 2019-08-15 RX ORDER — ATENOLOL 50 MG/1
50 TABLET ORAL DAILY
Status: DISCONTINUED | OUTPATIENT
Start: 2019-08-15 | End: 2019-08-20 | Stop reason: HOSPADM

## 2019-08-15 RX ADMIN — PANTOPRAZOLE SODIUM 20 MG: 20 TABLET, DELAYED RELEASE ORAL at 06:12

## 2019-08-15 RX ADMIN — ASPIRIN 81 MG 81 MG: 81 TABLET ORAL at 09:12

## 2019-08-15 RX ADMIN — LOSARTAN POTASSIUM 50 MG: 50 TABLET ORAL at 17:12

## 2019-08-15 RX ADMIN — ESCITALOPRAM OXALATE 10 MG: 10 TABLET ORAL at 09:12

## 2019-08-15 RX ADMIN — VITAMIN D, TAB 1000IU (100/BT) 1000 UNITS: 25 TAB at 09:12

## 2019-08-15 RX ADMIN — LOSARTAN POTASSIUM 50 MG: 50 TABLET ORAL at 09:12

## 2019-08-15 RX ADMIN — ATORVASTATIN CALCIUM 40 MG: 40 TABLET, FILM COATED ORAL at 16:11

## 2019-08-15 RX ADMIN — SODIUM CHLORIDE TAB 1 GM 1 G: 1 TAB at 16:11

## 2019-08-15 RX ADMIN — SODIUM CHLORIDE TAB 1 GM 1 G: 1 TAB at 01:01

## 2019-08-15 RX ADMIN — ATENOLOL 50 MG: 50 TABLET ORAL at 17:12

## 2019-08-15 RX ADMIN — SODIUM CHLORIDE TAB 1 GM 1 G: 1 TAB at 09:12

## 2019-08-15 RX ADMIN — ENOXAPARIN SODIUM 40 MG: 40 INJECTION SUBCUTANEOUS at 09:12

## 2019-08-15 RX ADMIN — Medication 1 TABLET: at 09:12

## 2019-08-15 RX ADMIN — SODIUM CHLORIDE TAB 1 GM 1 G: 1 TAB at 20:16

## 2019-08-15 NOTE — ASSESSMENT & PLAN NOTE
WBC 16 9, UA negative  CXR negative for acute cardiopulmonary disease  Afebrile, monitor for s/s infection  Monitor CBC

## 2019-08-15 NOTE — ED ATTENDING ATTESTATION
Daria Obando MD, saw and evaluated the patient  I have discussed the patient with the resident/non-physician practitioner and agree with the resident's/non-physician practitioner's findings, Plan of Care, and MDM as documented in the resident's/non-physician practitioner's note, except where noted  All available labs and Radiology studies were reviewed  I was present for key portions of any procedure(s) performed by the resident/non-physician practitioner and I was immediately available to provide assistance  At this point I agree with the current assessment done in the Emergency Department  I have conducted an independent evaluation of this patient a history and physical is as follows:    75-year-old female presents for evaluation of bilateral shoulder pain after suffering multiple falls  Patient has a history of normal pressure hydrocephalus and is to have a shunt placed next month  Patient states that she is unsteady when she is walks and has had multiple falls  Ten systems reviewed otherwise negative  On exam no distress, HEENT normal, neuro normal, nontender palpation or cervical thoracic and lumbar spines, cardiac normal, abdomen normal, pelvis stable, nontender palpation, extremity exam unremarkable except for bilateral shoulder discomfort,    Medical decision making;-multiple falls-will do CT head, EKG, cardiac workup  Critical Care Time  Procedures

## 2019-08-15 NOTE — UTILIZATION REVIEW
Initial Clinical Review    Admission: Date/Time/Statement: 8/14/19 @ 2207     Orders Placed This Encounter   Procedures    Inpatient Admission (expected length of stay for this patient Order details is greater than two midnights)     Standing Status:   Standing     Number of Occurrences:   1     Order Specific Question:   Admitting Physician     Answer:   Aviva Helton [[id number]]     Order Specific Question:   Level of Care     Answer:   Med Surg [[id number]]     Order Specific Question:   Estimated length of stay     Answer:   More than 2 Midnights     Order Specific Question:   Certification     Answer:   I certify that inpatient services are medically necessary for this patient for a duration of greater than two midnights  See H&P and MD Progress Notes for additional information about the patient's course of treatment  ED Arrival Information     Expected Arrival Acuity Means of Arrival Escorted By Service Admission Type    - 8/14/2019 19:49 Urgent Walk-In Family Member Hospitalist Urgent    Arrival Complaint    Fall-Shoulder pain        Chief Complaint   Patient presents with    Multiple Falls     family reports multiple falls over the past week, hx of hydrocephalus, scheduled for shunt september 25th, family reports pt had unwitnessed fall around 6pm, bruise noted to patient right cheek, pt offers no complaints but per family pt was complaining of right and left shoulder pain and some rib panio     Assessment/Plan:    66  Y O female  Presents to ED from home after a fall the evening of adm  States she  Lost her balance and fell, admits to recurrent  Falls and leg weakness  States she  Hit her face and  L shoulder, ecchymosis noted on  Face and  Shoulder  Pt is confused at baseline, Dtr  states  Pt is scheduled for  Surgery  Next month for  NPH  PMH  Is      CKD  Stage  2,  NPH, chronic  Hyponatremia  And  HTN    ADMIT  IP   MED SURG  LOC  With    Recurrent  Falls, leukocytosis  And plan is  Monitor labs, PT/OT and fall precautions  Per  Nephrology  Consult:     Acute on chronic hyponatremia:  Chronicity likely secondary to SIADH  Specimen was hemolyzed so sNa level likely not accurate  Per record review she is on 1 g sodium chloride tablets 3 times a day and is to follow a less than 1 2 L per day fluid restriction   -baseline sodium 130-133   -presented with serum sodium of 127(likely falsely low), improving today to 132  -continue with 1 2 L per day fluid restriction   -continue on sodium chloride tablets 1 g t i d  -will continue to monitor serum sodium level      CKD stage 2:  -baseline creatinine 0 6-0 9 per record review   -continue to avoid nephrotoxins  -will continue to monitor BMP   -UA:  Negative      Hyperkalemia:  Serum potassium level was 6 0, noted to be moderately hemolyzed  Repeat potassium improved to 4 1   -will continue to monitor serum potassium level  Per  Neuro  Consult:       66year-old with a history of NPH who is scheduled for  shunt next month who presents with worsening imbalance, confusion, incontinence, and frequent falls  [facility] suggests communicating hydrocephalus that is stable compared to prior [facility] completed in July  Suspect symptoms are related to NPH, recommend PT evaluation      Plan:  - Patient is scheduled for right frontal  shunt with Dr Omkar Thrasher on 9/25   - Suggest for primary team to contact Neurosurgery regarding possibly scheduling shunt placement to an earlier date   - PT/OT  - Continue ASA 81mg  - Continue Lipitor 40mg  - Fall precautions  - Patient should continue to follow up outpatient with Dr Donnelly Essex   Patient has an appointment scheduled for 8/22   - Supportive care and medical management as per primary team       ED Triage Vitals [08/14/19 2002]   Temperature Pulse Respirations Blood Pressure SpO2   99 4 °F (37 4 °C) 82 17 160/70 98 %      Temp Source Heart Rate Source Patient Position - Orthostatic VS BP Location FiO2 (%)   Oral Monitor Sitting Right arm --      Pain Score       No Pain        Wt Readings from Last 1 Encounters:   08/14/19 76 kg (167 lb 8 8 oz)     Additional Vital Signs:   /15/19 0728  97 8 °F (36 6 °C)  84  18  153/67  97 %  None (Room air)  Lying   08/14/19 2355  97 5 °F (36 4 °C)  85  18  159/70  95 %       08/14/19 2232    88  18  151/54    None (Room air)  Lying   08/14/19 2002  99 4 °F (37 4 °C)  82  17  160/70  98 %  None (Room air)  Sitting           Pertinent Labs/Diagnostic Test Results:   Results from last 7 days   Lab Units 08/15/19  0449 08/14/19  2029 08/14/19  1031   WBC Thousand/uL 12 22* 16 93* 17 05*   HEMOGLOBIN g/dL 10 7* 10 6* 11 5   HEMATOCRIT % 33 1* 31 5* 35 5   PLATELETS Thousands/uL 326 236 412*   NEUTROS ABS Thousands/µL 9 49* 14 29*  --    BANDS PCT %  --   --  18*         Results from last 7 days   Lab Units 08/15/19  0449 08/15/19  0044 08/14/19 2029 08/14/19  1031   SODIUM mmol/L 132*  --  127* 133*   POTASSIUM mmol/L 3 9 4 1 6 0* 4 9   CHLORIDE mmol/L 97*  --  95* 97*   CO2 mmol/L 23  --  21 25   ANION GAP mmol/L 12  --  11 11   BUN mg/dL 12  --  13 12   CREATININE mg/dL 0 73  --  0 45* 0 73   EGFR ml/min/1 73sq m 79  --  96 79   CALCIUM mg/dL 9 2  --  9 0 9 3     Results from last 7 days   Lab Units 08/14/19 2029 08/14/19  1031   AST U/L 83* 34   ALT U/L 43 41   ALK PHOS U/L 46 53   TOTAL PROTEIN g/dL 7 1 7 6   ALBUMIN g/dL 3 4* 3 7   TOTAL BILIRUBIN mg/dL 0 54 0 48         Results from last 7 days   Lab Units 08/15/19  0449 08/14/19  2029   GLUCOSE RANDOM mg/dL 114 96         Results from last 7 days   Lab Units 08/14/19  1031   HEMOGLOBIN A1C % 6 7*   EAG mg/dl 146               Results from last 7 days   Lab Units 08/14/19  1031   PROTIME seconds 13 0   INR  0 97   PTT seconds 23     Results from last 7 days   Lab Units 08/14/19 2029   TSH 3RD GENERATON uIU/mL 1 492           Results from last 7 days   Lab Units 08/14/19 2050 08/14/19  1031   CLARITY UA  Slightly Cloudy Slightly Cloudy   COLOR UA Yellow Yellow   SPEC GRAV UA  1 020 1 010   PH UA  7 5 7 0   GLUCOSE UA mg/dl Negative Negative   KETONES UA mg/dl Negative Negative   BLOOD UA  Negative Small*   PROTEIN UA mg/dl Negative Negative   NITRITE UA  Negative Negative   BILIRUBIN UA  Negative Negative   UROBILINOGEN UA E U /dl 0 2 0 2   LEUKOCYTES UA  Negative Trace*   WBC UA /hpf  --  4-10*   RBC UA /hpf  --  None Seen   BACTERIA UA /hpf  --  None Seen   EPITHELIAL CELLS WET PREP /hpf  --  Occasional           Results from last 7 days   Lab Units 08/14/19  1031   TOTAL COUNTED  100       CXR:  No  Acute  Abnormality  X ray  R  Shoulder:    No Fracture  Ct head:      Ventricular prominence out of proportion sulcal prominence suggesting some degree of communicating hydrocephalus  Findings are unchanged from the prior study        Ct  C/spine:  No fracture    Present on Admission:   Chronic hyponatremia   Hyperkalemia   Leukocytosis   Benign essential hypertension   Stage 2 chronic kidney disease   Normal pressure hydrocephalus      Admitting Diagnosis: Chronic hyponatremia [E87 1]  NPH (normal pressure hydrocephalus) [G91 2]  Shoulder pain [M25 519]  Ambulatory dysfunction [R26 2]  Age/Sex: 66 y o  female  Admission Orders:    Current Facility-Administered Medications:  acetaminophen 650 mg Oral Q6H PRN   aspirin 81 mg Oral Daily   atenolol 50 mg Oral Daily   atorvastatin 40 mg Oral Daily With Dinner   cholecalciferol 1,000 Units Oral Daily   enoxaparin 40 mg Subcutaneous Daily   escitalopram 10 mg Oral Daily   losartan 50 mg Oral BID   multivitamin-minerals 1 tablet Oral Daily   ondansetron 4 mg Intravenous Q6H PRN   pantoprazole 20 mg Oral Early Morning   sodium chloride 1 g Oral TID       IP CONSULT TO CASE MANAGEMENT  IP CONSULT TO NEUROLOGY  IP CONSULT TO CASE MANAGEMENT  IP CONSULT TO NEPHROLOGY     FR    Network Utilization Review Department  Phone: [phone]; Fax [phone]  Baljinder@"[email or web address]  ATTENTION: Please call with any questions or concerns to [phone]  and carefully listen to the prompts so that you are directed to the right person  Send all requests for admission clinical reviews, approved or denied determinations and any other requests to fax [phone]   All voicemails are confidential

## 2019-08-15 NOTE — CONSULTS
Consultation - Neurology   Louis Meraz 66 y o  female MRN: [id number]  Unit/Bed#: E5 -01 Encounter: [id number]      Assessment/Plan   Assessment:  3 66year-old with a history of NPH who is scheduled for  shunt next month who presents with worsening imbalance, confusion, incontinence, and frequent falls  [address] suggests communicating hydrocephalus that is stable compared to prior [address] completed in July  Suspect symptoms are related to NPH, recommend PT evaluation  Plan:  - Patient is scheduled for right frontal  shunt with Dr Shannon Carey on 9/25   - Suggest for primary team to contact Neurosurgery regarding possibly scheduling shunt placement to an earlier date   - PT/OT  - Continue ASA 81mg  - Continue Lipitor 40mg  - Fall precautions  - Patient should continue to follow up outpatient with Dr Verneice Goltz  Patient has an appointment scheduled for 8/22   - Supportive care and medical management as per primary team    Results:  1  CT head (8/14): Ventricular prominence out of proportion sulcal prominence suggesting some degree of communicating hydrocephalus  Findings are unchanged from the prior study  2  CT head: (7/22): Ventricular prominence out of proportion to sulcal prominence suggests some degree of communicating hydrocephalus  No acute intracranial abnormality  History of Present Illness     Reason for Consult / Principal Problem: NPH  Hx and PE limited by: None  HPI: Louis Meraz is a 66 y o   female with a PMH of normal pressure hydrocephalus, HLD, HTN, hypothyroidism, and CAD who presented to [facility] on 8/14 for multiple falls  Patient underwent an unwitnessed fall around 6:00pm on 8/14 and sustained a bruise on her right cheek and bilateral shoulder and rib pain  No LOC  Family report increased falls, imbalance, and worsening confusion and incontinence recently  Patient lives alone and uses a walker at home and a wheelchair when she goes places  She is a patient at the [place] [address] [facility] clinic   Patient underwent a low volume spinal tap without significant objective improvement in gait and cognition, however, family reported that they believe her gait was more steady and that she was more alert for several days following the procedure  Patient also sees outpatient Neurologist, Dr Verneice Goltz  Her last appointment with Dr Verneice Goltz was on   Patient is scheduled to undergo right frontal  shunt with Dr Shannon Carey on   Today, patient is alert and answers questions appropriately  Patient reports that she is doing okay  She denies chest pain, shortness of breath, abdominal pain, and numbness  Family report that patient has chronic left sided weakness  Inpatient consult to Neurology  Consult performed by: Jimmy Cantu PA-C  Consult ordered by: BEBETO Blunt        ROS:  A 12 point ROS was completed  Other than the above mentioned complaints in the HPI, all remaining systems were negative      Historical Information   Past Medical History:   Diagnosis Date    Arthritis     Confusion 10/2/2018    Depression     Displaced fracture of distal phalanx of right thumb     GERD (gastroesophageal reflux disease)     [facility] [address] [facility]     Hyperlipidemia     Hypertension     Moderate episode of recurrent major depressive disorder ([facility] [address] ) 2019    Shortness of breath     Stage 2 chronic kidney disease 2019     Past Surgical History:   Procedure Laterality Date    APPENDECTOMY      CATARACT EXTRACTION      COLONOSCOPY      FL LUMBAR PUNCTURE  1/10/2019    SEPTOPLASTY      WRIST FRACTURE SURGERY Right      Social History   Social History     Substance and Sexual Activity   Alcohol Use Yes    Comment: social     Social History     Substance and Sexual Activity   Drug Use No     Social History     Tobacco Use   Smoking Status Former Smoker    Last attempt to quit: Inocencia Vargas Years since quittin 6   Smokeless Tobacco Never Used   Tobacco Comment    no secondhand smoke exposure     Family History:   Family History   Problem Relation Age of Onset    Heart attack Mother 39    Heart attack Father 61    No Known Problems Other     Breast cancer Sister     Alcohol abuse Daughter         history of    Heart attack Brother        Review of previous medical records was completed  Meds/Allergies   current meds:   Current Facility-Administered Medications   Medication Dose Route Frequency    acetaminophen (TYLENOL) tablet 650 mg  650 mg Oral Q6H PRN    acetaminophen (TYLENOL) tablet 650 mg  650 mg Oral Q6H PRN    aspirin chewable tablet 81 mg  81 mg Oral Daily    atenolol (TENORMIN) tablet 50 mg  50 mg Oral Daily    atorvastatin (LIPITOR) tablet 40 mg  40 mg Oral Daily With Dinner    cholecalciferol (VITAMIN D3) tablet 1,000 Units  1,000 Units Oral Daily    enoxaparin (LOVENOX) subcutaneous injection 40 mg  40 mg Subcutaneous Daily    enoxaparin (LOVENOX) subcutaneous injection 40 mg  40 mg Subcutaneous Daily    escitalopram (LEXAPRO) tablet 10 mg  10 mg Oral Daily    losartan (COZAAR) tablet 50 mg  50 mg Oral BID    multivitamin-minerals (CENTRUM) tablet 1 tablet  1 tablet Oral Daily    ondansetron (ZOFRAN) injection 4 mg  4 mg Intravenous Q6H PRN    ondansetron (ZOFRAN) injection 4 mg  4 mg Intravenous Q6H PRN    pantoprazole (PROTONIX) EC tablet 20 mg  20 mg Oral Early Morning    sodium chloride tablet 1 g  1 g Oral TID    and PTA meds:   Prior to Admission Medications   Prescriptions Last Dose Informant Patient Reported? Taking? Vitamin D, Cholecalciferol, 1000 units CAPS  Self Yes No   Sig: Take 1 tablet by mouth   aspirin 81 MG tablet  Self Yes No   Sig: Take 1 tablet by mouth daily   atenolol (TENORMIN) 50 mg tablet  Self No No   Sig: TAKE ONE TABLET BY MOUTH DAILY   atorvastatin (LIPITOR) 40 mg tablet  Self No No   Sig: TAKE ONE TABLET BY MOUTH EVERY DAY    OFFICE VISIT NEEDED   escitalopram (LEXAPRO) 10 mg tablet   No No   Sig: TAKE ONE TABLET BY MOUTH ONCE DAILY   losartan (COZAAR) 50 mg tablet  Self No No   Sig: TAKE ONE TABLET BY MOUTH TWO TIMES A DAY FOR 30 DAYS   multivitamin (THERAGRAN) TABS  Self Yes No   Sig: Take 1 tablet by mouth   omeprazole (PriLOSEC) 20 mg delayed release capsule   Yes No   Sig: Take 20 mg by mouth daily    sodium chloride 1 g tablet  Self No No   Sig: Take 1 tablet (1 g total) by mouth 3 (three) times a day      Facility-Administered Medications: None       No Known Allergies    Objective   Vitals:Blood pressure 153/67, pulse 84, temperature 97 8 °F (36 6 °C), temperature source Tympanic, resp  rate 18, height 5' 4" (1 626 m), weight 76 kg (167 lb 8 8 oz), SpO2 97 %, not currently breastfeeding  ,Body mass index is 28 76 kg/m²  Intake/Output Summary (Last 24 hours) at 8/15/2019 0849  Last data filed at 8/15/2019 0401  Gross per 24 hour   Intake 630 ml   Output    Net 630 ml       Invasive Devices: Invasive Devices     Peripheral Intravenous Line            Peripheral IV 08/14/19 Left Arm less than 1 day              Physical Exam   Constitutional: She is oriented to person, place, and time  No distress  Elderly female resting comfortably in bedside chair  HENT:   Head: Normocephalic and atraumatic  Right Ear: External ear normal    Left Ear: External ear normal    Nose: Nose normal    Mouth/Throat: Oropharynx is clear and moist    Eyes: Pupils are equal, round, and reactive to light  EOM are normal    Cardiovascular: Normal rate and normal heart sounds  Pulmonary/Chest: Effort normal  No respiratory distress  Neurological: She is alert and oriented to person, place, and time  She has a normal Finger-Nose-Finger Test    Skin: Skin is warm and dry  She is not diaphoretic  Neurologic Exam     Mental Status   Oriented to person, place, and time     Patient is alert and oriented to person, place, town, and date  Patient is able to state the names of the family members in the room  Patient is able to state how many children she has and who is the oldest  Patient is unable to state the president's name  Patient is able to name simple objects  Patient follows all commands appropriately     Cranial Nerves     CN III, IV, VI   Pupils are equal, round, and reactive to light  Extraocular motions are normal      CN V   Facial sensation intact  CN VII   Facial expression full, symmetric  CN VIII   Hearing: intact    CN XII   CN XII normal      Motor Exam   Muscle bulk: normal  Overall muscle tone: normal  Right arm pronator drift: absent  Left arm pronator drift: present    Strength   Strength 5/5 except as noted  LUE strength: 4/5       Sensory Exam   Light touch normal    Vibration normal    Temperature sensation intact     Gait, Coordination, and Reflexes     Coordination   Finger to nose coordination: normal    Tremor   Resting tremor: absent    Reflexes   Right ankle clonus: absent  Left ankle clonus: absent  Trace reflexes throughout     Lab Results: I have personally reviewed pertinent reports  Imaging Studies: I have personally reviewed pertinent reports  and I have personally reviewed pertinent films in PACS  EKG, Pathology, and Other Studies: I have personally reviewed pertinent reports      VTE Prophylaxis: Enoxaparin (Lovenox)    Code Status: Level 1 - Full Code  Advance Directive and Living Will:      Power of :    POLST:

## 2019-08-15 NOTE — ASSESSMENT & PLAN NOTE
Reports increase in frequency of falls; known history of ambulatory dysfunction, on outpatient PT  CT spine negative for fracture or malalignment  PT OT consult  Case management consult, may need higher level care  Fall precautions

## 2019-08-15 NOTE — CASE MANAGEMENT
CM met with patient, daughter and sister to assess and address discharge needs  Patient deferred assessment questions to be answered by daughter and sister  Primary caregiver-daughter Geraldine Coronado and sister  Advance Directive-Living Will-copy requested for chart from daughter [address]- Unclear if there is an official POA, papers are with kevin Ng and request was made by other daughterEmma to bring in for patient's medical record   Pharmacy-[facility] [address]  PCP- Yuan Staff, PAEberC  Financial Concerns-none identified  Mental Health/Drug/ETOH- Denies MH or D&A abuse history per patient's daughter  Prior Living Arrangements/ADL's/Mobility/Home Set-Up- Lives alone in bilevel home; front of house has 7 steps with no railing and not used by patient, enters through garage with 7 steps then landing and 6 steps to top floor with railing on both sides of steps, none at landing  Patient is independent to bathe, toilet and feed self but daughter Emma Marino does all shopping, cleaning, transportation and other daughterDaniela does bills  Patient's daughterEmma comes daily from 9am -1 or 2pm and other family comes when able during the evenings  Patient uses a rollator to ambulate and a wheelchair outside of home  Prior Services for HHC/DME/Infusion/Private-Patient's family denies previous STR placement  Patient previously used [facility] and ambulatory PT at [address]  DME in home: , [facility], shower chair, wheelchair, and rollator  CM discussed discharge goals with patient and family  Patient for discharge to STR when medically cleared  CM provided choices to patient's daughterEmma and will place referrals when choices given  CM asked if family wanted to transport patient to STR or to use [facility] and daughter stated that she would transport patient at time of discharge  At this time there are no other identified discharge needs   CM department will continue to follow through discharge  CM reviewed d/c planning process including the following: identifying help at home, patient preference for d/c planning needs, availability of treatment team to discuss questions or concerns patient and/or family may have regarding understanding medications and recognizing signs and symptoms once discharged  CM also encouraged patient to follow up with all recommended appointments after discharge  Patient advised of importance for patient and family to participate in managing patient's medical well being

## 2019-08-15 NOTE — ASSESSMENT & PLAN NOTE
Hyponatremia secondary to SIADH  Na 127; chronically low    As per Outpatient Nephrology, baseline 130-133, if continues to drop will add Lasix 10 mg daily  On NaCl 1g tablet t i d , continue  Continue fluid restriction 1200ml/d  Monitor BMP  Nephrology consult

## 2019-08-15 NOTE — ASSESSMENT & PLAN NOTE
Followed outpatient by Neurosurgery, scheduled for right frontal  shunt insertion next month  CT head: Ventricular prominence out of proportion sulcal prominence suggesting some degree of communicating hydrocephalus; findings are unchanged from prior study    Neurology consult

## 2019-08-15 NOTE — CONSULTS
Consultation - Nephrology   Dewey Alvarez 66 y o  female MRN: [id number]  Unit/Bed#: E5 -01 Encounter: [id number]    ASSESSMENT/PLAN:  Acute on chronic hyponatremia:  Chronicity likely secondary to SIADH  Specimen was hemolyzed so sNa level likely not accurate  Per record review she is on 1 g sodium chloride tablets 3 times a day and is to follow a less than 1 2 L per day fluid restriction   -baseline sodium 130-133   -presented with serum sodium of 127(likely falsely low), improving today to 132  -continue with 1 2 L per day fluid restriction   -continue on sodium chloride tablets 1 g t i d  -will continue to monitor serum sodium level  CKD stage 2:  -baseline creatinine 0 6-0 9 per record review   -continue to avoid nephrotoxins  -will continue to monitor BMP   -UA:  Negative  Hyperkalemia:  Serum potassium level was 6 0, noted to be moderately hemolyzed  Repeat potassium improved to 4 1   -will continue to monitor serum potassium level  Hypertension:  Blood pressure is slightly above goal   -continue on atenolol 50 mg daily, losartan 50 mg b i d  Larayne Chroman - continue to monitor, may need adjustment in BP medication for continued elevation  Recurrent falls: R/T NPH  Plan for safe discharge to facility versus home alone    - PT/OT following   - case management for placement needs  Normal pressure hydrocephalus:  neurosurgery following    - planned for shunt placement in September  HISTORY OF PRESENT ILLNESS:  Requesting Physician: Ariel Paredes DO  Reason for Consult: Acute on chronic hyopnatremia    Dewey Alvarez is a 66y o  year old female with past medical history of hyponatremia, CKD stage 2, hypertension, hyper kalemia, and normal pressure hydrocephalus, who was admitted to [facility] after presenting S/P fall  She does have a history of frequent falls related 10 NPH  A renal consultation is requested today for assistance in the management of acute on chronic hyponatremia    Per record review, the patient follows in our office with Dr Maddy Alfonso   Her chronic hyponatremia secondary to SIADH      PAST MEDICAL HISTORY:  Past Medical History:   Diagnosis Date    Arthritis     Confusion 10/2/2018    Depression     Displaced fracture of distal phalanx of right thumb     GERD (gastroesophageal reflux disease)     [facility] [address] [facility]     Hyperlipidemia     Hypertension     Moderate episode of recurrent major depressive disorder ([facility] [address] ) 2019    Shortness of breath     Stage 2 chronic kidney disease 2019       PAST SURGICAL HISTORY:  Past Surgical History:   Procedure Laterality Date    APPENDECTOMY      CATARACT EXTRACTION      COLONOSCOPY      FL LUMBAR PUNCTURE  1/10/2019    SEPTOPLASTY      WRIST FRACTURE SURGERY Right        ALLERGIES:  No Known Allergies    SOCIAL HISTORY:  Social History     Substance and Sexual Activity   Alcohol Use Yes    Comment: social     Social History     Substance and Sexual Activity   Drug Use No     Social History     Tobacco Use   Smoking Status Former Smoker    Last attempt to quit: Yaquelin Captuo Years since quittin 6   Smokeless Tobacco Never Used   Tobacco Comment    no secondhand smoke exposure       FAMILY HISTORY:  Family History   Problem Relation Age of Onset    Heart attack Mother 39    Heart attack Father 61    No Known Problems Other     Breast cancer Sister     Alcohol abuse Daughter         history of    Heart attack Brother        MEDICATIONS:    Current Facility-Administered Medications:     acetaminophen (TYLENOL) tablet 650 mg, 650 mg, Oral, Q6H PRN, BEBETO Grimes    acetaminophen (TYLENOL) tablet 650 mg, 650 mg, Oral, Q6H PRN, BEBETO Grimes    aspirin chewable tablet 81 mg, 81 mg, Oral, Daily, BEBETO Grimes, 81 mg at 08/15/19 0912    atenolol (TENORMIN) tablet 50 mg, 50 mg, Oral, Daily, BEBETO Grimes    atorvastatin (LIPITOR) tablet 40 mg, 40 mg, Oral, Daily With Dinner, Cynthia Schwalbe, CRNP    cholecalciferol (VITAMIN D3) tablet 1,000 Units, 1,000 Units, Oral, Daily, Cynthia Schwalbe, CRNP, 1,000 Units at 08/15/19 0912    enoxaparin (LOVENOX) subcutaneous injection 40 mg, 40 mg, Subcutaneous, Daily, Cynthia Schwalbe, CRNP, 40 mg at 08/15/19 0912    escitalopram (LEXAPRO) tablet 10 mg, 10 mg, Oral, Daily, Cynthia Schwalbe, CRNP, 10 mg at 08/15/19 0912    losartan (COZAAR) tablet 50 mg, 50 mg, Oral, BID, Cynthia Schwalbe, CRNP, 50 mg at 08/15/19 0912    multivitamin-minerals (CENTRUM) tablet 1 tablet, 1 tablet, Oral, Daily, Cynthia Schwalbe, CRNP, 1 tablet at 08/15/19 0912    ondansetron (ZOFRAN) injection 4 mg, 4 mg, Intravenous, Q6H PRN, Cynthia Schwalbe, CRNP    ondansetron [facility]) injection 4 mg, 4 mg, Intravenous, Q6H PRN, Cynthia Schwalbe, CRNP    pantoprazole (PROTONIX) EC tablet 20 mg, 20 mg, Oral, Early Morning, Cynthia Schwalbe, CRNP, 20 mg at 08/15/19 3486    sodium chloride tablet 1 g, 1 g, Oral, TID, Cynthia Schwalbe, CRNP, 1 g at 08/15/19 8458    REVIEW OF SYSTEMS:  A complete review of systems was performed and found to be negative unless otherwise noted in the history of present illness  General: No fevers, chills  Cardiovascular:  - chest pain, - leg edema  Respiratory: No cough, sputum production,  - shortness of breath  Gastrointestinal:  - nausea/vomiting,  - diarrhea,  - abdominal pain  Genitourinary: No hematuria  No foamy urine    No dysuria    PHYSICAL EXAM:  Current Weight: Weight - Scale: 76 kg (167 lb 8 8 oz)  First Weight: Weight - Scale: 76 kg (167 lb 8 8 oz)  Vitals:    08/14/19 2002 08/14/19 2232 08/14/19 2355 08/15/19 0728   BP: 160/70 151/54 159/70 153/67   BP Location: Right arm Right arm  Right arm   Pulse: 82 88 85 84   Resp: 17 18 18 18   Temp: 99 4 °F (37 4 °C)  97 5 °F (36 4 °C) 97 8 °F (36 6 °C)   TempSrc: Oral  Temporal Tympanic   SpO2: 98%  95% 97%   Weight:   76 kg (167 lb 8 8 oz)    Height:   5' 4" (1 626 m)        Intake/Output Summary (Last 24 hours) at 8/15/2019 0922  Last data filed at 8/15/2019 0401  Gross per 24 hour   Intake 630 ml   Output    Net 630 ml     General: NAD  Skin: warm, dry, intact, no rash  HEENT: Moist mucous membranes, sclera anicteric, normocephalic, atraumatic  Neck: No apparent JVD appreciated  Chest:lung sounds clear B/L, on RA   CVS:Regular rate and rhythm, no murmer   Abdomen: Soft, round, non-tender,   Extremities: No B/L LE edema present  Neuro: alert, forgetful   Psych: appropriate mood and affect     Invasive Devices:      Lab Results:   Results from last 7 days   Lab Units 08/15/19  0449 08/15/19  0044 08/14/19 2029 08/14/19  1031   WBC Thousand/uL 12 22*  --  16 93* 17 05*   HEMOGLOBIN g/dL 10 7*  --  10 6* 11 5   HEMATOCRIT % 33 1*  --  31 5* 35 5   PLATELETS Thousands/uL 326  --  236 412*   POTASSIUM mmol/L 3 9 4 1 6 0* 4 9   CHLORIDE mmol/L 97*  --  95* 97*   CO2 mmol/L 23  --  21 25   BUN mg/dL 12  --  13 12   CREATININE mg/dL 0 73  --  0 45* 0 73   CALCIUM mg/dL 9 2  --  9 0 9 3   ALK PHOS U/L  --   --  46 53   ALT U/L  --   --  43 41   AST U/L  --   --  83* 34

## 2019-08-15 NOTE — CASE MANAGEMENT
CM met with patient's daughter, Clare Griffin who provided copy of Living Will and durable POA for finances and healthcare decisions-Bhavna is Healthcare Agent and sister, SARA Ward is first alternate healthcare agent  CM placed copy of documents with stickers to be scanned into EMR  CM provided a refined list of choices for STR from [facility] based on patient location and insurance  Patient's daughter will discuss with her sister and CM will follow up tomorrow for choices  CM department will continue to follow through discharge

## 2019-08-15 NOTE — H&P
H&P- Salvador Mc 1940, 66 y o  female MRN: [id number]    Unit/Bed#: E5 -01 Encounter: [id number]    Primary Care Provider: Eloise Galeana PA-C   Date and time admitted to hospital: 8/14/2019  8:01 PM      * Recurrent falls  Assessment & Plan  Reports increase in frequency of falls; known history of ambulatory dysfunction, on outpatient PT  CT spine negative for fracture or malalignment  PT OT consult  Case management consult, may need higher level care  Fall precautions    Leukocytosis  Assessment & Plan  WBC 16 9, UA negative  CXR negative for acute cardiopulmonary disease  Afebrile, monitor for s/s infection  Monitor CBC    Stage 2 chronic kidney disease  Assessment & Plan  Creatinine at baseline  Avoid NSAIDs and nephrotoxins  Monitor BMP    Normal pressure hydrocephalus  Assessment & Plan  Followed outpatient by Neurosurgery, scheduled for right frontal  shunt insertion next month  CT head: Ventricular prominence out of proportion sulcal prominence suggesting some degree of communicating hydrocephalus; findings are unchanged from prior study  Neurology consult    Chronic hyponatremia  Assessment & Plan  Hyponatremia secondary to SIADH  Na 127; chronically low  As per Outpatient Nephrology, baseline 130-133, if continues to drop will add Lasix 10 mg daily  On NaCl 1g tablet t i d , continue  Continue fluid restriction 1200ml/d  Monitor BMP  Nephrology consult    Hyperkalemia  Assessment & Plan  K 6 0, moderately hemolyzed, will repeat serum K    Benign essential hypertension  Assessment & Plan  Continue atenolol and losartan    VTE Prophylaxis: Enoxaparin (Lovenox)  / reason for no mechanical VTE prophylaxis ac   Code Status: FC  POLST: POLST is not applicable to this patient  Discussion with family:  2 daughters and sister at bedside    Anticipated Length of Stay:  Patient will be admitted on an Inpatient basis with an anticipated length of stay of  > 2 midnights     Justification for Hospital Stay:  Recurrent falls    Total Time for Visit, including Counseling / Coordination of Care: 1 hour  Greater than 50% of this total time spent on direct patient counseling and coordination of care  Chief Complaint:   Fall    History of Present Illness:    Isha Manuel is a 66 y o  female who presents with c/o fall, states she lost balance and fell this evening; reports recurrent falls and leg weakness  Patient A&Ox3 person/place/year only, has baseline confusion, history obtained with the help of her daughter who states she has recurrent falls due to NPH and is scheduled for surgery next month; states her mother was participating in outpatient PT although released due to lack of improvement  Lives alone and ambulates with walker, uses wheelchair for long distance  Patient reports hitting her face and left shoulder, has ecchymosis on left cheek and left shoulder, denies headache, pain with movement or decreased ROM  Patient denies fever, abdominal pain, NVCD or dysuria  Review of Systems:    Review of Systems   Constitutional: Negative  HENT: Negative  Respiratory: Positive for cough  Negative for shortness of breath  SAENZ   Cardiovascular: Negative  Gastrointestinal: Negative  Genitourinary: Negative  Musculoskeletal: Positive for gait problem  Neurological: Positive for weakness  Psychiatric/Behavioral: Negative          Past Medical and Surgical History:     Past Medical History:   Diagnosis Date    Arthritis     Confusion 10/2/2018    Depression     Displaced fracture of distal phalanx of right thumb     GERD (gastroesophageal reflux disease)     Heart attack ([address] )     Hyperlipidemia     Hypertension     Moderate episode of recurrent major depressive disorder ([facility] [address] ) 4/12/2019    Shortness of breath     Stage 2 chronic kidney disease 7/17/2019       Past Surgical History:   Procedure Laterality Date    APPENDECTOMY      CATARACT EXTRACTION      COLONOSCOPY  FL LUMBAR PUNCTURE  1/10/2019    SEPTOPLASTY      WRIST FRACTURE SURGERY Right        Meds/Allergies:    Prior to Admission medications    Medication Sig Start Date End Date Taking? Authorizing Provider   aspirin 81 MG tablet Take 1 tablet by mouth daily 14   Historical Provider, MD   atenolol (TENORMIN) 50 mg tablet TAKE ONE TABLET BY MOUTH DAILY 19   Nathaly Coto MD   atorvastatin (LIPITOR) 40 mg tablet TAKE ONE TABLET BY MOUTH EVERY DAY   OFFICE VISIT NEEDED 18   Nathaly Coto MD   escitalopram (LEXAPRO) 10 mg tablet TAKE ONE TABLET BY MOUTH ONCE DAILY 19   Jin Gaxiola PA-C   losartan (COZAAR) 50 mg tablet TAKE ONE TABLET BY MOUTH TWO TIMES A DAY FOR 30 DAYS 19   Nathaly Coto MD   multivitamin [facility]) TABS Take 1 tablet by mouth    Historical Provider, MD   omeprazole (PriLOSEC) 20 mg delayed release capsule Take 20 mg by mouth daily  19   Historical Provider, MD   sodium chloride 1 g tablet Take 1 tablet (1 g total) by mouth 3 (three) times a day 19   Jamie Lopez MD   Vitamin D, Cholecalciferol, 1000 units CAPS Take 1 tablet by mouth    Historical Provider, MD     I have reviewed home medications with patient family member  Allergies: No Known Allergies    Social History:     Marital Status:     Occupation:  Retired  Patient Pre-hospital Living Situation:  Resides at home alone Patient Pre-hospital Level of Mobility: Marrianne Gaudy or wheelchair  Patient Pre-hospital Diet Restrictions:   Substance Use History:   Social History     Substance and Sexual Activity   Alcohol Use Yes    Comment: social     Social History     Tobacco Use   Smoking Status Former Smoker    Last attempt to quit: [facility] Years since quittin 6   Smokeless Tobacco Never Used   Tobacco Comment    no secondhand smoke exposure     Social History     Substance and Sexual Activity   Drug Use No       Family History:    Family History   Problem Relation Age of Onset    Heart attack Mother 39    Heart attack Father 61    No Known Problems Other     Breast cancer Sister     Alcohol abuse Daughter         history of    Heart attack Brother        Physical Exam:     Vitals:   Blood Pressure: 159/70 (08/14/19 2355)  Pulse: 85 (08/14/19 2355)  Temperature: 97 5 °F (36 4 °C) (08/14/19 2355)  Temp Source: Oral (08/14/19 2002)  Respirations: 18 (08/14/19 2355)  SpO2: 95 % (08/14/19 2355)    Physical Exam   Constitutional: She is oriented to person, place, and time  She appears well-developed  No distress  Eyes: No scleral icterus  Neck: Neck supple  Cardiovascular: Normal rate, regular rhythm, normal heart sounds and intact distal pulses  No murmur heard  Pulmonary/Chest: Effort normal and breath sounds normal  No stridor  No respiratory distress  She has no wheezes  She has no rales  Abdominal: Soft  Bowel sounds are normal  She exhibits no distension and no mass  There is no tenderness  There is no guarding  Musculoskeletal: She exhibits no edema  Neurological: She is alert and oriented to person, place, and time  Baseline confusion   Skin: Skin is warm and dry  She is not diaphoretic  Psychiatric: Her behavior is normal    Confused     Additional Data:     Lab Results: I have personally reviewed pertinent reports        Results from last 7 days   Lab Units 08/14/19 2029 08/14/19  1031   WBC Thousand/uL 16 93* 17 05*   HEMOGLOBIN g/dL 10 6* 11 5   HEMATOCRIT % 31 5* 35 5   PLATELETS Thousands/uL 236 412*   BANDS PCT %  --  18*   NEUTROS PCT % 83*  --    LYMPHS PCT % 8*  --    LYMPHO PCT %  --  11*   MONOS PCT % 6  --    MONO PCT %  --  1*   EOS PCT % 1 0     Results from last 7 days   Lab Units 08/14/19 2029   SODIUM mmol/L 127*   POTASSIUM mmol/L 6 0*   CHLORIDE mmol/L 95*   CO2 mmol/L 21   BUN mg/dL 13   CREATININE mg/dL 0 45*   ANION GAP mmol/L 11   CALCIUM mg/dL 9 0   ALBUMIN g/dL 3 4*   TOTAL BILIRUBIN mg/dL 0 54   ALK PHOS U/L 46   ALT U/L 43   AST U/L 83*   GLUCOSE RANDOM mg/dL 96     Results from last 7 days   Lab Units 08/14/19  1031   INR  0 97         Results from last 7 days   Lab Units 08/14/19  1031   HEMOGLOBIN A1C % 6 7*           Imaging: I have personally reviewed pertinent reports  XR chest 2 views   ED Interpretation by Nicole Moreno MD (08/14 2316)   Primary reviewed: no acute abnormality      XR shoulder 2+ views RIGHT   ED Interpretation by Nicole Moreno MD (08/14 2316)   Primary reviewed: no acute abnormality      CT head without contrast   Final Result by La Parker DO (08/14 2123)      Ventricular prominence out of proportion sulcal prominence suggesting some degree of communicating hydrocephalus  Findings are unchanged from the prior study  Workstation performed: [id number]         CT cervical spine without contrast   Final Result by La Parker DO (08/14 2126)      No cervical spine fracture or traumatic malalignment  Degenerative changes are noted  Workstation performed: [id number]             EKG, Pathology, and Other Studies Reviewed on Admission:   · CT    Allscripts / Epic Records Reviewed: Yes     ** Please Note: This note has been constructed using a voice recognition system   **

## 2019-08-15 NOTE — ED PROVIDER NOTES
ASSESSMENT AND PLAN    Garnet Dance is a 66 y o  female with a history of normal pressure hydrocephalus who presents for evaluation of increased frequency of falls, increased confusion  On arrival, the patient is hemodynamically stable and well-appearing without acute distress, with a nontoxic appearance  On exam , the patient has no signs of external trauma  She has a nonfocal neurologic exam   The physical exam is otherwise unremarkable   -Labwork  largely unremarkable  -CT of the head unchanged  -discussed with neurosurgeon on-call, Dr Rendon Deem  He will review the patient's imaging studies, and decide at that point whether not the patient should have her shunt placement scheduled sooner  States that there is no need for emergent operative intervention given the patient's condition  -will admit to the medicine team for further management  I discussed this case in detail with the admitting team     History  Chief Complaint   Patient presents with    Multiple Falls     family reports multiple falls over the past week, hx of hydrocephalus, scheduled for shunt september 25th, family reports pt had unwitnessed fall around 6pm, bruise noted to patient right cheek, pt offers no complaints but per family pt was complaining of right and left shoulder pain and some rib panio     HPI this is a 60-year-old female, with history of normal pressure hydrocephalus for which she has scheduled on 09/25 for shunt placement with Dr Angela White  She also has a history of chronic aspirin use, hyperlipidemia, hypertension, hypothyroidism, and CAD  The patient reports with multiple falls  Per family, the patient has had increased unsteadiness, increased confusion, and increased falls compared to her normal frequency  They state that she has fallen several times this week, and has become more of a hand full due to her increased confusion    They state specifically they are unable to care for her any further due to her the severity of her symptoms  She currently lives alone  The patient states that when she fell, she struck the front of her head, and face  She did not lose consciousness  The patient states that she feels very unsteady when she walks  She denies any fevers, chills, chest pain, shortness of breath, nausea vomiting or diarrhea or abdominal pain, dysuria, urinary frequency, urinary urgency  She does have right-sided shoulder pain, as well as left-sided shoulder ecchymosis, but no other musculoskeletal pain/symptoms elsewhere  Prior to Admission Medications   Prescriptions Last Dose Informant Patient Reported? Taking? Vitamin D, Cholecalciferol, 1000 units CAPS  Self Yes No   Sig: Take 1 tablet by mouth   aspirin 81 MG tablet  Self Yes No   Sig: Take 1 tablet by mouth daily   atenolol (TENORMIN) 50 mg tablet  Self No No   Sig: TAKE ONE TABLET BY MOUTH DAILY   atorvastatin (LIPITOR) 40 mg tablet  Self No No   Sig: TAKE ONE TABLET BY MOUTH EVERY DAY    OFFICE VISIT NEEDED   escitalopram (LEXAPRO) 10 mg tablet   No No   Sig: TAKE ONE TABLET BY MOUTH ONCE DAILY   losartan (COZAAR) 50 mg tablet  Self No No   Sig: TAKE ONE TABLET BY MOUTH TWO TIMES A DAY FOR 30 DAYS   multivitamin (THERAGRAN) TABS  Self Yes No   Sig: Take 1 tablet by mouth   omeprazole (PriLOSEC) 20 mg delayed release capsule   Yes No   Sig: Take 20 mg by mouth daily    sodium chloride 1 g tablet  Self No No   Sig: Take 1 tablet (1 g total) by mouth 3 (three) times a day      Facility-Administered Medications: None       Past Medical History:   Diagnosis Date    Arthritis     Confusion 10/2/2018    Depression     Displaced fracture of distal phalanx of right thumb     GERD (gastroesophageal reflux disease)     [facility] [address] [facility]     Hyperlipidemia     Hypertension     Moderate episode of recurrent major depressive disorder ([facility] [address] ) 4/12/2019    Shortness of breath     Stage 2 chronic kidney disease 7/17/2019       Past Surgical History:   Procedure Laterality Date    APPENDECTOMY      CATARACT EXTRACTION      COLONOSCOPY      FL LUMBAR PUNCTURE  1/10/2019    SEPTOPLASTY      WRIST FRACTURE SURGERY Right        Family History   Problem Relation Age of Onset    Heart attack Mother 39    Heart attack Father 61    No Known Problems Other     Breast cancer Sister     Alcohol abuse Daughter         history of    Heart attack Brother      I have reviewed and agree with the history as documented  Social History     Tobacco Use    Smoking status: Former Smoker     Last attempt to quit:      Years since quittin 6    Smokeless tobacco: Never Used    Tobacco comment: no secondhand smoke exposure   Substance Use Topics    Alcohol use: Yes     Comment: social    Drug use: No        Review of Systems   Constitutional: Negative for chills and fever  HENT: Negative for congestion and rhinorrhea  Eyes: Negative for photophobia and visual disturbance  Respiratory: Negative for cough and shortness of breath  Cardiovascular: Negative for chest pain and palpitations  Gastrointestinal: Negative for abdominal pain, diarrhea, nausea and vomiting  Genitourinary: Negative for dysuria and frequency  Musculoskeletal: Negative for neck pain and neck stiffness  Skin: Negative for pallor and rash  Neurological: Positive for weakness  Negative for light-headedness and headaches  All other systems reviewed and are negative        Physical Exam  ED Triage Vitals [19]   Temperature Pulse Respirations Blood Pressure SpO2   99 4 °F (37 4 °C) 82 17 160/70 98 %      Temp Source Heart Rate Source Patient Position - Orthostatic VS BP Location FiO2 (%)   Oral Monitor Sitting Right arm --      Pain Score       No Pain             Orthostatic Vital Signs  Vitals:    19 2232 19 2355 08/15/19 0728 08/15/19 1500   BP: 151/54 159/70 153/67 146/67   Pulse: 88 85 84 94   Patient Position - Orthostatic VS: Lying  Lying Lying       Physical Exam   Constitutional: She is oriented to person, place, and time  Awake and alert, confused, but pleasant  Nontoxic  HENT:   Head: Normocephalic and atraumatic  Mouth/Throat: Oropharynx is clear and moist  No oropharyngeal exudate  No hemotympanum bilaterally  No anterior septal hematoma  No malocclusion  Eyes: Pupils are equal, round, and reactive to light  EOM are normal  Right eye exhibits no discharge  Left eye exhibits no discharge  No scleral icterus  Neck: Normal range of motion  Neck supple  No JVD present  No tracheal deviation present  No C-spine, T-spine, or L-spine tenderness, step-off, or deformity   Cardiovascular: Normal rate, regular rhythm and normal heart sounds  No murmur heard  Pulmonary/Chest: Effort normal  No stridor  No respiratory distress  She has no wheezes  She has no rales  Abdominal: Soft  She exhibits no distension and no mass  There is no tenderness  Musculoskeletal: Normal range of motion  She exhibits no edema or deformity  Neurological: She is alert and oriented to person, place, and time  No cranial nerve deficit  She exhibits normal muscle tone  Strength is 5/5 and equal in all extremities bilaterally  Sensation grossly intact and equal in all extremities  No cranial nerve deficits appreciated  Skin: Skin is warm and dry  No rash noted  No pallor         ED Medications  Medications   aspirin chewable tablet 81 mg (81 mg Oral Given 8/15/19 0912)   atenolol (TENORMIN) tablet 50 mg (50 mg Oral Given 8/15/19 1712)   atorvastatin (LIPITOR) tablet 40 mg (40 mg Oral Given 8/15/19 1611)   escitalopram (LEXAPRO) tablet 10 mg (10 mg Oral Given 8/15/19 0912)   losartan (COZAAR) tablet 50 mg (50 mg Oral Given 8/15/19 1712)   multivitamin-minerals (CENTRUM) tablet 1 tablet (1 tablet Oral Given 8/15/19 0912)   pantoprazole (PROTONIX) EC tablet 20 mg (20 mg Oral Given 8/15/19 0612)   sodium chloride tablet 1 g (1 g Oral Given 8/15/19 2016)   cholecalciferol (VITAMIN D3) tablet 1,000 Units (1,000 Units Oral Given 8/15/19 0912)   ondansetron (ZOFRAN) injection 4 mg (has no administration in time range)   enoxaparin (LOVENOX) subcutaneous injection 40 mg (40 mg Subcutaneous Given 8/15/19 0912)   acetaminophen (TYLENOL) tablet 650 mg (has no administration in time range)       Diagnostic Studies  Results Reviewed     Procedure Component Value Units Date/Time    Urine culture [[id number]] Collected:  08/14/19 2050    Lab Status:   In process Specimen:  Urine, Clean Catch Updated:  08/14/19 2359    Comprehensive metabolic panel [[id number]]  (Abnormal) Collected:  08/14/19 2029    Lab Status:  Final result Specimen:  Blood from Arm, Right Updated:  08/14/19 2134     Sodium 127 mmol/L      Potassium 6 0 mmol/L      Chloride 95 mmol/L      CO2 21 mmol/L      ANION GAP 11 mmol/L      BUN 13 mg/dL      Creatinine 0 45 mg/dL      Glucose 96 mg/dL      Calcium 9 0 mg/dL      AST 83 U/L      ALT 43 U/L      Alkaline Phosphatase 46 U/L      Total Protein 7 1 g/dL      Albumin 3 4 g/dL      Total Bilirubin 0 54 mg/dL      eGFR 96 ml/min/1 73sq m     Narrative:       Radhames guidelines for Chronic Kidney Disease (CKD):     Stage 1 with normal or high GFR (GFR > 90 mL/min/1 73 square meters)    Stage 2 Mild CKD (GFR = 60-89 mL/min/1 73 square meters)    Stage 3A Moderate CKD (GFR = 45-59 mL/min/1 73 square meters)    Stage 3B Moderate CKD (GFR = 30-44 mL/min/1 73 square meters)    Stage 4 Severe CKD (GFR = 15-29 mL/min/1 73 square meters)    Stage 5 End Stage CKD (GFR <15 mL/min/1 73 square meters)  Note: GFR calculation is accurate only with a steady state creatinine    TSH, 3rd generation with Free T4 reflex [[id number]]  (Normal) Collected:  08/14/19 2029    Lab Status:  Final result Specimen:  Blood from Arm, Right Updated:  08/14/19 2126     TSH 3RD GENERATON 1 492 uIU/mL     Narrative:       Patients undergoing fluorescein dye angiography may retain small amounts of fluorescein in the body for 48-72 hours post procedure  Samples containing fluorescein can produce falsely depressed TSH values  If the patient had this procedure,a specimen should be resubmitted post fluorescein clearance  UA w Reflex to Microscopic w Reflex to Culture [[id number]] Collected:  08/14/19 2050    Lab Status:  Final result Specimen:  Urine, Clean Catch Updated:  08/14/19 2057     Color, UA Yellow     Clarity, UA Slightly Cloudy     Specific Marvell, UA 1 020     pH, UA 7 5     Leukocytes, UA Negative     Nitrite, UA Negative     Protein, UA Negative mg/dl      Glucose, UA Negative mg/dl      Ketones, UA Negative mg/dl      Urobilinogen, UA 0 2 E U /dl      Bilirubin, UA Negative     Blood, UA Negative    CBC and differential [[id number]]  (Abnormal) Collected:  08/14/19 2029    Lab Status:  Final result Specimen:  Blood from Arm, Right Updated:  08/14/19 2041     WBC 16 93 Thousand/uL      RBC 3 35 Million/uL      Hemoglobin 10 6 g/dL      Hematocrit 31 5 %      MCV 94 fL      MCH 31 6 pg      MCHC 33 7 g/dL      RDW 12 5 %      MPV 10 3 fL      Platelets 767 Thousands/uL      nRBC 0 /100 WBCs      Neutrophils Relative 83 %      Immat GRANS % 1 %      Lymphocytes Relative 8 %      Monocytes Relative 6 %      Eosinophils Relative 1 %      Basophils Relative 1 %      Neutrophils Absolute 14 29 Thousands/µL      Immature Grans Absolute 0 12 Thousand/uL      Lymphocytes Absolute 1 41 Thousands/µL      Monocytes Absolute 0 95 Thousand/µL      Eosinophils Absolute 0 08 Thousand/µL      Basophils Absolute 0 08 Thousands/µL                  XR chest 2 views   ED Interpretation by Corrine Mora MD (08/14 2316)   Primary reviewed: no acute abnormality      Final Result by Lila Funez MD (08/15 9013)      No acute cardiopulmonary disease              Workstation performed: [id number]         XR shoulder 2+ views RIGHT   ED Interpretation by Corrine Mora MD (08/14 2316)   Primary reviewed: no acute abnormality      Final Result by Raj Harris MD (08/15 2783)      No acute osseous abnormality  This report is in agreement with the preliminary interpretation  Workstation performed: [id number]         CT head without contrast   Final Result by Lefty Goncalves DO (08/14 2123)      Ventricular prominence out of proportion sulcal prominence suggesting some degree of communicating hydrocephalus  Findings are unchanged from the prior study  Workstation performed: [id number]         CT cervical spine without contrast   Final Result by Lefty Goncalves DO (08/14 2126)      No cervical spine fracture or traumatic malalignment  Degenerative changes are noted  Workstation performed: [id number]               Procedures  Procedures        ED Course                               MDM    Disposition  Final diagnoses:   Ambulatory dysfunction   NPH (normal pressure hydrocephalus)     Time reflects when diagnosis was documented in both MDM as applicable and the Disposition within this note     Time User Action Codes Description Comment    8/14/2019 10:07 PM Beverley Candelario Add [R26 2] Ambulatory dysfunction     8/14/2019 10:07 PM Crescencio Adrien, [address] [place] [address] [G91 2] NPH (normal pressure hydrocephalus)     8/14/2019 11:12 PM Lakeisha Mendes Add [E87 1] Chronic hyponatremia     8/14/2019 11:12 PM Lakeisha Mendes Modify [E87 1] Chronic hyponatremia       ED Disposition     ED Disposition Condition Date/Time Comment    Admit Stable Wed Aug 14, 2019 10:07 PM Case was discussed with NICKIE and the patient's admission status was agreed to be Admission Status: inpatient status to the service of Dr Ugo Costello           Follow-up Information    None         Current Discharge Medication List      CONTINUE these medications which have NOT CHANGED    Details   aspirin 81 MG tablet Take 1 tablet by mouth daily      atenolol (TENORMIN) 50 mg tablet TAKE ONE TABLET BY MOUTH DAILY  Qty: 30 tablet, Refills: 5    Associated Diagnoses: Hypertension, unspecified type      atorvastatin (LIPITOR) 40 mg tablet TAKE ONE TABLET BY MOUTH EVERY DAY   OFFICE VISIT NEEDED  Qty: 90 tablet, Refills: 3    Associated Diagnoses: Hyperlipidemia, unspecified hyperlipidemia type      escitalopram (LEXAPRO) 10 mg tablet TAKE ONE TABLET BY MOUTH ONCE DAILY  Qty: 30 tablet, Refills: 1    Associated Diagnoses: Anxiety      losartan (COZAAR) 50 mg tablet TAKE ONE TABLET BY MOUTH TWO TIMES A DAY FOR 30 DAYS  Qty: 60 tablet, Refills: 3    Associated Diagnoses: Essential hypertension      multivitamin (THERAGRAN) TABS Take 1 tablet by mouth      omeprazole (PriLOSEC) 20 mg delayed release capsule Take 20 mg by mouth daily       sodium chloride 1 g tablet Take 1 tablet (1 g total) by mouth 3 (three) times a day  Qty: 90 tablet, Refills: 4    Associated Diagnoses: Hyponatremia; SIADH (syndrome of inappropriate ADH production) (HCC)      Vitamin D, Cholecalciferol, 1000 units CAPS Take 1 tablet by mouth           No discharge procedures on file  ED Provider  Attending physically available and evaluated Theodore Roach I managed the patient along with the ED Attending      Electronically Signed by         Tiago Howe MD  08/15/19 2029

## 2019-08-15 NOTE — PLAN OF CARE
Problem: Potential for Falls  Goal: Patient will remain free of falls  Description  INTERVENTIONS:  - Assess patient frequently for physical needs  -  Identify cognitive and physical deficits and behaviors that affect risk of falls    -  [place] fall precautions as indicated by assessment   - Educate patient/family on patient safety including physical limitations  - Instruct patient to call for assistance with activity based on assessment  - Modify environment to reduce risk of injury  - Consider OT/PT consult to assist with strengthening/mobility  Outcome: Progressing     Problem: PAIN - ADULT  Goal: Verbalizes/displays adequate comfort level or baseline comfort level  Description  Interventions:  - Encourage patient to monitor pain and request assistance  - Assess pain using appropriate pain scale  - Administer analgesics based on type and severity of pain and evaluate response  - Implement non-pharmacological measures as appropriate and evaluate response  - Consider cultural and social influences on pain and pain management  - Notify physician/advanced practitioner if interventions unsuccessful or patient reports new pain  Outcome: Progressing     Problem: INFECTION - ADULT  Goal: Absence or prevention of progression during hospitalization  Description  INTERVENTIONS:  - Assess and monitor for signs and symptoms of infection  - Monitor lab/diagnostic results  - Monitor all insertion sites, i e  indwelling lines, tubes, and drains  - Monitor endotracheal if appropriate and nasal secretions for changes in amount and color  - [place] appropriate cooling/warming therapies per order  - Administer medications as ordered  - Instruct and encourage patient and family to use good hand hygiene technique  - Identify and instruct in appropriate isolation precautions for identified infection/condition  Outcome: Progressing  Goal: Absence of fever/infection during neutropenic period  Description  INTERVENTIONS:  - Monitor WBC    Outcome: Progressing     Problem: SAFETY ADULT  Goal: Maintain or return to baseline ADL function  Description  INTERVENTIONS:  -  Assess patient's ability to carry out ADLs; assess patient's baseline for ADL function and identify physical deficits which impact ability to perform ADLs (bathing, care of mouth/teeth, toileting, grooming, dressing, etc )  - Assess/evaluate cause of self-care deficits   - Assess range of motion  - Assess patient's mobility; develop plan if impaired  - Assess patient's need for assistive devices and provide as appropriate  - Encourage maximum independence but intervene and supervise when necessary  - Involve family in performance of ADLs  - Assess for home care needs following discharge   - Consider OT consult to assist with ADL evaluation and planning for discharge  - Provide patient education as appropriate  Outcome: Progressing  Goal: Maintain or return mobility status to optimal level  Description  INTERVENTIONS:  - Assess patient's baseline mobility status (ambulation, transfers, stairs, etc )    - Identify cognitive and physical deficits and behaviors that affect mobility  - Identify mobility aids required to assist with transfers and/or ambulation (gait belt, sit-to-stand, lift, walker, cane, etc )  - [place] fall precautions as indicated by assessment  - Record patient progress and toleration of activity level on Mobility SBAR; progress patient to next Phase/Stage  - Instruct patient to call for assistance with activity based on assessment  - Consider rehabilitation consult to assist with strengthening/weightbearing, etc   Outcome: Progressing     Problem: DISCHARGE PLANNING  Goal: Discharge to home or other facility with appropriate resources  Description  INTERVENTIONS:  - Identify barriers to discharge w/patient and caregiver  - Arrange for needed discharge resources and transportation as appropriate  - Identify discharge learning needs (meds, wound care, etc )  - Arrange for interpretive services to assist at discharge as needed  - Refer to Case Management Department for coordinating discharge planning if the patient needs post-hospital services based on physician/advanced practitioner order or complex needs related to functional status, cognitive ability, or social support system  Outcome: Progressing     Problem: Knowledge Deficit  Goal: Patient/family/caregiver demonstrates understanding of disease process, treatment plan, medications, and discharge instructions  Description  Complete learning assessment and assess knowledge base  Interventions:  - Provide teaching at level of understanding  - Provide teaching via preferred learning methods  Outcome: Progressing     Problem: NEUROSENSORY - ADULT  Goal: Achieves stable or improved neurological status  Description  INTERVENTIONS  - Monitor and report changes in neurological status  - Monitor vital signs such as temperature, blood pressure, glucose, and any other labs ordered   - Initiate measures to prevent increased intracranial pressure  - Monitor for seizure activity and implement precautions if appropriate      Outcome: Progressing  Goal: Achieves maximal functionality and self care  Description  INTERVENTIONS  - Monitor swallowing and airway patency with patient fatigue and changes in neurological status  - Encourage and assist patient to increase activity and self care     - Encourage visually impaired, hearing impaired and aphasic patients to use assistive/communication devices  Outcome: Progressing     Problem: METABOLIC, FLUID AND ELECTROLYTES - ADULT  Goal: Electrolytes maintained within normal limits  Description  INTERVENTIONS:  - Monitor labs and assess patient for signs and symptoms of electrolyte imbalances  - Administer electrolyte replacement as ordered  - Monitor response to electrolyte replacements, including repeat lab results as appropriate  - Instruct patient on fluid and nutrition as appropriate  Outcome: Progressing  Goal: Fluid balance maintained  Description  INTERVENTIONS:  - Monitor labs   - Monitor I/O and WT  - Instruct patient on fluid and nutrition as appropriate  - Assess for signs & symptoms of volume excess or deficit  Outcome: Progressing     Problem: MUSCULOSKELETAL - ADULT  Goal: Maintain or return mobility to safest level of function  Description  INTERVENTIONS:  - Assess patient's ability to carry out ADLs; assess patient's baseline for ADL function and identify physical deficits which impact ability to perform ADLs (bathing, care of mouth/teeth, toileting, grooming, dressing, etc )  - Assess/evaluate cause of self-care deficits   - Assess range of motion  - Assess patient's mobility  - Assess patient's need for assistive devices and provide as appropriate  - Encourage maximum independence but intervene and supervise when necessary  - Involve family in performance of ADLs  - Assess for home care needs following discharge   - Consider OT consult to assist with ADL evaluation and planning for discharge  - Provide patient education as appropriate  Outcome: Progressing  Goal: Maintain proper alignment of affected body part  Description  INTERVENTIONS:  - Support, maintain and protect limb and body alignment  - Provide patient/ family with appropriate education  Outcome: Progressing

## 2019-08-15 NOTE — PROGRESS NOTES
[facility] Progress Note  Patient: Elvin Garcia 66 y o  female   MRN: [id number]  PCP: Jadiel Estrada PA-C  Unit/Bed#: E5 -01 Encounter: [id number]  Date Of Visit: 08/15/19      Assessment/plan  1  Recurrent falls- likely due to nph  Await pt/ot eval      2  Leukocytosis- likely reactive  It is resolving  3  ckd2- stable  Check bmp in am    4  Normal pressure hydrocephalus- pt has surgery for  shunt on 9/25 with neurosurgery  Appreciate neurology recommendations  nph is stable from previous exam      5 chronic hyponatremia- appreciate nephrology recommendations  It is due to siadh  Continue fluid restriction and salt tabs    6  Pseudohyperkalemia due to hemolyzed blood    7  htn- stable  Continue current treatment    dispo- daughter at bedside  Pt will likely need str placement until surgery in sept    Subjective:   Pt seen and examined  Pt is still confused at times and wobbly on her feet  No other problems  No f/c no cp no sob no n/v/d no abd pain  Objective:     Vitals: Blood pressure 146/67, pulse 94, temperature 97 9 °F (36 6 °C), temperature source Tympanic, resp  rate 18, height 5' 4" (1 626 m), weight 76 kg (167 lb 8 8 oz), SpO2 97 %, not currently breastfeeding  ,Body mass index is 28 76 kg/m²  Lab, Imaging and other studies:  Results from last 7 days   Lab Units 08/15/19  0449 08/14/19  1031   WBC Thousand/uL 12 22*   < > 17 05*   HEMOGLOBIN g/dL 10 7*   < > 11 5   HEMATOCRIT % 33 1*   < > 35 5   PLATELETS Thousands/uL 326   < > 412*   INR   --   --  0 97    < > = values in this interval not displayed  Results from last 7 days   Lab Units 08/15/19  0449 08/14/19  2029   POTASSIUM mmol/L 3 9   < > 6 0*   CHLORIDE mmol/L 97*  --  95*   CO2 mmol/L 23  --  21   BUN mg/dL 12  --  13   CREATININE mg/dL 0 73  --  0 45*   CALCIUM mg/dL 9 2  --  9 0   ALK PHOS U/L  --   --  46   ALT U/L  --   --  43   AST U/L  --   --  83*    < > = values in this interval not displayed  No results found for: Daniel Jones      Xr Chest 2 Views    Result Date: 8/15/2019  Narrative: CHEST INDICATION:   falls  COMPARISON:  3/27/2019 EXAM PERFORMED/VIEWS:  XR CHEST PA & LATERAL FINDINGS: Cardiomediastinal silhouette appears unremarkable  The lungs are clear  No pneumothorax or pleural effusion  Osseous structures appear within normal limits for patient age  Impression: No acute cardiopulmonary disease  Workstation performed: [id number]     Xr Shoulder 2+ Views Right    Result Date: 8/15/2019  Narrative: RIGHT SHOULDER INDICATION:   falls  COMPARISON:  None VIEWS:  XR SHOULDER 2+ VW RIGHT FINDINGS: There is no acute fracture or dislocation  Moderate osteoarthritis acromioclavicular joint  No lytic or blastic lesions are seen  Soft tissues are unremarkable  Impression: No acute osseous abnormality  This report is in agreement with the preliminary interpretation  Workstation performed: [id number]     Ct Head Without Contrast    Result Date: 8/14/2019  Narrative: CT BRAIN - WITHOUT CONTRAST INDICATION:   falls  COMPARISON:  July 22, 2019 TECHNIQUE:  CT examination of the brain was performed  In addition to axial images, coronal 2D reformatted images were created and submitted for interpretation  Radiation dose length product (DLP) for this visit:  906 mGy-cm   This examination, like all CT scans performed in the [facility], was performed utilizing techniques to minimize radiation dose exposure, including the use of iterative reconstruction and automated exposure control  IMAGE QUALITY:  Diagnostic  FINDINGS: PARENCHYMA:  No intracranial mass, mass effect or midline shift  No CT signs of acute infarction  No acute parenchymal hemorrhage  VENTRICLES AND EXTRA-AXIAL SPACES:  Ventricles are prominent out of proportion to sulcal prominence with crowding of sulci at the skull vertex    Some degree of communicating hydrocephalus/normal pressure hydrocephalus should be considered superimposed on  cerebral volume loss  VISUALIZED ORBITS AND PARANASAL SINUSES:  Bilateral lens replacements  CALVARIUM AND EXTRACRANIAL SOFT TISSUES:  Normal      Impression: Ventricular prominence out of proportion sulcal prominence suggesting some degree of communicating hydrocephalus  Findings are unchanged from the prior study  Workstation performed: [id number]     Ct Head Wo Contrast    Result Date: 7/24/2019  Narrative: CT BRAIN - WITHOUT CONTRAST INDICATION:   G91 2: (Idiopathic) normal pressure hydrocephalus R41 89: Other symptoms and signs involving cognitive functions and awareness R46 89: Other symptoms and signs involving appearance and behavior R26 81: Unsteadiness on feet  COMPARISON:  None  TECHNIQUE:  CT examination of the brain was performed  In addition to axial images, coronal 2D reformatted images were created and submitted for interpretation  Radiation dose length product (DLP) for this visit:  946 49 mGy-cm   This examination, like all CT scans performed in the [facility], was performed utilizing techniques to minimize radiation dose exposure, including the use of iterative  reconstruction and automated exposure control  IMAGE QUALITY:  Diagnostic  FINDINGS: PARENCHYMA:  No intracranial mass, mass effect or midline shift  No CT signs of acute infarction  No acute parenchymal hemorrhage  VENTRICLES AND EXTRA-AXIAL SPACES:  Ventricles are prominent out of proportion to sulcal prominence  Diffuse cerebral volume loss with some degree of superimposed communicating hydrocephalus should be considered  VISUALIZED ORBITS AND PARANASAL SINUSES:  Bilateral lens implants  CALVARIUM AND EXTRACRANIAL SOFT TISSUES:  Normal      Impression: Ventricular prominence out of proportion to sulcal prominence suggests some degree of communicating hydrocephalus  No acute intracranial abnormality   Workstation performed: [id number]     Ct Cervical Spine Without Contrast    Result Date: 8/14/2019  Narrative: CT CERVICAL SPINE - WITHOUT CONTRAST INDICATION:   falls  COMPARISON:  None  TECHNIQUE:  CT examination of the cervical spine was performed without intravenous contrast   Contiguous axial images were obtained  Sagittal and coronal reconstructions were performed  Radiation dose length product (DLP) for this visit:  602 mGy-cm   This examination, like all CT scans performed in the [facility], was performed utilizing techniques to minimize radiation dose exposure, including the use of iterative reconstruction and automated exposure control  IMAGE QUALITY:  Diagnostic  FINDINGS: ALIGNMENT:  Straightening of the normal cervical lordosis  VERTEBRAL BODIES:  No fracture  DEGENERATIVE CHANGES:  Moderate loss of disc height C5-6 C6-7 compatible with degenerative disc disease  Mild multilevel facet degenerative change  PREVERTEBRAL AND PARASPINAL SOFT TISSUES:  Unremarkable  THORACIC INLET:  Normal      Impression: No cervical spine fracture or traumatic malalignment  Degenerative changes are noted    Workstation performed: [id number]       Scheduled Meds:   Current Facility-Administered Medications:  acetaminophen 650 mg Oral Q6H PRN Ronelle Aase, CRNP   aspirin 81 mg Oral Daily Ronelle Aase, CRNP   atenolol 50 mg Oral Daily Ronelle Aase, CRNP   atorvastatin 40 mg Oral Daily With [employer], CRKATIE   cholecalciferol 1,000 Units Oral Daily Ronelle Aase, CRNP   enoxaparin 40 mg Subcutaneous Daily Ronelle Aase, CRNP   escitalopram 10 mg Oral Daily Ronelle Aase, CRNP   losartan 50 mg Oral BID Ronelle Aase, CRNP   multivitamin-minerals 1 tablet Oral Daily Ronelle Aase, CRNP   ondansetron 4 mg Intravenous Q6H PRN Ronelle Aase, CRNP   pantoprazole 20 mg Oral Early Morning Ronelle Aase, CRNP   sodium chloride 1 g Oral TID Ronelle Aase, CRNP     Continuous Infusions:    PRN Meds:   acetaminophen    ondansetron      Physical exam:  Physical Exam  General appearance: alert and oriented, in no acute distress  Head: Normocephalic, without obvious abnormality, atraumatic  Eyes: conjunctivae/corneas clear  PERRL, EOM's intact  Fundi benign    Neck: no adenopathy, no carotid bruit, no JVD, supple, symmetrical, trachea midline and thyroid not enlarged, symmetric, no tenderness/mass/nodules  Lungs: clear to auscultation bilaterally  Heart: regular rate and rhythm, S1, S2 normal, no murmur, click, rub or gallop  Abdomen: soft, non-tender; bowel sounds normal; no masses,  no organomegaly  Extremities: extremities normal, warm and well-perfused; no cyanosis, clubbing, or edema  Pulses: 2+ and symmetric  Skin: Skin color, texture, turgor normal  No rashes or lesions  Neurologic: Mental status: Alert, oriented, thought content appropriate      VTE Pharmacologic Prophylaxis: Enoxaparin (Lovenox)  VTE Mechanical Prophylaxis: sequential compression device    Counseling / Coordination of Care  Total floor / unit time spent today 20 minutes      Current Length of Stay: 1 day(s)    Current Patient Status: Inpatient     Code Status: Level 1 - Full Code

## 2019-08-16 ENCOUNTER — APPOINTMENT (INPATIENT)
Dept: CT IMAGING | Facility: HOSPITAL | Age: 79
DRG: 057 | End: 2019-08-16
Payer: COMMERCIAL

## 2019-08-16 LAB
ANION GAP SERPL CALCULATED.3IONS-SCNC: 10 MMOL/L (ref 4–13)
BACTERIA UR CULT: NORMAL
BUN SERPL-MCNC: 13 MG/DL (ref 5–25)
CALCIUM SERPL-MCNC: 9.1 MG/DL (ref 8.3–10.1)
CHLORIDE SERPL-SCNC: 98 MMOL/L (ref 100–108)
CO2 SERPL-SCNC: 25 MMOL/L (ref 21–32)
CREAT SERPL-MCNC: 0.74 MG/DL (ref 0.6–1.3)
GFR SERPL CREATININE-BSD FRML MDRD: 78 ML/MIN/1.73SQ M
GLUCOSE SERPL-MCNC: 131 MG/DL (ref 65–140)
POTASSIUM SERPL-SCNC: 4.2 MMOL/L (ref 3.5–5.3)
SODIUM SERPL-SCNC: 133 MMOL/L (ref 136–145)

## 2019-08-16 PROCEDURE — 97166 OT EVAL MOD COMPLEX 45 MIN: CPT

## 2019-08-16 PROCEDURE — 97163 PT EVAL HIGH COMPLEX 45 MIN: CPT

## 2019-08-16 PROCEDURE — G8988 SELF CARE GOAL STATUS: HCPCS

## 2019-08-16 PROCEDURE — G8979 MOBILITY GOAL STATUS: HCPCS

## 2019-08-16 PROCEDURE — G8978 MOBILITY CURRENT STATUS: HCPCS

## 2019-08-16 PROCEDURE — G8987 SELF CARE CURRENT STATUS: HCPCS

## 2019-08-16 PROCEDURE — 99232 SBSQ HOSP IP/OBS MODERATE 35: CPT | Performed by: INTERNAL MEDICINE

## 2019-08-16 PROCEDURE — 74177 CT ABD & PELVIS W/CONTRAST: CPT

## 2019-08-16 PROCEDURE — 80048 BASIC METABOLIC PNL TOTAL CA: CPT | Performed by: INTERNAL MEDICINE

## 2019-08-16 RX ADMIN — SODIUM CHLORIDE TAB 1 GM 1 G: 1 TAB at 21:54

## 2019-08-16 RX ADMIN — Medication 1 TABLET: at 09:29

## 2019-08-16 RX ADMIN — SODIUM CHLORIDE TAB 1 GM 1 G: 1 TAB at 18:59

## 2019-08-16 RX ADMIN — LOSARTAN POTASSIUM 50 MG: 50 TABLET ORAL at 09:29

## 2019-08-16 RX ADMIN — ATENOLOL 50 MG: 50 TABLET ORAL at 19:00

## 2019-08-16 RX ADMIN — IOHEXOL 100 ML: 350 INJECTION, SOLUTION INTRAVENOUS at 18:02

## 2019-08-16 RX ADMIN — ASPIRIN 81 MG 81 MG: 81 TABLET ORAL at 09:29

## 2019-08-16 RX ADMIN — IOHEXOL 50 ML: 240 INJECTION, SOLUTION INTRATHECAL; INTRAVASCULAR; INTRAVENOUS; ORAL at 18:02

## 2019-08-16 RX ADMIN — ENOXAPARIN SODIUM 40 MG: 40 INJECTION SUBCUTANEOUS at 09:29

## 2019-08-16 RX ADMIN — ATORVASTATIN CALCIUM 40 MG: 40 TABLET, FILM COATED ORAL at 18:59

## 2019-08-16 RX ADMIN — PANTOPRAZOLE SODIUM 20 MG: 20 TABLET, DELAYED RELEASE ORAL at 05:25

## 2019-08-16 RX ADMIN — VITAMIN D, TAB 1000IU (100/BT) 1000 UNITS: 25 TAB at 09:29

## 2019-08-16 RX ADMIN — LOSARTAN POTASSIUM 50 MG: 50 TABLET ORAL at 19:00

## 2019-08-16 RX ADMIN — ESCITALOPRAM OXALATE 10 MG: 10 TABLET ORAL at 09:29

## 2019-08-16 RX ADMIN — SODIUM CHLORIDE TAB 1 GM 1 G: 1 TAB at 09:29

## 2019-08-16 NOTE — PLAN OF CARE
Problem: Potential for Falls  Goal: Patient will remain free of falls  Description  INTERVENTIONS:  - Assess patient frequently for physical needs  -  Identify cognitive and physical deficits and behaviors that affect risk of falls    -  [place] fall precautions as indicated by assessment   - Educate patient/family on patient safety including physical limitations  - Instruct patient to call for assistance with activity based on assessment  - Modify environment to reduce risk of injury  - Consider OT/PT consult to assist with strengthening/mobility  Outcome: Progressing     Problem: PAIN - ADULT  Goal: Verbalizes/displays adequate comfort level or baseline comfort level  Description  Interventions:  - Encourage patient to monitor pain and request assistance  - Assess pain using appropriate pain scale  - Administer analgesics based on type and severity of pain and evaluate response  - Implement non-pharmacological measures as appropriate and evaluate response  - Consider cultural and social influences on pain and pain management  - Notify physician/advanced practitioner if interventions unsuccessful or patient reports new pain  Outcome: Progressing     Problem: INFECTION - ADULT  Goal: Absence or prevention of progression during hospitalization  Description  INTERVENTIONS:  - Assess and monitor for signs and symptoms of infection  - Monitor lab/diagnostic results  - Monitor all insertion sites, i e  indwelling lines, tubes, and drains  - Monitor endotracheal if appropriate and nasal secretions for changes in amount and color  - [place] appropriate cooling/warming therapies per order  - Administer medications as ordered  - Instruct and encourage patient and family to use good hand hygiene technique  - Identify and instruct in appropriate isolation precautions for identified infection/condition  Outcome: Progressing  Goal: Absence of fever/infection during neutropenic period  Description  INTERVENTIONS:  - Monitor WBC    Outcome: Progressing     Problem: SAFETY ADULT  Goal: Maintain or return to baseline ADL function  Description  INTERVENTIONS:  -  Assess patient's ability to carry out ADLs; assess patient's baseline for ADL function and identify physical deficits which impact ability to perform ADLs (bathing, care of mouth/teeth, toileting, grooming, dressing, etc )  - Assess/evaluate cause of self-care deficits   - Assess range of motion  - Assess patient's mobility; develop plan if impaired  - Assess patient's need for assistive devices and provide as appropriate  - Encourage maximum independence but intervene and supervise when necessary  - Involve family in performance of ADLs  - Assess for home care needs following discharge   - Consider OT consult to assist with ADL evaluation and planning for discharge  - Provide patient education as appropriate  Outcome: Progressing  Goal: Maintain or return mobility status to optimal level  Description  INTERVENTIONS:  - Assess patient's baseline mobility status (ambulation, transfers, stairs, etc )    - Identify cognitive and physical deficits and behaviors that affect mobility  - Identify mobility aids required to assist with transfers and/or ambulation (gait belt, sit-to-stand, lift, walker, cane, etc )  - [place] fall precautions as indicated by assessment  - Record patient progress and toleration of activity level on Mobility SBAR; progress patient to next Phase/Stage  - Instruct patient to call for assistance with activity based on assessment  - Consider rehabilitation consult to assist with strengthening/weightbearing, etc   Outcome: Progressing     Problem: DISCHARGE PLANNING  Goal: Discharge to home or other facility with appropriate resources  Description  INTERVENTIONS:  - Identify barriers to discharge w/patient and caregiver  - Arrange for needed discharge resources and transportation as appropriate  - Identify discharge learning needs (meds, wound care, etc )  - Arrange for interpretive services to assist at discharge as needed  - Refer to Case Management Department for coordinating discharge planning if the patient needs post-hospital services based on physician/advanced practitioner order or complex needs related to functional status, cognitive ability, or social support system  Outcome: Progressing     Problem: Knowledge Deficit  Goal: Patient/family/caregiver demonstrates understanding of disease process, treatment plan, medications, and discharge instructions  Description  Complete learning assessment and assess knowledge base  Interventions:  - Provide teaching at level of understanding  - Provide teaching via preferred learning methods  Outcome: Progressing     Problem: NEUROSENSORY - ADULT  Goal: Achieves stable or improved neurological status  Description  INTERVENTIONS  - Monitor and report changes in neurological status  - Monitor vital signs such as temperature, blood pressure, glucose, and any other labs ordered   - Initiate measures to prevent increased intracranial pressure  - Monitor for seizure activity and implement precautions if appropriate      Outcome: Progressing  Goal: Achieves maximal functionality and self care  Description  INTERVENTIONS  - Monitor swallowing and airway patency with patient fatigue and changes in neurological status  - Encourage and assist patient to increase activity and self care     - Encourage visually impaired, hearing impaired and aphasic patients to use assistive/communication devices  Outcome: Progressing     Problem: METABOLIC, FLUID AND ELECTROLYTES - ADULT  Goal: Electrolytes maintained within normal limits  Description  INTERVENTIONS:  - Monitor labs and assess patient for signs and symptoms of electrolyte imbalances  - Administer electrolyte replacement as ordered  - Monitor response to electrolyte replacements, including repeat lab results as appropriate  - Instruct patient on fluid and nutrition as appropriate  Outcome: Progressing  Goal: Fluid balance maintained  Description  INTERVENTIONS:  - Monitor labs   - Monitor I/O and WT  - Instruct patient on fluid and nutrition as appropriate  - Assess for signs & symptoms of volume excess or deficit  Outcome: Progressing     Problem: MUSCULOSKELETAL - ADULT  Goal: Maintain or return mobility to safest level of function  Description  INTERVENTIONS:  - Assess patient's ability to carry out ADLs; assess patient's baseline for ADL function and identify physical deficits which impact ability to perform ADLs (bathing, care of mouth/teeth, toileting, grooming, dressing, etc )  - Assess/evaluate cause of self-care deficits   - Assess range of motion  - Assess patient's mobility  - Assess patient's need for assistive devices and provide as appropriate  - Encourage maximum independence but intervene and supervise when necessary  - Involve family in performance of ADLs  - Assess for home care needs following discharge   - Consider OT consult to assist with ADL evaluation and planning for discharge  - Provide patient education as appropriate  Outcome: Progressing  Goal: Maintain proper alignment of affected body part  Description  INTERVENTIONS:  - Support, maintain and protect limb and body alignment  - Provide patient/ family with appropriate education  Outcome: Progressing     Problem: Nutrition/Hydration-ADULT  Goal: Nutrient/Hydration intake appropriate for improving, restoring or maintaining nutritional needs  Description  Monitor and assess patient's nutrition/hydration status for malnutrition  Collaborate with interdisciplinary team and initiate plan and interventions as ordered  Monitor patient's weight and dietary intake as ordered or per policy  Utilize nutrition screening tool and intervene as necessary  Determine patient's food preferences and provide high-protein, high-caloric foods as appropriate       INTERVENTIONS:  - Monitor oral intake, urinary output, labs, and treatment plans  - Assess nutrition and hydration status and recommend course of action  - Evaluate amount of meals eaten  - Assist patient with eating if necessary   - Allow adequate time for meals  - Recommend/ encourage appropriate diets, oral nutritional supplements, and vitamin/mineral supplements  - Order, calculate, and assess calorie counts as needed  - Recommend, monitor, and adjust tube feedings and TPN/PPN based on assessed needs  - Assess need for intravenous fluids  - Provide specific nutrition/hydration education as appropriate  - Include patient/family/caregiver in decisions related to nutrition  Outcome: Progressing     Problem: Prexisting or High Potential for Compromised Skin Integrity  Goal: Skin integrity is maintained or improved  Description  INTERVENTIONS:  - Identify patients at risk for skin breakdown  - Assess and monitor skin integrity  - Assess and monitor nutrition and hydration status  - Monitor labs   - Assess for incontinence   - Turn and reposition patient  - Assist with mobility/ambulation  - Relieve pressure over bony prominences  - Avoid friction and shearing  - Provide appropriate hygiene as needed including keeping skin clean and dry  - Evaluate need for skin moisturizer/barrier cream  - Collaborate with interdisciplinary team   - Patient/family teaching  - Consider wound care consult   Outcome: Progressing

## 2019-08-16 NOTE — CASE MANAGEMENT
CM met with patient's daughter, Jean Castaneda and she gave choices to CM that were discussed with her sister, Hernesto Cheney; 1st choice: [facility], 2nd Choice: [facility], 3rd choice: [facility] in [facility]  CM placed referrals in [address] as requested  CM department will continue to follow through discharge
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
## 2019-08-16 NOTE — PLAN OF CARE
Problem: Potential for Falls  Goal: Patient will remain free of falls  Description  INTERVENTIONS:  - Assess patient frequently for physical needs  -  Identify cognitive and physical deficits and behaviors that affect risk of falls    -  [place] fall precautions as indicated by assessment   - Educate patient/family on patient safety including physical limitations  - Instruct patient to call for assistance with activity based on assessment  - Modify environment to reduce risk of injury  - Consider OT/PT consult to assist with strengthening/mobility  Outcome: Progressing     Problem: PAIN - ADULT  Goal: Verbalizes/displays adequate comfort level or baseline comfort level  Description  Interventions:  - Encourage patient to monitor pain and request assistance  - Assess pain using appropriate pain scale  - Administer analgesics based on type and severity of pain and evaluate response  - Implement non-pharmacological measures as appropriate and evaluate response  - Consider cultural and social influences on pain and pain management  - Notify physician/advanced practitioner if interventions unsuccessful or patient reports new pain  Outcome: Progressing     Problem: INFECTION - ADULT  Goal: Absence or prevention of progression during hospitalization  Description  INTERVENTIONS:  - Assess and monitor for signs and symptoms of infection  - Monitor lab/diagnostic results  - Monitor all insertion sites, i e  indwelling lines, tubes, and drains  - Monitor endotracheal if appropriate and nasal secretions for changes in amount and color  - [place] appropriate cooling/warming therapies per order  - Administer medications as ordered  - Instruct and encourage patient and family to use good hand hygiene technique  - Identify and instruct in appropriate isolation precautions for identified infection/condition  Outcome: Progressing  Goal: Absence of fever/infection during neutropenic period  Description  INTERVENTIONS:  - Monitor WBC    Outcome: Progressing     Problem: SAFETY ADULT  Goal: Maintain or return to baseline ADL function  Description  INTERVENTIONS:  -  Assess patient's ability to carry out ADLs; assess patient's baseline for ADL function and identify physical deficits which impact ability to perform ADLs (bathing, care of mouth/teeth, toileting, grooming, dressing, etc )  - Assess/evaluate cause of self-care deficits   - Assess range of motion  - Assess patient's mobility; develop plan if impaired  - Assess patient's need for assistive devices and provide as appropriate  - Encourage maximum independence but intervene and supervise when necessary  - Involve family in performance of ADLs  - Assess for home care needs following discharge   - Consider OT consult to assist with ADL evaluation and planning for discharge  - Provide patient education as appropriate  Outcome: Progressing  Goal: Maintain or return mobility status to optimal level  Description  INTERVENTIONS:  - Assess patient's baseline mobility status (ambulation, transfers, stairs, etc )    - Identify cognitive and physical deficits and behaviors that affect mobility  - Identify mobility aids required to assist with transfers and/or ambulation (gait belt, sit-to-stand, lift, walker, cane, etc )  - [place] fall precautions as indicated by assessment  - Record patient progress and toleration of activity level on Mobility SBAR; progress patient to next Phase/Stage  - Instruct patient to call for assistance with activity based on assessment  - Consider rehabilitation consult to assist with strengthening/weightbearing, etc   Outcome: Progressing     Problem: DISCHARGE PLANNING  Goal: Discharge to home or other facility with appropriate resources  Description  INTERVENTIONS:  - Identify barriers to discharge w/patient and caregiver  - Arrange for needed discharge resources and transportation as appropriate  - Identify discharge learning needs (meds, wound care, etc )  - Arrange for interpretive services to assist at discharge as needed  - Refer to Case Management Department for coordinating discharge planning if the patient needs post-hospital services based on physician/advanced practitioner order or complex needs related to functional status, cognitive ability, or social support system  Outcome: Progressing     Problem: Knowledge Deficit  Goal: Patient/family/caregiver demonstrates understanding of disease process, treatment plan, medications, and discharge instructions  Description  Complete learning assessment and assess knowledge base  Interventions:  - Provide teaching at level of understanding  - Provide teaching via preferred learning methods  Outcome: Progressing     Problem: NEUROSENSORY - ADULT  Goal: Achieves stable or improved neurological status  Description  INTERVENTIONS  - Monitor and report changes in neurological status  - Monitor vital signs such as temperature, blood pressure, glucose, and any other labs ordered   - Initiate measures to prevent increased intracranial pressure  - Monitor for seizure activity and implement precautions if appropriate      Outcome: Progressing  Goal: Achieves maximal functionality and self care  Description  INTERVENTIONS  - Monitor swallowing and airway patency with patient fatigue and changes in neurological status  - Encourage and assist patient to increase activity and self care     - Encourage visually impaired, hearing impaired and aphasic patients to use assistive/communication devices  Outcome: Progressing     Problem: METABOLIC, FLUID AND ELECTROLYTES - ADULT  Goal: Electrolytes maintained within normal limits  Description  INTERVENTIONS:  - Monitor labs and assess patient for signs and symptoms of electrolyte imbalances  - Administer electrolyte replacement as ordered  - Monitor response to electrolyte replacements, including repeat lab results as appropriate  - Instruct patient on fluid and nutrition as appropriate  Outcome: Progressing  Goal: Fluid balance maintained  Description  INTERVENTIONS:  - Monitor labs   - Monitor I/O and WT  - Instruct patient on fluid and nutrition as appropriate  - Assess for signs & symptoms of volume excess or deficit  Outcome: Progressing     Problem: MUSCULOSKELETAL - ADULT  Goal: Maintain or return mobility to safest level of function  Description  INTERVENTIONS:  - Assess patient's ability to carry out ADLs; assess patient's baseline for ADL function and identify physical deficits which impact ability to perform ADLs (bathing, care of mouth/teeth, toileting, grooming, dressing, etc )  - Assess/evaluate cause of self-care deficits   - Assess range of motion  - Assess patient's mobility  - Assess patient's need for assistive devices and provide as appropriate  - Encourage maximum independence but intervene and supervise when necessary  - Involve family in performance of ADLs  - Assess for home care needs following discharge   - Consider OT consult to assist with ADL evaluation and planning for discharge  - Provide patient education as appropriate  Outcome: Progressing  Goal: Maintain proper alignment of affected body part  Description  INTERVENTIONS:  - Support, maintain and protect limb and body alignment  - Provide patient/ family with appropriate education  Outcome: Progressing     Problem: Nutrition/Hydration-ADULT  Goal: Nutrient/Hydration intake appropriate for improving, restoring or maintaining nutritional needs  Description  Monitor and assess patient's nutrition/hydration status for malnutrition  Collaborate with interdisciplinary team and initiate plan and interventions as ordered  Monitor patient's weight and dietary intake as ordered or per policy  Utilize nutrition screening tool and intervene as necessary  Determine patient's food preferences and provide high-protein, high-caloric foods as appropriate       INTERVENTIONS:  - Monitor oral intake, urinary output, labs, and treatment plans  - Assess nutrition and hydration status and recommend course of action  - Evaluate amount of meals eaten  - Assist patient with eating if necessary   - Allow adequate time for meals  - Recommend/ encourage appropriate diets, oral nutritional supplements, and vitamin/mineral supplements  - Order, calculate, and assess calorie counts as needed  - Recommend, monitor, and adjust tube feedings and TPN/PPN based on assessed needs  - Assess need for intravenous fluids  - Provide specific nutrition/hydration education as appropriate  - Include patient/family/caregiver in decisions related to nutrition  Outcome: Progressing     Problem: Prexisting or High Potential for Compromised Skin Integrity  Goal: Skin integrity is maintained or improved  Description  INTERVENTIONS:  - Identify patients at risk for skin breakdown  - Assess and monitor skin integrity  - Assess and monitor nutrition and hydration status  - Monitor labs   - Assess for incontinence   - Turn and reposition patient  - Assist with mobility/ambulation  - Relieve pressure over bony prominences  - Avoid friction and shearing  - Provide appropriate hygiene as needed including keeping skin clean and dry  - Evaluate need for skin moisturizer/barrier cream  - Collaborate with interdisciplinary team   - Patient/family teaching  - Consider wound care consult   Outcome: Progressing

## 2019-08-16 NOTE — PROGRESS NOTES
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  Specimen was hemolyzed so sNa level likely not accurate  Per record review she is on 1 g sodium chloride tablets 3 times a day and is to follow a less than 1 2 L per day fluid restriction   -baseline sodium 130-133   -presented with serum sodium of 127(likely falsely low), improved to 133    -continue with 1 2 L per day fluid restriction   -continue on sodium chloride tablets 1 g t i d  Solange El    CKD stage II:  -baseline creatinine 0 6-0 9 per record review   -continue to avoid nephrotoxins  -will continue to monitor BMP   -UA:  Negative      Hyperkalemia:  Serum potassium level was 6 0, noted to be moderately hemolyzed  -will continue to monitor serum potassium level      Hypertension:  Blood pressure is slightly above goal   -continue on atenolol 50 mg daily, losartan 50 mg b i d  Solange El - continue to monitor, may need adjustment in BP medication for continued elevation       Recurrent falls: R/T [facility]  Plan for safe discharge to facility versus home alone    - PT/OT following   - case management for placement needs       Normal pressure hydrocephalus:  neurosurgery following    - planned for shunt placement in September  Disposition:  Okay to discharge from renal with OP follow up  Will sign off  Please re-consult if needed  Thank you  SUBJECTIVE:  The patient is doing well today  She is sitting out of bed in the chair  She currently denies any complaints      OBJECTIVE:  Current Weight: Weight - Scale: 76 kg (167 lb 8 8 oz)  Vitals:    08/15/19 0728 08/15/19 1500 08/15/19 2330 08/16/19 0737   BP: 153/67 146/67 137/63 155/70   BP Location: Right arm Right arm Right arm Right arm   Pulse: 84 94 75 78   Resp: 18 18 18 18   Temp: 97 8 °F (36 6 °C) 97 9 °F (36 6 °C) 97 6 °F (36 4 °C) 97 6 °F (36 4 °C)   TempSrc: Tympanic Tympanic Temporal Tympanic   SpO2: 97% 97% 96% 95%   Weight:       Height:           Intake/Output Summary (Last 24 hours) at 8/16/2019 0856  Last data filed at 8/15/2019 2021  Gross per 24 hour   Intake 320 ml   Output    Net 320 ml     General: No apparent distress  Skin: warm, dry, intact, no rash  HEENT: Moist mucous membranes, sclera anicteric, normocephalic  Neck: No apparent JVD appreciated  Chest: Lung sounds clear B/L, on RA  Heart: Regular rate and rhythm, No murmer  Abdomen: Soft, round, NT, +BS  Extremities: No B/L LE edema present  Neuro: Alert, forgetful  Psych: Appropriate mood and affect    Medications:    Current Facility-Administered Medications:     acetaminophen (TYLENOL) tablet 650 mg, 650 mg, Oral, Q6H PRN, BEBETO Hebert    aspirin chewable tablet 81 mg, 81 mg, Oral, Daily, BEBETO Hebert, 81 mg at 08/15/19 0912    atenolol (TENORMIN) tablet 50 mg, 50 mg, Oral, Daily, BEBETO Hebert, 50 mg at 08/15/19 1712    atorvastatin (LIPITOR) tablet 40 mg, 40 mg, Oral, Daily With Dinner, BEBETO Hebert, 40 mg at 08/15/19 1611    cholecalciferol (VITAMIN D3) tablet 1,000 Units, 1,000 Units, Oral, Daily, BEBETO Hebert, 1,000 Units at 08/15/19 0912    enoxaparin (LOVENOX) subcutaneous injection 40 mg, 40 mg, Subcutaneous, Daily, BEBETO Hebert, 40 mg at 08/15/19 0912    escitalopram (LEXAPRO) tablet 10 mg, 10 mg, Oral, Daily, BEBETO Hebert, 10 mg at 08/15/19 0912    losartan (COZAAR) tablet 50 mg, 50 mg, Oral, BID, BEBETO Hebert, 50 mg at 08/15/19 1712    multivitamin-minerals (CENTRUM) tablet 1 tablet, 1 tablet, Oral, Daily, BEBETO Hebert, 1 tablet at 08/15/19 0912    ondansetron (ZOFRAN) injection 4 mg, 4 mg, Intravenous, Q6H PRN, BEBETO Hebert    pantoprazole (PROTONIX) EC tablet 20 mg, 20 mg, Oral, Early Morning, BEBETO Hebert, 20 mg at 08/16/19 0525    sodium chloride tablet 1 g, 1 g, Oral, TID, Mark Else, CRNP, 1 g at 08/15/19 2016    Laboratory Results:  Results from last 7 days   Lab Units 08/16/19  0447 08/15/19  0449 08/15/19  0044 08/14/19 2029 08/14/19  1031   WBC Thousand/uL  --  12 22*  --  16 93* 17 05*   HEMOGLOBIN g/dL  --  10 7*  --  10 6* 11 5   HEMATOCRIT %  --  33 1*  --  31 5* 35 5   PLATELETS Thousands/uL  --  326  --  236 412*   POTASSIUM mmol/L 4 2 3 9 4 1 6 0* 4 9   CHLORIDE mmol/L 98* 97*  --  95* 97*   CO2 mmol/L 25 23  --  21 25   BUN mg/dL 13 12  --  13 12   CREATININE mg/dL 0 74 0 73  --  0 45* 0 73   CALCIUM mg/dL 9 1 9 2  --  9 0 9 3

## 2019-08-16 NOTE — CASE MANAGEMENT
CM spoke to Fadumo Lim at [facility] (patient's first choice) who confirmed that they will not have a female bed over the weekend but will hold bed for patient to come on Monday and submit for authorization  Fadumo Lim requested updated PT/OT notes to be done on Sunday or first thing Monday morning to submit for authorization and to have the CM call her [phone] on Monday to confirm discharge to [employer] at [facility]  CM updated [facility] physician and PT/OT  CM department will continue to follow through discharge

## 2019-08-16 NOTE — OCCUPATIONAL THERAPY NOTE
[address]  Evaluation     Patient Name: Theodore Roach  [id number] Date: 8/16/2019  Problem List  Patient Active Problem List   Diagnosis    Ambulatory dysfunction    Anxiety    Benign essential hypertension    Carotid artery plaque    Elevated sed rate    GERD without esophagitis    Hyperlipidemia    Chronic hyponatremia    Levoscoliosis    Occlusion of celiac artery    Vitamin D deficiency    Radiculopathy    Low hemoglobin    Asthma    Carotid bruit    Coronary atherosclerosis    Splenic infarction    Positive ROSALINDA (antinuclear antibody)    Thrombocytosis (HCC)    Fatigue    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HCC)    Forgetfulness    Balance problem    Confusion    Normal pressure hydrocephalus    Cataract, nuclear sclerotic senile, left    History of recent fall    Medication management    Stage 2 chronic kidney disease    Recurrent falls    Hyperkalemia    Leukocytosis     Past Medical History  Past Medical History:   Diagnosis Date    Arthritis     Confusion 10/2/2018    Depression     Displaced fracture of distal phalanx of right thumb     GERD (gastroesophageal reflux disease)     [facility] [address] )     Hyperlipidemia     Hypertension     Moderate episode of recurrent major depressive disorder ([facility] [address] ) 4/12/2019    Shortness of breath     Stage 2 chronic kidney disease 7/17/2019     Past Surgical History  Past Surgical History:   Procedure Laterality Date    APPENDECTOMY      CATARACT EXTRACTION      COLONOSCOPY      FL LUMBAR PUNCTURE  1/10/2019    SEPTOPLASTY      WRIST FRACTURE SURGERY Right            08/16/19 0908   Note Type   Note type Eval only   Restrictions/Precautions   Weight Bearing Precautions Per Order No   Other Precautions Fall Risk;Multiple lines;Cognitive; Chair Alarm; Bed Alarm   Pain Assessment   Pain Assessment No/denies pain   Pain Score No Pain   Home Living   Type of Home House   Home Layout Multi-level; Able to live on main level with bedroom/bathroom  (bi-level home; 7 [address])   Bathroom Shower/Tub Walk-in shower   Bathroom Toilet Standard   Bathroom Equipment Grab bars in shower   [address] Walker;Cane;Wheelchair-manual  (Rollator)   Additional Comments Pt lives alone in a bi-level house with 7 [address]  Pt reports dtr comes daily from 9AM-1PM to assist as needed, as well as another local dtr and sister  Prior Function   Level of [place] Independent with ADLs and functional mobility   Lives With Alone   Receives Help From Family  (dtr visits daily form 9AM-1PM)   ADL Assistance Independent   IADLs Needs assistance   Falls in the last 6 months 5 to 10   Vocational Retired   Comments At baseline, pt was I w/ ADLs and functional mobility/transfers w/ use of Rollator, required assist w/ IADLs, (-) , and reports 6-45 falls PTA  Lifestyle   Autonomy At baseline, pt was I w/ ADLs and functional mobility/transfers w/ use of Rollator, required assist w/ IADLs, (-) , and reports 9-57 falls PTA  Reciprocal Relationships Lives alone; 2 dtrs, local sister   Service to Others Retired   Intrinsic Gratification Watching TV   Psychosocial   Psychosocial (WDL) WDL   ADL   Where Assessed Chair   Eating Assistance 5  Supervision/Setup   Grooming Assistance 5  Supervision/Setup   [address] 5  Supervision/Setup   LB [address] 4  Minimal Assistance   [address] 5  Supervision/Setup    [facility] 3  Moderate Assistance   [address]  4  [facility] 4  Minimal Assistance   Bed Mobility   Supine to Sit Unable to assess   Sit to Supine Unable to assess   Additional Comments N/A  Pt seated OOB in chair at start/end of session  chair alarm activated at end of session  All needs met and pt reports no further questions for OT at this time   Transfers   Sit to Stand 4  Minimal assistance   Additional items Assist x 1; Armrests; Increased time required;Verbal cues   Stand to Sit 4  Minimal assistance   Additional items Assist x 1; Armrests; Increased time required;Verbal cues   Additional Comments Cues for safe technique and hand placement   Functional Mobility   Functional Mobility 4  Minimal assistance   Additional Comments Assist x1   Additional items Rolling walker   Balance   Static Sitting Fair +   Dynamic Sitting Fair   Static Standing Fair -   Dynamic Standing Poor +   Ambulatory Poor +   Activity Tolerance   Activity Tolerance Patient limited by fatigue   Medical Staff Made Aware SAMANTA Patton   Nurse Made Aware yes; MAYRA Belle   RUE Assessment   RUE Assessment WFL   RUE Strength   RUE Overall Strength Within Functional Limits - able to perform ADL tasks with strength  (3+/5 throughout)   LUE Assessment   LUE Assessment WFL   LUE Strength   LUE Overall Strength Within Functional Limits - able to perform ADL tasks with strength  (3+/5 throughout)   Hand Function   Gross Motor Coordination Functional   Fine Motor Coordination Functional   Sensation   Light Touch No apparent deficits   Proprioception   Proprioception No apparent deficits   Vision-Basic Assessment   Current Vision Wears glasses only for reading   Vision - Complex Assessment   Ocular Range of Motion [facility] Able to read clock/calendar on wall without difficulty   Perception   Inattention/Neglect Appears intact   Cognition   Overall Cognitive Status Impaired   Arousal/Participation Alert; Cooperative   Attention Attends with cues to redirect   Orientation Level Oriented to person;Oriented to time;Disoriented to place   Memory Decreased recall of precautions;Decreased recall of recent events;Decreased short term memory   Following Commands Follows one step commands without difficulty   Assessment   Limitation Decreased ADL status; Decreased UE strength;Decreased Safe judgement during ADL;Decreased cognition;Decreased endurance;Decreased self-care trans;Decreased high-level ADLs Prognosis Good   Assessment Pt is a 66 y o  female seen for OT evaluation s/p adm to [address] on 8/14/2019 s/p Recurrent falls   Imaging (-) for acute findings  Comorbidities affecting pts functional performance include a significant PMH of Arthritis, Confusion, Depression, Heart attack, HLD, HTN, SOB, CKD stage 2, and moderate episode of recurrent major depressive disorder  Pt with active OT orders and activity orders for Up with assistance  Pt lives alone in a bi-level house with 7 [facility]  Pt reports dtr comes daily from 9AM-1PM to assist as needed, as well as another local dtr and sister  At baseline, pt was I w/ ADLs and functional mobility/transfers w/ use of Rollator, required assist w/ IADLs, (-) , and reports 0-31 falls PTA  Upon evaluation, pt currently requires Supervision for UB ADLs, Mod-Min A for LB ADLs, Min A for toileting, Min A for functional mobility/transfers 2* the following deficits impacting occupational performance: weakness, decreased strength, decreased balance, decreased tolerance, impaired memory, decreased safety awareness and increased pain  These impairments, as well at pts steps to enter environment, limited home support, difficulty performing ADLS, difficulty performing IADLS  and limited insight into deficits limit pts ability to safely engage in all baseline areas of occupation  Pt scored overall 50/100 on the Barthel Index  Based on the aforementioned OT evaluation, functional performance deficits, and assessments, pt has been identified as a Moderate complexity evaluation  Pt to continue to benefit from continued acute OT services during hospital stay to address defined deficits and to maximize level of functional independence in the following Occupational Performance areas: grooming, bathing/shower, toilet hygiene, dressing, medication management, health maintenance, functional mobility, community mobility, clothing management and social participation   From OT standpoint, recommend STR upon D/C  OT will continue to follow pt 3-5x/wk to address the following goals to  w/in 10-14 days:   Goals   Patient Goals To get better   LTG Time Frame 10-14   Long Term Goal Please refer to LTGs listed below   Plan   Treatment Interventions ADL retraining;Functional transfer training;UE strengthening/ROM; Endurance training;Patient/family training;Equipment evaluation/education; Compensatory technique education; Energy conservation; Activityengagement   Goal Expiration Date 19   Treatment Day 0   OT Frequency 3-5x/wk   Recommendation   OT Discharge Recommendation Short Term Rehab   OT - OK to Discharge Yes  (when medically cleared to rehab)   Barthel Index   Feeding 10   Bathing 0   Grooming Score 5   Dressing Score 5   Bladder Score 5   Bowels Score 10   Toilet Use Score 5   Transfers (Bed/Chair) Score 10   Mobility (Level Surface) Score 0   Stairs Score 0   Barthel Index Score 50   Modified [place] Scale   Modified [place] Scale 4       GOALS    1) Pt will improve activity tolerance to G for min 30 min txment sessions for increase engagement in functional tasks    2) Pt will complete UB/LB dressing/self care w/ mod I using adaptive device and DME as needed    3) Pt will complete bathing w/ Mod I w/ use of AE and DME as needed    4) Pt will complete toileting w/ mod I w/ G hygiene/thoroughness using DME as needed    5) Pt will improve functional transfers to Mod I on/off all surfaces using DME as needed w/ G balance/safety     6) Pt will improve functional mobility during ADL/IADL/leisure tasks to Mod I using DME as needed w/ G balance/safety     7) Pt will verbalize 3 potential fall hazards and identify appropriate compensatory techniques to decrease fall risk in home environment     8) Pt will be attentive 100% of the time during ongoing cognitive assessment w/ G participation to assist w/ safe d/c planning/recommendations    9) Pt will demonstrate G carryover of pt/caregiver education and training as appropriate w/o cues w/ good tolerance to increase safety during functional tasks    10) Pt will demonstrate 100% carryover of energy conservation techniques t/o functional I/ADL/leisure tasks w/o cues s/p skilled education to increase endurance during functional tasks     11) Pt will increase BUE strength by 1MM grade to increase independence in ADLs and transfers      Katia Metzger, OTR/L

## 2019-08-16 NOTE — PLAN OF CARE
Problem: PHYSICAL THERAPY ADULT  Goal: Performs mobility at highest level of function for planned discharge setting  See evaluation for individualized goals  Description  Treatment/Interventions: Functional transfer training, LE strengthening/ROM, Elevations, Therapeutic exercise, Endurance training, Patient/family training, Equipment eval/education, Bed mobility, Gait training, Compensatory technique education, Continued evaluation, Spoke to nursing, OT  Equipment Recommended: Shanda Santa       See flowsheet documentation for full assessment, interventions and recommendations  Note:   Prognosis: Fair  Problem List: Decreased strength, Decreased endurance, Impaired balance, Decreased mobility, Decreased coordination, Decreased cognition, Impaired judgement, Decreased safety awareness, Impaired tone  Assessment: Pt is 65 y/o female admitted with recurrent falls, likely related to NPH and ambulatory dysfunction  HX of CKD, hyponatremia, ambulatory dysfunction  PT consulted  Up with assist orders  Imaging negative for fx  Prior to admission resides alone in bilevel home   7 [address] with main floor accomodation  + hx of falls  Support from local daughter and receiving OPPT PTA  Ambulates with rollator prior to admission household distances, outside of home using WC 2* fatigue  Currently presents with functional limitations related to decreased cognition, safety awareness, balance, strength, coordination, tone, activity tolerance and overall mobility  Currently requires Reba for transfers, cues for safety with hand placement  Ambulation with RW support with Reba of 2  Cues to remain inside SHIRLEY of RW, decreased foot clearance, step length, L foot lag with swing, decreased pace  Needs A for RW management to improve fluency  Fatigues with mobility  Given impairments will benefit from ongoing PT and rehab as lives alone and needs to achieve independent function  Will follow and progress    Barriers to Discharge: Inaccessible home environment, Decreased caregiver support     Recommendation: Short-term skilled PT     PT - OK to Discharge: No(to home, YES to rhb when medically stable )    See flowsheet documentation for full assessment

## 2019-08-16 NOTE — PROGRESS NOTES
[facility] Progress Note  Patient: Valencia Elizalde 66 y o  female   MRN: [id number]  PCP: Jacqueline Quiroga PA-C  Unit/Bed#: E5 -01 Encounter: [id number]  Date Of Visit: 08/16/19      Assessment/plan  1  Recurrent falls- likely due to nph  Appreciate pt/ot eval  Pt will need str       2  Leukocytosis- likely reactive  It is resolving  Urine culture shows mixed contaminents  Will check cbc in am       3  ckd2- stable       4  Normal pressure hydrocephalus- pt has surgery for  shunt on 9/25 with neurosurgery  Appreciate neurology recommendations  nph is stable from previous exam       5 chronic hyponatremia- appreciate nephrology recommendations  It is due to siadh  Continue fluid restriction and salt tabs  Stable       6  Pseudohyperkalemia due to hemolyzed blood     7  htn- stable  Continue current treatment    8  Enlarged spleen- will check ct abd/pelvis     dispo- pt will need str       Subjective:   Pt seen and examined  Pt is improved today  No f/c no cp no sob no n/v/d no abd pain  Less confused today  Objective:     Vitals: Blood pressure 155/70, pulse 78, temperature 97 6 °F (36 4 °C), temperature source Tympanic, resp  rate 18, height 5' 4" (1 626 m), weight 76 kg (167 lb 8 8 oz), SpO2 95 %, not currently breastfeeding  ,Body mass index is 28 76 kg/m²  Lab, Imaging and other studies:  Results from last 7 days   Lab Units 08/15/19  0449 08/14/19  1031   WBC Thousand/uL 12 22*   < > 17 05*   HEMOGLOBIN g/dL 10 7*   < > 11 5   HEMATOCRIT % 33 1*   < > 35 5   PLATELETS Thousands/uL 326   < > 412*   INR   --   --  0 97    < > = values in this interval not displayed       Results from last 7 days   Lab Units 08/16/19  0447  08/14/19  2029   POTASSIUM mmol/L 4 2   < > 6 0*   CHLORIDE mmol/L 98*   < > 95*   CO2 mmol/L 25   < > 21   BUN mg/dL 13   < > 13   CREATININE mg/dL 0 74   < > 0 45*   CALCIUM mg/dL 9 1   < > 9 0   ALK PHOS U/L  --   --  46   ALT U/L  --   --  43   AST U/L  --   -- 83*    < > = values in this interval not displayed  Lab Results   Component Value Date    URINECX 40,000-49,000 cfu/ml  08/14/2019     Scheduled Meds:   Current Facility-Administered Medications:  acetaminophen 650 mg Oral Q6H PRN Domi Francis, CRNP   aspirin 81 mg Oral Daily Domi Francis, CRNP   atenolol 50 mg Oral Daily Domi Francis, CRNP   atorvastatin 40 mg Oral Daily With [employer], CRNP   cholecalciferol 1,000 Units Oral Daily Domi Francis, CRNP   enoxaparin 40 mg Subcutaneous Daily Domi Francis, CRNP   escitalopram 10 mg Oral Daily Domi Francis, CRNP   losartan 50 mg Oral BID Domi Francis, CRNP   multivitamin-minerals 1 tablet Oral Daily Domi Francis, CRNP   ondansetron 4 mg Intravenous Q6H PRN Domi Francis, CRNP   pantoprazole 20 mg Oral Early Morning Domi Francis, CRNP   sodium chloride 1 g Oral TID Domi Francis, CRNP     Continuous Infusions:    PRN Meds:   acetaminophen    ondansetron      Physical exam:  Physical Exam  General appearance: alert and oriented, in no acute distress  Head: Normocephalic, without obvious abnormality, atraumatic  Eyes: conjunctivae/corneas clear  PERRL, EOM's intact  Fundi benign    Neck: no adenopathy, no carotid bruit, no JVD, supple, symmetrical, trachea midline and thyroid not enlarged, symmetric, no tenderness/mass/nodules  Lungs: clear to auscultation bilaterally  Heart: regular rate and rhythm, S1, S2 normal, no murmur, click, rub or gallop  Abdomen: soft, non-tender; bowel sounds normal; no masses,  no organomegaly  Extremities: extremities normal, warm and well-perfused; no cyanosis, clubbing, or edema  Pulses: 2+ and symmetric  Skin: Skin color, texture, turgor normal  No rashes or lesions  Neurologic: Mental status: awake alert oriented x2 person, place      VTE Pharmacologic Prophylaxis: Enoxaparin (Lovenox)  VTE Mechanical Prophylaxis: sequential compression device    Counseling / Coordination of Care  Total floor / unit time spent today 20 minutes      Current Length of Stay: 2 day(s)    Current Patient Status: Inpatient       Code Status: Level 1 - Full Code

## 2019-08-17 LAB
ANION GAP SERPL CALCULATED.3IONS-SCNC: 10 MMOL/L (ref 4–13)
BUN SERPL-MCNC: 12 MG/DL (ref 5–25)
CALCIUM SERPL-MCNC: 9.4 MG/DL (ref 8.3–10.1)
CHLORIDE SERPL-SCNC: 98 MMOL/L (ref 100–108)
CO2 SERPL-SCNC: 25 MMOL/L (ref 21–32)
CREAT SERPL-MCNC: 0.75 MG/DL (ref 0.6–1.3)
ERYTHROCYTE [DISTWIDTH] IN BLOOD BY AUTOMATED COUNT: 12.5 % (ref 11.6–15.1)
GFR SERPL CREATININE-BSD FRML MDRD: 77 ML/MIN/1.73SQ M
GLUCOSE SERPL-MCNC: 125 MG/DL (ref 65–140)
HCT VFR BLD AUTO: 34.6 % (ref 34.8–46.1)
HGB BLD-MCNC: 11.4 G/DL (ref 11.5–15.4)
MCH RBC QN AUTO: 31.3 PG (ref 26.8–34.3)
MCHC RBC AUTO-ENTMCNC: 32.9 G/DL (ref 31.4–37.4)
MCV RBC AUTO: 95 FL (ref 82–98)
PLATELET # BLD AUTO: 363 THOUSANDS/UL (ref 149–390)
PMV BLD AUTO: 9.6 FL (ref 8.9–12.7)
POTASSIUM SERPL-SCNC: 4.2 MMOL/L (ref 3.5–5.3)
RBC # BLD AUTO: 3.64 MILLION/UL (ref 3.81–5.12)
SODIUM SERPL-SCNC: 133 MMOL/L (ref 136–145)
WBC # BLD AUTO: 8.14 THOUSAND/UL (ref 4.31–10.16)

## 2019-08-17 PROCEDURE — 80048 BASIC METABOLIC PNL TOTAL CA: CPT | Performed by: INTERNAL MEDICINE

## 2019-08-17 PROCEDURE — 85027 COMPLETE CBC AUTOMATED: CPT | Performed by: INTERNAL MEDICINE

## 2019-08-17 RX ADMIN — ATORVASTATIN CALCIUM 40 MG: 40 TABLET, FILM COATED ORAL at 16:39

## 2019-08-17 RX ADMIN — SODIUM CHLORIDE TAB 1 GM 1 G: 1 TAB at 20:21

## 2019-08-17 RX ADMIN — LOSARTAN POTASSIUM 50 MG: 50 TABLET ORAL at 16:39

## 2019-08-17 RX ADMIN — ATENOLOL 50 MG: 50 TABLET ORAL at 16:39

## 2019-08-17 RX ADMIN — Medication 1 TABLET: at 08:34

## 2019-08-17 RX ADMIN — ENOXAPARIN SODIUM 40 MG: 40 INJECTION SUBCUTANEOUS at 08:34

## 2019-08-17 RX ADMIN — VITAMIN D, TAB 1000IU (100/BT) 1000 UNITS: 25 TAB at 08:34

## 2019-08-17 RX ADMIN — SODIUM CHLORIDE TAB 1 GM 1 G: 1 TAB at 16:39

## 2019-08-17 RX ADMIN — SODIUM CHLORIDE TAB 1 GM 1 G: 1 TAB at 08:34

## 2019-08-17 RX ADMIN — ESCITALOPRAM OXALATE 10 MG: 10 TABLET ORAL at 08:34

## 2019-08-17 RX ADMIN — PANTOPRAZOLE SODIUM 20 MG: 20 TABLET, DELAYED RELEASE ORAL at 05:24

## 2019-08-17 RX ADMIN — LOSARTAN POTASSIUM 50 MG: 50 TABLET ORAL at 08:34

## 2019-08-17 RX ADMIN — ASPIRIN 81 MG 81 MG: 81 TABLET ORAL at 08:34

## 2019-08-17 NOTE — PLAN OF CARE
Problem: Potential for Falls  Goal: Patient will remain free of falls  Description  INTERVENTIONS:  - Assess patient frequently for physical needs  -  Identify cognitive and physical deficits and behaviors that affect risk of falls    -  [place] fall precautions as indicated by assessment   - Educate patient/family on patient safety including physical limitations  - Instruct patient to call for assistance with activity based on assessment  - Modify environment to reduce risk of injury  - Consider OT/PT consult to assist with strengthening/mobility  Outcome: Progressing     Problem: PAIN - ADULT  Goal: Verbalizes/displays adequate comfort level or baseline comfort level  Description  Interventions:  - Encourage patient to monitor pain and request assistance  - Assess pain using appropriate pain scale  - Administer analgesics based on type and severity of pain and evaluate response  - Implement non-pharmacological measures as appropriate and evaluate response  - Consider cultural and social influences on pain and pain management  - Notify physician/advanced practitioner if interventions unsuccessful or patient reports new pain  Outcome: Progressing     Problem: INFECTION - ADULT  Goal: Absence or prevention of progression during hospitalization  Description  INTERVENTIONS:  - Assess and monitor for signs and symptoms of infection  - Monitor lab/diagnostic results  - Monitor all insertion sites, i e  indwelling lines, tubes, and drains  - Monitor endotracheal if appropriate and nasal secretions for changes in amount and color  - [place] appropriate cooling/warming therapies per order  - Administer medications as ordered  - Instruct and encourage patient and family to use good hand hygiene technique  - Identify and instruct in appropriate isolation precautions for identified infection/condition  Outcome: Progressing  Goal: Absence of fever/infection during neutropenic period  Description  INTERVENTIONS:  - Monitor WBC    Outcome: Progressing     Problem: SAFETY ADULT  Goal: Maintain or return to baseline ADL function  Description  INTERVENTIONS:  -  Assess patient's ability to carry out ADLs; assess patient's baseline for ADL function and identify physical deficits which impact ability to perform ADLs (bathing, care of mouth/teeth, toileting, grooming, dressing, etc )  - Assess/evaluate cause of self-care deficits   - Assess range of motion  - Assess patient's mobility; develop plan if impaired  - Assess patient's need for assistive devices and provide as appropriate  - Encourage maximum independence but intervene and supervise when necessary  - Involve family in performance of ADLs  - Assess for home care needs following discharge   - Consider OT consult to assist with ADL evaluation and planning for discharge  - Provide patient education as appropriate  Outcome: Progressing  Goal: Maintain or return mobility status to optimal level  Description  INTERVENTIONS:  - Assess patient's baseline mobility status (ambulation, transfers, stairs, etc )    - Identify cognitive and physical deficits and behaviors that affect mobility  - Identify mobility aids required to assist with transfers and/or ambulation (gait belt, sit-to-stand, lift, walker, cane, etc )  - [place] fall precautions as indicated by assessment  - Record patient progress and toleration of activity level on Mobility SBAR; progress patient to next Phase/Stage  - Instruct patient to call for assistance with activity based on assessment  - Consider rehabilitation consult to assist with strengthening/weightbearing, etc   Outcome: Progressing     Problem: DISCHARGE PLANNING  Goal: Discharge to home or other facility with appropriate resources  Description  INTERVENTIONS:  - Identify barriers to discharge w/patient and caregiver  - Arrange for needed discharge resources and transportation as appropriate  - Identify discharge learning needs (meds, wound care, etc )  - Arrange for interpretive services to assist at discharge as needed  - Refer to Case Management Department for coordinating discharge planning if the patient needs post-hospital services based on physician/advanced practitioner order or complex needs related to functional status, cognitive ability, or social support system  Outcome: Progressing     Problem: Knowledge Deficit  Goal: Patient/family/caregiver demonstrates understanding of disease process, treatment plan, medications, and discharge instructions  Description  Complete learning assessment and assess knowledge base  Interventions:  - Provide teaching at level of understanding  - Provide teaching via preferred learning methods  Outcome: Progressing     Problem: NEUROSENSORY - ADULT  Goal: Achieves stable or improved neurological status  Description  INTERVENTIONS  - Monitor and report changes in neurological status  - Monitor vital signs such as temperature, blood pressure, glucose, and any other labs ordered   - Initiate measures to prevent increased intracranial pressure  - Monitor for seizure activity and implement precautions if appropriate      Outcome: Progressing  Goal: Achieves maximal functionality and self care  Description  INTERVENTIONS  - Monitor swallowing and airway patency with patient fatigue and changes in neurological status  - Encourage and assist patient to increase activity and self care     - Encourage visually impaired, hearing impaired and aphasic patients to use assistive/communication devices  Outcome: Progressing     Problem: METABOLIC, FLUID AND ELECTROLYTES - ADULT  Goal: Electrolytes maintained within normal limits  Description  INTERVENTIONS:  - Monitor labs and assess patient for signs and symptoms of electrolyte imbalances  - Administer electrolyte replacement as ordered  - Monitor response to electrolyte replacements, including repeat lab results as appropriate  - Instruct patient on fluid and nutrition as appropriate  Outcome: Progressing  Goal: Fluid balance maintained  Description  INTERVENTIONS:  - Monitor labs   - Monitor I/O and WT  - Instruct patient on fluid and nutrition as appropriate  - Assess for signs & symptoms of volume excess or deficit  Outcome: Progressing     Problem: MUSCULOSKELETAL - ADULT  Goal: Maintain or return mobility to safest level of function  Description  INTERVENTIONS:  - Assess patient's ability to carry out ADLs; assess patient's baseline for ADL function and identify physical deficits which impact ability to perform ADLs (bathing, care of mouth/teeth, toileting, grooming, dressing, etc )  - Assess/evaluate cause of self-care deficits   - Assess range of motion  - Assess patient's mobility  - Assess patient's need for assistive devices and provide as appropriate  - Encourage maximum independence but intervene and supervise when necessary  - Involve family in performance of ADLs  - Assess for home care needs following discharge   - Consider OT consult to assist with ADL evaluation and planning for discharge  - Provide patient education as appropriate  Outcome: Progressing  Goal: Maintain proper alignment of affected body part  Description  INTERVENTIONS:  - Support, maintain and protect limb and body alignment  - Provide patient/ family with appropriate education  Outcome: Progressing     Problem: Nutrition/Hydration-ADULT  Goal: Nutrient/Hydration intake appropriate for improving, restoring or maintaining nutritional needs  Description  Monitor and assess patient's nutrition/hydration status for malnutrition  Collaborate with interdisciplinary team and initiate plan and interventions as ordered  Monitor patient's weight and dietary intake as ordered or per policy  Utilize nutrition screening tool and intervene as necessary  Determine patient's food preferences and provide high-protein, high-caloric foods as appropriate       INTERVENTIONS:  - Monitor oral intake, urinary output, labs, and treatment plans  - Assess nutrition and hydration status and recommend course of action  - Evaluate amount of meals eaten  - Assist patient with eating if necessary   - Allow adequate time for meals  - Recommend/ encourage appropriate diets, oral nutritional supplements, and vitamin/mineral supplements  - Order, calculate, and assess calorie counts as needed  - Recommend, monitor, and adjust tube feedings and TPN/PPN based on assessed needs  - Assess need for intravenous fluids  - Provide specific nutrition/hydration education as appropriate  - Include patient/family/caregiver in decisions related to nutrition  Outcome: Progressing     Problem: Prexisting or High Potential for Compromised Skin Integrity  Goal: Skin integrity is maintained or improved  Description  INTERVENTIONS:  - Identify patients at risk for skin breakdown  - Assess and monitor skin integrity  - Assess and monitor nutrition and hydration status  - Monitor labs   - Assess for incontinence   - Turn and reposition patient  - Assist with mobility/ambulation  - Relieve pressure over bony prominences  - Avoid friction and shearing  - Provide appropriate hygiene as needed including keeping skin clean and dry  - Evaluate need for skin moisturizer/barrier cream  - Collaborate with interdisciplinary team   - Patient/family teaching  - Consider wound care consult   Outcome: Progressing

## 2019-08-17 NOTE — PLAN OF CARE
Problem: Potential for Falls  Goal: Patient will remain free of falls  Description  INTERVENTIONS:  - Assess patient frequently for physical needs  -  Identify cognitive and physical deficits and behaviors that affect risk of falls    -  [place] fall precautions as indicated by assessment   - Educate patient/family on patient safety including physical limitations  - Instruct patient to call for assistance with activity based on assessment  - Modify environment to reduce risk of injury  - Consider OT/PT consult to assist with strengthening/mobility  Outcome: Progressing     Problem: PAIN - ADULT  Goal: Verbalizes/displays adequate comfort level or baseline comfort level  Description  Interventions:  - Encourage patient to monitor pain and request assistance  - Assess pain using appropriate pain scale  - Administer analgesics based on type and severity of pain and evaluate response  - Implement non-pharmacological measures as appropriate and evaluate response  - Consider cultural and social influences on pain and pain management  - Notify physician/advanced practitioner if interventions unsuccessful or patient reports new pain  Outcome: Progressing     Problem: INFECTION - ADULT  Goal: Absence or prevention of progression during hospitalization  Description  INTERVENTIONS:  - Assess and monitor for signs and symptoms of infection  - Monitor lab/diagnostic results  - Monitor all insertion sites, i e  indwelling lines, tubes, and drains  - Monitor endotracheal if appropriate and nasal secretions for changes in amount and color  - [place] appropriate cooling/warming therapies per order  - Administer medications as ordered  - Instruct and encourage patient and family to use good hand hygiene technique  - Identify and instruct in appropriate isolation precautions for identified infection/condition  Outcome: Progressing  Goal: Absence of fever/infection during neutropenic period  Description  INTERVENTIONS:  - Monitor WBC    Outcome: Progressing     Problem: SAFETY ADULT  Goal: Maintain or return to baseline ADL function  Description  INTERVENTIONS:  -  Assess patient's ability to carry out ADLs; assess patient's baseline for ADL function and identify physical deficits which impact ability to perform ADLs (bathing, care of mouth/teeth, toileting, grooming, dressing, etc )  - Assess/evaluate cause of self-care deficits   - Assess range of motion  - Assess patient's mobility; develop plan if impaired  - Assess patient's need for assistive devices and provide as appropriate  - Encourage maximum independence but intervene and supervise when necessary  - Involve family in performance of ADLs  - Assess for home care needs following discharge   - Consider OT consult to assist with ADL evaluation and planning for discharge  - Provide patient education as appropriate  Outcome: Progressing  Goal: Maintain or return mobility status to optimal level  Description  INTERVENTIONS:  - Assess patient's baseline mobility status (ambulation, transfers, stairs, etc )    - Identify cognitive and physical deficits and behaviors that affect mobility  - Identify mobility aids required to assist with transfers and/or ambulation (gait belt, sit-to-stand, lift, walker, cane, etc )  - [place] fall precautions as indicated by assessment  - Record patient progress and toleration of activity level on Mobility SBAR; progress patient to next Phase/Stage  - Instruct patient to call for assistance with activity based on assessment  - Consider rehabilitation consult to assist with strengthening/weightbearing, etc   Outcome: Progressing     Problem: DISCHARGE PLANNING  Goal: Discharge to home or other facility with appropriate resources  Description  INTERVENTIONS:  - Identify barriers to discharge w/patient and caregiver  - Arrange for needed discharge resources and transportation as appropriate  - Identify discharge learning needs (meds, wound care, etc )  - Arrange for interpretive services to assist at discharge as needed  - Refer to Case Management Department for coordinating discharge planning if the patient needs post-hospital services based on physician/advanced practitioner order or complex needs related to functional status, cognitive ability, or social support system  Outcome: Progressing     Problem: Knowledge Deficit  Goal: Patient/family/caregiver demonstrates understanding of disease process, treatment plan, medications, and discharge instructions  Description  Complete learning assessment and assess knowledge base  Interventions:  - Provide teaching at level of understanding  - Provide teaching via preferred learning methods  Outcome: Progressing     Problem: NEUROSENSORY - ADULT  Goal: Achieves stable or improved neurological status  Description  INTERVENTIONS  - Monitor and report changes in neurological status  - Monitor vital signs such as temperature, blood pressure, glucose, and any other labs ordered   - Initiate measures to prevent increased intracranial pressure  - Monitor for seizure activity and implement precautions if appropriate      Outcome: Progressing  Goal: Achieves maximal functionality and self care  Description  INTERVENTIONS  - Monitor swallowing and airway patency with patient fatigue and changes in neurological status  - Encourage and assist patient to increase activity and self care     - Encourage visually impaired, hearing impaired and aphasic patients to use assistive/communication devices  Outcome: Progressing     Problem: METABOLIC, FLUID AND ELECTROLYTES - ADULT  Goal: Electrolytes maintained within normal limits  Description  INTERVENTIONS:  - Monitor labs and assess patient for signs and symptoms of electrolyte imbalances  - Administer electrolyte replacement as ordered  - Monitor response to electrolyte replacements, including repeat lab results as appropriate  - Instruct patient on fluid and nutrition as appropriate  Outcome: Progressing  Goal: Fluid balance maintained  Description  INTERVENTIONS:  - Monitor labs   - Monitor I/O and WT  - Instruct patient on fluid and nutrition as appropriate  - Assess for signs & symptoms of volume excess or deficit  Outcome: Progressing     Problem: MUSCULOSKELETAL - ADULT  Goal: Maintain or return mobility to safest level of function  Description  INTERVENTIONS:  - Assess patient's ability to carry out ADLs; assess patient's baseline for ADL function and identify physical deficits which impact ability to perform ADLs (bathing, care of mouth/teeth, toileting, grooming, dressing, etc )  - Assess/evaluate cause of self-care deficits   - Assess range of motion  - Assess patient's mobility  - Assess patient's need for assistive devices and provide as appropriate  - Encourage maximum independence but intervene and supervise when necessary  - Involve family in performance of ADLs  - Assess for home care needs following discharge   - Consider OT consult to assist with ADL evaluation and planning for discharge  - Provide patient education as appropriate  Outcome: Progressing  Goal: Maintain proper alignment of affected body part  Description  INTERVENTIONS:  - Support, maintain and protect limb and body alignment  - Provide patient/ family with appropriate education  Outcome: Progressing     Problem: Nutrition/Hydration-ADULT  Goal: Nutrient/Hydration intake appropriate for improving, restoring or maintaining nutritional needs  Description  Monitor and assess patient's nutrition/hydration status for malnutrition  Collaborate with interdisciplinary team and initiate plan and interventions as ordered  Monitor patient's weight and dietary intake as ordered or per policy  Utilize nutrition screening tool and intervene as necessary  Determine patient's food preferences and provide high-protein, high-caloric foods as appropriate       INTERVENTIONS:  - Monitor oral intake, urinary output, labs, and treatment plans  - Assess nutrition and hydration status and recommend course of action  - Evaluate amount of meals eaten  - Assist patient with eating if necessary   - Allow adequate time for meals  - Recommend/ encourage appropriate diets, oral nutritional supplements, and vitamin/mineral supplements  - Order, calculate, and assess calorie counts as needed  - Recommend, monitor, and adjust tube feedings and TPN/PPN based on assessed needs  - Assess need for intravenous fluids  - Provide specific nutrition/hydration education as appropriate  - Include patient/family/caregiver in decisions related to nutrition  Outcome: Progressing     Problem: Prexisting or High Potential for Compromised Skin Integrity  Goal: Skin integrity is maintained or improved  Description  INTERVENTIONS:  - Identify patients at risk for skin breakdown  - Assess and monitor skin integrity  - Assess and monitor nutrition and hydration status  - Monitor labs   - Assess for incontinence   - Turn and reposition patient  - Assist with mobility/ambulation  - Relieve pressure over bony prominences  - Avoid friction and shearing  - Provide appropriate hygiene as needed including keeping skin clean and dry  - Evaluate need for skin moisturizer/barrier cream  - Collaborate with interdisciplinary team   - Patient/family teaching  - Consider wound care consult   Outcome: Progressing

## 2019-08-17 NOTE — PLAN OF CARE
Problem: Potential for Falls  Goal: Patient will remain free of falls  Description  INTERVENTIONS:  - Assess patient frequently for physical needs  -  Identify cognitive and physical deficits and behaviors that affect risk of falls    -  [place] fall precautions as indicated by assessment   - Educate patient/family on patient safety including physical limitations  - Instruct patient to call for assistance with activity based on assessment  - Modify environment to reduce risk of injury  - Consider OT/PT consult to assist with strengthening/mobility  Outcome: Progressing     Problem: PAIN - ADULT  Goal: Verbalizes/displays adequate comfort level or baseline comfort level  Description  Interventions:  - Encourage patient to monitor pain and request assistance  - Assess pain using appropriate pain scale  - Administer analgesics based on type and severity of pain and evaluate response  - Implement non-pharmacological measures as appropriate and evaluate response  - Consider cultural and social influences on pain and pain management  - Notify physician/advanced practitioner if interventions unsuccessful or patient reports new pain  Outcome: Progressing     Problem: INFECTION - ADULT  Goal: Absence or prevention of progression during hospitalization  Description  INTERVENTIONS:  - Assess and monitor for signs and symptoms of infection  - Monitor lab/diagnostic results  - Monitor all insertion sites, i e  indwelling lines, tubes, and drains  - Monitor endotracheal if appropriate and nasal secretions for changes in amount and color  - [place] appropriate cooling/warming therapies per order  - Administer medications as ordered  - Instruct and encourage patient and family to use good hand hygiene technique  - Identify and instruct in appropriate isolation precautions for identified infection/condition  Outcome: Progressing  Goal: Absence of fever/infection during neutropenic period  Description  INTERVENTIONS:  - Monitor WBC    Outcome: Progressing     Problem: SAFETY ADULT  Goal: Maintain or return to baseline ADL function  Description  INTERVENTIONS:  -  Assess patient's ability to carry out ADLs; assess patient's baseline for ADL function and identify physical deficits which impact ability to perform ADLs (bathing, care of mouth/teeth, toileting, grooming, dressing, etc )  - Assess/evaluate cause of self-care deficits   - Assess range of motion  - Assess patient's mobility; develop plan if impaired  - Assess patient's need for assistive devices and provide as appropriate  - Encourage maximum independence but intervene and supervise when necessary  - Involve family in performance of ADLs  - Assess for home care needs following discharge   - Consider OT consult to assist with ADL evaluation and planning for discharge  - Provide patient education as appropriate  Outcome: Progressing  Goal: Maintain or return mobility status to optimal level  Description  INTERVENTIONS:  - Assess patient's baseline mobility status (ambulation, transfers, stairs, etc )    - Identify cognitive and physical deficits and behaviors that affect mobility  - Identify mobility aids required to assist with transfers and/or ambulation (gait belt, sit-to-stand, lift, walker, cane, etc )  - [place] fall precautions as indicated by assessment  - Record patient progress and toleration of activity level on Mobility SBAR; progress patient to next Phase/Stage  - Instruct patient to call for assistance with activity based on assessment  - Consider rehabilitation consult to assist with strengthening/weightbearing, etc   Outcome: Progressing     Problem: DISCHARGE PLANNING  Goal: Discharge to home or other facility with appropriate resources  Description  INTERVENTIONS:  - Identify barriers to discharge w/patient and caregiver  - Arrange for needed discharge resources and transportation as appropriate  - Identify discharge learning needs (meds, wound care, etc )  - Arrange for interpretive services to assist at discharge as needed  - Refer to Case Management Department for coordinating discharge planning if the patient needs post-hospital services based on physician/advanced practitioner order or complex needs related to functional status, cognitive ability, or social support system  Outcome: Progressing     Problem: Knowledge Deficit  Goal: Patient/family/caregiver demonstrates understanding of disease process, treatment plan, medications, and discharge instructions  Description  Complete learning assessment and assess knowledge base  Interventions:  - Provide teaching at level of understanding  - Provide teaching via preferred learning methods  Outcome: Progressing     Problem: NEUROSENSORY - ADULT  Goal: Achieves stable or improved neurological status  Description  INTERVENTIONS  - Monitor and report changes in neurological status  - Monitor vital signs such as temperature, blood pressure, glucose, and any other labs ordered   - Initiate measures to prevent increased intracranial pressure  - Monitor for seizure activity and implement precautions if appropriate      Outcome: Progressing  Goal: Achieves maximal functionality and self care  Description  INTERVENTIONS  - Monitor swallowing and airway patency with patient fatigue and changes in neurological status  - Encourage and assist patient to increase activity and self care     - Encourage visually impaired, hearing impaired and aphasic patients to use assistive/communication devices  Outcome: Progressing     Problem: METABOLIC, FLUID AND ELECTROLYTES - ADULT  Goal: Electrolytes maintained within normal limits  Description  INTERVENTIONS:  - Monitor labs and assess patient for signs and symptoms of electrolyte imbalances  - Administer electrolyte replacement as ordered  - Monitor response to electrolyte replacements, including repeat lab results as appropriate  - Instruct patient on fluid and nutrition as appropriate  Outcome: Progressing  Goal: Fluid balance maintained  Description  INTERVENTIONS:  - Monitor labs   - Monitor I/O and WT  - Instruct patient on fluid and nutrition as appropriate  - Assess for signs & symptoms of volume excess or deficit  Outcome: Progressing     Problem: MUSCULOSKELETAL - ADULT  Goal: Maintain or return mobility to safest level of function  Description  INTERVENTIONS:  - Assess patient's ability to carry out ADLs; assess patient's baseline for ADL function and identify physical deficits which impact ability to perform ADLs (bathing, care of mouth/teeth, toileting, grooming, dressing, etc )  - Assess/evaluate cause of self-care deficits   - Assess range of motion  - Assess patient's mobility  - Assess patient's need for assistive devices and provide as appropriate  - Encourage maximum independence but intervene and supervise when necessary  - Involve family in performance of ADLs  - Assess for home care needs following discharge   - Consider OT consult to assist with ADL evaluation and planning for discharge  - Provide patient education as appropriate  Outcome: Progressing  Goal: Maintain proper alignment of affected body part  Description  INTERVENTIONS:  - Support, maintain and protect limb and body alignment  - Provide patient/ family with appropriate education  Outcome: Progressing     Problem: Nutrition/Hydration-ADULT  Goal: Nutrient/Hydration intake appropriate for improving, restoring or maintaining nutritional needs  Description  Monitor and assess patient's nutrition/hydration status for malnutrition  Collaborate with interdisciplinary team and initiate plan and interventions as ordered  Monitor patient's weight and dietary intake as ordered or per policy  Utilize nutrition screening tool and intervene as necessary  Determine patient's food preferences and provide high-protein, high-caloric foods as appropriate       INTERVENTIONS:  - Monitor oral intake, urinary output, labs, and treatment plans  - Assess nutrition and hydration status and recommend course of action  - Evaluate amount of meals eaten  - Assist patient with eating if necessary   - Allow adequate time for meals  - Recommend/ encourage appropriate diets, oral nutritional supplements, and vitamin/mineral supplements  - Order, calculate, and assess calorie counts as needed  - Recommend, monitor, and adjust tube feedings and TPN/PPN based on assessed needs  - Assess need for intravenous fluids  - Provide specific nutrition/hydration education as appropriate  - Include patient/family/caregiver in decisions related to nutrition  Outcome: Progressing     Problem: Prexisting or High Potential for Compromised Skin Integrity  Goal: Skin integrity is maintained or improved  Description  INTERVENTIONS:  - Identify patients at risk for skin breakdown  - Assess and monitor skin integrity  - Assess and monitor nutrition and hydration status  - Monitor labs   - Assess for incontinence   - Turn and reposition patient  - Assist with mobility/ambulation  - Relieve pressure over bony prominences  - Avoid friction and shearing  - Provide appropriate hygiene as needed including keeping skin clean and dry  - Evaluate need for skin moisturizer/barrier cream  - Collaborate with interdisciplinary team   - Patient/family teaching  - Consider wound care consult   Outcome: Progressing

## 2019-08-18 PROCEDURE — 97535 SELF CARE MNGMENT TRAINING: CPT

## 2019-08-18 PROCEDURE — 97116 GAIT TRAINING THERAPY: CPT

## 2019-08-18 PROCEDURE — 99232 SBSQ HOSP IP/OBS MODERATE 35: CPT | Performed by: INTERNAL MEDICINE

## 2019-08-18 PROCEDURE — 97530 THERAPEUTIC ACTIVITIES: CPT

## 2019-08-18 PROCEDURE — G0515 COGNITIVE SKILLS DEVELOPMENT: HCPCS

## 2019-08-18 PROCEDURE — 97110 THERAPEUTIC EXERCISES: CPT

## 2019-08-18 RX ADMIN — ESCITALOPRAM OXALATE 10 MG: 10 TABLET ORAL at 09:58

## 2019-08-18 RX ADMIN — ASPIRIN 81 MG 81 MG: 81 TABLET ORAL at 09:58

## 2019-08-18 RX ADMIN — PANTOPRAZOLE SODIUM 20 MG: 20 TABLET, DELAYED RELEASE ORAL at 05:11

## 2019-08-18 RX ADMIN — SODIUM CHLORIDE TAB 1 GM 1 G: 1 TAB at 20:40

## 2019-08-18 RX ADMIN — ENOXAPARIN SODIUM 40 MG: 40 INJECTION SUBCUTANEOUS at 09:58

## 2019-08-18 RX ADMIN — Medication 1 TABLET: at 09:58

## 2019-08-18 RX ADMIN — ATENOLOL 50 MG: 50 TABLET ORAL at 17:00

## 2019-08-18 RX ADMIN — VITAMIN D, TAB 1000IU (100/BT) 1000 UNITS: 25 TAB at 09:58

## 2019-08-18 RX ADMIN — ATORVASTATIN CALCIUM 40 MG: 40 TABLET, FILM COATED ORAL at 17:00

## 2019-08-18 RX ADMIN — SODIUM CHLORIDE TAB 1 GM 1 G: 1 TAB at 09:58

## 2019-08-18 RX ADMIN — LOSARTAN POTASSIUM 50 MG: 50 TABLET ORAL at 17:00

## 2019-08-18 RX ADMIN — LOSARTAN POTASSIUM 50 MG: 50 TABLET ORAL at 09:58

## 2019-08-18 RX ADMIN — SODIUM CHLORIDE TAB 1 GM 1 G: 1 TAB at 17:00

## 2019-08-18 NOTE — PHYSICAL THERAPY NOTE
PHYSICAL THERAPY NOTE          Patient Name: Karen Bates  RIXJJ'H Date: 8/18/2019     07:47-08:16= 29 minutes     08/18/19 0816   Pain Assessment   Pain Assessment No/denies pain   Restrictions/Precautions   Weight Bearing Precautions Per Order No   Other Precautions Cognitive; Chair Alarm; Bed Alarm; Fall Risk   General   Chart Reviewed Yes   Response to Previous Treatment Patient with no complaints from previous session  Family/Caregiver Present No   Cognition   Overall Cognitive Status Impaired   Arousal/Participation Cooperative   Attention Attends with cues to redirect   Following Commands Follows one step commands with increased time or repetition   Comments delayed motor processing  Subjective   Subjective Agreeable to therapy  Tired  Increased time for responses  Bed Mobility   Additional Comments received sitting OOB in recliner chair, alarm in place  Transfers   Sit to Stand 3  Moderate assistance   Additional items Assist x 1; Increased time required;Verbal cues;Armrests  (Reba x 1 from chair, modA from rollator walker seat)   Stand to Sit 4  Minimal assistance   Additional items Assist x 1; Increased time required;Verbal cues   Additional Comments increased difficulty for sit to stand transitions from rollator walker p seated rest with ambulation  Dizziness reported 156/62 BP despite  2 trials on rollator sit to stand  Step by step cues needed with cues for safe brake management  Ambulation/Elevation   Gait pattern Improper Weight shift;Decreased foot clearance;Decreased L stance; Short stride; Excessively slow  (decreased LLE advancement)   Gait Assistance 4  Minimal assist   Additional items Assist x 1;Verbal cues; Tactile cues  (with moderate VC)   Assistive Device Other (Comment)  (rollator walker )   Distance Amb with rollator walker 50 ft x 2 with seated rest on rollator   + SAENZ  Sats on RA 98%    BP 156/62  Increased cues for brake management with transitoins  Also to remain centered in rollator( tends to stand to L of rollator))   Balance   Static Sitting Fair +   Dynamic Sitting Fair   Static Standing Fair -   Dynamic Standing Poor +   Ambulatory Poor +   Endurance Deficit   Endurance Deficit Yes   Endurance Deficit Description fatigue, SAENZ  /62, RA O2 sat 98%   Activity Tolerance   Activity Tolerance Patient limited by fatigue;Treatment limited secondary to medical complications (Comment)   Medical Staff Made Aware NurseJd OT-Valdo   Nurse Made Aware yes   Exercises   Knee AROM Long Arc Quad Sitting;10 reps;AROM; Bilateral   Ankle Pumps Sitting;10 reps;AROM; Bilateral   Heel Cord Stretch Sitting;10 reps;AROM; Bilateral  (heel raises)   Marching Sitting;10 reps;AROM; Bilateral   Assessment   Prognosis Fair   Problem List Decreased strength;Decreased endurance; Impaired balance;Decreased mobility; Decreased coordination;Decreased cognition; Impaired judgement;Decreased safety awareness; Impaired tone   Assessment Seen for progression of mobility and strengthening  Continues to require minimal to moderate assistance for sit to stand transitions  Michael from chair, modA from rollator walker  Increased time and delayed motor planning noted with increased cues needed to complete transfers  Gait slowed, decreased foot clearance  Decreased fluidity in LLE swing and foot clearance noted  Cues for proper brake management needed both with ambulation and transfers  Also with tendency to remain to L  Side of rollator with cues to improve center when using AD  Tolerated seated exercise following rest, but fatigue noted  Requires demonstration to complete repetitions and AAROM on LLE hip flexion ex  Given impairments in strength, balance, activity tolerance, coordination, cognition and overall mobility will continue to benefit from STR in order to optimize functional independence and reduce risk for fall  Barriers to Discharge Inaccessible home environment;Decreased caregiver support   Goals   Patient Goals get better   STG Expiration Date 08/26/19   Treatment Day 1   Plan   Treatment/Interventions Functional transfer training;LE strengthening/ROM; Elevations; Therapeutic exercise; Endurance training;Patient/family training;Equipment eval/education; Bed mobility;Gait training; Compensatory technique education;Continued evaluation;Spoke to nursing;OT   Progress Slow progress, decreased activity tolerance   PT Frequency Other (Comment)  (4-5x/wk)   Recommendation   Recommendation Short-term skilled PT   Equipment Recommended Walker   PT - OK to Discharge Yes  (to [facility] when medically stable   No to home alone )   Jose Maria Melendez PT

## 2019-08-18 NOTE — PLAN OF CARE
Problem: PHYSICAL THERAPY ADULT  Goal: Performs mobility at highest level of function for planned discharge setting  See evaluation for individualized goals  Description  Treatment/Interventions: Functional transfer training, LE strengthening/ROM, Elevations, Therapeutic exercise, Endurance training, Patient/family training, Equipment eval/education, Bed mobility, Gait training, Compensatory technique education, Continued evaluation, Spoke to nursing, OT  Equipment Recommended: Piter Mu       See flowsheet documentation for full assessment, interventions and recommendations  Outcome: Progressing  Note:   Prognosis: Fair  Problem List: Decreased strength, Decreased endurance, Impaired balance, Decreased mobility, Decreased coordination, Decreased cognition, Impaired judgement, Decreased safety awareness, Impaired tone  Assessment: Seen for progression of mobility and strengthening  Continues to require minimal to moderate assistance for sit to stand transitions  Michael from chair, modA from rollator walker  Increased time and delayed motor planning noted with increased cues needed to complete transfers  Gait slowed, decreased foot clearance  Decreased fluidity in LLE swing and foot clearance noted  Cues for proper brake management needed both with ambulation and transfers  Also with tendency to remain to L  Side of rollator with cues to improve center when using AD  Tolerated seated exercise following rest, but fatigue noted  Requires demonstration to complete repetitions and AAROM on LLE hip flexion ex  Given impairments in strength, balance, activity tolerance, coordination, cognition and overall mobility will continue to benefit from STR in order to optimize functional independence and reduce risk for fall    Barriers to Discharge: Inaccessible home environment, Decreased caregiver support     Recommendation: Short-term skilled PT     PT - OK to Discharge: Yes    See flowsheet documentation for full assessment

## 2019-08-18 NOTE — PLAN OF CARE
Problem: OCCUPATIONAL THERAPY ADULT  Goal: Performs self-care activities at highest level of function for planned discharge setting  See evaluation for individualized goals  Description  Treatment Interventions: ADL retraining, Functional transfer training, UE strengthening/ROM, Endurance training, Patient/family training, Equipment evaluation/education, Compensatory technique education, Energy conservation, Activityengagement          See flowsheet documentation for full assessment, interventions and recommendations  Note:   Limitation: Decreased ADL status, Decreased UE strength, Decreased Safe judgement during ADL, Decreased cognition, Decreased endurance, Decreased self-care trans, Decreased high-level ADLs  Prognosis: Good  Assessment: Pt seen for 40 min tx session with focus on functional balance, functional mobility, ADL status, education, and cognition  Pt able to tolerate OOB mobility, sitting balance=g-/f+, standing balance=f-/p+  Pt required verbal/physical assistance to maintain transfers/walker safety  Moderate cognitive deficits noted--i e executive function, visuospatial, memory, attention, language, and orientation  Pt with mild awareness of cognitive deficits  Pt would benefit from inpt rehab to improve her overall level of independence  Pt pleasant and cooperative with tx session  Will continue        OT Discharge Recommendation: Short Term Rehab  OT - OK to Discharge: Yes(when medically cleared to rehab)

## 2019-08-18 NOTE — PLAN OF CARE
Problem: Potential for Falls  Goal: Patient will remain free of falls  Description  INTERVENTIONS:  - Assess patient frequently for physical needs  -  Identify cognitive and physical deficits and behaviors that affect risk of falls    -  [place] fall precautions as indicated by assessment   - Educate patient/family on patient safety including physical limitations  - Instruct patient to call for assistance with activity based on assessment  - Modify environment to reduce risk of injury  - Consider OT/PT consult to assist with strengthening/mobility  8/18/2019 0721 by Leeroy Steele RN  Outcome: Progressing  8/18/2019 0714 by Leeroy Steele RN  Outcome: Progressing     Problem: PAIN - ADULT  Goal: Verbalizes/displays adequate comfort level or baseline comfort level  Description  Interventions:  - Encourage patient to monitor pain and request assistance  - Assess pain using appropriate pain scale  - Administer analgesics based on type and severity of pain and evaluate response  - Implement non-pharmacological measures as appropriate and evaluate response  - Consider cultural and social influences on pain and pain management  - Notify physician/advanced practitioner if interventions unsuccessful or patient reports new pain  8/18/2019 0721 by Leeroy Steele RN  Outcome: Progressing  8/18/2019 0714 by Leeroy Steele RN  Outcome: Progressing     Problem: INFECTION - ADULT  Goal: Absence or prevention of progression during hospitalization  Description  INTERVENTIONS:  - Assess and monitor for signs and symptoms of infection  - Monitor lab/diagnostic results  - Monitor all insertion sites, i e  indwelling lines, tubes, and drains  - Monitor endotracheal if appropriate and nasal secretions for changes in amount and color  - [place] appropriate cooling/warming therapies per order  - Administer medications as ordered  - Instruct and encourage patient and family to use good hand hygiene technique  - Identify and instruct in appropriate isolation precautions for identified infection/condition  8/18/2019 0721 by Sammie Null RN  Outcome: Progressing  8/18/2019 0714 by Sammie Null RN  Outcome: Progressing  Goal: Absence of fever/infection during neutropenic period  Description  INTERVENTIONS:  - Monitor WBC    8/18/2019 0721 by Sammie Null RN  Outcome: Progressing  8/18/2019 0714 by Sammie Null RN  Outcome: Progressing     Problem: SAFETY ADULT  Goal: Maintain or return to baseline ADL function  Description  INTERVENTIONS:  -  Assess patient's ability to carry out ADLs; assess patient's baseline for ADL function and identify physical deficits which impact ability to perform ADLs (bathing, care of mouth/teeth, toileting, grooming, dressing, etc )  - Assess/evaluate cause of self-care deficits   - Assess range of motion  - Assess patient's mobility; develop plan if impaired  - Assess patient's need for assistive devices and provide as appropriate  - Encourage maximum independence but intervene and supervise when necessary  - Involve family in performance of ADLs  - Assess for home care needs following discharge   - Consider OT consult to assist with ADL evaluation and planning for discharge  - Provide patient education as appropriate  8/18/2019 0721 by Sammie Null RN  Outcome: Progressing  8/18/2019 0714 by Sammie Null RN  Outcome: Progressing  Goal: Maintain or return mobility status to optimal level  Description  INTERVENTIONS:  - Assess patient's baseline mobility status (ambulation, transfers, stairs, etc )    - Identify cognitive and physical deficits and behaviors that affect mobility  - Identify mobility aids required to assist with transfers and/or ambulation (gait belt, sit-to-stand, lift, walker, cane, etc )  - [place] fall precautions as indicated by assessment  - Record patient progress and toleration of activity level on Mobility SBAR; progress patient to next Phase/Stage  - Instruct patient to call for assistance with activity based on assessment  - Consider rehabilitation consult to assist with strengthening/weightbearing, etc   8/18/2019 0721 by Blaine Winston RN  Outcome: Progressing  8/18/2019 0714 by Blaine Winston RN  Outcome: Progressing     Problem: DISCHARGE PLANNING  Goal: Discharge to home or other facility with appropriate resources  Description  INTERVENTIONS:  - Identify barriers to discharge w/patient and caregiver  - Arrange for needed discharge resources and transportation as appropriate  - Identify discharge learning needs (meds, wound care, etc )  - Arrange for interpretive services to assist at discharge as needed  - Refer to Case Management Department for coordinating discharge planning if the patient needs post-hospital services based on physician/advanced practitioner order or complex needs related to functional status, cognitive ability, or social support system  8/18/2019 0721 by Blaine Winston RN  Outcome: Progressing  8/18/2019 0714 by Blaine Winston RN  Outcome: Progressing     Problem: Knowledge Deficit  Goal: Patient/family/caregiver demonstrates understanding of disease process, treatment plan, medications, and discharge instructions  Description  Complete learning assessment and assess knowledge base    Interventions:  - Provide teaching at level of understanding  - Provide teaching via preferred learning methods  8/18/2019 0721 by Blaine Winston RN  Outcome: Progressing  8/18/2019 0714 by Blaine Winston RN  Outcome: Progressing     Problem: NEUROSENSORY - ADULT  Goal: Achieves stable or improved neurological status  Description  INTERVENTIONS  - Monitor and report changes in neurological status  - Monitor vital signs such as temperature, blood pressure, glucose, and any other labs ordered   - Initiate measures to prevent increased intracranial pressure  - Monitor for seizure activity and implement precautions if appropriate      8/18/2019 0721 by Blaine Winston RN  Outcome: Progressing  8/18/2019 4514 by Ahsan Bass RN  Outcome: Progressing  Goal: Achieves maximal functionality and self care  Description  INTERVENTIONS  - Monitor swallowing and airway patency with patient fatigue and changes in neurological status  - Encourage and assist patient to increase activity and self care     - Encourage visually impaired, hearing impaired and aphasic patients to use assistive/communication devices  8/18/2019 0721 by Ahsan Bass RN  Outcome: Progressing  8/18/2019 0714 by Ahsan Bass RN  Outcome: Progressing     Problem: METABOLIC, FLUID AND ELECTROLYTES - ADULT  Goal: Electrolytes maintained within normal limits  Description  INTERVENTIONS:  - Monitor labs and assess patient for signs and symptoms of electrolyte imbalances  - Administer electrolyte replacement as ordered  - Monitor response to electrolyte replacements, including repeat lab results as appropriate  - Instruct patient on fluid and nutrition as appropriate  8/18/2019 0721 by Ahsan Bass RN  Outcome: Progressing  8/18/2019 0714 by Ahsan Bass RN  Outcome: Progressing  Goal: Fluid balance maintained  Description  INTERVENTIONS:  - Monitor labs   - Monitor I/O and WT  - Instruct patient on fluid and nutrition as appropriate  - Assess for signs & symptoms of volume excess or deficit  8/18/2019 0721 by Ahsan Bass RN  Outcome: Progressing  8/18/2019 0714 by Ahsan Bass RN  Outcome: Progressing     Problem: MUSCULOSKELETAL - ADULT  Goal: Maintain or return mobility to safest level of function  Description  INTERVENTIONS:  - Assess patient's ability to carry out ADLs; assess patient's baseline for ADL function and identify physical deficits which impact ability to perform ADLs (bathing, care of mouth/teeth, toileting, grooming, dressing, etc )  - Assess/evaluate cause of self-care deficits   - Assess range of motion  - Assess patient's mobility  - Assess patient's need for assistive devices and provide as appropriate  - Encourage maximum independence but intervene and supervise when necessary  - Involve family in performance of ADLs  - Assess for home care needs following discharge   - Consider OT consult to assist with ADL evaluation and planning for discharge  - Provide patient education as appropriate  8/18/2019 0721 by Edwar Fung RN  Outcome: Progressing  8/18/2019 0714 by Edwar Fung RN  Outcome: Progressing  Goal: Maintain proper alignment of affected body part  Description  INTERVENTIONS:  - Support, maintain and protect limb and body alignment  - Provide patient/ family with appropriate education  8/18/2019 0721 by Edwar Fung RN  Outcome: Progressing  8/18/2019 0714 by Edwar Fung RN  Outcome: Progressing     Problem: Nutrition/Hydration-ADULT  Goal: Nutrient/Hydration intake appropriate for improving, restoring or maintaining nutritional needs  Description  Monitor and assess patient's nutrition/hydration status for malnutrition  Collaborate with interdisciplinary team and initiate plan and interventions as ordered  Monitor patient's weight and dietary intake as ordered or per policy  Utilize nutrition screening tool and intervene as necessary  Determine patient's food preferences and provide high-protein, high-caloric foods as appropriate       INTERVENTIONS:  - Monitor oral intake, urinary output, labs, and treatment plans  - Assess nutrition and hydration status and recommend course of action  - Evaluate amount of meals eaten  - Assist patient with eating if necessary   - Allow adequate time for meals  - Recommend/ encourage appropriate diets, oral nutritional supplements, and vitamin/mineral supplements  - Order, calculate, and assess calorie counts as needed  - Recommend, monitor, and adjust tube feedings and TPN/PPN based on assessed needs  - Assess need for intravenous fluids  - Provide specific nutrition/hydration education as appropriate  - Include patient/family/caregiver in decisions related to nutrition  8/18/2019 0721 by Jasmyne Soriano RN  Outcome: Progressing  8/18/2019 0714 by Jasmyne Soriano RN  Outcome: Progressing     Problem: Prexisting or High Potential for Compromised Skin Integrity  Goal: Skin integrity is maintained or improved  Description  INTERVENTIONS:  - Identify patients at risk for skin breakdown  - Assess and monitor skin integrity  - Assess and monitor nutrition and hydration status  - Monitor labs   - Assess for incontinence   - Turn and reposition patient  - Assist with mobility/ambulation  - Relieve pressure over bony prominences  - Avoid friction and shearing  - Provide appropriate hygiene as needed including keeping skin clean and dry  - Evaluate need for skin moisturizer/barrier cream  - Collaborate with interdisciplinary team   - Patient/family teaching  - Consider wound care consult   8/18/2019 0721 by Jasmyne Soriano RN  Outcome: Progressing  8/18/2019 0714 by Jasmyne Soriano RN  Outcome: Progressing

## 2019-08-18 NOTE — OCCUPATIONAL THERAPY NOTE
OccupationalTherapy Progress Note(xcww=[id number])     Patient Name: Karen Bates  VTESM'O Date: 8/18/2019  Problem List  Patient Active Problem List   Diagnosis    Ambulatory dysfunction    Anxiety    Benign essential hypertension    Carotid artery plaque    Elevated sed rate    GERD without esophagitis    Hyperlipidemia    Chronic hyponatremia    Levoscoliosis    Occlusion of celiac artery    Vitamin D deficiency    Radiculopathy    Low hemoglobin    Asthma    Carotid bruit    Coronary atherosclerosis    Splenic infarction    Positive ROSALINDA (antinuclear antibody)    Thrombocytosis (HCC)    Fatigue    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HCC)    Forgetfulness    Balance problem    Confusion    Normal pressure hydrocephalus    Cataract, nuclear sclerotic senile, left    History of recent fall    Medication management    Stage 2 chronic kidney disease    Recurrent falls    Hyperkalemia    Leukocytosis        08/18/19 0830   Restrictions/Precautions   Weight Bearing Precautions Per Order No   Other Precautions Fall Risk;Cognitive; Chair Alarm   Pain Assessment   Pain Assessment No/denies pain   ADL   Where Assessed Chair   LB Dressing Assistance 4  Minimal Assistance   LB Dressing Deficit Thread LLE into pants   Functional Standing Tolerance   Time 1-2mins   Transfers   Sit to Stand 3  Moderate assistance   Additional items Assist x 1; Increased time required;Verbal cues   Stand to Sit 4  Minimal assistance   Additional items Assist x 1; Increased time required;Verbal cues   Additional Comments bp's=156/62-after ambulation   Functional Mobility   Functional Mobility 4  Minimal assistance   Additional Comments x1   Additional items Rolling walker   Cognition   Overall Cognitive Status Impaired   Arousal/Participation Alert   Attention Attends with cues to redirect   Orientation Level Oriented to person;Oriented to place   Memory Decreased short term memory;Decreased recall of precautions   Following Commands Follows one step commands with increased time or repetition   Cognition Assessment Tools MOCA   Score 16   Activity Tolerance   Activity Tolerance Patient limited by fatigue   Medical Staff Made Aware nsg, P T  Assessment   Assessment Pt seen for 40 min tx session with focus on functional balance, functional mobility, ADL status, education, and cognition  Pt able to tolerate OOB mobility, sitting balance=g-/f+, standing balance=f-/p+  Pt required verbal/physical assistance to maintain transfers/walker safety  Moderate cognitive deficits noted--i e executive function, visuospatial, memory, attention, language, and orientation  Pt with mild awareness of cognitive deficits  Pt would benefit from inpt rehab to improve her overall level of independence  Pt pleasant and cooperative with tx session  Will continue  Plan   Treatment Interventions ADL retraining;Functional transfer training; Endurance training;Cognitive reorientation;Patient/family training;Equipment evaluation/education; Compensatory technique education   Goal Expiration Date 08/30/19   Treatment Day 1   OT Frequency 3-5x/wk   Recommendation   OT Discharge Recommendation Short Term Rehab   B[place] Index   Feeding 10   Bathing 0   Grooming Score 5   Dressing Score 5   Bladder Score 5   Bowels Score 10   Toilet Use Score 5   Transfers (Bed/Chair) Score 10   Mobility (Level Surface) Score 0   Stairs Score 0   B[place] Index Score 50   Mariana Hand, OT

## 2019-08-18 NOTE — PLAN OF CARE
Problem: Potential for Falls  Goal: Patient will remain free of falls  Description  INTERVENTIONS:  - Assess patient frequently for physical needs  -  Identify cognitive and physical deficits and behaviors that affect risk of falls    -  [place] fall precautions as indicated by assessment   - Educate patient/family on patient safety including physical limitations  - Instruct patient to call for assistance with activity based on assessment  - Modify environment to reduce risk of injury  - Consider OT/PT consult to assist with strengthening/mobility  Outcome: Progressing     Problem: PAIN - ADULT  Goal: Verbalizes/displays adequate comfort level or baseline comfort level  Description  Interventions:  - Encourage patient to monitor pain and request assistance  - Assess pain using appropriate pain scale  - Administer analgesics based on type and severity of pain and evaluate response  - Implement non-pharmacological measures as appropriate and evaluate response  - Consider cultural and social influences on pain and pain management  - Notify physician/advanced practitioner if interventions unsuccessful or patient reports new pain  Outcome: Progressing     Problem: INFECTION - ADULT  Goal: Absence or prevention of progression during hospitalization  Description  INTERVENTIONS:  - Assess and monitor for signs and symptoms of infection  - Monitor lab/diagnostic results  - Monitor all insertion sites, i e  indwelling lines, tubes, and drains  - Monitor endotracheal if appropriate and nasal secretions for changes in amount and color  - [place] appropriate cooling/warming therapies per order  - Administer medications as ordered  - Instruct and encourage patient and family to use good hand hygiene technique  - Identify and instruct in appropriate isolation precautions for identified infection/condition  Outcome: Progressing  Goal: Absence of fever/infection during neutropenic period  Description  INTERVENTIONS:  - Monitor WBC    Outcome: Progressing     Problem: SAFETY ADULT  Goal: Maintain or return to baseline ADL function  Description  INTERVENTIONS:  -  Assess patient's ability to carry out ADLs; assess patient's baseline for ADL function and identify physical deficits which impact ability to perform ADLs (bathing, care of mouth/teeth, toileting, grooming, dressing, etc )  - Assess/evaluate cause of self-care deficits   - Assess range of motion  - Assess patient's mobility; develop plan if impaired  - Assess patient's need for assistive devices and provide as appropriate  - Encourage maximum independence but intervene and supervise when necessary  - Involve family in performance of ADLs  - Assess for home care needs following discharge   - Consider OT consult to assist with ADL evaluation and planning for discharge  - Provide patient education as appropriate  Outcome: Progressing  Goal: Maintain or return mobility status to optimal level  Description  INTERVENTIONS:  - Assess patient's baseline mobility status (ambulation, transfers, stairs, etc )    - Identify cognitive and physical deficits and behaviors that affect mobility  - Identify mobility aids required to assist with transfers and/or ambulation (gait belt, sit-to-stand, lift, walker, cane, etc )  - [place] fall precautions as indicated by assessment  - Record patient progress and toleration of activity level on Mobility SBAR; progress patient to next Phase/Stage  - Instruct patient to call for assistance with activity based on assessment  - Consider rehabilitation consult to assist with strengthening/weightbearing, etc   Outcome: Progressing     Problem: DISCHARGE PLANNING  Goal: Discharge to home or other facility with appropriate resources  Description  INTERVENTIONS:  - Identify barriers to discharge w/patient and caregiver  - Arrange for needed discharge resources and transportation as appropriate  - Identify discharge learning needs (meds, wound care, etc )  - Arrange for interpretive services to assist at discharge as needed  - Refer to Case Management Department for coordinating discharge planning if the patient needs post-hospital services based on physician/advanced practitioner order or complex needs related to functional status, cognitive ability, or social support system  Outcome: Progressing     Problem: Knowledge Deficit  Goal: Patient/family/caregiver demonstrates understanding of disease process, treatment plan, medications, and discharge instructions  Description  Complete learning assessment and assess knowledge base  Interventions:  - Provide teaching at level of understanding  - Provide teaching via preferred learning methods  Outcome: Progressing     Problem: NEUROSENSORY - ADULT  Goal: Achieves stable or improved neurological status  Description  INTERVENTIONS  - Monitor and report changes in neurological status  - Monitor vital signs such as temperature, blood pressure, glucose, and any other labs ordered   - Initiate measures to prevent increased intracranial pressure  - Monitor for seizure activity and implement precautions if appropriate      Outcome: Progressing  Goal: Achieves maximal functionality and self care  Description  INTERVENTIONS  - Monitor swallowing and airway patency with patient fatigue and changes in neurological status  - Encourage and assist patient to increase activity and self care     - Encourage visually impaired, hearing impaired and aphasic patients to use assistive/communication devices  Outcome: Progressing     Problem: METABOLIC, FLUID AND ELECTROLYTES - ADULT  Goal: Electrolytes maintained within normal limits  Description  INTERVENTIONS:  - Monitor labs and assess patient for signs and symptoms of electrolyte imbalances  - Administer electrolyte replacement as ordered  - Monitor response to electrolyte replacements, including repeat lab results as appropriate  - Instruct patient on fluid and nutrition as appropriate  Outcome: Progressing  Goal: Fluid balance maintained  Description  INTERVENTIONS:  - Monitor labs   - Monitor I/O and WT  - Instruct patient on fluid and nutrition as appropriate  - Assess for signs & symptoms of volume excess or deficit  Outcome: Progressing     Problem: MUSCULOSKELETAL - ADULT  Goal: Maintain or return mobility to safest level of function  Description  INTERVENTIONS:  - Assess patient's ability to carry out ADLs; assess patient's baseline for ADL function and identify physical deficits which impact ability to perform ADLs (bathing, care of mouth/teeth, toileting, grooming, dressing, etc )  - Assess/evaluate cause of self-care deficits   - Assess range of motion  - Assess patient's mobility  - Assess patient's need for assistive devices and provide as appropriate  - Encourage maximum independence but intervene and supervise when necessary  - Involve family in performance of ADLs  - Assess for home care needs following discharge   - Consider OT consult to assist with ADL evaluation and planning for discharge  - Provide patient education as appropriate  Outcome: Progressing  Goal: Maintain proper alignment of affected body part  Description  INTERVENTIONS:  - Support, maintain and protect limb and body alignment  - Provide patient/ family with appropriate education  Outcome: Progressing     Problem: Nutrition/Hydration-ADULT  Goal: Nutrient/Hydration intake appropriate for improving, restoring or maintaining nutritional needs  Description  Monitor and assess patient's nutrition/hydration status for malnutrition  Collaborate with interdisciplinary team and initiate plan and interventions as ordered  Monitor patient's weight and dietary intake as ordered or per policy  Utilize nutrition screening tool and intervene as necessary  Determine patient's food preferences and provide high-protein, high-caloric foods as appropriate       INTERVENTIONS:  - Monitor oral intake, urinary output, labs, and treatment plans  - Assess nutrition and hydration status and recommend course of action  - Evaluate amount of meals eaten  - Assist patient with eating if necessary   - Allow adequate time for meals  - Recommend/ encourage appropriate diets, oral nutritional supplements, and vitamin/mineral supplements  - Order, calculate, and assess calorie counts as needed  - Recommend, monitor, and adjust tube feedings and TPN/PPN based on assessed needs  - Assess need for intravenous fluids  - Provide specific nutrition/hydration education as appropriate  - Include patient/family/caregiver in decisions related to nutrition  Outcome: Progressing     Problem: Prexisting or High Potential for Compromised Skin Integrity  Goal: Skin integrity is maintained or improved  Description  INTERVENTIONS:  - Identify patients at risk for skin breakdown  - Assess and monitor skin integrity  - Assess and monitor nutrition and hydration status  - Monitor labs   - Assess for incontinence   - Turn and reposition patient  - Assist with mobility/ambulation  - Relieve pressure over bony prominences  - Avoid friction and shearing  - Provide appropriate hygiene as needed including keeping skin clean and dry  - Evaluate need for skin moisturizer/barrier cream  - Collaborate with interdisciplinary team   - Patient/family teaching  - Consider wound care consult   Outcome: Progressing

## 2019-08-18 NOTE — H&P (VIEW-ONLY)
[facility] Progress Note  Patient: Deepika Caballero 66 y o  female   MRN: [id number]  PCP: Berta Jim PA-C  Unit/Bed#: E5 -01 Encounter: [id number]  Date Of Visit: 08/18/19      Assessment/plan  1  Recurrent falls- likely due to nph  Appreciate pt/ot eval  Pt will need str       2  Leukocytosis- likely reactive  It has resolved  Urine culture shows mixed contaminents       3  ckd2- stable       4  Normal pressure hydrocephalus- pt has surgery for  shunt on 9/25 with neurosurgery  Appreciate neurology recommendations  nph is stable from previous exam       5 chronic hyponatremia- appreciate nephrology recommendations  It is due to siadh  Continue fluid restriction and salt tabs  Stable       6  htn- stable  Continue current treatment     7  Enlarged spleen- pt had been scheduled for ct abd/pelvis for work up prior to surgery  This was obtained for surgery       dispo- pt will need str  Subjective:   Pt seen and examined  Pt doing well  No problems reported  No f/c no cp no sob no n/v/d no abd pain  Objective:     Vitals: Blood pressure 168/72, pulse 100, temperature 98 4 °F (36 9 °C), temperature source Tympanic, resp  rate 20, height 5' 4" (1 626 m), weight 76 kg (167 lb 8 8 oz), SpO2 96 %, not currently breastfeeding  ,Body mass index is 28 76 kg/m²  Lab, Imaging and other studies:  Results from last 7 days   Lab Units 08/17/19  0556  08/14/19  1031   WBC Thousand/uL 8 14   < > 17 05*   HEMOGLOBIN g/dL 11 4*   < > 11 5   HEMATOCRIT % 34 6*   < > 35 5   PLATELETS Thousands/uL 363   < > 412*   INR   --   --  0 97    < > = values in this interval not displayed       Results from last 7 days   Lab Units 08/17/19  0556  08/14/19  2029   POTASSIUM mmol/L 4 2   < > 6 0*   CHLORIDE mmol/L 98*   < > 95*   CO2 mmol/L 25   < > 21   BUN mg/dL 12   < > 13   CREATININE mg/dL 0 75   < > 0 45*   CALCIUM mg/dL 9 4   < > 9 0   ALK PHOS U/L  --   --  46   ALT U/L  --   --  43   AST U/L  --   -- 83*    < > = values in this interval not displayed  Lab Results   Component Value Date    URINECX 40,000-49,000 cfu/ml  08/14/2019     Scheduled Meds:   Current Facility-Administered Medications:  acetaminophen 650 mg Oral Q6H PRN [place], CRNP   aspirin 81 mg Oral Daily [place], CRNP   atenolol 50 mg Oral Daily Cinthya Hockey, CRNP   atorvastatin 40 mg Oral Daily With 202-206 [facility], CRNP   cholecalciferol 1,000 Units Oral Daily [place], CRNP   enoxaparin 40 mg Subcutaneous Daily [place], CRNP   escitalopram 10 mg Oral Daily [place], CRNP   losartan 50 mg Oral BID [place], CRNP   multivitamin-minerals 1 tablet Oral Daily [place], CRNP   ondansetron 4 mg Intravenous Q6H PRN [place], CRNP   pantoprazole 20 mg Oral Early Morning [place], CRNP   sodium chloride 1 g Oral TID [place], CRNP     Continuous Infusions:    PRN Meds:   acetaminophen    ondansetron      Physical exam:  Physical Exam  General appearance: alert and oriented, in no acute distress  Head: Normocephalic, without obvious abnormality, atraumatic  Eyes: conjunctivae/corneas clear  PERRL, EOM's intact  Fundi benign    Neck: no adenopathy, no carotid bruit, no JVD, supple, symmetrical, trachea midline and thyroid not enlarged, symmetric, no tenderness/mass/nodules  Lungs: clear to auscultation bilaterally  Heart: regular rate and rhythm, S1, S2 normal, no murmur, click, rub or gallop  Abdomen: soft, non-tender; bowel sounds normal; no masses,  no organomegaly  Extremities: extremities normal, warm and well-perfused; no cyanosis, clubbing, or edema  Pulses: 2+ and symmetric  Skin: Skin color, texture, turgor normal  No rashes or lesions  Neurologic: Mental status: Alert, oriented, thought content appropriate      VTE Pharmacologic Prophylaxis: Enoxaparin (Lovenox)  VTE Mechanical Prophylaxis: sequential compression device    Counseling / Coordination of Care  Total floor / unit time spent today 20 minutes      Current Length of Stay: 4 day(s)    Current Patient Status: Inpatient     Code Status: Level 1 - Full Code

## 2019-08-18 NOTE — PROGRESS NOTES
[facility] Progress Note  Patient: Suresh Jansen 66 y o  female   MRN: [id number]  PCP: Yuan Condon PA-C  Unit/Bed#: E5 -01 Encounter: [id number]  Date Of Visit: 08/18/19      Assessment/plan  1  Recurrent falls- likely due to nph  Appreciate pt/ot eval  Pt will need str       2  Leukocytosis- likely reactive  It has resolved  Urine culture shows mixed contaminents       3  ckd2- stable       4  Normal pressure hydrocephalus- pt has surgery for  shunt on 9/25 with neurosurgery  Appreciate neurology recommendations  nph is stable from previous exam       5 chronic hyponatremia- appreciate nephrology recommendations  It is due to siadh  Continue fluid restriction and salt tabs  Stable       6  htn- stable  Continue current treatment     7  Enlarged spleen- pt had been scheduled for ct abd/pelvis for work up prior to surgery  This was obtained for surgery       dispo- pt will need str  Subjective:   Pt seen and examined  Pt doing well  No problems reported  No f/c no cp no sob no n/v/d no abd pain  Objective:     Vitals: Blood pressure 168/72, pulse 100, temperature 98 4 °F (36 9 °C), temperature source Tympanic, resp  rate 20, height 5' 4" (1 626 m), weight 76 kg (167 lb 8 8 oz), SpO2 96 %, not currently breastfeeding  ,Body mass index is 28 76 kg/m²  Lab, Imaging and other studies:  Results from last 7 days   Lab Units 08/17/19  0556  08/14/19  1031   WBC Thousand/uL 8 14   < > 17 05*   HEMOGLOBIN g/dL 11 4*   < > 11 5   HEMATOCRIT % 34 6*   < > 35 5   PLATELETS Thousands/uL 363   < > 412*   INR   --   --  0 97    < > = values in this interval not displayed       Results from last 7 days   Lab Units 08/17/19  0556  08/14/19  2029   POTASSIUM mmol/L 4 2   < > 6 0*   CHLORIDE mmol/L 98*   < > 95*   CO2 mmol/L 25   < > 21   BUN mg/dL 12   < > 13   CREATININE mg/dL 0 75   < > 0 45*   CALCIUM mg/dL 9 4   < > 9 0   ALK PHOS U/L  --   --  46   ALT U/L  --   --  43   AST U/L  --   -- 83*    < > = values in this interval not displayed  Lab Results   Component Value Date    URINECX 40,000-49,000 cfu/ml  08/14/2019     Scheduled Meds:   Current Facility-Administered Medications:  acetaminophen 650 mg Oral Q6H PRN Tedra Keep, CRNP   aspirin 81 mg Oral Daily Tedra Keep, CRNP   atenolol 50 mg Oral Daily Tedra Keep, CRNP   atorvastatin 40 mg Oral Daily With 202-206 [facility], CRNP   cholecalciferol 1,000 Units Oral Daily Tedra Keep, CRNP   enoxaparin 40 mg Subcutaneous Daily Tedra Keep, CRNP   escitalopram 10 mg Oral Daily Tedra Keep, CRNP   losartan 50 mg Oral BID Tedra Keep, CRNP   multivitamin-minerals 1 tablet Oral Daily Tedra Keep, CRNP   ondansetron 4 mg Intravenous Q6H PRN Tedra Keep, CRNP   pantoprazole 20 mg Oral Early Morning Tedra Keep, CRNP   sodium chloride 1 g Oral TID Tedra Keep, CRNP     Continuous Infusions:    PRN Meds:   acetaminophen    ondansetron      Physical exam:  Physical Exam  General appearance: alert and oriented, in no acute distress  Head: Normocephalic, without obvious abnormality, atraumatic  Eyes: conjunctivae/corneas clear  PERRL, EOM's intact  Fundi benign    Neck: no adenopathy, no carotid bruit, no JVD, supple, symmetrical, trachea midline and thyroid not enlarged, symmetric, no tenderness/mass/nodules  Lungs: clear to auscultation bilaterally  Heart: regular rate and rhythm, S1, S2 normal, no murmur, click, rub or gallop  Abdomen: soft, non-tender; bowel sounds normal; no masses,  no organomegaly  Extremities: extremities normal, warm and well-perfused; no cyanosis, clubbing, or edema  Pulses: 2+ and symmetric  Skin: Skin color, texture, turgor normal  No rashes or lesions  Neurologic: Mental status: Alert, oriented, thought content appropriate      VTE Pharmacologic Prophylaxis: Enoxaparin (Lovenox)  VTE Mechanical Prophylaxis: sequential compression device    Counseling / Coordination of Care  Total floor / unit time spent today 20 minutes      Current Length of Stay: 4 day(s)    Current Patient Status: Inpatient     Code Status: Level 1 - Full Code

## 2019-08-19 PROCEDURE — 99232 SBSQ HOSP IP/OBS MODERATE 35: CPT | Performed by: INTERNAL MEDICINE

## 2019-08-19 PROCEDURE — 90732 PPSV23 VACC 2 YRS+ SUBQ/IM: CPT | Performed by: INTERNAL MEDICINE

## 2019-08-19 PROCEDURE — G0009 ADMIN PNEUMOCOCCAL VACCINE: HCPCS | Performed by: INTERNAL MEDICINE

## 2019-08-19 RX ORDER — ACETAMINOPHEN 325 MG/1
650 TABLET ORAL EVERY 6 HOURS PRN
Qty: 30 TABLET | Refills: 0
Start: 2019-08-19

## 2019-08-19 RX ADMIN — ACETAMINOPHEN 650 MG: 325 TABLET ORAL at 16:33

## 2019-08-19 RX ADMIN — ASPIRIN 81 MG 81 MG: 81 TABLET ORAL at 08:19

## 2019-08-19 RX ADMIN — LOSARTAN POTASSIUM 50 MG: 50 TABLET ORAL at 08:18

## 2019-08-19 RX ADMIN — SODIUM CHLORIDE TAB 1 GM 1 G: 1 TAB at 16:08

## 2019-08-19 RX ADMIN — ENOXAPARIN SODIUM 40 MG: 40 INJECTION SUBCUTANEOUS at 08:18

## 2019-08-19 RX ADMIN — VITAMIN D, TAB 1000IU (100/BT) 1000 UNITS: 25 TAB at 08:18

## 2019-08-19 RX ADMIN — ATORVASTATIN CALCIUM 40 MG: 40 TABLET, FILM COATED ORAL at 16:08

## 2019-08-19 RX ADMIN — SODIUM CHLORIDE TAB 1 GM 1 G: 1 TAB at 08:18

## 2019-08-19 RX ADMIN — SODIUM CHLORIDE TAB 1 GM 1 G: 1 TAB at 21:39

## 2019-08-19 RX ADMIN — PNEUMOCOCCAL VACCINE POLYVALENT 0.5 ML
25; 25; 25; 25; 25; 25; 25; 25; 25; 25; 25; 25; 25; 25; 25; 25; 25; 25; 25; 25; 25; 25; 25 INJECTION, SOLUTION INTRAMUSCULAR; SUBCUTANEOUS at 10:35

## 2019-08-19 RX ADMIN — LOSARTAN POTASSIUM 50 MG: 50 TABLET ORAL at 17:29

## 2019-08-19 RX ADMIN — PANTOPRAZOLE SODIUM 20 MG: 20 TABLET, DELAYED RELEASE ORAL at 05:38

## 2019-08-19 RX ADMIN — Medication 1 TABLET: at 08:19

## 2019-08-19 RX ADMIN — ESCITALOPRAM OXALATE 10 MG: 10 TABLET ORAL at 08:19

## 2019-08-19 RX ADMIN — ATENOLOL 50 MG: 50 TABLET ORAL at 17:29

## 2019-08-19 NOTE — DISCHARGE SUMMARY
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  4  Essential hypertension  5  Chronic kidney disease stage 2  6  History of splenomegaly, not confirmed on CT scanning  7  Atherosclerosis of the aorta, celiac axis, and superior mesenteric artery  Consulting Physicians:  1  Dr Jesus Ro, nephrology  2  Dr Blayne Sparks, neurology    Procedures Performed:   None    HPI:  The patient is a 70-year-old woman who lost her balance and fell on the evening of admission  She said that her legs became weak  She has had several recent falls  She has ambulatory dysfunction related to normal pressure hydrocephalus  She has been evaluated by neuro surgery and shunting is planned at the end of September  She was admitted for further evaluation and care  Hospital Course: The patient was admitted to the hospital and observed carefully  Evaluation revealed no evidence of serious traumatic injury  She had x-rays in the emergency room including CT scan of the head, cervical spine, and x-rays of the chest and right shoulder  The patient was evaluated by Neurology who agreed that the patient's fall was related to gait dysfunction from normal pressure hydrocephalus  The patient was evaluated and treated by physical and occupational therapy  Fortunately, she remained stable throughout her hospital course  She was transferred to [facility] for further rehab  The patient has a history of hyponatremia related to SIADH  This was stable throughout her hospital stay  The patient has a history of splenomegaly    It was planned that a CT scan would be done as an outpatient before her impending shunt surgery  This was completed during her hospitalization and did not confirm splenomegaly  It was noted that there was atherosclerosis of the abdominal aorta, celiac axis, and superior mesenteric artery  This was known previously and the patient is followed by [facility]  On the day of discharge the patient was feeling well  Vital signs were stable  Lungs were clear  Cardiac exam revealed regular rhythm  The abdomen was soft and nontender  Disposition:  The patient was transferred to [facility] for further care on August 19th  She will follow a regular diet  Her activity will be as tolerated  She will be under the care of the physicians at [facility] during her stay there  It is anticipated that she will resume her primary care with Dr Schwartz and associates when she is discharged  I did contact the neuro surgery office to appraise them of the situation in case surgery should be expedited  Discharge instructions/Information to patient and family:   See after visit summary for information provided to patient and family  Provisions for Follow-Up Care:  See after visit summary for information related to follow-up care and any pertinent home health orders  Planned Readmission: No    Discharge Statement   I spent 40 minutes discharging the patient  This time was spent on the day of discharge  I had direct contact with the patient on the day of discharge  Discharge Medications:  See after visit summary for reconciled discharge medications provided to patient and family

## 2019-08-19 NOTE — SOCIAL WORK
CM checked in with [facility] and still no insurance auth  CM dept will check back in the morning   CM notified pt

## 2019-08-19 NOTE — SOCIAL WORK
CM sent PT/OT notes, as requested, to [facility] so they could submit for insurance authorization   requested that CM complete PASSR for pt, which was done and sent back via [facility]  CM dept waiting on [facility] to obtain insurance auth notification  Once approved, daughter will transport pt

## 2019-08-20 ENCOUNTER — TRANSITIONAL CARE MANAGEMENT (OUTPATIENT)
Dept: FAMILY MEDICINE CLINIC | Facility: CLINIC | Age: 79
End: 2019-08-20

## 2019-08-20 VITALS
BODY MASS INDEX: 28.6 KG/M2 | OXYGEN SATURATION: 97 % | HEART RATE: 79 BPM | TEMPERATURE: 98.2 F | WEIGHT: 167.55 LBS | HEIGHT: 64 IN | RESPIRATION RATE: 18 BRPM | SYSTOLIC BLOOD PRESSURE: 168 MMHG | DIASTOLIC BLOOD PRESSURE: 72 MMHG

## 2019-08-20 PROCEDURE — 97530 THERAPEUTIC ACTIVITIES: CPT

## 2019-08-20 PROCEDURE — 97535 SELF CARE MNGMENT TRAINING: CPT

## 2019-08-20 PROCEDURE — 99239 HOSP IP/OBS DSCHRG MGMT >30: CPT | Performed by: INTERNAL MEDICINE

## 2019-08-20 PROCEDURE — 97110 THERAPEUTIC EXERCISES: CPT

## 2019-08-20 PROCEDURE — 97116 GAIT TRAINING THERAPY: CPT

## 2019-08-20 RX ADMIN — ENOXAPARIN SODIUM 40 MG: 40 INJECTION SUBCUTANEOUS at 08:40

## 2019-08-20 RX ADMIN — Medication 1 TABLET: at 08:40

## 2019-08-20 RX ADMIN — ASPIRIN 81 MG 81 MG: 81 TABLET ORAL at 08:40

## 2019-08-20 RX ADMIN — SODIUM CHLORIDE TAB 1 GM 1 G: 1 TAB at 08:40

## 2019-08-20 RX ADMIN — LOSARTAN POTASSIUM 50 MG: 50 TABLET ORAL at 08:40

## 2019-08-20 RX ADMIN — VITAMIN D, TAB 1000IU (100/BT) 1000 UNITS: 25 TAB at 08:40

## 2019-08-20 RX ADMIN — PANTOPRAZOLE SODIUM 20 MG: 20 TABLET, DELAYED RELEASE ORAL at 05:34

## 2019-08-20 RX ADMIN — ESCITALOPRAM OXALATE 10 MG: 10 TABLET ORAL at 08:40

## 2019-08-20 NOTE — DISCHARGE SUMMARY
Originally, it was planned that the patient would be discharged yesterday  A discharge summary was dictated yesterday  Nothing has changed clinically since that time  The patient has remained well over night  She had no chest pain, shortness of breath, nausea, or vomiting  Physical examination revealed the patient to be in no distress  Vital signs were stable  Lungs were clear  Cardiac exam revealed regular rhythm  The abdomen was soft and nontender  There was no edema  The patient had a thorough medical evaluation during her hospitalization  Baseline laboratory data is acceptable  There was concern about a urinary tract infection but this was not confirmed by culture  Because of a past history of thrombotic problems, the patient was evaluated by Hematology as an outpatient  They suggested a CT scan of the abdomen to assistance surgical planning but did not recommend any specific anticoagulation  The patient had a CT scan of her abdomen and pelvis during her hospitalization  This revealed atherosclerosis of the aorta, the celiac axis, and the superior mesenteric artery but no other major abnormalities  Electrocardiogram was normal     I discussed the situation with Coby Head, the neurosurgical surgical scheduler  I also discussed the situation with Dr Demetris Mathew  It is unlikely that the patient's surgery can be scheduled sooner than it is currently scheduled, on September 25th  In either case, the patient may benefit from physical therapy preoperatively  Currently, her medical condition is adequately compensated to allow surgery to proceed with reasonable safety  The patient is being transferred to [facility] today  Diet and activity will be as tolerated  She will be under the care of the physicians there during her stay  She will resume care with her primary care physician upon her discharge  As mentioned, plans for  shunting are currently set for September 25th

## 2019-08-20 NOTE — PHYSICAL THERAPY NOTE
Physical Therapy Progress Note     08/20/19 1045   Pain Assessment   Pain Assessment No/denies pain   Pain Score No Pain   Restrictions/Precautions   Weight Bearing Precautions Per Order No   Other Precautions Cognitive; Chair Alarm; Bed Alarm; Fall Risk   General   Chart Reviewed Yes   Response to Previous Treatment Patient reporting fatigue but able to participate  Family/Caregiver Present Yes   Subjective   Subjective Willing to participate in therapy this AM    Bed Mobility   Sit to Supine 4  Minimal assistance   Additional items Assist x 1;HOB elevated; Bedrails;Leg ; Increased time required;Verbal cues;LE management   Transfers   Sit to Stand 4  Minimal assistance   Additional items Assist x 1; Armrests; Increased time required;Verbal cues   Stand to Sit 4  Minimal assistance   Additional items Assist x 1; Armrests; Increased time required;Verbal cues   Toilet transfer 4  Minimal assistance   Additional items Assist x 1; Armrests; Increased time required;Verbal cues;Standard toilet   Ambulation/Elevation   Gait pattern Decreased foot clearance; Improper Weight shift; Short stride; Excessively slow; Inconsistent renita;Decreased L stance; Forward Flexion   Gait Assistance 4  Minimal assist   Additional items Assist x 1;Verbal cues; Tactile cues   Assistive Device   (rollator )   Distance 60' x 2 with a seated resting period in between gait trials  Balance   Static Sitting Fair +   Dynamic Sitting Fair   Static Standing Fair -   Dynamic Standing Poor +   Ambulatory Poor +   Endurance Deficit   Endurance Deficit Yes   Endurance Deficit Description fatigue/SAENZ   Activity Tolerance   Activity Tolerance Patient limited by fatigue   Nurse Made Aware Yes   Exercises   TKR Sitting;10 reps;AAROM; Bilateral   Assessment   Prognosis Fair   Problem List Decreased strength;Decreased endurance;Decreased range of motion; Impaired balance;Decreased mobility; Decreased cognition; Impaired judgement;Decreased safety awareness; Obesity; Decreased skin integrity   Assessment Pt  seated in bedside chair upon my arrival  Pt  reporting increased fatigue, however agreeable to therapeutic intervention  O2 saturation measured throughout treatment session on RA between 95-97% even with reports of SAENZ by pt  Performance of HEP seated in bedside chair with cues provided for proper completion  Progressed with transfers continuing to require A of therapist with cues for hand placement/technique  Progressed with an amb  trial with use of rollator and A of therapist with cues provided for LE sequencing  Pt  continues to require A with braking mechanism of rollator at this time  Pt  required a seated resting period in between gait trials  Continued with a second amb  trial with eventual return to room  Performance of br trial, with female OTR present  Pt  returned to supine in bed at end of treatment session with alarm active  PT will continue to recommend d/c to rehab when medically stable for continued improvement of noted impairments above  Barriers to Discharge Inaccessible home environment;Decreased caregiver support   Barriers to Discharge Comments CHELLY   Goals   Patient Goals To go to rehab  STG Expiration Date 08/26/19   Treatment Day 2   Plan   Treatment/Interventions Functional transfer training;LE strengthening/ROM; Therapeutic exercise; Endurance training;Bed mobility;Gait training;Spoke to nursing;Spoke to case management;OT;Family   Progress Slow progress, decreased activity tolerance   PT Frequency Other (Comment)  (4-5x/wk)   Recommendation   Recommendation Short-term skilled PT   Equipment Recommended Walker  (RW)   PT - OK to Discharge Yes  (if d/c to rehab when medically stable )     Sly Sandoval, FEDERICO

## 2019-08-20 NOTE — PLAN OF CARE
Problem: PHYSICAL THERAPY ADULT  Goal: Performs mobility at highest level of function for planned discharge setting  See evaluation for individualized goals  Description  Treatment/Interventions: Functional transfer training, LE strengthening/ROM, Elevations, Therapeutic exercise, Endurance training, Patient/family training, Equipment eval/education, Bed mobility, Gait training, Compensatory technique education, Continued evaluation, Spoke to nursing, OT  Equipment Recommended: Jennifer Myers       See flowsheet documentation for full assessment, interventions and recommendations  Outcome: Progressing  Note:   Prognosis: Fair  Problem List: Decreased strength, Decreased endurance, Decreased range of motion, Impaired balance, Decreased mobility, Decreased cognition, Impaired judgement, Decreased safety awareness, Obesity, Decreased skin integrity  Assessment: Pt  seated in bedside chair upon my arrival  Pt  reporting increased fatigue, however agreeable to therapeutic intervention  O2 saturation measured throughout treatment session on RA between 95-97% even with reports of SAENZ by pt  Performance of HEP seated in bedside chair with cues provided for proper completion  Progressed with transfers continuing to require A of therapist with cues for hand placement/technique  Progressed with an amb  trial with use of rollator and A of therapist with cues provided for LE sequencing  Pt  continues to require A with braking mechanism of rollator at this time  Pt  required a seated resting period in between gait trials  Continued with a second amb  trial with eventual return to room  Performance of br trial, with female OTR present  Pt  returned to supine in bed at end of treatment session with alarm active  PT will continue to recommend d/c to rehab when medically stable for continued improvement of noted impairments above     Barriers to Discharge: Inaccessible home environment, Decreased caregiver support  Barriers to Discharge Comments: CHELLY  Recommendation: Short-term skilled PT     PT - OK to Discharge: Yes(if d/c to rehab when medically stable )    See flowsheet documentation for full assessment

## 2019-08-20 NOTE — PLAN OF CARE
Problem: OCCUPATIONAL THERAPY ADULT  Goal: Performs self-care activities at highest level of function for planned discharge setting  See evaluation for individualized goals  Description  Treatment Interventions: ADL retraining, Functional transfer training, UE strengthening/ROM, Endurance training, Patient/family training, Equipment evaluation/education, Compensatory technique education, Energy conservation, Activityengagement          See flowsheet documentation for full assessment, interventions and recommendations  8/20/2019 1123 by Corrine November, OT  Outcome: Progressing  Note:   Limitation: Decreased ADL status, Decreased UE strength, Decreased Safe judgement during ADL, Decreased cognition, Decreased endurance, Decreased self-care trans, Decreased high-level ADLs  Prognosis: Good  Assessment: Pt seen for OT treatment session this AM focusing on functional activity tolerance, ADLs, functional mobility/transfres, and energy conservation education  Pt alert and cooperative throughout session  Pt seated OOB in chair at start of session  Transfers (sit<>stand, RW<>standard toilet) completed with Min A with cues for safe technique, hand placement, and placement of rollator during transitions  Min A required for functional mobility with cues for safe body positioning within rollator  Toileting tasks completed with Mod A with CGA for clothing management up/down 2* decreased dynamic standing balance demonstrated and assist for thoroughness during perineal hygiene  Light grooming tasks completed with Min A while standing at sink to wash/dry hands with CGA for steadying  UB dressing completed with Min A with cues for sequencing and assist to bring gown down/around in back  LB dressing completed with Mod A  Pt able to don/doff B/L socks with Supervision, however required assist to thread BLEs into underwear and assist to pull underwear over hips  Pt lying supine at end of session with bed alarm activated   Call bell and phone within reach  All needs met and pt reports no further questions for OT at this time  Continue to recommend STR upon D/C  OT to follow pt on caseload  OT Discharge Recommendation: Short Term Rehab  OT - OK to Discharge: Yes(when medically cleared to rehab)    8/20/2019 1123 by Arianne Oneal OT  Note:   Limitation: Decreased ADL status, Decreased UE strength, Decreased Safe judgement during ADL, Decreased cognition, Decreased endurance, Decreased self-care trans, Decreased high-level ADLs  Prognosis: Good  Assessment: Pt seen for OT treatment session this AM focusing on functional activity tolerance, ADLs, functional mobility/transfres, and energy conservation education  Pt alert and cooperative throughout session  Pt seated OOB in chair at start of session  Transfers (sit<>stand, RW<>standard toilet) completed with Min A with cues for safe technique, hand placement, and placement of rollator during transitions  Min A required for functional mobility with cues for safe body positioning within rollator  Toileting tasks completed with Mod A with CGA for clothing management up/down 2* decreased dynamic standing balance demonstrated and assist for thoroughness during perineal hygiene  Light grooming tasks completed with Min A while standing at sink to wash/dry hands with CGA for steadying  UB dressing completed with Min A with cues for sequencing and assist to bring gown down/around in back  LB dressing completed with Mod A  Pt able to don/doff B/L socks with Supervision, however required assist to thread BLEs into underwear and assist to pull underwear over hips  Pt lying supine at end of session with bed alarm activated  Call bell and phone within reach  All needs met and pt reports no further questions for OT at this time  Continue to recommend STR upon D/C  OT to follow pt on caseload        OT Discharge Recommendation: Short Term Rehab  OT - OK to Discharge: Yes(when medically cleared to rehab)

## 2019-08-20 NOTE — NURSING NOTE
Reviewed discharge instructions with patient and her daughter  Discussed plans for continuing care at [facility]  Patient demonstrated understanding  Patient planning to leave with daughter to [facility]  Will continue to monitor

## 2019-08-26 ENCOUNTER — TELEPHONE (OUTPATIENT)
Dept: NEPHROLOGY | Facility: CLINIC | Age: 79
End: 2019-08-26

## 2019-08-26 NOTE — TELEPHONE ENCOUNTER
Patient's family member called to cancel Brenda's appointment with Dr Tonya Aparicio on 9/3 because that is the date of her surgery  That appointment was for a follow up/clearance for her surgery  She stated that Porter Murrell was recently seen in the hospital and cleared by the doctors in the hospital  She will call back to reschedule

## 2019-08-27 DIAGNOSIS — N18.32 CHRONIC KIDNEY DISEASE (CKD) STAGE G3B/A2, MODERATELY DECREASED GLOMERULAR FILTRATION RATE (GFR) BETWEEN 30-44 ML/MIN/1.73 SQUARE METER AND ALBUMINURIA CREATININE RATIO BETWEEN 30-299 MG/G (HCC): Primary | ICD-10-CM

## 2019-08-27 DIAGNOSIS — E87.1 HYPONATREMIA: ICD-10-CM

## 2019-08-27 NOTE — TELEPHONE ENCOUNTER
Talked with patient's daughter and gave her Dr Garth Nelson recommendations  Pt currently at [facility] facility  Daughter will provide us with fax number to fax Dr Garth Nelson recommendations to the facility as they are now handling patient's medications

## 2019-08-27 NOTE — TELEPHONE ENCOUNTER
Please ask patient to do BMP 4 days after surgery  Schedule follow up 2 weeks after surgery with repeat BMP  Please ask her to hold losartan on the day of surgery as well as the prior day to decrease the risk of worsening renal function  Last labs reviewed cr 0 7, sodium stable at 133

## 2019-08-29 RX ORDER — BISACODYL 10 MG
10 SUPPOSITORY, RECTAL RECTAL AS NEEDED
COMMUNITY
End: 2019-10-21 | Stop reason: ALTCHOICE

## 2019-08-30 ENCOUNTER — DOCUMENTATION (OUTPATIENT)
Dept: NEUROSURGERY | Facility: CLINIC | Age: 79
End: 2019-08-30

## 2019-09-02 ENCOUNTER — ANESTHESIA EVENT (OUTPATIENT)
Dept: PERIOP | Facility: HOSPITAL | Age: 79
DRG: 032 | End: 2019-09-02
Payer: COMMERCIAL

## 2019-09-02 NOTE — ANESTHESIA PREPROCEDURE EVALUATION
Review of Systems/Medical History  Patient summary reviewed  Chart reviewed  No history of anesthetic complications     Cardiovascular  Hyperlipidemia, Hypertension , Past MI ,    Pulmonary  Asthma ,        GI/Hepatic    GERD ,        Chronic kidney disease stage 2,        Endo/Other     GYN       Hematology  Negative hematology ROS      Musculoskeletal    Arthritis     Neurology      Comment: NPH Psychology   Anxiety, Depression ,              Physical Exam    Airway    Mallampati score: II  TM Distance: >3 FB  Neck ROM: full     Dental       Cardiovascular      Pulmonary      Other Findings        Anesthesia Plan  ASA Score- 3     Anesthesia Type- general with ASA Monitors  Additional Monitors:   Airway Plan: ETT  Plan Factors-    Induction- intravenous  Postoperative Plan-     Informed Consent- Anesthetic plan and risks discussed with patient  I personally reviewed this patient with the CRNA  Discussed and agreed on the Anesthesia Plan with the CRNA  Karen Ceja

## 2019-09-03 ENCOUNTER — HOSPITAL ENCOUNTER (INPATIENT)
Facility: HOSPITAL | Age: 79
LOS: 2 days | Discharge: NURSING, REHAB OR LONG-TERM CARE | DRG: 032 | End: 2019-09-05
Attending: NEUROLOGICAL SURGERY | Admitting: NEUROLOGICAL SURGERY
Payer: COMMERCIAL

## 2019-09-03 ENCOUNTER — ANESTHESIA (OUTPATIENT)
Dept: PERIOP | Facility: HOSPITAL | Age: 79
DRG: 032 | End: 2019-09-03
Payer: COMMERCIAL

## 2019-09-03 ENCOUNTER — APPOINTMENT (INPATIENT)
Dept: RADIOLOGY | Facility: HOSPITAL | Age: 79
DRG: 032 | End: 2019-09-03
Payer: COMMERCIAL

## 2019-09-03 DIAGNOSIS — G91.2 NORMAL PRESSURE HYDROCEPHALUS (HCC): ICD-10-CM

## 2019-09-03 DIAGNOSIS — K55.1 SMA STENOSIS: ICD-10-CM

## 2019-09-03 DIAGNOSIS — I65.29 CAROTID ATHEROSCLEROSIS, UNSPECIFIED LATERALITY: Primary | ICD-10-CM

## 2019-09-03 DIAGNOSIS — N18.2 STAGE 2 CHRONIC KIDNEY DISEASE: ICD-10-CM

## 2019-09-03 DIAGNOSIS — E87.1 CHRONIC HYPONATREMIA: Chronic | ICD-10-CM

## 2019-09-03 DIAGNOSIS — I70.8 OCCLUSION OF CELIAC ARTERY: ICD-10-CM

## 2019-09-03 DIAGNOSIS — K59.03 DRUG-INDUCED CONSTIPATION: ICD-10-CM

## 2019-09-03 LAB
GLUCOSE SERPL-MCNC: 127 MG/DL (ref 65–140)
GLUCOSE SERPL-MCNC: 144 MG/DL (ref 65–140)
HBV SURFACE AG SER QL: NORMAL
HCV AB SER QL: NORMAL
HIV 1+2 AB+HIV1 P24 AG SERPL QL IA: NORMAL
HIV1 P24 AG SER QL: NORMAL

## 2019-09-03 PROCEDURE — 87340 HEPATITIS B SURFACE AG IA: CPT | Performed by: NEUROLOGICAL SURGERY

## 2019-09-03 PROCEDURE — 97163 PT EVAL HIGH COMPLEX 45 MIN: CPT

## 2019-09-03 PROCEDURE — 87205 SMEAR GRAM STAIN: CPT | Performed by: NEUROLOGICAL SURGERY

## 2019-09-03 PROCEDURE — G8978 MOBILITY CURRENT STATUS: HCPCS

## 2019-09-03 PROCEDURE — G8979 MOBILITY GOAL STATUS: HCPCS

## 2019-09-03 PROCEDURE — 70250 X-RAY EXAM OF SKULL: CPT

## 2019-09-03 PROCEDURE — 62223 ESTABLISH BRAIN CAVITY SHUNT: CPT | Performed by: COLON & RECTAL SURGERY

## 2019-09-03 PROCEDURE — 00163J6 BYPASS CEREBRAL VENTRICLE TO PERITONEAL CAVITY WITH SYNTHETIC SUBSTITUTE, PERCUTANEOUS APPROACH: ICD-10-PCS | Performed by: NEUROLOGICAL SURGERY

## 2019-09-03 PROCEDURE — 86803 HEPATITIS C AB TEST: CPT | Performed by: NEUROLOGICAL SURGERY

## 2019-09-03 PROCEDURE — 87806 HIV AG W/HIV1&2 ANTB W/OPTIC: CPT | Performed by: NEUROLOGICAL SURGERY

## 2019-09-03 PROCEDURE — 87070 CULTURE OTHR SPECIMN AEROBIC: CPT | Performed by: NEUROLOGICAL SURGERY

## 2019-09-03 PROCEDURE — 99233 SBSQ HOSP IP/OBS HIGH 50: CPT | Performed by: INTERNAL MEDICINE

## 2019-09-03 PROCEDURE — 82948 REAGENT STRIP/BLOOD GLUCOSE: CPT

## 2019-09-03 PROCEDURE — 49324 LAP INSERT TUNNEL IP CATH: CPT | Performed by: COLON & RECTAL SURGERY

## 2019-09-03 PROCEDURE — 0WJG4ZZ INSPECTION OF PERITONEAL CAVITY, PERCUTANEOUS ENDOSCOPIC APPROACH: ICD-10-PCS | Performed by: COLON & RECTAL SURGERY

## 2019-09-03 PROCEDURE — 71046 X-RAY EXAM CHEST 2 VIEWS: CPT

## 2019-09-03 PROCEDURE — 74018 RADEX ABDOMEN 1 VIEW: CPT

## 2019-09-03 PROCEDURE — 62223 ESTABLISH BRAIN CAVITY SHUNT: CPT | Performed by: NEUROLOGICAL SURGERY

## 2019-09-03 DEVICE — BACTISEAL VP CATHETER KIT: Type: IMPLANTABLE DEVICE | Site: ABDOMEN | Status: FUNCTIONAL

## 2019-09-03 DEVICE — VALVE CERTAS PLUS PROGRAMMABLE IN-LINE VALVE WITH SIPHONGUARD DEVICE: Type: IMPLANTABLE DEVICE | Site: FRONTAL LOBE | Status: FUNCTIONAL

## 2019-09-03 RX ORDER — ATORVASTATIN CALCIUM 40 MG/1
40 TABLET, FILM COATED ORAL
Status: DISCONTINUED | OUTPATIENT
Start: 2019-09-03 | End: 2019-09-05 | Stop reason: HOSPADM

## 2019-09-03 RX ORDER — CHLORHEXIDINE GLUCONATE 0.12 MG/ML
15 RINSE ORAL ONCE
Status: COMPLETED | OUTPATIENT
Start: 2019-09-03 | End: 2019-09-03

## 2019-09-03 RX ORDER — SODIUM CHLORIDE, SODIUM LACTATE, POTASSIUM CHLORIDE, CALCIUM CHLORIDE 600; 310; 30; 20 MG/100ML; MG/100ML; MG/100ML; MG/100ML
125 INJECTION, SOLUTION INTRAVENOUS CONTINUOUS
Status: DISCONTINUED | OUTPATIENT
Start: 2019-09-03 | End: 2019-09-03

## 2019-09-03 RX ORDER — ONDANSETRON 2 MG/ML
4 INJECTION INTRAMUSCULAR; INTRAVENOUS EVERY 6 HOURS PRN
Status: DISCONTINUED | OUTPATIENT
Start: 2019-09-03 | End: 2019-09-05 | Stop reason: HOSPADM

## 2019-09-03 RX ORDER — PROPOFOL 10 MG/ML
INJECTION, EMULSION INTRAVENOUS CONTINUOUS PRN
Status: DISCONTINUED | OUTPATIENT
Start: 2019-09-03 | End: 2019-09-03 | Stop reason: SURG

## 2019-09-03 RX ORDER — ONDANSETRON 2 MG/ML
4 INJECTION INTRAMUSCULAR; INTRAVENOUS ONCE AS NEEDED
Status: DISCONTINUED | OUTPATIENT
Start: 2019-09-03 | End: 2019-09-03 | Stop reason: HOSPADM

## 2019-09-03 RX ORDER — FENTANYL CITRATE/PF 50 MCG/ML
50 SYRINGE (ML) INJECTION
Status: DISCONTINUED | OUTPATIENT
Start: 2019-09-03 | End: 2019-09-03 | Stop reason: HOSPADM

## 2019-09-03 RX ORDER — LOSARTAN POTASSIUM 25 MG/1
25 TABLET ORAL DAILY
Status: DISCONTINUED | OUTPATIENT
Start: 2019-09-03 | End: 2019-09-05 | Stop reason: HOSPADM

## 2019-09-03 RX ORDER — CEFAZOLIN SODIUM 1 G/3ML
INJECTION, POWDER, FOR SOLUTION INTRAMUSCULAR; INTRAVENOUS AS NEEDED
Status: DISCONTINUED | OUTPATIENT
Start: 2019-09-03 | End: 2019-09-03 | Stop reason: SURG

## 2019-09-03 RX ORDER — LABETALOL 20 MG/4 ML (5 MG/ML) INTRAVENOUS SYRINGE
10 ONCE
Status: COMPLETED | OUTPATIENT
Start: 2019-09-03 | End: 2019-09-03

## 2019-09-03 RX ORDER — MAGNESIUM HYDROXIDE 1200 MG/15ML
LIQUID ORAL AS NEEDED
Status: DISCONTINUED | OUTPATIENT
Start: 2019-09-03 | End: 2019-09-03 | Stop reason: HOSPADM

## 2019-09-03 RX ORDER — ESCITALOPRAM OXALATE 10 MG/1
10 TABLET ORAL DAILY
Status: DISCONTINUED | OUTPATIENT
Start: 2019-09-03 | End: 2019-09-05 | Stop reason: HOSPADM

## 2019-09-03 RX ORDER — SENNOSIDES 8.6 MG
1 TABLET ORAL DAILY
Status: DISCONTINUED | OUTPATIENT
Start: 2019-09-03 | End: 2019-09-05 | Stop reason: HOSPADM

## 2019-09-03 RX ORDER — ACETAMINOPHEN 325 MG/1
975 TABLET ORAL EVERY 6 HOURS SCHEDULED
Status: DISCONTINUED | OUTPATIENT
Start: 2019-09-03 | End: 2019-09-05 | Stop reason: HOSPADM

## 2019-09-03 RX ORDER — CEFAZOLIN SODIUM 2 G/50ML
2000 SOLUTION INTRAVENOUS ONCE
Status: COMPLETED | OUTPATIENT
Start: 2019-09-03 | End: 2019-09-03

## 2019-09-03 RX ORDER — HYDROMORPHONE HCL/PF 1 MG/ML
0.5 SYRINGE (ML) INJECTION
Status: DISCONTINUED | OUTPATIENT
Start: 2019-09-03 | End: 2019-09-03 | Stop reason: HOSPADM

## 2019-09-03 RX ORDER — PROPOFOL 10 MG/ML
INJECTION, EMULSION INTRAVENOUS AS NEEDED
Status: DISCONTINUED | OUTPATIENT
Start: 2019-09-03 | End: 2019-09-03 | Stop reason: SURG

## 2019-09-03 RX ORDER — PANTOPRAZOLE SODIUM 20 MG/1
20 TABLET, DELAYED RELEASE ORAL
Status: DISCONTINUED | OUTPATIENT
Start: 2019-09-03 | End: 2019-09-05 | Stop reason: HOSPADM

## 2019-09-03 RX ORDER — GINSENG 100 MG
CAPSULE ORAL AS NEEDED
Status: DISCONTINUED | OUTPATIENT
Start: 2019-09-03 | End: 2019-09-03 | Stop reason: HOSPADM

## 2019-09-03 RX ORDER — ATENOLOL 50 MG/1
50 TABLET ORAL DAILY
Status: DISCONTINUED | OUTPATIENT
Start: 2019-09-03 | End: 2019-09-05 | Stop reason: HOSPADM

## 2019-09-03 RX ORDER — LIDOCAINE HYDROCHLORIDE 10 MG/ML
INJECTION, SOLUTION INFILTRATION; PERINEURAL AS NEEDED
Status: DISCONTINUED | OUTPATIENT
Start: 2019-09-03 | End: 2019-09-03 | Stop reason: SURG

## 2019-09-03 RX ORDER — SODIUM CHLORIDE, SODIUM LACTATE, POTASSIUM CHLORIDE, CALCIUM CHLORIDE 600; 310; 30; 20 MG/100ML; MG/100ML; MG/100ML; MG/100ML
75 INJECTION, SOLUTION INTRAVENOUS CONTINUOUS
Status: DISCONTINUED | OUTPATIENT
Start: 2019-09-03 | End: 2019-09-03

## 2019-09-03 RX ORDER — FENTANYL CITRATE 50 UG/ML
INJECTION, SOLUTION INTRAMUSCULAR; INTRAVENOUS AS NEEDED
Status: DISCONTINUED | OUTPATIENT
Start: 2019-09-03 | End: 2019-09-03 | Stop reason: SURG

## 2019-09-03 RX ORDER — LIDOCAINE HYDROCHLORIDE 10 MG/ML
INJECTION, SOLUTION INFILTRATION; PERINEURAL AS NEEDED
Status: DISCONTINUED | OUTPATIENT
Start: 2019-09-03 | End: 2019-09-03 | Stop reason: HOSPADM

## 2019-09-03 RX ORDER — SODIUM CHLORIDE 1000 MG
1 TABLET, SOLUBLE MISCELLANEOUS 3 TIMES DAILY
Status: DISCONTINUED | OUTPATIENT
Start: 2019-09-03 | End: 2019-09-05 | Stop reason: HOSPADM

## 2019-09-03 RX ORDER — CEFAZOLIN SODIUM 2 G/50ML
2000 SOLUTION INTRAVENOUS EVERY 8 HOURS
Status: COMPLETED | OUTPATIENT
Start: 2019-09-03 | End: 2019-09-04

## 2019-09-03 RX ORDER — LIDOCAINE HYDROCHLORIDE AND EPINEPHRINE 10; 10 MG/ML; UG/ML
INJECTION, SOLUTION INFILTRATION; PERINEURAL AS NEEDED
Status: DISCONTINUED | OUTPATIENT
Start: 2019-09-03 | End: 2019-09-03 | Stop reason: HOSPADM

## 2019-09-03 RX ORDER — DEXAMETHASONE SODIUM PHOSPHATE 10 MG/ML
INJECTION, SOLUTION INTRAMUSCULAR; INTRAVENOUS AS NEEDED
Status: DISCONTINUED | OUTPATIENT
Start: 2019-09-03 | End: 2019-09-03 | Stop reason: SURG

## 2019-09-03 RX ORDER — DOCUSATE SODIUM 100 MG/1
100 CAPSULE, LIQUID FILLED ORAL 2 TIMES DAILY
Status: DISCONTINUED | OUTPATIENT
Start: 2019-09-03 | End: 2019-09-05 | Stop reason: HOSPADM

## 2019-09-03 RX ORDER — SODIUM CHLORIDE 9 MG/ML
INJECTION, SOLUTION INTRAVENOUS AS NEEDED
Status: DISCONTINUED | OUTPATIENT
Start: 2019-09-03 | End: 2019-09-03 | Stop reason: HOSPADM

## 2019-09-03 RX ORDER — ROCURONIUM BROMIDE 10 MG/ML
INJECTION, SOLUTION INTRAVENOUS AS NEEDED
Status: DISCONTINUED | OUTPATIENT
Start: 2019-09-03 | End: 2019-09-03 | Stop reason: SURG

## 2019-09-03 RX ORDER — ONDANSETRON 2 MG/ML
INJECTION INTRAMUSCULAR; INTRAVENOUS AS NEEDED
Status: DISCONTINUED | OUTPATIENT
Start: 2019-09-03 | End: 2019-09-03 | Stop reason: SURG

## 2019-09-03 RX ADMIN — Medication 1 TABLET: at 12:33

## 2019-09-03 RX ADMIN — PROPOFOL 50 MG: 10 INJECTION, EMULSION INTRAVENOUS at 09:15

## 2019-09-03 RX ADMIN — PROPOFOL 100 MCG/KG/MIN: 10 INJECTION, EMULSION INTRAVENOUS at 08:49

## 2019-09-03 RX ADMIN — PHENYLEPHRINE HYDROCHLORIDE 100 MCG: 10 INJECTION INTRAVENOUS at 09:29

## 2019-09-03 RX ADMIN — ACETAMINOPHEN 975 MG: 325 TABLET ORAL at 17:04

## 2019-09-03 RX ADMIN — SODIUM CHLORIDE, SODIUM LACTATE, POTASSIUM CHLORIDE, AND CALCIUM CHLORIDE 75 ML/HR: .6; .31; .03; .02 INJECTION, SOLUTION INTRAVENOUS at 12:13

## 2019-09-03 RX ADMIN — PHENYLEPHRINE HYDROCHLORIDE 100 MCG: 10 INJECTION INTRAVENOUS at 09:31

## 2019-09-03 RX ADMIN — ACETAMINOPHEN 975 MG: 325 TABLET ORAL at 12:33

## 2019-09-03 RX ADMIN — PROPOFOL 140 MG: 10 INJECTION, EMULSION INTRAVENOUS at 08:46

## 2019-09-03 RX ADMIN — CHLORHEXIDINE GLUCONATE 0.12% ORAL RINSE 15 ML: 1.2 LIQUID ORAL at 08:09

## 2019-09-03 RX ADMIN — SODIUM CHLORIDE, SODIUM LACTATE, POTASSIUM CHLORIDE, AND CALCIUM CHLORIDE 125 ML/HR: .6; .31; .03; .02 INJECTION, SOLUTION INTRAVENOUS at 08:09

## 2019-09-03 RX ADMIN — ROCURONIUM BROMIDE 30 MG: 10 INJECTION INTRAVENOUS at 08:46

## 2019-09-03 RX ADMIN — DEXAMETHASONE SODIUM PHOSPHATE 8 MG: 10 INJECTION, SOLUTION INTRAMUSCULAR; INTRAVENOUS at 08:46

## 2019-09-03 RX ADMIN — Medication 1 G: at 21:08

## 2019-09-03 RX ADMIN — CEFAZOLIN SODIUM 2000 MG: 2 SOLUTION INTRAVENOUS at 08:09

## 2019-09-03 RX ADMIN — ROCURONIUM BROMIDE 20 MG: 10 INJECTION INTRAVENOUS at 09:09

## 2019-09-03 RX ADMIN — PROPOFOL 30 MG: 10 INJECTION, EMULSION INTRAVENOUS at 09:24

## 2019-09-03 RX ADMIN — Medication 1 G: at 16:51

## 2019-09-03 RX ADMIN — ATORVASTATIN CALCIUM 40 MG: 40 TABLET, FILM COATED ORAL at 21:08

## 2019-09-03 RX ADMIN — DOCUSATE SODIUM 100 MG: 100 CAPSULE, LIQUID FILLED ORAL at 17:04

## 2019-09-03 RX ADMIN — CEFAZOLIN SODIUM 2000 MG: 2 SOLUTION INTRAVENOUS at 16:51

## 2019-09-03 RX ADMIN — SUGAMMADEX 200 MG: 100 INJECTION, SOLUTION INTRAVENOUS at 09:51

## 2019-09-03 RX ADMIN — ONDANSETRON 4 MG: 2 INJECTION INTRAMUSCULAR; INTRAVENOUS at 09:43

## 2019-09-03 RX ADMIN — CEFAZOLIN SODIUM 1000 MG: 1 INJECTION, POWDER, FOR SOLUTION INTRAMUSCULAR; INTRAVENOUS at 09:14

## 2019-09-03 RX ADMIN — LABETALOL 20 MG/4 ML (5 MG/ML) INTRAVENOUS SYRINGE 10 MG: at 10:15

## 2019-09-03 RX ADMIN — FENTANYL CITRATE 50 MCG: 50 INJECTION INTRAMUSCULAR; INTRAVENOUS at 09:15

## 2019-09-03 RX ADMIN — LIDOCAINE HYDROCHLORIDE 50 MG: 10 INJECTION, SOLUTION INFILTRATION; PERINEURAL at 08:46

## 2019-09-03 RX ADMIN — PANTOPRAZOLE SODIUM 20 MG: 20 TABLET, DELAYED RELEASE ORAL at 12:37

## 2019-09-03 RX ADMIN — FENTANYL CITRATE 50 MCG: 50 INJECTION INTRAMUSCULAR; INTRAVENOUS at 08:46

## 2019-09-03 RX ADMIN — ROCURONIUM BROMIDE 5 MG: 10 INJECTION INTRAVENOUS at 09:33

## 2019-09-03 RX ADMIN — SODIUM CHLORIDE, SODIUM LACTATE, POTASSIUM CHLORIDE, AND CALCIUM CHLORIDE: .6; .31; .03; .02 INJECTION, SOLUTION INTRAVENOUS at 08:38

## 2019-09-03 RX ADMIN — LOSARTAN POTASSIUM 25 MG: 25 TABLET, FILM COATED ORAL at 12:35

## 2019-09-03 RX ADMIN — Medication 1 G: at 13:24

## 2019-09-03 NOTE — INTERVAL H&P NOTE
H&P reviewed  After examining the patient I find no changes in the patients condition since the H&P had been written  Patient personally seen and examined  Neurological examination unchanged compared to last office/progress note, with the following exceptions:    BP (!) 182/74   Pulse 69   Temp 97 9 °F (36 6 °C) (Temporal)   Resp 17   Ht 5' 2" (1 575 m)   Wt 70 8 kg (156 lb)   SpO2 99%   BMI 28 53 kg/m²      None  Patient has stopped all antiplatelet/anticoagulation medication  Currently residing in a rehab facility within a nursing home  Anticipate discharge back to that setting  Code status has been discussed with the patient's family  Results of recent CT scan of the abdomen were reviewed with the patient and family and discussed within the context of this procedure  Post operative instructions and medications have been reviewed with the patient and family  Assessment and Plan:    All questions have been answered to the patient and family satisfaction  Plan to proceed with insertion of right frontal ventriculoperitoneal shunt  They are in agreement with proceeding

## 2019-09-03 NOTE — QUICK NOTE
CT scan images reviewed, report copied below  Minimal sigmoid diverticular inflammation, last colonoscopy in system 2/2006, overdue  Denies fevers, abdomen soft, nontender  Discussed with Dr Erika uGtierrez, will proceed as scheduled, Will place colonoscopy recall for postop within 3months  Normal spleen appearance      Scattered colonic diverticula  Minimal fatty infiltration about a sigmoid diverticula could relate to mild diverticulitis      Diffuse atherosclerotic plaque throughout the aorta and its branches  Moderate to severe atherosclerotic plaque at the celiac axis and superior mesenteric artery origins

## 2019-09-03 NOTE — OP NOTE
OPERATIVE REPORT  PATIENT NAME: Suresh Jansen    :  1940  MRN: [id number]  Pt Location: AN OR ROOM 04    SURGERY DATE: 9/3/2019    Surgeon(s) and Role:  Panel 1:     Kwasi Mckenna MD - Primary  Panel 2:     * Dmitry Lai MD - Primary    Preop Diagnosis:  Normal pressure hydrocephalus [G91 2]    Post-Op Diagnosis Codes:     * Normal pressure hydrocephalus [G91 2]    Procedure:  1  Insertion of right frontal ventriculoperitoneal shunt normal pressure hydrocephalus  Certas plus valve set to 6  Specimen(s):  ID Type Source Tests Collected by Time Destination   A : CSF CULTURE / GRAM STAIN Cerebrospinal Fluid Brain CSF CULTURE AND GRAM STAIN Ca Nelson MD 9/3/2019 6014        Estimated Blood Loss:   Minimal    Drains:  * No LDAs found *    Anesthesia Type:   General    Operative Indications:  Normal pressure hydrocephalus [G91 2]    Operative Findings:  Clear CSF under moderate pressure  Complications:   None    Procedure and Technique:  Clinical Note    The goals and alternatives to the procedure listed above were discussed  The risks of surgery were discussed in detail with patient and family  1  Risk of general anesthetic with possible respiratory and cardiac complications  Risk of infection and bleeding were described  2  Risk of neurological injury with new pain, weakness or numbness in the arms and legs, difficulties with speech, language and vision  The risk of seizures was described  Risk of hemorrhage requiring reoperation was described  3  Risk of shunt malfunction including over drainage requiring reprogramming of the shunt for surgical revision of the shunt  4  Risk of injury to injure thoracic and intra-abdominal structures was described  Once all questions were answered to their satisfaction, they asked to proceed with surgery  Operating Room Note    The patient was brought to the operating room and transferred onto the OR table and placed under general anesthetic  All appropriate lines were placed  Care was taken to insure that all pressure points were padded and the neck was in a neutral position  The head was placed horseshoe head mcpherson and turned to the left  The review side of head was shaved  An incision was planned in the mid pupillary line at the coronal suture while additional incisions were planned behind the ear  The skin was then prepped and draped in usual sterile fashion  Please see Dr Oscar Duque note regarding the laparoscopic approach to the peritoneal cavity and placement of the distal catheter  A full surgical timeout was undertaken identifying the site, side and type of surgery  It was confirmed that the patient received preoperative antibiotics  All incisions were infiltrated with 1% lidocaine with 100,000 of epinephrine  The incision behind the ear was incised with a 15 blade  Monopolar cautery was used to dissect the outer table of the skull  A shunt passer was then passed from the abdominal incision to the incision behind the right ear over the chest wall and clavicle  The distal catheter was passed through the shunt passer and the shunt passer was then removed  The incision over the coronal suture was incised with a 15 blade  Monopolar cautery was used to dissect the outer table of the skull  A matchstick bur was used to drill a bur hole at the coronal suture  The dura was cauterized with bipolar cautery and bone wax was used for hemostasis of the bony edges  A uterine packing forceps was used to make a subgaleal pocket then passed from the incision in thefrontal area to the incision behind the ear  The distal catheter was then pulled from the incision behind the ear to the frontal incision at the coronal suture  The distal catheter was then primed with saline  A fresh 15 blade was used to incise the dura  Bipolar cautery was used for hemostasis of the dural leaflets and debby   A ventriculostomy catheter was placed perpendicular to the outer table of the skull and advanced in the mid pupillary line to 5 centimeters from the inner table of the skull  There was egress of CSF, which was collected for Gram stain  The catheter was then cut at 10 cm and secured to the proximal end of the shunt valve, which had been set to 6, with a silk tie  The distal catheter was then connected to the distal and to the shunt valve and secured with silk tie  The shunt valve was then placed in the subgaleal pocket and excess distal catheter was pulled through to the abdomen  The reservoir was pumped and there is egress of clear CSF from the distal catheter into the peritoneal cavity  All surgical sites were irrigated copiously with antibiotic irrigation  Inverted Vicryl suture was used to approximate the galea at the incision behind the ear and at the coronal suture  Staples were used to approximate the skin edges at the incision behind the ear  Monocryl was used to approximate the skin edges at the incision at the coronal suture  The count was correct at the end of the case and there were no complications  The patient was carefully extubated and transferred onto the operating room [facility]  They were transferred to the operating room where they were noted to be hemodynamically stable and neurologically unchanged  They will be admitted to ICU for ongoing observation  The results of surgery were discussed with the patient and family      I was present for the entire procedure    Patient Disposition:  PACU     SIGNATURE: Tiesha Zaragoza MD  DATE: September 3, 2019  TIME: 9:56 AM

## 2019-09-03 NOTE — PLAN OF CARE
Problem: PHYSICAL THERAPY ADULT  Goal: Performs mobility at highest level of function for planned discharge setting  See evaluation for individualized goals  Description  Treatment/Interventions: Functional transfer training, LE strengthening/ROM, Therapeutic exercise, Endurance training, Cognitive reorientation, Patient/family training, Equipment eval/education, Bed mobility, Gait training, Spoke to nursing, Spoke to case management  Equipment Recommended: Hassan Shone       See flowsheet documentation for full assessment, interventions and recommendations  Outcome: Progressing  Note:   Prognosis: Good  Problem List: Decreased strength, Decreased range of motion, Decreased endurance, Impaired balance, Decreased mobility, Decreased coordination, Decreased cognition, Decreased safety awareness, Decreased skin integrity  Assessment: Pt  is a 65 yo F who presents with NPH for  Shunt placement  Dx: Normal Pressure hydrocephalus, order placed for PT eval and tx, w/ activity order of OOB  pt presents w/ comorbidities of hydrocephalus, htn, GERD, Esphagitis, HLD, hyponatremia, SMA Stenosis, CKD2, Ambulatory Dysfunction, Anxiety, levoscoliosis and personal factors of advanced age, inaccessible home environment, living in 2 story house, mobilizing w/ assistive device, inability to ambulate household distances, inability to navigate community distances, inability to navigate level surfaces w/o external assistance, unable to perform dynamic tasks in community, decreased cognition, limited home support, positive fall history, impulsivity, unable to perform physical activity, inability to perform IADLs, inability to perform ADLs and inability to live alone  pt presents w/ pain, weakness, decreased ROM, decreased endurance, impaired cognition, impaired balance, gait deviations, impaired coordination, decreased safety awareness, fall risk and impaired skin integrity   these impairments are evident in findings from physical examination (weakness and impaired coordination), mobility assessment (need for Michael assist w/ all phases of mobility when usually mobilizing independently, tolerance to only 10'x2 feet of ambulation and need for cueing for mobility technique), and Barthel Index: 40/100  pt needed input for task focus and mobility technique  pt is at risk for falls due to physical and safety awareness deficits  pt's clinical presentation is unstable/unpredictable (evident in need for assist w/ all phases of mobility when usually mobilizing independently, tolerance to only 10'x2 feet of ambulation, pain impacting overall mobility status, need for input for mobility technique, need for input for task focus and mobility technique and need for input for mobility technique/safety)  pt needs inpatient PT tx to improve mobility deficits  discharge recommendation is for inpatient rehab to reduce fall risk and maximize level of functional independence  Recommendation: Short-term skilled PT(return to [facility] with PT consult)     PT - OK to Discharge: Yes    See flowsheet documentation for full assessment

## 2019-09-03 NOTE — ANESTHESIA POSTPROCEDURE EVALUATION
Post-Op Assessment Note    CV Status:  Stable    Pain management: adequate     Mental Status:  Sleepy and arousable   Hydration Status:  Euvolemic   PONV Controlled:  Controlled   Airway Patency:  Patent and adequate   Post Op Vitals Reviewed: Yes      Staff: IVANNA           BP (!) 198/85 (09/03/19 1011)    Temp     Pulse 104 (09/03/19 1011)   Resp 19 (09/03/19 1011)    SpO2 100 % (09/03/19 1011)

## 2019-09-03 NOTE — PHYSICAL THERAPY NOTE
PHYSICAL THERAPY Evaluation NOTE    Patient Name: Marques Rincon  DVCWP'R Date: 9/3/2019     AGE:   66 y o  Mrn:   [id number]  ADMIT DX:  Normal pressure hydrocephalus [G91 2]    Past Medical History:   Diagnosis Date    Anxiety     Arthritis     Confusion 10/2/2018    Depression     Displaced fracture of distal phalanx of right thumb     GERD (gastroesophageal reflux disease)     [facility] [address] [facility]     Hyperlipidemia     Hypertension     Moderate episode of recurrent major depressive disorder ([facility] [address] ) 4/12/2019    Shortness of breath     Stage 2 chronic kidney disease 7/17/2019     Length Of Stay: 0  PHYSICAL THERAPY EVALUATION :    09/03/19 1600   Note Type   Note type Eval only   Pain Assessment   Pain Assessment No/denies pain   Home Living   Type of Home   (Reports admitted from [facility])   Bathroom Shower/Tub Walk-in shower   [address] bars in shower;Built-in shower seat;Grab bars around toilet   [address] [facility]   Additional Comments Pt  reports recently at [facility] prior to [address]   Prior Function   Level of [place] Needs assistance with IADLs; Needs assistance with ADLs and functional mobility   Lives With Alone   Receives Help From Family   ADL Assistance Needs assistance   IADLs Needs assistance   Falls in the last 6 months 1 to 4   Vocational Retired   Comments PTA, Pt  reports assistance with ADLs, IADLs and functional mobility with RW as AD  Restrictions/Precautions   Other Precautions Chair Alarm; Bed Alarm;Multiple lines; Fall Risk;Impulsive;Telemetry   General   Additional Pertinent History Pt  is a 65 yo F who presents with NPH for  Shunt placement   Dx: Normal Pressure hydrocephalus, comorbidities include htn, GERD, Esphagitis, HLD, hyponatremia, SMA Stenosis, CKD2, Ambulatory Dysfunction, Anxiety, levoscoliosis  Family/Caregiver Present No   Cognition   Overall Cognitive Status Impaired   Arousal/Participation Cooperative   Orientation Level Oriented to person;Oriented to place; Disoriented to time;Disoriented to situation   Memory Decreased short term memory;Decreased recall of recent events   Following Commands Follows multistep commands with increased time or repetition   Comments Pt  was identified with full name and birthdate   RLE Assessment   RLE Assessment   (functionally > 3+/5)   LLE Assessment   LLE Assessment   (functionally > 3+/5)   Coordination   Movements are Fluid and Coordinated 0   Coordination and Movement Description bradykinetic and mild gait ataxia   Sensation WFL   Light Touch   RLE Light Touch Grossly intact   LLE Light Touch Grossly intact   Bed Mobility   Supine to Sit 5  Supervision   Additional items Assist x 1;HOB elevated; Bedrails; Increased time required   Sit to Supine 4  Minimal assistance   Additional items Assist x 2; Increased time required;LE management   Transfers   Sit to Stand 4  Minimal assistance   Additional items Assist x 1; Increased time required;Verbal cues  (for hand placement and sequencing)   Stand to Sit 4  Minimal assistance   Additional items Assist x 1; Impulsive;Verbal cues  (to reach back to control descent)   Ambulation/Elevation   Gait pattern Improper Weight shift;Narrow SHIRLEY; Forward Flexion;Decreased foot clearance;Shuffling; Inconsistent renita; Redundant gait at times; Short stride; Step to;Excessively slow   Gait Assistance 4  Minimal assist   Additional items Assist x 1;Verbal cues  (for gait mechanics and sequencing)   Assistive Device Rolling walker   Distance 10'x2   Balance   Static Sitting Fair +   Dynamic Sitting Fair   Static Standing Fair -   Dynamic Standing Poor +   Ambulatory Poor +   Endurance Deficit   Endurance Deficit Yes   Endurance Deficit Description postural and gait degradation with fatigue   Activity Tolerance   Activity Tolerance Patient limited by fatigue   Medical Staff Made Aware Spoke to CM    Nurse Made Aware Spoke to RN    Assessment   Prognosis Good   Problem List Decreased strength;Decreased range of motion;Decreased endurance; Impaired balance;Decreased mobility; Decreased coordination;Decreased cognition;Decreased safety awareness;Decreased skin integrity   Assessment Pt  is a 65 yo F who presents with NPH for  Shunt placement  Dx: Normal Pressure hydrocephalus, order placed for PT eval and tx, w/ activity order of OOB  pt presents w/ comorbidities of hydrocephalus, htn, GERD, Esphagitis, HLD, hyponatremia, SMA Stenosis, CKD2, Ambulatory Dysfunction, Anxiety, levoscoliosis and personal factors of advanced age, inaccessible home environment, living in 2 story house, mobilizing w/ assistive device, inability to ambulate household distances, inability to navigate community distances, inability to navigate level surfaces w/o external assistance, unable to perform dynamic tasks in community, decreased cognition, limited home support, positive fall history, impulsivity, unable to perform physical activity, inability to perform IADLs, inability to perform ADLs and inability to live alone  pt presents w/ pain, weakness, decreased ROM, decreased endurance, impaired cognition, impaired balance, gait deviations, impaired coordination, decreased safety awareness, fall risk and impaired skin integrity  these impairments are evident in findings from physical examination (weakness and impaired coordination), mobility assessment (need for Michael assist w/ all phases of mobility when usually mobilizing independently, tolerance to only 10'x2 feet of ambulation and need for cueing for mobility technique), and Barthel Index: 40/100  pt needed input for task focus and mobility technique  pt is at risk for falls due to physical and safety awareness deficits   pt's clinical presentation is unstable/unpredictable (evident in need for assist w/ all phases of mobility when usually mobilizing independently, tolerance to only 10'x2 feet of ambulation, pain impacting overall mobility status, need for input for mobility technique, need for input for task focus and mobility technique and need for input for mobility technique/safety)  pt needs inpatient PT tx to improve mobility deficits  discharge recommendation is for inpatient rehab to reduce fall risk and maximize level of functional independence  Goals   Patient Goals to get home   STG Expiration Date 09/13/19   Short Term Goal #1 pt will: Increase bilateral LE strength 1/2 grade to facilitate independent mobility, Perform all bed mobility tasks independently to decrease fall risk factors, Perform all transfers w/ supervision to improve independence, Ambulate 150 ft  with roller walker w/ supervision w/o LOB, Increase all balance 1/2 grade to decrease risk for falls and Improve Barthel Index score to 70 or greater to facilitate independence   Treatment Day 0   Plan   Treatment/Interventions Functional transfer training;LE strengthening/ROM; Therapeutic exercise; Endurance training;Cognitive reorientation;Patient/family training;Equipment eval/education; Bed mobility;Gait training;Spoke to nursing;Spoke to case management   PT Frequency   7x/week   Recommendation   Recommendation Short-term skilled PT  (return to [facility] with PT consult)   Equipment Recommended [facility]   PT - OK to Discharge Yes   Additional Comments when medically appropriate   Barthel Index   Feeding 10   Bathing 0   Grooming Score 0   Dressing Score 5   Bladder Score 0   Bowels Score 10   Toilet Use Score 5   Transfers (Bed/Chair) Score 10   Mobility (Level Surface) Score 0   Stairs Score 0   Barthel Index Score 40     Skilled PT recommended while in hospital and upon DC to progress pt toward treatment goals       William Ames, PT, DPT 9/3/2019

## 2019-09-03 NOTE — OP NOTE
OPERATIVE REPORT  PATIENT NAME: Donna Cross    :  1940  MRN: [id number]  Pt Location: AN OR ROOM 04    SURGERY DATE: 9/3/2019    Surgeon(s) and Role:  Panel 1:     Moe Munoz MD - Primary  Panel 2:     * Gustavo Greenberg MD - Primary    Preop Diagnosis:  Normal pressure hydrocephalus [G91 2]    Post-Op Diagnosis Codes:     * Normal pressure hydrocephalus [G91 2]    Procedure(s) (LRB): Insertion of frontal ventriculoperitoneal shunt (Right)  LAPAROSCOPIC APPROACH FOR VENTRICULAR PERITONEAL SHUNT INSERTION (N/A)    Specimen(s):  ID Type Source Tests Collected by Time Destination   A : CSF CULTURE / GRAM STAIN Cerebrospinal Fluid Brain CSF CULTURE AND GRAM STAIN Evgeny Fritz MD 9/3/2019 1377        Estimated Blood Loss:   Minimal    Drains:  * No LDAs found *    Anesthesia Type:   General    Operative Indications:  Normal pressure hydrocephalus [G91 2]    Operative Findings:  Laparoscopic placement, active fluid egress on laparoscopic view showing functioning catheter  Complications:   None    Procedure and Technique:  70yo female, NPH, for  shunt placement, I have been requested by Dr Sai Kemp for distal shunt laparoscopic assistance  Have discussed the benefits, alternatives, risks during personal face to face consent with her  including not limited to bleeding, infection, risks of anesthesia, open surgery, DVT/PE, heart attack, stroke, death, damage to local structures including ureter and duodenum, enterotomy, temporary versus permanent stoma, CSF infection, need for redo  shunt as well as the traditional risks as discussed with her by neurosurgical colleagues      She signed informed consent and was brought to the operating room where general endotracheal anesthesia was induced without event  Venodynes were on and running throughout the procedure  Cefazolin given prior to skin incision   She was electric skin clipped and abdomen chlorhexidine sterile prepped,draped in neurosurgical fashion with head chest and abdomen prepped      After time-out was taken per protocol procedure began with creating midline skin incision to tunnel abdominal catheter proximally  I then placed right upper quadrant incision with 15 blade, using penetrating towel clamps to tether and tent skin a 5 mm Opti View gasless trocar was placed under direct vision  15 mm CO2 pneumoperitoneum established and diagnostic laparoscopy showed no injury or bleeding on entry inspection      Additional 5 mm right lower quadrant placed, then a 9 [place] peel-away sheath was placed through the midline incision over top of inner obturator of 5 mm trocar, tubing was placed through this and peel-away sheath was taken away while prestige grasper was used to grasp the shunt tip which was brought out without tension  The distal end was held and visualized as Dr Vanessa Coleman pumped the proximal side showing active fluid egress,and the distal tip was intact,and tip placed into the pelvis under direct visualization       Abdomen was desufflated under direct vision showing catheter and its proper placement  Hemostasis was assured  Ports sites were closed with 4-0 Monocryl followed by histoacryl glue  Patient was brought to the recovery room in stable condition  All sponge, needle,instrument, I was present/scrubbed for the entirety of my portion of procedure       I was present for the entire procedure    Patient Disposition:  PACU     SIGNATURE: Shelley Del Angel MD  DATE: September 3, 2019  TIME: 9:50 AM

## 2019-09-03 NOTE — CONSULTS
[address] 66 y o  female MRN: [id number]  Unit/Bed#: OR [place] Encounter: [id number]    Reason for Admission / Chief Complaint: NPH s/p  shunt placement    History of Present Illness:  Marques Rincon is a 66 y o  female who presents today, 09/03/2019 for  shunt placement  Patient has past medical history of normal pressure hydrocephalus, chronic hyponatremia, essential hypertension, atherosclerotic disease of aorta, celiac axis, superior mesenteric artery, CKD stage 2 with baseline Cr of 0 6 to 0 9 and GERD  Patient had been experiencing falls at home and therefore was set up for elective procedure today  Case went relatively uneventfully, patient was given 700 cc crystalloid in our, no charted output, estimated blood loss none  Patient was successfully extubated in PACU and now is being referred to ICU for postop management  Following operating room, patient brought ICU  Is alert, oriented x3  Has no complaints currently  History obtained from chart review and the patient   ______________________________________________________________________    Assessment:   Principal Problem:    Normal pressure hydrocephalus  Active Problems:    Essential hypertension    GERD without esophagitis    Hyperlipidemia    Chronic hyponatremia    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HCC)    Stage 2 chronic kidney disease  Resolved Problems:    * No resolved hospital problems   *    Plan:    Neuro:   Normal Pressure Hydrocephalous  - postop day 0 status post  shunt placed by neuro surgery and Colorectal surgery  - incision site without hematoma or drainage  - continue critical neuro checks per Neurosurgery  - contact critical care Neurosurgery with any change in neuro exam was GCS with stat CT scan  - holding anti-platelet and anticoagulation for now, restart when appropriate per Colorectal surgery  - routine repeat head CT in shunt series tomorrow morning    Regulate sleep/wake cycle    CV:   Essential hypertension  - home medications:  Atenolol 50 mg daily; Cozaar 50 mg daily  - can restart with home parameters  - systolic blood pressure goal greater than 90, MAP goal greater than 65    Hyperlipidemia  - continue atorvastatin 40 mg q h s  Peripheral vascular disease with occluded celiac artery and stenosis of SMA  - follows with [facility] surgery with [facility]  - no signs or symptoms of mesenteric ischemia, being managed conservatively with aspirin and statin  - aspirin currently on hold due to neuro surgical procedure  - restart when appropriate from a neurosurgery standpoint    Echocardiogram:  10/22/2017:  EF 70%, normal right ventricular size and function, mitral annular calcification  Nuclear stress test:  10/24/2017:  No evidence of ischemia    Cardiac infusions:  None  Rhythm: NSR  Follow rhythm on telemetry    Pulm:   No history of pulmonary disorders  Patient is a former smoker but has not smoked in several years    Encourage deep breathing/coughing/IS/PulmToileting     GI:   GERD  - continue home PPI    Nutrition/diet plan:  Regular diet  Stress ulcer prophylaxis: No prophylaxis needed  Bowel regimen: None currently    :   CKD stage 2  - no indication currently for labs, can repeat in morning   - baseline 0 6-0 9    Indwelling Maldonado present: no     FEN:   Chronic hyponatremia  - baseline 130-135  - in the past has been 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 and has been on salt tablets and fluid restriction  - will assess sodium the morning, for now continue on regular diet    Fluid/Diuretic plan: No intervention  Goal 24 hour fluid balance:  Roughly even  Electrolytes repleted: not applicable  Goal: K >7 6, Mag >2 0, and Phos >3 0    ID:   No signs or symptoms of infection  Trend temps and WBC count    Heme:   No issues    Hold subcu heparin for now secondary to neuro surgical procedure  Trend hgb and plts    Endo:   No issues    Msk/Skin:  Routine wound care of shunt site as well as peritoneal infusions that appear hemostatic without any drainage  Mobility goal:  Out of bed when appropriate  PT consult: yes  OT consult: yes      Family:  Family updated within 24 hours:  Patient and her family updated at bedside extensively with care plan  All questions appropriately answered        Lines:  Peripheral IVs    VTE Prophylaxis:  Pharmacologic Prophylaxis: Restart when appropriate per Neurosurgery  Mechanical Prophylaxis: sequential compression device    Disposition: ICU level of care    Code Status: Level 1 - Full Code    Counseling / Coordination of Care  Total time spent today 45 minutes  Greater than 50% of total time was spent with the patient and / or family counseling and / or coordination of care   A description of the counseling / coordination of care: Discussed case with neurosurgery      ______________________________________________________________________  Past Medical History:  Past Medical History:   Diagnosis Date    Anxiety     Arthritis     Confusion 10/2/2018    Depression     Displaced fracture of distal phalanx of right thumb     GERD (gastroesophageal reflux disease)     [facility] [address] )     Hyperlipidemia     Hypertension     Moderate episode of recurrent major depressive disorder ([facility] [address] ) 4/12/2019    Shortness of breath     Stage 2 chronic kidney disease 7/17/2019       Past Surgical History:  Past Surgical History:   Procedure Laterality Date    APPENDECTOMY      CARPAL TUNNEL RELEASE      CATARACT EXTRACTION Bilateral     COLONOSCOPY      FL LUMBAR PUNCTURE  1/10/2019    FRACTURE SURGERY      SEPTOPLASTY      WRIST FRACTURE SURGERY Right        Past Family History:  Family History   Problem Relation Age of Onset    Heart attack Mother 39    Heart attack Father 61    No Known Problems Other     Breast cancer Sister     Alcohol abuse Daughter         history of    Heart attack Brother        Social History:  Social History     Tobacco Use   Smoking Status Former Smoker    Last attempt to quit:     Years since quittin 6   Smokeless Tobacco Never Used   Tobacco Comment    no secondhand smoke exposure     Social History     Substance and Sexual Activity   Alcohol Use Not Currently    Comment: Patient in [address] [place] [address]     Social History     Substance and Sexual Activity   Drug Use No     Marital Status:   Exercise History: Independent ADLs    Medications:  Current Facility-Administered Medications   Medication Dose Route Frequency    acetaminophen (TYLENOL) tablet 975 mg  975 mg Oral Q6H [address]    atenolol (TENORMIN) tablet 50 mg  50 mg Oral Daily    atorvastatin (LIPITOR) tablet 40 mg  40 mg Oral HS    ceFAZolin (ANCEF) IVPB (premix) 2,000 mg  2,000 mg Intravenous Q8H    docusate sodium (COLACE) capsule 100 mg  100 mg Oral BID    escitalopram (LEXAPRO) tablet 10 mg  10 mg Oral Daily    fentaNYL (SUBLIMAZE) injection 50 mcg  50 mcg Intravenous Q3 min PRN    HYDROmorphone (DILAUDID) injection 0 5 mg  0 5 mg Intravenous Q5 Min PRN    lactated ringers infusion  125 mL/hr Intravenous Continuous    lactated ringers infusion  75 mL/hr Intravenous Continuous    losartan (COZAAR) tablet 25 mg  25 mg Oral Daily    multivitamin (THERAGRAN) per tablet 1 tablet  1 tablet Oral Daily    ondansetron (ZOFRAN) injection 4 mg  4 mg Intravenous Once PRN    ondansetron (ZOFRAN) injection 4 mg  4 mg Intravenous Q6H PRN    pantoprazole (PROTONIX) EC tablet 20 mg  20 mg Oral Early Morning    senna (SENOKOT) tablet 8 6 mg  1 tablet Oral Daily    sodium chloride tablet 1 g  1 g Oral TID     Home medications:  Prior to Admission medications    Medication Sig Start Date End Date Taking?  Authorizing Provider   acetaminophen (TYLENOL) 325 mg tablet Take 2 tablets (650 mg total) by mouth every 6 (six) hours as needed for mild pain, moderate pain, headaches or fever 19  Yes Vadim Fraire MD   aspirin 81 MG tablet Take 1 tablet by mouth every evening  14  Yes Historical Provider, MD   atenolol (TENORMIN) 50 mg tablet TAKE ONE TABLET BY MOUTH DAILY  Patient taking differently: Take 50 mg by mouth daily in the early morning  19  Yes Nadeen Maharaj MD   atorvastatin (LIPITOR) 40 mg tablet TAKE ONE TABLET BY MOUTH EVERY DAY   OFFICE VISIT NEEDED  Patient taking differently: Take 40 mg by mouth daily at bedtime  18  Yes Nadeen Maharaj MD   bisacodyl (DULCOLAX) 10 mg suppository Insert 10 mg into the rectum as needed for constipation   Yes Historical Provider, MD   escitalopram (LEXAPRO) 10 mg tablet TAKE ONE TABLET BY MOUTH ONCE DAILY  Patient taking differently: Take 10 mg by mouth daily in the early morning  19  Yes Vicky Knutson PA-C   losartan (COZAAR) 50 mg tablet TAKE ONE TABLET BY MOUTH TWO TIMES A DAY FOR 30 DAYS 19  Yes Nadeen Maharaj MD   multivitamin (THERAGRAN) TABS Take 1 tablet by mouth daily    Yes Historical Provider, MD   omeprazole (PriLOSEC) 20 mg delayed release capsule Take 20 mg by mouth daily in the early morning  19  Yes Historical Provider, MD   sodium chloride 1 g tablet Take 1 tablet (1 g total) by mouth 3 (three) times a day 19  Yes Sally Alvares MD   Vitamin D, Cholecalciferol, 1000 units CAPS Take 1 tablet by mouth every evening    Yes Historical Provider, MD     Allergies:  No Known Allergies    ROS:   Review of Systems   All other systems reviewed and are negative  Vitals:  Vitals:    19 1011 19 1015 19 1030 19 1045   BP: (!) 198/85 166/70 168/73 152/67   Pulse: 104 104 (!) 106 102   Resp: 19 21 18 (!) 24   Temp:  97 5 °F (36 4 °C) 97 6 °F (36 4 °C)    TempSrc:       SpO2: 100% 99% 95% 94%   Weight:       Height:         Temperature:   Temp (24hrs), Av 5 °F (36 4 °C), Min:96 8 °F (36 °C), Max:97 9 °F (36 6 °C)    Current Temperature: 97 6 °F (36 4 °C)    Weights:   IBW: 50 1 kg  Body mass index is 28 53 kg/m²      Hemodynamic Monitoring:  N/A     Non-Invasive/Invasive Ventilation Settings:  Respiratory    Lab Data (Last 4 hours)    None         O2/Vent Data (Last 4 hours)    None              No results found for: PHART, UCC0EXB, PO2ART, CSO2NDM, Z1BHJWZH, BEART, SOURCE  SpO2: SpO2: 94 %, SpO2 Activity: SpO2 Activity: At Rest, SpO2 Device: O2 Device: None (Room air)     Physical Exam:  Physical Exam   Constitutional: She is oriented to person, place, and time  Pleasant female appears approximately stated age resting comfortably in bed  No distress, nontoxic   HENT:   Head: Normocephalic   shunt incision seen, hemostatic   Eyes: Pupils are equal, round, and reactive to light  Conjunctivae and EOM are normal    Neck: Normal range of motion  Neck supple  No JVD present  Cardiovascular: Normal rate, regular rhythm and intact distal pulses  No murmur heard  Pulmonary/Chest: Effort normal and breath sounds normal  No respiratory distress  Abdominal: Soft  Bowel sounds are normal  She exhibits no distension  There is no tenderness  Laparoscopic trocar sites hemostatic without any drainage   Genitourinary:   Genitourinary Comments: Deferred   Musculoskeletal: Normal range of motion  She exhibits no edema  Neurological: She is alert and oriented to person, place, and time  She has normal strength  No cranial nerve deficit or sensory deficit  GCS eye subscore is 4  GCS verbal subscore is 5  GCS motor subscore is 6  Skin: Skin is warm and dry  Capillary refill takes less than 2 seconds  Nursing note and vitals reviewed  Labs: Invalid input(s):  EOSPCT                        ABG:    VBG:            Imaging: None   EKG: Pending   Micro:          VTE Pharmacologic Prophylaxis: Restart when appropriate per Neurosurgery  VTE Mechanical Prophylaxis: sequential compression device    Invasive lines and devices:   Invasive Devices     Peripheral Intravenous Line            Peripheral IV 09/03/19 Left Hand less than 1 day    Peripheral IV 09/03/19 Left Hand less than 1 day Code Status: Level 1 - Full Code  POA:    POLST:      Given critical illness, patient length of stay will require greater than two midnights  Portions of the record may have been created with voice recognition software  Occasional wrong word or "sound a like" substitutions may have occurred due to the inherent limitations of voice recognition software  Read the chart carefully and recognize, using context, where substitutions have occurred        Fab Bland PA-C

## 2019-09-03 NOTE — INTERVAL H&P NOTE
70yo female, NPH, for  shunt placement, I have been requested by Dr Hima Li for distal shunt laparoscopic assistance  History as above, thinks last colonoscopy 7-10years ago      Discussed diagnostic laparoscopy, laparoscopic possible open distal shunt placement, benefits, alternatives, risks during personal face to face consent including not limited to bleeding, infection, risks of anesthesia, open surgery, DVT/PE, heart attack, stroke, death, damage to local structures including ureter and duodenum, enterotomy, temporary versus permanent stoma, CSF infection, need for redo  shunt as well as the traditional risks as discussed by neurosurgical colleagues      H&P reviewed  After examining the patient I find no changes in the patients condition since the H&P had been written    Lungs Clear bilateral  Heart Sinus Rhythm  Vitals:    09/03/19 0758   BP: (!) 182/74   Pulse: 69   Resp: 17   Temp: 97 9 °F (36 6 °C)   SpO2: 99%       Vitals:    09/03/19 0758   BP: (!) 182/74   Pulse: 69   Resp: 17   Temp: 97 9 °F (36 6 °C)   SpO2: 99%

## 2019-09-04 ENCOUNTER — APPOINTMENT (INPATIENT)
Dept: CT IMAGING | Facility: HOSPITAL | Age: 79
DRG: 032 | End: 2019-09-04
Payer: COMMERCIAL

## 2019-09-04 LAB
ANION GAP SERPL CALCULATED.3IONS-SCNC: 9 MMOL/L (ref 4–13)
BUN SERPL-MCNC: 15 MG/DL (ref 5–25)
CALCIUM SERPL-MCNC: 9.1 MG/DL (ref 8.3–10.1)
CHLORIDE SERPL-SCNC: 96 MMOL/L (ref 100–108)
CO2 SERPL-SCNC: 25 MMOL/L (ref 21–32)
CREAT SERPL-MCNC: 0.82 MG/DL (ref 0.6–1.3)
ERYTHROCYTE [DISTWIDTH] IN BLOOD BY AUTOMATED COUNT: 12.6 % (ref 11.6–15.1)
GFR SERPL CREATININE-BSD FRML MDRD: 69 ML/MIN/1.73SQ M
GLUCOSE SERPL-MCNC: 132 MG/DL (ref 65–140)
HCT VFR BLD AUTO: 28.2 % (ref 34.8–46.1)
HGB BLD-MCNC: 9.2 G/DL (ref 11.5–15.4)
MCH RBC QN AUTO: 30.9 PG (ref 26.8–34.3)
MCHC RBC AUTO-ENTMCNC: 32.6 G/DL (ref 31.4–37.4)
MCV RBC AUTO: 95 FL (ref 82–98)
PLATELET # BLD AUTO: 407 THOUSANDS/UL (ref 149–390)
PMV BLD AUTO: 9.5 FL (ref 8.9–12.7)
POTASSIUM SERPL-SCNC: 4 MMOL/L (ref 3.5–5.3)
RBC # BLD AUTO: 2.98 MILLION/UL (ref 3.81–5.12)
SODIUM SERPL-SCNC: 130 MMOL/L (ref 136–145)
WBC # BLD AUTO: 15.04 THOUSAND/UL (ref 4.31–10.16)

## 2019-09-04 PROCEDURE — 80048 BASIC METABOLIC PNL TOTAL CA: CPT | Performed by: PHYSICIAN ASSISTANT

## 2019-09-04 PROCEDURE — 97110 THERAPEUTIC EXERCISES: CPT

## 2019-09-04 PROCEDURE — 99024 POSTOP FOLLOW-UP VISIT: CPT | Performed by: NEUROLOGICAL SURGERY

## 2019-09-04 PROCEDURE — 97535 SELF CARE MNGMENT TRAINING: CPT

## 2019-09-04 PROCEDURE — 97167 OT EVAL HIGH COMPLEX 60 MIN: CPT

## 2019-09-04 PROCEDURE — 85027 COMPLETE CBC AUTOMATED: CPT | Performed by: NEUROLOGICAL SURGERY

## 2019-09-04 PROCEDURE — 97116 GAIT TRAINING THERAPY: CPT

## 2019-09-04 PROCEDURE — G8988 SELF CARE GOAL STATUS: HCPCS

## 2019-09-04 PROCEDURE — G8987 SELF CARE CURRENT STATUS: HCPCS

## 2019-09-04 PROCEDURE — 99233 SBSQ HOSP IP/OBS HIGH 50: CPT | Performed by: INTERNAL MEDICINE

## 2019-09-04 PROCEDURE — 70450 CT HEAD/BRAIN W/O DYE: CPT

## 2019-09-04 RX ORDER — HEPARIN SODIUM 5000 [USP'U]/ML
5000 INJECTION, SOLUTION INTRAVENOUS; SUBCUTANEOUS EVERY 8 HOURS SCHEDULED
Status: DISCONTINUED | OUTPATIENT
Start: 2019-09-04 | End: 2019-09-04

## 2019-09-04 RX ORDER — HEPARIN SODIUM 5000 [USP'U]/ML
5000 INJECTION, SOLUTION INTRAVENOUS; SUBCUTANEOUS EVERY 8 HOURS SCHEDULED
Status: DISCONTINUED | OUTPATIENT
Start: 2019-09-04 | End: 2019-09-05 | Stop reason: HOSPADM

## 2019-09-04 RX ADMIN — DOCUSATE SODIUM 100 MG: 100 CAPSULE, LIQUID FILLED ORAL at 09:43

## 2019-09-04 RX ADMIN — ACETAMINOPHEN 975 MG: 325 TABLET ORAL at 12:41

## 2019-09-04 RX ADMIN — HEPARIN SODIUM 5000 UNITS: 5000 INJECTION INTRAVENOUS; SUBCUTANEOUS at 17:54

## 2019-09-04 RX ADMIN — Medication 1 TABLET: at 09:43

## 2019-09-04 RX ADMIN — ACETAMINOPHEN 975 MG: 325 TABLET ORAL at 17:53

## 2019-09-04 RX ADMIN — HEPARIN SODIUM 5000 UNITS: 5000 INJECTION INTRAVENOUS; SUBCUTANEOUS at 09:54

## 2019-09-04 RX ADMIN — Medication 1 G: at 16:56

## 2019-09-04 RX ADMIN — ACETAMINOPHEN 975 MG: 325 TABLET ORAL at 01:00

## 2019-09-04 RX ADMIN — ESCITALOPRAM OXALATE 10 MG: 10 TABLET ORAL at 09:43

## 2019-09-04 RX ADMIN — PANTOPRAZOLE SODIUM 20 MG: 20 TABLET, DELAYED RELEASE ORAL at 06:33

## 2019-09-04 RX ADMIN — Medication 1 G: at 21:14

## 2019-09-04 RX ADMIN — ACETAMINOPHEN 975 MG: 325 TABLET ORAL at 06:33

## 2019-09-04 RX ADMIN — DOCUSATE SODIUM 100 MG: 100 CAPSULE, LIQUID FILLED ORAL at 17:53

## 2019-09-04 RX ADMIN — CEFAZOLIN SODIUM 2000 MG: 2 SOLUTION INTRAVENOUS at 01:00

## 2019-09-04 RX ADMIN — SENNOSIDES 8.6 MG: 8.6 TABLET, FILM COATED ORAL at 09:43

## 2019-09-04 RX ADMIN — LOSARTAN POTASSIUM 25 MG: 25 TABLET, FILM COATED ORAL at 09:42

## 2019-09-04 RX ADMIN — ATENOLOL 50 MG: 50 TABLET ORAL at 09:43

## 2019-09-04 RX ADMIN — ATORVASTATIN CALCIUM 40 MG: 40 TABLET, FILM COATED ORAL at 21:14

## 2019-09-04 RX ADMIN — Medication 1 G: at 09:43

## 2019-09-04 NOTE — UTILIZATION REVIEW
Initial Clinical Review   [id number] AUTH #[id number] VALID 9/3/19-9/5/19 DOS 9/3/19 MAGDLAENA MENDOZA [phone] REF# [id number],     Elective Surgery/Date-- 9/3 - Insertion of frontal ventriculoperitoneal shunt [06707 (CPT®)]  80-year-old woman with possible normal pressure hydrocephalus  She has had objective and subjective decline in gait and cognition and overall function      Elective Inpatient surgical procedure  Age/Sex: 66 y o  female  Surgery Date: 9/3/2019  (Neurosurgery)  Procedure: Procedure:  1  Insertion of right frontal ventriculoperitoneal shunt normal pressure hydrocephalus  Certas plus valve set to 6  Operative Findings:  Clear CSF under moderate pressure      PROCEDURE: (COLORECTAL)-9/3/2019  Procedure(s) (LRB): Insertion of frontal ventriculoperitoneal shunt (Right)  LAPAROSCOPIC APPROACH FOR VENTRICULAR PERITONEAL SHUNT INSERTION (N/A)   Operative Findings:  Laparoscopic placement, active fluid egress on laparoscopic view showing functioning catheter      Anesthesia: Anesthesia Type:   General    Admission Orders: Date/Time/Statement: Inpatient Admission Orders (From admission, onward)     Ordered        09/03/19 0811  Inpatient Admission  Once                   Orders Placed This Encounter   Procedures    Inpatient Admission     Standing Status:   Standing     Number of Occurrences:   1     Order Specific Question:   Admitting Physician     Answer:   Joann Balderas     Order Specific Question:   Level of Care     Answer:   Critical Care [15]     Order Specific Question:   Estimated length of stay     Answer:   More than 2 Midnights     Order Specific Question:   Certification     Answer:   I certify that inpatient services are medically necessary for this patient for a duration of greater than two midnights  See H&P and MD Progress Notes for additional information about the patient's course of treatment       Vital Signs: /63   Pulse 87   Temp 98 5 °F (36 9 °C) (Oral) Resp 20   Ht 5' 4" (1 626 m)   Wt 70 9 kg (156 lb 4 9 oz)   SpO2 99%   BMI 26 83 kg/m²    NEURO CHECKS q SHIFT  Diet: REGULAR DIET  Mobility: oob STARTING 9/31100 ; SIT AT BEDSIDE BY 1300 9/3; AMBULATE BY 1500 9/3  DVT Prophylaxis: scd/hHPARIN sc  Medications/Pain Control:   Current Facility-Administered Medications:  acetaminophen 975 mg Oral Q6H DANIELLA   atenolol 50 mg Oral Daily   atorvastatin 40 mg Oral HS   docusate sodium 100 mg Oral BID   escitalopram 10 mg Oral Daily   heparin (porcine) 5,000 Units Subcutaneous Q8H [address]   losartan 25 mg Oral Daily   multivitamin-minerals 1 tablet Oral Daily   ondansetron 4 mg Intravenous Q6H PRN   pantoprazole 20 mg Oral Early Morning   senna 1 tablet Oral Daily   sodium chloride 1 g Oral TID       Network Utilization Review Department  Phone: [phone]; Fax [phone]  [email or web address]  ATTENTION: Please call with any questions or concerns to [phone]  and carefully listen to the prompts so that you are directed to the right person  Send all requests for admission clinical reviews, approved or denied determinations and any other requests to fax [phone]   All voicemails are confidential

## 2019-09-04 NOTE — PHYSICAL THERAPY NOTE
PHYSICAL THERAPY TREATMENT NOTE    Patient Name: Aditya Flores  YOQCS'Q Date: 9/4/2019 09/04/19 1247   Pain Assessment   Pain Assessment No/denies pain   Restrictions/Precautions   Other Precautions Chair Alarm; Bed Alarm; Fall Risk;Fluid restriction   General   Chart Reviewed Yes   Additional Pertinent History room air resting pulse ox 98% and 80 BPM    Family/Caregiver Present Yes   Cognition   Arousal/Participation Cooperative   Attention Attends with cues to redirect   Orientation Level Oriented to person; Other (Comment)  (pt was identified w/ full name, birth date)   Following Commands Follows one step commands with increased time or repetition   Subjective   Subjective pt seen sitting in chair  agreed to participate in PT session  denied pain or dizziness  pt needed input for task focus  Transfers   Sit to Stand 4  Minimal assistance   Additional items Assist x 1; Increased time required;Verbal cues  (for hand placement)   Stand to Sit 4  Minimal assistance   Additional items Assist x 1; Impulsive;Verbal cues  (for body positioning, hand placement, controlled descent)   Ambulation/Elevation   Gait pattern Improper Weight shift;Decreased L stance; Foward flexed; Short stride; Excessively slow   Gait Assistance 4  Minimal assist  (w/ chair follow)   Additional items Assist x 1;Verbal cues; Tactile cues  (for walker placement, posture, full step length)   Assistive Device Rolling walker   Distance 25 feet x2, 20 feet x2 w/ seated rest breaks x 3 to 4 minutes each  (additioanl not possible due to fatigue)   Balance   Static Sitting Fair +   Static Standing Fair -   Ambulatory Poor +  (w/ roller walker)   Activity Tolerance   Activity Tolerance Patient limited by fatigue   Nurse Made Aware spoke to [phone] [employer]   Equipment Use   Comments seated heel/toe raises 30 each  long arc quads and seated hip flexion 10 each     Assessment Prognosis Fair   Problem List Decreased strength;Decreased range of motion;Decreased endurance; Impaired balance;Decreased mobility; Decreased coordination;Decreased cognition;Decreased safety awareness;Decreased skin integrity   Assessment pt shows progression of mobility status since initial eval w/ increased ambulation distance  pt continue to need assist w/ all phases of mobility, including chair follow and input for technique  pt remains at risk for falling due to physical and safety awareness limitations  pt needs continued inpatient PT to reduce fall risk factors  discharge recommendation is for inpatient rehab to maximize functional independence  Goals   Patient Goals be more steady when i walk   [facility] Expiration Date 09/13/19   Short Term Goal #1 pt will: Increase bilateral LE strength 1/2 grade to facilitate independent mobility, Perform all bed mobility tasks independently to decrease fall risk factors, Perform all transfers w/ supervision to improve independence, Ambulate 150 ft  with roller walker w/ supervision w/o LOB, Increase all balance 1/2 grade to decrease risk for falls and Improve Barthel Index score to 70 or greater to facilitate independence   Treatment Day 1   Plan   Treatment/Interventions Functional transfer training;LE strengthening/ROM; Therapeutic exercise;Cognitive reorientation; Endurance training;Patient/family training;Equipment eval/education; Bed mobility;Gait training   Progress Progressing toward goals   PT Frequency 7x/wk   Recommendation   Recommendation Short-term skilled PT   Equipment Recommended Other (Comment)  (roller walker)     Skilled inpatient PT recommended while in hospital to progress pt toward treatment goals      Nette Garrison, PT

## 2019-09-04 NOTE — PROGRESS NOTES
Progress Note - Neurosurgery   Kendrick Calvillo 66 y o  female MRN: [id number]  Unit/Bed#:  Encounter: [id number]                  Assessment:  1  Status post day# 1, from Right frontal  shunt placement for NPH  2  History of NPH  3  Stage 2 chronic kidney disease  4  Hypertension  5  Depression  6  History of confusion  7  Anxiety      Plan:   · Exam: A&OX3, EOMI bilateral conjugate  SF  Leni, strength 5/5 throughout, sensation intact symmetrically  DTR +3 in UE and +2 in LEs, no clonus and [place] is negative bilaterally  Incision slightly stained with dried darkish blood on right retroauricular area  No active drain or bleeding  · Imagings reviewed by me and by attending as follows:    · X-ray shunt series on 09/03/2019 demonstrates  shunt catheter tip and pelvis  · CT head without on 09/04/2019 demonstrates catheter tip in the 3rd ventricle, no intracranial bleeding  · Labs: WBC=15 04;Jl=849; Hgb=9 2; platelets=407  · Pain control:   - Acetaminophen 975 mg p o  Q 6h  · DVT ppx:   · SCDs-bilateral legs  · Pharmacological DVT ppx-pending CT head results  · Seizure ppx:  None  · Activity:  As tolerated-fall precaution  · PT/OT evaluation & treatment  · Medical Mx:  -hypertension-on losartan 25 mg po/d  - depression / anxiety-on [id number] po/d  -hyponatremia-sodium chloride up taps 1 g t i d    ( IM consult placed for their input)  · Neurocheck: Q 4h  · As a primary, NSx is following the patient  Patient is alert and awake, oriented to place person and year, disoriented to month and date, otherwise neuro exam nonfocal   Subjective gait instability  Has been out of bed to the bathroom using walker  Sodium 130 mg/dL on 1 g t i d  Sodium chloride tabs  Internal medicine consult put in for evaluation and treatment of hyponatremia  CT  Head without contrast demonstrates no intracranial abnormality, catheter tip is in the 3rd ventricle  Patient can start on heparin subcu   Patient can be transferred to med surge floor  PT recommends short-term skilled physical therapy-probably returning to rehab center with PT consult  Will discuss with CM about discharge planning  Call for concern or problem  Subjective/Objective   Chief Complaint: " I am doing fine"/post  shunt progress note    Subjective:  Patient states that she is doing fine; claims that she has some gait issues do use walker  Denies any urinary incontinent  Denies headache, blurry vision or diplopia  Denies nausea, vomiting, dizziness or lightheadedness  She ate her dinner after the procedure  Denies any fever, chills, rigors, cough or chest pain  Her nurse states that there was minimal weeping of blood from the right retro auricular incision, reinforced id with dressing and stopped  Objective:  Patient is supine in bed, communicates well, in no pain distress    I/O       09/02 0701 - 09/03 0700 09/03 0701 - 09/04 0700    P  O   1080    I V  (mL/kg)  1472 5 (20 8)    IV Piggyback  100    Total Intake(mL/kg)  2652 5 (37 4)    Urine (mL/kg/hr)  1625    Blood  25    Total Output  1650    Net  +1002 5          Unmeasured Urine Occurrence  3 x          Invasive Devices     Peripheral Intravenous Line            Peripheral IV 09/03/19 Right Hand less than 1 day                Physical Exam:  Vitals: Blood pressure 130/57, pulse 93, temperature 98 2 °F (36 8 °C), temperature source Oral, resp  rate 16, height 5' 4" (1 626 m), weight 70 9 kg (156 lb 4 9 oz), SpO2 98 %, not currently breastfeeding  ,Body mass index is 26 83 kg/m²  General appearance: alert, appears stated age, cooperative and no distress  Head:   The frontal dressing clean and dry, right retroauricular dressing stained with darkish blood  Eyes: EOMI, conjugate bilateral   Neck: supple, symmetrical, trachea midline and NT  Lungs: non labored breathing  Heart: regular heart rate  Neurologic:   Mental status: Alert, oriented x3, thought content appropriate  Cranial nerves: grossly intact (Cranial nerves II-XII)  Sensory: normal to light touch from  Motor: moving all extremities without focal weakness, strength is 5/5 bilaterally  Reflexes: 3+ and symmetric in upper extremities and 2+ in the lower extremities without clonus and Babinski negative bilaterally  Coordination: finger to nose normal bilaterally, no drift bilaterally      Lab Results:  Results from last 7 days   Lab Units 09/04/19  0515   WBC Thousand/uL 15 04*   HEMOGLOBIN g/dL 9 2*   HEMATOCRIT % 28 2*   PLATELETS Thousands/uL 407*     Results from last 7 days   Lab Units 09/04/19  0515   POTASSIUM mmol/L 4 0   CHLORIDE mmol/L 96*   CO2 mmol/L 25   BUN mg/dL 15   CREATININE mg/dL 0 82   CALCIUM mg/dL 9 1                 No results found for: TROPONINT  ABG:No results found for: PHART, ULF7YNL, PO2ART, MDB3NCK, F0FHSZPS, BEART, SOURCE    Imaging Studies: I have personally reviewed pertinent reports  and I have personally reviewed pertinent films in PACS    EKG, Pathology, and Other Studies: I have personally reviewed pertinent reports        VTE Pharmacologic Prophylaxis: Reason for no pharmacologic prophylaxis Pending CT head results    VTE Mechanical Prophylaxis: sequential compression device

## 2019-09-04 NOTE — PLAN OF CARE
Problem: PHYSICAL THERAPY ADULT  Goal: Performs mobility at highest level of function for planned discharge setting  See evaluation for individualized goals  Description  Treatment/Interventions: Functional transfer training, LE strengthening/ROM, Therapeutic exercise, Endurance training, Cognitive reorientation, Patient/family training, Equipment eval/education, Bed mobility, Gait training, Spoke to nursing, Spoke to case management  Equipment Recommended: Sylvia Gaines       See flowsheet documentation for full assessment, interventions and recommendations  Outcome: Progressing  Note:   Prognosis: Fair  Problem List: Decreased strength, Decreased range of motion, Decreased endurance, Impaired balance, Decreased mobility, Decreased coordination, Decreased cognition, Decreased safety awareness, Decreased skin integrity  Assessment: pt shows progression of mobility status since initial eval w/ increased ambulation distance  pt continue to need assist w/ all phases of mobility, including chair follow and input for technique  pt remains at risk for falling due to physical and safety awareness limitations  pt needs continued inpatient PT to reduce fall risk factors  discharge recommendation is for inpatient rehab to maximize functional independence  Recommendation: Short-term skilled PT     PT - OK to Discharge: Yes    See flowsheet documentation for full assessment

## 2019-09-04 NOTE — PLAN OF CARE
Problem: Prexisting or High Potential for Compromised Skin Integrity  Goal: Skin integrity is maintained or improved  Description  INTERVENTIONS:  - Identify patients at risk for skin breakdown  - Assess and monitor skin integrity  - Assess and monitor nutrition and hydration status  - Monitor labs   - Assess for incontinence   - Turn and reposition patient  - Assist with mobility/ambulation  - Relieve pressure over bony prominences  - Avoid friction and shearing  - Provide appropriate hygiene as needed including keeping skin clean and dry  - Evaluate need for skin moisturizer/barrier cream  - Collaborate with interdisciplinary team   - Patient/family teaching  - Consider wound care consult   Outcome: Progressing     Problem: NEUROSENSORY - ADULT  Goal: Achieves stable or improved neurological status  Description  INTERVENTIONS  - Monitor and report changes in neurological status  - Monitor vital signs such as temperature, blood pressure, glucose, and any other labs ordered   - Initiate measures to prevent increased intracranial pressure  - Monitor for seizure activity and implement precautions if appropriate      Outcome: Progressing  Goal: Remains free of injury related to seizures activity  Description  INTERVENTIONS  - Maintain airway, patient safety  and administer oxygen as ordered  - Monitor patient for seizure activity, document and report duration and description of seizure to physician/advanced practitioner  - If seizure occurs,  ensure patient safety during seizure  - Reorient patient post seizure  - Seizure pads on all 4 side rails  - Instruct patient/family to notify RN of any seizure activity including if an aura is experienced  - Instruct patient/family to call for assistance with activity based on nursing assessment  - Administer anti-seizure medications if ordered    Outcome: Progressing  Goal: Achieves maximal functionality and self care  Description  INTERVENTIONS  - Monitor swallowing and airway patency with patient fatigue and changes in neurological status  - Encourage and assist patient to increase activity and self care  - Encourage visually impaired, hearing impaired and aphasic patients to use assistive/communication devices  Outcome: Progressing     Problem: SKIN/TISSUE INTEGRITY - ADULT  Goal: Skin integrity remains intact  Description  INTERVENTIONS  - Identify patients at risk for skin breakdown  - Assess and monitor skin integrity  - Assess and monitor nutrition and hydration status  - Monitor labs (i e  albumin)  - Assess for incontinence   - Turn and reposition patient  - Assist with mobility/ambulation  - Relieve pressure over bony prominences  - Avoid friction and shearing  - Provide appropriate hygiene as needed including keeping skin clean and dry  - Evaluate need for skin moisturizer/barrier cream  - Collaborate with interdisciplinary team (i e  Nutrition, Rehabilitation, etc )   - Patient/family teaching  Outcome: Progressing  Goal: Incision(s), wounds(s) or drain site(s) healing without S/S of infection  Description  INTERVENTIONS  - Assess and document risk factors for skin impairment   - Assess and document dressing, incision, wound bed, drain sites and surrounding tissue  - Consider nutrition services referral as needed  - Oral mucous membranes remain intact  - Provide patient/ family education  Outcome: Progressing  Goal: Oral mucous membranes remain intact  Description  INTERVENTIONS  - Assess oral mucosa and hygiene practices  - Implement preventative oral hygiene regimen  - Implement oral medicated treatments as ordered  - Initiate Nutrition services referral as needed  Outcome: Progressing     Problem: Potential for Falls  Goal: Patient will remain free of falls  Description  INTERVENTIONS:  - Assess patient frequently for physical needs  -  Identify cognitive and physical deficits and behaviors that affect risk of falls    -  Rule fall precautions as indicated by assessment   - Educate patient/family on patient safety including physical limitations  - Instruct patient to call for assistance with activity based on assessment  - Modify environment to reduce risk of injury  - Consider OT/PT consult to assist with strengthening/mobility  Outcome: Progressing

## 2019-09-04 NOTE — OCCUPATIONAL THERAPY NOTE
OccupationalTherapy Evaluation and Treatment Note     Patient Name: Skyla Gonzalez  DBDLI'K Date: 9/4/2019  Problem List  Patient Active Problem List   Diagnosis    Ambulatory dysfunction    Anxiety    Essential hypertension    Carotid artery plaque    Elevated sed rate    GERD without esophagitis    Hyperlipidemia    Chronic hyponatremia    Levoscoliosis    Occlusion of celiac artery    Vitamin D deficiency    Radiculopathy    Asthma    Carotid bruit    Coronary atherosclerosis    Splenic infarction    Positive ROSALINDA (antinuclear antibody)    Thrombocytosis (HCC)    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HCC)    Normal pressure hydrocephalus    Cataract, nuclear sclerotic senile, left    Stage 2 chronic kidney disease    Recurrent falls    Hyperkalemia    Leukocytosis     Past Medical History  Past Medical History:   Diagnosis Date    Anxiety     Arthritis     Confusion 10/2/2018    Depression     Displaced fracture of distal phalanx of right thumb     GERD (gastroesophageal reflux disease)     Heart attack ([facility] [address] )     Hyperlipidemia     Hypertension     Moderate episode of recurrent major depressive disorder ([facility] [address] ) 4/12/2019    Shortness of breath     Stage 2 chronic kidney disease 7/17/2019     Past Surgical History  Past Surgical History:   Procedure Laterality Date    APPENDECTOMY      CARPAL TUNNEL RELEASE      CATARACT EXTRACTION Bilateral     COLONOSCOPY      FL LUMBAR PUNCTURE  1/10/2019    FRACTURE SURGERY      FL CREATE SHUNT:VENTRIC-PERITONEAL Right 9/3/2019    Procedure: Insertion of frontal ventriculoperitoneal shunt;  Surgeon: Mis Santos MD;  [facility]: [facility];  Service: Neurosurgery    SEPTOPLASTY      WRIST FRACTURE SURGERY Right          09/04/19 0822   Note Type   Note type Eval/Treat   Restrictions/Precautions   Weight Bearing Precautions Per Order No   Other Precautions Chair Alarm; Bed Alarm;Cognitive;Multiple lines;Telemetry; Fall Risk;Impulsive   Pain Assessment   Pain Assessment No/denies pain   Pain Score No Pain   Home Living   Additional Comments Pt currently receiving rehab at [facility] immediately PTA  Pt reports at home she has walkin shower with shower chair  Pt reports using RW for functional mobiltiy PTA   Prior Function   Level of [place] Needs assistance with IADLs; Needs assistance with ADLs and functional mobility   Lives With Alone   Receives Help From Family   ADL Assistance Needs assistance   IADLs Needs assistance   Falls in the last 6 months 1 to 4   Vocational Retired   Comments Pt reports dtr provides A typically with medication management, meal prep, cleaning, laundry, and driving  Pt reports dtr is always home when showering and to don b/l socks  Lifestyle   Autonomy Pt requires A with ADLs, IADLs  RW for functional mobility   Reciprocal Relationships Pt has supportive dtr and sister   Service to Others Pt is retired   Intrinsic Gratification Pt enjoys going out to eat with her sister  Psychosocial   Psychosocial (WDL) WDL   Subjective   Subjective Pt is pleasant and cooperative   ADL   Eating Assistance 7  Independent   Grooming Assistance 5  Supervision/Setup   Grooming Deficit Setup;Supervision/safety   UB Bathing Assistance 4  Minimal Assistance   UB Bathing Deficit Other (Comment)  (simulated)   LB Bathing Assistance 3  Moderate Assistance   LB Bathing Deficit Other (Comment)  (simulated)   UB Dressing Assistance 4  Minimal Assistance   UB Dressing Deficit Other (Comment)  (simulated)   LB Dressing Assistance 1  Total Assistance   LB Dressing Deficit Don/doff R sock; Don/doff L sock   Toileting Assistance  3  Moderate Assistance   Toileting Deficit Supervison/safety;Verbal cueing; Impulsive;Steadying; Bedside commode;Perineal hygiene   Bed Mobility   Sit to Supine 4  Minimal assistance   Additional items Assist x 2; Increased time required;LE management;Verbal cues; Bedrails   Additional Comments Pt seated in b/s chair at start of session and agreeable to OT  Pt supine in bed to go to CT scan with PCA and RN at end of session  Transfers   Sit to Stand 4  Minimal assistance   Additional items Assist x 1; Increased time required;Verbal cues;Armrests  (VCs to push from arm rests)   Stand to Sit 4  Minimal assistance   Additional items Assist x 1; Increased time required;Verbal cues;Armrests   Stand pivot 3  Moderate assistance   Additional items Assist x 1; Increased time required;Verbal cues  (VCs for sequencing, body positioning, safety with RW)   Toilet transfer 4  Minimal assistance   Additional items Assist x 1; Increased time required;Verbal cues; Commode;Armrests  (Min-Mod A)   Additional Comments Pt required Min A to transfer onto commode, VCs to reach for arm rests when sitting  Upon standing up from commode however pt was retropulsive and required Mod A to maintain balance  Pt reported fatigue and SOB  Spo2 measuring 90%,   Pt instructed to take seated rest break and educated on pursed lip breathing  Pt reports symptoms resolving with 5 min rest break, spO2 increased to 95%  B/s chair moved closer to commode to complete SPT instead of further functional mobility to conserve pt energy and to decrease fall risk  Pt required Mod A x 1 using RW for extensive multisensory cues required for sequencing, body positioning, and safety with RW  HR upon completion of session 90bpm     Functional Mobility   Functional Mobility 4  Minimal assistance   Additional Comments Ax1 using RW <> bathroom; no overt LOB noted  Attempted to complete transfer to toilet with c frame on top, however RW unable to safely fit between sink and commode  For safety, did not attempt to complete this transfer  Commode moved to bedside     Balance   Static Sitting Fair +   Dynamic Sitting Fair   Static Standing Fair -   Dynamic Standing Poor +   Ambulatory Poor   Activity Tolerance   Activity Tolerance Patient limited by fatigue   Nurse Made Aware MAYRA Arora Assessment   TAMMY Assessment X   RUE Overall AROM   R Shoulder Flexion WFL   R Elbow Flexion WFL   R Wrist Flexion WFL   R Mass Grasp WFL   RUE Strength   RUE Overall Strength Deficits  (4-/5)   LUE Assessment   LUE Assessment X   LUE Overall AROM   L Shoulder Flexion impaired to 100*   L Elbow Flexion WFL   L Wrist Flexion WFL   L Mass Grasp WFL   LUE Strength   LUE Overall Strength Deficits  (3+/5)   Hand Function   Gross Motor Coordination Functional  (bradykinetic but able to complete finger to nose test )   Fine Motor Coordination Functional   Vision-Basic Assessment   Current Vision Wears glasses only for reading   Patient Visual Report Other (Comment)  (reports no visual changes)   Vision - Complex Assessment   Tracking Able to track stimulus in all quads without difficulty   Acuity Able to read clock/calendar on wall without difficulty   Cognition   Overall Cognitive Status Impaired   Arousal/Participation Alert; Responsive;Arousable; Cooperative   Attention Attends with cues to redirect   Orientation Level Oriented to person;Oriented to place; Disoriented to time;Oriented to situation   Memory Decreased short term memory;Decreased recall of recent events   Following Commands Follows one step commands inconsistently   Comments Pt able to identify self by full name and birthdate  Pt able to state month correctly  However stated it was 2015  Pt required multisensory cues for following directions as well as repetition  Poor safety awareness noted as well  Assessment   Limitation Decreased ADL status; Decreased UE ROM; Decreased UE strength;Decreased cognition;Decreased Safe judgement during ADL;Decreased endurance;Decreased self-care trans;Decreased high-level ADLs   Assessment Pt is a 66 y o  female seen for OT evaluation (time 6241-1405) s/p [facility] 9/3/2019 w/ Normal pressure hydrocephalus    Comorbidities affecting pt's functional performance at time of assessment include: HTN, GERD, HLD, chronic hyponatremia, SMA stenosis, CKD2, leukocytosis  Evaluation required a review of medical and/ or therapy records and extensive additional review of physical, cognitive, or psychosocial history related to current functional performance    Personal factors affecting pt at time of IE include:steps to enter environment, limited home support, behavioral pattern, difficulty performing ADLS, compliance, decreased initiation and engagement , health management  and environment  Prior to admission, pt was receiving rehab at [facility], requiring A for ADLs, IADLs, and functional mobility using RW  Upon evaluation: Pt requires S with grooming, Min A for UB ADLs, Mod A for LB bathing, total A for LB dressing, Mod A for toileting, Min A for sit<>stand, Min A for functional mobility <>bathroom using RW 2* the following deficits impacting occupational performance: weakness, decreased ROM, decreased strength, decreased balance, decreased tolerance, impaired attention, impaired initiation, impaired memory, impaired sequencing, impaired problem solving, impulsivity, decreased safety awareness and decreased coping skills  Cognition appears to be impaired  Please refer to flowsheet comments for detail  Vision is [facility]  Development of pt POC requires clinical decision making of high analytic complexity  Significant modification of tasks or assistance (e g , physical or verbal) is necessary to enable patient to complete evaluation component    Pt to benefit from continued skilled OT tx while in the hospital to address deficits as defined above and maximize level of functional independence w ADL's and functional mobility  Occupational Performance areas to address include: grooming, bathing/shower, toilet hygiene, dressing, health maintenance, functional mobility and social participation  From OT standpoint, recommendation at time of d/c would be post acute rehab     Goals   Patient Goals to see my dtr   Short Term Goal #1 Pt will attend to continue cognitive assessment 100% of the time in order to develop most appropriate POC  Short Term Goal #2 Pt will improve functional activity tolerance while standing at sink to 8+ mins in order to increase I and safety during standing ADLs and functional transfers  Short Term Goal  Pt will identify and implement at least 3 healthy coping strategies in order to increase participation in meaningful activities   Functional Transfer Goals   Pt Will Perform All Functional Transfers With safety/supervision; With assistive devices   Pt Will Transfer To Bedside Commode With safety/supervision; With assistive device   ADL Goals   Pt Will Perform Grooming With setup   Pt Will Perform Bathing With min assist   Pt Will Perform UE Dressing With setup   Pt Will Perform LE Dressing With min assist   Pt Will Perform Toileting With safety/supervision   Plan   Treatment Interventions ADL retraining;Functional transfer training;UE strengthening/ROM; Cognitive reorientation; Endurance training;Patient/family training;Equipment evaluation/education; Compensatory technique education;Continued evaluation; Energy conservation; Activityengagement   Goal Expiration Date 09/14/19   Treatment Day 1   OT Frequency 3-5x/wk   Additional Treatment Session   Start Time 4435   End Time 3977   Treatment Assessment Patient participated in Skilled OT session (time 0596-9608) this date with interventions consisting of ADL retraining with the use of correct body mechanics, energy conservation technique education, deep breathing technique education, safety awareness and fall prevention technique education and increase dynamic sit/stand balance during functional activity  Pt completed functional mobility from b/s chair to bsc estimated 5 ft using RW, Min A x1  Pt required Min A to transfer onto commode, VCs to reach for arm rests when sitting  Upon standing up from commode however pt was retropulsive and required Mod A to maintain static standing balance   Pt reported fatigue and SOB  Spo2 measuring 90%,   Pt instructed to take seated rest break and educated on pursed lip breathing  Pt monitored throughout seated rest break, observation of vital signs  Pt repored symptoms resolved with 5 min rest break, spO2 increased to 95%  B/s chair moved closer to commode to complete SPT instead of further functional mobility to conserve pt energy and to decrease fall risk  Pt required Mod A x 1 using RW for extensive multisensory cues required for sequencing, body mechanics (positioning of LEs), and safety with RW  After additional rest break while seated pt needed transfer back to bed for CT scan  Pt required Min A x 1 using RW for SPT, no evidence of LOB  Pt required Min A x2 for sit > supine transfer  HR upon completion of session 90bpm, spO2 99%  Patient continues to be functioning below baseline level, occupational performance remains limited secondary to factors listed above and increased risk for falls and injury  From OT standpoint, recommendation at time of d/c would be post acute rehab  Patient to benefit from continued Occupational Therapy treatment while in the hospital to address deficits as defined above and maximize level of functional independence with ADLs and functional mobility  Recommendation   OT Discharge Recommendation Other (Comment)  (to post acute rehab)   Equipment Recommended Other (comment)  (cont   to eval at rehab)   OT - OK to Discharge   (to post acute rehab once medically cleared)   Barthel Index   Feeding 10   Bathing 0   Grooming Score 5   Dressing Score 5   Bladder Score 0   Bowels Score 10   Toilet Use Score 5   Transfers (Bed/Chair) Score 10   Mobility (Level Surface) Score 0   Stairs Score 0   Barthel Index Score 45   Modified Fithian Scale   Modified Fithian Scale 4   Ana Luna, OTR/L

## 2019-09-04 NOTE — PLAN OF CARE
Problem: OCCUPATIONAL THERAPY ADULT  Goal: Performs self-care activities at highest level of function for planned discharge setting  See evaluation for individualized goals  Description  Treatment Interventions: ADL retraining, Functional transfer training, UE strengthening/ROM, Cognitive reorientation, Endurance training, Patient/family training, Equipment evaluation/education, Compensatory technique education, Continued evaluation, Energy conservation, Activityengagement  Equipment Recommended: Other (comment)(cont  to eval at rehab)       See flowsheet documentation for full assessment, interventions and recommendations  Note:   Limitation: Decreased ADL status, Decreased UE ROM, Decreased UE strength, Decreased cognition, Decreased Safe judgement during ADL, Decreased endurance, Decreased self-care trans, Decreased high-level ADLs     Assessment: Pt is a 66 y o  female seen for OT evaluation (time 9581-9471) s/p admit [facility] on 9/3/2019 w/ Normal pressure hydrocephalus  Comorbidities affecting pt's functional performance at time of assessment include: HTN, GERD, HLD, chronic hyponatremia, SMA stenosis, CKD2, leukocytosis  Evaluation required a review of medical and/ or therapy records and extensive additional review of physical, cognitive, or psychosocial history related to current functional performance    Personal factors affecting pt at time of IE include:steps to enter environment, limited home support, behavioral pattern, difficulty performing ADLS, compliance, decreased initiation and engagement , health management  and environment  Prior to admission, pt was receiving rehab at [facility], requiring A for ADLs, IADLs, and functional mobility using RW   Upon evaluation: Pt requires S with grooming, Min A for UB ADLs, Mod A for LB bathing, total A for LB dressing, Mod A for toileting, Min A for sit<>stand, Min A for functional mobility <>bathroom using RW 2* the following deficits impacting occupational performance: weakness, decreased ROM, decreased strength, decreased balance, decreased tolerance, impaired attention, impaired initiation, impaired memory, impaired sequencing, impaired problem solving, impulsivity, decreased safety awareness and decreased coping skills  Cognition appears to be impaired  Please refer to flowsheet comments for detail  Vision is [facility]  Development of pt POC requires clinical decision making of high analytic complexity  Significant modification of tasks or assistance (e g , physical or verbal) is necessary to enable patient to complete evaluation component    Pt to benefit from continued skilled OT tx while in the hospital to address deficits as defined above and maximize level of functional independence w ADL's and functional mobility  Occupational Performance areas to address include: grooming, bathing/shower, toilet hygiene, dressing, health maintenance, functional mobility and social participation  From OT standpoint, recommendation at time of d/c would be post acute rehab  Tx assessment: Patient participated in Skilled OT session (time 6173-1670) this date with interventions consisting of ADL retraining with the use of correct body mechanics, energy conservation technique education, deep breathing technique education, safety awareness and fall prevention technique education and increase dynamic sit/stand balance during functional activity  Pt completed functional mobility from b/s chair to [facility] estimated 5 ft using RW, Min A x1  Pt required Min A to transfer onto commode, VCs to reach for arm rests when sitting  Upon standing up from commode however pt was retropulsive and required Mod A to maintain static standing balance  Pt reported fatigue and SOB  Spo2 measuring 90%,   Pt instructed to take seated rest break and educated on pursed lip breathing  Pt monitored throughout seated rest break, observation of vital signs   Pt repored symptoms resolved with 5 min rest break, spO2 increased to 95%  B/s chair moved closer to commode to complete SPT instead of further functional mobility to conserve pt energy and to decrease fall risk  Pt required Mod A x 1 using RW for extensive multisensory cues required for sequencing, body mechanics (positioning of LEs), and safety with RW  After additional rest break while seated pt needed transfer back to bed for CT scan  Pt required Min A x 1 using RW for SPT, no evidence of LOB  Pt required Min A x2 for sit > supine transfer  HR upon completion of session 90bpm, spO2 99%  Patient continues to be functioning below baseline level, occupational performance remains limited secondary to factors listed above and increased risk for falls and injury  From OT standpoint, recommendation at time of d/c would be post acute rehab  Patient to benefit from continued Occupational Therapy treatment while in the hospital to address deficits as defined above and maximize level of functional independence with ADLs and functional mobility        OT Discharge Recommendation: Other (Comment)(to post acute rehab)  OT - OK to Discharge: (to post acute rehab once medically cleared)

## 2019-09-04 NOTE — PROGRESS NOTES
Daily Progress Note - Critical Care/ Stepdown   Mallika Hernandez 66 y o  female MRN: [id number]  Unit/Bed#:  Encounter: [id number]    Assessment:   Principal Problem:    Normal pressure hydrocephalus  Active Problems:    Essential hypertension    GERD without esophagitis    Hyperlipidemia    Chronic hyponatremia    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HCC)    Stage 2 chronic kidney disease    Leukocytosis  Resolved Problems:    * No resolved hospital problems  *      Plan:    Neuro:   Normal pressure hydrocephalus  - postop day 1 status post  shunt placed by neuro surgery and Colorectal surgery  - incision site appears clean/dry/intact  - shunt series completed  - CT head pending for this morning  - continue neuro checks, but can liberalize  - discuss restarting anti-platelet and anticoagulation with neurosurgery  - CT head with any change in GCS or neuro exam    Regulate sleep/wake cycle    CV:   Essential hypertension  - continue home atenolol and Cozaar with hold parameters  - blood pressure is within normal limits    Hyperlipidemia  - continue atorvastatin 40 mg q h s  Peripheral vascular disease with occluded celiac artery and stenosis of SMA  - follows with [facility] with [facility]  - no signs or symptoms of mesenteric ischemia, being managed conservatively with aspirin statin  - discuss restarting aspirin today    Echocardiogram:  10/22/2017:  EF 70%, normal right ventricular size and function, mitral annular calcification    Nuclear stress test:  10/24/2017:  No evidence of ischemia    Cardiac infusions:  None  Rhythm: NSR  Follow rhythm on telemetry    Pulm:   No history of pulmonary disorders  Former smoker but has not smoked in several years    Encourage deep breathing/coughing/IS/PulmToileting      GI:   GERD  - continue home PPI    Nutrition/diet plan:  Regular diet will add fluid restriction  Stress ulcer prophylaxis: No prophylaxis needed  Bowel regimen: None currently    :   CKD stage 2  - currently at baseline    FEN:   Chronic hyponatremia  - chronically sodium 130-135  - at lower limit of baseline  - reviewed old guarding, patient was at 1 time on 1200 cc fluid restriction  Was not on fluid restriction at home, though will start this AM,  continue sodium chloride tablets    Fluid/Diuretic plan: No intervention  Goal 24 hour fluid balance:  Passively even  Electrolytes repleted: not applicable  Goal: K >1 7, Mag >2 0, and Phos >3 0    ID:   Leukocytosis  - Likely reactive post operative  - No bandemia  - CSF sterile  Trend temps and WBC count    Heme:   No issues  Will discuss anticoagulation today if patient needs to continue inpatient hospital stay    Trend hgb and plts    Endo:   No issues    Msk/Skin:  Routine wound care of shunt site as well as peritoneal incisions  They appear hemostatic without any drainage    Mobility goal:  Out of bed with assistance  PT consult: yes  OT consult: yes  Frequent turning and off-loading    Family:  Will update family with plan of care today  Lines:  Peripheral IVs    VTE Prophylaxis:  Pharmacologic Prophylaxis: Can restart at neurosurgery discretion  Mechanical Prophylaxis: sequential compression device    Disposition: Disposition per Neurosurgery, so long as CT head without any unexpected findings, will sign off from a critical care medicine standpoint    Code Status: Level 1 - Full Code    Counseling / Coordination of Care  Total time spent today 45 minutes  Greater than 50% of total time was spent with the patient and / or family counseling and / or coordination of care    ______________________________________________________________________    CC: "I'm okay"    HPI/24hr events:   Patient without acute events overnight    No complaints offered today    ______________________________________________________________________    Review of Systems   All other systems reviewed and are negative       ______________________________________________________________________    VITALS:    Vitals:    19 2200 19 2300 19 0000 19 0300   BP: 137/60 117/56 117/51 130/57   BP Location:  Left arm  Left arm   Pulse: (!) 112 (!) 110 (!) 107 93   Resp: (!) 25 21 19 16   Temp:  98 3 °F (36 8 °C)  98 2 °F (36 8 °C)   TempSrc:  Oral  Oral   SpO2: 96% 97% 96% 98%   Weight:       Height:         Temp  Min: 96 8 °F (36 °C)  Max: 99 6 °F (37 6 °C)  IBW: 54 7 kg       Temp (24hrs), Av 8 °F (36 6 °C), Min:96 8 °F (36 °C), Max:98 3 °F (36 8 °C)    Temp:  [96 8 °F (36 °C)-98 3 °F (36 8 °C)] 98 2 °F (36 8 °C)  HR:  [] 93  Resp:  [14-26] 16  BP: (117-220)/() 130/57  SpO2:  [94 %-100 %] 98 %    Weights:   IBW: 54 7 kg    Body mass index is 26 72 kg/m²  Weight (last 2 days)     Date/Time   Weight    19 1111   70 6 (155 65)    19 0758   70 8 (156)              Hemodynamic Monitoring:  N/A       Non-Invasive/Invasive Ventilation Settings:  Respiratory    Lab Data (Last 4 hours)    None         O2/Vent Data (Last 4 hours)    None              No results found for: PHART, AOW8JLB, PO2ART, NJI1WCH, M0NKYMJZ, BEART, SOURCE  SpO2: SpO2: 98 %, SpO2 Activity: SpO2 Activity: At Rest, SpO2 Device: O2 Device: None (Room air)    ______________________________________________________________________    Physical Exam:   Physical Exam   Constitutional: She is oriented to person, place, and time  Elderly female appears approximately stated age, no apparent distress, nontoxic appearing, lying in bed comfortably  HENT:   Head: Normocephalic   shunt incision site on scalp clean, dry, intact   Eyes: Pupils are equal, round, and reactive to light  EOM are normal    Neck: Neck supple  No JVD present  Cardiovascular: Normal rate and regular rhythm  Pulmonary/Chest: Effort normal and breath sounds normal  No respiratory distress  Abdominal: Soft   Bowel sounds are normal  She exhibits no distension  There is no tenderness  Laparoscopic trocar incision sites clean, dry, intact   Genitourinary:   Genitourinary Comments: Deferred   Musculoskeletal: Normal range of motion  She exhibits no edema  Neurological: She is alert and oriented to person, place, and time  Strength 5/5 in all 4 extremities  Sensation intact  No cranial nerve deficits  Nonfocal   Skin: Skin is warm and dry  Capillary refill takes less than 2 seconds  Nursing note and vitals reviewed  ______________________________________________________________________    Intake and Outputs:  I/O       09/02 0701 - 09/03 0700 09/03 0701 - 09/04 0700    P  O   1080    I V  (mL/kg)  1472 5 (20 9)    Total Intake(mL/kg)  2552 5 (36 2)    Urine (mL/kg/hr)  1250    Blood  25    Total Output  1275    Net  +1277 5          Unmeasured Urine Occurrence  2 x            Intake/Output Summary (Last 24 hours) at 9/4/2019 5104  Last data filed at 9/4/2019 0211  Gross per 24 hour   Intake 2552 5 ml   Output 1275 ml   Net 1277 5 ml         Nutrition:        Diet Orders   (From admission, onward)             Start     Ordered    09/04/19 0607  Diet Regular; Regular House; Fluid Restriction 1200 ML  Diet effective 1000     Question Answer Comment   Diet Type Regular    Regular Regular House    Other Restriction(s): Fluid Restriction 1200 ML    RD to adjust diet per protocol?  Yes        09/04/19 0606    09/03/19 1147  Room Service  Once     Question:  Type of Service  Answer:  Room Service - Appropriate with Assistance    09/03/19 1146                ______________________________________________________________________    Labs:   Results from last 7 days   Lab Units 09/04/19  0515   WBC Thousand/uL 15 04*   HEMOGLOBIN g/dL 9 2*   HEMATOCRIT % 28 2*   PLATELETS Thousands/uL 407*     Results from last 7 days   Lab Units 09/04/19  0515   POTASSIUM mmol/L 4 0   CHLORIDE mmol/L 96*   CO2 mmol/L 25   BUN mg/dL 15   CREATININE mg/dL 0 82   CALCIUM mg/dL 9 1 No results found for: PHOS       No results found for: TROPONINI      ABG:No results found for: PHART, FZN1RVW, PO2ART, RXS9IJQ, S9UMTDLB, BEART, SOURCE    Micro:  Lab Results   Component Value Date    URINECX 40,000-49,000 cfu/ml  08/14/2019       Imaging:   I have personally reviewed pertinent films in PACS     EKG: Reviewed  ______________________________________________________________________    Allergies: No Known Allergies    Medications:   Scheduled Meds:  Current Facility-Administered Medications:  acetaminophen 975 mg Oral Q6H [id number] [facility], MD   atenolol 50 mg Oral Daily Carvel Fuse, MD   atorvastatin 40 mg Oral HS Carvel Fuse, MD   docusate sodium 100 mg Oral BID Carvel Fuse, MD   escitalopram 10 mg Oral Daily Carvel Fuse, MD   losartan 25 mg Oral Daily Carvel Fuse, MD   multivitamin-minerals 1 tablet Oral Daily Carvel Fuse, MD   ondansetron 4 mg Intravenous Q6H PRN Ca Nelson, MD   pantoprazole 20 mg Oral Early Morning Carvel Fuse, MD   senna 1 tablet Oral Daily Carvel Fuse, MD   sodium chloride 1 g Oral TID Carvel Omar, MD     Continuous Infusions:   PRN Meds:    ondansetron 4 mg Q6H PRN      ______________________________________________________________________    Invasive lines and devices: Invasive Devices     Peripheral Intravenous Line            Peripheral IV 09/03/19 Right Hand less than 1 day                   SIGNATURE: Yinka Boo PA-C  DATE: September 4, 2019    Portions of the record may have been created with voice recognition software  Occasional wrong word or "sound a like" substitutions may have occurred due to the inherent limitations of voice recognition software  Read the chart carefully and recognize, using context, where substitutions have occurred

## 2019-09-05 VITALS
RESPIRATION RATE: 18 BRPM | OXYGEN SATURATION: 98 % | SYSTOLIC BLOOD PRESSURE: 184 MMHG | HEIGHT: 64 IN | WEIGHT: 154.6 LBS | TEMPERATURE: 97.8 F | BODY MASS INDEX: 26.4 KG/M2 | HEART RATE: 79 BPM | DIASTOLIC BLOOD PRESSURE: 68 MMHG

## 2019-09-05 LAB
ANION GAP SERPL CALCULATED.3IONS-SCNC: 11 MMOL/L (ref 4–13)
BUN SERPL-MCNC: 14 MG/DL (ref 5–25)
CALCIUM SERPL-MCNC: 9.4 MG/DL (ref 8.3–10.1)
CHLORIDE SERPL-SCNC: 98 MMOL/L (ref 100–108)
CO2 SERPL-SCNC: 24 MMOL/L (ref 21–32)
CREAT SERPL-MCNC: 0.7 MG/DL (ref 0.6–1.3)
ERYTHROCYTE [DISTWIDTH] IN BLOOD BY AUTOMATED COUNT: 13 % (ref 11.6–15.1)
GFR SERPL CREATININE-BSD FRML MDRD: 83 ML/MIN/1.73SQ M
GLUCOSE SERPL-MCNC: 106 MG/DL (ref 65–140)
HCT VFR BLD AUTO: 33.8 % (ref 34.8–46.1)
HGB BLD-MCNC: 10.8 G/DL (ref 11.5–15.4)
MAGNESIUM SERPL-MCNC: 1.7 MG/DL (ref 1.6–2.6)
MCH RBC QN AUTO: 31.3 PG (ref 26.8–34.3)
MCHC RBC AUTO-ENTMCNC: 32 G/DL (ref 31.4–37.4)
MCV RBC AUTO: 98 FL (ref 82–98)
PLATELET # BLD AUTO: 419 THOUSANDS/UL (ref 149–390)
PMV BLD AUTO: 9.7 FL (ref 8.9–12.7)
POTASSIUM SERPL-SCNC: 3.9 MMOL/L (ref 3.5–5.3)
RBC # BLD AUTO: 3.45 MILLION/UL (ref 3.81–5.12)
SODIUM SERPL-SCNC: 133 MMOL/L (ref 136–145)
WBC # BLD AUTO: 10.35 THOUSAND/UL (ref 4.31–10.16)

## 2019-09-05 PROCEDURE — 85027 COMPLETE CBC AUTOMATED: CPT | Performed by: PHYSICIAN ASSISTANT

## 2019-09-05 PROCEDURE — 80048 BASIC METABOLIC PNL TOTAL CA: CPT | Performed by: PHYSICIAN ASSISTANT

## 2019-09-05 PROCEDURE — 83735 ASSAY OF MAGNESIUM: CPT | Performed by: PHYSICIAN ASSISTANT

## 2019-09-05 PROCEDURE — NC001 PR NO CHARGE: Performed by: PHYSICIAN ASSISTANT

## 2019-09-05 PROCEDURE — 99024 POSTOP FOLLOW-UP VISIT: CPT | Performed by: PHYSICIAN ASSISTANT

## 2019-09-05 RX ORDER — SENNOSIDES 8.6 MG
1 TABLET ORAL DAILY
Qty: 20 EACH | Refills: 0 | Status: SHIPPED | OUTPATIENT
Start: 2019-09-06 | End: 2019-10-04 | Stop reason: SDUPTHER

## 2019-09-05 RX ADMIN — DOCUSATE SODIUM 100 MG: 100 CAPSULE, LIQUID FILLED ORAL at 08:16

## 2019-09-05 RX ADMIN — ESCITALOPRAM OXALATE 10 MG: 10 TABLET ORAL at 08:16

## 2019-09-05 RX ADMIN — PANTOPRAZOLE SODIUM 20 MG: 20 TABLET, DELAYED RELEASE ORAL at 05:48

## 2019-09-05 RX ADMIN — ACETAMINOPHEN 975 MG: 325 TABLET ORAL at 13:02

## 2019-09-05 RX ADMIN — ACETAMINOPHEN 975 MG: 325 TABLET ORAL at 08:16

## 2019-09-05 RX ADMIN — ACETAMINOPHEN 975 MG: 325 TABLET ORAL at 01:44

## 2019-09-05 RX ADMIN — ATENOLOL 50 MG: 50 TABLET ORAL at 08:16

## 2019-09-05 RX ADMIN — Medication 1 TABLET: at 08:16

## 2019-09-05 RX ADMIN — LOSARTAN POTASSIUM 25 MG: 25 TABLET, FILM COATED ORAL at 08:16

## 2019-09-05 RX ADMIN — Medication 1 G: at 08:16

## 2019-09-05 NOTE — PLAN OF CARE
Problem: Prexisting or High Potential for Compromised Skin Integrity  Goal: Skin integrity is maintained or improved  Description  INTERVENTIONS:  - Identify patients at risk for skin breakdown  - Assess and monitor skin integrity  - Assess and monitor nutrition and hydration status  - Monitor labs   - Assess for incontinence   - Turn and reposition patient  - Assist with mobility/ambulation  - Relieve pressure over bony prominences  - Avoid friction and shearing  - Provide appropriate hygiene as needed including keeping skin clean and dry  - Evaluate need for skin moisturizer/barrier cream  - Collaborate with interdisciplinary team   - Patient/family teaching  - Consider wound care consult   Outcome: Progressing     Problem: NEUROSENSORY - ADULT  Goal: Achieves stable or improved neurological status  Description  INTERVENTIONS  - Monitor and report changes in neurological status  - Monitor vital signs such as temperature, blood pressure, glucose, and any other labs ordered   - Initiate measures to prevent increased intracranial pressure  - Monitor for seizure activity and implement precautions if appropriate      Outcome: Progressing     Problem: SKIN/TISSUE INTEGRITY - ADULT  Goal: Skin integrity remains intact  Description  INTERVENTIONS  - Identify patients at risk for skin breakdown  - Assess and monitor skin integrity  - Assess and monitor nutrition and hydration status  - Monitor labs (i e  albumin)  - Assess for incontinence   - Turn and reposition patient  - Assist with mobility/ambulation  - Relieve pressure over bony prominences  - Avoid friction and shearing  - Provide appropriate hygiene as needed including keeping skin clean and dry  - Evaluate need for skin moisturizer/barrier cream  - Collaborate with interdisciplinary team (i e  Nutrition, Rehabilitation, etc )   - Patient/family teaching  Outcome: Progressing  Goal: Oral mucous membranes remain intact  Description  INTERVENTIONS  - Assess oral mucosa and hygiene practices  - Implement preventative oral hygiene regimen  - Implement oral medicated treatments as ordered  - Initiate Nutrition services referral as needed  Outcome: Progressing     Problem: Potential for Falls  Goal: Patient will remain free of falls  Description  INTERVENTIONS:  - Assess patient frequently for physical needs  -  Identify cognitive and physical deficits and behaviors that affect risk of falls    -  [place] fall precautions as indicated by assessment   - Educate patient/family on patient safety including physical limitations  - Instruct patient to call for assistance with activity based on assessment  - Modify environment to reduce risk of injury  - Consider OT/PT consult to assist with strengthening/mobility  Outcome: Progressing

## 2019-09-05 NOTE — SOCIAL WORK
CM spoke with [facility] from [facility] and prior authorization was received  Prior auth#  [id number], Approved from 9/5 - 9/10  Facility requested that patient arrive after 1:00 PM     CM updated neurosurgery PA  Patient's daughter Natalie will provide transportation to rehab later today  CM reviewed the availability of treatment team to discuss questions or concerns patient and/or family may have regarding  understanding medications and recognizing signs and symptoms once discharged  CM also encouraged patient to follow up with all recommended appointments after discharge  Patient advised of importance for patient and family to participate in managing patients medical well being

## 2019-09-05 NOTE — PROGRESS NOTES
Progress Note - Neurosurgery   Garnet Dance 66 y o  female MRN: [id number]  Unit/Bed#: -01 Encounter: [id number]        Assessment:  1  Status post day# 2, from Right frontal  shunt placement for NPH  2  History of NPH  3  Stage 2 chronic kidney disease  4  Hypertension  5  Depression  6  History of confusion  7  Anxiety        Plan:   § Exam: A&OX3, EOMI bilateral and conjugate  Communicates well  Leni, strength 5/5 throughout, sensation to LT intact symmetrically  DTR 2+ symmetrical;  no clonus and [place] is negative bilaterally  right retroauricular dressing soaked with dried blood, no active bleeding/drainage- consider removing dressing today  §  Imagings reviewed by me and by attending as follows:    § X-ray shunt series on 09/03/2019 demonstrates  shunt catheter tip and pelvis  § CT head without on 09/04/2019 demonstrates catheter tip in the 3rd ventricle, no intracranial bleeding  § Labs: WBC=10 35;Li=791; Hgb=10 8  ? Pain control:   - Acetaminophen 975 mg p o  Q 6h  ? DVT ppx:   § SCDs-bilateral legs  § Continue heparin 5000 units SQ t i d   ? Seizure ppx:  None  ? Activity:  As tolerated-fall precaution  ? PT/OT evaluation & treatment  ? Medical Mx:  -hypertension-on losartan 25 mg po/d  - depression / anxiety-on [id number] po/d  -hyponatremia-sodium chloride tabs 1 g oral t i d   ? Neurocheck: Q 4h  ? Patient is stable neurologically  Denies any headache/incisional pain, blurry vision or diplopia  Appetite is good denies any nausea or vomiting  Denies  urinary incontinent  Complains of gait instability  Uses walker  Neuro exam nonfocal   Her sodium 133 today  Patient doing physical therapy, walking around using walker  Physical therapy recommends skilled nursing home  CM working on discharge planning- pending insurance authorization  Call for concern or problem        Subjective/Objective   Chief Complaint: "I am doing fine"postop to from the patient placement progress note    Subjective:  Patient complains ongoing gait instability; otherwise denies any headache, nausea, vomiting, blurry vision, diplopia, dizziness or lightheadedness  Denies any drainage or discharge from the scalp incision or abdominal wound  Appetite is good  denies urinary incontinent  Denies fever, chills, rigors, cough or chest pain  Objective:   Patient is supine in bed, communicates well and in  no pain distress    I/O       09/03 0701 - 09/04 0700 09/04 0701 - 09/05 0700 09/05 0701 - 09/06 0700    P  O  1080 1180     I V  (mL/kg) 1472 5 (20 8)      IV Piggyback 100      Total Intake(mL/kg) 2652 5 (37 4) 1180 (16 8)     Urine (mL/kg/hr) 1625 250 (0 1)     Stool  0     Blood 25      Total Output 1650 250     Net +1002 5 +930            Unmeasured Urine Occurrence 3 x 6 x     Unmeasured Stool Occurrence  1 x           Invasive Devices     Peripheral Intravenous Line            Peripheral IV 09/03/19 Right Hand 1 day                Physical Exam:  Vitals: Blood pressure 110/62, pulse 84, temperature (!) 97 4 °F (36 3 °C), temperature source Oral, resp  rate 18, height 5' 4" (1 626 m), weight 70 1 kg (154 lb 9 6 oz), SpO2 98 %, not currently breastfeeding  ,Body mass index is 26 54 kg/m²  General appearance: alert, appears stated age, cooperative and no distress  Head:  Incision clean and dry, without active drainage or bleeding  Eyes: EOMI, conjugate bilaterally  Neck: supple, symmetrical, trachea midline and NT  Lungs: non labored breathing  Heart: regular heart rate  Neurologic:   Mental status: Alert, oriented x3, thought content appropriate  Cranial nerves: grossly intact (Cranial nerves II-XII)  Sensory: normal to light touch throughout  Motor: moving all extremities without focal weakness, strength is 5/5 bilaterally in both upper and lower extremities  Reflexes: 2+ and symmetric    No clonus and Babinski negative bilaterally  Coordination: finger to nose normal bilaterally, no drift bilaterally      Lab Results:  Results from last 7 days   Lab Units 09/05/19  0616 09/04/19  0515   WBC Thousand/uL 10 35* 15 04*   HEMOGLOBIN g/dL 10 8* 9 2*   HEMATOCRIT % 33 8* 28 2*   PLATELETS Thousands/uL 419* 407*     Results from last 7 days   Lab Units 09/05/19  0616 09/04/19  0515   POTASSIUM mmol/L 3 9 4 0   CHLORIDE mmol/L 98* 96*   CO2 mmol/L 24 25   BUN mg/dL 14 15   CREATININE mg/dL 0 70 0 82   CALCIUM mg/dL 9 4 9 1     Results from last 7 days   Lab Units 09/05/19  0616   MAGNESIUM mg/dL 1 7             No results found for: TROPONINT  ABG:No results found for: PHART, PIP4HQZ, PO2ART, DPX6HWY, D2QVXRLW, BEART, SOURCE    Imaging Studies: I have personally reviewed pertinent reports  and I have personally reviewed pertinent films in PACS    EKG, Pathology, and Other Studies: I have personally reviewed pertinent reports        VTE Pharmacologic Prophylaxis: Heparin SQ    VTE Mechanical Prophylaxis: sequential compression device

## 2019-09-05 NOTE — DISCHARGE INSTRUCTIONS
 Monitor incisions daily  Keep incision clean and dry   May shower and wash hair 72 hours after surgery   Avoid rubbing the incision, but gently massage hair   Do not use a hair dryer, and avoid hair products such as mousse, oils, and gels  Do not brush your hair away from the incision since this will put strain on the suture line   Do not dye or perm hair until cleared by MD Trejo No soaking in tub   May walk as tolerated: 3 short walks daily   Avoid heavy lifting, pushing or pulling over 10lbs for 2 weeks   Do not drive until cleared by MD/PA   Coumadin, ASA, Plavix may be restarted once cleared by MD Trejo If you ever require a MRI a skull X-ray must be done before and after the MRI  There may be a need to reprogram your shunt, so contact the office   Follow-up for a 2 week incision check and staple removal as scheduled  Follow-up for a 6 week post-operative visit with a repeat CT head to be completed prior to your visit  **Please contact MD if incision becomes red, swelling, and tender or has increased drainage  Or if you experience increased headaches, difficulties walking, nausea/vomiting, changes in vision, and increased drowsiness or temp>101   **

## 2019-09-05 NOTE — DISCHARGE SUMMARY
Discharge Summary - Neuro Surgery   Danilo Gary 66 y o  female MRN: [id number]  Unit/Bed#: -01 Encounter: [id number]    Admission Date:  09/03/2019  Admission Orders (From admission, onward)     Ordered        09/03/19 4242  Inpatient Admission  Once                      Discharge Date: 9/5/2019    Admitting Diagnosis: Normal pressure hydrocephalus [G91 2]    Discharge Diagnosis: Normal pressure hydrocephalus [G91 2]    Resolved Problems  Date Reviewed: 9/4/2019    None          Attending: Dr Ridley [place] Physician(s): none    Procedures Performed: No orders of the defined types were placed in this encounter  Pathology: CSF specimen    Hospital Course: Patient is 66 yrs old woman with history of progressive gait instability and some cognitive deficits  CT head without contrast demonstrates ventricular prominence out of proportion of sulcal prominence, which suggests possible communicating hydrocephalus  On 09/03/2019 patient underwent right frontal VPS placement by Dr Harsha mg plus valve set to 6  Laparoscopic assistant part was done by Dr Jonelle Gonzalez, CSF specimen was taken  After the procedure patient was monitored in the PACU for anesthesia effect  Following recovery patient was transferred to ICU  During the 2 days postop hospital stay, patient without complaints- Denies any headache, nausea, vomiting,  remains stable neurologically  Stable gait instability  X-ray shunt series and postop CT head unremarkable  Incision clean and dry  Patient was doing physical therapy, walking around using walker with the help of PT  PT recommends skilled nursing facility, and on 09/05/2019 patient was discharged to [facility] SNF in stable condition  Postop instructions discussed with the patient  Condition at Discharge: stable     Discharge instructions/Information to patient and family:   See after visit summary for information provided to patient and family  Provisions for Follow-Up Care:  See after visit summary for information related to follow-up care and any pertinent home health orders  Disposition: Skilled nursing facility at [facility] Readmission: No    Discharge Statement   I spent 30 minutes discharging the patient  This time was spent on the day of discharge  I had direct contact with the patient on the day of discharge  Additional documentation is required if more than 30 minutes were spent on discharge  Discharge Medications:  See after visit summary for reconciled discharge medications provided to patient and family

## 2019-09-06 ENCOUNTER — TELEPHONE (OUTPATIENT)
Dept: NEUROSURGERY | Facility: CLINIC | Age: 79
End: 2019-09-06

## 2019-09-06 LAB
BACTERIA CSF CULT: NO GROWTH
GRAM STN SPEC: NORMAL

## 2019-09-06 NOTE — TELEPHONE ENCOUNTER
TODAYS DATE: 9/6/19  EMAIL FROM PA: Lex Burgos  DATE OF EMAIL: 9/5/19    INSTRUCTIONS: PATIENT DOING PT  PT RECOMMENDS SKILLED NURSING FACILITY  PATIENT DISCHARGED TO [facility] IN STABLE CONDITION  WILL SIGN OFF     STATUS:  DISCHARGED TO LTAC[facility] IN STABLE CONDITION  WILL SIGN OFF      FOLLOW UP:   9/17/19 / 2:00 / [facility]    10/4/19 / 11:00 / [facility] / [facility]   - WITH CT HEAD (9/27/19 - [facility])

## 2019-09-06 NOTE — TELEPHONE ENCOUNTER
Spoke with Ashish Bland who is currently admitted at [facility], to see how she is doing after surgery 9/3/2019  She reports that she is doing well overall and denies any incisional issues or fevers  Advised that after three days she may take a shower and gently wash the surgical site with soap and water  Use clean wash cloth, towels, and clothing  Do not submerge in water until cleared by the surgeon  Do not apply any creams, ointments, or lotions to the site  Patient has moved her bowels since the surgery  Patient educated to drink 10-12 glasses of water daily and increase her fiber intake  Also instructed to ambulate as tolerated to help with constipation and prevent blood clots  she has no further questions at this time  Verified date/time/location of her upcoming POV on 9/17/2019 and advised her to call the office with any further questions or concerns, or if any incisional issues or fevers would arise  Patient was appreciative for the call

## 2019-09-17 ENCOUNTER — OFFICE VISIT (OUTPATIENT)
Dept: NEUROSURGERY | Facility: CLINIC | Age: 79
End: 2019-09-17

## 2019-09-17 VITALS
BODY MASS INDEX: 26.54 KG/M2 | SYSTOLIC BLOOD PRESSURE: 145 MMHG | DIASTOLIC BLOOD PRESSURE: 62 MMHG | HEIGHT: 64 IN | HEART RATE: 82 BPM | RESPIRATION RATE: 16 BRPM | TEMPERATURE: 99 F

## 2019-09-17 DIAGNOSIS — Z98.2 S/P VENTRICULOPERITONEAL SHUNT: Primary | ICD-10-CM

## 2019-09-17 PROCEDURE — 99024 POSTOP FOLLOW-UP VISIT: CPT | Performed by: PHYSICIAN ASSISTANT

## 2019-09-17 NOTE — PROGRESS NOTES
Office Note - Neurosurgery   Marques Rincon 66 y o  female MRN: [id number]      Assessment: This is a 66years old woman status post 2 weeks from right frontal  shunt placement for normal pressure hydrocephalus  Patient is here for incision check and staple removal   Patient and family reports that patient is doing better with gait  She uses walker at nursing  Denies any headache, nausea or vomiting  Denies bowel bladder issues  Denies any drainage or discharge from the incision site  Neuro exam :  Patient in wheelchair, alert and orientedx3,   Strength 5/5 bilaterally  Sensation intact  Incision healed well  Staples removed and there is no post removal bleeding  Hx,& PE discussed with the patient and family  That patient should not take shower for the next 24 hours  Follow-up in 4 weeks with Dr Omkar Thrasher  Questions and concerns were answered  Both family and patient understands the instructions and agreed with the plan  Plan:  1  No shower for the next 24  2  Follow-up in 4 weeks with Dr Gilmer Mclean     Chief Complaint: " I  Am doing better"/status post 2 weeks right  shunt placement for progress note        HPI  Patient is 66years old here today for 2 weeks incision check and staples removal   Patient and family noticed some improvement with patient's gait  She uses walker  Currently in wheelchair  Patient denies any bowel bladder issues  Denies headache, nausea, vomiting, blurry vision or diplopia  Denies discharge or drainage from the incision site  Denies taking any pain medication  JOSE GARCIA personally reviewed and updated  Review of Systems   Constitutional: Negative  HENT: Negative  Eyes: Negative  Respiratory: Negative for chest tightness (with activity ) and shortness of breath (with activity )  Cardiovascular: Negative  Gastrointestinal: Negative  Endocrine: Negative      Genitourinary: Negative for frequency and urgency (urinary incontinence )  Leakage when coughing, new symptom    Musculoskeletal: Positive for gait problem (per pt noticed improvements in walking)  Skin: Negative  Allergic/Immunologic: Negative  Neurological: Negative for weakness  Hematological: Bruises/bleeds easily (patient on ASA 81 and fish oil)  Psychiatric/Behavioral: Positive for confusion and decreased concentration  The patient is nervous/anxious  Increase in memory loss        Family History    Family History   Problem Relation Age of Onset    Heart attack Mother 39    Heart attack Father 61    No Known Problems Other     Breast cancer Sister     Alcohol abuse Daughter         history of    Heart attack Brother        Social History    Social History     Socioeconomic History    Marital status:       Spouse name: Not on file    Number of children: Not on file    Years of education: Not on file    Highest education level: Not on file   Occupational History    Occupation: Retired   Social Needs    Financial resource strain: Not on file    Food insecurity:     Worry: Not on file     Inability: Not on file   [place] [employer] needs:     Medical: Not on file     Non-medical: Not on file   Tobacco Use    Smoking status: Former Smoker     Packs/day: 0 00     Years: 0 00     Pack years: 0 00     Types: Cigarettes     Last attempt to quit:      Years since quittin 7    Smokeless tobacco: Never Used    Tobacco comment: no secondhand smoke exposure   Substance and Sexual Activity    Alcohol use: Not Currently     Frequency: Monthly or less     Drinks per session: 1 or 2     Binge frequency: Never    Drug use: No    Sexual activity: Not on file   Lifestyle    Physical activity:     Days per week: Not on file     Minutes per session: Not on file    Stress: Not on file   Relationships    Social connections:     Talks on phone: Not on file     Gets together: Not on file     Attends Judaism service: Not on file Active member of club or organization: Not on file     Attends meetings of clubs or organizations: Not on file     Relationship status: Not on file    Intimate partner violence:     Fear of current or ex partner: Not on file     Emotionally abused: Not on file     Physically abused: Not on file     Forced sexual activity: Not on file   Other Topics Concern    Not on file   Social History Narrative    Living alone       Past Medical History    Past Medical History:   Diagnosis Date    Anxiety     Arthritis     Confusion 10/2/2018    Depression     Displaced fracture of distal phalanx of right thumb     GERD (gastroesophageal reflux disease)     [facility] attack [facility] [address]     Hyperlipidemia     Hypertension     Moderate episode of recurrent major depressive disorder ([facility] [address] ) 4/12/2019    Shortness of breath     Stage 2 chronic kidney disease 7/17/2019       Surgical History    Past Surgical History:   Procedure Laterality Date    APPENDECTOMY      CARPAL TUNNEL RELEASE      CATARACT EXTRACTION Bilateral     COLONOSCOPY      FL LUMBAR PUNCTURE  1/10/2019    FRACTURE SURGERY      [place] CREATE SHUNT:VENTRIC-PERITONEAL Right 9/3/2019    Procedure: Insertion of frontal ventriculoperitoneal shunt;  Surgeon: Jose Francisco Pennington MD;  [facility];  Service: Neurosurgery    SEPTOPLASTY      WRIST FRACTURE SURGERY Right        Medications      Current Outpatient Medications:     acetaminophen (TYLENOL) 325 mg tablet, Take 2 tablets (650 mg total) by mouth every 6 (six) hours as needed for mild pain, moderate pain, headaches or fever, Disp: 30 tablet, Rfl: 0    atenolol (TENORMIN) 50 mg tablet, TAKE ONE TABLET BY MOUTH DAILY (Patient taking differently: Take 25 mg by mouth daily in the early morning ), Disp: 30 tablet, Rfl: 5    atorvastatin (LIPITOR) 40 mg tablet, TAKE ONE TABLET BY MOUTH EVERY DAY    OFFICE VISIT NEEDED (Patient taking differently: Take 40 mg by mouth daily at bedtime ), Disp: 90 tablet, Rfl: 3   bisacodyl (DULCOLAX) 10 mg suppository, Insert 10 mg into the rectum as needed for constipation, Disp: , Rfl:     escitalopram (LEXAPRO) 10 mg tablet, TAKE ONE TABLET BY MOUTH ONCE DAILY (Patient taking differently: Take 10 mg by mouth daily in the early morning ), Disp: 30 tablet, Rfl: 1    losartan (COZAAR) 50 mg tablet, TAKE ONE TABLET BY MOUTH TWO TIMES A DAY FOR 30 DAYS, Disp: 60 tablet, Rfl: 3    multivitamin (THERAGRAN) TABS, Take 1 tablet by mouth daily , Disp: , Rfl:     omeprazole (PriLOSEC) 20 mg delayed release capsule, Take 20 mg by mouth daily in the early morning , Disp: , Rfl:     senna (SENOKOT) 8 6 mg, Take 1 tablet (8 6 mg total) by mouth daily, Disp: 20 each, Rfl: 0    sodium chloride 1 g tablet, Take 1 tablet (1 g total) by mouth 3 (three) times a day, Disp: 90 tablet, Rfl: 4    Vitamin D, Cholecalciferol, 1000 units CAPS, Take 1 tablet by mouth every evening , Disp: , Rfl:     Allergies    No Known Allergies    The following portions of the patient's history were reviewed and updated as appropriate: allergies, current medications, past family history, past medical history, past social history, past surgical history and problem list     Investigations    No images required  Physical Exam    Vitals:  Blood pressure 145/62, pulse 82, temperature 99 °F (37 2 °C), temperature source Tympanic, resp  rate 16, height 5' 4" (1 626 m), not currently breastfeeding  ,Body mass index is 26 54 kg/m²  Physical Exam   Constitutional: She is oriented to person, place, and time  She appears well-developed and well-nourished  HENT:   Head: Normocephalic and atraumatic  Eyes: EOM are normal    Neck: Normal range of motion  Cardiovascular: Normal rate  Pulmonary/Chest: Effort normal    Musculoskeletal: Normal range of motion  Neurological: She is alert and oriented to person, place, and time  She has normal strength  No cranial nerve deficit or sensory deficit   She has a normal Finger-Nose-Finger Test  GCS eye subscore is 4  GCS verbal subscore is 5  GCS motor subscore is 6  She displays no Babinski's sign on the right side  She displays no Babinski's sign on the left side  Reflex Scores:       Tricep reflexes are 3+ on the right side and 3+ on the left side  Bicep reflexes are 3+ on the right side and 3+ on the left side  Brachioradialis reflexes are 3+ on the right side and 3+ on the left side  Patellar reflexes are 2+ on the right side and 2+ on the left side  Achilles reflexes are 2+ on the right side and 2+ on the left side  Skin: Skin is warm  Psychiatric: Her speech is normal      Neurologic Exam     Mental Status   Oriented to person, place, and time  Speech: speech is normal   Level of consciousness: alert  Knowledge: good  Unable to perform simple calculations  Cranial Nerves     CN II   Visual fields full to confrontation  Right visual field deficit: none  Left visual field deficit: none     CN III, IV, VI   Extraocular motions are normal    Right pupil: Shape: regular  Left pupil: Shape: regular  Nystagmus: none     CN V   Right facial sensation deficit: none  Left facial sensation deficit: none    CN VII   Right facial weakness: none  Left facial weakness: none    CN XII   CN XII normal      Motor Exam   Muscle bulk: normal  Overall muscle tone: normal  Right arm tone: normal  Left arm tone: normal  Right arm pronator drift: absent  Left arm pronator drift: absent  Right leg tone: normal  Left leg tone: normal    Strength   Strength 5/5 throughout       Sensory Exam   Light touch normal      Gait, Coordination, and Reflexes     Coordination   Finger to nose coordination: normal    Reflexes   Right brachioradialis: 3+  Left brachioradialis: 3+  Right biceps: 3+  Left biceps: 3+  Right triceps: 3+  Left triceps: 3+  Right patellar: 2+  Left patellar: 2+  Right achilles: 2+  Left achilles: 2+  Right plantar: normal  Left plantar: normal  Right Barrientos: absent  Right ankle clonus: absent  Left ankle clonus: absent

## 2019-09-18 ENCOUNTER — TELEPHONE (OUTPATIENT)
Dept: NEUROSURGERY | Facility: CLINIC | Age: 79
End: 2019-09-18

## 2019-09-18 NOTE — TELEPHONE ENCOUNTER
Received call from [facility] requesting clearance for the patient to restart her ASA 81mg  Discussed with KAYLA PARMAR to confirm but advised ok to resume use of NSAIDs including ASA as of 2 week post op

## 2019-09-27 ENCOUNTER — HOSPITAL ENCOUNTER (OUTPATIENT)
Dept: CT IMAGING | Facility: HOSPITAL | Age: 79
Discharge: HOME | End: 2019-09-27
Attending: NEUROLOGICAL SURGERY
Payer: COMMERCIAL

## 2019-09-27 DIAGNOSIS — Z98.2 S/P VP SHUNT: ICD-10-CM

## 2019-09-27 PROCEDURE — 70450 CT HEAD/BRAIN W/O DYE: CPT

## 2019-10-04 ENCOUNTER — OFFICE VISIT (OUTPATIENT)
Dept: NEUROSURGERY | Facility: CLINIC | Age: 79
End: 2019-10-04

## 2019-10-04 ENCOUNTER — OFFICE VISIT (OUTPATIENT)
Dept: PHYSICAL THERAPY | Facility: CLINIC | Age: 79
End: 2019-10-04
Payer: COMMERCIAL

## 2019-10-04 VITALS
DIASTOLIC BLOOD PRESSURE: 72 MMHG | SYSTOLIC BLOOD PRESSURE: 138 MMHG | BODY MASS INDEX: 26.12 KG/M2 | RESPIRATION RATE: 16 BRPM | WEIGHT: 153 LBS | TEMPERATURE: 98 F | HEIGHT: 64 IN

## 2019-10-04 DIAGNOSIS — E55.9 VITAMIN D DEFICIENCY: Primary | ICD-10-CM

## 2019-10-04 DIAGNOSIS — K59.03 DRUG-INDUCED CONSTIPATION: ICD-10-CM

## 2019-10-04 DIAGNOSIS — Z98.2 STATUS POST VENTRICULOPERITONEAL SHUNT: ICD-10-CM

## 2019-10-04 DIAGNOSIS — E87.1 HYPONATREMIA: ICD-10-CM

## 2019-10-04 DIAGNOSIS — R26.9 ABNORMALITY OF GAIT: Primary | ICD-10-CM

## 2019-10-04 DIAGNOSIS — I10 ESSENTIAL HYPERTENSION: ICD-10-CM

## 2019-10-04 DIAGNOSIS — K21.9 GERD WITHOUT ESOPHAGITIS: ICD-10-CM

## 2019-10-04 DIAGNOSIS — E22.2 SIADH (SYNDROME OF INAPPROPRIATE ADH PRODUCTION) (HCC): ICD-10-CM

## 2019-10-04 DIAGNOSIS — G91.2 NORMAL PRESSURE HYDROCEPHALUS (HCC): Primary | ICD-10-CM

## 2019-10-04 DIAGNOSIS — F41.9 ANXIETY: ICD-10-CM

## 2019-10-04 PROCEDURE — 99024 POSTOP FOLLOW-UP VISIT: CPT | Performed by: NEUROLOGICAL SURGERY

## 2019-10-04 PROCEDURE — 97161 PT EVAL LOW COMPLEX 20 MIN: CPT | Performed by: PHYSICAL THERAPIST

## 2019-10-04 RX ORDER — ESCITALOPRAM OXALATE 10 MG/1
TABLET ORAL
Qty: 30 TABLET | Refills: 4 | Status: SHIPPED | OUTPATIENT
Start: 2019-10-04 | End: 2019-12-23 | Stop reason: SDUPTHER

## 2019-10-04 RX ORDER — MULTIVIT-MIN/IRON/FOLIC ACID/K 18-600-40
CAPSULE ORAL
Qty: 30 TABLET | Refills: 4 | Status: SHIPPED | OUTPATIENT
Start: 2019-10-04 | End: 2021-08-24 | Stop reason: SDUPTHER

## 2019-10-04 RX ORDER — STANDARDIZED SENNA CONCENTRATE 8.6 MG/1
TABLET ORAL
Qty: 30 TABLET | Refills: 4 | Status: SHIPPED | OUTPATIENT
Start: 2019-10-04 | End: 2019-10-21 | Stop reason: ALTCHOICE

## 2019-10-04 RX ORDER — LOSARTAN POTASSIUM 50 MG/1
TABLET ORAL
Qty: 60 TABLET | Refills: 4 | Status: SHIPPED | OUTPATIENT
Start: 2019-10-04 | End: 2020-02-03

## 2019-10-04 RX ORDER — SODIUM CHLORIDE 1000 MG
TABLET, SOLUBLE MISCELLANEOUS
Qty: 90 TABLET | Refills: 4 | Status: SHIPPED | OUTPATIENT
Start: 2019-10-04 | End: 2020-02-24

## 2019-10-04 RX ORDER — NICOTINE POLACRILEX 4 MG/1
GUM, CHEWING ORAL
Qty: 30 EACH | Refills: 4 | Status: SHIPPED | OUTPATIENT
Start: 2019-10-04 | End: 2019-10-21 | Stop reason: SDUPTHER

## 2019-10-04 RX ORDER — ATENOLOL 25 MG/1
TABLET ORAL
Qty: 15 TABLET | Refills: 4 | Status: SHIPPED | OUTPATIENT
Start: 2019-10-04 | End: 2019-10-21 | Stop reason: ALTCHOICE

## 2019-10-04 RX ORDER — ASPIRIN 81 MG/1
TABLET, CHEWABLE ORAL
Qty: 30 TABLET | Refills: 4 | Status: SHIPPED | OUTPATIENT
Start: 2019-10-04

## 2019-10-04 NOTE — PROGRESS NOTES
Office Note - Neurosurgery   Kathleen Silverman 66 y o  female MRN: [id number]      Assessment:    Patient is rapidly improving  69-year-old woman post insertion of right frontal ventriculoperitoneal shunt for normal pressure hydrocephalus  She has had objective and subjective improvement in gait and balance and cognition  She also reports some improvement in urinary incontinence  Both she and her daughter pleased with the results of surgery  Would not recommend any adjustment of the shunt at present  Reviewed signs and symptoms of shunt malfunction with him today  She will contact this office should any concerns arise, otherwise she will follow-up in 5 months with repeat PT and cognitive assessment  Thank you for referring this patient to the integrated normal pressure hydrocephalus program       History, physical examination and diagnostic tests were reviewed and questions answered  Diagnosis, care plan and treatment options were discussed  The patient and daughter understand instructions and will follow up as directed  Plan:    Follow-up:  5 months    Problem List Items Addressed This Visit        Nervous and Auditory    Normal pressure hydrocephalus (HCC) - Primary       Other    Status post ventriculoperitoneal shunt          Subjective/Objective     Chief Complaint    Follow-up post insertion of right frontal  shunt for normal pressure hydrocephalus  No particular concerns  HPI    69-year-old woman 4 weeks post insertion of right frontal  shunt for normal pressure hydrocephalus  She is accompanied by her daughter  They have noticed significant improvement in her gait and balance and some improvement in cognition  The patient's notes some improvement in her urinary incontinence  No difficulties with the incisions  She denies any headaches  Overall, they are pleased with the early results of surgery and the patient is scheduled to return home early next week        JOSE GARCIA personally reviewed and updated  Review of Systems   Constitutional: Negative  HENT: Negative  Eyes: Negative  Respiratory: Negative  Cardiovascular: Negative  Gastrointestinal: Negative  Endocrine: Negative  Genitourinary: Positive for urgency  Musculoskeletal: Negative  Skin: Negative  Allergic/Immunologic: Negative  Neurological: Negative  Hematological: Negative  Psychiatric/Behavioral: Positive for decreased concentration (daughter reports improvement in memory)  Family History    Family History   Problem Relation Age of Onset    Heart attack Mother 39    Heart attack Father 61    No Known Problems Other     Breast cancer Sister     Alcohol abuse Daughter         history of    Heart attack Brother        Social History    Social History     Socioeconomic History    Marital status:       Spouse name: Not on file    Number of children: Not on file    Years of education: Not on file    Highest education level: Not on file   Occupational History    Occupation: Retired   Social Needs    Financial resource strain: Not on file    Food insecurity:     Worry: Not on file     Inability: Not on file   Quartzy needs:     Medical: Not on file     Non-medical: Not on file   Tobacco Use    Smoking status: Former Smoker     Packs/day: 0 00     Years: 0 00     Pack years: 0 00     Types: Cigarettes     Last attempt to quit:      Years since quittin 7    Smokeless tobacco: Never Used    Tobacco comment: no secondhand smoke exposure   Substance and Sexual Activity    Alcohol use: Not Currently     Frequency: Monthly or less     Drinks per session: 1 or 2     Binge frequency: Never    Drug use: No    Sexual activity: Not on file   Lifestyle    Physical activity:     Days per week: Not on file     Minutes per session: Not on file    Stress: Not on file   Relationships    Social connections:     Talks on phone: Not on file     Gets together: Not on file     Attends Mormonism service: Not on file     Active member of club or organization: Not on file     Attends meetings of clubs or organizations: Not on file     Relationship status: Not on file    Intimate partner violence:     Fear of current or ex partner: Not on file     Emotionally abused: Not on file     Physically abused: Not on file     Forced sexual activity: Not on file   Other Topics Concern    Not on file   Social History Narrative    Living alone       Past Medical History    Past Medical History:   Diagnosis Date    Anxiety     Arthritis     Confusion 10/2/2018    Depression     Displaced fracture of distal phalanx of right thumb     GERD (gastroesophageal reflux disease)     Heart [facility] ([facility] [address]     Hyperlipidemia     Hypertension     Moderate episode of recurrent major depressive disorder ([facility] [address] [facility] 4/12/2019    Shortness of breath     Stage 2 chronic kidney disease 7/17/2019       Surgical History    Past Surgical History:   Procedure Laterality Date    APPENDECTOMY      CARPAL TUNNEL RELEASE      CATARACT EXTRACTION Bilateral     COLONOSCOPY      FL LUMBAR PUNCTURE  1/10/2019    FRACTURE SURGERY      [place] CREATE SHUNT:VENTRIC-PERITONEAL Right 9/3/2019    Procedure: Insertion of frontal ventriculoperitoneal shunt;  Surgeon: Shankar Humphries MD;  [facility];  Service: Neurosurgery    SEPTOPLASTY      WRIST FRACTURE SURGERY Right        Medications      Current Outpatient Medications:     acetaminophen (TYLENOL) 325 mg tablet, Take 2 tablets (650 mg total) by mouth every 6 (six) hours as needed for mild pain, moderate pain, headaches or fever, Disp: 30 tablet, Rfl: 0    atenolol (TENORMIN) 50 mg tablet, TAKE ONE TABLET BY MOUTH DAILY (Patient taking differently: Take 25 mg by mouth daily in the early morning ), Disp: 30 tablet, Rfl: 5    atorvastatin (LIPITOR) 40 mg tablet, TAKE ONE TABLET BY MOUTH EVERY DAY    OFFICE VISIT NEEDED (Patient taking differently: Take 40 mg by mouth daily at bedtime ), Disp: 90 tablet, Rfl: 3    escitalopram (LEXAPRO) 10 mg tablet, TAKE ONE TABLET BY MOUTH ONCE DAILY (Patient taking differently: Take 10 mg by mouth daily in the early morning ), Disp: 30 tablet, Rfl: 1    losartan (COZAAR) 50 mg tablet, TAKE ONE TABLET BY MOUTH TWO TIMES A DAY FOR 30 DAYS, Disp: 60 tablet, Rfl: 3    multivitamin (THERAGRAN) TABS, Take 1 tablet by mouth daily , Disp: , Rfl:     omeprazole (PriLOSEC) 20 mg delayed release capsule, Take 20 mg by mouth daily in the early morning , Disp: , Rfl:     sodium chloride 1 g tablet, Take 1 tablet (1 g total) by mouth 3 (three) times a day, Disp: 90 tablet, Rfl: 4    Vitamin D, Cholecalciferol, 1000 units CAPS, Take 1 tablet by mouth every evening , Disp: , Rfl:     bisacodyl (DULCOLAX) 10 mg suppository, Insert 10 mg into the rectum as needed for constipation, Disp: , Rfl:     senna (SENOKOT) 8 6 mg, Take 1 tablet (8 6 mg total) by mouth daily (Patient not taking: Reported on 10/4/2019), Disp: 20 each, Rfl: 0    Allergies    No Known Allergies    The following portions of the patient's history were reviewed and updated as appropriate: allergies, current medications, past family history, past medical history, past social history, past surgical history and problem list     Investigations    I personally reviewed the CT, NPH PT and NPH Cognitive results with the patient:    CT scan of the head without contrast dated September 27, 2019  Comparison to previous imaging  Stable appearance of right frontal  shunt which terminates in the 3rd ventricle  No new intra-axial or extra-axial lesions  Stable appearance of ventricular system  NPH scale dated 10/4/2019, 13/15  PT gait assessment dated 10/4/2019  TUG 32 sec , TUGc 33 sec, DGI 19/24   MoCA dated 10/4/2019, 25/30  Physical Exam    Vitals:  Blood pressure 138/72, temperature 98 °F (36 7 °C), resp   rate 16, height 5' 4" (1 626 m), weight 69 4 kg (153 lb), not currently breastfeeding  ,Body mass index is 26 26 kg/m²  Physical Exam   Constitutional: She is oriented to person, place, and time  She appears well-developed and well-nourished  No distress  HENT:   Head: Atraumatic  Eyes: EOM are normal    Pulmonary/Chest: Effort normal  No respiratory distress  Abdominal: Soft  She exhibits no distension  There is no tenderness  Neurological: She is alert and oriented to person, place, and time  No cranial nerve deficit or sensory deficit  Walks with a mildly wide-based gait but improvement in shuffling  Able to stand and walk independently  No pronator or parietal drift  Skin: Skin is warm and dry  Shunt depresses and refills easily  Shunt related incisions well healed  Small residual Monocryl suture removed from scalp incision  Psychiatric: She has a normal mood and affect  Her behavior is normal    Vitals reviewed  Neurologic Exam     Mental Status   Oriented to person, place, and time       Cranial Nerves     CN III, IV, VI   Extraocular motions are normal

## 2019-10-04 NOTE — PROGRESS NOTES
PT Evaluation     Today's date: 10/4/2019  Patient name: Lilli Armstrong  : 1940  MRN: [id number]  Referring provider: Lashaun Burgess MD  Dx:   Encounter Diagnosis     ICD-10-CM    1  Abnormality of gait R26 9                   Assessment  Assessment details: Please refer to the NPH Exam form for all objective measures  Patient was seen for a gait and balance assessment/screen and shows remarkable improvements  She will be seen for re-assessment of their gait/balance as needed as part of the management of their condition  Thank you for this pleasant referral       Impairments: abnormal gait, impaired balance and safety issue    Goals  Patient to be reassessed as needed in 4-6 weeks    Plan  Plan details: Patient to have Home PT and possibly outpatient PT to maximize balance and gait training  Subjective Evaluation    History of Present Illness  Onset date: ~ 1 year  Mechanism of injury: Chief Complaint: Difficulty walking    History:  Pt was diagnosed with NPH about a year ago and she presents s/p  shunt on 9/3/19  She has been at [facility] for 2 weeks where she is doing PT everyday  She feels this has been very helpful  She walks with a Rollator  On Monday she will be d/c home  Her daughter states her balance is tremendous  She lives alone in a bi-level with 13 steps to enter  She is not worried about going home as her home is equipped with support  Family lives close by  She does not drive  She uses a walker for home mobility and his a minimal community ambulator, basically not walking outside her home  She notes the incontinence has improved      Quality of life: good    Pain  No pain reported          Objective     Ambulation     Comments   NPH Exam Finding:  TU 43 sec RW, good control with sit to stand  TUG Cognitive: 33 31 sec, RW and good counting until 88  DGI:   Stairs: Step to, B handrails pain in L knee going up  Retropulsion: (-)               Precautions: NPH, Fall Risk

## 2019-10-11 ENCOUNTER — TELEPHONE (OUTPATIENT)
Dept: FAMILY MEDICINE CLINIC | Facility: CLINIC | Age: 79
End: 2019-10-11

## 2019-10-11 NOTE — TELEPHONE ENCOUNTER
They were in today for her start of care for her home health, they will be seeing her two times per week for three weeks  They are treating her post  shunt

## 2019-10-16 ENCOUNTER — TELEPHONE (OUTPATIENT)
Dept: FAMILY MEDICINE CLINIC | Facility: CLINIC | Age: 79
End: 2019-10-16

## 2019-10-16 NOTE — TELEPHONE ENCOUNTER
Call from [facility] PT stating pts BP was 176/62 and heart rate was 120,  Pt felt shaky all day, please advise

## 2019-10-21 ENCOUNTER — OFFICE VISIT (OUTPATIENT)
Dept: FAMILY MEDICINE CLINIC | Facility: CLINIC | Age: 79
End: 2019-10-21
Payer: COMMERCIAL

## 2019-10-21 VITALS
WEIGHT: 157.5 LBS | SYSTOLIC BLOOD PRESSURE: 132 MMHG | HEIGHT: 64 IN | BODY MASS INDEX: 26.89 KG/M2 | DIASTOLIC BLOOD PRESSURE: 68 MMHG | HEART RATE: 68 BPM

## 2019-10-21 DIAGNOSIS — Z23 ENCOUNTER FOR IMMUNIZATION: Primary | ICD-10-CM

## 2019-10-21 DIAGNOSIS — E78.5 HYPERLIPIDEMIA, UNSPECIFIED HYPERLIPIDEMIA TYPE: ICD-10-CM

## 2019-10-21 DIAGNOSIS — F41.9 ANXIETY: ICD-10-CM

## 2019-10-21 DIAGNOSIS — K21.9 GERD WITHOUT ESOPHAGITIS: ICD-10-CM

## 2019-10-21 DIAGNOSIS — E87.1 CHRONIC HYPONATREMIA: Chronic | ICD-10-CM

## 2019-10-21 DIAGNOSIS — E55.9 VITAMIN D DEFICIENCY: ICD-10-CM

## 2019-10-21 DIAGNOSIS — Z98.2 STATUS POST VENTRICULOPERITONEAL SHUNT: ICD-10-CM

## 2019-10-21 DIAGNOSIS — N18.2 STAGE 2 CHRONIC KIDNEY DISEASE: ICD-10-CM

## 2019-10-21 DIAGNOSIS — G91.2 NORMAL PRESSURE HYDROCEPHALUS (HCC): ICD-10-CM

## 2019-10-21 PROCEDURE — 90662 IIV NO PRSV INCREASED AG IM: CPT | Performed by: PHYSICIAN ASSISTANT

## 2019-10-21 PROCEDURE — G0008 ADMIN INFLUENZA VIRUS VAC: HCPCS | Performed by: PHYSICIAN ASSISTANT

## 2019-10-21 PROCEDURE — 99214 OFFICE O/P EST MOD 30 MIN: CPT | Performed by: PHYSICIAN ASSISTANT

## 2019-10-21 RX ORDER — NICOTINE POLACRILEX 4 MG/1
20 GUM, CHEWING ORAL
Qty: 30 EACH | Refills: 5 | Status: SHIPPED | OUTPATIENT
Start: 2019-10-21 | End: 2020-04-14

## 2019-10-21 NOTE — ASSESSMENT & PLAN NOTE
Refer back to nephrology as missed her last apt due to recent surgery  Stable currently  (0) independent

## 2019-10-21 NOTE — PATIENT INSTRUCTIONS
Problem List Items Addressed This Visit        Digestive    GERD without esophagitis    Relevant Medications    Omeprazole 20 MG TBEC       Nervous and Auditory    Normal pressure hydrocephalus (HCC)       Patient is status post VPS shunt placement and has improved since with her gait belt and urinary incontinence  She has follow-up with Neurosurgery in 5 months  Genitourinary    Stage 2 chronic kidney disease    Relevant Orders    Comprehensive metabolic panel       Other    Chronic hyponatremia (Chronic)     Refer back to nephrology as missed her last apt due to recent surgery  Stable currently  Relevant Orders    Ambulatory referral to Nephrology    Anxiety     Pt does not feel she needs to be on lexapro  She has been on this at 10 mg since April of this year  I will have pt decrease to only 1/2 tab daily and will re eval in 6 months  Hyperlipidemia       Due for fasting lipid panel and CMP  Relevant Orders    Comprehensive metabolic panel    Lipid Panel with Direct LDL reflex    Lipid Panel with Direct LDL reflex    Vitamin D deficiency       Check vitamin-D with next labs           Relevant Orders    Vitamin D 25 hydroxy    Vitamin D 25 hydroxy    Status post ventriculoperitoneal shunt      Other Visit Diagnoses     Encounter for immunization    -  Primary    Relevant Orders    influenza vaccine, [id number], high-dose, PF 0 5 mL (FLUZONE HIGH-DOSE) (Completed)

## 2019-10-21 NOTE — ASSESSMENT & PLAN NOTE
Pt does not feel she needs to be on lexapro  She has been on this at 10 mg since April of this year  I will have pt decrease to only 1/2 tab daily and will re eval in 6 months

## 2019-10-21 NOTE — PROGRESS NOTES
Assessment and Plan:    Problem List Items Addressed This Visit        Digestive    GERD without esophagitis    Relevant Medications    Omeprazole 20 MG TBEC       Nervous and Auditory    Normal pressure hydrocephalus (HCC)       Patient is status post VPS shunt placement and has improved since with her gait belt and urinary incontinence  She has follow-up with Neurosurgery in 5 months  Genitourinary    Stage 2 chronic kidney disease    Relevant Orders    Comprehensive metabolic panel       Other    Chronic hyponatremia (Chronic)     Refer back to nephrology as missed her last apt due to recent surgery  Stable currently  Relevant Orders    Ambulatory referral to Nephrology    Anxiety     Pt does not feel she needs to be on lexapro  She has been on this at 10 mg since April of this year  I will have pt decrease to only 1/2 tab daily and will re eval in 6 months  Hyperlipidemia       Due for fasting lipid panel and CMP  Relevant Orders    Comprehensive metabolic panel    Lipid Panel with Direct LDL reflex    Lipid Panel with Direct LDL reflex    Vitamin D deficiency       Check vitamin-D with next labs  Relevant Orders    Vitamin D 25 hydroxy    Vitamin D 25 hydroxy    Status post ventriculoperitoneal shunt      Other Visit Diagnoses     Encounter for immunization    -  Primary    Relevant Orders    influenza vaccine, [id number], high-dose, PF 0 5 mL (FLUZONE HIGH-DOSE) (Completed)                 Diagnoses and all orders for this visit:    Encounter for immunization  -     influenza vaccine, [id number], high-dose, PF 0 5 mL (FLUZONE HIGH-DOSE)    GERD without esophagitis  -     Omeprazole 20 MG TBEC;  Take 1 tablet (20 mg total) by mouth daily in the early morning    Status post ventriculoperitoneal shunt    Normal pressure hydrocephalus (HCC)    Hyperlipidemia, unspecified hyperlipidemia type  -     Comprehensive metabolic panel  -     Lipid Panel with Direct LDL reflex  - Lipid Panel with Direct LDL reflex; Future    Vitamin D deficiency  -     Vitamin D 25 hydroxy  -     Vitamin D 25 hydroxy; Future    Stage 2 chronic kidney disease  -     Comprehensive metabolic panel; Future    Anxiety    Chronic hyponatremia  -     Ambulatory referral to Nephrology; Future              Subjective:      Patient ID: Analilia Bautista is a 66 y o  female  CC:    Chief Complaint   Patient presents with    Follow-up     Follow up after surgery  Pt is doing well  She is home alone now and able to do quite alot on her own  kw       HPI:      Patient here today accompanied by her 2 daughters for a CAL visit  She is status post surgery  On 09/03 she had a right frontal   S placement by Dr Jonathan Gamble accompanied by Dr Elvi CARRILLO for hydrocephalus  She tolerated procedure well and was discharged on the 5th of September to a skilled nursing facility  At [facility] and then on the 23rd of September she was discharged to [facility]  She was discharged from here 2 weeks ago  She has already seen neuro surgery for follow-up on 10/04 and has experienced improvement in gait balance in urinary incontinence  They want to see her back in 5 months  Patient is receiving physical therapy at home twice a week for 3 weeks and then will probably be moved to outpatient from that point forward  She is due for lipid check as she is on a statin  She also needed to cancel her Nephrology appointment due to the recent surgery  She would like to make another appointment to check on this  She does [place] alone [place] and only needs to use the stairs if going out to get the mail  She does have a different walker at home which has a tray and has a walker Rollator for when she goes out to the store  Her 2 daughters who live close and check on her every day  She is able to prepare her own meals folder own laundry  Her daughter does set up her evening in a m   Dosing prescriptions which she has been remembering to take on time since home  The daughter is a very impressed that she has improved so much since prior to discharge  The following portions of the patient's history were reviewed and updated as appropriate: allergies, current medications, past family history, past medical history, past social history, past surgical history and problem list       Review of Systems   Constitutional: Negative  HENT: Negative  Eyes: Negative  Respiratory: Negative  Cardiovascular: Negative  Gastrointestinal: Negative  Endocrine: Negative  Genitourinary: Negative  Musculoskeletal:          Knee pain only if going up or down stairs which is infrequent  Skin: Negative  Allergic/Immunologic: Negative  Neurological: Negative  Hematological: Negative  Psychiatric/Behavioral: Negative  Data to review:       Objective:    Vitals:    10/21/19 1030   BP: 132/68   BP Location: Left arm   Patient Position: Sitting   Pulse: 68   Weight: 71 4 kg (157 lb 8 oz)   Height: 5' 4" (1 626 m)        Physical Exam   Constitutional: She is oriented to person, place, and time  She appears well-developed and well-nourished  No distress  HENT:   Head: Normocephalic and atraumatic  Eyes: Conjunctivae are normal  Right eye exhibits no discharge  Left eye exhibits no discharge  Neck: Neck supple  Carotid bruit is not present  Cardiovascular: Normal rate, regular rhythm and normal heart sounds  Exam reveals no gallop and no friction rub  No murmur heard  Pulmonary/Chest: Effort normal and breath sounds normal  No respiratory distress  She has no wheezes  She has no rales  Neurological: She is alert and oriented to person, place, and time  Skin: Skin is warm and dry  She is not diaphoretic  Psychiatric: She has a normal mood and affect  Judgment normal    Nursing note and vitals reviewed

## 2019-10-21 NOTE — ASSESSMENT & PLAN NOTE
Patient is status post VPS shunt placement and has improved since with her gait belt and urinary incontinence  She has follow-up with Neurosurgery in 5 months

## 2019-10-22 ENCOUNTER — APPOINTMENT (OUTPATIENT)
Dept: PHYSICAL THERAPY | Facility: CLINIC | Age: 79
End: 2019-10-22
Payer: COMMERCIAL

## 2019-10-28 ENCOUNTER — TELEPHONE (OUTPATIENT)
Dept: NEPHROLOGY | Facility: CLINIC | Age: 79
End: 2019-10-28

## 2019-10-28 NOTE — TELEPHONE ENCOUNTER
Contacted daughter Manuela Ponce in regards to Dr Domitila Veronica having opening for today later this afternoon at our [place] office for new consult  Left [place] office contact information

## 2019-10-30 LAB
25(OH)D3 SERPL-MCNC: 30 NG/ML (ref 30–100)
ALBUMIN SERPL-MCNC: 4.2 G/DL (ref 3.6–5.1)
ALBUMIN/GLOB SERPL: 1.5 (CALC) (ref 1–2.5)
ALP SERPL-CCNC: 54 U/L (ref 33–130)
ALT SERPL-CCNC: 29 U/L (ref 6–29)
AST SERPL-CCNC: 26 U/L (ref 10–35)
BILIRUB SERPL-MCNC: 0.3 MG/DL (ref 0.2–1.2)
BUN SERPL-MCNC: 11 MG/DL (ref 7–25)
BUN/CREAT SERPL: ABNORMAL (CALC) (ref 6–22)
CALCIUM SERPL-MCNC: 9.7 MG/DL (ref 8.6–10.4)
CHLORIDE SERPL-SCNC: 97 MMOL/L (ref 98–110)
CHOLEST SERPL-MCNC: 129 MG/DL
CHOLEST/HDLC SERPL: 2.9 (CALC)
CO2 SERPL-SCNC: 25 MMOL/L (ref 20–32)
CREAT SERPL-MCNC: 0.69 MG/DL (ref 0.6–0.93)
GLOBULIN SER CALC-MCNC: 2.8 G/DL (CALC) (ref 1.9–3.7)
GLUCOSE SERPL-MCNC: 104 MG/DL (ref 65–99)
HDLC SERPL-MCNC: 44 MG/DL
LDLC SERPL CALC-MCNC: 51 MG/DL (CALC)
NONHDLC SERPL-MCNC: 85 MG/DL (CALC)
POTASSIUM SERPL-SCNC: 4.6 MMOL/L (ref 3.5–5.3)
PROT SERPL-MCNC: 7 G/DL (ref 6.1–8.1)
SL AMB EGFR AFRICAN AMERICAN: 97 ML/MIN/1.73M2
SL AMB EGFR NON AFRICAN AMERICAN: 83 ML/MIN/1.73M2
SODIUM SERPL-SCNC: 133 MMOL/L (ref 135–146)
TRIGL SERPL-MCNC: 285 MG/DL

## 2019-11-05 ENCOUNTER — TELEPHONE (OUTPATIENT)
Dept: FAMILY MEDICINE CLINIC | Facility: CLINIC | Age: 79
End: 2019-11-05

## 2019-11-06 NOTE — TELEPHONE ENCOUNTER
Patient called back again today for BW results, Called 4 different numbers no one was available  Patients daughter was upset  That she has to play phone tag

## 2019-11-15 ENCOUNTER — TELEPHONE (OUTPATIENT)
Dept: FAMILY MEDICINE CLINIC | Facility: CLINIC | Age: 79
End: 2019-11-15

## 2019-11-15 NOTE — TELEPHONE ENCOUNTER
ALDAIR FROM [facility] CALLED TO ADVISE THAT PATIENT HAS BEEN GETTING SHORT OF BREATH AND VERY WINDED EASILY  BLODD PRESSURE TAKEN 3 TIMES WITH AVERAGE 160/80 WITH HEART RATE RANGING 115  RESTING  PATIENT STATES SHE FEELS OK PHYSICAL THERAPIST HAS PATIENT SITTING AND STAYING HYDRATED    Marcel 71!

## 2019-11-15 NOTE — TELEPHONE ENCOUNTER
Can we contact pts family and triage to see if pt needs to be seen in ER, office or urgent care for eval?

## 2019-11-15 NOTE — TELEPHONE ENCOUNTER
I spoke with Katrin GARZON - she gave me pt's home # of [phone]  DURAN thinks pt's meds may need to be adjusted  I suggested ER and also that we are in office tomorrow morning for acute visits  She states pt denies any discomfort and symptoms described are not unusual for pt  I will call pt and see if she would like to schedule appt

## 2019-12-04 ENCOUNTER — TELEPHONE (OUTPATIENT)
Dept: NEPHROLOGY | Facility: CLINIC | Age: 79
End: 2019-12-04

## 2019-12-04 ENCOUNTER — OFFICE VISIT (OUTPATIENT)
Dept: NEPHROLOGY | Facility: CLINIC | Age: 79
End: 2019-12-04
Payer: COMMERCIAL

## 2019-12-04 ENCOUNTER — TELEPHONE (OUTPATIENT)
Dept: FAMILY MEDICINE CLINIC | Facility: CLINIC | Age: 79
End: 2019-12-04

## 2019-12-04 VITALS
HEART RATE: 112 BPM | DIASTOLIC BLOOD PRESSURE: 56 MMHG | WEIGHT: 163 LBS | HEIGHT: 64 IN | SYSTOLIC BLOOD PRESSURE: 176 MMHG | BODY MASS INDEX: 27.83 KG/M2

## 2019-12-04 DIAGNOSIS — E87.1 CHRONIC HYPONATREMIA: Primary | Chronic | ICD-10-CM

## 2019-12-04 DIAGNOSIS — I10 HYPERTENSION, UNSPECIFIED TYPE: ICD-10-CM

## 2019-12-04 DIAGNOSIS — I10 ESSENTIAL HYPERTENSION: ICD-10-CM

## 2019-12-04 DIAGNOSIS — N18.2 STAGE 2 CHRONIC KIDNEY DISEASE: ICD-10-CM

## 2019-12-04 PROCEDURE — 99214 OFFICE O/P EST MOD 30 MIN: CPT | Performed by: PHYSICIAN ASSISTANT

## 2019-12-04 RX ORDER — ATENOLOL 50 MG/1
50 TABLET ORAL DAILY
Qty: 30 TABLET | Refills: 5 | Status: SHIPPED | OUTPATIENT
Start: 2019-12-04 | End: 2020-06-09

## 2019-12-04 NOTE — PROGRESS NOTES
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  She takes salt tablets 1g three times a day and follows a fluid restriction of <1200ml/day  Usually her sodium ranges from 130 to 133  Last blood work was from October  Will send for more blood work this month and again prior to next visit with us      Hypertension- BP acceptable  She takes atenolol 25mg once daily at night and losartan 50mg twice daily  Blood pressure and pulse are elevated  Increase atenolol to 50mg daily at night  Please follow up with your family doctor for a blood pressure check      Chronic Kidney Disease stage II- Creatinine stable between 0 6-0 9  Minimal proteinuria      Follow up with Dr Geovany Flynn in 6 months  Please call the office with any questions or concerns  The patient declined coming back for a BP check sooner  Reason for Visit: Follow-up; Hyponatremia; and Hypertension    HPI: Jimmy Narayan is a 78 y o  female who is here for follow up of hyponatremia  She had surgery for hydrocephalus in early September  She is doing much better since then and walking with a walker now  She denies N/V/D  She denies SOB  The patient's daughter relates that she has been told by physical therapy her blood pressure has been elevated and her pulse has also been high  She even was not allowed to participate in PT due to her BP and HR a couple times      ROS: A complete review of systems was performed and was negative unless otherwise noted in the history of present illness  Allergies:   Patient has no known allergies  Medications:     Current Outpatient Medications:     aspirin 81 mg chewable tablet, CHEW 1 TABLET AND SWALLOW ORALLY DAILY (HYPERTENSION), Disp: 30 tablet, Rfl: 4    atenolol (TENORMIN) 50 mg tablet, Take 1 tablet (50 mg total) by mouth daily, Disp: 30 tablet, Rfl: 5    atorvastatin (LIPITOR) 40 mg tablet, TAKE ONE TABLET BY MOUTH EVERY DAY    OFFICE VISIT NEEDED (Patient taking differently: Take 40 mg by mouth daily at bedtime ), Disp: 90 tablet, Rfl: 3    D3-1000 1000 units tablet, TAKE 1 TABLET ORALLY DAILY (SUPPLEMENT) * (Patient taking differently: Morning), Disp: 30 tablet, Rfl: 4    escitalopram (LEXAPRO) 10 mg tablet, TAKE 1 TABLET ORALLY DAILY (DEPRESSION) (Patient taking differently: Take 5 mg by mouth daily Evening), Disp: 30 tablet, Rfl: 4    losartan (COZAAR) 50 mg tablet, TAKE 1 TABLET ORALLY TWICE DAILY (HYPERTENSION), Disp: 60 tablet, Rfl: 4    multivitamin (THERAGRAN) TABS, Take 1 tablet by mouth daily , Disp: , Rfl:     Omeprazole 20 MG TBEC, Take 1 tablet (20 mg total) by mouth daily in the early morning, Disp: 30 each, Rfl: 5    sodium chloride 1 g tablet, TAKE 1 TABLET ORALLY THREE TIMES DAILY (HYPONATREMIA), Disp: 90 tablet, Rfl: 4    acetaminophen (TYLENOL) 325 mg tablet, Take 2 tablets (650 mg total) by mouth every 6 (six) hours as needed for mild pain, moderate pain, headaches or fever, Disp: 30 tablet, Rfl: 0    Past Medical History:   Diagnosis Date    Anxiety     Arthritis     Confusion 10/2/2018    Depression     Displaced fracture of distal phalanx of right thumb     GERD (gastroesophageal reflux disease)     Heart attack (HCC)     Hyperlipidemia     Hypertension     Moderate episode of recurrent major depressive disorder (HCC) 4/12/2019    Shortness of breath     Stage 2 chronic kidney disease 7/17/2019     Past Surgical History:   Procedure Laterality Date    APPENDECTOMY      CARPAL TUNNEL RELEASE      CATARACT EXTRACTION Bilateral     COLONOSCOPY      FL LUMBAR PUNCTURE  1/10/2019    FRACTURE SURGERY      PA CREATE SHUNT:VENTRIC-PERITONEAL Right 9/3/2019    Procedure: Insertion of frontal ventriculoperitoneal shunt;  Surgeon: Stanley Haque MD;  [facility]: [facility];  Service: Neurosurgery    SEPTOPLASTY      WRIST FRACTURE SURGERY Right      Family History   Problem Relation Age of Onset    Heart attack Mother 39    Heart attack Father 61    No Known Problems Other     Breast cancer Sister     Alcohol abuse Daughter         history of    Heart attack Brother       reports that she quit smoking about 23 years ago  Her smoking use included cigarettes  She smoked 0 00 packs per day for 0 00 years  She has never used smokeless tobacco  She reports that she drank alcohol  She reports that she does not use drugs  Physical Exam:   Vitals:    12/04/19 1405 12/04/19 1428   BP:  (!) 172/60   BP Location:  Right arm   Patient Position:  Sitting   Cuff Size:  Standard   Pulse:  (!) 112   Weight: 73 9 kg (163 lb)    Height: 5' 4" (1 626 m)      Body mass index is 27 98 kg/m²  General: NAD  Neuro: AAO  Skin: no rash  Eyes: anicteric  ENMT: mm moist  Neck: no masses  Cardiovascular: RRR  Extremities: no edema  Respiratory: CTAB  Gastrointestinal: soft nt nd      Procedure:  No results found for this or any previous visit  Labs reviewed      Lab Results   Component Value Date    CALCIUM 9 7 10/29/2019     (L) 12/01/2017    K 4 6 10/29/2019    CO2 25 10/29/2019    CL 97 (L) 10/29/2019    BUN 11 10/29/2019    CREATININE 0 69 10/29/2019

## 2019-12-04 NOTE — TELEPHONE ENCOUNTER
I called and left message for patient to move appointment to 2p today w/ Sarah instead of 430p  Fidelia Banerjeekatia will be rounding at [facility] in afternoon and will not be in the office that late in the day

## 2019-12-04 NOTE — PATIENT INSTRUCTIONS
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  She takes salt tablets 1g three times a day and follows a fluid restriction of <1200ml/day  Usually her sodium ranges from 130 to 133  Last blood work was from October  Will send for more blood work this month and again prior to next visit with us      Hypertension- BP acceptable  She takes atenolol 25mg once daily at night and losartan 50mg twice daily  Blood pressure and pulse are elevated  Increase atenolol to 50mg daily at night  Please follow up with your family doctor for a blood pressure check      Chronic Kidney Disease stage II- Creatinine stable between 0 6-0 9  Minimal proteinuria      Follow up with Dr Alon Hutchins in 6 months  Please call the office with any questions or concerns

## 2019-12-04 NOTE — TELEPHONE ENCOUNTER
PT DAUGHTER CALLED SHE WANTED TO LET SHARRI GUSMAN KNOW THAT THEY INCREASED PT MEDICATION ATENOLOL BACK UP TO 50 MG    Marcel Wiggins

## 2019-12-23 DIAGNOSIS — F41.9 ANXIETY: ICD-10-CM

## 2019-12-23 LAB
BUN SERPL-MCNC: 11 MG/DL (ref 7–25)
BUN/CREAT SERPL: ABNORMAL (CALC) (ref 6–22)
CALCIUM SERPL-MCNC: 9.6 MG/DL (ref 8.6–10.4)
CHLORIDE SERPL-SCNC: 96 MMOL/L (ref 98–110)
CO2 SERPL-SCNC: 26 MMOL/L (ref 20–32)
CREAT SERPL-MCNC: 0.7 MG/DL (ref 0.6–0.93)
GLUCOSE SERPL-MCNC: 89 MG/DL (ref 65–99)
POTASSIUM SERPL-SCNC: 4.4 MMOL/L (ref 3.5–5.3)
SL AMB EGFR AFRICAN AMERICAN: 96 ML/MIN/1.73M2
SL AMB EGFR NON AFRICAN AMERICAN: 82 ML/MIN/1.73M2
SODIUM SERPL-SCNC: 133 MMOL/L (ref 135–146)

## 2019-12-23 RX ORDER — ESCITALOPRAM OXALATE 5 MG/1
5 TABLET ORAL DAILY
Qty: 30 TABLET | Refills: 5 | Status: SHIPPED | OUTPATIENT
Start: 2019-12-23 | End: 2020-06-09

## 2019-12-23 NOTE — TELEPHONE ENCOUNTER
According to notes from Kb on 10/12/2019  Patient is to be on Lexapro 10 mg, 1/2 tablet daily    She was to be re-evaluated 6 months after that date

## 2019-12-23 NOTE — TELEPHONE ENCOUNTER
PT DAUGHTER CALLED IN BECAUSE PT WAS BEING GRADUALLY TAKEN OFF LEZAPRO AND MEDS FINISHED   DAUGHTER WANTED TO KNOW IF SHE WAS DONE WITH THAT MEDICATION OR IF PT NEEDS A REFILL

## 2019-12-30 ENCOUNTER — TELEPHONE (OUTPATIENT)
Dept: NEPHROLOGY | Facility: CLINIC | Age: 79
End: 2019-12-30

## 2019-12-30 NOTE — TELEPHONE ENCOUNTER
Left two messages for daughter to call back with BP readings  No response to date       ----- Message from [place], [place] sent at 12/27/2019 12:30 PM EST -----  Please let patient know sodium level is stable  Please ask if her blood pressure is better with increase in atenolol  Thanks

## 2020-01-26 DIAGNOSIS — E78.5 HYPERLIPIDEMIA, UNSPECIFIED HYPERLIPIDEMIA TYPE: ICD-10-CM

## 2020-01-27 RX ORDER — ATORVASTATIN CALCIUM 40 MG/1
TABLET, FILM COATED ORAL
Qty: 90 TABLET | Refills: 0 | Status: SHIPPED | OUTPATIENT
Start: 2020-01-27 | End: 2020-04-10

## 2020-02-03 DIAGNOSIS — I10 ESSENTIAL HYPERTENSION: ICD-10-CM

## 2020-02-03 RX ORDER — LOSARTAN POTASSIUM 50 MG/1
TABLET ORAL
Qty: 60 TABLET | Refills: 0 | Status: SHIPPED | OUTPATIENT
Start: 2020-02-03 | End: 2020-03-09

## 2020-02-19 ENCOUNTER — TELEPHONE (OUTPATIENT)
Dept: NEUROSURGERY | Facility: CLINIC | Age: 80
End: 2020-02-19

## 2020-02-19 NOTE — TELEPHONE ENCOUNTER
----- Message from Fatuma Khan RN sent at 10/4/2019 11:26 AM EDT -----  Regarding: [facility]-5 month f/u (due in March 2020 )  Call patient to schedule 5 month f/u through [facility]  Due in March 2020

## 2020-02-19 NOTE — TELEPHONE ENCOUNTER
LMOM for daughter Stephanie [place] to call back at her convenience to schedule 5 month follow up through [facility]

## 2020-02-24 DIAGNOSIS — E22.2 SIADH (SYNDROME OF INAPPROPRIATE ADH PRODUCTION) (HCC): ICD-10-CM

## 2020-02-24 DIAGNOSIS — E87.1 HYPONATREMIA: ICD-10-CM

## 2020-02-24 RX ORDER — SODIUM CHLORIDE 1000 MG
TABLET, SOLUBLE MISCELLANEOUS
Qty: 90 TABLET | Refills: 4 | Status: SHIPPED | OUTPATIENT
Start: 2020-02-24 | End: 2020-02-25

## 2020-02-25 DIAGNOSIS — E22.2 SIADH (SYNDROME OF INAPPROPRIATE ADH PRODUCTION) (HCC): ICD-10-CM

## 2020-02-25 DIAGNOSIS — E87.1 HYPONATREMIA: ICD-10-CM

## 2020-02-25 RX ORDER — SODIUM CHLORIDE 1000 MG
TABLET, SOLUBLE MISCELLANEOUS
Qty: 90 TABLET | Refills: 4 | Status: SHIPPED | OUTPATIENT
Start: 2020-02-25 | End: 2020-02-26 | Stop reason: SDUPTHER

## 2020-02-26 DIAGNOSIS — E22.2 SIADH (SYNDROME OF INAPPROPRIATE ADH PRODUCTION) (HCC): ICD-10-CM

## 2020-02-26 DIAGNOSIS — E87.1 HYPONATREMIA: ICD-10-CM

## 2020-02-26 RX ORDER — SODIUM CHLORIDE 1000 MG
TABLET, SOLUBLE MISCELLANEOUS
Qty: 90 TABLET | Refills: 4 | Status: SHIPPED | OUTPATIENT
Start: 2020-02-26 | End: 2020-11-12

## 2020-02-26 NOTE — TELEPHONE ENCOUNTER
Daughter Mauricio Dunham requesting call back to schedule appt for her mom: [phone]     Attempted to call her back to schedule for March with no answer  LMOM for call back at her convenience

## 2020-02-28 NOTE — TELEPHONE ENCOUNTER
Received VM call back from daughter who informed me that she is is working today from 6am-2pm but that she will reach out to me later today to schedule appt  Will await call

## 2020-03-08 DIAGNOSIS — I10 ESSENTIAL HYPERTENSION: ICD-10-CM

## 2020-03-09 RX ORDER — LOSARTAN POTASSIUM 50 MG/1
TABLET ORAL
Qty: 60 TABLET | Refills: 0 | Status: SHIPPED | OUTPATIENT
Start: 2020-03-09 | End: 2020-04-05

## 2020-03-09 NOTE — TELEPHONE ENCOUNTER
Spoke with daughter and scheduled the following appts:    Tuesday, March 31st(SLT):    PT @ 10am-confirmed she is NOT receiving PT anywhere else currently  Script in chart  Appt with DR @ 11am    Daughter is aware of date/time/location for appt and was appreciative for the coordination

## 2020-03-26 ENCOUNTER — TELEPHONE (OUTPATIENT)
Dept: NEUROSURGERY | Facility: CLINIC | Age: 80
End: 2020-03-26

## 2020-03-26 NOTE — TELEPHONE ENCOUNTER
Spoke with daughter and rescheduled [facility] appt for April due to covid risk and patient's age  Not a candidate for virtual visit because we need PT and MOCA exam   Patient is stable per daughter    I called PT and reschedule appt as well for April 21st

## 2020-04-05 DIAGNOSIS — I10 ESSENTIAL HYPERTENSION: ICD-10-CM

## 2020-04-05 RX ORDER — LOSARTAN POTASSIUM 50 MG/1
TABLET ORAL
Qty: 60 TABLET | Refills: 0 | Status: SHIPPED | OUTPATIENT
Start: 2020-04-05 | End: 2020-04-11

## 2020-04-10 DIAGNOSIS — I10 ESSENTIAL HYPERTENSION: ICD-10-CM

## 2020-04-10 DIAGNOSIS — E78.5 HYPERLIPIDEMIA, UNSPECIFIED HYPERLIPIDEMIA TYPE: ICD-10-CM

## 2020-04-10 RX ORDER — ATORVASTATIN CALCIUM 40 MG/1
TABLET, FILM COATED ORAL
Qty: 90 TABLET | Refills: 0 | Status: SHIPPED | OUTPATIENT
Start: 2020-04-10 | End: 2020-07-13

## 2020-04-11 RX ORDER — LOSARTAN POTASSIUM 50 MG/1
TABLET ORAL
Qty: 60 TABLET | Refills: 0 | Status: SHIPPED | OUTPATIENT
Start: 2020-04-11 | End: 2020-05-13

## 2020-04-14 DIAGNOSIS — K21.9 GERD WITHOUT ESOPHAGITIS: ICD-10-CM

## 2020-04-14 RX ORDER — OMEPRAZOLE 20 MG/1
CAPSULE, DELAYED RELEASE ORAL
Qty: 30 CAPSULE | Refills: 5 | Status: SHIPPED | OUTPATIENT
Start: 2020-04-14 | End: 2020-05-11

## 2020-04-19 ENCOUNTER — TELEPHONE (OUTPATIENT)
Dept: OTHER | Facility: OTHER | Age: 80
End: 2020-04-19

## 2020-04-23 ENCOUNTER — TRANSCRIBE ORDERS (OUTPATIENT)
Dept: NEUROSURGERY | Facility: CLINIC | Age: 80
End: 2020-04-23

## 2020-04-23 DIAGNOSIS — R26.9 GAIT DISTURBANCE: Primary | ICD-10-CM

## 2020-04-24 ENCOUNTER — TELEPHONE (OUTPATIENT)
Dept: NEUROSURGERY | Facility: CLINIC | Age: 80
End: 2020-04-24

## 2020-05-10 DIAGNOSIS — K21.9 GERD WITHOUT ESOPHAGITIS: ICD-10-CM

## 2020-05-11 RX ORDER — OMEPRAZOLE 20 MG/1
CAPSULE, DELAYED RELEASE ORAL
Qty: 30 CAPSULE | Refills: 5 | Status: SHIPPED | OUTPATIENT
Start: 2020-05-11 | End: 2020-11-07

## 2020-05-13 DIAGNOSIS — I10 ESSENTIAL HYPERTENSION: ICD-10-CM

## 2020-05-13 RX ORDER — LOSARTAN POTASSIUM 50 MG/1
TABLET ORAL
Qty: 60 TABLET | Refills: 0 | Status: SHIPPED | OUTPATIENT
Start: 2020-05-13 | End: 2020-06-09

## 2020-05-22 ENCOUNTER — TELEPHONE (OUTPATIENT)
Dept: NEUROSURGERY | Facility: CLINIC | Age: 80
End: 2020-05-22

## 2020-06-08 ENCOUNTER — TELEPHONE (OUTPATIENT)
Dept: NEUROSURGERY | Facility: CLINIC | Age: 80
End: 2020-06-08

## 2020-06-09 ENCOUNTER — HOSPITAL ENCOUNTER (OUTPATIENT)
Dept: RADIOLOGY | Facility: HOSPITAL | Age: 80
Discharge: HOME | End: 2020-06-09
Attending: NEUROLOGICAL SURGERY
Payer: COMMERCIAL

## 2020-06-09 ENCOUNTER — OFFICE VISIT (OUTPATIENT)
Dept: PHYSICAL THERAPY | Facility: CLINIC | Age: 80
End: 2020-06-09
Payer: COMMERCIAL

## 2020-06-09 ENCOUNTER — TRANSCRIBE ORDERS (OUTPATIENT)
Dept: NEUROSURGERY | Facility: CLINIC | Age: 80
End: 2020-06-09

## 2020-06-09 ENCOUNTER — OFFICE VISIT (OUTPATIENT)
Dept: NEUROSURGERY | Facility: CLINIC | Age: 80
End: 2020-06-09
Payer: COMMERCIAL

## 2020-06-09 VITALS
BODY MASS INDEX: 27.66 KG/M2 | DIASTOLIC BLOOD PRESSURE: 78 MMHG | WEIGHT: 162 LBS | RESPIRATION RATE: 16 BRPM | HEIGHT: 64 IN | SYSTOLIC BLOOD PRESSURE: 142 MMHG | TEMPERATURE: 98.7 F

## 2020-06-09 DIAGNOSIS — F41.9 ANXIETY: ICD-10-CM

## 2020-06-09 DIAGNOSIS — R26.9 ABNORMALITY OF GAIT: Primary | ICD-10-CM

## 2020-06-09 DIAGNOSIS — G91.2 NORMAL PRESSURE HYDROCEPHALUS (HCC): Primary | ICD-10-CM

## 2020-06-09 DIAGNOSIS — Z45.41 CEREBRAL VENTRICULAR SHUNT FITTING OR ADJUSTMENT: Primary | ICD-10-CM

## 2020-06-09 DIAGNOSIS — Z98.2 S/P VP SHUNT: ICD-10-CM

## 2020-06-09 DIAGNOSIS — I10 HYPERTENSION, UNSPECIFIED TYPE: ICD-10-CM

## 2020-06-09 DIAGNOSIS — Z45.41 CEREBRAL VENTRICULAR SHUNT FITTING OR ADJUSTMENT: ICD-10-CM

## 2020-06-09 DIAGNOSIS — I10 ESSENTIAL HYPERTENSION: ICD-10-CM

## 2020-06-09 PROCEDURE — 3008F BODY MASS INDEX DOCD: CPT | Performed by: NEUROLOGICAL SURGERY

## 2020-06-09 PROCEDURE — 3077F SYST BP >= 140 MM HG: CPT | Performed by: NEUROLOGICAL SURGERY

## 2020-06-09 PROCEDURE — 1036F TOBACCO NON-USER: CPT | Performed by: NEUROLOGICAL SURGERY

## 2020-06-09 PROCEDURE — 70250 X-RAY EXAM OF SKULL: CPT

## 2020-06-09 PROCEDURE — 4040F PNEUMOC VAC/ADMIN/RCVD: CPT | Performed by: NEUROLOGICAL SURGERY

## 2020-06-09 PROCEDURE — 62252 CSF SHUNT REPROGRAM: CPT | Performed by: NEUROLOGICAL SURGERY

## 2020-06-09 PROCEDURE — 3078F DIAST BP <80 MM HG: CPT | Performed by: NEUROLOGICAL SURGERY

## 2020-06-09 PROCEDURE — 1160F RVW MEDS BY RX/DR IN RCRD: CPT | Performed by: NEUROLOGICAL SURGERY

## 2020-06-09 PROCEDURE — 99215 OFFICE O/P EST HI 40 MIN: CPT | Performed by: NEUROLOGICAL SURGERY

## 2020-06-09 PROCEDURE — 97161 PT EVAL LOW COMPLEX 20 MIN: CPT | Performed by: PHYSICAL THERAPIST

## 2020-06-09 RX ORDER — ESCITALOPRAM OXALATE 5 MG/1
TABLET ORAL
Qty: 30 TABLET | Refills: 5 | Status: SHIPPED | OUTPATIENT
Start: 2020-06-09 | End: 2020-12-07

## 2020-06-09 RX ORDER — LOSARTAN POTASSIUM 50 MG/1
TABLET ORAL
Qty: 60 TABLET | Refills: 0 | Status: SHIPPED | OUTPATIENT
Start: 2020-06-09 | End: 2020-07-12

## 2020-06-09 RX ORDER — ATENOLOL 50 MG/1
TABLET ORAL
Qty: 30 TABLET | Refills: 5 | Status: SHIPPED | OUTPATIENT
Start: 2020-06-09 | End: 2020-12-08

## 2020-06-09 NOTE — TELEPHONE ENCOUNTER
PLEASE CALL FAMILY AND HAVE THEM MAKE AN APT FOR HER TO COME IN FOR REVIEW OF HER CHRONIC ILLNESSES  TWO SLOTS PLEASE

## 2020-06-25 ENCOUNTER — TELEPHONE (OUTPATIENT)
Dept: NEUROSURGERY | Facility: CLINIC | Age: 80
End: 2020-06-25

## 2020-06-25 NOTE — TELEPHONE ENCOUNTER
Attempted to contact patient's daughter for her 1 month f/u w/ CT head through [facility] due July 2020 with no answer  LMOM for call back at her convenience

## 2020-06-30 NOTE — TELEPHONE ENCOUNTER
LM x2 with Gale Riedel to schedule Rocael Alfaro for her 1 month f/u with CT head  Awaiting call back

## 2020-07-02 ENCOUNTER — OFFICE VISIT (OUTPATIENT)
Dept: FAMILY MEDICINE CLINIC | Facility: CLINIC | Age: 80
End: 2020-07-02
Payer: COMMERCIAL

## 2020-07-02 VITALS
BODY MASS INDEX: 27.31 KG/M2 | DIASTOLIC BLOOD PRESSURE: 58 MMHG | WEIGHT: 160 LBS | HEART RATE: 80 BPM | SYSTOLIC BLOOD PRESSURE: 130 MMHG | HEIGHT: 64 IN | TEMPERATURE: 98.4 F

## 2020-07-02 DIAGNOSIS — I10 ESSENTIAL HYPERTENSION: Primary | ICD-10-CM

## 2020-07-02 DIAGNOSIS — K21.9 GERD WITHOUT ESOPHAGITIS: ICD-10-CM

## 2020-07-02 DIAGNOSIS — G91.2 NORMAL PRESSURE HYDROCEPHALUS (HCC): ICD-10-CM

## 2020-07-02 DIAGNOSIS — R26.2 AMBULATORY DYSFUNCTION: ICD-10-CM

## 2020-07-02 DIAGNOSIS — R41.0 CONFUSION AND DISORIENTATION: ICD-10-CM

## 2020-07-02 DIAGNOSIS — E87.1 CHRONIC HYPONATREMIA: Chronic | ICD-10-CM

## 2020-07-02 DIAGNOSIS — N18.2 STAGE 2 CHRONIC KIDNEY DISEASE: ICD-10-CM

## 2020-07-02 DIAGNOSIS — Z98.2 S/P VP SHUNT: ICD-10-CM

## 2020-07-02 DIAGNOSIS — F32.A DEPRESSION, UNSPECIFIED DEPRESSION TYPE: ICD-10-CM

## 2020-07-02 DIAGNOSIS — E55.9 VITAMIN D DEFICIENCY: ICD-10-CM

## 2020-07-02 DIAGNOSIS — Z00.00 MEDICARE ANNUAL WELLNESS VISIT, SUBSEQUENT: ICD-10-CM

## 2020-07-02 DIAGNOSIS — E78.5 HYPERLIPIDEMIA, UNSPECIFIED HYPERLIPIDEMIA TYPE: ICD-10-CM

## 2020-07-02 DIAGNOSIS — D75.839 THROMBOCYTOSIS: ICD-10-CM

## 2020-07-02 DIAGNOSIS — R29.6 RECURRENT FALLS: ICD-10-CM

## 2020-07-02 DIAGNOSIS — M85.89 OSTEOPENIA OF MULTIPLE SITES: ICD-10-CM

## 2020-07-02 LAB
SL AMB  POCT GLUCOSE, UA: NEGATIVE
SL AMB LEUKOCYTE ESTERASE,UA: ABNORMAL
SL AMB POCT BILIRUBIN,UA: NEGATIVE
SL AMB POCT BLOOD,UA: NEGATIVE
SL AMB POCT CLARITY,UA: CLEAR
SL AMB POCT COLOR,UA: YELLOW
SL AMB POCT KETONES,UA: NEGATIVE
SL AMB POCT NITRITE,UA: NEGATIVE
SL AMB POCT PH,UA: 7.5
SL AMB POCT SPECIFIC GRAVITY,UA: 1.01
SL AMB POCT URINE PROTEIN: NEGATIVE
SL AMB POCT UROBILINOGEN: 0.2

## 2020-07-02 PROCEDURE — 99214 OFFICE O/P EST MOD 30 MIN: CPT | Performed by: PHYSICIAN ASSISTANT

## 2020-07-02 PROCEDURE — 1160F RVW MEDS BY RX/DR IN RCRD: CPT | Performed by: PHYSICIAN ASSISTANT

## 2020-07-02 PROCEDURE — 81002 URINALYSIS NONAUTO W/O SCOPE: CPT | Performed by: PHYSICIAN ASSISTANT

## 2020-07-02 PROCEDURE — 1170F FXNL STATUS ASSESSED: CPT | Performed by: PHYSICIAN ASSISTANT

## 2020-07-02 PROCEDURE — 4040F PNEUMOC VAC/ADMIN/RCVD: CPT | Performed by: PHYSICIAN ASSISTANT

## 2020-07-02 PROCEDURE — 87077 CULTURE AEROBIC IDENTIFY: CPT | Performed by: PHYSICIAN ASSISTANT

## 2020-07-02 PROCEDURE — 1036F TOBACCO NON-USER: CPT | Performed by: PHYSICIAN ASSISTANT

## 2020-07-02 PROCEDURE — 87086 URINE CULTURE/COLONY COUNT: CPT | Performed by: PHYSICIAN ASSISTANT

## 2020-07-02 PROCEDURE — 3078F DIAST BP <80 MM HG: CPT | Performed by: PHYSICIAN ASSISTANT

## 2020-07-02 PROCEDURE — 3008F BODY MASS INDEX DOCD: CPT | Performed by: PHYSICIAN ASSISTANT

## 2020-07-02 PROCEDURE — 3075F SYST BP GE 130 - 139MM HG: CPT | Performed by: PHYSICIAN ASSISTANT

## 2020-07-02 PROCEDURE — G0439 PPPS, SUBSEQ VISIT: HCPCS | Performed by: PHYSICIAN ASSISTANT

## 2020-07-02 PROCEDURE — 87186 SC STD MICRODIL/AGAR DIL: CPT | Performed by: PHYSICIAN ASSISTANT

## 2020-07-02 PROCEDURE — 1125F AMNT PAIN NOTED PAIN PRSNT: CPT | Performed by: PHYSICIAN ASSISTANT

## 2020-07-02 NOTE — PROGRESS NOTES
Assessment and Plan:     Problem List Items Addressed This Visit        Digestive    GERD without esophagitis     Stable continue PPI  Cardiovascular and Mediastinum    Essential hypertension - Primary     Stable continue current medication  Nervous and Auditory    Normal pressure hydrocephalus (HCC)     Continue to follow Neurosurgery  Given order to be done this month and has a follow-up  Relevant Orders    Ambulatory Referral to [address] [place] [address] Micah Franklin       Musculoskeletal and Integument    Osteopenia of multiple sites     DEXA last 2017  New order placed  Relevant Orders    DXA bone density spine hip and pelvis       Hematopoietic and Hemostatic    Thrombocytosis (HCC)     Check CBC  Relevant Orders    CBC and differential       Genitourinary    Stage 2 chronic kidney disease     Check CMP  Relevant Orders    Comprehensive metabolic panel       Other    Chronic hyponatremia (Chronic)     Check CMP  Ambulatory dysfunction     Daughter requested home health physical therapy  Order placed  Relevant Orders    Ambulatory Referral to Home Health    Hyperlipidemia     Check fasting lipid panel  Patient continues on Lipitor 40 mg once daily  Relevant Orders    Lipid Panel with Direct LDL reflex    TSH, 3rd generation with Free T4 reflex    Vitamin D deficiency     Check vitamin-D level  Relevant Orders    Vitamin D 25 hydroxy    Recurrent falls    Relevant Orders    Ambulatory Referral to [address] [place] [address] Micah Franklin    S/P  shunt    Relevant Orders    Ambulatory Referral to [address] [place] [address] Micah Franklin    Medicare annual wellness visit, subsequent     Fit test given  Five wishes blue Packet given today  Shingles vaccination recommended at her local pharmacy  Confusion and disorientation     This could be due to her normal pressure hydrocephalus status post shunt diagnosis however I am going to check a urine sample and culture    They have follow-up with neurosurgery soon and I would like them to see Geriatrics her further evaluation after their follow-up if it is deemed her memory in time orientation is not from this diagnosis  She also seems to have a diagnosis of depression as well and the to may be going together  Relevant Orders    Ambulatory referral to Geriatrics    Depression     Patient does not realize the fact that she is most likely depressed  I will keep her on the Lexapro 5 mg and I will actually refer her to Geriatrics as patient is also having issues with time confusion  This may be secondary to her normal pressure hydrocephalus but they should make an appointment with the geriatric specialist if neurology concludes it is not from this diagnosis  Relevant Orders    Ambulatory referral to Geriatrics           Preventive health issues were discussed with patient, and age appropriate screening tests were ordered as noted in patient's After Visit Summary  Personalized health advice and appropriate referrals for health education or preventive services given if needed, as noted in patient's After Visit Summary       History of Present Illness:     Patient presents for Medicare Annual Wellness visit    Patient Care Team:  Kavon Prajapati PA-C as PCP - General (Family Medicine)  Ladonna Hicks, PA-C Debbe Snare, PA-C Olive Canavan, MD (Neurosurgery)     Problem List:     Patient Active Problem List   Diagnosis    Ambulatory dysfunction    Anxiety    Essential hypertension    Carotid artery plaque    Elevated sed rate    GERD without esophagitis    Hyperlipidemia    Chronic hyponatremia    Levoscoliosis    Occlusion of celiac artery    Vitamin D deficiency    Radiculopathy    Asthma    Carotid bruit    Coronary atherosclerosis    Splenic infarction    Positive ROSALINDA (antinuclear antibody)    Thrombocytosis (HCC)    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HCC)    Normal pressure hydrocephalus ([address] )    Cataract, nuclear sclerotic senile, left    Stage 2 chronic kidney disease    Recurrent falls    Hyperkalemia    Leukocytosis    S/P  shunt    Medicare annual wellness visit, subsequent    Osteopenia of multiple sites    Confusion and disorientation    Depression      Past Medical and Surgical History:     Past Medical History:   Diagnosis Date    Anxiety     Arthritis     Confusion 10/2/2018    Depression     Displaced fracture of distal phalanx of right thumb     GERD (gastroesophageal reflux disease)     Heart attack [facility] )     Hyperlipidemia     Hypertension     Moderate episode of recurrent major depressive disorder ([facility] ) 4/12/2019    Shortness of breath     Stage 2 chronic kidney disease 7/17/2019     Past Surgical History:   Procedure Laterality Date    APPENDECTOMY      CARPAL TUNNEL RELEASE      CATARACT EXTRACTION Bilateral     COLONOSCOPY      FL LUMBAR PUNCTURE DIAGNOSTIC  1/10/2019    FRACTURE SURGERY      NE CREATE SHUNT:VENTRIC-PERITONEAL Right 9/3/2019    Procedure: Insertion of frontal ventriculoperitoneal shunt;  Surgeon: Alta Tinoco MD;  [facility];  Service: Neurosurgery    SEPTOPLASTY      WRIST FRACTURE SURGERY Right       Family History:     Family History   Problem Relation Age of Onset    Heart attack Mother 39    Heart attack Father 61    No Known Problems Other     Breast cancer Sister     Alcohol abuse Daughter         history of    Heart attack Brother       Social History:        Social History     Socioeconomic History    Marital status:       Spouse name: None    Number of children: None    Years of education: None    Highest education level: None   Occupational History    Occupation: Retired   Social Needs    Financial resource strain: None    Food insecurity:     Worry: None     Inability: None    Transportation needs:     Medical: None     Non-medical: None   Tobacco Use    Smoking status: Former Smoker     Packs/day: 0 00     Years: 0 00     Pack years: 0 00     Types: Cigarettes     Last attempt to quit:      Years since quittin 5    Smokeless tobacco: Never Used    Tobacco comment: no secondhand smoke exposure   Substance and Sexual Activity    Alcohol use: Not Currently     Frequency: Monthly or less     Drinks per session: 1 or 2     Binge frequency: Never    Drug use: No    Sexual activity: None   Lifestyle    Physical activity:     Days per week: None     Minutes per session: None    Stress: None   Relationships    Social connections:     Talks on phone: None     Gets together: None     Attends Shinto service: None     Active member of club or organization: None     Attends meetings of clubs or organizations: None     Relationship status: None    Intimate partner violence:     Fear of current or ex partner: None     Emotionally abused: None     Physically abused: None     Forced sexual activity: None   Other Topics Concern    None   Social History Narrative    Living alone      Medications and Allergies:     Current Outpatient Medications   Medication Sig Dispense Refill    acetaminophen (TYLENOL) 325 mg tablet Take 2 tablets (650 mg total) by mouth every 6 (six) hours as needed for mild pain, moderate pain, headaches or fever 30 tablet 0    aspirin 81 mg chewable tablet CHEW 1 TABLET AND SWALLOW ORALLY DAILY (HYPERTENSION) 30 tablet 4    atenolol (TENORMIN) 50 mg tablet TAKE ONE TABLET BY MOUTH ONCE DAILY 30 tablet 5    atorvastatin (LIPITOR) 40 mg tablet TAKE ONE TABLET BY MOUTH ONCE DAILY 90 tablet 0    D3-1000 1000 units tablet TAKE 1 TABLET ORALLY DAILY (SUPPLEMENT) * (Patient taking differently: Morning) 30 tablet 4    escitalopram (LEXAPRO) 5 mg tablet TAKE ONE TABLET BY MOUTH DAILY   DEPRESSION 30 tablet 5    losartan (COZAAR) 50 mg tablet TAKE ONE TABLET BY MOUTH TWICE A DAY 60 tablet 0    multivitamin (THERAGRAN) TABS Take 1 tablet by mouth daily       omeprazole (PriLOSEC) 20 mg delayed release capsule TAKE 1 CAPSULE BY MOUTH DAILY IN THE MORNING 30 capsule 5    sodium chloride 1 g tablet TAKE ONE TABLET BY MOUTH THREE TIMES A DAY 90 tablet 4     No current facility-administered medications for this visit  No Known Allergies   Immunizations:     Immunization History   Administered Date(s) Administered     Influenza (IM) Preservative Free 10/02/2018    INFLUENZA 12/19/2005, 11/16/2006, 10/09/2007, 10/09/2008, 11/03/2009, 10/21/2010, 11/04/2011, 09/20/2012, 09/13/2013, 10/07/2015, 10/25/2016, 10/12/2017, 10/02/2018    Influenza Split High Dose Preservative Free IM 10/07/2015, 10/25/2016, 10/12/2017    Influenza TIV (IM) 12/12/2014, 10/02/2017    Influenza, high dose seasonal 0 5 mL 10/21/2019    Pneumococcal Conjugate 13-Valent 10/16/2017    Pneumococcal Polysaccharide PPV23 11/03/2005, 08/19/2019    TD (adult) Preservative Free 05/20/1995    Td (adult), Unspecified 05/20/1995    Td (adult), adsorbed 05/20/1995      Health Maintenance:         Topic Date Due    CRC Screening: Colonoscopy  1940    Hepatitis C Screening  Completed         Topic Date Due    Influenza Vaccine  07/01/2020      Medicare Health Risk Assessment:     /58 (BP Location: Left arm, Patient Position: Sitting, Cuff Size: Large)   Pulse 80   Temp 98 4 °F (36 9 °C) (Temporal)   Ht 5' 4" (1 626 m)   Wt 72 6 kg (160 lb)   BMI 27 46 kg/m²      Heahter Mckeon is here for her Subsequent Wellness visit  Last Medicare Wellness visit information reviewed, patient interviewed, no change since last AWV  Health Risk Assessment:   Patient rates overall health as good  Patient feels that their physical health rating is same  Eyesight was rated as same  Hearing was rated as same  Patient feels that their emotional and mental health rating is same  Pain experienced in the last 7 days has been none  Patient states that she has experienced no weight loss or gain in last 6 months       Depression Screening:   PHQ-2 Score: 3  PHQ-9 Score: 9      Fall Risk Screening: In the past year, patient has experienced: history of falling in past year    Number of falls: 2 or more  Injured during fall?: No    Feels unsteady when standing or walking?: Yes    Worried about falling?: Yes      Urinary Incontinence Screening:   Patient has leaked urine accidently in the last six months  Wears Depends - pt's daughter notes she has been going through a lot of them  Home Safety:  Patient has trouble with stairs inside or outside of their home  Patient has working smoke alarms and has working carbon monoxide detector  Home safety hazards include: none  Nutrition:   Current diet is Regular  Pt uses Ensure and drinks a lot of water  Medications:   Patient is currently taking over-the-counter supplements  OTC medications include: see medication list  Patient is not able to manage medications  Pt's daughter takes care of medications  Activities of Daily Living (ADLs)/Instrumental Activities of Daily Living (IADLs):   Walk and transfer into and out of bed and chair?: Yes  Dress and groom yourself?: Yes    Bathe or shower yourself?: Yes    Feed yourself?  Yes  Do your laundry/housekeeping?: No  Manage your money, pay your bills and track your expenses?: No  Make your own meals?: No    Do your own shopping?: No    ADL comments: Pt's son and daughter assist with ADLs    Previous Hospitalizations:   Any hospitalizations or ED visits within the last 12 months?: No      Advance Care Planning:     Five wishes given: Yes      Cognitive Screening:   Provider or family/friend/caregiver concerned regarding cognition?: Yes    PREVENTIVE SCREENINGS      Cardiovascular Screening:    General: Screening Not Indicated and History Lipid Disorder      Diabetes Screening:     General: Screening Current      Cervical Cancer Screening:    General: Screening Not Indicated      Osteoporosis Screening:      Due for: Bone Density Ultrasound      Abdominal Aortic Aneurysm (AAA) Screening:        General: Screening Not Indicated      Lung Cancer Screening:     General: Screening Not Indicated      Hepatitis C Screening:    General: Screening Current      Shira Gutierrez PA-C

## 2020-07-02 NOTE — PROGRESS NOTES
Assessment and Plan:    Problem List Items Addressed This Visit        Digestive    GERD without esophagitis     Stable continue PPI  Cardiovascular and Mediastinum    Essential hypertension - Primary     Stable continue current medication  Nervous and Auditory    Normal pressure hydrocephalus (HCC)     Continue to follow Neurosurgery  Given order to be done this month and has a follow-up  Relevant Orders    Ambulatory Referral to [address] [place] [address] Micah Franklin       Musculoskeletal and Integument    Osteopenia of multiple sites     DEXA last 2017  New order placed  Relevant Orders    DXA bone density spine hip and pelvis       Hematopoietic and Hemostatic    Thrombocytosis (HCC)     Check CBC  Relevant Orders    CBC and differential       Genitourinary    Stage 2 chronic kidney disease     Check CMP  Relevant Orders    Comprehensive metabolic panel       Other    Chronic hyponatremia (Chronic)     Check CMP  Ambulatory dysfunction     Daughter requested home health physical therapy  Order placed  Relevant Orders    Ambulatory Referral to Home Health    Hyperlipidemia     Check fasting lipid panel  Patient continues on Lipitor 40 mg once daily  Relevant Orders    Lipid Panel with Direct LDL reflex    TSH, 3rd generation with Free T4 reflex    Vitamin D deficiency     Check vitamin-D level  Relevant Orders    Vitamin D 25 hydroxy    Recurrent falls    Relevant Orders    Ambulatory Referral to [address] [place] [address] Micah Franklin    S/P  shunt    Relevant Orders    Ambulatory Referral to [address] [place] [address] Micah Franklin    Medicare annual wellness visit, subsequent     Fit test given  Five wishes blue Packet given today  Shingles vaccination recommended at her local pharmacy  Confusion and disorientation     This could be due to her normal pressure hydrocephalus status post shunt diagnosis however I am going to check a urine sample and culture    They have follow-up with neurosurgery soon and I would like them to see Geriatrics her further evaluation after their follow-up if it is deemed her memory in time orientation is not from this diagnosis  She also seems to have a diagnosis of depression as well and the to may be going together  Relevant Orders    Ambulatory referral to Geriatrics    Depression     Patient does not realize the fact that she is most likely depressed  I will keep her on the Lexapro 5 mg and I will actually refer her to Geriatrics as patient is also having issues with time confusion  This may be secondary to her normal pressure hydrocephalus but they should make an appointment with the geriatric specialist if neurology concludes it is not from this diagnosis  Relevant Orders    Ambulatory referral to Geriatrics                 Diagnoses and all orders for this visit:    Essential hypertension    Normal pressure hydrocephalus ([address] )  -     Ambulatory Referral to [facility]; Future    Thrombocytosis (HCC)  -     CBC and differential    Stage 2 chronic kidney disease  -     Comprehensive metabolic panel    Chronic hyponatremia    Hyperlipidemia, unspecified hyperlipidemia type  -     Lipid Panel with Direct LDL reflex  -     TSH, 3rd generation with Free T4 reflex    Vitamin D deficiency  -     Vitamin D 25 hydroxy    GERD without esophagitis    Medicare annual wellness visit, subsequent    Ambulatory dysfunction  -     Ambulatory Referral to [facility]; Future    Recurrent falls  -     Ambulatory Referral to Home Health; Future    S/P  shunt  -     Ambulatory Referral to Home Health; Future    Osteopenia of multiple sites  -     DXA bone density spine hip and pelvis; Future    Confusion and disorientation  -     Ambulatory referral to Geriatrics; Future    Depression, unspecified depression type  -     Ambulatory referral to Geriatrics; Future              Subjective:      Patient ID: Med Sanders is a 78 y o  female      CC:    Chief Complaint Patient presents with    Anxiety    Hyperlipidemia    Hypertension    Confusion     Pt's daughter notes pt is having difficulty keeping track of time - notes she will sometimes call in the middle of the night thinking it is daytime  Pt's daughter also requests order for home physical therapy  Desi Adams Medicare Wellness Visit       HPI:    Patient here today accompanied by her daughter for a six-month follow-up as we had not seen her since October shortly after her surgery  She also has a cell phone that she is on during the visit with her other daughter as well  She is being treated for GERD chronic kidney disease low sodium anxiety hyperlipidemia low vitamin-D and normal pressure hydrocephalus  She does see Neurosurgery this month after CT scan  Both daughters are stating that her mom is exhibiting signs of depression not wanted to do absolutely anything except for watch TV she does not want to leave the house go anywhere  Patient has been sitting at home on the couch since being in her own home again after her surgery last fall for her normal pressure hydrocephalus shunt placement  She has it declined in strength and memory and used to be able to go to the store and walk around with her walker Rollator for at least 20-30 minutes and now she has sees a wheelchair  They do believe this is because she is doing nothing at all at home but sitting watching TV  She no longer wants to read due work work possible or gets together with any friends or family  She states that she does talk on the phone to Buddhist friends often  Lately in the past couple months she has been having issues with confusion of time such as calling her daughter at 2:00 a m  In the morning not realizing how it is not daytime but nighttime  Patient has been heard to say that she just wants to join her late     This is very hard for both her daughters because they lost their dad who wanted to live from cancer but cancer took him away and now with their mom she does not have any cancers death taking diagnosis yet she does not seem like she wants to live  Patient does live on her own and the daughters manage all of her medications and every single meal   The daughter states with her today that when she goes in brings her leftovers she puts them on a plate and her mom will eat what she puts on a plate but will not bother to go into the refrigerator to even open anything else that is in a container for any other meal so in order for her to eat the daughter has to come over put the medial on a plate for her  We did try to have a conversation today with the patient that this is slightly unfair to her children putting all the pressure on her to keep living on her children's since she is totally dependent on them  I explained to her that this probably makes him feel very sad and bound like they can even take time off for vacation because she would not do well without them  Patient did live at [facility] for few weeks after her surgery and this was wonderful and they believe that she would be better off in the situation living in this type of environment  The following portions of the patient's history were reviewed and updated as appropriate: allergies, current medications, past family history, past medical history, past social history, past surgical history and problem list       Review of Systems   Constitutional: Negative  HENT: Negative  Eyes: Negative  Respiratory: Negative  Cardiovascular: Negative  Gastrointestinal: Negative  Endocrine: Negative  Genitourinary: Negative  Musculoskeletal: Negative  Skin: Negative  Allergic/Immunologic: Negative  Neurological: Negative  Hematological: Negative  Psychiatric/Behavioral: Positive for confusion and dysphoric mood           Data to review:       Objective:    Vitals:    07/02/20 1120   BP: 130/58   BP Location: Left arm   Patient Position: Sitting   Cuff Size: Large   Pulse: 80   Temp: 98 4 °F (36 9 °C)   TempSrc: Temporal   Weight: 72 6 kg (160 lb)   Height: 5' 4" (1 626 m)        Physical Exam   Constitutional: She is oriented to person, place, and time  She appears well-developed and well-nourished  No distress  HENT:   Head: Normocephalic and atraumatic  Eyes: Conjunctivae are normal  Right eye exhibits no discharge  Left eye exhibits no discharge  Neck: Neck supple  Carotid bruit is not present  Cardiovascular: Normal rate, regular rhythm and normal heart sounds  Exam reveals no gallop and no friction rub  No murmur heard  Pulmonary/Chest: Effort normal and breath sounds normal  No respiratory distress  She has no wheezes  She has no rales  Musculoskeletal:   Patient using a roller walker today   Neurological: She is alert and oriented to person, place, and time  Skin: Skin is warm and dry  She is not diaphoretic  Psychiatric: She has a normal mood and affect  Judgment normal    Nursing note and vitals reviewed  BMI Counseling: Body mass index is 27 46 kg/m²  The BMI is above normal  Nutrition recommendations include decreasing portion sizes, encouraging healthy choices of fruits and vegetables, decreasing fast food intake, consuming healthier snacks, limiting drinks that contain sugar, moderation in carbohydrate intake, increasing intake of lean protein, reducing intake of saturated and trans fat and reducing intake of cholesterol  Exercise recommendations include exercising 3-5 times per week  No pharmacotherapy was ordered  BMI Counseling: Body mass index is 27 46 kg/m²  The BMI is above normal  No BMI follow-up plan is appropriate  Patient is 72 years old and weight reduction/weight gain would further complicate their underlying physical disability

## 2020-07-02 NOTE — PATIENT INSTRUCTIONS
Problem List Items Addressed This Visit        Digestive    GERD without esophagitis     Stable continue PPI  Cardiovascular and Mediastinum    Essential hypertension - Primary     Stable continue current medication  Nervous and Auditory    Normal pressure hydrocephalus (HCC)     Continue to follow Neurosurgery  Given order to be done this month and has a follow-up  Relevant Orders    Ambulatory Referral to [address] [place] [address] Micah Franklin       Musculoskeletal and Integument    Osteopenia of multiple sites     DEXA last 2017  New order placed  Relevant Orders    DXA bone density spine hip and pelvis       Hematopoietic and Hemostatic    Thrombocytosis (HCC)     Check CBC  Relevant Orders    CBC and differential       Genitourinary    Stage 2 chronic kidney disease     Check CMP  Relevant Orders    Comprehensive metabolic panel       Other    Chronic hyponatremia (Chronic)     Check CMP  Ambulatory dysfunction     Daughter requested home health physical therapy  Order placed  Relevant Orders    Ambulatory Referral to Home Health    Hyperlipidemia     Check fasting lipid panel  Patient continues on Lipitor 40 mg once daily  Relevant Orders    Lipid Panel with Direct LDL reflex    TSH, 3rd generation with Free T4 reflex    Vitamin D deficiency     Check vitamin-D level  Relevant Orders    Vitamin D 25 hydroxy    Recurrent falls    Relevant Orders    Ambulatory Referral to [address] [place] [address] Micah Franklin    S/P  shunt    Relevant Orders    Ambulatory Referral to [address] [place] [address] Micah Franklin    Medicare annual wellness visit, subsequent     Fit test given  Five wishes blue Packet given today  Shingles vaccination recommended at her local pharmacy  Confusion and disorientation     This could be due to her normal pressure hydrocephalus status post shunt diagnosis however I am going to check a urine sample and culture    They have follow-up with neurosurgery soon and I would like them to see Geriatrics her further evaluation after their follow-up if it is deemed her memory in time orientation is not from this diagnosis  She also seems to have a diagnosis of depression as well and the to may be going together  Relevant Orders    Ambulatory referral to Geriatrics    Depression     Patient does not realize the fact that she is most likely depressed  I will keep her on the Lexapro 5 mg and I will actually refer her to Geriatrics as patient is also having issues with time confusion  This may be secondary to her normal pressure hydrocephalus but they should make an appointment with the geriatric specialist if neurology concludes it is not from this diagnosis             Relevant Orders    Ambulatory referral to Geriatrics

## 2020-07-02 NOTE — ASSESSMENT & PLAN NOTE
Fit test given  Five wishes blue Packet given today  Shingles vaccination recommended at her local pharmacy

## 2020-07-02 NOTE — ASSESSMENT & PLAN NOTE
Patient does not realize the fact that she is most likely depressed  I will keep her on the Lexapro 5 mg and I will actually refer her to Geriatrics as patient is also having issues with time confusion  This may be secondary to her normal pressure hydrocephalus but they should make an appointment with the geriatric specialist if neurology concludes it is not from this diagnosis

## 2020-07-02 NOTE — ASSESSMENT & PLAN NOTE
This could be due to her normal pressure hydrocephalus status post shunt diagnosis however I am going to check a urine sample and culture  They have follow-up with neurosurgery soon and I would like them to see Geriatrics her further evaluation after their follow-up if it is deemed her memory in time orientation is not from this diagnosis  She also seems to have a diagnosis of depression as well and the to may be going together

## 2020-07-04 LAB — BACTERIA UR CULT: ABNORMAL

## 2020-07-05 DIAGNOSIS — N30.00 ACUTE CYSTITIS WITHOUT HEMATURIA: Primary | ICD-10-CM

## 2020-07-05 RX ORDER — NITROFURANTOIN 25; 75 MG/1; MG/1
100 CAPSULE ORAL 2 TIMES DAILY
Qty: 14 CAPSULE | Refills: 0 | Status: SHIPPED | OUTPATIENT
Start: 2020-07-05 | End: 2020-07-12

## 2020-07-05 NOTE — RESULT ENCOUNTER NOTE
Please call pts daughter and let her know that her moms urine culture shows she does have a big UTI  I have called in pts macrobid to take 100 mg twice daily for 7 days and then I would like a repeat urine culture done which I have ordered  Thank you

## 2020-07-07 NOTE — TELEPHONE ENCOUNTER
Melissa Johnson returned my call and left a message to schedule  I attempted to call her back with no answer  LM for call back at her convenience

## 2020-07-08 ENCOUNTER — TRANSCRIBE ORDERS (OUTPATIENT)
Dept: NEUROSURGERY | Facility: CLINIC | Age: 80
End: 2020-07-08

## 2020-07-08 DIAGNOSIS — R26.9 GAIT DISTURBANCE: Primary | ICD-10-CM

## 2020-07-08 NOTE — TELEPHONE ENCOUNTER
Patient scheduled for head ct at [address] on 7/27/20 @ 430pm and the follow up with our office is 7/31/20 PT @ 10:15 and the appt with DR @ 11:15  Spoke with daughter Yeison Cheney and confirmed all dates, times and locations  She was appreciative

## 2020-07-09 LAB
25(OH)D3 SERPL-MCNC: 38 NG/ML (ref 30–100)
ALBUMIN SERPL-MCNC: 4 G/DL (ref 3.6–5.1)
ALBUMIN/GLOB SERPL: 1.4 (CALC) (ref 1–2.5)
ALP SERPL-CCNC: 58 U/L (ref 37–153)
ALT SERPL-CCNC: 40 U/L (ref 6–29)
AST SERPL-CCNC: 41 U/L (ref 10–35)
BASOPHILS # BLD AUTO: 127 CELLS/UL (ref 0–200)
BASOPHILS NFR BLD AUTO: 1.4 %
BILIRUB SERPL-MCNC: 0.3 MG/DL (ref 0.2–1.2)
BUN SERPL-MCNC: 13 MG/DL (ref 7–25)
BUN/CREAT SERPL: ABNORMAL (CALC) (ref 6–22)
CALCIUM SERPL-MCNC: 9.6 MG/DL (ref 8.6–10.4)
CHLORIDE SERPL-SCNC: 95 MMOL/L (ref 98–110)
CHOLEST SERPL-MCNC: 111 MG/DL
CHOLEST/HDLC SERPL: 3 (CALC)
CO2 SERPL-SCNC: 24 MMOL/L (ref 20–32)
CREAT SERPL-MCNC: 0.66 MG/DL (ref 0.6–0.93)
EOSINOPHIL # BLD AUTO: 528 CELLS/UL (ref 15–500)
EOSINOPHIL NFR BLD AUTO: 5.8 %
ERYTHROCYTE [DISTWIDTH] IN BLOOD BY AUTOMATED COUNT: 13 % (ref 11–15)
GLOBULIN SER CALC-MCNC: 2.8 G/DL (CALC) (ref 1.9–3.7)
GLUCOSE SERPL-MCNC: 108 MG/DL (ref 65–99)
HCT VFR BLD AUTO: 34.1 % (ref 35–45)
HDLC SERPL-MCNC: 37 MG/DL
HGB BLD-MCNC: 11.8 G/DL (ref 11.7–15.5)
LDLC SERPL CALC-MCNC: 43 MG/DL (CALC)
LYMPHOCYTES # BLD AUTO: 2211 CELLS/UL (ref 850–3900)
LYMPHOCYTES NFR BLD AUTO: 24.3 %
MCH RBC QN AUTO: 31.3 PG (ref 27–33)
MCHC RBC AUTO-ENTMCNC: 34.6 G/DL (ref 32–36)
MCV RBC AUTO: 90.5 FL (ref 80–100)
MONOCYTES # BLD AUTO: 974 CELLS/UL (ref 200–950)
MONOCYTES NFR BLD AUTO: 10.7 %
NEUTROPHILS # BLD AUTO: 5260 CELLS/UL (ref 1500–7800)
NEUTROPHILS NFR BLD AUTO: 57.8 %
NONHDLC SERPL-MCNC: 74 MG/DL (CALC)
PLATELET # BLD AUTO: 505 THOUSAND/UL (ref 140–400)
PMV BLD REES-ECKER: 10 FL (ref 7.5–12.5)
POTASSIUM SERPL-SCNC: 4.8 MMOL/L (ref 3.5–5.3)
PROT SERPL-MCNC: 6.8 G/DL (ref 6.1–8.1)
RBC # BLD AUTO: 3.77 MILLION/UL (ref 3.8–5.1)
SL AMB EGFR AFRICAN AMERICAN: 97 ML/MIN/1.73M2
SL AMB EGFR NON AFRICAN AMERICAN: 84 ML/MIN/1.73M2
SODIUM SERPL-SCNC: 130 MMOL/L (ref 135–146)
TRIGL SERPL-MCNC: 261 MG/DL
TSH SERPL-ACNC: 1.57 MIU/L (ref 0.4–4.5)
WBC # BLD AUTO: 9.1 THOUSAND/UL (ref 3.8–10.8)

## 2020-07-10 DIAGNOSIS — E78.5 HYPERLIPIDEMIA, UNSPECIFIED HYPERLIPIDEMIA TYPE: ICD-10-CM

## 2020-07-10 DIAGNOSIS — N18.2 STAGE 2 CHRONIC KIDNEY DISEASE: Primary | ICD-10-CM

## 2020-07-10 DIAGNOSIS — D75.839 THROMBOCYTOSIS: ICD-10-CM

## 2020-07-10 NOTE — RESULT ENCOUNTER NOTE
Please call the patient's daughter  Her blood work is continuing to show low sodium and chloride for which she had been seeing Nephrology although upon chart review it looks like she is overdue to meet with them  If she is not taking her sodium supplement as directed which is I believe 3 times a day then she should be  She needs to make an appointment with her nephrologist for follow-up  Otherwise her cholesterol is stable although her liver function tests are slightly elevated  Vitamin-D of thyroid are great  I have ordered blood work fasting for her to do in 3 months time and then to see me back but she really needs to get in with her nephrologist soon  If she needs help making this appointment she can call our office

## 2020-07-12 DIAGNOSIS — E78.5 HYPERLIPIDEMIA, UNSPECIFIED HYPERLIPIDEMIA TYPE: ICD-10-CM

## 2020-07-12 DIAGNOSIS — I10 ESSENTIAL HYPERTENSION: ICD-10-CM

## 2020-07-12 RX ORDER — LOSARTAN POTASSIUM 50 MG/1
TABLET ORAL
Qty: 60 TABLET | Refills: 0 | Status: SHIPPED | OUTPATIENT
Start: 2020-07-12 | End: 2020-08-11

## 2020-07-13 RX ORDER — ATORVASTATIN CALCIUM 40 MG/1
TABLET, FILM COATED ORAL
Qty: 90 TABLET | Refills: 0 | Status: SHIPPED | OUTPATIENT
Start: 2020-07-13 | End: 2020-10-07

## 2020-07-14 ENCOUNTER — TELEPHONE (OUTPATIENT)
Dept: FAMILY MEDICINE CLINIC | Facility: CLINIC | Age: 80
End: 2020-07-14

## 2020-07-14 NOTE — TELEPHONE ENCOUNTER
I spoke to pt's Daughter Dashawn Hollis and she is wondering if you would be willing to order In Home PT for pt  Unsure what type of PT pt would qualify for  Back, Gait dysfunction  Please advise and they have used [facility] before and would like it set up with them if this is something you agree to order

## 2020-07-14 NOTE — TELEPHONE ENCOUNTER
It looks like PT ws already ordered by pts Neurosurgeon on 7/8  They need to call that office to clarify orders please  Thank you

## 2020-07-21 NOTE — TELEPHONE ENCOUNTER
LMOM informing pt's daughter Scott Goddard of KB response  I suggested she call back if she has any additional questions

## 2020-07-27 ENCOUNTER — HOSPITAL ENCOUNTER (OUTPATIENT)
Dept: CT IMAGING | Facility: HOSPITAL | Age: 80
Discharge: HOME | End: 2020-07-27
Attending: NEUROLOGICAL SURGERY
Payer: COMMERCIAL

## 2020-07-27 DIAGNOSIS — Z98.2 S/P VP SHUNT: ICD-10-CM

## 2020-07-27 DIAGNOSIS — G91.2 NORMAL PRESSURE HYDROCEPHALUS (HCC): ICD-10-CM

## 2020-07-27 PROCEDURE — 70450 CT HEAD/BRAIN W/O DYE: CPT

## 2020-07-31 ENCOUNTER — TELEPHONE (OUTPATIENT)
Dept: NEPHROLOGY | Facility: CLINIC | Age: 80
End: 2020-07-31

## 2020-07-31 ENCOUNTER — OFFICE VISIT (OUTPATIENT)
Dept: PHYSICAL THERAPY | Facility: CLINIC | Age: 80
End: 2020-07-31
Payer: COMMERCIAL

## 2020-07-31 ENCOUNTER — OFFICE VISIT (OUTPATIENT)
Dept: NEUROSURGERY | Facility: CLINIC | Age: 80
End: 2020-07-31
Payer: COMMERCIAL

## 2020-07-31 VITALS
BODY MASS INDEX: 27.83 KG/M2 | WEIGHT: 163 LBS | RESPIRATION RATE: 14 BRPM | SYSTOLIC BLOOD PRESSURE: 152 MMHG | DIASTOLIC BLOOD PRESSURE: 67 MMHG | TEMPERATURE: 98.7 F | HEIGHT: 64 IN | HEART RATE: 84 BPM

## 2020-07-31 DIAGNOSIS — Z98.2 S/P VP SHUNT: Primary | ICD-10-CM

## 2020-07-31 DIAGNOSIS — G91.2 NORMAL PRESSURE HYDROCEPHALUS (HCC): ICD-10-CM

## 2020-07-31 DIAGNOSIS — R26.9 ABNORMALITY OF GAIT: Primary | ICD-10-CM

## 2020-07-31 PROCEDURE — 3008F BODY MASS INDEX DOCD: CPT | Performed by: NEUROLOGICAL SURGERY

## 2020-07-31 PROCEDURE — 1036F TOBACCO NON-USER: CPT | Performed by: NEUROLOGICAL SURGERY

## 2020-07-31 PROCEDURE — 1160F RVW MEDS BY RX/DR IN RCRD: CPT | Performed by: NEUROLOGICAL SURGERY

## 2020-07-31 PROCEDURE — 3078F DIAST BP <80 MM HG: CPT | Performed by: NEUROLOGICAL SURGERY

## 2020-07-31 PROCEDURE — 3077F SYST BP >= 140 MM HG: CPT | Performed by: NEUROLOGICAL SURGERY

## 2020-07-31 PROCEDURE — 97161 PT EVAL LOW COMPLEX 20 MIN: CPT | Performed by: PHYSICAL THERAPIST

## 2020-07-31 PROCEDURE — 99214 OFFICE O/P EST MOD 30 MIN: CPT | Performed by: NEUROLOGICAL SURGERY

## 2020-07-31 PROCEDURE — 4040F PNEUMOC VAC/ADMIN/RCVD: CPT | Performed by: NEUROLOGICAL SURGERY

## 2020-07-31 PROCEDURE — 1170F FXNL STATUS ASSESSED: CPT | Performed by: NEUROLOGICAL SURGERY

## 2020-07-31 NOTE — PROGRESS NOTES
Office Note - Neurosurgery   Blanca Aguilar 78 y o  female MRN: [id number]      Assessment:    Patient is gradually improving  66-year-old woman insertion of right frontal  shunt with normal pressure hydrocephalus  At her last visit, she had her shunt adjusted and has noted improvement in her gait and balance  She has also been treated for hyponatremia and a UTI  She denies any headaches  Overall she is pleased with her progress  She will begin physical therapy for gait and balance training  She will follow up through the integrated normal pressure hydrocephalus program in approximately 1 year's time with repeat PT and cognitive assessment  History, physical examination and diagnostic tests were reviewed and questions answered  Diagnosis, care plan and treatment options were discussed  The patient and daughter understand instructions and will follow up as directed  Plan:    Follow-up:  1 year    Problem List Items Addressed This Visit        Nervous and Auditory    Normal pressure hydrocephalus (HCC)       Other    S/P  shunt - Primary          Subjective/Objective     Chief Complaint    None, follow-up for NPH  HPI    Patient had her shunt adjusted from a setting of 6 to a setting of 5 at her last visit  She noted improvement in her gait and balance afterwards  She was also treated for UTI and hyponatremia  There has been no change in cognition  She denies any headaches or tenderness or swelling along the shunt tract  Overall she is pleased with her progress  JOSE GARCIA personally reviewed and updated  Review of Systems   Constitutional: Negative  HENT: Negative  Eyes: Negative  Respiratory: Negative  Cardiovascular: Negative  Gastrointestinal: Negative  Endocrine: Negative  Genitourinary: Negative for frequency and urgency  Leakage     Musculoskeletal: Negative  Skin: Negative  Allergic/Immunologic: Negative      Neurological: Positive for weakness  Negative for dizziness, seizures, syncope, numbness and headaches  Hematological: Bruises/bleeds easily (baby asa)  Psychiatric/Behavioral: Positive for confusion (better since last visit) and decreased concentration (better since last visit )  Family History    Family History   Problem Relation Age of Onset    Heart attack Mother 39    Heart attack Father 61    No Known Problems Other     Breast cancer Sister     Alcohol abuse Daughter         history of    Heart attack Brother        Social History    Social History     Socioeconomic History    Marital status:       Spouse name: Not on file    Number of children: Not on file    Years of education: Not on file    Highest education level: Not on file   Occupational History    Occupation: Retired   Social Needs    Financial resource strain: Not on file    Food insecurity:     Worry: Not on file     Inability: Not on file   [employer] needs:     Medical: Not on file     Non-medical: Not on file   Tobacco Use    Smoking status: Former Smoker     Packs/day: 0 00     Years: 0 00     Pack years: 0 00     Types: Cigarettes     Last attempt to quit:      Years since quittin 5    Smokeless tobacco: Never Used    Tobacco comment: no secondhand smoke exposure   Substance and Sexual Activity    Alcohol use: Not Currently     Frequency: Monthly or less     Drinks per session: 1 or 2     Binge frequency: Never    Drug use: No    Sexual activity: Not on file   Lifestyle    Physical activity:     Days per week: Not on file     Minutes per session: Not on file    Stress: Not on file   Relationships    Social connections:     Talks on phone: Not on file     Gets together: Not on file     Attends Jewish service: Not on file     Active member of club or organization: Not on file     Attends meetings of clubs or organizations: Not on file     Relationship status: Not on file    Intimate partner violence:     Fear of current or ex partner: Not on file     Emotionally abused: Not on file     Physically abused: Not on file     Forced sexual activity: Not on file   Other Topics Concern    Not on file   Social History Narrative    Living alone       Past Medical History    Past Medical History:   Diagnosis Date    Anxiety     Arthritis     Confusion 10/2/2018    Depression     Displaced fracture of distal phalanx of right thumb     GERD (gastroesophageal reflux disease)     Heart attack ([facility] [address] )     Hyperlipidemia     Hypertension     Moderate episode of recurrent major depressive disorder ([facility] [address] ) 4/12/2019    Shortness of breath     Stage 2 chronic kidney disease 7/17/2019       Surgical History    Past Surgical History:   Procedure Laterality Date    APPENDECTOMY      CARPAL TUNNEL RELEASE      CATARACT EXTRACTION Bilateral     COLONOSCOPY      FL LUMBAR PUNCTURE DIAGNOSTIC  1/10/2019    FRACTURE SURGERY      LA CREATE SHUNT:VENTRIC-PERITONEAL Right 9/3/2019    Procedure: Insertion of frontal ventriculoperitoneal shunt;  Surgeon: Jacqui Givens MD;  [facility];  Service: Neurosurgery    SEPTOPLASTY      WRIST FRACTURE SURGERY Right        Medications      Current Outpatient Medications:     acetaminophen (TYLENOL) 325 mg tablet, Take 2 tablets (650 mg total) by mouth every 6 (six) hours as needed for mild pain, moderate pain, headaches or fever, Disp: 30 tablet, Rfl: 0    aspirin 81 mg chewable tablet, CHEW 1 TABLET AND SWALLOW ORALLY DAILY (HYPERTENSION), Disp: 30 tablet, Rfl: 4    atenolol (TENORMIN) 50 mg tablet, TAKE ONE TABLET BY MOUTH ONCE DAILY, Disp: 30 tablet, Rfl: 5    atorvastatin (LIPITOR) 40 mg tablet, TAKE ONE TABLET BY MOUTH ONCE DAILY, Disp: 90 tablet, Rfl: 0    D3-1000 1000 units tablet, TAKE 1 TABLET ORALLY DAILY (SUPPLEMENT) * (Patient taking differently: Morning), Disp: 30 tablet, Rfl: 4    escitalopram (LEXAPRO) 5 mg tablet, TAKE ONE TABLET BY MOUTH DAILY   DEPRESSION, Disp: 30 tablet, Rfl: 5    losartan (COZAAR) 50 mg tablet, TAKE ONE TABLET BY MOUTH TWO TIMES A DAY, Disp: 60 tablet, Rfl: 0    multivitamin (THERAGRAN) TABS, Take 1 tablet by mouth daily , Disp: , Rfl:     omeprazole (PriLOSEC) 20 mg delayed release capsule, TAKE 1 CAPSULE BY MOUTH DAILY IN THE MORNING, Disp: 30 capsule, Rfl: 5    sodium chloride 1 g tablet, TAKE ONE TABLET BY MOUTH THREE TIMES A DAY, Disp: 90 tablet, Rfl: 4    Allergies    No Known Allergies    The following portions of the patient's history were reviewed and updated as appropriate: allergies, current medications, past family history, past medical history, past social history, past surgical history and problem list     Investigations    I personally reviewed the CT, NPH PT and NPH Cognitive results with the patient:    CT scan of the head without contrast dated July 27th, 2020  Comparison to previous imaging  Stable appearance of right frontal  shunt  No new intra-axial or extra-axial lesions  NPH scale dated 7/31/2020, 14/15  PT gait assessment dated 7/31/2020  TUG 20 sec , TUGc 25 sec, DGI 13/24   MoCA dated 7/31/2020, 17/30  Physical Exam    Vitals:  Blood pressure 152/67, pulse 84, temperature 98 7 °F (37 1 °C), resp  rate 14, height 5' 4" (1 626 m), weight 73 9 kg (163 lb), not currently breastfeeding  ,Body mass index is 27 98 kg/m²  Physical Exam   Constitutional: She appears well-developed and well-nourished  No distress  HENT:   Head: Atraumatic  Eyes: EOM are normal    Pulmonary/Chest: Effort normal  No respiratory distress  Musculoskeletal: She exhibits no deformity  Neurological: She is alert  No cranial nerve deficit  No pronator or parietal drift  No sensory extinction or neglect  Walks with a steady gait with minimal shuffling using a walker  Skin: Skin is warm and dry  Psychiatric: She has a normal mood and affect  Her behavior is normal    Vitals reviewed      Neurologic Exam     Cranial Nerves CN III, IV, VI   Extraocular motions are normal

## 2020-07-31 NOTE — PROGRESS NOTES
PT Evaluation   And Discharge    Today's date: 2020  Patient name: Neelima Spencer  : 1940  MRN: [id number]  Referring provider: Yaakov Ott MD  Dx:   Encounter Diagnosis     ICD-10-CM    1  Abnormality of gait R26 9                   Assessment  Assessment details: Please refer to the NPH Exam form for all objective measures  Patient was seen for a gait and balance assessment/screen  She will be seen for re-assessment of their gait/balance as needed as part of the management of their condition  Thank you for this pleasant referral       Impairments: abnormal gait, impaired balance and safety issue    Goals  Patient to be reassessed as needed in 4-6 weeks    Plan  Plan details: Patient to return only as needed        Subjective Evaluation    History of Present Illness  Onset date: A few weeks ago  Mechanism of injury: Chief Complaint:   Difficulty walking    History:   Pt had a shunt placed due to NPH in 2019  In the past few weeks her daughter notes she started dragging her feet again when she was walking  She saw Dr Nini Andrews at the start of , and he adjusted her shunt  She also had changes in her sodium levels and was treated for a UTI  She lives alone in a 1 floor with 13 steps to enter  She uses a step to pattern on stairs  She uses a rolling walker for mobility with in her home  She had increased confusion and incontinence recently but this has been improved  She is mostly sedentary  Pt was diagnosed with NPH about a year ago and she presents s/p  shunt on 9/3/19       Functional Deficits: Limited functoinal mobility      Quality of life: good    Pain  No pain reported          Objective     Ambulation     Comments   NPH Exam Finding:  TU 82 sec RW  TUG Cognitive: 24 92 sec RW, 80% accurate counting  DGI:   Stairs: Step to, B handrails  Retropulsion:(-)               Precautions: Fall Risk      Manuals Neuro Re-Ed                                                                                                        Ther Ex                                                                                                                     Ther Activity                                       Gait Training                                       Modalities

## 2020-07-31 NOTE — TELEPHONE ENCOUNTER
I spoke with patient's daughter regarding scheduling a follow up appointment for Johnathanlei French but she told me they were at another doctors office that she will call back

## 2020-08-11 DIAGNOSIS — I10 ESSENTIAL HYPERTENSION: ICD-10-CM

## 2020-08-11 RX ORDER — LOSARTAN POTASSIUM 50 MG/1
TABLET ORAL
Qty: 60 TABLET | Refills: 0 | Status: SHIPPED | OUTPATIENT
Start: 2020-08-11 | End: 2020-09-07

## 2020-09-07 DIAGNOSIS — I10 ESSENTIAL HYPERTENSION: ICD-10-CM

## 2020-09-07 RX ORDER — LOSARTAN POTASSIUM 50 MG/1
TABLET ORAL
Qty: 60 TABLET | Refills: 11 | Status: SHIPPED | OUTPATIENT
Start: 2020-09-07 | End: 2021-05-11 | Stop reason: SDUPTHER

## 2020-10-07 DIAGNOSIS — E78.5 HYPERLIPIDEMIA, UNSPECIFIED HYPERLIPIDEMIA TYPE: ICD-10-CM

## 2020-10-07 RX ORDER — ATORVASTATIN CALCIUM 40 MG/1
TABLET, FILM COATED ORAL
Qty: 90 TABLET | Refills: 0 | Status: SHIPPED | OUTPATIENT
Start: 2020-10-07 | End: 2021-01-05

## 2020-10-08 ENCOUNTER — HOSPITAL ENCOUNTER (OUTPATIENT)
Dept: BONE DENSITY | Facility: MEDICAL CENTER | Age: 80
Discharge: HOME | End: 2020-10-08
Payer: COMMERCIAL

## 2020-10-08 DIAGNOSIS — M85.89 OSTEOPENIA OF MULTIPLE SITES: ICD-10-CM

## 2020-10-08 PROCEDURE — 77080 DXA BONE DENSITY AXIAL: CPT

## 2020-10-13 DIAGNOSIS — M85.89 OSTEOPENIA OF MULTIPLE SITES: Primary | ICD-10-CM

## 2020-10-28 ENCOUNTER — TELEPHONE (OUTPATIENT)
Dept: NEPHROLOGY | Facility: CLINIC | Age: 80
End: 2020-10-28

## 2020-10-30 ENCOUNTER — TELEPHONE (OUTPATIENT)
Dept: NEPHROLOGY | Facility: CLINIC | Age: 80
End: 2020-10-30

## 2020-10-30 LAB
BUN SERPL-MCNC: 11 MG/DL (ref 7–25)
BUN/CREAT SERPL: 19 (CALC) (ref 6–22)
CALCIUM SERPL-MCNC: 9.7 MG/DL (ref 8.6–10.4)
CHLORIDE SERPL-SCNC: 93 MMOL/L (ref 98–110)
CO2 SERPL-SCNC: 28 MMOL/L (ref 20–32)
CREAT SERPL-MCNC: 0.59 MG/DL (ref 0.6–0.93)
GLUCOSE SERPL-MCNC: 108 MG/DL (ref 65–99)
MAGNESIUM SERPL-MCNC: 1.5 MG/DL (ref 1.5–2.5)
POTASSIUM SERPL-SCNC: 4.9 MMOL/L (ref 3.5–5.3)
SL AMB EGFR AFRICAN AMERICAN: 101 ML/MIN/1.73M2
SL AMB EGFR NON AFRICAN AMERICAN: 87 ML/MIN/1.73M2
SODIUM SERPL-SCNC: 129 MMOL/L (ref 135–146)

## 2020-11-06 DIAGNOSIS — K21.9 GERD WITHOUT ESOPHAGITIS: ICD-10-CM

## 2020-11-07 RX ORDER — OMEPRAZOLE 20 MG/1
CAPSULE, DELAYED RELEASE ORAL
Qty: 30 CAPSULE | Refills: 5 | Status: SHIPPED | OUTPATIENT
Start: 2020-11-07 | End: 2021-05-09

## 2020-11-12 ENCOUNTER — TELEMEDICINE (OUTPATIENT)
Dept: NEPHROLOGY | Facility: CLINIC | Age: 80
End: 2020-11-12
Payer: COMMERCIAL

## 2020-11-12 DIAGNOSIS — E22.2 SIADH (SYNDROME OF INAPPROPRIATE ADH PRODUCTION) (HCC): ICD-10-CM

## 2020-11-12 DIAGNOSIS — I10 ESSENTIAL HYPERTENSION: Primary | ICD-10-CM

## 2020-11-12 DIAGNOSIS — E87.1 CHRONIC HYPONATREMIA: Chronic | ICD-10-CM

## 2020-11-12 DIAGNOSIS — E87.1 HYPONATREMIA: ICD-10-CM

## 2020-11-12 PROCEDURE — 99213 OFFICE O/P EST LOW 20 MIN: CPT | Performed by: PHYSICIAN ASSISTANT

## 2020-11-12 PROCEDURE — 1160F RVW MEDS BY RX/DR IN RCRD: CPT | Performed by: PHYSICIAN ASSISTANT

## 2020-11-12 PROCEDURE — 1036F TOBACCO NON-USER: CPT | Performed by: PHYSICIAN ASSISTANT

## 2020-11-12 RX ORDER — SODIUM CHLORIDE 1000 MG
2 TABLET, SOLUBLE MISCELLANEOUS 2 TIMES DAILY
Qty: 120 TABLET | Refills: 4 | Status: SHIPPED | OUTPATIENT
Start: 2020-11-12 | End: 2021-05-10

## 2020-12-06 DIAGNOSIS — F41.9 ANXIETY: ICD-10-CM

## 2020-12-06 DIAGNOSIS — I10 HYPERTENSION, UNSPECIFIED TYPE: ICD-10-CM

## 2020-12-07 RX ORDER — ESCITALOPRAM OXALATE 5 MG/1
TABLET ORAL
Qty: 30 TABLET | Refills: 5 | Status: SHIPPED | OUTPATIENT
Start: 2020-12-07 | End: 2021-05-11 | Stop reason: SDUPTHER

## 2020-12-08 RX ORDER — ATENOLOL 50 MG/1
TABLET ORAL
Qty: 30 TABLET | Refills: 5 | Status: SHIPPED | OUTPATIENT
Start: 2020-12-08 | End: 2020-12-09

## 2020-12-09 DIAGNOSIS — I10 HYPERTENSION, UNSPECIFIED TYPE: ICD-10-CM

## 2020-12-09 RX ORDER — ATENOLOL 50 MG/1
TABLET ORAL
Qty: 30 TABLET | Refills: 5 | Status: SHIPPED | OUTPATIENT
Start: 2020-12-09 | End: 2021-05-11 | Stop reason: SDUPTHER

## 2021-01-05 DIAGNOSIS — E78.5 HYPERLIPIDEMIA, UNSPECIFIED HYPERLIPIDEMIA TYPE: ICD-10-CM

## 2021-01-05 RX ORDER — ATORVASTATIN CALCIUM 40 MG/1
TABLET, FILM COATED ORAL
Qty: 90 TABLET | Refills: 0 | Status: SHIPPED | OUTPATIENT
Start: 2021-01-05 | End: 2021-04-08

## 2021-01-07 ENCOUNTER — TELEPHONE (OUTPATIENT)
Dept: FAMILY MEDICINE CLINIC | Facility: CLINIC | Age: 81
End: 2021-01-07

## 2021-01-07 DIAGNOSIS — R41.82 ALTERED MENTAL STATUS, UNSPECIFIED ALTERED MENTAL STATUS TYPE: Primary | ICD-10-CM

## 2021-01-07 NOTE — TELEPHONE ENCOUNTER
PATIENT IS VERY CONFUSED, DAUGHTER IS ASKING FOR AN ORDER FOR A URINE TEST PLEASE  THANK YOU SO MUCH!

## 2021-01-08 ENCOUNTER — TELEPHONE (OUTPATIENT)
Dept: FAMILY MEDICINE CLINIC | Facility: CLINIC | Age: 81
End: 2021-01-08

## 2021-01-08 NOTE — TELEPHONE ENCOUNTER
Pt daughter calling to let you know that you can fax the urine script to [facility] at [phone]  She said someone here was waiting for the fax#   [facility] told her they see that it is in her chart they just can print it, they need it faxed

## 2021-01-09 LAB
APPEARANCE UR: CLEAR
BACTERIA UR QL AUTO: ABNORMAL /HPF
BILIRUB UR QL STRIP: NEGATIVE
COLOR UR: YELLOW
GLUCOSE UR QL STRIP: NEGATIVE
HGB UR QL STRIP: NEGATIVE
HYALINE CASTS #/AREA URNS LPF: ABNORMAL /LPF
KETONES UR QL STRIP: NEGATIVE
LEUKOCYTE ESTERASE UR QL STRIP: ABNORMAL
NITRITE UR QL STRIP: NEGATIVE
PH UR STRIP: 7.5 [PH] (ref 5–8)
PROT UR QL STRIP: NEGATIVE
RBC #/AREA URNS HPF: ABNORMAL /HPF
SP GR UR STRIP: 1.01 (ref 1–1.03)
SPECIMEN SOURCE CVX/VAG CYTO: NORMAL
SQUAMOUS #/AREA URNS HPF: ABNORMAL /HPF
TRANSFUSION STATUS PATIENT QL: NORMAL
VZV AG SPEC QL IF: NORMAL
WBC #/AREA URNS HPF: ABNORMAL /HPF

## 2021-01-19 ENCOUNTER — TELEPHONE (OUTPATIENT)
Dept: NEPHROLOGY | Facility: CLINIC | Age: 81
End: 2021-01-19

## 2021-01-19 NOTE — TELEPHONE ENCOUNTER
Patient's daughter called and told me we do not need to send a refill to the pharmacy for sodium chloride but is still requesting her sodium checked

## 2021-01-19 NOTE — TELEPHONE ENCOUNTER
Patient's daughter called and stated that the patient's sodium chloride 1g tablet is suppose to be 4 tablets a day and not 2  She stated the pharmacy stated to yuan the office to get the medication changed  Patient's daughter stated they need the medication changed ASAP  Also she requested for labs to be put in for her sodium due to the patient's daughter having concerns  Best call back number [phone]

## 2021-02-03 ENCOUNTER — TELEPHONE (OUTPATIENT)
Dept: NEUROSURGERY | Facility: CLINIC | Age: 81
End: 2021-02-03

## 2021-02-03 NOTE — TELEPHONE ENCOUNTER
Spoke with daughter Vani Jackson  She's concerned about her mothers worsening memory lately  She will be confused as to what time of day it is and taking her meds at the wrong time because of this  She says her diet is horrible and she's just drinking like 6 ensures per day, so she and her  are now trying to help the patient with meal prep and medication planning  They are going to be selling her home and moving her into [facility] independent living they're going to reach out to the family doctor to see if they can help perscribe something for her to sleep better at night time, but she wanted me to let you know about the symptoms as well in case you wanted to see her back in the office  She denies any headaches or worsening of her gait  It seems to just be the short-term memory that has gotten a little worse  She had a CT scan done in July which was stable- would you like to see patient back with PT and cognitive assessment? Any new imaging? Thanks

## 2021-02-03 NOTE — TELEPHONE ENCOUNTER
Called daughter back and informed her of this information  She was very appreciative for the coordination  She will contact us if anything changes/worsens or as a regular follow up once her mother is situated at the new facility

## 2021-02-03 NOTE — TELEPHONE ENCOUNTER
----- Message from Anette Gant MA sent at 2/2/2021  5:11 PM EST -----  Regarding: Shunt Pt  Patients Nik Kathrine Naranjo left a message on the call center line yest  She said her mother is having increased confusion and they wanted to get her shunt checked out  I didn't know if this was something you would schedule or if she just needs an appt with Kecia Huitron and a new scan? Sebter #: [phone]  Thanks!

## 2021-02-08 ENCOUNTER — OFFICE VISIT (OUTPATIENT)
Dept: FAMILY MEDICINE CLINIC | Facility: CLINIC | Age: 81
End: 2021-02-08
Payer: COMMERCIAL

## 2021-02-08 VITALS
SYSTOLIC BLOOD PRESSURE: 152 MMHG | BODY MASS INDEX: 28.68 KG/M2 | WEIGHT: 168 LBS | TEMPERATURE: 98.4 F | HEIGHT: 64 IN | DIASTOLIC BLOOD PRESSURE: 60 MMHG | HEART RATE: 124 BPM

## 2021-02-08 DIAGNOSIS — E55.9 VITAMIN D DEFICIENCY: ICD-10-CM

## 2021-02-08 DIAGNOSIS — K21.9 GERD WITHOUT ESOPHAGITIS: Primary | ICD-10-CM

## 2021-02-08 DIAGNOSIS — Z11.9 ENCOUNTER FOR SCREENING FOR INFECTIOUS AND PARASITIC DISEASES, UNSPECIFIED: ICD-10-CM

## 2021-02-08 DIAGNOSIS — R41.0 CONFUSION AND DISORIENTATION: ICD-10-CM

## 2021-02-08 DIAGNOSIS — E22.2 SIADH (SYNDROME OF INAPPROPRIATE ADH PRODUCTION) (HCC): ICD-10-CM

## 2021-02-08 DIAGNOSIS — E78.5 HYPERLIPIDEMIA, UNSPECIFIED HYPERLIPIDEMIA TYPE: ICD-10-CM

## 2021-02-08 DIAGNOSIS — I10 ESSENTIAL HYPERTENSION: ICD-10-CM

## 2021-02-08 DIAGNOSIS — R26.2 AMBULATORY DYSFUNCTION: ICD-10-CM

## 2021-02-08 DIAGNOSIS — M85.89 OSTEOPENIA OF MULTIPLE SITES: ICD-10-CM

## 2021-02-08 DIAGNOSIS — D75.839 THROMBOCYTOSIS: ICD-10-CM

## 2021-02-08 DIAGNOSIS — N18.2 STAGE 2 CHRONIC KIDNEY DISEASE: ICD-10-CM

## 2021-02-08 DIAGNOSIS — F41.9 ANXIETY: ICD-10-CM

## 2021-02-08 DIAGNOSIS — G91.2 NORMAL PRESSURE HYDROCEPHALUS (HCC): ICD-10-CM

## 2021-02-08 DIAGNOSIS — F51.01 PRIMARY INSOMNIA: ICD-10-CM

## 2021-02-08 DIAGNOSIS — K55.1 SUPERIOR MESENTERIC ARTERY STENOSIS (HCC): ICD-10-CM

## 2021-02-08 DIAGNOSIS — F32.4 MAJOR DEPRESSIVE DISORDER WITH SINGLE EPISODE, IN PARTIAL REMISSION (HCC): ICD-10-CM

## 2021-02-08 PROCEDURE — 3078F DIAST BP <80 MM HG: CPT | Performed by: PHYSICIAN ASSISTANT

## 2021-02-08 PROCEDURE — 3288F FALL RISK ASSESSMENT DOCD: CPT | Performed by: PHYSICIAN ASSISTANT

## 2021-02-08 PROCEDURE — 1036F TOBACCO NON-USER: CPT | Performed by: PHYSICIAN ASSISTANT

## 2021-02-08 PROCEDURE — 3077F SYST BP >= 140 MM HG: CPT | Performed by: PHYSICIAN ASSISTANT

## 2021-02-08 PROCEDURE — 1101F PT FALLS ASSESS-DOCD LE1/YR: CPT | Performed by: PHYSICIAN ASSISTANT

## 2021-02-08 PROCEDURE — 1160F RVW MEDS BY RX/DR IN RCRD: CPT | Performed by: PHYSICIAN ASSISTANT

## 2021-02-08 PROCEDURE — 99214 OFFICE O/P EST MOD 30 MIN: CPT | Performed by: PHYSICIAN ASSISTANT

## 2021-02-08 RX ORDER — AMITRIPTYLINE HYDROCHLORIDE 10 MG/1
10 TABLET, FILM COATED ORAL
Qty: 30 TABLET | Refills: 5 | Status: SHIPPED | OUTPATIENT
Start: 2021-02-08 | End: 2021-05-11

## 2021-02-08 NOTE — ASSESSMENT & PLAN NOTE
Slightly elevated patient is to avoid excessive caffeine like [employer]  Also fast food such as excessive [employer] that patient has been eating lately does have high sodium content as well  Patient should not be using Ensure as the fluid with a meal as it is supposed to be a meal supplement or supplement in between meals when somebody is not feeling well and not eating and nutrition properly

## 2021-02-08 NOTE — PROGRESS NOTES
Assessment and Plan:    Problem List Items Addressed This Visit        Digestive    GERD without esophagitis - Primary    SMA stenosis       Endocrine    SIADH (syndrome of inappropriate ADH production) ([facility] [address] )       Cardiovascular and Mediastinum    Essential hypertension       Slightly elevated patient is to avoid excessive caffeine like [employer]  Also fast food such as excessive [employer] that patient has been eating lately does have high sodium content as well  Patient should not be using Ensure as the fluid with a meal as it is supposed to be a meal supplement or supplement in between meals when somebody is not feeling well and not eating and nutrition properly  Nervous and Auditory    Normal pressure hydrocephalus (HCC)       Musculoskeletal and Integument    Osteopenia of multiple sites       DEXA still has not been done  Hematopoietic and Hemostatic    Thrombocytosis (HCC)       Check CBC  Genitourinary    Stage 2 chronic kidney disease       Other    Ambulatory dysfunction       Continues with walker use  Anxiety    Hyperlipidemia       Patient on Lipitor 40 mg and is overdue for her lipid panel that was due in October  Vitamin D deficiency       Check vitamin-D level  Confusion and disorientation     Pt seems to have some sun downing  Will refer to geriatrics again for evaluation  Patient does need help with medication administration and nutrition and family is looking to have her placed in an assisted living facility  Forms completed today  COVID test was ordered for patient to go when decision is made as to what facility she is going to be this changing 2  Relevant Orders    Ambulatory referral to Geriatrics    Primary insomnia       Trial Elavil 10 mg once daily at bedtime           Relevant Medications    amitriptyline (ELAVIL) 10 mg tablet    Major depressive disorder with single episode, in partial remission ([facility] [address] )    Relevant Medications    amitriptyline (ELAVIL) 10 mg tablet      Other Visit Diagnoses     Encounter for screening for infectious and parasitic diseases, unspecified        Relevant Orders    Novel Coronavirus (Covid-19),PCR [facility] - Collected at [facility]                 Diagnoses and all orders for this visit:    GERD without esophagitis    Essential hypertension    Normal pressure hydrocephalus (HCC)    Vitamin D deficiency    Ambulatory dysfunction    Stage 2 chronic kidney disease    Thrombocytosis (HCC)    Osteopenia of multiple sites    Hyperlipidemia, unspecified hyperlipidemia type    Anxiety    Encounter for screening for infectious and parasitic diseases, unspecified  -     Novel Coronavirus (Covid-19),PCR [facility] - Collected at [facility]; Future    Primary insomnia  -     amitriptyline (ELAVIL) 10 mg tablet; Take 1 tablet (10 mg total) by mouth daily at bedtime    Confusion and disorientation  -     Ambulatory referral to Geriatrics; Future    Major depressive disorder with single episode, in partial remission (HCC)    Superior mesenteric artery stenosis (HCC)    SIADH (syndrome of inappropriate ADH production) [facility]    Other orders  -     Cancel: Ambulatory referral for colonoscopy; Future              Subjective:      Patient ID: Ainsley Mohan is a [de-identified] y o  female  CC:    Chief Complaint   Patient presents with    Follow-up     Follow up to chronic conditions  Also needs DME  Daughter brought a list of concerns  mjs       HPI:     Patient here today accompanied by her daughter  They are complaining of confusion worsen a m  and the p m  over the past month or so  She is not sleeping through the night waking up just a couple hours after going to sleep and thinking is the next day and calling people in the mail the night  She needs help remembering to take her medications so her daughter goes at night and her  goes in the morning to make sure she takes them    She is eating a lot of junk food she refuses to wear lower dentures she has been drinking 4-6 ensures a day along with 2 [employer] drinks it has been done all same which is several times a week they by embolic in the but them in the refrigerator she lives by herself showers and dresses herself gets her mail washes her own dishes but then lays on the "[employer] TV most of the day  They did speak with the neurologist thinking that these changes were secondary to fluid her shunt malfunction as she was treated with surgery for normal pressure hydrocephalus  Family is planning a move her into a facility for better care they are considering [facility] or [facility]  They did not go to get geriatric evaluation in July after it was ordered apparently they either for got her missed this order  The following portions of the patient's history were reviewed and updated as appropriate: allergies, current medications, past family history, past medical history, past social history, past surgical history and problem list       Review of Systems   Constitutional: Negative  HENT: Negative  Eyes: Negative  Respiratory: Negative  Cardiovascular: Negative  Gastrointestinal: Negative  Endocrine: Negative  Genitourinary: Negative  Musculoskeletal: Negative  Skin: Negative  Allergic/Immunologic: Negative  Neurological: Negative  Hematological: Negative  Psychiatric/Behavioral: Positive for confusion and sleep disturbance  Data to review:       Objective:    Vitals:    02/08/21 1511   BP: 152/60   Pulse: (!) 124   Temp: 98 4 °F (36 9 °C)   Weight: 76 2 kg (168 lb)   Height: 5' 4" (1 626 m)        Physical Exam  Vitals signs and nursing note reviewed  Constitutional:       Appearance: Normal appearance  She is well-developed  HENT:      Head: Normocephalic and atraumatic     Eyes:      General: Lids are normal       Conjunctiva/sclera: Conjunctivae normal       Pupils: Pupils are equal, round, and reactive to light  Cardiovascular:      Rate and Rhythm: Normal rate and regular rhythm  Heart sounds: No murmur  Pulmonary:      Effort: Pulmonary effort is normal       Breath sounds: Normal breath sounds  Skin:     General: Skin is warm and dry  Neurological:      General: No focal deficit present  Mental Status: She is alert  Coordination: Coordination is intact  Psychiatric:         Mood and Affect: Mood normal          Behavior: Behavior normal  Behavior is cooperative  Thought Content:  Thought content normal          Judgment: Judgment normal

## 2021-02-08 NOTE — ASSESSMENT & PLAN NOTE
Pt seems to have some sun downing  Will refer to geriatrics again for evaluation  Patient does need help with medication administration and nutrition and family is looking to have her placed in an assisted living facility  Forms completed today  COVID test was ordered for patient to go when decision is made as to what facility she is going to be this changing 2

## 2021-02-11 ENCOUNTER — TELEPHONE (OUTPATIENT)
Dept: GERIATRICS | Facility: CLINIC | Age: 81
End: 2021-02-11

## 2021-02-11 NOTE — TELEPHONE ENCOUNTER
[address] [address]  [phone]  Telephone Intake: Geriatric Assessment     Referral source: PCP                           Patient's PCP: Dr Suhas Bucio                     Who called to schedule the appointment? daughter Eden Richard (relationship to patient): Sebas Weeks person's phone number: [phone]              Is there a POA in place/If so, who has POA? Yes, Bhavna and Regino Docker- daughters  Others residing with patient: none    Reason for referral: Family member concerns regarding memory concerns  What is the goal of visit? assess dementia, will be moving into facility     Has the patient been seen by a Neurologist? Yes   Has the patient ever been formally tested for memory/dementia? No  Has the patient ever been diagnosed with dementia? No      Preferred language? English  Can patient read English? Yes   Highest education level? Highest Level of Education: [place]   Does the patient wear glasses? Yes, reading glasses  Does the patient use hearing aids? No     First Visit: 4/22/21  Conference Visit: 5/13/21  In office visit and family conference virtual/hybrid visit options explained? No    Caller was informed: Please make sure the patient is accompanied by someone who knows them well / a caregiver / family member to participate in this appointment  If a patient plans to attend the assessment alone, please route a message to the assigned provider  Office packet mailed out?  Yes

## 2021-02-13 LAB
ALBUMIN SERPL-MCNC: 4.2 G/DL (ref 3.6–5.1)
ALBUMIN/GLOB SERPL: 1.4 (CALC) (ref 1–2.5)
ALP SERPL-CCNC: 63 U/L (ref 37–153)
ALT SERPL-CCNC: 41 U/L (ref 6–29)
AST SERPL-CCNC: 41 U/L (ref 10–35)
BASOPHILS # BLD AUTO: 129 CELLS/UL (ref 0–200)
BASOPHILS NFR BLD AUTO: 1.5 %
BILIRUB SERPL-MCNC: 0.4 MG/DL (ref 0.2–1.2)
BUN SERPL-MCNC: 12 MG/DL (ref 7–25)
BUN/CREAT SERPL: ABNORMAL (CALC) (ref 6–22)
CALCIUM SERPL-MCNC: 9.7 MG/DL (ref 8.6–10.4)
CHLORIDE SERPL-SCNC: 96 MMOL/L (ref 98–110)
CHOLEST SERPL-MCNC: 123 MG/DL
CHOLEST/HDLC SERPL: 3.1 (CALC)
CO2 SERPL-SCNC: 27 MMOL/L (ref 20–32)
CREAT SERPL-MCNC: 0.65 MG/DL (ref 0.6–0.88)
EOSINOPHIL # BLD AUTO: 559 CELLS/UL (ref 15–500)
EOSINOPHIL NFR BLD AUTO: 6.5 %
ERYTHROCYTE [DISTWIDTH] IN BLOOD BY AUTOMATED COUNT: 12.7 % (ref 11–15)
GLOBULIN SER CALC-MCNC: 3 G/DL (CALC) (ref 1.9–3.7)
GLUCOSE SERPL-MCNC: 111 MG/DL (ref 65–99)
HCT VFR BLD AUTO: 36 % (ref 35–45)
HDLC SERPL-MCNC: 40 MG/DL
HGB BLD-MCNC: 12.1 G/DL (ref 11.7–15.5)
LDLC SERPL CALC-MCNC: 48 MG/DL (CALC)
LYMPHOCYTES # BLD AUTO: 2133 CELLS/UL (ref 850–3900)
LYMPHOCYTES NFR BLD AUTO: 24.8 %
MCH RBC QN AUTO: 30.8 PG (ref 27–33)
MCHC RBC AUTO-ENTMCNC: 33.6 G/DL (ref 32–36)
MCV RBC AUTO: 91.6 FL (ref 80–100)
MONOCYTES # BLD AUTO: 774 CELLS/UL (ref 200–950)
MONOCYTES NFR BLD AUTO: 9 %
NEUTROPHILS # BLD AUTO: 5005 CELLS/UL (ref 1500–7800)
NEUTROPHILS NFR BLD AUTO: 58.2 %
NONHDLC SERPL-MCNC: 83 MG/DL (CALC)
PLATELET # BLD AUTO: 454 THOUSAND/UL (ref 140–400)
PMV BLD REES-ECKER: 10.3 FL (ref 7.5–12.5)
POTASSIUM SERPL-SCNC: 4.6 MMOL/L (ref 3.5–5.3)
PROT SERPL-MCNC: 7.2 G/DL (ref 6.1–8.1)
RBC # BLD AUTO: 3.93 MILLION/UL (ref 3.8–5.1)
SL AMB EGFR AFRICAN AMERICAN: 97 ML/MIN/1.73M2
SL AMB EGFR NON AFRICAN AMERICAN: 84 ML/MIN/1.73M2
SODIUM SERPL-SCNC: 133 MMOL/L (ref 135–146)
TRIGL SERPL-MCNC: 329 MG/DL
WBC # BLD AUTO: 8.6 THOUSAND/UL (ref 3.8–10.8)

## 2021-02-16 DIAGNOSIS — N18.2 STAGE 2 CHRONIC KIDNEY DISEASE: ICD-10-CM

## 2021-02-16 DIAGNOSIS — E55.9 VITAMIN D DEFICIENCY: ICD-10-CM

## 2021-02-16 DIAGNOSIS — I10 ESSENTIAL HYPERTENSION: Primary | ICD-10-CM

## 2021-02-16 DIAGNOSIS — R73.9 HYPERGLYCEMIA: ICD-10-CM

## 2021-02-16 DIAGNOSIS — E78.5 HYPERLIPIDEMIA, UNSPECIFIED HYPERLIPIDEMIA TYPE: ICD-10-CM

## 2021-02-16 DIAGNOSIS — D75.839 THROMBOCYTOSIS: ICD-10-CM

## 2021-02-17 NOTE — RESULT ENCOUNTER NOTE
Please let the patients daughter know that her blood work is stable however her triglycerides have significantly increased to 329 from 261 and should be below 150  This is direct result of increase of carbohydrates and junk food  Fasting sugar was also elevated at 111 which is prediabetic  Patient needs to cut back on her fast food intake  Platelets continue me slightly elevated but better than last time so she should continue baby aspirin once daily  I have ordered blood work for 6 months

## 2021-03-29 ENCOUNTER — TELEPHONE (OUTPATIENT)
Dept: NEPHROLOGY | Facility: CLINIC | Age: 81
End: 2021-03-29

## 2021-04-08 DIAGNOSIS — E78.5 HYPERLIPIDEMIA, UNSPECIFIED HYPERLIPIDEMIA TYPE: ICD-10-CM

## 2021-04-08 RX ORDER — ATORVASTATIN CALCIUM 40 MG/1
TABLET, FILM COATED ORAL
Qty: 90 TABLET | Refills: 0 | Status: SHIPPED | OUTPATIENT
Start: 2021-04-08 | End: 2021-05-11 | Stop reason: SDUPTHER

## 2021-04-22 ENCOUNTER — CONSULT (OUTPATIENT)
Dept: GERIATRICS | Age: 81
End: 2021-04-22
Payer: COMMERCIAL

## 2021-04-22 VITALS
OXYGEN SATURATION: 97 % | BODY MASS INDEX: 29.48 KG/M2 | WEIGHT: 160.2 LBS | DIASTOLIC BLOOD PRESSURE: 68 MMHG | TEMPERATURE: 98.1 F | HEIGHT: 62 IN | SYSTOLIC BLOOD PRESSURE: 170 MMHG | RESPIRATION RATE: 16 BRPM | HEART RATE: 85 BPM

## 2021-04-22 DIAGNOSIS — Z79.899 POLYPHARMACY: ICD-10-CM

## 2021-04-22 DIAGNOSIS — F32.A ANXIETY AND DEPRESSION: ICD-10-CM

## 2021-04-22 DIAGNOSIS — K21.9 GERD WITHOUT ESOPHAGITIS: ICD-10-CM

## 2021-04-22 DIAGNOSIS — G31.84 MCI (MILD COGNITIVE IMPAIRMENT): Primary | ICD-10-CM

## 2021-04-22 DIAGNOSIS — I25.10 ATHEROSCLEROSIS OF CORONARY ARTERY OF NATIVE HEART, UNSPECIFIED VESSEL OR LESION TYPE, UNSPECIFIED WHETHER ANGINA PRESENT: ICD-10-CM

## 2021-04-22 DIAGNOSIS — I10 ESSENTIAL HYPERTENSION: ICD-10-CM

## 2021-04-22 DIAGNOSIS — R73.9 HYPERGLYCEMIA: ICD-10-CM

## 2021-04-22 DIAGNOSIS — F51.01 PRIMARY INSOMNIA: ICD-10-CM

## 2021-04-22 DIAGNOSIS — E78.5 HYPERLIPIDEMIA, UNSPECIFIED HYPERLIPIDEMIA TYPE: ICD-10-CM

## 2021-04-22 DIAGNOSIS — R41.0 CONFUSION AND DISORIENTATION: ICD-10-CM

## 2021-04-22 DIAGNOSIS — R26.2 AMBULATORY DYSFUNCTION: ICD-10-CM

## 2021-04-22 DIAGNOSIS — F41.9 ANXIETY AND DEPRESSION: ICD-10-CM

## 2021-04-22 DIAGNOSIS — E87.1 CHRONIC HYPONATREMIA: Chronic | ICD-10-CM

## 2021-04-22 PROCEDURE — 3077F SYST BP >= 140 MM HG: CPT | Performed by: STUDENT IN AN ORGANIZED HEALTH CARE EDUCATION/TRAINING PROGRAM

## 2021-04-22 PROCEDURE — 99205 OFFICE O/P NEW HI 60 MIN: CPT | Performed by: STUDENT IN AN ORGANIZED HEALTH CARE EDUCATION/TRAINING PROGRAM

## 2021-04-22 PROCEDURE — 3078F DIAST BP <80 MM HG: CPT | Performed by: STUDENT IN AN ORGANIZED HEALTH CARE EDUCATION/TRAINING PROGRAM

## 2021-04-22 PROCEDURE — 1036F TOBACCO NON-USER: CPT | Performed by: STUDENT IN AN ORGANIZED HEALTH CARE EDUCATION/TRAINING PROGRAM

## 2021-04-22 NOTE — PROGRESS NOTES
Assessment & Plan:   Joan Danielle was seen today for geriatric evaluation  Diagnoses and all orders for this visit:    MCI (mild cognitive impairment)  -     Ambulatory referral to Speech Therapy; Future    Confusion and disorientation  -     Ambulatory referral to Geriatrics    Anxiety and depression    Ambulatory dysfunction    Chronic hyponatremia    Atherosclerosis of coronary artery of native heart, unspecified vessel or lesion type, unspecified whether angina present    Essential hypertension    GERD without esophagitis    Hyperglycemia    Hyperlipidemia, unspecified hyperlipidemia type    Primary insomnia    Polypharmacy      Anxiety and depression  GDS 1/15  Patient currently Lexapro 5 mg daily   Also on amitriptyline 10 mg daily  Per patient and daughter, symptoms have remained stable   No homicidal suicidal ideation  Discussed side effects of amitriptyline however given stability of symptoms, recent transitional move, risk versus benefits and quality of life, will defer changing medication regimen at this time  Certainly in the future once symptoms remain stable would recommend weaning off amitriptyline given its side effect profile in elderly patients with cognitive deficits including hypertension, drowsiness, dizziness, blurred vision, confusion, disorientation, tremors, stroke, MI  and QT prolongation to name a few  Continue social support by family and friends    Ambulatory dysfunction  TUGT 11 sec  Patient currently ambulating using a Rollator   Maintain Falls precautions  Patient does have a falls Alert device    Chronic hyponatremia   Noted on labs since 2019  Most recent sodium 133 ( improved)  Follow-up with PCP for routine monitoring given concomitant use of Lexapro and amitriptyline to avoid potentiation of hyponatremia      Once mood/sleep symptoms are stable would aim to wean amitriptyline therapy as able  Continue sodium chloride supplementation  Follow-up with Nephrology as scheduled    Coronary atherosclerosis   Patient denies any cardiorespiratory distress   Manage risk factors for CAD  Continue atenolol /atorvastatin/ aspirin / losartan  Recommend adherence to a heart healthy diet  Physical activity as able as part of an exercise routine    Essential hypertension   /68   Patient continues atenolol 50 mg daily , losartan 50 mg b i d     Advised daughter to have patient follow-up with PCP as a goal blood pressure would be in the 130s given patient's vascular risk factors and cognitive deficits   Also chronic lacunar infarct noted on CT head  Certainly amitriptyline may also increase blood pressures and as discussed earlier, would recommend weaning off amitriptyline if able once symptoms are stable and patient has transitioned fully into her new environment      GERD without esophagitis   Stable   Continue omeprazole 20 mg daily   Continue anti reflux measures    Hyperglycemia   Random blood sugars elevated   Prior HbA1c 6 7 in 2019   Patient does have a repeat HbA1c pending   Recommend adherence to a low sugar, heart healthy diet  Follow-up with PCP for continued monitoring     Hyperlipidemia   Continue Lipitor 40 mg daily   Continue routine follow-up with PCP for continued monitoring   Lipid panel pending    Primary insomnia   Per daughter, patient was started on amitriptyline in February due to insomnia which was felt to potentiate confusion and memory loss   Symptoms notably improved and have been stable   Reviewed polypharmacy and side effects of amitriptyline therapy in elderly patients with cognitive deficits   Once patient has transition to her new living facility, would recommend weaning very gradually amitriptyline given its side effect profile in elderly patients with cognitive deficits  Would defer to PCP home the patient will be following with to determine whether weaning is possible   Avoid daytime napping  Avoid caffeinated beverages after 2pm  Per daughter, patient was not previously trialed on melatonin  Would recommend trialing melatonin once the patient is weaned off amitriptyline or used with an alternative psychotropic agent      MCI (mild cognitive impairment)  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  Etiology multifactorial:  Vascular ( stable lacunar infarct) vs h/o anxiety/depression vs chronic hyponatremia  CTH (7/20): stable lacunar infarct in left basal ganglia, mild microangiopathic change,  Stable ventriculomegaly  and  shunt cath  Per notes, daughter, prior confusion not felt to be due to NPH by neurosurgery  Imperative to manage vascular risk factors and follow-up closely with PCP given CT findings of lacunar infarct and high atherosclerotic burden  Patient has since transitioned to [facility] independent living   I certainly agree with this transition as it offers the patient the opportunity to remain active mentally, physically and socially  She also does have provision of her meals by the facility  Family has instituted a locked medication access box to ensure compliance with medication regimen   Would recommend reaching out to independent [facility] wellness center to determine whether facility may assist with medications administration if there are recurrent concerns in the future  Will refer to speech therapy for cognitive rehabilitation   Continue management of mood symptoms   Recommend the Mediterranean diet which has shown to decrease the risk of memory loss and CVA   Consider the online games such as lumosity  com  Reorientation and redirection as needed  Manage chronic conditions  Maintain Falls precautions  Encourage patient to remain active mentally, physically and socially   Patient should participate in cognitively challenging exercises such as cross words, puzzles  Would defer any cognitive pharmacotherapy at this time  Will cancel family care plan conference as measures and recommendations were discussed today during the patient's intake  Will continue to track cognitive status every 6 months  Daughter advised to call the office should there be any changes in cognition    Polypharmacy  Discussed side effects of amitriptyline however given stability of symptoms, risk versus benefits and quality of life, will defer changing medication regimen at this time  Certainly in the future once symptoms remain stable would recommend weaning off amitriptyline given its side effect profile in elderly patients with cognitive deficits as well as findings of chronic lacunar infarct on pt's CT scan  Side effects of amitriptyline including hypertension, drowsiness, dizziness, blurred vision, confusion, disorientation, tremors, stroke, MI  and QT prolongation to name a few  Recommend routine EKG monitoring for QT prolongation as patient is on amitriptyline and Lexapro  Continue social support by family and friends          HPI:  We had the pleasure of evaluating Shruti Myers who is a [de-identified] y o  female in Geriatric consultation today  She lives at [address]  Ms Chandler Sol is in the office with her daughter        Memory Issues noticed since 2019  Memory affected: short term memory loss    Symptoms started: gradual  Over time the memory has:  worsened but now improved  Memory issue(s) were noted by: family   Patient has difficulties with  recalling short-term events, nutritional intake previously    She has no problems operating household appliance such as TV remote, kitchen appliances       This is a very pleasant 79-year-old female with chronic hyponatremia, HTN, hyperglycemia ( previous diabetic HbA1c value),  HLD,  Chronic lacunar infarct on CT head,  CAD (prior MI), NPH s/p  shunt in situ, anxiety/depression, ambulatory dysfunction and cognitive deficits who presents for her initial comprehensive geriatric assessment  The patient has been  for 18 years and previously resided independently in her house until February of 2021  She did complete high school and later worked in , retiring over 21 years ago  She has 2 daughters both of whom live locally  Daughter explains that this assessment appointment was scheduled in February during which time the patient had significantly worsening cognition and increased confusion  Per daughter, the patient had been telephoning her multiple times during the day, had significant insomnia as she had been consuming [employer] throughout the day, was drinking up to 6 ensures daily, resided alone, had no real socialization  and was forgetful for instance in recalling when she had gotten   She also requires frequent prompting at that time to take her showers and other daily activities  Daughter,  had reached out to Neurosurgery in Feb, who felt symptoms were not related to the patient's  shunt  The patient was seen by her PCP with symptoms attributed to her insomnia and initiation of amitriptyline to improve sleep  The patient herself today states during that time she felt very tired and looked at television most of the time as she felt very alone  Since February, the patient has transitioned to [facility] independent living  Per daughter, the patient has had an overall improvement in cognition and well-being  The patient herself today states that she is very happy in her current living facility as she has the ability to socialize and no longer feels alone  I discussed with the patient and her daughter that I am in  agreement with her moving to an independent living facility as this will afford her the opportunity to remain active mentally, physically and socially    Currently the patient remains independent in all ADLs but dependent in IADLs  She now has access to meals and can participate in activities at her facility which the patient related she is looking forward to continuing  The patient happily explained that she is able to have meals in the company of other persons which she feels has helped her mental state tremendously  Certainly should the patient have a progressive decline in cognition, she will require transitioning to a higher level of care for increased supervision  However during today's visit, her conversation was very appropriate as well as her thought process  Daughter explains that the patient does have a locked medication box in order to ensure safe medication administration as well as compliance, which the patient has been doing well on  She has been showering regularly with no concerns about her personal hygiene at this time  The patient did have urinary as well as stool incontinence over the past few months however this has since improved  In 2016, the patient had fallen, hit her head and broken her left arm after which she had stopped driving  No prior episodes of being involved in motor vehicle accidents or episodes of being lost in familiar places  No prior episodes of wandering or having hazardous events at home including burning food, leaving the stove on or faucet running  Patient's daughter has assumed her financial responsibilities but denies the patient making any late payments or incorrect checks in the past   Due to the change in patient's mood, the patient was seen by behavioral health at her PCPs office where daughter states she was assessed as not being depressed  The patient was an ex-smoker however after having an MI 21 years ago she stops smoking  No cardiorespiratory distress, fever, chills, lethargy, URI or urinary symptoms  The patient continues to tolerate oral intake, has been having regular bowel movements and sleeping well on current regimen    No recent falls in the past 6 months  She has difficulty finding the right word while speaking: sometimes  Patient requires repeat information or ask the same question repeatedly: No  Do you drive: No       Have you had any recent accidents, citations or getting lost in familiar places :No  Do you handle your own financial affairs such as balancing your checkbook, paying bills, investments: No  Do have any difficulties with handling your financial affairs: No  Have you or your family noted any change in your mood or personality:Yes  Are you currently or have you been treated in the past for depression or anxiety: Yes  Have you noticed any gait or balance disorder: Yes  Uses :rollator  Any hallucination or delusion: No  Fluctuation in alertness: No  Sleep Issues: No  Urinary/Stool Incontinence: Yes  Hearing and vision issue: Yes, reading glasses  Do you have POA:Yes  Do you have a Living will Yes  Past Medical, surgical, social, medication and allergy history and patients previous records reviewed  Family Review of Behavior [facility]:    pacing  No    agressive/combative behavior  No    agitated  No   wandering  No   resistance to care  No   hoarding/hiding objects  No    suspicious  No  Withdrawn: no  inappropriate sexual behaviorNo  rummaging/pillaging  No    misplacing/losing objects No  personal hygiene problems  No  forgetfulness of actions No   temper outbursts  No     throwing items No      Family member with dementia and what type? no  Have you had any head trauma Yes  Does patient have history of alcohol abuse No      ROS: Review of Systems   Constitutional: Negative for activity change, chills and fever  HENT: Negative for congestion, ear pain, rhinorrhea and sore throat  Eyes: Negative for discharge  Respiratory: Negative for cough, chest tightness, shortness of breath, wheezing and stridor  Cardiovascular: Negative for chest pain, palpitations and leg swelling     Gastrointestinal: Negative for abdominal pain, constipation, diarrhea, nausea and vomiting  Genitourinary: Negative for decreased urine volume, dysuria and hematuria  Urinary incontinence   Musculoskeletal: Positive for gait problem (Uses Rollator)  Skin: Negative for color change, pallor, rash and wound  Neurological: Negative for dizziness, speech difficulty, weakness and light-headedness  Hematological: Does not bruise/bleed easily  Psychiatric/Behavioral: Positive for decreased concentration and sleep disturbance (History of insomnia)  Negative for confusion and hallucinations  The patient is not nervous/anxious          Allergies:   No Known Allergies    Medications:      Current Outpatient Medications:     acetaminophen (TYLENOL) 325 mg tablet, Take 2 tablets (650 mg total) by mouth every 6 (six) hours as needed for mild pain, moderate pain, headaches or fever, Disp: 30 tablet, Rfl: 0    amitriptyline (ELAVIL) 10 mg tablet, Take 1 tablet (10 mg total) by mouth daily at bedtime, Disp: 30 tablet, Rfl: 5    aspirin 81 mg chewable tablet, CHEW 1 TABLET AND SWALLOW ORALLY DAILY (HYPERTENSION), Disp: 30 tablet, Rfl: 4    atenolol (TENORMIN) 50 mg tablet, TAKE ONE TABLET BY MOUTH ONCE DAILY, Disp: 30 tablet, Rfl: 5    atorvastatin (LIPITOR) 40 mg tablet, TAKE ONE TABLET BY MOUTH EVERY DAY, Disp: 90 tablet, Rfl: 0    D3-1000 1000 units tablet, TAKE 1 TABLET ORALLY DAILY (SUPPLEMENT) * (Patient taking differently: Morning), Disp: 30 tablet, Rfl: 4    escitalopram (LEXAPRO) 5 mg tablet, TAKE ONE TABLET BY MOUTH ONCE DAILY, Disp: 30 tablet, Rfl: 5    losartan (COZAAR) 50 mg tablet, TAKE ONE TABLET BY MOUTH TWICE A DAY, Disp: 60 tablet, Rfl: 11    multivitamin (THERAGRAN) TABS, Take 1 tablet by mouth daily , Disp: , Rfl:     omeprazole (PriLOSEC) 20 mg delayed release capsule, TAKE ONE CAPSULE BY MOUTH EVERY DAY IN THE MORNING, Disp: 30 capsule, Rfl: 5    sodium chloride 1 g tablet, Take 2 tablets (2 g total) by mouth 2 (two) times a day, Disp: 120 tablet, Rfl: 4    Vitals:  Vitals:    21 1610   BP: 170/68   Pulse:    Resp:    Temp:    SpO2:        History:  Past Medical History:   Diagnosis Date    Anxiety     Arthritis     Confusion 10/2/2018    Depression     Displaced fracture of distal phalanx of right thumb     GERD (gastroesophageal reflux disease)     Heart [facility] [address] [facility]     Hyperlipidemia     Hypertension     [facility] of [facility]    Shortness of breath     Stage 2 chronic kidney disease 2019     Past Surgical History:   Procedure Laterality Date    APPENDECTOMY      CARPAL TUNNEL RELEASE      CATARACT EXTRACTION Bilateral     COLONOSCOPY      FL LUMBAR PUNCTURE DIAGNOSTIC  1/10/2019    FRACTURE SURGERY      ND CREATE SHUNT:VENTRIC-PERITONEAL Right 9/3/2019    Procedure: Insertion of frontal ventriculoperitoneal shunt;  Surgeon: Ana Real MD;  [facility];  Service: Neurosurgery    SEPTOPLASTY      WRIST FRACTURE SURGERY Right      Family History   Problem Relation Age of Onset    Heart attack Mother 39    Heart attack Father 61    No Known Problems Other     Breast cancer Sister     Alcohol abuse Daughter         history of    Heart attack Brother      Social History     Socioeconomic History    Marital status:       Spouse name: Not on file    Number of children: Not on file    Years of education: Not on file    Highest education level: Not on file   Occupational History    Occupation: Retired   Social Needs    Financial resource strain: Not on file    Food insecurity     Worry: Not on file     Inability: Not on file   [email or web address] needs     Medical: Not on file     Non-medical: Not on file   Tobacco Use    Smoking status: Former Smoker     Packs/day: 0 00     Years: 0 00     Pack years: 0 00     Types: Cigarettes     Quit date:      Years since quittin 3    Smokeless tobacco: Never Used    Tobacco comment: no secondhand smoke exposure   Substance and Sexual Activity    Alcohol use: Not Currently     Frequency: Monthly or less     Drinks per session: 1 or 2     Binge frequency: Never    Drug use: No    Sexual activity: Not on file   Lifestyle    Physical activity     Days per week: Not on file     Minutes per session: Not on file    Stress: Not on file   Relationships    Social connections     Talks on phone: Not on file     Gets together: Not on file     Attends Latter day service: Not on file     Active member of club or organization: Not on file     Attends meetings of clubs or organizations: Not on file     Relationship status: Not on file    Intimate partner violence     Fear of current or ex partner: Not on file     Emotionally abused: Not on file     Physically abused: Not on file     Forced sexual activity: Not on file   Other Topics Concern    Not on file   Social History Narrative    Living alone     Past Surgical History:   Procedure Laterality Date    APPENDECTOMY      CARPAL TUNNEL RELEASE      CATARACT EXTRACTION Bilateral     COLONOSCOPY      FL LUMBAR PUNCTURE DIAGNOSTIC  1/10/2019    FRACTURE SURGERY      TX CREATE SHUNT:VENTRIC-PERITONEAL Right 9/3/2019    Procedure: Insertion of frontal ventriculoperitoneal shunt;  Surgeon: Isiah Petit MD;  [facility];  Service: Neurosurgery    SEPTOPLASTY      WRIST FRACTURE SURGERY Right          Physical Exam:   Physical Exam  Vitals signs reviewed  Constitutional:       General: She is not in acute distress  Appearance: Normal appearance  She is well-developed  She is not ill-appearing or diaphoretic  Comments: Elderly female sitting comfortably in chair in no obvious cardiorespiratory or painful distress   HENT:      Head: Normocephalic and atraumatic        Comments:  shunt in situ     Right Ear: Tympanic membrane, ear canal and external ear normal       Left Ear: Tympanic membrane, ear canal and external ear normal  Nose: Nose normal       Mouth/Throat:      Mouth: Mucous membranes are moist       Pharynx: Oropharynx is clear  No oropharyngeal exudate  Eyes:      General: No scleral icterus  Right eye: No discharge  Left eye: No discharge  Conjunctiva/sclera: Conjunctivae normal    Neck:      Musculoskeletal: Normal range of motion and neck supple  Vascular: No JVD  Cardiovascular:      Rate and Rhythm: Normal rate and regular rhythm  Heart sounds: Murmur present  No friction rub  No gallop  Pulmonary:      Effort: Pulmonary effort is normal  No respiratory distress  Breath sounds: Normal breath sounds  No wheezing or rales  Abdominal:      General: Bowel sounds are normal  There is no distension  Palpations: Abdomen is soft  Tenderness: There is no abdominal tenderness  There is no guarding  Musculoskeletal: Normal range of motion  General: No tenderness or deformity  Skin:     General: Skin is warm  Coloration: Skin is not pale  Findings: No erythema or rash  Neurological:      General: No focal deficit present  Mental Status: She is alert and oriented to person, place, and time  Mental status is at baseline  Cranial Nerves: No cranial nerve deficit  Sensory: No sensory deficit  Motor: No weakness  Coordination: Coordination normal       Gait: Gait abnormal (Uses rollator)  Deep Tendon Reflexes: Reflexes are normal and symmetric  Reflexes normal    Psychiatric:         Mood and Affect: Mood normal          Behavior: Behavior normal          Thought Content:  Thought content normal

## 2021-04-22 NOTE — PROGRESS NOTES
[address]  [phone]  Social Work Intake    SOCIAL HISTORY  Patient: Shelley Guerrero  Date:4/22/2021  Current Living Situation: Pratik Hatfield currently resides at [facility]  Her daughter reports that in 2019 she had a shunt put in for NPH and she started to see an immediate improvement (within 30 minutes)  Pratik Hatfield has continued to improve since then  While living at home, she had a more sedentary lifestyle  At [facility], she has been social and is enjoying living there  Up until February, she required prompting to shower and was mostly getting her nutrition from Ensure drinks  Her sleep schedule was irregular and medications were inconsistent  [facility] assisted in setting up a locked/timed pillbox  Pratik Hatfield attends bingo 3x/wk, showers daily or every other day, spends time with friends, and her sleep schedule has greatly improved  In independent living, Pratik Hatfield gets a cooked meal each night  She eats breakfast in her room and has cut back on caffeine  Caregiver main concern(s): Maintaining chronic conditions/good health (blood pressure, cholesterol, monitoring sodium)    FINANCIAL  Financial questions were not reviewed during today's intake as Ms Kory Patterson already resides at [facility]  Brenda's late  was a , however, did not serve during active duty and is not eligible for 's benefits  Brenda's home recently sold and funds will be used to continue to pay for any needs which may arise in [facility] (such as an addition of a medication check-in service, home care, higher level of care, etc)       [facility]: Pratik Hatfield is very involved in social activities at [facility] and has made a group of friends  Does the patient currently drive: No    If not, date stopped driving: Not since [id number]    LEGAL  Advanced directives: has an advanced directive - a copy has been provided      Aubrey Bloom Assessment (MoCA) Version 8 2  Education: High School    Points Earned POSSIBLE Points   Visuospatial/Executive   Alternating [place] Making 0 1   Visuoconstructional skills (chair) 0 1   Visuoconstructional skills (clock) 2 3   Naming   Naming Animals 3 3   Attention   Digit Span 2 2   Vigilance (letters) 1 1   Serial 7 subtraction 2 3   Language   Sentence Repetition 2 2   Verbal fluency 0 1   Abstraction   Abstraction (word pairings) 2 2   Delayed recall   Delayed recall 1 5   Memory index score: 7/15   Orientation   Orientation 6 6   TOTAL SCORE: 22/30  (Normal ?26/30)   Additional notes: MoCA previously 17/30 in July 2020 with Neurosurgery  Geriatric Depression Scale also completed, 1/15  Care conference not held today as Dee Simon and Carroll Monahan are both aware of her cognitive status and did not identify any current social needs  LCSW to remain available as needed; provided educational resources such as the memory loss ladder visual for reference

## 2021-04-23 PROBLEM — Z79.899 POLYPHARMACY: Status: ACTIVE | Noted: 2021-04-23

## 2021-04-23 PROBLEM — F32.A ANXIETY AND DEPRESSION: Status: ACTIVE | Noted: 2017-10-20

## 2021-04-23 NOTE — ASSESSMENT & PLAN NOTE
Patient denies any cardiorespiratory distress   Manage risk factors for CAD  Continue atenolol /atorvastatin/ aspirin / losartan  Recommend adherence to a heart healthy diet  Physical activity as able as part of an exercise routine

## 2021-04-23 NOTE — ASSESSMENT & PLAN NOTE
Continue Lipitor 40 mg daily   Continue routine follow-up with PCP for continued monitoring   Lipid panel pending

## 2021-04-23 NOTE — ASSESSMENT & PLAN NOTE
Noted on labs since 2019  Most recent sodium 133 ( improved)  Follow-up with PCP for routine monitoring given concomitant use of Lexapro and amitriptyline to avoid potentiation of hyponatremia      Once mood/sleep symptoms are stable would aim to wean amitriptyline therapy as able  Continue sodium chloride supplementation  Follow-up with Nephrology as scheduled

## 2021-04-23 NOTE — ASSESSMENT & PLAN NOTE
/68   Patient continues atenolol 50 mg daily , losartan 50 mg b i d     Advised daughter to have patient follow-up with PCP as a goal blood pressure would be in the 130s given patient's vascular risk factors and cognitive deficits   Also chronic lacunar infarct noted on CT head  Certainly amitriptyline may also increase blood pressures and as discussed earlier, would recommend weaning off amitriptyline if able once symptoms are stable and patient has transitioned fully into her new environment

## 2021-04-23 NOTE — ASSESSMENT & PLAN NOTE
Random blood sugars elevated   Prior HbA1c 6 7 in 2019   Patient does have a repeat HbA1c pending   Recommend adherence to a low sugar, heart healthy diet  Follow-up with PCP for continued monitoring

## 2021-04-23 NOTE — ASSESSMENT & PLAN NOTE
TUGT 11 sec  Patient currently ambulating using a Rollator   Maintain Falls precautions  Patient does have a falls Alert device

## 2021-04-23 NOTE — ASSESSMENT & PLAN NOTE
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  Etiology multifactorial:  Vascular ( stable lacunar infarct) vs h/o anxiety/depression vs chronic hyponatremia  CTH (7/20): stable lacunar infarct in left basal ganglia, mild microangiopathic change,  Stable ventriculomegaly  and  shunt cath  Per notes, daughter, prior confusion not felt to be due to NPH by neurosurgery  Imperative to manage vascular risk factors and follow-up closely with PCP given CT findings of lacunar infarct and high atherosclerotic burden  Patient has since transitioned to [facility] [facility]   I certainly agree with this transition as it offers the patient the opportunity to remain active mentally, physically and socially  She also does have provision of her meals by the facility  Family has instituted a locked medication access box to ensure compliance with medication regimen   Would recommend reaching out to [facility] wellness center to determine whether facility may assist with medications administration if there are recurrent concerns in the future  Will refer to speech therapy for cognitive rehabilitation   Continue management of mood symptoms   Recommend the Mediterranean diet which has shown to decrease the risk of memory loss and CVA   Consider the online games such as lumosity  com  Reorientation and redirection as needed  Manage chronic conditions  Maintain Falls precautions  Encourage patient to remain active mentally, physically and socially   Patient should participate in cognitively challenging exercises such as cross words, puzzles  Would defer any cognitive pharmacotherapy at this time  Will cancel family care plan conference as measures and recommendations were discussed today during the patient's intake  Will continue to track cognitive status every 6 months  Daughter advised to call the office should there be any changes in cognition

## 2021-04-23 NOTE — ASSESSMENT & PLAN NOTE
Per daughter, patient was started on amitriptyline in February due to insomnia which was felt to potentiate confusion and memory loss   Symptoms notably improved and have been stable   Reviewed polypharmacy and side effects of amitriptyline therapy in elderly patients with cognitive deficits   Once patient has transition to her new living facility, would recommend weaning very gradually amitriptyline given its side effect profile in elderly patients with cognitive deficits  Would defer to PCP home the patient will be following with to determine whether weaning is possible   Avoid daytime napping  Avoid caffeinated beverages after 2pm  Per daughter, patient was not previously trialed on melatonin    Would recommend trialing melatonin once the patient is weaned off amitriptyline or used with an alternative psychotropic agent

## 2021-04-23 NOTE — ASSESSMENT & PLAN NOTE
GDS 1/15  Patient currently Lexapro 5 mg daily   Also on amitriptyline 10 mg daily  Per patient and daughter, symptoms have remained stable   No homicidal suicidal ideation  Discussed side effects of amitriptyline however given stability of symptoms, recent transitional move, risk versus benefits and quality of life, will defer changing medication regimen at this time      Certainly in the future once symptoms remain stable would recommend weaning off amitriptyline given its side effect profile in elderly patients with cognitive deficits including hypertension, drowsiness, dizziness, blurred vision, confusion, disorientation, tremors, stroke, MI  and QT prolongation to name a few  Continue social support by family and friends

## 2021-05-08 DIAGNOSIS — E22.2 SIADH (SYNDROME OF INAPPROPRIATE ADH PRODUCTION) (HCC): ICD-10-CM

## 2021-05-08 DIAGNOSIS — K21.9 GERD WITHOUT ESOPHAGITIS: ICD-10-CM

## 2021-05-08 DIAGNOSIS — E87.1 HYPONATREMIA: ICD-10-CM

## 2021-05-09 RX ORDER — OMEPRAZOLE 20 MG/1
CAPSULE, DELAYED RELEASE ORAL
Qty: 30 CAPSULE | Refills: 5 | Status: SHIPPED | OUTPATIENT
Start: 2021-05-09 | End: 2021-05-11 | Stop reason: SDUPTHER

## 2021-05-10 RX ORDER — SODIUM CHLORIDE 1000 MG
TABLET, SOLUBLE MISCELLANEOUS
Qty: 120 TABLET | Refills: 4 | Status: SHIPPED | OUTPATIENT
Start: 2021-05-10 | End: 2021-05-24 | Stop reason: SDUPTHER

## 2021-05-11 ENCOUNTER — OFFICE VISIT (OUTPATIENT)
Dept: FAMILY MEDICINE CLINIC | Facility: CLINIC | Age: 81
End: 2021-05-11
Payer: COMMERCIAL

## 2021-05-11 VITALS
TEMPERATURE: 98 F | HEART RATE: 84 BPM | BODY MASS INDEX: 28.71 KG/M2 | SYSTOLIC BLOOD PRESSURE: 150 MMHG | WEIGHT: 156 LBS | DIASTOLIC BLOOD PRESSURE: 60 MMHG | HEIGHT: 62 IN

## 2021-05-11 DIAGNOSIS — F41.9 ANXIETY: ICD-10-CM

## 2021-05-11 DIAGNOSIS — I10 ESSENTIAL HYPERTENSION: Primary | ICD-10-CM

## 2021-05-11 DIAGNOSIS — I25.10 ATHEROSCLEROSIS OF CORONARY ARTERY OF NATIVE HEART, UNSPECIFIED VESSEL OR LESION TYPE, UNSPECIFIED WHETHER ANGINA PRESENT: ICD-10-CM

## 2021-05-11 DIAGNOSIS — K21.9 GERD WITHOUT ESOPHAGITIS: ICD-10-CM

## 2021-05-11 DIAGNOSIS — F51.01 PRIMARY INSOMNIA: ICD-10-CM

## 2021-05-11 DIAGNOSIS — E78.5 HYPERLIPIDEMIA, UNSPECIFIED HYPERLIPIDEMIA TYPE: ICD-10-CM

## 2021-05-11 DIAGNOSIS — I10 HYPERTENSION, UNSPECIFIED TYPE: ICD-10-CM

## 2021-05-11 PROCEDURE — 3725F SCREEN DEPRESSION PERFORMED: CPT | Performed by: PHYSICIAN ASSISTANT

## 2021-05-11 PROCEDURE — 99214 OFFICE O/P EST MOD 30 MIN: CPT | Performed by: PHYSICIAN ASSISTANT

## 2021-05-11 RX ORDER — ESCITALOPRAM OXALATE 5 MG/1
5 TABLET ORAL DAILY
Qty: 30 TABLET | Refills: 3 | Status: SHIPPED | OUTPATIENT
Start: 2021-05-11 | End: 2021-10-11

## 2021-05-11 RX ORDER — LOSARTAN POTASSIUM 50 MG/1
50 TABLET ORAL 2 TIMES DAILY
Qty: 180 TABLET | Refills: 3 | Status: SHIPPED | OUTPATIENT
Start: 2021-05-11 | End: 2022-07-05

## 2021-05-11 RX ORDER — ATORVASTATIN CALCIUM 40 MG/1
40 TABLET, FILM COATED ORAL DAILY
Qty: 90 TABLET | Refills: 3 | Status: SHIPPED | OUTPATIENT
Start: 2021-05-11 | End: 2022-07-05

## 2021-05-11 RX ORDER — ATENOLOL 100 MG/1
100 TABLET ORAL DAILY
Qty: 90 TABLET | Refills: 3 | Status: SHIPPED | OUTPATIENT
Start: 2021-05-11 | End: 2022-05-06

## 2021-05-11 RX ORDER — OMEPRAZOLE 20 MG/1
20 CAPSULE, DELAYED RELEASE ORAL EVERY MORNING
Qty: 90 CAPSULE | Refills: 3 | Status: SHIPPED | OUTPATIENT
Start: 2021-05-11 | End: 2022-05-06

## 2021-05-11 NOTE — ASSESSMENT & PLAN NOTE
BP not controlled  Will increase atenolol to 100 mg once daily and have her wean off Elavil  Continue losartan twice daily 50 mg  Check in 5 weeks

## 2021-05-11 NOTE — ASSESSMENT & PLAN NOTE
Per geriatrics will wean off elavil  I will have pt take 1/2 tab of 10 mg for one week and then stop  Check in 5 weeks  If needed may add melatonin 3 mg at bedtime

## 2021-05-11 NOTE — PATIENT INSTRUCTIONS
Problem List Items Addressed This Visit        Digestive    GERD without esophagitis      Stable Prilosec refills given  Relevant Medications    omeprazole (PriLOSEC) 20 mg delayed release capsule       Cardiovascular and Mediastinum    Essential hypertension - Primary     BP not controlled  Will increase atenolol to 100 mg once daily and have her wean off Elavil  Continue losartan twice daily 50 mg  Check in 5 weeks  Relevant Medications    atenolol (TENORMIN) 100 mg tablet    losartan (COZAAR) 50 mg tablet    Other Relevant Orders    Ambulatory referral to Cardiology    Coronary atherosclerosis     Pt has not seen cardio since 2018  I will give info for [facility]  Relevant Medications    atenolol (TENORMIN) 100 mg tablet    Other Relevant Orders    Ambulatory referral to Cardiology       Other    Hyperlipidemia      Refills of Lipitor given blood work due in August          Relevant Medications    atorvastatin (LIPITOR) 40 mg tablet    Primary insomnia     Per geriatrics will wean off elavil  I will have pt take 1/2 tab of 10 mg for one week and then stop  Check in 5 weeks  If needed may add melatonin 3 mg at bedtime              Other Visit Diagnoses     Hypertension, unspecified type        Relevant Medications    atenolol (TENORMIN) 100 mg tablet    losartan (COZAAR) 50 mg tablet    Anxiety        Relevant Medications    escitalopram (LEXAPRO) 5 mg tablet

## 2021-05-11 NOTE — PROGRESS NOTES
Assessment and Plan:    All meds refilled as patient switching pharmacies  I will see her in 5 weeks  Problem List Items Addressed This Visit        Digestive    GERD without esophagitis      Stable Prilosec refills given  Relevant Medications    omeprazole (PriLOSEC) 20 mg delayed release capsule       Cardiovascular and Mediastinum    Essential hypertension - Primary     BP not controlled  Will increase atenolol to 100 mg once daily and have her wean off Elavil  Continue losartan twice daily 50 mg  Check in 5 weeks  Relevant Medications    atenolol (TENORMIN) 100 mg tablet    losartan (COZAAR) 50 mg tablet    Other Relevant Orders    Ambulatory referral to Cardiology    Coronary atherosclerosis     Pt has not seen cardio since 2018  I will give info for [facility]  Relevant Medications    atenolol (TENORMIN) 100 mg tablet    Other Relevant Orders    Ambulatory referral to Cardiology       Other    Hyperlipidemia      Refills of Lipitor given blood work due in August          Relevant Medications    atorvastatin (LIPITOR) 40 mg tablet    Primary insomnia     Per geriatrics will wean off elavil  I will have pt take 1/2 tab of 10 mg for one week and then stop  Check in 5 weeks  If needed may add melatonin 3 mg at bedtime  Other Visit Diagnoses     Hypertension, unspecified type        Relevant Medications    atenolol (TENORMIN) 100 mg tablet    losartan (COZAAR) 50 mg tablet    Anxiety        Relevant Medications    escitalopram (LEXAPRO) 5 mg tablet                 Diagnoses and all orders for this visit:    Essential hypertension  -     losartan (COZAAR) 50 mg tablet; Take 1 tablet (50 mg total) by mouth 2 (two) times a day  -     Ambulatory referral to Cardiology; Future    Primary insomnia    Hypertension, unspecified type  -     atenolol (TENORMIN) 100 mg tablet;  Take 1 tablet (100 mg total) by mouth daily    Hyperlipidemia, unspecified hyperlipidemia type  - atorvastatin (LIPITOR) 40 mg tablet; Take 1 tablet (40 mg total) by mouth daily    GERD without esophagitis  -     omeprazole (PriLOSEC) 20 mg delayed release capsule; Take 1 capsule (20 mg total) by mouth every morning    Anxiety  -     escitalopram (LEXAPRO) 5 mg tablet; Take 1 tablet (5 mg total) by mouth daily    Atherosclerosis of coronary artery of native heart, unspecified vessel or lesion type, unspecified whether angina present  -     Ambulatory referral to Cardiology; Future              Subjective:      Patient ID: Sandro wang a [de-identified] y o  female  CC:    Chief Complaint   Patient presents with    Follow-up     patient is here for a 4 week f/u re: chronic medical conditions  patients daughter would possibly like to wean pt  off  Amitriptyline  ak       HPI:      Patient here today accompanied by her daughter  They did just see Geriatrics for evaluation on April 22nd and they recommended weaning off amitriptyline and if needed for sleep trial melatonin instead as this can elevate blood pressure  Also blood pressure control needs to be improved as patient has history of vascular events  They recommend decreasing blood pressure to at least systolic below 268  Patient is currently living at [facility]  She has been there for 2 months now and is doing great she is walking more than normal for her getting her energy back getting dressed every day meeting in the dining room for meals attending activities such as bingo daily  Daughter states her stamina has been much better and when she goes to view her granddaughter's softball game she no longer needs the wheelchair and can actually use her walker Rollator  They are definitely interested in weaning off the Elavil for sleep and will try melatonin instead  Also it is questionable whether patient needs Lexapro continued at 5 mg  It was started at 10 mg in 2018 by Dr CHRISTIE and then in 2019 was decreased to 5 mg    Patient is doing so much better mentally that we will definitely consider weaning her off of this after the Elavil wean  No other complaints today  The following portions of the patient's history were reviewed and updated as appropriate: allergies, current medications, past family history, past medical history, past social history, past surgical history and problem list       Review of Systems   Constitutional: Negative  HENT: Negative  Eyes: Negative  Respiratory: Negative  Cardiovascular: Negative  Gastrointestinal: Negative  Endocrine: Negative  Genitourinary: Negative  Musculoskeletal: Negative  Skin: Negative  Allergic/Immunologic: Negative  Neurological: Negative  Hematological: Negative  Psychiatric/Behavioral: Negative  Data to review:       Objective:    Vitals:    05/11/21 1550 05/11/21 1615   BP: 162/60 150/60   Pulse: 84    Temp: 98 °F (36 7 °C)    Weight: 70 8 kg (156 lb)    Height: 5' 2 25" (1 581 m)         Physical Exam  Vitals signs and nursing note reviewed  Constitutional:       Appearance: Normal appearance  She is well-developed  HENT:      Head: Normocephalic and atraumatic  Eyes:      General: Lids are normal       Conjunctiva/sclera: Conjunctivae normal       Pupils: Pupils are equal, round, and reactive to light  Cardiovascular:      Rate and Rhythm: Normal rate and regular rhythm  Heart sounds: No murmur  Pulmonary:      Effort: Pulmonary effort is normal       Breath sounds: Normal breath sounds  Skin:     General: Skin is warm and dry  Neurological:      General: No focal deficit present  Mental Status: She is alert  Coordination: Coordination is intact  Psychiatric:         Mood and Affect: Mood normal          Behavior: Behavior normal  Behavior is cooperative  Thought Content:  Thought content normal          Judgment: Judgment normal              Falls Plan of Care: balance, strength, and gait training instructions were provided

## 2021-05-11 NOTE — ASSESSMENT & PLAN NOTE
Pt has been doing very well on lexapro 5 mg since 2019  We will have pt stop elavil and then check mood in 5 weeks  If stable will stop lexapro by taking 1/2 tab daily for 2 weeks and then stop

## 2021-05-19 ENCOUNTER — TELEPHONE (OUTPATIENT)
Dept: NEPHROLOGY | Facility: CLINIC | Age: 81
End: 2021-05-19

## 2021-05-19 NOTE — TELEPHONE ENCOUNTER
I left detailed message for patient to have lab done prior to office visit 05/26/21, informed if going through [facility] lab scripts are in system if going elsewhere asked to call us so we can send accordingly

## 2021-05-20 NOTE — TELEPHONE ENCOUNTER
Patient's daughter called back and stated she will be taking her to [address] [place] [address] lab either tomorrow 5/21 or Monday 5/24, I faxed order to [address] [place] [address] in [place]    f- [phone]

## 2021-05-24 ENCOUNTER — TELEPHONE (OUTPATIENT)
Dept: NEPHROLOGY | Facility: CLINIC | Age: 81
End: 2021-05-24

## 2021-05-24 DIAGNOSIS — E87.1 HYPONATREMIA: ICD-10-CM

## 2021-05-24 DIAGNOSIS — E22.2 SIADH (SYNDROME OF INAPPROPRIATE ADH PRODUCTION) (HCC): ICD-10-CM

## 2021-05-24 RX ORDER — SODIUM CHLORIDE 1000 MG
2 TABLET, SOLUBLE MISCELLANEOUS 2 TIMES DAILY
Qty: 360 TABLET | Refills: 3 | Status: SHIPPED | OUTPATIENT
Start: 2021-05-24 | End: 2022-05-05

## 2021-05-24 RX ORDER — SODIUM CHLORIDE 1000 MG
2 TABLET, SOLUBLE MISCELLANEOUS 2 TIMES DAILY
Qty: 120 TABLET | Refills: 4 | Status: CANCELLED | OUTPATIENT
Start: 2021-05-24

## 2021-05-24 NOTE — TELEPHONE ENCOUNTER
New script of salt tablets 2 gm bid was sent to [facility] pharmacy reviewed last note from advanced practitioner Sarah from November 2020, dose of salt tablet was increased to 2 g twice daily during the office visit

## 2021-05-24 NOTE — TELEPHONE ENCOUNTER
Patient switch pharmacy so [facility] pharmacy call to verified her script of sodium chloride 1g tablet 2 tab by mouth 2 times a day  [facility] need  a new script  They have 1 tab 3 times a day  Please advise

## 2021-05-25 LAB
BUN SERPL-MCNC: 10 MG/DL (ref 7–25)
BUN/CREAT SERPL: ABNORMAL (CALC) (ref 6–22)
CALCIUM SERPL-MCNC: 9.3 MG/DL (ref 8.6–10.4)
CHLORIDE SERPL-SCNC: 99 MMOL/L (ref 98–110)
CO2 SERPL-SCNC: 23 MMOL/L (ref 20–32)
CREAT SERPL-MCNC: 0.61 MG/DL (ref 0.6–0.88)
GLUCOSE SERPL-MCNC: 80 MG/DL (ref 65–99)
POTASSIUM SERPL-SCNC: 4.4 MMOL/L (ref 3.5–5.3)
SL AMB EGFR AFRICAN AMERICAN: 99 ML/MIN/1.73M2
SL AMB EGFR NON AFRICAN AMERICAN: 86 ML/MIN/1.73M2
SODIUM SERPL-SCNC: 132 MMOL/L (ref 135–146)

## 2021-05-25 NOTE — TELEPHONE ENCOUNTER
Call patient and left her a voicemail to call the office relating her medication being sent to the pharmacy

## 2021-05-26 ENCOUNTER — OFFICE VISIT (OUTPATIENT)
Dept: NEPHROLOGY | Facility: CLINIC | Age: 81
End: 2021-05-26
Payer: COMMERCIAL

## 2021-05-26 VITALS
HEART RATE: 75 BPM | WEIGHT: 159 LBS | SYSTOLIC BLOOD PRESSURE: 138 MMHG | DIASTOLIC BLOOD PRESSURE: 60 MMHG | HEIGHT: 62 IN | BODY MASS INDEX: 29.26 KG/M2

## 2021-05-26 DIAGNOSIS — E22.2 SIADH (SYNDROME OF INAPPROPRIATE ADH PRODUCTION) (HCC): ICD-10-CM

## 2021-05-26 DIAGNOSIS — I10 ESSENTIAL HYPERTENSION: ICD-10-CM

## 2021-05-26 DIAGNOSIS — E87.1 HYPONATREMIA: Primary | ICD-10-CM

## 2021-05-26 PROCEDURE — 99213 OFFICE O/P EST LOW 20 MIN: CPT | Performed by: INTERNAL MEDICINE

## 2021-05-26 NOTE — PROGRESS NOTES
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  SIADH likely from use of Lexapro + PPI  -CT abdomen pelvis with contrast from 2019 was negative for malignancy  CT head from July 2020 showed stable ventriculoperitoneal shunt  - urine sodium from 12/05/2018 was 59 in urine osmolality 239  Previously checked TSH within normal limit  Urine studies  Suggestive of SIADH  -currently on tablets 2 grams twice daily and fluid restriction less than 40 ounces per day  -discussed about decreasing omeprazole to every other day possible switching to Pepcid as PPI can causing hyponatremia  -last sodium level dropped to 132 mEq per liter  Was also recently treated with amytryptiline which could be contributing  Will repeat blood work again in 4 weeks, 8 weeks  Follow-up in 4 months with repeat labs with advanced practitioner  -renal function has been stable  Last UA was negative for protein  Primary Hypertension:   -Current medication: losartan 50 mg bid + Atenolol  100 mg daily  -Current blood pressure: Stable and at goal   -Plan:    · Continue current medication, blood pressure is currently at goal   As per patient's daughter and complaining the patient dose of atenolol was recently increased  And  the blood pressure has improved  · Monitor for proteinuria  -Recommend 2 g sodium diet  -Recommend daily exercise and weight loss  -Discussed home monitoring of BP and maintaining a log of blood pressure   -Recommend goal BP less than 130/80       Follow-up in 4 months repeat BMP    There are no Patient Instructions on file for this visit  Diagnoses and all orders for this visit:    Hyponatremia  -     Basic metabolic panel; Future  -     Basic metabolic panel; Future  -     Basic metabolic panel;  Future  -     Protein / creatinine ratio, urine; Future    SIADH (syndrome of inappropriate ADH production) ([facility])  -     Basic metabolic panel; Future  -     Basic metabolic panel; Future    Essential hypertension  -     Basic metabolic panel; Future  -     Protein / creatinine ratio, urine; Future            SUBJECTIVE / HPI:  Emani Mora is a [de-identified] y o   female history of hypertension, hyperlipidemia depression, history of normal pressure hydrocephalus presenting follow-up of hyponatremia  She was last seen by me in 2019 has been following with advanced practitioner, was last seen in 2020  Reviewed blood work from 05/24/2021 from [facility]  Renal function stable at creatinine 0 6 but sodium level has trended down to 132 mEq/liter    Patient denies any new complaints  Denies any lower extremity edema  Feeling better, has good appetite  Has not been watching fluid restriction    REVIEW OF SYSTEMS:    Review of Systems   Constitutional: Negative for activity change, appetite change, chills, diaphoresis, fatigue and fever  HENT: Negative for congestion, facial swelling and nosebleeds  Eyes: Negative for pain and visual disturbance  Respiratory: Negative for cough, chest tightness and shortness of breath  Cardiovascular: Negative for chest pain and palpitations  Gastrointestinal: Negative for abdominal distention, abdominal pain, diarrhea, nausea and vomiting  Genitourinary: Negative for difficulty urinating, dysuria, flank pain, frequency and hematuria  Musculoskeletal: Negative for arthralgias, back pain and joint swelling  Skin: Negative for rash  Neurological: Negative for dizziness, seizures, syncope, weakness and headaches  Psychiatric/Behavioral: Negative for agitation and confusion  The patient is not nervous/anxious  More than 10 point review of systems were obtained and discussed in length with the patient  Complete review of systems were negative / unremarkable except mentioned above         PAST MEDICAL HISTORY:  Past Medical History:   Diagnosis Date    Anxiety     Arthritis     Confusion 10/2/2018    Depression     Displaced fracture of distal phalanx of right thumb     GERD (gastroesophageal reflux disease)     Heart attack ([facility] [address] )     Hyperlipidemia     Hypertension     Moderate episode of recurrent major depressive disorder ([facility] [address] ) 2019    Shortness of breath     Stage 2 chronic kidney disease 2019       PAST SURGICAL HISTORY:  Past Surgical History:   Procedure Laterality Date    APPENDECTOMY      CARPAL TUNNEL RELEASE      CATARACT EXTRACTION Bilateral     COLONOSCOPY      FL LUMBAR PUNCTURE DIAGNOSTIC  1/10/2019    FRACTURE SURGERY      IN CREATE SHUNT:VENTRIC-PERITONEAL Right 9/3/2019    Procedure:  Insertion of frontal ventriculoperitoneal shunt;  Surgeon: Uriel Rosario MD;  [facility]: [facility];  Service: Neurosurgery    SEPTOPLASTY      WRIST FRACTURE SURGERY Right        SOCIAL HISTORY:  Social History     Substance and Sexual Activity   Alcohol Use Not Currently    Frequency: Monthly or less    Drinks per session: 1 or 2    Binge frequency: Never     Social History     Substance and Sexual Activity   Drug Use No     Social History     Tobacco Use   Smoking Status Former Smoker    Packs/day: 0 00    Years: 0 00    Pack years: 0 00    Types: Cigarettes    Quit date: 56    Years since quittin 4   Smokeless Tobacco Never Used   Tobacco Comment    no secondhand smoke exposure       FAMILY HISTORY:  Family History   Problem Relation Age of Onset    Heart attack Mother 39    Heart attack Father 61    No Known Problems Other     Breast cancer Sister     Alcohol abuse Daughter         history of    Heart attack Brother        MEDICATIONS:    Current Outpatient Medications:     acetaminophen (TYLENOL) 325 mg tablet, Take 2 tablets (650 mg total) by mouth every 6 (six) hours as needed for mild pain, moderate pain, headaches or fever, Disp: 30 tablet, Rfl: 0   aspirin 81 mg chewable tablet, CHEW 1 TABLET AND SWALLOW ORALLY DAILY (HYPERTENSION), Disp: 30 tablet, Rfl: 4    atenolol (TENORMIN) 100 mg tablet, Take 1 tablet (100 mg total) by mouth daily, Disp: 90 tablet, Rfl: 3    atorvastatin (LIPITOR) 40 mg tablet, Take 1 tablet (40 mg total) by mouth daily, Disp: 90 tablet, Rfl: 3    D3-1000 1000 units tablet, TAKE 1 TABLET ORALLY DAILY (SUPPLEMENT) * (Patient taking differently: Morning), Disp: 30 tablet, Rfl: 4    escitalopram (LEXAPRO) 5 mg tablet, Take 1 tablet (5 mg total) by mouth daily, Disp: 30 tablet, Rfl: 3    losartan (COZAAR) 50 mg tablet, Take 1 tablet (50 mg total) by mouth 2 (two) times a day, Disp: 180 tablet, Rfl: 3    multivitamin (THERAGRAN) TABS, Take 1 tablet by mouth daily , Disp: , Rfl:     omeprazole (PriLOSEC) 20 mg delayed release capsule, Take 1 capsule (20 mg total) by mouth every morning, Disp: 90 capsule, Rfl: 3    sodium chloride 1 g tablet, Take 2 tablets (2 g total) by mouth 2 (two) times a day, Disp: 360 tablet, Rfl: 3      PHYSICAL EXAM:  There were no vitals filed for this visit  There is no height or weight on file to calculate BMI  Physical Exam  Constitutional:       General: She is not in acute distress  Appearance: Normal appearance  She is well-developed  HENT:      Head: Normocephalic and atraumatic  Nose: Nose normal       Mouth/Throat:      Mouth: Mucous membranes are moist    Eyes:      General: No scleral icterus  Conjunctiva/sclera: Conjunctivae normal       Pupils: Pupils are equal, round, and reactive to light  Neck:      Musculoskeletal: Neck supple  Thyroid: No thyromegaly  Vascular: No JVD  Cardiovascular:      Rate and Rhythm: Normal rate and regular rhythm  Heart sounds: Normal heart sounds  No murmur  No friction rub  Pulmonary:      Effort: Pulmonary effort is normal  No respiratory distress  Breath sounds: Normal breath sounds  No wheezing or rales     Abdominal: General: Bowel sounds are normal  There is no distension  Palpations: Abdomen is soft  Tenderness: There is no abdominal tenderness  Musculoskeletal:         General: No deformity  Right lower leg: No edema  Left lower leg: No edema  Skin:     General: Skin is warm and dry  Findings: No rash  Neurological:      Mental Status: She is alert and oriented to person, place, and time  Psychiatric:         Mood and Affect: Mood normal          Behavior: Behavior normal          Thought Content:  Thought content normal          Lab Results:   Results for orders placed or performed in visit on 77/11/06   Basic metabolic panel   Result Value Ref Range    Glucose, Random 80 65 - 99 mg/dL    BUN 10 7 - 25 mg/dL    Creatinine 0 61 0 60 - 0 88 mg/dL    eGFR Non  86 > OR = 60 mL/min/1 73m2    eGFR  99 > OR = 60 mL/min/1 73m2    SL AMB BUN/CREATININE RATIO NOT APPLICABLE 6 - 22 (calc)    Sodium 132 (L) 135 - 146 mmol/L    Potassium 4 4 3 5 - 5 3 mmol/L    Chloride 99 98 - 110 mmol/L    CO2 23 20 - 32 mmol/L    Calcium 9 3 8 6 - 10 4 mg/dL

## 2021-05-26 NOTE — PATIENT INSTRUCTIONS
Sodium level stable  Continue current dose of salt tablets 2 gram twice daily, recommend fluid restriction less than 40 ounces per day  Will check blood work in 4 weeks, 8 weeks  Follow-up in 4 months with repeat blood work  Blood pressure is stable  Continue current medications  Recommend decreasing omeprazole to every other day if possible  May also try switching omeprazole to Pepcid to help sodium level

## 2021-06-09 ENCOUNTER — RA CDI HCC (OUTPATIENT)
Dept: OTHER | Facility: HOSPITAL | Age: 81
End: 2021-06-09

## 2021-06-09 NOTE — PROGRESS NOTES
[facility]  coding opportunities          Chart reviewed, no opportunity found: CHART REVIEWED, NO OPPORTUNITY FOUND              Patients insurance company: [employer] only)

## 2021-06-14 ENCOUNTER — TELEPHONE (OUTPATIENT)
Dept: NEUROSURGERY | Facility: CLINIC | Age: 81
End: 2021-06-14

## 2021-06-14 NOTE — TELEPHONE ENCOUNTER
----- Message from Lorene Luu RN sent at 7/31/2020 11:52 AM EDT -----  Regarding: N[facility]-1 yr f/u (due in July 2021)  Please contact patient for 1 year f/u through [facility] clinic due July 2021

## 2021-06-14 NOTE — TELEPHONE ENCOUNTER
LM for daughter Bhavna to call back to schedule yearly follow up  If I do not hear back, will contact daughter Carroll Monahan to coordinate appointments

## 2021-06-15 ENCOUNTER — OFFICE VISIT (OUTPATIENT)
Dept: FAMILY MEDICINE CLINIC | Facility: CLINIC | Age: 81
End: 2021-06-15
Payer: COMMERCIAL

## 2021-06-15 VITALS
DIASTOLIC BLOOD PRESSURE: 66 MMHG | SYSTOLIC BLOOD PRESSURE: 140 MMHG | HEIGHT: 62 IN | TEMPERATURE: 98.2 F | HEART RATE: 96 BPM | BODY MASS INDEX: 28.89 KG/M2 | WEIGHT: 157 LBS

## 2021-06-15 DIAGNOSIS — F51.01 PRIMARY INSOMNIA: ICD-10-CM

## 2021-06-15 DIAGNOSIS — I10 ESSENTIAL HYPERTENSION: Primary | ICD-10-CM

## 2021-06-15 DIAGNOSIS — K21.9 GERD WITHOUT ESOPHAGITIS: ICD-10-CM

## 2021-06-15 PROCEDURE — 1036F TOBACCO NON-USER: CPT | Performed by: PHYSICIAN ASSISTANT

## 2021-06-15 PROCEDURE — 3077F SYST BP >= 140 MM HG: CPT | Performed by: PHYSICIAN ASSISTANT

## 2021-06-15 PROCEDURE — 99213 OFFICE O/P EST LOW 20 MIN: CPT | Performed by: PHYSICIAN ASSISTANT

## 2021-06-15 PROCEDURE — 3078F DIAST BP <80 MM HG: CPT | Performed by: PHYSICIAN ASSISTANT

## 2021-06-15 PROCEDURE — 1160F RVW MEDS BY RX/DR IN RCRD: CPT | Performed by: PHYSICIAN ASSISTANT

## 2021-06-15 NOTE — ASSESSMENT & PLAN NOTE
Pt on QOD of PPI per Nephrology  If no breakthrough GERD symptoms pt can go 3 times a week and then may stop in the future

## 2021-06-15 NOTE — PROGRESS NOTES
Assessment and Plan:    Problem List Items Addressed This Visit        Digestive    GERD without esophagitis     Pt on QOD of PPI per Nephrology  If no breakthrough GERD symptoms pt can go 3 times a week and then may stop in the future  Cardiovascular and Mediastinum    Essential hypertension - Primary       Much improved with the increase of to [facility] to 100 mg once daily and continuation of Cozaar 50 mg twice daily  Patient has cardiology appointment upcoming  Other    Primary insomnia     Pt no longer taking elavil and only on melatonin  Diagnoses and all orders for this visit:    Essential hypertension    Primary insomnia    GERD without esophagitis              Subjective:      Patient ID: Yulia Bergeron is a [de-identified] y o  female  CC:    Chief Complaint   Patient presents with    Follow-up     f/u on medication changed        HPI:    Patient here today for a 1 month blood pressure check after increasing her tenormin medication to a full 100mg   She states that she is doing very well she did see Nephrology in her blood pressure was better then and is definitely better today as well  She has no complaints today  The following portions of the patient's history were reviewed and updated as appropriate: allergies, current medications, past family history, past medical history, past social history, past surgical history and problem list       Review of Systems   Constitutional: Negative  HENT: Negative  Eyes: Negative  Respiratory: Negative  Cardiovascular: Negative  Gastrointestinal: Negative  Endocrine: Negative  Genitourinary: Negative  Musculoskeletal: Negative  Skin: Negative  Allergic/Immunologic: Negative  Neurological: Negative  Hematological: Negative  Psychiatric/Behavioral: Negative            Data to review:       Objective:    Vitals:    06/15/21 1607   BP: 140/66   BP Location: Left arm   Patient Position: Sitting Cuff Size: Adult   Pulse: 96   Temp: 98 2 °F (36 8 °C)   TempSrc: Probe   Weight: 71 2 kg (157 lb)   Height: 5' 2 25" (1 581 m)        Physical Exam  Vitals and nursing note reviewed  Constitutional:       Appearance: Normal appearance  She is well-developed  HENT:      Head: Normocephalic and atraumatic  Eyes:      General: Lids are normal       Conjunctiva/sclera: Conjunctivae normal       Pupils: Pupils are equal, round, and reactive to light  Cardiovascular:      Rate and Rhythm: Normal rate and regular rhythm  Heart sounds: No murmur heard  Pulmonary:      Effort: Pulmonary effort is normal       Breath sounds: Normal breath sounds  Skin:     General: Skin is warm and dry  Neurological:      General: No focal deficit present  Mental Status: She is alert  Coordination: Coordination is intact  Psychiatric:         Mood and Affect: Mood normal          Behavior: Behavior normal  Behavior is cooperative  Thought Content:  Thought content normal          Judgment: Judgment normal

## 2021-06-15 NOTE — ASSESSMENT & PLAN NOTE
Much improved with the increase of to [facility] to 100 mg once daily and continuation of Cozaar 50 mg twice daily  Patient has cardiology appointment upcoming

## 2021-06-15 NOTE — PATIENT INSTRUCTIONS
Problem List Items Addressed This Visit        Digestive    GERD without esophagitis     Pt on QOD of PPI per Nephrology  If no breakthrough GERD symptoms pt can go 3 times a week and then may stop in the future  Cardiovascular and Mediastinum    Essential hypertension - Primary       Much improved with the increase of to [facility] to 100 mg once daily and continuation of Cozaar 50 mg twice daily  Patient has cardiology appointment upcoming  Other    Primary insomnia     Pt no longer taking elavil and only on melatonin

## 2021-06-16 NOTE — TELEPHONE ENCOUNTER
LM for daughter Audrey Tiwari to call back at her convenience to schedule Brenda's yearly f/u due in July

## 2021-06-21 ENCOUNTER — TELEPHONE (OUTPATIENT)
Dept: NEUROSURGERY | Facility: CLINIC | Age: 81
End: 2021-06-21

## 2021-06-21 ENCOUNTER — TRANSCRIBE ORDERS (OUTPATIENT)
Dept: NEUROSURGERY | Facility: CLINIC | Age: 81
End: 2021-06-21

## 2021-06-21 DIAGNOSIS — R26.9 GAIT DISTURBANCE: Primary | ICD-10-CM

## 2021-06-21 NOTE — TELEPHONE ENCOUNTER
Patient due for yearly follow up in July  Scheduled with daughter Darcy Abebe for Thursday 7/22/21 PT @ 10am and appt with Skeet Needs at 1045am   Daughter is aware that it will be with a PA they prefer [facility] location for convenience  She was appreciative for the coordination

## 2021-06-21 NOTE — TELEPHONE ENCOUNTER
----- Message from Robb Ledezma RN sent at 6/18/2021 11:56 AM EDT -----  Regarding: daughter called back to schedule  Call Bhavna back to schedule her mother for f/u

## 2021-06-27 LAB
BUN SERPL-MCNC: 12 MG/DL (ref 7–25)
BUN/CREAT SERPL: ABNORMAL (CALC) (ref 6–22)
CALCIUM SERPL-MCNC: 9.8 MG/DL (ref 8.6–10.4)
CHLORIDE SERPL-SCNC: 98 MMOL/L (ref 98–110)
CO2 SERPL-SCNC: 27 MMOL/L (ref 20–32)
CREAT SERPL-MCNC: 0.67 MG/DL (ref 0.6–0.88)
GLUCOSE SERPL-MCNC: 104 MG/DL (ref 65–139)
POTASSIUM SERPL-SCNC: 5.2 MMOL/L (ref 3.5–5.3)
SL AMB EGFR AFRICAN AMERICAN: 96 ML/MIN/1.73M2
SL AMB EGFR NON AFRICAN AMERICAN: 83 ML/MIN/1.73M2
SODIUM SERPL-SCNC: 132 MMOL/L (ref 135–146)

## 2021-06-29 ENCOUNTER — TELEPHONE (OUTPATIENT)
Dept: NEPHROLOGY | Facility: CLINIC | Age: 81
End: 2021-06-29

## 2021-06-29 ENCOUNTER — CONSULT (OUTPATIENT)
Dept: CARDIOLOGY CLINIC | Facility: CLINIC | Age: 81
End: 2021-06-29
Payer: COMMERCIAL

## 2021-06-29 VITALS
HEIGHT: 62 IN | SYSTOLIC BLOOD PRESSURE: 158 MMHG | HEART RATE: 76 BPM | WEIGHT: 155 LBS | DIASTOLIC BLOOD PRESSURE: 60 MMHG | BODY MASS INDEX: 28.52 KG/M2

## 2021-06-29 DIAGNOSIS — I70.8 OCCLUSION OF CELIAC ARTERY: Primary | ICD-10-CM

## 2021-06-29 DIAGNOSIS — D75.839 THROMBOCYTOSIS: ICD-10-CM

## 2021-06-29 DIAGNOSIS — I25.10 ATHEROSCLEROSIS OF CORONARY ARTERY OF NATIVE HEART, UNSPECIFIED VESSEL OR LESION TYPE, UNSPECIFIED WHETHER ANGINA PRESENT: ICD-10-CM

## 2021-06-29 DIAGNOSIS — E22.2 SIADH (SYNDROME OF INAPPROPRIATE ADH PRODUCTION) (HCC): Primary | ICD-10-CM

## 2021-06-29 DIAGNOSIS — I65.29 CAROTID ATHEROSCLEROSIS, UNSPECIFIED LATERALITY: ICD-10-CM

## 2021-06-29 DIAGNOSIS — Z98.2 S/P VP SHUNT: ICD-10-CM

## 2021-06-29 DIAGNOSIS — F32.A ANXIETY AND DEPRESSION: ICD-10-CM

## 2021-06-29 DIAGNOSIS — N18.2 STAGE 2 CHRONIC KIDNEY DISEASE: ICD-10-CM

## 2021-06-29 DIAGNOSIS — G31.84 MCI (MILD COGNITIVE IMPAIRMENT): ICD-10-CM

## 2021-06-29 DIAGNOSIS — R01.1 CARDIAC MURMUR: ICD-10-CM

## 2021-06-29 DIAGNOSIS — E87.1 CHRONIC HYPONATREMIA: Chronic | ICD-10-CM

## 2021-06-29 DIAGNOSIS — K55.1 SUPERIOR MESENTERIC ARTERY STENOSIS (HCC): ICD-10-CM

## 2021-06-29 DIAGNOSIS — F41.9 ANXIETY AND DEPRESSION: ICD-10-CM

## 2021-06-29 DIAGNOSIS — E22.2 SIADH (SYNDROME OF INAPPROPRIATE ADH PRODUCTION) (HCC): ICD-10-CM

## 2021-06-29 DIAGNOSIS — E87.1 HYPONATREMIA: ICD-10-CM

## 2021-06-29 DIAGNOSIS — I10 ESSENTIAL HYPERTENSION: ICD-10-CM

## 2021-06-29 DIAGNOSIS — D73.5 SPLENIC INFARCTION: ICD-10-CM

## 2021-06-29 DIAGNOSIS — R70.0 ELEVATED SED RATE: ICD-10-CM

## 2021-06-29 DIAGNOSIS — R76.8 POSITIVE ANA (ANTINUCLEAR ANTIBODY): ICD-10-CM

## 2021-06-29 PROCEDURE — 1036F TOBACCO NON-USER: CPT

## 2021-06-29 PROCEDURE — 99204 OFFICE O/P NEW MOD 45 MIN: CPT

## 2021-06-29 PROCEDURE — 3077F SYST BP >= 140 MM HG: CPT

## 2021-06-29 PROCEDURE — 3078F DIAST BP <80 MM HG: CPT

## 2021-06-29 PROCEDURE — 93000 ELECTROCARDIOGRAM COMPLETE: CPT

## 2021-06-29 RX ORDER — TORSEMIDE 10 MG/1
10 TABLET ORAL DAILY
Qty: 90 TABLET | Refills: 3 | Status: SHIPPED | OUTPATIENT
Start: 2021-06-29 | End: 2022-06-27

## 2021-06-29 NOTE — PROGRESS NOTES
Cardiology Consultation   MD Catalina Schmitt MD Leandro Combs, DO, MD Aryan Pack DO, Holley Griffith DO, [facility] [place]  -------------------------------------------------------------------  [facility]  [address]  [place], [place] [id number] [phone]  21  Kia Ford  YOB: 1940   MRN: [id number]      Referring Physician: Katia Davies PA-C  [address] [place] [id number]     HPI:  I am seeing this patient in cardiology consultation for:  Coronary artery disease    Kia Ford is a [de-identified] y o  female with prior history of myocardial infarction in , no significant reduction of LV function however by most recent echo which is from 2017 at [facility] shows EF 70%  Stress test at that time as well showed no evidence of ischemia  ECG today shows normal sinus rhythm and is normal     Other history includes SIADH, hypertension, GERD, normal pressure hydrocephalus status post  shunt    She is a prior smoker, smoked about a pack per day for 42 years, quit 20 years ago  Etoh: denies  Drugs: none    Family history: Mother  at the age of 55 from myocardial infarction, her father  from heart attack in his 62s    Today she states that she feels well  Denies any significant substernal chest pain or chest pressure or limiting shortness of breath no lower extremity edema  Her blood pressure has been elevated for quite some time and she follows closely with Nephrology as well as her primary care Tom  Review of Systems   Constitutional: Negative for chills and fever  HENT: Negative for facial swelling and sore throat  Eyes: Negative for visual disturbance  Respiratory: Negative for cough, chest tightness, shortness of breath and wheezing  Cardiovascular: Negative for chest pain, palpitations and leg swelling     Gastrointestinal: Negative for abdominal pain, blood in stool, constipation, diarrhea, nausea and vomiting  Endocrine: Negative for cold intolerance and heat intolerance  Genitourinary: Negative for decreased urine volume, difficulty urinating, dysuria and hematuria  Musculoskeletal: Positive for gait problem  Negative for arthralgias, back pain and myalgias  Skin: Negative for rash  Neurological: Negative for dizziness, syncope, weakness and numbness  Psychiatric/Behavioral: Negative for agitation, behavioral problems and confusion  The patient is not nervous/anxious  OBJECTIVE  Vitals:    06/29/21 1451   BP: 158/60   Pulse: 76       Physical Exam   Constitutional: awake, alert and oriented, in no acute distress, no obvious deformities, elderly female  Head: Normocephalic, without obvious abnormality, atraumatic  Eyes: conjunctivae clear and moist  Sclera anicteric  No xanthelasmas  Pupils equal bilaterally  Extraocular motions are full  Ear nose mouth and throat: ears are symmetrical bilaterally, hearing appears to be equal bilaterally, no nasal discharge or epistaxis, oropharynx is clear with moist mucous membranes  Neck:  Trachea is midline, neck is supple, no thyromegaly or significant lymphadenopathy, there is full range of motion  Lungs: clear to auscultation bilaterally, no wheezes, no rales, no rhonchi, no accessory muscle use, breathing is nonlabored  Heart: regular rate and rhythm, S1, S2 normal, no murmur, click, rub or gallop, no lower extremity edema  Abdomen: soft, non-tender; bowel sounds normal; no masses,  no organomegaly  Psychiatric:  Patient is oriented to time, place, person, mood/affect is negative for depression, anxiety, agitation, appears to have appropriate insight  Skin: Skin is warm, dry, intact  No obvious rashes or lesions on exposed extremities  Nail beds are pink with no cyanosis or clubbing  EKG:  No results found for this visit on 06/29/21       The ASCVD Risk score (Samantha Aguilar et al , 2013) failed to calculate for the following reasons: The 2013 ASCVD risk score is only valid for ages 36 to 78    IMPRESSION:  · CAD / MI 1998 - no cath/PCI - medical mgmt  · SIADH  · hypertension  · GERD  · normal pressure hydrocephalus status post  shunt  · Cardiac murmur    Echo 2017, EF 70%  Nuclear stress 2017, nonischemic    DISCUSSION/RECOMMENDATIONS:   Overall at this time she appears to be doing reasonably well  Her ECG is normal, her echo from 2017 shows a preserved ejection fraction of 70%, stress test from that time as well shows no evidence of ischemia   She has no angina symptoms at all currently, denies any, substernal chest pain or chest pressure at rest or with exertion  She does use a rolling walker for ambulation   Her blood pressure has been elevated and she follows closely with her primary care provider as well as with her nephrologist given that she has SIADH   On physical examination today she does have a cardiac murmur in the aortic position, I do suspect she may have developing aortic stenosis  Last echo was from about 4 years ago, recommendation in this situation is to repeat and re-evaluate for valvular heart disease  New echo will be ordered today   Would hold off on ordering new stress test as she does not have any type of angina symptoms at this time and she has a normal electrocardiogram   Reviewed her recent lipid panel, her LDL cholesterol is very well controlled however triglycerides were very elevated at 329  This is in February of 2021 which is when she was still living at home and having some dietary indiscretions    She has repeat lipid panel ordered for July  At this point I would just repeat these numbers now that she has changed her diet significantly to see if they have improved  In her, with a history in the past of myocardial infarction, reasonably we should have her triglyceride goal around 150    If she is still elevated over this, we could consider adding fish oil to her regimen   Plan for follow-up after echo was performed in afternoon lipid panel to review the results in person, re-evaluate her and make further recommendations at that point    Jovita Wu DO, [facility] [place]  --------------------------------------------------------------------------------  TREADMILL STRESS  No results found for this or any previous visit      ----------------------------------------------------------------------------------------------  NUCLEAR STRESS TEST: No results found for this or any previous visit  No results found for this or any previous visit       --------------------------------------------------------------------------------  CATH:  No results found for this or any previous visit     --------------------------------------------------------------------------------  ECHO:   No results found for this or any previous visit  No results found for this or any previous visit     --------------------------------------------------------------------------------  HOLTER  No results found for this or any previous visit     --------------------------------------------------------------------------------  CAROTIDS  Results for orders placed during the hospital encounter of 05/11/16    VAS carotid complete study    Narrative  [facility] REPORT  CLINICAL:  Indications:  Bruit [R09 [id number]]  Asymptomatic  Risk Factors  The patient has history of HTN, Diabetes, previous remote smoking, and  hyperlipidemia  Clinical  Left Pressure:  122/60 mm Hg  FINDINGS:    Right        Impression  PSV  EDV (cm/s)  Ratio  Dist  ICA                140          32   1 12  Mid  ICA                  95          28   0 76  Prox   ICA    1 - 49%     103          27   0 82  Dist CCA                  90          21  Mid CCA                  126          25   0 94  Prox CCA                 133          21  Ext Carotid              122           7   0 97  Prox Vert                 49 8  Subclavian               142           6    Left         Impression  PSV  EDV (cm/s)  Ratio  Dist  ICA                128          36   1 15  Mid  ICA                 109          31   0 98  Prox  ICA    1 - 49%     102          32   0 92  Dist CCA                 121          30  Mid CCA                  111          24   0 77  Prox CCA                 144          25  Ext Carotid               95           9   0 86  Prox Vert                 38           9  Subclavian               196          18        CONCLUSION:    Impression    RIGHT SIDE: There is a less than 50% stenosis in the internal carotid artery  Vertebral artery flow is antegrade  No significant subclavian artery disease  Plaque Structure: Heterogenous    LEFT SIDE: There is a less than 50% stenosis in the internal carotid artery  Vertebral artery flow is antegrade  No significant subclavian artery disease  Plaque Structure: Homogenous    In comparison to the study of 7/21/2014, there is no significant interval  change in the disease process  Internal carotid artery stenosis determination by consensus criteria from:  Tarik Ceja et al  Carotid Artery Stenosis: Gray-Scale and Doppler US Diagnosis  - Society of Radiologists in [address] [place] [address], Radiology 2003;  [phone]      SIGNATURE:  Electronically Signed by: Linda Molina on 2016-05-11 01:44:25 PM       Diagnoses and all orders for this visit:    Occlusion of celiac artery    Essential hypertension  -     Ambulatory referral to Cardiology  -     POCT ECG    Atherosclerosis of coronary artery of native heart, unspecified vessel or lesion type, unspecified whether angina present  -     Ambulatory referral to Cardiology  -     POCT ECG    Carotid atherosclerosis, unspecified laterality    SIADH (syndrome of inappropriate ADH production) ([facility] )    SMA stenosis    Thrombocytosis (HCC)    Stage 2 chronic kidney disease    Chronic hyponatremia    Anxiety and depression    Elevated sed rate    Splenic infarction    Positive ROSALINDA (antinuclear antibody)    S/P  shunt    MCI (mild cognitive impairment)    Other orders  -     Melatonin 1 MG CAPS; Take 3 mg by mouth       ======================================================    Past Medical History:   Diagnosis Date    Anxiety     Arthritis     Confusion 10/2/2018    Depression     Displaced fracture of distal phalanx of right thumb     GERD (gastroesophageal reflux disease)     Heart attack ([facility] )     Hyperlipidemia     Hypertension     Moderate episode of recurrent major depressive disorder ([facility] ) 4/12/2019    Shortness of breath     Stage 2 chronic kidney disease 7/17/2019     Past Surgical History:   Procedure Laterality Date    APPENDECTOMY      CARPAL TUNNEL RELEASE      CATARACT EXTRACTION Bilateral     COLONOSCOPY      FL LUMBAR PUNCTURE DIAGNOSTIC  1/10/2019    FRACTURE SURGERY      CO CREATE SHUNT:VENTRIC-PERITONEAL Right 9/3/2019    Procedure:  Insertion of frontal ventriculoperitoneal shunt;  Surgeon: Vicky Marquez MD;  [facility]: [facility];  Service: Neurosurgery    SEPTOPLASTY      WRIST FRACTURE SURGERY Right          Medications  Current Outpatient Medications   Medication Sig Dispense Refill    acetaminophen (TYLENOL) 325 mg tablet Take 2 tablets (650 mg total) by mouth every 6 (six) hours as needed for mild pain, moderate pain, headaches or fever 30 tablet 0    aspirin 81 mg chewable tablet CHEW 1 TABLET AND SWALLOW ORALLY DAILY (HYPERTENSION) 30 tablet 4    atenolol (TENORMIN) 100 mg tablet Take 1 tablet (100 mg total) by mouth daily 90 tablet 3    atorvastatin (LIPITOR) 40 mg tablet Take 1 tablet (40 mg total) by mouth daily 90 tablet 3    D3-1000 1000 units tablet TAKE 1 TABLET ORALLY DAILY (SUPPLEMENT) * (Patient taking differently: Morning) 30 tablet 4    escitalopram (LEXAPRO) 5 mg tablet Take 1 tablet (5 mg total) by mouth daily 30 tablet 3    losartan (COZAAR) 50 mg tablet Take 1 tablet (50 mg total) by mouth 2 (two) times a day 180 tablet 3    Melatonin 1 MG CAPS Take 3 mg by mouth      multivitamin (THERAGRAN) TABS Take 1 tablet by mouth daily       omeprazole (PriLOSEC) 20 mg delayed release capsule Take 1 capsule (20 mg total) by mouth every morning (Patient taking differently: Take 20 mg by mouth every other day ) 90 capsule 3    sodium chloride 1 g tablet Take 2 tablets (2 g total) by mouth 2 (two) times a day 360 tablet 3     No current facility-administered medications for this visit  No Known Allergies    Social History     Socioeconomic History    Marital status:      Spouse name: Not on file    Number of children: Not on file    Years of education: Not on file    Highest education level: Not on file   Occupational History    Occupation: Retired   Tobacco Use    Smoking status: Former Smoker     Packs/day: 0 00     Years: 0 00     Pack years: 0 00     Types: Cigarettes     Quit date:      Years since quittin 5    Smokeless tobacco: Never Used    Tobacco comment: no secondhand smoke exposure   Vaping Use    Vaping Use: Never used   Substance and Sexual Activity    Alcohol use: Not Currently    Drug use: No    Sexual activity: Not on file   Other Topics Concern    Not on file   Social History Narrative    Living alone     Social Determinants of Health     Financial Resource Strain:     Difficulty of Paying Living Expenses:    Food Insecurity:     Worried About [address] other things you bake in the Last Year:     [address] in the Last Year:    Transportation Needs:     Lack of Transportation (Medical):      Lack of Transportation (Non-Medical):    Physical Activity:     Days of Exercise per Week:     Minutes of Exercise per Session:    Stress:     Feeling of Stress :    Social Connections:     Frequency of Communication with Friends and Family:     Frequency of Social Gatherings with Friends and Family:     Attends [employer] Services:     Active Member of Clubs or Organizations:     Attends Club or Organization Meetings:     Marital Status:    Intimate Partner Violence:     Fear of Current or Ex-Partner:     Emotionally Abused:     Physically Abused:     Sexually Abused:         Family History   Problem Relation Age of Onset    Heart attack Mother 39    Heart attack Father 61    No Known Problems Other     Breast cancer Sister     Alcohol abuse Daughter         history of    Heart attack Brother        Lab Results   Component Value Date    WBC 8 6 02/12/2021    HGB 12 1 02/12/2021    HCT 36 0 02/12/2021    MCV 91 6 02/12/2021     (H) 02/12/2021      Lab Results   Component Value Date    SODIUM 132 (L) 06/26/2021    K 5 2 06/26/2021    CL 98 06/26/2021    CO2 27 06/26/2021    BUN 12 06/26/2021    CREATININE 0 67 06/26/2021    GLUC 104 06/26/2021    CALCIUM 9 8 06/26/2021      Lab Results   Component Value Date    HGBA1C 6 7 (H) 08/14/2019      Lab Results   Component Value Date    CHOL 165 12/01/2017    CHOL 149 04/19/2017    CHOL 159 10/19/2016     Lab Results   Component Value Date    HDL 40 (L) 02/12/2021    HDL 37 (L) 07/08/2020    HDL 44 (L) 10/29/2019     Lab Results   Component Value Date    LDLCALC 48 02/12/2021    LDLCALC 43 07/08/2020    LDLCALC 51 10/29/2019     Lab Results   Component Value Date    TRIG 329 (H) 02/12/2021    TRIG 261 (H) 07/08/2020    TRIG 285 (H) 10/29/2019     No results found for: [place], [place]   Lab Results   Component Value Date    INR 0 97 08/14/2019    INR 0 96 01/10/2019    PROTIME 13 0 08/14/2019    PROTIME 12 5 01/10/2019          Patient Active Problem List    Diagnosis Date Noted    Polypharmacy 04/23/2021    MCI (mild cognitive impairment) 04/22/2021    Hyperglycemia 02/16/2021    Primary insomnia 02/08/2021    SIADH (syndrome of inappropriate ADH production) ([facility] [address] ) 02/08/2021    Medicare annual wellness visit, subsequent 07/02/2020    Osteopenia of multiple sites 07/02/2020  Confusion and disorientation 07/02/2020    S/P  shunt 10/04/2019    Recurrent falls 08/14/2019    Hyperkalemia 08/14/2019    Leukocytosis 08/14/2019    Stage 2 chronic kidney disease 07/17/2019    Normal pressure hydrocephalus ([facility] ) 11/01/2018    Ambulatory dysfunction 12/15/2017    Positive ROSALINDA (antinuclear antibody) 12/15/2017    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    Anxiety and depression 10/20/2017    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    Splenic infarction 10/11/2017    Levoscoliosis 08/26/2017    Elevated sed rate 08/16/2017    Vitamin D deficiency 08/16/2017    Radiculopathy 08/16/2017    GERD without esophagitis 02/07/2017    Thrombocytosis ([facility] ) 01/28/2017    Carotid artery plaque 04/19/2016    Chronic hyponatremia 11/18/2014    Hyperlipidemia 07/17/2014    Carotid bruit 07/17/2014    Essential hypertension 11/03/2009    Coronary atherosclerosis 11/03/2009       Portions of the record may have been created with voice recognition software  Occasional wrong word or "sound a like" substitutions may have occurred due to the inherent limitations of voice recognition software  Read the chart carefully and recognize, using context, where substitutions have occurred      Irena Balderas DO, [facility] [place]  6/29/2021 3:22 PM

## 2021-06-29 NOTE — TELEPHONE ENCOUNTER
Sodium 132  K 5 2   Discussed labs and plan with the daughter in detail    Plan:   Start torsemide to 10 mg daily     Low k diet stressed   BMP  In x 2 weeks

## 2021-07-22 ENCOUNTER — OFFICE VISIT (OUTPATIENT)
Dept: PHYSICAL THERAPY | Facility: CLINIC | Age: 81
End: 2021-07-22
Payer: COMMERCIAL

## 2021-07-22 ENCOUNTER — OFFICE VISIT (OUTPATIENT)
Dept: NEUROSURGERY | Facility: CLINIC | Age: 81
End: 2021-07-22
Payer: COMMERCIAL

## 2021-07-22 VITALS
RESPIRATION RATE: 16 BRPM | HEIGHT: 62 IN | SYSTOLIC BLOOD PRESSURE: 134 MMHG | DIASTOLIC BLOOD PRESSURE: 78 MMHG | WEIGHT: 155 LBS | TEMPERATURE: 98.3 F | BODY MASS INDEX: 28.52 KG/M2

## 2021-07-22 DIAGNOSIS — Z98.2 S/P VP SHUNT: ICD-10-CM

## 2021-07-22 DIAGNOSIS — G91.2 NORMAL PRESSURE HYDROCEPHALUS (HCC): Primary | ICD-10-CM

## 2021-07-22 DIAGNOSIS — Z98.2 S/P VP SHUNT: Primary | ICD-10-CM

## 2021-07-22 PROCEDURE — 3078F DIAST BP <80 MM HG: CPT | Performed by: PHYSICIAN ASSISTANT

## 2021-07-22 PROCEDURE — 3075F SYST BP GE 130 - 139MM HG: CPT | Performed by: PHYSICIAN ASSISTANT

## 2021-07-22 PROCEDURE — 99213 OFFICE O/P EST LOW 20 MIN: CPT | Performed by: PHYSICIAN ASSISTANT

## 2021-07-22 PROCEDURE — 97161 PT EVAL LOW COMPLEX 20 MIN: CPT | Performed by: PHYSICAL THERAPIST

## 2021-07-22 PROCEDURE — 1036F TOBACCO NON-USER: CPT | Performed by: PHYSICIAN ASSISTANT

## 2021-07-22 PROCEDURE — 1160F RVW MEDS BY RX/DR IN RCRD: CPT | Performed by: PHYSICIAN ASSISTANT

## 2021-07-22 NOTE — LETTER
July 22, 2021     [facility], [address]  Nir[place] Boland   [phone]  [phone]    Patient: Jenniffer Hitchcock   YOB: 1940   Date of Visit: 7/22/2021       Dear Dr Carlito Choudhury: Thank you for allowing me to participate in the care of Junito Redmond  Below are my notes for this consultation  If you have questions, please do not hesitate to call me  I look forward to following your patient along with you  Sincerely,        Nayla Moses PA-C        CC: No Recipients  Nayla Moses PA-C  7/22/2021 11:27 AM  Sign when Signing Visit  Patient ID: Jenniffer Hitchcock is a [de-identified] y o  female  Diagnoses and all orders for this visit:    Normal pressure hydrocephalus (HCC)    S/P  shunt          Assessment/Plan:    Very pleasant 51-year-old female, accompanied by her daughter Norah Singletary, returns for 1 year follow-up Integrated Normal Pressure Hydrocephalus Program     She is clearly improving  She is status post:  Insertion of frontal ventricular peritoneal shunt, 9/3/19 by Dr Blayne Butler  The shunt has a Certas valve, this was initially set at 5  Her shunt pressure was changed from 5 to 6 at her last visit, 6/9/20  She returns today and she and her daughter reports she is doing much better, she has recently in the last few months shoulder home and moved into [facility], were she has become much more active, participating in activities, visiting with friends, and otherwise socializing, she reports she pays bingo 3 times a week which she enjoys very much  Continues to ambulate with the assistance of a rolling walker, as she enters and proceeds down the lan today, her gait and balance as well as her speed is certainly increased/improved from prior visits  Overall she is very pleased with her progress  There is no new imaging for review today      I personally reviewed the NPH PT and NPH Cognitive results with the patient:    8/2/19 NPH scale 11/15 TUG 72 sec TUGc 71 sec DGI 7/24 MOCA 19/30    10/4/19 NPH scale 13/15 TUG 32 sec TUGc 33 sec DGI 19/24 MOCA 25/30 6/9/20 NPH scale 10/15 TUG 41 sec TUGc 54 sec DGI 12/24 MOCA 17/30 7/31/20 NPH scale 14/15 TUG 20 sec TUGc 25 sec DGI 13/24 MOCA 17/30 7/22/21 NPH scale12/15 TUG 19 sec TUGc 25 sec DGI 15/24 MOCA 23/30    On examination today she is awake, alert, oriented x3, motor exam of the upper lower extremities is 5 x 5 for power, there was no pronator drift, rapid alternating movements are intact, finger-nose is intact, there is no sensory extinction or neglect  And as noted her gait is much more steady and smooth, without shuffling  Further follow-up, Neurosurgery, with the Integrated Normal Pressure Hydrocephalus Program is planned in 1 year  This will include physical therapy assessment as well as MOCA testing  She understands continue all usual activities  She is encouraged to socialize and continue to keep busy  These findings, impressions recommendations reviewed in great detail with the patient and her daughter they expressed understanding and agreement  Questions were answered completely into the satisfaction  Return in about 1 year (around 7/22/2022)  Chief Complaint  One year follow-up offers no new complaints    HPI       The following portions of the patient's history were reviewed and updated as appropriate: allergies, current medications, past family history, past medical history, past social history, past surgical history and problem list     Review of Systems   Constitutional: Negative  HENT: Negative  Eyes: Negative  Respiratory: Negative  Cardiovascular: Negative  Gastrointestinal: Negative  Endocrine: Negative  Genitourinary: Positive for urgency  Leakage     Musculoskeletal: Positive for arthralgias (occasional  right knee pain)  Skin: Negative  Allergic/Immunologic: Negative  Neurological: Positive for weakness (uses rolling walker    more active this year)  Negative for dizziness, seizures, syncope, numbness and headaches  Hematological: Bruises/bleeds easily (baby asa)  Psychiatric/Behavioral: Positive for confusion (better since last visit) and decreased concentration (better since last visit )  Objective:    Physical Exam  Constitutional:       Appearance: She is well-developed  HENT:      Head: Normocephalic and atraumatic  Eyes:      Pupils: Pupils are equal, round, and reactive to light  Cardiovascular:      Rate and Rhythm: Normal rate  Pulmonary:      Effort: Pulmonary effort is normal       Breath sounds: Normal breath sounds  Skin:     General: Skin is warm and dry  Neurological:      General: No focal deficit present  Mental Status: She is alert and oriented to person, place, and time  Psychiatric:         Mood and Affect: Mood normal          Speech: Speech normal          Behavior: Behavior normal          Neurologic Exam     Mental Status   Oriented to person, place, and time  Attention: normal    Speech: speech is normal     Cranial Nerves     CN III, IV, VI   Pupils are equal, round, and reactive to light

## 2021-07-22 NOTE — PROGRESS NOTES
PT Evaluation   And Discharge    Today's date: 2021  Patient name: Valencia Elizalde  : 1940  MRN: [id number]  Referring provider: Jose Minaya MD  Dx:   Encounter Diagnosis     ICD-10-CM    1  S/P  shunt  Z98 2        Start Time: 1000  Stop Time: 1030  Total time in clinic (min): 30 minutes    Assessment  Assessment details: Please refer to the NPH Exam form for all objective measures  Patient was seen for a gait and balance assessment/screen and has minimal fall risk  She will be seen for re-assessment of their gait/balance as needed as part of the management of their condition  She also may benefit from PT for her R knee pain but will follow up c PCP first  Thank you for this pleasant referral       Impairments: abnormal gait, impaired balance and safety issue    Goals  Patient to be reassessed as needed in 4-6 weeks    Plan  Plan details: Patient to return only as needed        Subjective Evaluation    History of Present Illness  Onset date: A few weeks ago  Mechanism of injury: Chief Complaint:   Difficulty walking    History:   Pt had a shunt placed due to NPH in 2019  She has recently moved to [facility] and is bathing and dressing independently  She uses a rolling walker for mobility with in her home  She has no issue with urine retention  Pt was diagnosed with NPH in  and she presents s/p  shunt on 9/3/19       Functional Deficits: Limited functoinal mobility      Quality of life: good    Pain  No pain reported          Objective     Ambulation     Comments   NPH Exam Finding:  TU 05 sec RW  TUG Cognitive: 25 69 sec RW, accurate until 94 75% accuracy  DGI: 15/24  Stairs: Step to, B handrails  Retropulsion:(-)               Precautions: Fall Risk      Manuals                                                                 Neuro Re-Ed                                                                                                        Ther Ex Ther Activity                                       Gait Training                                       Modalities

## 2021-07-22 NOTE — PATIENT INSTRUCTIONS
Further follow-up is planned with the NPH Clinic in approximately 1 year  Continue with all usual activities without restriction  Continue to ambulate as tolerated  Participation in group activities is encouraged

## 2021-07-22 NOTE — PROGRESS NOTES
Patient ID: Lazaro Perez is a [de-identified] y o  female  Diagnoses and all orders for this visit:    Normal pressure hydrocephalus (HCC)    S/P  shunt          Assessment/Plan:    Very pleasant 51-year-old female, accompanied by her daughter Clare Griffin, returns for 1 year follow-up Integrated Normal Pressure Hydrocephalus Program     She is clearly improving  She is status post:  Insertion of frontal ventricular peritoneal shunt, 9/3/19 by Dr Nirmal Campos  The shunt has a Certas valve, this was initially set at 5  Her shunt pressure was changed from 5 to 6 at her last visit, 6/9/20  She returns today and she and her daughter reports she is doing much better, she has recently in the last few months shoulder home and moved into [facility], were she has become much more active, participating in activities, visiting with friends, and otherwise socializing, she reports she pays bingo 3 times a week which she enjoys very much  Continues to ambulate with the assistance of a rolling walker, as she enters and proceeds down the lan today, her gait and balance as well as her speed is certainly increased/improved from prior visits  Overall she is very pleased with her progress  There is no new imaging for review today  I personally reviewed the NPH PT and NPH Cognitive results with the patient:    8/2/19 NPH scale 11/15 TUG 72 sec TUGc 71 sec DGI 7/24 MOCA 19/30    10/4/19 NPH scale 13/15 TUG 32 sec TUGc 33 sec DGI 19/24 MOCA 25/30    6/9/20 NPH scale 10/15 TUG 41 sec TUGc 54 sec DGI 12/24 MOCA 17/30 7/31/20 NPH scale 14/15 TUG 20 sec TUGc 25 sec DGI 13/24 MOCA 17/30 7/22/21 NPH scale12/15 TUG 19 sec TUGc 25 sec DGI 15/24 MOCA 23/30    On examination today she is awake, alert, oriented x3, motor exam of the upper lower extremities is 5 x 5 for power, there was no pronator drift, rapid alternating movements are intact, finger-nose is intact, there is no sensory extinction or neglect    And as noted her gait is much more steady and smooth, without shuffling  Further follow-up, Neurosurgery, with the Integrated Normal Pressure Hydrocephalus Program is planned in 1 year  This will include physical therapy assessment as well as MOCA testing  She understands continue all usual activities  She is encouraged to socialize and continue to keep busy  These findings, impressions recommendations reviewed in great detail with the patient and her daughter they expressed understanding and agreement  Questions were answered completely into the satisfaction  Return in about 1 year (around 7/22/2022)  Chief Complaint  One year follow-up offers no new complaints    HPI       The following portions of the patient's history were reviewed and updated as appropriate: allergies, current medications, past family history, past medical history, past social history, past surgical history and problem list     Review of Systems   Constitutional: Negative  HENT: Negative  Eyes: Negative  Respiratory: Negative  Cardiovascular: Negative  Gastrointestinal: Negative  Endocrine: Negative  Genitourinary: Positive for urgency  Leakage     Musculoskeletal: Positive for arthralgias (occasional  right knee pain)  Skin: Negative  Allergic/Immunologic: Negative  Neurological: Positive for weakness (uses rolling walker  more active this year)  Negative for dizziness, seizures, syncope, numbness and headaches  Hematological: Bruises/bleeds easily (baby asa)  Psychiatric/Behavioral: Positive for confusion (better since last visit) and decreased concentration (better since last visit )  Objective:    Physical Exam  Constitutional:       Appearance: She is well-developed  HENT:      Head: Normocephalic and atraumatic  Eyes:      Pupils: Pupils are equal, round, and reactive to light  Cardiovascular:      Rate and Rhythm: Normal rate     Pulmonary:      Effort: Pulmonary effort is normal       Breath sounds: Normal breath sounds  Skin:     General: Skin is warm and dry  Neurological:      General: No focal deficit present  Mental Status: She is alert and oriented to person, place, and time  Psychiatric:         Mood and Affect: Mood normal          Speech: Speech normal          Behavior: Behavior normal          Neurologic Exam     Mental Status   Oriented to person, place, and time  Attention: normal    Speech: speech is normal     Cranial Nerves     CN III, IV, VI   Pupils are equal, round, and reactive to light

## 2021-07-23 ENCOUNTER — TELEPHONE (OUTPATIENT)
Dept: NEPHROLOGY | Facility: CLINIC | Age: 81
End: 2021-07-23

## 2021-07-23 DIAGNOSIS — E87.1 HYPONATREMIA: Primary | ICD-10-CM

## 2021-07-23 LAB
BUN SERPL-MCNC: 12 MG/DL (ref 7–25)
BUN/CREAT SERPL: ABNORMAL (CALC) (ref 6–22)
CALCIUM SERPL-MCNC: 9.9 MG/DL (ref 8.6–10.4)
CHLORIDE SERPL-SCNC: 98 MMOL/L (ref 98–110)
CO2 SERPL-SCNC: 25 MMOL/L (ref 20–32)
CREAT SERPL-MCNC: 0.67 MG/DL (ref 0.6–0.88)
GLUCOSE SERPL-MCNC: 97 MG/DL (ref 65–139)
POTASSIUM SERPL-SCNC: 4.6 MMOL/L (ref 3.5–5.3)
SL AMB EGFR AFRICAN AMERICAN: 96 ML/MIN/1.73M2
SL AMB EGFR NON AFRICAN AMERICAN: 83 ML/MIN/1.73M2
SODIUM SERPL-SCNC: 132 MMOL/L (ref 135–146)

## 2021-07-23 NOTE — TELEPHONE ENCOUNTER
----- Message from Peewee Wolf MD sent at 7/23/2021  8:20 AM EDT -----  Please inform patient that sodium is stable at 132, potassium is now at normal range  Please stressed on fluid restriction 1 5 L per day  Please order for repeat BMP for hyponatremia to be done in 2 weeks  If sodium level remains low, may need to increase the dose of salt tablet

## 2021-07-23 NOTE — TELEPHONE ENCOUNTER
I spoke with patients daughter, Chintan Kern and informed the following-  Sodium is stable at 132, potassium is now at normal range  Stressed on fluid restriction 1 5 L per day (about 6 cups)  Ordered repeat BMP for hyponatremia to be done in 2 weeks  - Shabana verbalized understanding

## 2021-08-13 ENCOUNTER — RA CDI HCC (OUTPATIENT)
Dept: OTHER | Facility: HOSPITAL | Age: 81
End: 2021-08-13

## 2021-08-13 NOTE — PROGRESS NOTES
[facility] 75  coding opportunities       Chart reviewed, no opportunity found: CHART REVIEWED, NO OPPORTUNITY FOUND                        Patients insurance company: [employer] only)

## 2021-08-22 LAB
25(OH)D3 SERPL-MCNC: 26 NG/ML (ref 30–100)
ALBUMIN SERPL-MCNC: 4.2 G/DL (ref 3.6–5.1)
ALBUMIN/GLOB SERPL: 1.4 (CALC) (ref 1–2.5)
ALP SERPL-CCNC: 56 U/L (ref 37–153)
ALT SERPL-CCNC: 27 U/L (ref 6–29)
AST SERPL-CCNC: 30 U/L (ref 10–35)
BASOPHILS # BLD AUTO: 89 CELLS/UL (ref 0–200)
BASOPHILS NFR BLD AUTO: 1.1 %
BILIRUB SERPL-MCNC: 0.5 MG/DL (ref 0.2–1.2)
BUN SERPL-MCNC: 15 MG/DL (ref 7–25)
BUN SERPL-MCNC: 15 MG/DL (ref 7–25)
BUN/CREAT SERPL: ABNORMAL (CALC) (ref 6–22)
BUN/CREAT SERPL: ABNORMAL (CALC) (ref 6–22)
CALCIUM SERPL-MCNC: 9.7 MG/DL (ref 8.6–10.4)
CALCIUM SERPL-MCNC: 9.7 MG/DL (ref 8.6–10.4)
CHLORIDE SERPL-SCNC: 99 MMOL/L (ref 98–110)
CHLORIDE SERPL-SCNC: 99 MMOL/L (ref 98–110)
CHOLEST SERPL-MCNC: 140 MG/DL
CHOLEST/HDLC SERPL: 3.6 (CALC)
CO2 SERPL-SCNC: 26 MMOL/L (ref 20–32)
CO2 SERPL-SCNC: 27 MMOL/L (ref 20–32)
CREAT SERPL-MCNC: 0.76 MG/DL (ref 0.6–0.88)
CREAT SERPL-MCNC: 0.79 MG/DL (ref 0.6–0.88)
EOSINOPHIL # BLD AUTO: 373 CELLS/UL (ref 15–500)
EOSINOPHIL NFR BLD AUTO: 4.6 %
ERYTHROCYTE [DISTWIDTH] IN BLOOD BY AUTOMATED COUNT: 13.1 % (ref 11–15)
GLOBULIN SER CALC-MCNC: 2.9 G/DL (CALC) (ref 1.9–3.7)
GLUCOSE SERPL-MCNC: 119 MG/DL (ref 65–99)
GLUCOSE SERPL-MCNC: 121 MG/DL (ref 65–99)
HBA1C MFR BLD: 6.3 % OF TOTAL HGB
HCT VFR BLD AUTO: 35.6 % (ref 35–45)
HDLC SERPL-MCNC: 39 MG/DL
HGB BLD-MCNC: 12.1 G/DL (ref 11.7–15.5)
LDLC SERPL CALC-MCNC: 59 MG/DL (CALC)
LYMPHOCYTES # BLD AUTO: 1806 CELLS/UL (ref 850–3900)
LYMPHOCYTES NFR BLD AUTO: 22.3 %
MCH RBC QN AUTO: 30.8 PG (ref 27–33)
MCHC RBC AUTO-ENTMCNC: 34 G/DL (ref 32–36)
MCV RBC AUTO: 90.6 FL (ref 80–100)
MONOCYTES # BLD AUTO: 834 CELLS/UL (ref 200–950)
MONOCYTES NFR BLD AUTO: 10.3 %
NEUTROPHILS # BLD AUTO: 4998 CELLS/UL (ref 1500–7800)
NEUTROPHILS NFR BLD AUTO: 61.7 %
NONHDLC SERPL-MCNC: 101 MG/DL (CALC)
PLATELET # BLD AUTO: 420 THOUSAND/UL (ref 140–400)
PMV BLD REES-ECKER: 10.4 FL (ref 7.5–12.5)
POTASSIUM SERPL-SCNC: 4.5 MMOL/L (ref 3.5–5.3)
POTASSIUM SERPL-SCNC: 4.6 MMOL/L (ref 3.5–5.3)
PROT SERPL-MCNC: 7.1 G/DL (ref 6.1–8.1)
RBC # BLD AUTO: 3.93 MILLION/UL (ref 3.8–5.1)
SL AMB EGFR AFRICAN AMERICAN: 82 ML/MIN/1.73M2
SL AMB EGFR AFRICAN AMERICAN: 86 ML/MIN/1.73M2
SL AMB EGFR NON AFRICAN AMERICAN: 71 ML/MIN/1.73M2
SL AMB EGFR NON AFRICAN AMERICAN: 74 ML/MIN/1.73M2
SODIUM SERPL-SCNC: 135 MMOL/L (ref 135–146)
SODIUM SERPL-SCNC: 135 MMOL/L (ref 135–146)
TRIGL SERPL-MCNC: 346 MG/DL
WBC # BLD AUTO: 8.1 THOUSAND/UL (ref 3.8–10.8)

## 2021-08-23 NOTE — RESULT ENCOUNTER NOTE
Please inform patient that sodium level improved to 135 from previous level of 132  Continue current dose of salt tablets and torsemide  Plan for follow-up with repeat labs as previously discussed  She should have a follow-up in September or October with repeat labs -please schedule  Labs are in the system   was ordered during last office visit

## 2021-08-24 ENCOUNTER — OFFICE VISIT (OUTPATIENT)
Dept: FAMILY MEDICINE CLINIC | Facility: CLINIC | Age: 81
End: 2021-08-24
Payer: COMMERCIAL

## 2021-08-24 ENCOUNTER — TELEPHONE (OUTPATIENT)
Dept: NEPHROLOGY | Facility: CLINIC | Age: 81
End: 2021-08-24

## 2021-08-24 VITALS
HEIGHT: 62 IN | HEART RATE: 80 BPM | WEIGHT: 155 LBS | RESPIRATION RATE: 16 BRPM | BODY MASS INDEX: 28.52 KG/M2 | SYSTOLIC BLOOD PRESSURE: 128 MMHG | OXYGEN SATURATION: 98 % | DIASTOLIC BLOOD PRESSURE: 62 MMHG

## 2021-08-24 DIAGNOSIS — D75.839 THROMBOCYTOSIS: ICD-10-CM

## 2021-08-24 DIAGNOSIS — F32.A ANXIETY AND DEPRESSION: ICD-10-CM

## 2021-08-24 DIAGNOSIS — K21.9 GERD WITHOUT ESOPHAGITIS: ICD-10-CM

## 2021-08-24 DIAGNOSIS — R73.9 HYPERGLYCEMIA: ICD-10-CM

## 2021-08-24 DIAGNOSIS — M85.89 OSTEOPENIA OF MULTIPLE SITES: ICD-10-CM

## 2021-08-24 DIAGNOSIS — F41.9 ANXIETY AND DEPRESSION: ICD-10-CM

## 2021-08-24 DIAGNOSIS — I10 ESSENTIAL HYPERTENSION: Primary | ICD-10-CM

## 2021-08-24 DIAGNOSIS — E55.9 VITAMIN D DEFICIENCY: ICD-10-CM

## 2021-08-24 DIAGNOSIS — E78.5 HYPERLIPIDEMIA, UNSPECIFIED HYPERLIPIDEMIA TYPE: ICD-10-CM

## 2021-08-24 DIAGNOSIS — Z00.00 MEDICARE ANNUAL WELLNESS VISIT, SUBSEQUENT: ICD-10-CM

## 2021-08-24 DIAGNOSIS — N18.2 STAGE 2 CHRONIC KIDNEY DISEASE: ICD-10-CM

## 2021-08-24 PROCEDURE — 1125F AMNT PAIN NOTED PAIN PRSNT: CPT | Performed by: PHYSICIAN ASSISTANT

## 2021-08-24 PROCEDURE — 99214 OFFICE O/P EST MOD 30 MIN: CPT | Performed by: PHYSICIAN ASSISTANT

## 2021-08-24 PROCEDURE — 1160F RVW MEDS BY RX/DR IN RCRD: CPT | Performed by: PHYSICIAN ASSISTANT

## 2021-08-24 PROCEDURE — 3288F FALL RISK ASSESSMENT DOCD: CPT | Performed by: PHYSICIAN ASSISTANT

## 2021-08-24 PROCEDURE — 3074F SYST BP LT 130 MM HG: CPT | Performed by: PHYSICIAN ASSISTANT

## 2021-08-24 PROCEDURE — 3078F DIAST BP <80 MM HG: CPT | Performed by: PHYSICIAN ASSISTANT

## 2021-08-24 PROCEDURE — G0439 PPPS, SUBSEQ VISIT: HCPCS | Performed by: PHYSICIAN ASSISTANT

## 2021-08-24 PROCEDURE — 1170F FXNL STATUS ASSESSED: CPT | Performed by: PHYSICIAN ASSISTANT

## 2021-08-24 PROCEDURE — 1036F TOBACCO NON-USER: CPT | Performed by: PHYSICIAN ASSISTANT

## 2021-08-24 RX ORDER — MULTIVIT-MIN/IRON/FOLIC ACID/K 18-600-40
4000 CAPSULE ORAL DAILY
Qty: 30 TABLET
Start: 2021-08-24

## 2021-08-24 NOTE — ASSESSMENT & PLAN NOTE
Pt  is up to date with all screenings, labs and vaccines for age except could get shingles at the pharmacy   Blue pack and 5 wishes given in the past

## 2021-08-24 NOTE — PROGRESS NOTES
Assessment and Plan:    Problem List Items Addressed This Visit        Digestive    GERD without esophagitis     Stable with PPI dosing every other day  Cardiovascular and Mediastinum    Essential hypertension - Primary     Very stable, continue current medication  Musculoskeletal and Integument    Osteopenia of multiple sites       Hematopoietic and Hemostatic    Thrombocytosis (HCC)     CBC shows platelet count only slightly elevated at 420  Continue to observe  Patient is on aspirin 81 mg once daily  Relevant Orders    CBC and differential       Genitourinary    Stage 2 chronic kidney disease     Lab Results   Component Value Date    EGFR 83 09/05/2019    EGFR 69 09/04/2019    EGFR 77 08/17/2019    CREATININE 0 76 08/21/2021    CREATININE 0 79 08/21/2021    CREATININE 0 67 07/22/2021   Patient saw Nephrology today in her BUN creatinine is normal with a GFR of 71  Other    Anxiety and depression     Stable without SI or HI on Lexapro 5 mg once daily         Hyperlipidemia     Patient is on Lipitor 40 mg once daily keeping her LDL at goal at 59  Triglycerides are elevated quite some at 346  This is secondary to elevated sugars  Recheck in 6 months if becomes closer to 400 will consider using fenofibrate as well as Lipitor  Relevant Orders    Lipid Panel with Direct LDL reflex    Vitamin D deficiency     Vitamin-D slightly low at 26  I am asking patient to start vitamin-D 4000 units once daily  Relevant Medications    Cholecalciferol (D3-1000) 25 MCG (1000 UT) tablet    Other Relevant Orders    Vitamin D 25 hydroxy    Medicare annual wellness visit, subsequent     Pt  is up to date with all screenings, labs and vaccines for age except could get shingles at the pharmacy  Blue pack and 5 wishes given in the past           Hyperglycemia     Fasting sugar slightly elevated at 121 which is near diabetic range with an A1c of 6 3    If patient diabetic she would be at goal with this as an A1c should be less than 6for [de-identified]years old  Continue to observe recheck 6 months  Decrease simple carbs such as bread pasta potatoes rice junk food desert and snacks  Relevant Orders    Hemoglobin A1C    Comprehensive metabolic panel                 Diagnoses and all orders for this visit:    Essential hypertension    Stage 2 chronic kidney disease    Thrombocytosis (HCC)  -     CBC and differential; Future    Osteopenia of multiple sites    Vitamin D deficiency  -     Vitamin D 25 hydroxy; Future  -     Cholecalciferol (D3-1000) 25 MCG (1000 UT) tablet; Take 4 tablets (4,000 Units total) by mouth daily    Medicare annual wellness visit, subsequent    Hyperglycemia  -     Hemoglobin A1C; Future  -     Comprehensive metabolic panel; Future    Hyperlipidemia, unspecified hyperlipidemia type  -     Lipid Panel with Direct LDL reflex; Future    Anxiety and depression    GERD without esophagitis              Subjective:      Patient ID: Mac Tubbs is a [de-identified] y o  female  CC:    Chief Complaint   Patient presents with    Follow-up     Patient present today for her 4 month follow up   Medicare Wellness Visit     pt due       HPI:      Mac Tubbs is here for chronic conditions f/u including the diagnosis of Essential hypertension  (primary encounter diagnosis)  Stage 2 chronic kidney disease  Thrombocytosis (hcc)  Osteopenia of multiple sites  Vitamin d deficiency  Medicare annual wellness visit, subsequent  Hyperglycemia  Hyperlipidemia, unspecified hyperlipidemia type  Anxiety and depression  Gerd without esophagitis   Pt  states they are taking all medications as directed without complaints or side effects   Pt  had labs done prior to today's visit which included Recent Results (from the past 672 hour(s))  -Basic metabolic panel  Collection Time: 08/21/21  9:26 AM       Result                      Value             Ref Range           Glucose, Random             121 (H) 65 - 99 mg/dL       BUN                         15                7 - 25 mg/dL        Creatinine                  0 79              0 60 - 0 88 *       eGFR Non  Ameri*     71                > OR = 60 mL*       eGFR        82                > OR = 60 mL*       SL AMB BUN/CREATININE *                       6 - 22 (calc)   NOT APPLICABLE       Sodium                      135               135 - 146 mm*       Potassium                   4 6               3 5 - 5 3 mm*       Chloride                    99                98 - 110 mmo*       CO2                         26                20 - 32 mmol*       Calcium                     9 7               8 6 - 10 4 m*  -Lipid Panel with Direct LDL reflex  Collection Time: 08/21/21  9:27 AM       Result                      Value             Ref Range           Total Cholesterol           140               <200 mg/dL          HDL                         39 (L)            > OR = 50 mg*       Triglycerides               346 (H)           <150 mg/dL          LDL Calculated              59                mg/dL (calc)        Chol HDLC Ratio             3 6               <5 0 (calc)         Non-HDL Cholesterol         101               <130 mg/dL (*  -Comprehensive metabolic panel  Collection Time: 08/21/21  9:27 AM       Result                      Value             Ref Range           Glucose, Random             119 (H)           65 - 99 mg/dL       BUN                         15                7 - 25 mg/dL        Creatinine                  0 76              0 60 - 0 88 *       eGFR Non  Ameri*     74                > OR = 60 mL*       eGFR        86                > OR = 60 mL*       SL AMB BUN/CREATININE *                       6 - 22 (calc)   NOT APPLICABLE       Sodium                      135               135 - 146 mm*       Potassium                   4 5               3 5 - 5 3 mm*       Chloride                    99 98 - 110 mmo*       CO2                         27                20 - 32 mmol*       Calcium                     9 7               8 6 - 10 4 m*       Protein, Total              7 1               6 1 - 8 1 g/*       Albumin                     4 2               3 6 - 5 1 g/*       Globulin                    2 9               1 9 - 3 7 g/*       Albumin/Globulin Ratio      1 4               1 0 - 2 5 (c*       TOTAL BILIRUBIN             0 5               0 2 - 1 2 mg*       Alkaline Phosphatase        56                37 - 153 U/L        AST                         30                10 - 35 U/L         ALT                         27                6 - 29 U/L     -CBC and differential  Collection Time: 08/21/21  9:27 AM       Result                      Value             Ref Range           White Blood Cell Count      8 1               3 8 - 10 8 T*       Red Blood Cell Count        3 93              3 80 - 5 10 *       Hemoglobin                  12 1              11 7 - 15 5 *       HCT                         35 6              35 0 - 45 0 %       MCV                         90 6              80 0 - 100 0*       MCH                         30 8              27 0 - 33 0 *       MCHC                        34 0              32 0 - 36 0 *       RDW                         13 1              11 0 - 15 0 %       Platelet Count              420 (H)           140 - 400 Th*       SL AMB MPV                  10 4              7 5 - 12 5 fL       Neutrophils (Absolute)      4,998             1,500 - 7,80*       Lymphocytes (Absolute)      1,806             850 - 3,900 *       Monocytes (Absolute)        834               200 - 950 ce*       Eosinophils (Absolute)      373               15 - 500 sarahy*       Basophils ABS               89                0 - 200 cell*       Neutrophils                 61 7              %                   Lymphocytes                 22 3              % Monocytes                   10 3              %                   Eosinophils                 4 6               %                   Basophils PCT               1 1               %              -Vitamin D 25 hydroxy  Collection Time: 08/21/21  9:27 AM       Result                      Value             Ref Range           Vitamin D, 25-Hydroxy,*     26 (L)            30 - 100 ng/*  -Hemoglobin A1c (w/out EAG)  Collection Time: 08/21/21  9:27 AM       Result                      Value             Ref Range           Hemoglobin A1C              6 3 (H)           <5 7 % of to*        The following portions of the patient's history were reviewed and updated as appropriate: allergies, current medications, past family history, past medical history, past social history, past surgical history and problem list       Review of Systems   Constitutional: Negative  HENT: Negative  Eyes: Negative  Respiratory: Negative  Cardiovascular: Negative  Gastrointestinal: Negative  Endocrine: Negative  Genitourinary: Negative  Musculoskeletal: Negative  Skin: Negative  Allergic/Immunologic: Negative  Neurological: Negative  Hematological: Negative  Psychiatric/Behavioral: Negative  Data to review:       Objective:    Vitals:    08/24/21 1541   BP: 128/62   BP Location: Left arm   Patient Position: Sitting   Cuff Size: Large   Pulse: 80   Resp: 16   SpO2: 98%   Weight: 70 3 kg (155 lb)   Height: 5' 2" (1 575 m)        Physical Exam  Vitals and nursing note reviewed  Constitutional:       Appearance: Normal appearance  She is well-developed  HENT:      Head: Normocephalic and atraumatic  Eyes:      General: Lids are normal       Conjunctiva/sclera: Conjunctivae normal       Pupils: Pupils are equal, round, and reactive to light  Cardiovascular:      Rate and Rhythm: Normal rate and regular rhythm  Heart sounds: No murmur heard       Pulmonary:      Effort: Pulmonary effort is normal       Breath sounds: Normal breath sounds  Skin:     General: Skin is warm and dry  Neurological:      General: No focal deficit present  Mental Status: She is alert  Coordination: Coordination is intact  Psychiatric:         Mood and Affect: Mood normal          Behavior: Behavior normal  Behavior is cooperative  Thought Content:  Thought content normal          Judgment: Judgment normal

## 2021-08-24 NOTE — ASSESSMENT & PLAN NOTE
Lab Results   Component Value Date    EGFR 83 09/05/2019    EGFR 69 09/04/2019    EGFR 77 08/17/2019    CREATININE 0 76 08/21/2021    CREATININE 0 79 08/21/2021    CREATININE 0 67 07/22/2021   Patient saw Nephrology today in her BUN creatinine is normal with a GFR of 71

## 2021-08-24 NOTE — PROGRESS NOTES
Assessment and Plan:     Problem List Items Addressed This Visit        Digestive    GERD without esophagitis       Cardiovascular and Mediastinum    Essential hypertension - Primary       Musculoskeletal and Integument    Osteopenia of multiple sites       Hematopoietic and Hemostatic    Thrombocytosis (HCC)       Genitourinary    Stage 2 chronic kidney disease       Other    Anxiety and depression    Hyperlipidemia    Vitamin D deficiency    Medicare annual wellness visit, subsequent     Pt  is up to date with all screenings, labs and vaccines for age except could get shingles at the pharmacy  Blue pack and 5 wishes given in the past           Hyperglycemia        BMI Counseling: Body mass index is 28 35 kg/m²  The BMI is above normal  Nutrition recommendations include decreasing portion sizes, encouraging healthy choices of fruits and vegetables, decreasing fast food intake, consuming healthier snacks, limiting drinks that contain sugar, moderation in carbohydrate intake, increasing intake of lean protein, reducing intake of saturated and trans fat and reducing intake of cholesterol  Exercise recommendations include exercising 3-5 times per week  No pharmacotherapy was ordered  Preventive health issues were discussed with patient, and age appropriate screening tests were ordered as noted in patient's After Visit Summary  Personalized health advice and appropriate referrals for health education or preventive services given if needed, as noted in patient's After Visit Summary       History of Present Illness:     Patient presents for Medicare Annual Wellness visit    Patient Care Team:  Cheo Pike PA-C as PCP - General (Family Medicine)  GIOVANA Leal PA-C Hosey Range, MD (Neurosurgery)     Problem List:     Patient Active Problem List   Diagnosis    Ambulatory dysfunction    Anxiety and depression    Essential hypertension    Carotid artery plaque  Elevated sed rate    GERD without esophagitis    Hyperlipidemia    Chronic hyponatremia    Levoscoliosis    Occlusion of celiac artery    Vitamin D deficiency    Radiculopathy    Carotid bruit    Coronary atherosclerosis    Splenic infarction    Positive ROSALINDA (antinuclear antibody)    Thrombocytosis (HCC)    SMA stenosis    Normal pressure hydrocephalus (HCC)    Stage 2 chronic kidney disease    Recurrent falls    Hyperkalemia    Leukocytosis    S/P  shunt    Medicare annual wellness visit, subsequent    Osteopenia of multiple sites    Confusion and disorientation    Primary insomnia    SIADH (syndrome of inappropriate ADH production) (HCC)    Hyperglycemia    MCI (mild cognitive impairment)    Polypharmacy      Past Medical and Surgical History:     Past Medical History:   Diagnosis Date    Anxiety     Arthritis     Confusion 10/2/2018    Depression     Displaced fracture of distal phalanx of right thumb     GERD (gastroesophageal reflux disease)     Heart attack (HCC)     Hyperlipidemia     Hypertension     Moderate episode of recurrent major depressive disorder ([facility] [address] ) 4/12/2019    Shortness of breath     Stage 2 chronic kidney disease 7/17/2019     Past Surgical History:   Procedure Laterality Date    APPENDECTOMY      CARPAL TUNNEL RELEASE      CATARACT EXTRACTION Bilateral     COLONOSCOPY      FL LUMBAR PUNCTURE DIAGNOSTIC  1/10/2019    FRACTURE SURGERY      RI CREATE SHUNT:VENTRIC-PERITONEAL Right 9/3/2019    Procedure:  Insertion of frontal ventriculoperitoneal shunt;  Surgeon: Osito El MD;  [facility];  Service: Neurosurgery    SEPTOPLASTY      WRIST FRACTURE SURGERY Right       Family History:     Family History   Problem Relation Age of Onset    Heart attack Mother 39    Heart attack Father 61    No Known Problems Other     Breast cancer Sister     Alcohol abuse Daughter         history of    Heart attack Brother       Social History: Social History     Socioeconomic History    Marital status:      Spouse name: None    Number of children: None    Years of education: None    Highest education level: None   Occupational History    Occupation: Retired   Tobacco Use    Smoking status: Former Smoker     Packs/day: 0 00     Years: 0 00     Pack years: 0 00     Types: Cigarettes     Quit date:      Years since quittin 6    Smokeless tobacco: Never Used    Tobacco comment: no secondhand smoke exposure   Vaping Use    Vaping Use: Never used   Substance and Sexual Activity    Alcohol use: Yes     Comment: social    Drug use: No    Sexual activity: None   Other Topics Concern    None   Social History Narrative    Living alone     Social Determinants of Health     Financial Resource Strain:     Difficulty of Paying Living Expenses:    Food Insecurity:     Worried About Running Out of Food in the Last Year:     Ran Out of Food in the Last Year:    Transportation Needs:     Lack of Transportation (Medical):      Lack of Transportation (Non-Medical):    Physical Activity:     Days of Exercise per Week:     Minutes of Exercise per Session:    Stress:     Feeling of Stress :    Social Connections:     Frequency of Communication with Friends and Family:     Frequency of Social Gatherings with Friends and Family:     Attends [employer] Services:     Active Member of Clubs or Organizations:     Attends Club or Organization Meetings:     Marital Status:    Intimate Partner Violence:     Fear of Current or Ex-Partner:     Emotionally Abused:     Physically Abused:     Sexually Abused:       Medications and Allergies:     Current Outpatient Medications   Medication Sig Dispense Refill    acetaminophen (TYLENOL) 325 mg tablet Take 2 tablets (650 mg total) by mouth every 6 (six) hours as needed for mild pain, moderate pain, headaches or fever 30 tablet 0    aspirin 81 mg chewable tablet CHEW 1 TABLET AND SWALLOW ORALLY DAILY (HYPERTENSION) 30 tablet 4    atenolol (TENORMIN) 100 mg tablet Take 1 tablet (100 mg total) by mouth daily 90 tablet 3    atorvastatin (LIPITOR) 40 mg tablet Take 1 tablet (40 mg total) by mouth daily 90 tablet 3    D3-1000 1000 units tablet TAKE 1 TABLET ORALLY DAILY (SUPPLEMENT) * (Patient taking differently: Morning) 30 tablet 4    escitalopram (LEXAPRO) 5 mg tablet Take 1 tablet (5 mg total) by mouth daily 30 tablet 3    losartan (COZAAR) 50 mg tablet Take 1 tablet (50 mg total) by mouth 2 (two) times a day 180 tablet 3    Melatonin 1 MG CAPS Take 3 mg by mouth      multivitamin (THERAGRAN) TABS Take 1 tablet by mouth daily       omeprazole (PriLOSEC) 20 mg delayed release capsule Take 1 capsule (20 mg total) by mouth every morning (Patient taking differently: Take 20 mg by mouth every other day ) 90 capsule 3    sodium chloride 1 g tablet Take 2 tablets (2 g total) by mouth 2 (two) times a day 360 tablet 3    torsemide (DEMADEX) 10 mg tablet Take 1 tablet (10 mg total) by mouth daily 90 tablet 3     No current facility-administered medications for this visit       No Known Allergies   Immunizations:     Immunization History   Administered Date(s) Administered    INFLUENZA 12/19/2005, 11/16/2006, 10/09/2007, 10/09/2008, 11/03/2009, 10/21/2010, 11/04/2011, 09/20/2012, 09/13/2013, 10/07/2015, 10/25/2016, 10/12/2017, 10/02/2018, 11/01/2020    Influenza Split High Dose Preservative Free IM 10/07/2015, 10/25/2016, 10/12/2017    Influenza, high dose seasonal 0 7 mL 10/21/2019    Influenza, seasonal, injectable 12/12/2014, 10/02/2017, 11/01/2020    Influenza, seasonal, injectable, preservative free 10/02/2018    Pneumococcal Conjugate 13-Valent 10/16/2017    Pneumococcal Polysaccharide PPV23 11/03/2005, 08/19/2019    SARS-CoV-2 / COVID-19 mRNA IM (Danis Argueta) 01/15/2021, 02/16/2021    TD (adult) Preservative Free 05/20/1995    Td (adult), Unspecified 05/20/1995    Td (adult), adsorbed 05/20/1995 Health Maintenance:         Topic Date Due    Colorectal Cancer Screening  Never done    Breast Cancer Screening: Mammogram  06/20/2018    Hepatitis C Screening  Completed         Topic Date Due    DTaP,Tdap,and Td Vaccines (1 - Tdap) 11/24/1961    Influenza Vaccine (1) 09/01/2021      Medicare Health Risk Assessment:     /62 (BP Location: Left arm, Patient Position: Sitting, Cuff Size: Large)   Pulse 80   Resp 16   Ht 5' 2" (1 575 m)   Wt 70 3 kg (155 lb)   SpO2 98%   BMI 28 35 kg/m²      Abdulaziz Potter is here for her Subsequent Wellness visit  Health Risk Assessment:   Patient rates overall health as very good  Patient feels that their physical health rating is much better  Patient is very satisfied with their life  Eyesight was rated as same  Hearing was rated as same  Patient feels that their emotional and mental health rating is same  Patients states they are never, rarely angry  Patient states they are never, rarely unusually tired/fatigued  Pain experienced in the last 7 days has been none  Patient states that she has experienced no weight loss or gain in last 6 months  Per daughter knee problem  Fall Risk Screening: In the past year, patient has experienced: no history of falling in past year      Urinary Incontinence Screening:   Patient has not leaked urine accidently in the last six months  Home Safety:  Patient does not have trouble with stairs inside or outside of their home  Patient has working smoke alarms and has working carbon monoxide detector  Home safety hazards include: none  A little slow to go up the stairs  Nutrition:   Current diet is Regular  Medications:   Patient is not currently taking any over-the-counter supplements   Daughter takes care of medications    Activities of Daily Living (ADLs)/Instrumental Activities of Daily Living (IADLs):   Walk and transfer into and out of bed and chair?: Yes  Dress and groom yourself?: Yes    Bathe or shower yourself?: Yes    Feed yourself? Yes  Do your laundry/housekeeping?: No  Manage your money, pay your bills and track your expenses?: No  Make your own meals?: Yes    Do your own shopping?: No    ADL comments: Family does laundry, family takes care of expenses     Patient does not drive    Previous Hospitalizations:   Any hospitalizations or ED visits within the last 12 months?: No      Advance Care Planning:     Five wishes given: Yes      Cognitive Screening:   Provider or family/friend/caregiver concerned regarding cognition?: No    PREVENTIVE SCREENINGS      Cardiovascular Screening:    General: Screening Not Indicated, History Lipid Disorder and Screening Current      Diabetes Screening:     General: Screening Current      Colorectal Cancer Screening:     General: Screening Not Indicated      Breast Cancer Screening:     General: Screening Not Indicated      Cervical Cancer Screening:    General: Screening Not Indicated      Osteoporosis Screening:    General: Screening Current      Abdominal Aortic Aneurysm (AAA) Screening:        General: Screening Not Indicated      Lung Cancer Screening:     General: Screening Not Indicated      Hepatitis C Screening:    General: Screening Current    Review of Current Opioid Use    Opioid Risk Tool (ORT) Interpretation: Complete Opioid Risk Tool (ORT)      Carlyn Morrissey PA-C

## 2021-08-24 NOTE — ASSESSMENT & PLAN NOTE
Patient is on Lipitor 40 mg once daily keeping her LDL at goal at 59  Triglycerides are elevated quite some at 346  This is secondary to elevated sugars  Recheck in 6 months if becomes closer to 400 will consider using fenofibrate as well as Lipitor

## 2021-08-24 NOTE — ASSESSMENT & PLAN NOTE
CBC shows platelet count only slightly elevated at 420  Continue to observe  Patient is on aspirin 81 mg once daily

## 2021-08-24 NOTE — PATIENT INSTRUCTIONS
Problem List Items Addressed This Visit        Digestive    GERD without esophagitis     Stable with PPI dosing every other day  Cardiovascular and Mediastinum    Essential hypertension - Primary     Very stable, continue current medication  Musculoskeletal and Integument    Osteopenia of multiple sites       Hematopoietic and Hemostatic    Thrombocytosis (HCC)     CBC shows platelet count only slightly elevated at 420  Continue to observe  Patient is on aspirin 81 mg once daily  Genitourinary    Stage 2 chronic kidney disease     Lab Results   Component Value Date    EGFR 83 09/05/2019    EGFR 69 09/04/2019    EGFR 77 08/17/2019    CREATININE 0 76 08/21/2021    CREATININE 0 79 08/21/2021    CREATININE 0 67 07/22/2021   Patient saw Nephrology today in her BUN creatinine is normal with a GFR of 71  Other    Anxiety and depression     Stable without SI or HI on Lexapro 5 mg once daily         Hyperlipidemia     Patient is on Lipitor 40 mg once daily keeping her LDL at goal at 59  Triglycerides are elevated quite some at 346  This is secondary to elevated sugars  Recheck in 6 months if becomes closer to 400 will consider using fenofibrate as well as Lipitor  Vitamin D deficiency     Vitamin-D slightly low at 26  I am asking patient to start vitamin-D 4000 units once daily  Medicare annual wellness visit, subsequent     Pt  is up to date with all screenings, labs and vaccines for age except could get shingles at the pharmacy  Blue pack and 5 wishes given in the past           Hyperglycemia     Fasting sugar slightly elevated at 121 which is near diabetic range with an A1c of 6 3  If patient diabetic she would be at goal with this as an A1c should be less than 6for [de-identified]years old  Continue to observe recheck 6 months  Decrease simple carbs such as bread pasta potatoes rice junk food desert and snacks

## 2021-08-24 NOTE — TELEPHONE ENCOUNTER
Lm advising blood work is stable at this time , continue with current prescribed medications at same dosage      - Needs to schedule a follow up appt/ November openings

## 2021-08-24 NOTE — TELEPHONE ENCOUNTER
----- Message from Violet Mckeon MD sent at 8/23/2021  7:59 PM EDT -----   Please inform patient that sodium level improved to 135 from previous level of 132  Continue current dose of salt tablets and torsemide  Plan for follow-up with repeat labs as previously discussed  She should have a follow-up in September or October with repeat labs -please schedule  Labs are in the system   was ordered during last office visit

## 2021-08-25 NOTE — TELEPHONE ENCOUNTER
Lm x2 in regards to stable labs, no changes in medication   - requires a follow up with Dr Ramirez advised to call office and schedule

## 2021-09-10 ENCOUNTER — HOSPITAL ENCOUNTER (OUTPATIENT)
Dept: NON INVASIVE DIAGNOSTICS | Facility: HOSPITAL | Age: 81
Discharge: HOME | End: 2021-09-10
Payer: COMMERCIAL

## 2021-09-10 DIAGNOSIS — R01.1 CARDIAC MURMUR: ICD-10-CM

## 2021-09-10 PROCEDURE — 93306 TTE W/DOPPLER COMPLETE: CPT | Performed by: INTERNAL MEDICINE

## 2021-09-10 PROCEDURE — 93306 TTE W/DOPPLER COMPLETE: CPT

## 2021-09-15 ENCOUNTER — OFFICE VISIT (OUTPATIENT)
Dept: CARDIOLOGY CLINIC | Facility: CLINIC | Age: 81
End: 2021-09-15
Payer: COMMERCIAL

## 2021-09-15 VITALS
HEIGHT: 62 IN | WEIGHT: 152.2 LBS | HEART RATE: 76 BPM | BODY MASS INDEX: 28.01 KG/M2 | DIASTOLIC BLOOD PRESSURE: 50 MMHG | SYSTOLIC BLOOD PRESSURE: 128 MMHG | OXYGEN SATURATION: 94 %

## 2021-09-15 DIAGNOSIS — I25.10 ATHEROSCLEROSIS OF CORONARY ARTERY OF NATIVE HEART, UNSPECIFIED VESSEL OR LESION TYPE, UNSPECIFIED WHETHER ANGINA PRESENT: Primary | ICD-10-CM

## 2021-09-15 DIAGNOSIS — E78.1 HYPERTRIGLYCERIDEMIA: ICD-10-CM

## 2021-09-15 PROCEDURE — 99214 OFFICE O/P EST MOD 30 MIN: CPT

## 2021-09-15 PROCEDURE — 1160F RVW MEDS BY RX/DR IN RCRD: CPT

## 2021-09-15 PROCEDURE — 3074F SYST BP LT 130 MM HG: CPT

## 2021-09-15 PROCEDURE — 1036F TOBACCO NON-USER: CPT

## 2021-09-15 PROCEDURE — 3078F DIAST BP <80 MM HG: CPT

## 2021-09-15 RX ORDER — ICOSAPENT ETHYL 1000 MG/1
2 CAPSULE ORAL 2 TIMES DAILY
Qty: 360 CAPSULE | Refills: 3 | Status: SHIPPED | OUTPATIENT
Start: 2021-09-15 | End: 2021-12-27 | Stop reason: SDUPTHER

## 2021-11-10 LAB
BUN SERPL-MCNC: 17 MG/DL (ref 7–25)
BUN/CREAT SERPL: NORMAL (CALC) (ref 6–22)
CALCIUM SERPL-MCNC: 10.1 MG/DL (ref 8.6–10.4)
CHLORIDE SERPL-SCNC: 98 MMOL/L (ref 98–110)
CO2 SERPL-SCNC: 29 MMOL/L (ref 20–32)
CREAT SERPL-MCNC: 0.84 MG/DL (ref 0.6–0.88)
GLUCOSE SERPL-MCNC: 91 MG/DL (ref 65–139)
POTASSIUM SERPL-SCNC: 5.1 MMOL/L (ref 3.5–5.3)
SL AMB EGFR AFRICAN AMERICAN: 76 ML/MIN/1.73M2
SL AMB EGFR NON AFRICAN AMERICAN: 66 ML/MIN/1.73M2
SODIUM SERPL-SCNC: 135 MMOL/L (ref 135–146)

## 2021-11-11 ENCOUNTER — OFFICE VISIT (OUTPATIENT)
Dept: NEPHROLOGY | Facility: CLINIC | Age: 81
End: 2021-11-11
Payer: COMMERCIAL

## 2021-11-11 VITALS
HEART RATE: 69 BPM | HEIGHT: 62 IN | WEIGHT: 156.4 LBS | BODY MASS INDEX: 28.78 KG/M2 | DIASTOLIC BLOOD PRESSURE: 62 MMHG | SYSTOLIC BLOOD PRESSURE: 130 MMHG

## 2021-11-11 DIAGNOSIS — E87.1 CHRONIC HYPONATREMIA: Primary | ICD-10-CM

## 2021-11-11 DIAGNOSIS — E55.9 VITAMIN D DEFICIENCY: ICD-10-CM

## 2021-11-11 DIAGNOSIS — E22.2 SIADH (SYNDROME OF INAPPROPRIATE ADH PRODUCTION) (HCC): ICD-10-CM

## 2021-11-11 DIAGNOSIS — I10 ESSENTIAL HYPERTENSION: ICD-10-CM

## 2021-11-11 PROCEDURE — 3075F SYST BP GE 130 - 139MM HG: CPT | Performed by: INTERNAL MEDICINE

## 2021-11-11 PROCEDURE — 3078F DIAST BP <80 MM HG: CPT | Performed by: INTERNAL MEDICINE

## 2021-11-11 PROCEDURE — 1160F RVW MEDS BY RX/DR IN RCRD: CPT | Performed by: INTERNAL MEDICINE

## 2021-11-11 PROCEDURE — 99213 OFFICE O/P EST LOW 20 MIN: CPT | Performed by: INTERNAL MEDICINE

## 2021-11-11 PROCEDURE — 1036F TOBACCO NON-USER: CPT | Performed by: INTERNAL MEDICINE

## 2021-11-12 LAB
CREAT UR-MCNC: 77 MG/DL (ref 20–275)
PROT UR-MCNC: 13 MG/DL (ref 5–24)
PROT/CREAT UR: 0.17 MG/MG CREAT (ref 0.02–0.16)
PROT/CREAT UR: 169 MG/G CREAT (ref 21–161)

## 2021-12-27 DIAGNOSIS — I25.10 ATHEROSCLEROSIS OF CORONARY ARTERY OF NATIVE HEART, UNSPECIFIED VESSEL OR LESION TYPE, UNSPECIFIED WHETHER ANGINA PRESENT: ICD-10-CM

## 2021-12-27 DIAGNOSIS — E78.1 HYPERTRIGLYCERIDEMIA: ICD-10-CM

## 2021-12-27 RX ORDER — ICOSAPENT ETHYL 1000 MG/1
2 CAPSULE ORAL 2 TIMES DAILY
Qty: 360 CAPSULE | Refills: 3 | Status: SHIPPED | OUTPATIENT
Start: 2021-12-27

## 2022-02-13 LAB
25(OH)D3 SERPL-MCNC: 41 NG/ML (ref 30–100)
ALBUMIN SERPL-MCNC: 4.2 G/DL (ref 3.6–5.1)
ALBUMIN/GLOB SERPL: 1.4 (CALC) (ref 1–2.5)
ALP SERPL-CCNC: 52 U/L (ref 37–153)
ALT SERPL-CCNC: 39 U/L (ref 6–29)
AST SERPL-CCNC: 43 U/L (ref 10–35)
BASOPHILS # BLD AUTO: 112 CELLS/UL (ref 0–200)
BASOPHILS NFR BLD AUTO: 1.2 %
BILIRUB SERPL-MCNC: 0.5 MG/DL (ref 0.2–1.2)
BUN SERPL-MCNC: 18 MG/DL (ref 7–25)
BUN/CREAT SERPL: ABNORMAL (CALC) (ref 6–22)
CALCIUM SERPL-MCNC: 9.6 MG/DL (ref 8.6–10.4)
CHLORIDE SERPL-SCNC: 100 MMOL/L (ref 98–110)
CHOLEST SERPL-MCNC: 114 MG/DL
CHOLEST/HDLC SERPL: 2.9 (CALC)
CO2 SERPL-SCNC: 25 MMOL/L (ref 20–32)
CREAT SERPL-MCNC: 0.75 MG/DL (ref 0.6–0.88)
EOSINOPHIL # BLD AUTO: 512 CELLS/UL (ref 15–500)
EOSINOPHIL NFR BLD AUTO: 5.5 %
ERYTHROCYTE [DISTWIDTH] IN BLOOD BY AUTOMATED COUNT: 12.8 % (ref 11–15)
GLOBULIN SER CALC-MCNC: 2.9 G/DL (CALC) (ref 1.9–3.7)
GLUCOSE SERPL-MCNC: 112 MG/DL (ref 65–99)
HBA1C MFR BLD: 6.3 % OF TOTAL HGB
HCT VFR BLD AUTO: 35.5 % (ref 35–45)
HDLC SERPL-MCNC: 39 MG/DL
HGB BLD-MCNC: 12.3 G/DL (ref 11.7–15.5)
LDLC SERPL CALC-MCNC: 46 MG/DL (CALC)
LYMPHOCYTES # BLD AUTO: 2465 CELLS/UL (ref 850–3900)
LYMPHOCYTES NFR BLD AUTO: 26.5 %
MCH RBC QN AUTO: 30.6 PG (ref 27–33)
MCHC RBC AUTO-ENTMCNC: 34.6 G/DL (ref 32–36)
MCV RBC AUTO: 88.3 FL (ref 80–100)
MONOCYTES # BLD AUTO: 698 CELLS/UL (ref 200–950)
MONOCYTES NFR BLD AUTO: 7.5 %
NEUTROPHILS # BLD AUTO: 5515 CELLS/UL (ref 1500–7800)
NEUTROPHILS NFR BLD AUTO: 59.3 %
NONHDLC SERPL-MCNC: 75 MG/DL (CALC)
PLATELET # BLD AUTO: 404 THOUSAND/UL (ref 140–400)
PMV BLD REES-ECKER: 10.8 FL (ref 7.5–12.5)
POTASSIUM SERPL-SCNC: 4.4 MMOL/L (ref 3.5–5.3)
PROT SERPL-MCNC: 7.1 G/DL (ref 6.1–8.1)
RBC # BLD AUTO: 4.02 MILLION/UL (ref 3.8–5.1)
SL AMB EGFR AFRICAN AMERICAN: 87 ML/MIN/1.73M2
SL AMB EGFR NON AFRICAN AMERICAN: 75 ML/MIN/1.73M2
SODIUM SERPL-SCNC: 136 MMOL/L (ref 135–146)
TRIGL SERPL-MCNC: 233 MG/DL
WBC # BLD AUTO: 9.3 THOUSAND/UL (ref 3.8–10.8)

## 2022-02-28 ENCOUNTER — DOCUMENTATION (OUTPATIENT)
Dept: NEPHROLOGY | Facility: CLINIC | Age: 82
End: 2022-02-28

## 2022-02-28 LAB
BUN BLD-SCNC: 18 MMOL/L
EXT GLUCOSE BLD: 112
EXTERNAL ALBUMIN: 4.2
EXTERNAL ALK PHOS: 52
EXTERNAL ALT: 39
EXTERNAL AST: 43
EXTERNAL CALCIUM: 9.6
EXTERNAL CHLORIDE: 100
EXTERNAL CREATININE: 0.75
EXTERNAL EGFR: 75
EXTERNAL POTASSIUM: 4.4
EXTERNAL SODIUM: 136
EXTERNAL T.BILIRUBIN: 0.5
EXTERNAL TOTAL PROTEIN: 7.1
HBA1C MFR BLD: 6.3 % (ref 3.8–5.6)
PLATELET # BLD AUTO: 404 THOUSANDS/UL (ref 149–390)
SL AMB ABSOLUTE EOSINOPHILS: 512
SODIUM SERPL-SCNC: 136 MMOL/L (ref 136–145)

## 2022-03-08 ENCOUNTER — OFFICE VISIT (OUTPATIENT)
Dept: FAMILY MEDICINE CLINIC | Facility: CLINIC | Age: 82
End: 2022-03-08
Payer: COMMERCIAL

## 2022-03-08 VITALS
HEART RATE: 84 BPM | SYSTOLIC BLOOD PRESSURE: 132 MMHG | TEMPERATURE: 98.3 F | BODY MASS INDEX: 28.71 KG/M2 | DIASTOLIC BLOOD PRESSURE: 68 MMHG | WEIGHT: 156 LBS | HEIGHT: 62 IN

## 2022-03-08 DIAGNOSIS — D75.839 THROMBOCYTOSIS: ICD-10-CM

## 2022-03-08 DIAGNOSIS — F41.9 ANXIETY AND DEPRESSION: ICD-10-CM

## 2022-03-08 DIAGNOSIS — R73.9 HYPERGLYCEMIA: ICD-10-CM

## 2022-03-08 DIAGNOSIS — J44.9 CHRONIC OBSTRUCTIVE PULMONARY DISEASE, UNSPECIFIED COPD TYPE (HCC): ICD-10-CM

## 2022-03-08 DIAGNOSIS — N18.2 STAGE 2 CHRONIC KIDNEY DISEASE: ICD-10-CM

## 2022-03-08 DIAGNOSIS — E78.5 HYPERLIPIDEMIA, UNSPECIFIED HYPERLIPIDEMIA TYPE: ICD-10-CM

## 2022-03-08 DIAGNOSIS — M85.89 OSTEOPENIA OF MULTIPLE SITES: ICD-10-CM

## 2022-03-08 DIAGNOSIS — E55.9 VITAMIN D DEFICIENCY: ICD-10-CM

## 2022-03-08 DIAGNOSIS — I25.10 ATHEROSCLEROSIS OF CORONARY ARTERY OF NATIVE HEART, UNSPECIFIED VESSEL OR LESION TYPE, UNSPECIFIED WHETHER ANGINA PRESENT: ICD-10-CM

## 2022-03-08 DIAGNOSIS — F32.A ANXIETY AND DEPRESSION: ICD-10-CM

## 2022-03-08 DIAGNOSIS — G91.2 NORMAL PRESSURE HYDROCEPHALUS (HCC): ICD-10-CM

## 2022-03-08 DIAGNOSIS — E22.2 SIADH (SYNDROME OF INAPPROPRIATE ADH PRODUCTION) (HCC): ICD-10-CM

## 2022-03-08 DIAGNOSIS — D47.3 ESSENTIAL (HEMORRHAGIC) THROMBOCYTHEMIA (HCC): ICD-10-CM

## 2022-03-08 DIAGNOSIS — I10 ESSENTIAL HYPERTENSION: Primary | ICD-10-CM

## 2022-03-08 PROCEDURE — 99214 OFFICE O/P EST MOD 30 MIN: CPT | Performed by: PHYSICIAN ASSISTANT

## 2022-03-08 NOTE — ASSESSMENT & PLAN NOTE
Lab Results   Component Value Date    EGFR 75 02/12/2022    EGFR 83 09/05/2019    EGFR 69 09/04/2019    CREATININE 0 75 02/12/2022    CREATININE 0 75 02/12/2022    CREATININE 0 84 11/09/2021   Normal BUN creatinine and stable GFR of 75

## 2022-03-08 NOTE — PROGRESS NOTES
Assessment and Plan:    Problem List Items Addressed This Visit        Endocrine    SIADH (syndrome of inappropriate ADH production) ([facility] [address] )     Sodium within normal limits  Respiratory    Chronic obstructive pulmonary disease, unspecified COPD type ([facility] [address] )     Stable without any inhalers  Cardiovascular and Mediastinum    Essential hypertension - Primary     Stable continue medication  Coronary atherosclerosis     Continue with cardiology next appointment is next week  Nervous and Auditory    Normal pressure hydrocephalus (HCC)     Status post shunt  Doing well  Musculoskeletal and Integument    Osteopenia of multiple sites     DEXA still not done  Hematopoietic and Hemostatic    Essential (hemorrhagic) thrombocythemia (HCC)     Platelet count is four hundred four continue aspirin  Genitourinary    Stage 2 chronic kidney disease     Lab Results   Component Value Date    EGFR 75 02/12/2022    EGFR 83 09/05/2019    EGFR 69 09/04/2019    CREATININE 0 75 02/12/2022    CREATININE 0 75 02/12/2022    CREATININE 0 84 11/09/2021   Normal BUN creatinine and stable GFR of 75  Other    Anxiety and depression     Doing very well overall on Lexapro she is time forgetful and happens to be calling her daughter's very frequently for example at 1 point she called her daughter 32 times and she will stop calling when she finally gets through but leaves messages  May be having a little bit of SAD  This should improve throughout the spring  She does have signs on CT scan of loss of volume previous stroke and ischemic vessel disease all parts of aging which medication cannot help  Patient was on Elavil family seems to think that was making her better however during tolerated took her off of this secondary to possible side effects           Hyperlipidemia     Patient is on Lipitor 40 and vacepa which has brought her triglycerides down significantly LDL stable at 46  Has Cardiology next week          Relevant Orders    Lipid Panel with Direct LDL reflex    Vitamin D deficiency    Hyperglycemia     Very stable with an A1c of 6 3 in a fasting sugar of 112  Continue to observe very 6 months  Relevant Orders    Comprehensive metabolic panel    Hemoglobin A1C                 Diagnoses and all orders for this visit:    Essential hypertension    Normal pressure hydrocephalus (HCC)    Osteopenia of multiple sites    Thrombocytosis  -     CBC and differential; Future    Stage 2 chronic kidney disease    Anxiety and depression    Hyperglycemia  -     Comprehensive metabolic panel; Future  -     Hemoglobin A1C; Future    Hyperlipidemia, unspecified hyperlipidemia type  -     Lipid Panel with Direct LDL reflex; Future    Vitamin D deficiency    Atherosclerosis of coronary artery of native heart, unspecified vessel or lesion type, unspecified whether angina present    Chronic obstructive pulmonary disease, unspecified COPD type ([facility] [address] )    SIADH (syndrome of inappropriate ADH production) ([facility] [address] )    Essential (hemorrhagic) thrombocythemia ([facility] )              Subjective:      Patient ID: Ruperto Norris is a 80 y o  female  CC:    Chief Complaint   Patient presents with    Follow-up     6 month f/u     Hypertension    Memory Loss     ongoing for awhile, daughter would like to discuss pt having memory issues with " time"   states this is not as bad as it was a year ago, but would like to discuss this with PCP as it seems like this is more towards winter/ fall time  would like to discuss possible med          HPI:      Ruperto Norris is here for chronic conditions f/u including the diagnosis of Essential hypertension  (primary encounter diagnosis)  Normal pressure hydrocephalus (hcc)  Osteopenia of multiple sites  Thrombocytosis  Stage 2 chronic kidney disease  Anxiety and depression  Hyperglycemia  Hyperlipidemia, unspecified hyperlipidemia type  Vitamin d deficiency  Atherosclerosis of coronary artery of native heart, unspecified vessel or lesion type, unspecified whether angina present   Pt  states they are taking all medications as directed without complaints or side effects   Pt  had labs done prior to today's visit which included Recent Results (from the past 672 hour(s))  -Lipid Panel with Direct LDL reflex:   Collection Time: 02/12/22  9:40 AM       Result                      Value             Ref Range           Total Cholesterol           114               <200 mg/dL          HDL                         39 (L)            > OR = 50 mg*       Triglycerides               233 (H)           <150 mg/dL          LDL Calculated              46                mg/dL (calc)        Chol HDLC Ratio             2 9               <5 0 (calc)         Non-HDL Cholesterol         75                <130 mg/dL (*  -Comprehensive metabolic panel:   Collection Time: 02/12/22  9:40 AM       Result                      Value             Ref Range           Glucose, Random             112 (H)           65 - 99 mg/dL       BUN                         18                7 - 25 mg/dL        Creatinine                  0 75              0 60 - 0 88 *       eGFR Non  Ameri*     75                > OR = 60 mL*       eGFR        87                > OR = 60 mL*       SL AMB BUN/CREATININE *                       6 - 22 (calc)   NOT APPLICABLE       Sodium                      136               135 - 146 mm*       Potassium                   4 4               3 5 - 5 3 mm*       Chloride                    100               98 - 110 mmo*       CO2                         25                20 - 32 mmol*       Calcium                     9 6               8 6 - 10 4 m*       Protein, Total              7 1               6 1 - 8 1 g/*       Albumin                     4 2               3 6 - 5 1 g/*       Globulin                    2 9               1 9 - 3 7 g/* Albumin/Globulin Ratio      1 4               1 0 - 2 5 (c*       TOTAL BILIRUBIN             0 5               0 2 - 1 2 mg*       Alkaline Phosphatase        52                37 - 153 U/L        AST                         43 (H)            10 - 35 U/L         ALT                         39 (H)            6 - 29 U/L     -CBC and differential:   Collection Time: 02/12/22  9:40 AM       Result                      Value             Ref Range           White Blood Cell Count      9 3               3 8 - 10 8 T*       Red Blood Cell Count        4 02              3 80 - 5 10 *       Hemoglobin                  12 3              11 7 - 15 5 *       HCT                         35 5              35 0 - 45 0 %       MCV                         88 3              80 0 - 100 0*       MCH                         30 6              27 0 - 33 0 *       MCHC                        34 6              32 0 - 36 0 *       RDW                         12 8              11 0 - 15 0 %       Platelet Count              404 (H)           140 - 400 Th*       SL AMB MPV                  10 8              7 5 - 12 5 fL       Neutrophils (Absolute)      5,515             1,500 - 7,80*       Lymphocytes (Absolute)      2,465             850 - 3,900 *       Monocytes (Absolute)        698               200 - 950 ce*       Eosinophils (Absolute)      512 (H)           15 - 500 sarahy*       Basophils ABS               112               0 - 200 cell*       Neutrophils                 59 3              %                   Lymphocytes                 26 5              %                   Monocytes                   7 5               %                   Eosinophils                 5 5               %                   Basophils PCT               1 2               %              -Vitamin D 25 hydroxy:   Collection Time: 02/12/22  9:40 AM       Result                      Value             Ref Range           Vitamin D, 25-Hydroxy,*     41 30 - 100 ng/*  -Hemoglobin A1c (w/out EAG):   Collection Time: 02/12/22  9:40 AM       Result                      Value             Ref Range           Hemoglobin A1C              6 3 (H)           <5 7 % of to*  -Comprehensive metabolic panel:   Collection Time: 02/12/22  9:40 AM       Result                      Value             Ref Range           SODIUM                      136                                   POTASSIUM                   4 4                                   CHLORIDE                    100                                   Sodium                      136               136 - 145 mm*       BUN                         18                                    CREATININE                  0 75                                  Glucose                     112                                   EXTERNAL CALCIUM            9 6                                   TOTAL PROTEIN               7 1                                   ALBUMIN                     4 2                                   AST                         43                                    ALT                         39                                    ALK PHOS                    52                                    EXTERNAL TOT  BILIRUBIN     0 5                                   EXTERNAL EGFR               75                                    Hemoglobin A1C              6 3 (A)           3 8 - 5 6 %         Platelets                   404 (A)           149 - 390 Th*       Absolute Eosinophils        512                                    The following portions of the patient's history were reviewed and updated as appropriate: allergies, current medications, past family history, past medical history, past social history, past surgical history and problem list       Review of Systems   Constitutional: Negative  HENT: Negative  Eyes: Negative  Respiratory: Negative  Cardiovascular: Negative  Gastrointestinal: Negative  Endocrine: Negative  Genitourinary: Negative  Musculoskeletal: Negative  Skin: Negative  Allergic/Immunologic: Negative  Neurological: Negative  Hematological: Negative  Psychiatric/Behavioral: Negative  Data to review:       Objective:    Vitals:    03/08/22 1640   BP: 132/68   BP Location: Left arm   Patient Position: Sitting   Cuff Size: Adult   Pulse: 84   Temp: 98 3 °F (36 8 °C)   TempSrc: Probe   Weight: 70 8 kg (156 lb)   Height: 5' 2" (1 575 m)        Physical Exam  Vitals and nursing note reviewed  Constitutional:       Appearance: Normal appearance  She is well-developed  HENT:      Head: Normocephalic and atraumatic  Eyes:      General: Lids are normal       Conjunctiva/sclera: Conjunctivae normal       Pupils: Pupils are equal, round, and reactive to light  Cardiovascular:      Rate and Rhythm: Normal rate and regular rhythm  Heart sounds: No murmur heard  Pulmonary:      Effort: Pulmonary effort is normal       Breath sounds: Normal breath sounds  Skin:     General: Skin is warm and dry  Neurological:      General: No focal deficit present  Mental Status: She is alert  Coordination: Coordination is intact  Psychiatric:         Mood and Affect: Mood normal          Behavior: Behavior normal  Behavior is cooperative  Thought Content: Thought content normal          Judgment: Judgment normal            BMI Counseling: Body mass index is 28 53 kg/m²  The BMI is above normal  Nutrition recommendations include decreasing portion sizes, encouraging healthy choices of fruits and vegetables, decreasing fast food intake, consuming healthier snacks, limiting drinks that contain sugar, moderation in carbohydrate intake, increasing intake of lean protein, reducing intake of saturated and trans fat and reducing intake of cholesterol  Exercise recommendations include exercising 3-5 times per week  No pharmacotherapy was ordered   Rationale for BMI follow-up plan is due to patient being overweight or obese

## 2022-03-08 NOTE — PATIENT INSTRUCTIONS
Problem List Items Addressed This Visit        Cardiovascular and Mediastinum    Essential hypertension - Primary    Coronary atherosclerosis       Nervous and Auditory    Normal pressure hydrocephalus (HCC)       Musculoskeletal and Integument    Osteopenia of multiple sites       Hematopoietic and Hemostatic    Thrombocytosis       Genitourinary    Stage 2 chronic kidney disease       Other    Anxiety and depression    Hyperlipidemia    Vitamin D deficiency    Hyperglycemia

## 2022-03-08 NOTE — ASSESSMENT & PLAN NOTE
Patient is on Lipitor 40 and vacepa which has brought her triglycerides down significantly LDL stable at 46   Has Cardiology next week

## 2022-03-08 NOTE — ASSESSMENT & PLAN NOTE
Doing very well overall on Lexapro she is time forgetful and happens to be calling her daughter's very frequently for example at 1 point she called her daughter 32 times and she will stop calling when she finally gets through but leaves messages  May be having a little bit of SAD  This should improve throughout the spring  She does have signs on CT scan of loss of volume previous stroke and ischemic vessel disease all parts of aging which medication cannot help  Patient was on Elavil family seems to think that was making her better however during tolerated took her off of this secondary to possible side effects

## 2022-03-16 ENCOUNTER — OFFICE VISIT (OUTPATIENT)
Dept: CARDIOLOGY CLINIC | Facility: CLINIC | Age: 82
End: 2022-03-16
Payer: COMMERCIAL

## 2022-03-16 VITALS
HEART RATE: 84 BPM | SYSTOLIC BLOOD PRESSURE: 130 MMHG | BODY MASS INDEX: 28.82 KG/M2 | HEIGHT: 62 IN | WEIGHT: 156.6 LBS | DIASTOLIC BLOOD PRESSURE: 60 MMHG

## 2022-03-16 DIAGNOSIS — Z98.2 S/P VP SHUNT: ICD-10-CM

## 2022-03-16 DIAGNOSIS — F41.9 ANXIETY AND DEPRESSION: ICD-10-CM

## 2022-03-16 DIAGNOSIS — E78.2 MIXED HYPERLIPIDEMIA: ICD-10-CM

## 2022-03-16 DIAGNOSIS — K55.1 SUPERIOR MESENTERIC ARTERY STENOSIS (HCC): ICD-10-CM

## 2022-03-16 DIAGNOSIS — E22.2 SIADH (SYNDROME OF INAPPROPRIATE ADH PRODUCTION) (HCC): ICD-10-CM

## 2022-03-16 DIAGNOSIS — J44.9 CHRONIC OBSTRUCTIVE PULMONARY DISEASE, UNSPECIFIED COPD TYPE (HCC): ICD-10-CM

## 2022-03-16 DIAGNOSIS — I65.29 CAROTID ATHEROSCLEROSIS, UNSPECIFIED LATERALITY: ICD-10-CM

## 2022-03-16 DIAGNOSIS — D47.3 ESSENTIAL (HEMORRHAGIC) THROMBOCYTHEMIA (HCC): ICD-10-CM

## 2022-03-16 DIAGNOSIS — F32.A ANXIETY AND DEPRESSION: ICD-10-CM

## 2022-03-16 DIAGNOSIS — I10 ESSENTIAL HYPERTENSION: ICD-10-CM

## 2022-03-16 DIAGNOSIS — G91.2 NORMAL PRESSURE HYDROCEPHALUS (HCC): ICD-10-CM

## 2022-03-16 DIAGNOSIS — I25.10 ATHEROSCLEROSIS OF NATIVE CORONARY ARTERY OF NATIVE HEART WITHOUT ANGINA PECTORIS: Primary | ICD-10-CM

## 2022-03-16 PROBLEM — I21.4 NON-ST ELEVATION MYOCARDIAL INFARCTION (NSTEMI) (HCC): Status: ACTIVE | Noted: 2022-03-16

## 2022-03-16 PROCEDURE — 99213 OFFICE O/P EST LOW 20 MIN: CPT

## 2022-03-16 NOTE — PROGRESS NOTES
Cardiology   MD David Balderrama MD Azalia Burlington, DO, MD Caterina Bardales DO, Blanquita Quiñones DO, [facility] [place]  -------------------------------------------------------------------  [facility]  [address], [place] [address]  [phone]  [phone]  03/16/22  Munir Limon  YOB: 1940   MRN: [id number]      Referring Physician: Sheron Pierce PA-C  [address]  [address] [place] [id number]     HPI: Munir Limon is a 80 y o  female with: myocardial infarction 1998-no catheterization/PCI-medical management, SIADH, hypertension, GERD, normal pressure hydrocephalus, status post  shunt     She presents today for re-evaluation        She states she feels well  She was noted to have significant hypertriglyceridemia on prior laboratory testing  I started her on Vascepa at her last visit  This has made a dramatic improvement in her triglycerides  She states she feels well, denies any chest pain shortness of breath dyspnea on exertion syncope presyncope paroxysmal nocturnal dyspnea  She ambulates with a rolling walker    Review of Systems   Constitutional: Negative for chills and fever  HENT: Negative for facial swelling and sore throat  Eyes: Negative for visual disturbance  Respiratory: Negative for cough, chest tightness, shortness of breath and wheezing  Cardiovascular: Negative for chest pain, palpitations and leg swelling  Gastrointestinal: Negative for abdominal pain, blood in stool, constipation, diarrhea, nausea and vomiting  Endocrine: Negative for cold intolerance and heat intolerance  Genitourinary: Negative for decreased urine volume, difficulty urinating, dysuria and hematuria  Musculoskeletal: Negative for arthralgias, back pain and myalgias  Skin: Negative for rash  Neurological: Negative for dizziness, syncope, weakness and numbness     Psychiatric/Behavioral: Negative for agitation, behavioral problems and confusion  The patient is not nervous/anxious  OBJECTIVE  Vitals:    03/16/22 1638   BP: 130/60   Pulse: 84       Physical Exam  Constitutional: awake, alert and oriented, in no acute distress, no obvious deformities, overweight female  Head: Normocephalic, without obvious abnormality, atraumatic  Eyes: conjunctivae clear and moist  Sclera anicteric  No xanthelasmas  Pupils equal bilaterally  Extraocular motions are full  Ear nose mouth and throat: ears are symmetrical bilaterally, hearing appears to be equal bilaterally, no nasal discharge or epistaxis, oropharynx is clear with moist mucous membranes  Neck:  Trachea is midline, neck is supple, no thyromegaly or significant lymphadenopathy, there is full range of motion  Lungs: clear to auscultation bilaterally, no wheezes, no rales, no rhonchi, no accessory muscle use, breathing is nonlabored  Heart: regular rate and rhythm, S1, S2 normal, no murmur, no click, no rub and no gallop, no lower extremity edema  Abdomen:  Overweight, soft, non-tender; bowel sounds normal; no masses,  no organomegaly  Psychiatric:  Patient is oriented to time, place, person, mood/affect is negative for depression, anxiety, agitation, appears to have appropriate insight  Skin: Skin is warm, dry, intact  No obvious rashes or lesions on exposed extremities  Nail beds are pink with no cyanosis or clubbing  EKG:  No results found for this visit on 03/16/22       IMPRESSION:  · CAD / MI 1998 - no cath/PCI - medical mgmt  · SIADH  · hypertension  · GERD  · normal pressure hydrocephalus status post  shunt  · Cardiac murmur     Echo 2017, EF 70%  Nuclear stress 2017, nonischemic     Echo September 2021  EF 75 percent, mild-to-moderate LVH, grade 1 diastolic dysfunction  Aortic valve sclerotic with adequate separation  Fibrocalcific changes of mitral valve       DISCUSSION/RECOMMENDATIONS:   She has improved with the addition of Ashely, would continue  Continue Lipitor 40 milligrams    She lives at [facility] and does not have much to say in her overall dietary preferences although there are occasions where 1 of her daughters will bring her [employer] here in there  I asked her to try to make the healthiest dietary choices     She is asymptomatic today   Otherwise stable from cardiovascular perspective   Blood pressure is stable and controlled   Would continue current medical management, plan for follow-up 1 year    Denis Mejia DO, [facility] [place]  --------------------------------------------------------------------------------  TREADMILL STRESS  No results found for this or any previous visit      ----------------------------------------------------------------------------------------------  NUCLEAR STRESS TEST: No results found for this or any previous visit  No results found for this or any previous visit       --------------------------------------------------------------------------------  CATH:  No results found for this or any previous visit     --------------------------------------------------------------------------------  ECHO:   Results for orders placed during the hospital encounter of 09/10/21    Echo complete with contrast if indicated    Narrative  [address] [place] [address]  Flaquita De Paz [address]  [facility], [address]   [phone]    Transthoracic Echocardiogram  2D, M-mode, Doppler, and Color Doppler    Study date:  10-Sep-2021    Patient: Yaa Galicia  MR number: [id number]  Account number: [de-identified]  : 1940  Age: [de-identified] years  Gender: Female  Status: Outpatient  Location: [place] ECHO 3  Height: 62 in  Weight: 155 lb  BP: 128/ 62 mmHg    Indications: Murmur    Diagnoses: R01 1 - Cardiac murmur, unspecified    Sonographer:  Lily Orta RDCS  Primary Physician:  Odilia Marcelo [facility]  Referring Physician:  SCAR Lopez    Group:  [facility]  Interpreting Physician:  Dhaval Martines DO    SUMMARY    SUMMARY:  1  This is a technically adequate study  2  Left ventricle is normal in size with hyperdynamic systolic function  Left ventricular ejection fraction is estimated at 75%  3  Mild-to-moderate concentric left ventricular hypertrophy  4  Grade 1 diastolic dysfunction  5  Left atrium is mildly dilated  6  Aortic valve sclerotic with adequate separation  7  Trace mitral regurgitation  Fibrocalcific changes of the mitral valve with mild mitral annular calcification  8  Pulmonary artery systolic pressures cannot be estimated due to lack of tricuspid regurgitation jet  9  There is no study for comparison    SUMMARY MEASUREMENTS  2D measurements:  Unspecified Anatomy:   %FS was 30 3 %  Ao Diam was 2 6 cm   EDV(Teich) was 53 ml   EF(Teich) was 58 6 %  ESV(Teich) was 21 9 ml  IVSd was 1 cm  LA Diam was 3 4 cm  LAAs A4C was 16 6 cm2  LAESV A-L A4C was 45 8 ml  LAESV MOD A4C was  41 5 ml  LALs A4C was 5 1 cm  LVEDV MOD A4C was 60 2 ml  LVEF MOD A4C was 76 8 %  LVESV MOD A4C was 14 ml  LVIDd was 3 6 cm  LVIDs was 2 5 cm  LVLd A4C was 7 cm  LVLs A4C was 6 cm  LVPWd was 1 1 cm  RAAs A4C was 9 cm2  RAESV A-L  was 18 6 ml   RAESV MOD was 17 1 ml  RALs was 3 7 cm  RVIDd was 2 8 cm  SV MOD A4C was 46 2 ml   SV(Teich) was 31 ml   CW measurements:  Unspecified Anatomy:   AV Env  Ti was 324 5 ms   AV VTI was 29 8 cm  AV Vmax was 1 4 m/s  AV Vmean was 0 9 m/s  AV maxPG was 7 7 mmHg  AV meanPG was 3 9 mmHg  MV VTI was 35 5 cm  MV Vmax was 1 1 m/s  MV Vmean was 0 6 m/s  MV maxPG  was 5 3 mmHg  MV meanPG was 1 8 mmHg  MM measurements:  Unspecified Anatomy:   TAPSE was 2 1 cm  PW measurements:  Unspecified Anatomy:   DVI was 0 8   E' Avg was 0 1 m/s  E' Lat was 0 1 m/s  E' Sept was 0 1 m/s  E/E' Avg was 11 1   E/E' Lat was 9 3   E/E' Sept was 13 8   LVOT Env  Ti was 320 6 ms  LVOT VTI was 24 1 cm  LVOT Vmax was 1 1 m/s  LVOT Vmean was 0 7 m/s    LVOT maxPG was 4 6 mmHg  LVOT meanPG was 2 5 mmHg  MV A Franko was 1 3 m/s  MV Dec Harrison was 2 3 m/s2  MV DecT was 363 5 ms   MV E Franko was 0 8 m/s  MV E/A Ratio was 0 6   HISTORY: PRIOR HISTORY: GERD, Hypertension, CKD, Hyperlipidemia, MI, SOB, Former Smoker    PROCEDURE: The study was performed in the [place] ECHO 3  This was a routine study  The transthoracic approach was used  The study included complete 2D imaging, M-mode, complete spectral Doppler, and color Doppler  The heart rate was 75 bpm, at  the start of the study  Images were obtained from the parasternal, apical, subcostal, and suprasternal notch acoustic windows  Echocardiographic views were limited due to lung interference  This was a technically difficult study  LEFT VENTRICLE: Left ventricle is normal size with hyperdynamic systolic function  Left ventricular ejection fraction is estimated 75%  Mild-to-moderate concentric left ventricular hypertrophy  Grade 1 diastolic dysfunction  False cord  noted  There are no obvious high-grade regional wall motion abnormalities  RIGHT VENTRICLE: Right ventricle is normal in size and function  LEFT ATRIUM: Left atrium is mildly dilated    ATRIAL SEPTUM: The interatrial septum appears to be grossly normal without evidence of shunting by color-flow Doppler  RIGHT ATRIUM: Right atrium is normal in size  MITRAL VALVE: Mitral valve opens well  Fibrocalcific changes of the mitral valve  Mild mitral annular calcification  No evidence of mitral stenosis  Trace mitral regurgitation  AORTIC VALVE: Aortic valve is sclerotic with adequate separation  No evidence of aortic stenosis or aortic regurgitation  TRICUSPID VALVE: Tricuspid valve opens well  No evidence tricuspid regurgitation  Pulmonary artery systolic pressures cannot be estimated due to lack of tricuspid regurgitation jet  PULMONIC VALVE: Pulmonic valve is not well visualized   No evidence of pulmonic stenosis or pulmonic regurgitation  PERICARDIUM: There is no evidence of significant pericardial effusion  AORTA: Aortic root is normal in size  SYSTEMIC VEINS: IVC: IVC is normal size with greater than 50% respirophasic collapse  SYSTEM MEASUREMENT TABLES    2D  %FS: 30 3 %  Ao Diam: 2 6 cm  EDV(Teich): 53 ml  EF(Teich): 58 6 %  ESV(Teich): 21 9 ml  IVSd: 1 cm  LA Diam: 3 4 cm  LAAs A4C: 16 6 cm2  LAESV A-L A4C: 45 8 ml  LAESV MOD A4C: 41 5 ml  LALs A4C: 5 1 cm  LVEDV MOD A4C: 60 2 ml  LVEF MOD A4C: 76 8 %  LVESV MOD A4C: 14 ml  LVIDd: 3 6 cm  LVIDs: 2 5 cm  LVLd A4C: 7 cm  LVLs A4C: 6 cm  LVPWd: 1 1 cm  RAAs A4C: 9 cm2  RAESV A-L: 18 6 ml  RAESV MOD: 17 1 ml  RALs: 3 7 cm  RVIDd: 2 8 cm  SV MOD A4C: 46 2 ml  SV(Teich): 31 ml    CW  AV Env  Ti: 324 5 ms  AV VTI: 29 8 cm  AV Vmax: 1 4 m/s  AV Vmean: 0 9 m/s  AV maxP 7 mmHg  AV meanPG: 3 9 mmHg  MV VTI: 35 5 cm  MV Vmax: 1 1 m/s  MV Vmean: 0 6 m/s  MV maxP 3 mmHg  MV meanP 8 mmHg    MM  TAPSE: 2 1 cm    PW  DVI: 0 8  E' Av 1 m/s  E' Lat: 0 1 m/s  E' Sept: 0 1 m/s  E/E' Av 1  E/E' Lat: 9 3  E/E' Sept: 13 8  LVOT Env  Ti: 320 6 ms  LVOT VTI: 24 1 cm  LVOT Vmax: 1 1 m/s  LVOT Vmean: 0 7 m/s  LVOT maxP 6 mmHg  LVOT meanP 5 mmHg  MV A Franko: 1 3 m/s  MV Dec Winston: 2 3 m/s2  MV DecT: 363 5 ms  MV E Franko: 0 8 m/s  MV E/A Ratio: 0 6    [employer] Accredited Echocardiography Laboratory    Prepared and electronically signed by    Emily Booth DO  Signed 10-Sep-2021 19:27:25    No results found for this or any previous visit     --------------------------------------------------------------------------------  HOLTER  No results found for this or any previous visit      No results found for this or any previous visit     --------------------------------------------------------------------------------  CAROTIDS  Results for orders placed during the hospital encounter of 16    VAS carotid complete study    Narrative  [id number] REPORT  CLINICAL:  Indications:  Bruit [R09 89]  Asymptomatic  Risk Factors  The patient has history of HTN, Diabetes, previous remote smoking, and  hyperlipidemia  Clinical  Left Pressure:  122/60 mm Hg  FINDINGS:    Right        Impression  PSV  EDV (cm/s)  Ratio  Dist  ICA                140          32   1 12  Mid  ICA                  95          28   0 76  Prox  ICA    1 - 49%     103          27   0 82  Dist CCA                  90          21  Mid CCA                  126          25   0 94  Prox CCA                 133          21  Ext Carotid              122           7   0 97  Prox Vert                 49           8  Subclavian               142           6    Left         Impression  PSV  EDV (cm/s)  Ratio  Dist  ICA                128          36   1 15  Mid  ICA                 109          31   0 98  Prox  ICA    1 - 49%     102          32   0 92  Dist CCA                 121          30  Mid CCA                  111          24   0 77  Prox CCA                 144          25  Ext Carotid               95           9   0 86  Prox Vert                 38           9  Subclavian               196          18        CONCLUSION:    Impression    RIGHT SIDE: There is a less than 50% stenosis in the internal carotid artery  Vertebral artery flow is antegrade  No significant subclavian artery disease  Plaque Structure: Heterogenous    LEFT SIDE: There is a less than 50% stenosis in the internal carotid artery  Vertebral artery flow is antegrade  No significant subclavian artery disease  Plaque Structure: Homogenous    In comparison to the study of 7/21/2014, there is no significant interval  change in the disease process      Internal carotid artery stenosis determination by consensus criteria from:  Natanael Vargas et al  Carotid Artery Stenosis: Gray-Scale and Doppler US Diagnosis  - Society of Radiologists in [address] [place] [address], Radiology 2003;  [phone]  SIGNATURE:  Electronically Signed by: Hesham Dunham on 2016-05-11 01:44:25 PM     --------------------------------------------------------------------------------  Diagnoses and all orders for this visit:    Atherosclerosis of native coronary artery of native heart without angina pectoris    Carotid atherosclerosis, unspecified laterality    Essential hypertension    Chronic obstructive pulmonary disease, unspecified COPD type ([place] [address] )    SIADH (syndrome of inappropriate ADH production) (HCC)    SMA stenosis    Normal pressure hydrocephalus (HCC)    Essential (hemorrhagic) thrombocythemia (HCC)    Anxiety and depression    Mixed hyperlipidemia    S/P  shunt       ======================================================    Past Medical History:   Diagnosis Date    Anxiety     Arthritis     Confusion 10/2/2018    Depression     Displaced fracture of distal phalanx of right thumb     GERD (gastroesophageal reflux disease)     Heart attack ([place] [address] )     Hyperlipidemia     Hypertension     Moderate episode of recurrent major depressive disorder ([facility] ) 4/12/2019    Shortness of breath     Stage 2 chronic kidney disease 7/17/2019     Past Surgical History:   Procedure Laterality Date    APPENDECTOMY      CARPAL TUNNEL RELEASE      CATARACT EXTRACTION Bilateral     COLONOSCOPY      FL LUMBAR PUNCTURE DIAGNOSTIC  1/10/2019    FRACTURE SURGERY      AR CREATE SHUNT:VENTRIC-PERITONEAL Right 9/3/2019    Procedure:  Insertion of frontal ventriculoperitoneal shunt;  Surgeon: Santo Gordon MD;  [facility];  Service: Neurosurgery    SEPTOPLASTY      WRIST FRACTURE SURGERY Right          Medications  Current Outpatient Medications   Medication Sig Dispense Refill    acetaminophen (TYLENOL) 325 mg tablet Take 2 tablets (650 mg total) by mouth every 6 (six) hours as needed for mild pain, moderate pain, headaches or fever 30 tablet 0    aspirin 81 mg chewable tablet CHEW 1 TABLET AND SWALLOW ORALLY DAILY (HYPERTENSION) 30 tablet 4    atenolol (TENORMIN) 100 mg tablet Take 1 tablet (100 mg total) by mouth daily 90 tablet 3    atorvastatin (LIPITOR) 40 mg tablet Take 1 tablet (40 mg total) by mouth daily 90 tablet 3    Cholecalciferol (D3-1000) 25 MCG (1000 UT) tablet Take 4 tablets (4,000 Units total) by mouth daily 30 tablet     escitalopram (LEXAPRO) 5 mg tablet TAKE ONE TABLET BY MOUTH EVERY DAY 30 tablet 11    Icosapent Ethyl (Vascepa) 1 g CAPS Take 2 capsules (2 g total) by mouth 2 (two) times a day 360 capsule 3    losartan (COZAAR) 50 mg tablet Take 1 tablet (50 mg total) by mouth 2 (two) times a day 180 tablet 3    Melatonin 1 MG CAPS Take 3 mg by mouth      multivitamin (THERAGRAN) TABS Take 1 tablet by mouth daily       omeprazole (PriLOSEC) 20 mg delayed release capsule Take 1 capsule (20 mg total) by mouth every morning (Patient taking differently: Take 20 mg by mouth every other day ) 90 capsule 3    sodium chloride 1 g tablet Take 2 tablets (2 g total) by mouth 2 (two) times a day 360 tablet 3    torsemide (DEMADEX) 10 mg tablet Take 1 tablet (10 mg total) by mouth daily 90 tablet 3     No current facility-administered medications for this visit  No Known Allergies    Social History     Socioeconomic History    Marital status:       Spouse name: Not on file    Number of children: Not on file    Years of education: Not on file    Highest education level: Not on file   Occupational History    Occupation: Retired   Tobacco Use    Smoking status: Former Smoker     Packs/day: 0 00     Years: 0 00     Pack years: 0 00     Types: Cigarettes     Quit date:      Years since quittin 2    Smokeless tobacco: Never Used    Tobacco comment: no secondhand smoke exposure   Vaping Use    Vaping Use: Never used   Substance and Sexual Activity    Alcohol use: Yes     Comment: social    Drug use: No    Sexual activity: Not on file   Other Topics Concern    Not on file   Social History Narrative    Living alone     Social Determinants of Health     Financial Resource Strain: Not on file   Food Insecurity: Not on file   Transportation Needs: Not on file   Physical Activity: Not on file   Stress: Not on file   Social Connections: Not on file   Intimate Partner Violence: Not on file   Housing Stability: Not on file        Family History   Problem Relation Age of Onset    Heart attack Mother 39    Heart attack Father 61    No Known Problems Other     Breast cancer Sister     Alcohol abuse Daughter         history of    Heart attack Brother        Lab Results   Component Value Date    WBC 9 3 02/12/2022    HGB 12 3 02/12/2022    HCT 35 5 02/12/2022    MCV 88 3 02/12/2022     (H) 02/12/2022     (A) 02/12/2022      Lab Results   Component Value Date    SODIUM 136 02/12/2022    SODIUM 136 02/12/2022    SODIUM 136 02/12/2022    K 4 4 02/12/2022    K 4 4 02/12/2022     02/12/2022     02/12/2022    CO2 25 02/12/2022    BUN 18 02/12/2022    BUN 18 02/12/2022    CREATININE 0 75 02/12/2022    CREATININE 0 75 02/12/2022    GLUC 112 (H) 02/12/2022    GLUC 112 02/12/2022    CALCIUM 9 6 02/12/2022    CALCIUM 9 6 02/12/2022      Lab Results   Component Value Date    HGBA1C 6 3 (H) 02/12/2022    HGBA1C 6 3 (A) 02/12/2022      Lab Results   Component Value Date    CHOL 165 12/01/2017    CHOL 149 04/19/2017    CHOL 159 10/19/2016     Lab Results   Component Value Date    HDL 39 (L) 02/12/2022    HDL 39 (L) 08/21/2021    HDL 40 (L) 02/12/2021     Lab Results   Component Value Date    LDLCALC 46 02/12/2022    LDLCALC 59 08/21/2021    LDLCALC 48 02/12/2021     Lab Results   Component Value Date    TRIG 233 (H) 02/12/2022    TRIG 346 (H) 08/21/2021    TRIG 329 (H) 02/12/2021     No results found for: [place], [place]   Lab Results   Component Value Date    INR 0 97 08/14/2019    INR 0 96 01/10/2019    PROTIME 13 0 08/14/2019    PROTIME 12 5 01/10/2019          Patient Active Problem List    Diagnosis Date Noted    Non-ST elevation myocardial infarction (NSTEMI) ([facility] ) 03/16/2022    Chronic obstructive pulmonary disease, unspecified COPD type ([place] [address] ) 03/08/2022    Polypharmacy 04/23/2021    MCI (mild cognitive impairment) 04/22/2021    Hyperglycemia 02/16/2021    Primary insomnia 02/08/2021    SIADH (syndrome of inappropriate ADH production) ([place] [address] ) 02/08/2021    Medicare annual wellness visit, subsequent 07/02/2020    Osteopenia of multiple sites 07/02/2020    Confusion and disorientation 07/02/2020    S/P  shunt 10/04/2019    Recurrent falls 08/14/2019    Hyperkalemia 08/14/2019    Leukocytosis 08/14/2019    Stage 2 chronic kidney disease 07/17/2019    Normal pressure hydrocephalus ([place] [address] ) 11/01/2018    Ambulatory dysfunction 12/15/2017    Positive ROSALINDA (antinuclear antibody) 12/15/2017    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    Anxiety and depression 10/20/2017    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    Splenic infarction 10/11/2017    Levoscoliosis 08/26/2017    Elevated sed rate 08/16/2017    Vitamin D deficiency 08/16/2017    Radiculopathy 08/16/2017    GERD without esophagitis 02/07/2017    Essential (hemorrhagic) thrombocythemia ([place] [address] ) 01/28/2017    Carotid artery plaque 04/19/2016    Chronic hyponatremia 11/18/2014    Hyperlipidemia 07/17/2014    Carotid bruit 07/17/2014    Essential hypertension 11/03/2009    Coronary atherosclerosis 11/03/2009       Portions of the record may have been created with voice recognition software  Occasional wrong word or "sound a like" substitutions may have occurred due to the inherent limitations of voice recognition software  Read the chart carefully and recognize, using context, where substitutions have occurred      Peter Farris DO, [facility] [place]  3/16/2022 5:09 PM

## 2022-03-23 ENCOUNTER — OFFICE VISIT (OUTPATIENT)
Dept: PODIATRY | Facility: CLINIC | Age: 82
End: 2022-03-23
Payer: COMMERCIAL

## 2022-03-23 VITALS
SYSTOLIC BLOOD PRESSURE: 132 MMHG | BODY MASS INDEX: 28.71 KG/M2 | WEIGHT: 156 LBS | HEIGHT: 62 IN | DIASTOLIC BLOOD PRESSURE: 70 MMHG

## 2022-03-23 DIAGNOSIS — I73.9 PERIPHERAL VASCULAR DISEASE, UNSPECIFIED (HCC): Primary | ICD-10-CM

## 2022-03-23 PROCEDURE — 99202 OFFICE O/P NEW SF 15 MIN: CPT | Performed by: PODIATRIST

## 2022-03-23 PROCEDURE — 3078F DIAST BP <80 MM HG: CPT | Performed by: PODIATRIST

## 2022-03-23 PROCEDURE — 1036F TOBACCO NON-USER: CPT | Performed by: PODIATRIST

## 2022-03-23 PROCEDURE — 1160F RVW MEDS BY RX/DR IN RCRD: CPT | Performed by: PODIATRIST

## 2022-03-23 PROCEDURE — 3075F SYST BP GE 130 - 139MM HG: CPT | Performed by: PODIATRIST

## 2022-03-23 NOTE — PROGRESS NOTES
Assessment/Plan:    Recommended periodic palliative care and nail trimming  Patient advised to refrain from cutting her own toenails  She will be rescheduled in 10 weeks for palliative care  No problem-specific Assessment & Plan notes found for this encounter  Diagnoses and all orders for this visit:    Peripheral vascular disease, unspecified ([facility] )          Subjective:      Patient ID: Garcia Cleveland is a 80 y o  female  HPI     Patient, an 80-year-old female in apparent good health presents with her daughter  Chief complaint involves toenails that are difficult for her to cut  The nails appear to be recently trimmed as they are currently short  The following portions of the patient's history were reviewed and updated as appropriate: allergies, current medications, past family history, past medical history, past social history, past surgical history and problem list     Review of Systems   Cardiovascular:        Coronary atherosclerosis   Gastrointestinal:        GERD   Neurological:        Radiculopathy             Objective:      /70   Ht 5' 2" (1 575 m)   Wt 70 8 kg (156 lb)   BMI 28 53 kg/m²          Physical Exam  Constitutional:       Appearance: Normal appearance  Cardiovascular:      Comments: No palpable pedal pulses bilateral  Musculoskeletal:         General: Deformity present  Comments: Asymptomatic hallux valgus deformity right foot   Skin:     Comments: No evidence of elongated toenails  No evidence of ingrown nails  Neurological:      General: No focal deficit present  Mental Status: She is oriented to person, place, and time

## 2022-04-08 ENCOUNTER — TELEPHONE (OUTPATIENT)
Dept: NEPHROLOGY | Facility: CLINIC | Age: 82
End: 2022-04-08

## 2022-05-05 DIAGNOSIS — K21.9 GERD WITHOUT ESOPHAGITIS: ICD-10-CM

## 2022-05-05 DIAGNOSIS — E22.2 SIADH (SYNDROME OF INAPPROPRIATE ADH PRODUCTION) (HCC): ICD-10-CM

## 2022-05-05 DIAGNOSIS — I10 HYPERTENSION, UNSPECIFIED TYPE: ICD-10-CM

## 2022-05-05 DIAGNOSIS — E87.1 HYPONATREMIA: ICD-10-CM

## 2022-05-05 RX ORDER — SODIUM CHLORIDE 1000 MG
TABLET, SOLUBLE MISCELLANEOUS
Qty: 360 TABLET | Refills: 3 | Status: SHIPPED | OUTPATIENT
Start: 2022-05-05 | End: 2022-05-08

## 2022-05-06 RX ORDER — OMEPRAZOLE 20 MG/1
CAPSULE, DELAYED RELEASE ORAL
Qty: 90 CAPSULE | Refills: 3 | Status: SHIPPED | OUTPATIENT
Start: 2022-05-06 | End: 2022-05-09

## 2022-05-06 RX ORDER — ATENOLOL 100 MG/1
TABLET ORAL
Qty: 90 TABLET | Refills: 3 | Status: SHIPPED | OUTPATIENT
Start: 2022-05-06 | End: 2022-05-09

## 2022-05-06 NOTE — TELEPHONE ENCOUNTER
Requested Prescriptions     Pending Prescriptions Disp Refills    omeprazole (PriLOSEC) 20 mg delayed release capsule [Pharmacy Med Name: OMEPRAZOLE 20MG CPDR] 90 capsule 3     Sig: TAKE ONE CAPSULE BY MOUTH EVERY MORNING    atenolol (TENORMIN) 100 mg tablet [Pharmacy Med Name: ATENOLOL 100MG TABS] 90 tablet 3     Sig: TAKE ONE TABLET BY MOUTH EVERY DAY     LOV 3/8/22, F/U 9/20/22, Labs pending

## 2022-05-08 DIAGNOSIS — E87.1 HYPONATREMIA: ICD-10-CM

## 2022-05-08 DIAGNOSIS — E22.2 SIADH (SYNDROME OF INAPPROPRIATE ADH PRODUCTION) (HCC): ICD-10-CM

## 2022-05-08 DIAGNOSIS — K21.9 GERD WITHOUT ESOPHAGITIS: ICD-10-CM

## 2022-05-08 DIAGNOSIS — I10 HYPERTENSION, UNSPECIFIED TYPE: ICD-10-CM

## 2022-05-08 RX ORDER — SODIUM CHLORIDE 1000 MG
TABLET, SOLUBLE MISCELLANEOUS
Qty: 360 TABLET | Refills: 3 | Status: SHIPPED | OUTPATIENT
Start: 2022-05-08

## 2022-05-09 RX ORDER — ATENOLOL 100 MG/1
TABLET ORAL
Qty: 90 TABLET | Refills: 3 | Status: SHIPPED | OUTPATIENT
Start: 2022-05-09

## 2022-05-09 RX ORDER — OMEPRAZOLE 20 MG/1
CAPSULE, DELAYED RELEASE ORAL
Qty: 90 CAPSULE | Refills: 3 | Status: SHIPPED | OUTPATIENT
Start: 2022-05-09

## 2022-05-09 NOTE — TELEPHONE ENCOUNTER
Requested Prescriptions     Pending Prescriptions Disp Refills    atenolol (TENORMIN) 100 mg tablet [Pharmacy Med Name: ATENOLOL 100MG TABS] 90 tablet 3     Sig: TAKE ONE TABLET BY MOUTH EVERY DAY    omeprazole (PriLOSEC) 20 mg delayed release capsule [Pharmacy Med Name: OMEPRAZOLE 20MG CPDR] 90 capsule 3     Sig: TAKE ONE CAPSULE BY MOUTH EVERY MORNING     LOV 3/8/22, F/U 9/20/22, labs pending

## 2022-06-01 ENCOUNTER — OFFICE VISIT (OUTPATIENT)
Dept: PODIATRY | Facility: CLINIC | Age: 82
End: 2022-06-01
Payer: COMMERCIAL

## 2022-06-01 VITALS
HEIGHT: 62 IN | SYSTOLIC BLOOD PRESSURE: 130 MMHG | BODY MASS INDEX: 27.82 KG/M2 | WEIGHT: 151.2 LBS | DIASTOLIC BLOOD PRESSURE: 58 MMHG

## 2022-06-01 DIAGNOSIS — I73.9 PERIPHERAL VASCULAR DISEASE, UNSPECIFIED (HCC): Primary | ICD-10-CM

## 2022-06-01 PROCEDURE — 99212 OFFICE O/P EST SF 10 MIN: CPT | Performed by: PODIATRIST

## 2022-06-01 PROCEDURE — 1036F TOBACCO NON-USER: CPT | Performed by: PODIATRIST

## 2022-06-01 PROCEDURE — 3075F SYST BP GE 130 - 139MM HG: CPT | Performed by: PODIATRIST

## 2022-06-01 PROCEDURE — 3078F DIAST BP <80 MM HG: CPT | Performed by: PODIATRIST

## 2022-06-01 PROCEDURE — 1160F RVW MEDS BY RX/DR IN RCRD: CPT | Performed by: PODIATRIST

## 2022-06-01 NOTE — PROGRESS NOTES
Patient presents for palliative foot care  No acute disorder noted  There are no palpable pedal pulses  Treatment consisted of nail trimming  Patient will be rescheduled in 10 weeks

## 2022-06-02 ENCOUNTER — TELEPHONE (OUTPATIENT)
Dept: NEUROSURGERY | Facility: CLINIC | Age: 82
End: 2022-06-02

## 2022-06-02 NOTE — TELEPHONE ENCOUNTER
----- Message from Shari Block RN sent at 7/22/2021 11:21 AM EDT -----  Regarding: NPH-1 yr f/u (due in July 2022)  Please contact patient for 1 year f/u through NPH clinic due July 2022

## 2022-06-02 NOTE — TELEPHONE ENCOUNTER
LM for roselyn Huggins to call me back at her convenience to schedule mom's appointment   will be at the [facility] the 11th and 25th in July

## 2022-06-03 NOTE — TELEPHONE ENCOUNTER
I had a VM from [facility] stating that she would like the latest available appointment for the follow up  I attempted to call her back with no answer  LM for call back at her convenience

## 2022-06-08 ENCOUNTER — TRANSCRIBE ORDERS (OUTPATIENT)
Dept: NEUROSURGERY | Facility: CLINIC | Age: 82
End: 2022-06-08

## 2022-06-08 DIAGNOSIS — R26.9 GAIT DISTURBANCE: Primary | ICD-10-CM

## 2022-06-08 NOTE — TELEPHONE ENCOUNTER
Daughter called me back and said she would be okay with 12 noon PT  I checked for July 11th but they did not have it available  Next Monday Dr Bard Millan is at [facility] and there was a 12 noon PT available was Monday, 8/22  Daughter stated that rosendo has been doing well and she was comfortable coming in on that date  Encouraged her to call in the meantime if she feels Flor Rowe should be seen sooner  She was appreciative for the coordination

## 2022-06-21 ENCOUNTER — TELEPHONE (OUTPATIENT)
Dept: NEPHROLOGY | Facility: CLINIC | Age: 82
End: 2022-06-21

## 2022-06-21 DIAGNOSIS — E87.1 CHRONIC HYPONATREMIA: Primary | ICD-10-CM

## 2022-06-21 DIAGNOSIS — I10 ESSENTIAL HYPERTENSION: ICD-10-CM

## 2022-06-21 NOTE — TELEPHONE ENCOUNTER
Called and spoke with [employer] to complete their bloodwork prior to their appointment on 06/30/22 with Dr Walter Hou at the [facility]

## 2022-06-21 NOTE — TELEPHONE ENCOUNTER
----- Message from Mercy Bolden MD sent at 6/21/2022  8:15 AM EDT -----  Regarding: RE: labs  -   Please order,   -Basic metabolic panel; Future  -     Protein / creatinine ratio, urine; Future     ----- Message -----  From: Mavis Vasquez  Sent: 6/20/2022   2:03 PM EDT  To: Mercy Bolden MD  Subject: labs                                             Hi what labs would you like for this patient?

## 2022-06-27 DIAGNOSIS — E87.1 HYPONATREMIA: ICD-10-CM

## 2022-06-27 DIAGNOSIS — E22.2 SIADH (SYNDROME OF INAPPROPRIATE ADH PRODUCTION) (HCC): ICD-10-CM

## 2022-06-27 RX ORDER — TORSEMIDE 10 MG/1
TABLET ORAL
Qty: 90 TABLET | Refills: 3 | Status: SHIPPED | OUTPATIENT
Start: 2022-06-27 | End: 2022-06-27 | Stop reason: SDUPTHER

## 2022-06-27 RX ORDER — TORSEMIDE 10 MG/1
10 TABLET ORAL DAILY
Qty: 90 TABLET | Refills: 3 | Status: SHIPPED | OUTPATIENT
Start: 2022-06-27

## 2022-07-07 DIAGNOSIS — E78.5 HYPERLIPIDEMIA, UNSPECIFIED HYPERLIPIDEMIA TYPE: ICD-10-CM

## 2022-07-07 DIAGNOSIS — I10 ESSENTIAL HYPERTENSION: ICD-10-CM

## 2022-07-07 RX ORDER — LOSARTAN POTASSIUM 50 MG/1
TABLET ORAL
Qty: 180 TABLET | Refills: 3 | Status: SHIPPED | OUTPATIENT
Start: 2022-07-07

## 2022-07-07 RX ORDER — ATORVASTATIN CALCIUM 40 MG/1
TABLET, FILM COATED ORAL
Qty: 90 TABLET | Refills: 3 | Status: SHIPPED | OUTPATIENT
Start: 2022-07-07

## 2022-08-10 ENCOUNTER — OFFICE VISIT (OUTPATIENT)
Dept: PODIATRY | Facility: CLINIC | Age: 82
End: 2022-08-10
Payer: COMMERCIAL

## 2022-08-10 VITALS
HEIGHT: 62 IN | SYSTOLIC BLOOD PRESSURE: 132 MMHG | WEIGHT: 149.2 LBS | DIASTOLIC BLOOD PRESSURE: 58 MMHG | BODY MASS INDEX: 27.46 KG/M2

## 2022-08-10 DIAGNOSIS — I73.9 PERIPHERAL VASCULAR DISEASE, UNSPECIFIED (HCC): Primary | ICD-10-CM

## 2022-08-10 PROCEDURE — 99212 OFFICE O/P EST SF 10 MIN: CPT | Performed by: PODIATRIST

## 2022-08-10 NOTE — PROGRESS NOTES
Patient presents for palliative foot care  No acute disorder noted  There are no palpable pedal pulses  Treatment consisted of trimming of elongated toenails

## 2022-08-22 ENCOUNTER — OFFICE VISIT (OUTPATIENT)
Dept: PHYSICAL THERAPY | Facility: CLINIC | Age: 82
End: 2022-08-22
Payer: COMMERCIAL

## 2022-08-22 ENCOUNTER — OFFICE VISIT (OUTPATIENT)
Dept: NEUROSURGERY | Facility: CLINIC | Age: 82
End: 2022-08-22
Payer: COMMERCIAL

## 2022-08-22 VITALS
SYSTOLIC BLOOD PRESSURE: 112 MMHG | WEIGHT: 149 LBS | RESPIRATION RATE: 18 BRPM | BODY MASS INDEX: 27.42 KG/M2 | HEART RATE: 76 BPM | DIASTOLIC BLOOD PRESSURE: 58 MMHG | HEIGHT: 62 IN | TEMPERATURE: 97.8 F

## 2022-08-22 DIAGNOSIS — Z98.2 S/P VP SHUNT: Primary | ICD-10-CM

## 2022-08-22 DIAGNOSIS — R26.9 GAIT ABNORMALITY: Primary | ICD-10-CM

## 2022-08-22 DIAGNOSIS — G91.2 NORMAL PRESSURE HYDROCEPHALUS (HCC): ICD-10-CM

## 2022-08-22 PROCEDURE — 97161 PT EVAL LOW COMPLEX 20 MIN: CPT | Performed by: PHYSICAL THERAPIST

## 2022-08-22 PROCEDURE — 1160F RVW MEDS BY RX/DR IN RCRD: CPT | Performed by: NEUROLOGICAL SURGERY

## 2022-08-22 PROCEDURE — 99214 OFFICE O/P EST MOD 30 MIN: CPT | Performed by: NEUROLOGICAL SURGERY

## 2022-08-22 NOTE — PROGRESS NOTES
Office Note - Neurosurgery   Delilah Whiting 80 y o  female MRN: [id number]      Assessment:    Patient is stable  57-year-old woman with a right frontal  shunt for normal pressure hydrocephalus  While she has had some objective and subjective decline in cognition and gait since her last visit, she is still significantly improved compared to prior to surgery  Would not recommend any adjustment to her shunt at present  She will follow up in 1 year's time with repeat PT and cognitive assessment  She will contact this office should any concerns arise in the interim  History, physical examination and diagnostic tests were reviewed and questions answered  Diagnosis, care plan and treatment options were discussed  The patient and daughter understand instructions and will follow up as directed  Plan:    Follow-up:  1 year    Problem List Items Addressed This Visit        Nervous and Auditory    Normal pressure hydrocephalus (HCC)       Other    S/P  shunt - Primary          Subjective/Objective     Chief Complaint    Follow-up for NPH  HPI    Pleasant 57-year-old woman accompanied by her daughter  She is status post insertion of right frontal  shunt for normal pressure hydrocephalus  They both note significant improvement in gait and cognition since having the shunt placed, though there has been some recent episodes of confusion, especially at nighttime  The patient's daughter also notices some increased emotional volatility at times  The patient denies any headaches, nausea vomiting or loss of consciousness  She is currently living in assisted living and seems to be quite active and socially engaged  JOSE GARCIA personally reviewed and updated  Review of Systems   Constitutional: Negative  HENT: Negative  Eyes: Negative  Respiratory: Negative  Cardiovascular: Negative  Gastrointestinal: Negative  Endocrine: Negative  Genitourinary: Positive for urgency  Leakage     Musculoskeletal: Positive for arthralgias (occasional  right knee pain) and gait problem (rolling walker )  Skin: Negative  Allergic/Immunologic: Negative  Neurological: Positive for speech difficulty (forgets words) and weakness (uses rolling walker  more active this year)  Negative for dizziness, seizures, syncope, numbness and headaches  9/3/19-Insertion of right frontal ventriculoperitoneal shunt normal pressure hydrocephalus  Certas plus valve set to 6   20-shunt adjusted from 6->5   Hematological: Bruises/bleeds easily (baby asa)  Psychiatric/Behavioral: Positive for confusion (patients daughter states a little bit worse than last year- time of day is an issue-(forgetful)), decreased concentration (better since last visit ) and sleep disturbance (when wakes up she seems more confused)  Family History    Family History   Problem Relation Age of Onset    Heart attack Mother 39    Heart attack Father 61    No Known Problems Other     Breast cancer Sister     Alcohol abuse Daughter         history of    Heart attack Brother        Social History    Social History     Socioeconomic History    Marital status:       Spouse name: Not on file    Number of children: Not on file    Years of education: Not on file    Highest education level: Not on file   Occupational History    Occupation: Retired   Tobacco Use    Smoking status: Former Smoker     Packs/day: 0 00     Years: 0 00     Pack years: 0 00     Types: Cigarettes     Quit date:      Years since quittin 6    Smokeless tobacco: Never Used    Tobacco comment: no secondhand smoke exposure   Vaping Use    Vaping Use: Never used   Substance and Sexual Activity    Alcohol use: Yes     Comment: social    Drug use: No    Sexual activity: Not on file   Other Topics Concern    Not on file   Social History Narrative    Living alone     Social Determinants of Health     Financial Resource Strain: Not on file   Food Insecurity: Not on file   Transportation Needs: Not on file   Physical Activity: Not on file   Stress: Not on file   Social Connections: Not on file   Intimate Partner Violence: Not on file   Housing Stability: Not on file       Past Medical History    Past Medical History:   Diagnosis Date    Anxiety     Arthritis     Confusion 10/2/2018    Depression     Displaced fracture of distal phalanx of right thumb     GERD (gastroesophageal reflux disease)     [facility] attack [facility] [address]     Hyperlipidemia     Hypertension     Moderate episode of recurrent major depressive disorder ([facility] [address] ) 4/12/2019    Shortness of breath     Stage 2 chronic kidney disease 7/17/2019       Surgical History    Past Surgical History:   Procedure Laterality Date    APPENDECTOMY      CARPAL TUNNEL RELEASE      CATARACT EXTRACTION Bilateral     COLONOSCOPY      FL LUMBAR PUNCTURE DIAGNOSTIC  1/10/2019    FRACTURE SURGERY      GA CREATE SHUNT:VENTRIC-PERITONEAL Right 9/3/2019    Procedure:  Insertion of frontal ventriculoperitoneal shunt;  Surgeon: Ke Puente MD;  [facility];  Service: Neurosurgery    SEPTOPLASTY      WRIST FRACTURE SURGERY Right        Medications      Current Outpatient Medications:     acetaminophen (TYLENOL) 325 mg tablet, Take 2 tablets (650 mg total) by mouth every 6 (six) hours as needed for mild pain, moderate pain, headaches or fever, Disp: 30 tablet, Rfl: 0    aspirin 81 mg chewable tablet, CHEW 1 TABLET AND SWALLOW ORALLY DAILY (HYPERTENSION), Disp: 30 tablet, Rfl: 4    atenolol (TENORMIN) 100 mg tablet, TAKE ONE TABLET BY MOUTH EVERY DAY, Disp: 90 tablet, Rfl: 3    atorvastatin (LIPITOR) 40 mg tablet, TAKE ONE TABLET BY MOUTH EVERY DAY, Disp: 90 tablet, Rfl: 3    Cholecalciferol (D3-1000) 25 MCG (1000 UT) tablet, Take 4 tablets (4,000 Units total) by mouth daily, Disp: 30 tablet, Rfl:     escitalopram (LEXAPRO) 5 mg tablet, TAKE ONE TABLET BY MOUTH EVERY DAY, Disp: 30 tablet, Rfl: 11    Icosapent Ethyl (Vascepa) 1 g CAPS, Take 2 capsules (2 g total) by mouth 2 (two) times a day, Disp: 360 capsule, Rfl: 3    losartan (COZAAR) 50 mg tablet, TAKE ONE TABLET BY MOUTH TWICE A DAY, Disp: 180 tablet, Rfl: 3    Melatonin 1 MG CAPS, Take 3 mg by mouth, Disp: , Rfl:     multivitamin (THERAGRAN) TABS, Take 1 tablet by mouth daily , Disp: , Rfl:     omeprazole (PriLOSEC) 20 mg delayed release capsule, TAKE ONE CAPSULE BY MOUTH EVERY MORNING (Patient taking differently: every other day), Disp: 90 capsule, Rfl: 3    sodium chloride 1 g tablet, TAKE 2 TABLETS BY MOUTH TWO TIMES A DAY, Disp: 360 tablet, Rfl: 3    torsemide (DEMADEX) 10 mg tablet, Take 1 tablet (10 mg total) by mouth daily, Disp: 90 tablet, Rfl: 3    Allergies    No Known Allergies    The following portions of the patient's history were reviewed and updated as appropriate: allergies, current medications, past family history, past medical history, past social history, past surgical history and problem list     Investigations    I personally reviewed the NPH PT and NPH Cognitive results with the patient:    NPH scale dated 8/22/22, 10/15  PT gait assessment dated 8/22/2  10 meter walking test 29 06 m/s (<1m/s predictive of falls), TUG 32 25 sec (<12 sec predictive of falls), FGA 15/30 (< 24 predictive of falls)  MoCA dated 8/22/22, 19/30 (<26 cognitive deficit present)  Physical Exam    Vitals:  Blood pressure 112/58, pulse 76, temperature 97 8 °F (36 6 °C), temperature source Temporal, resp  rate 18, height 5' 2" (1 575 m), weight 67 6 kg (149 lb), not currently breastfeeding  ,Body mass index is 27 25 kg/m²  Physical Exam  Vitals reviewed  Constitutional:       General: She is not in acute distress  Eyes:      Extraocular Movements: Extraocular movements intact  Pulmonary:      Effort: Pulmonary effort is normal  No respiratory distress  Abdominal:      General: There is no distension  Tenderness:  There is no abdominal tenderness  Skin:     General: Skin is warm and dry  Comments: Right frontal  shunt depresses and refills easily  Neurological:      Mental Status: She is alert  Cranial Nerves: No cranial nerve deficit  Motor: No weakness  Gait: Gait abnormal       Comments: Walks with a steady gait using a walker  On bloc turn in 4 steps     Psychiatric:         Mood and Affect: Mood normal          Behavior: Behavior normal        Neurologic Exam

## 2022-08-22 NOTE — PROGRESS NOTES
PT Evaluation     Today's date: 2022  Patient name: Penelope Mckeon  : 1940  MRN: [id number]  Referring provider: Pari Carbone MD  Dx:   Encounter Diagnosis     ICD-10-CM    1  Gait abnormality  R26 9                   Assessment  Assessment details: Pt presents with min to Mod risk for falls given subjective and objective data gathered during her session  Given she is currently living in a community with strong resources and her family is readily and locally available to help, she is likely in a safe consistent environment  Given her objective testings she is a fall risk, but given her subjective reports of no falling and family support/home set up, PT intervention would be beneficial for pt participation, however not immediately necessary for her at this time and her current state  If she has a decline she will definitely benefit PT intervention, and this facility may not be most convenient location for her  If she has another follow up with neuro-surgery she is welcome to return at that time  Thank you very much for this kind and familiar referral       Understanding of Dx/Px/POC: good   Prognosis: good    Goals  STG 4 weeks:  1  Assess prn  LTGs 8 weeks:  1  Assess prn    Plan  Plan details: Eduard Henry is Daughter  Patient would benefit from: skilled physical therapy  Duration in weeks: 6  Treatment plan discussed with: patient and family        Subjective Evaluation    History of Present Illness  Date of onset: 2022  Mechanism of injury: Pt is an 80 yofemale who presents today for assessment of being s/p  shunt placement performed by Dr Rock Georges in 2019  She reports that she has been feeling well  Not fearing of her balance, feeling very confident with use of rollatory  [place] of [place], everything is completely ADA accessible    Pt reports there is no medical assistance there at this time, she does have 2 daughters that are actively helping and involved with any tasks that she may have in the home  She reports she is content with her current living arrange and offers no complaints at [facility] time  Pt reports that there are no concerns with her memory, use of rest room and mobility  Very content at this time  She has a follow up with Dr Chilo Kumar today at 1:00  Quality of life: good    Pain  No pain reported      Diagnostic Tests  CT scan: normal        Objective     Ambulation     Comments   TU 06 Rollator, TUG Co 25" with Rollator, Inaccurate after 88  FGA:  DGI: 15/24   MOCA:  For all other details please refer to NPH form  Precautions: SIADH, COPD, HTN, NPH S/p  Shunt, OSTEOpenia, Hx of MI, Falls

## 2022-08-23 LAB
BUN SERPL-MCNC: 16 MG/DL (ref 7–25)
BUN/CREAT SERPL: ABNORMAL (CALC) (ref 6–22)
CALCIUM SERPL-MCNC: 9.9 MG/DL (ref 8.6–10.4)
CHLORIDE SERPL-SCNC: 99 MMOL/L (ref 98–110)
CO2 SERPL-SCNC: 26 MMOL/L (ref 20–32)
CREAT SERPL-MCNC: 0.79 MG/DL (ref 0.6–0.95)
CREAT UR-MCNC: 55 MG/DL (ref 20–275)
GFR/BSA.PRED SERPLBLD CYS-BASED-ARV: 75 ML/MIN/1.73M2
GLUCOSE SERPL-MCNC: 143 MG/DL (ref 65–99)
POTASSIUM SERPL-SCNC: 4 MMOL/L (ref 3.5–5.3)
PROT UR-MCNC: 5 MG/DL (ref 5–24)
PROT/CREAT UR: 0.09 MG/MG CREAT (ref 0.02–0.16)
PROT/CREAT UR: 91 MG/G CREAT (ref 21–161)
SODIUM SERPL-SCNC: 134 MMOL/L (ref 135–146)

## 2022-08-31 ENCOUNTER — OFFICE VISIT (OUTPATIENT)
Dept: NEPHROLOGY | Facility: CLINIC | Age: 82
End: 2022-08-31
Payer: COMMERCIAL

## 2022-08-31 VITALS
DIASTOLIC BLOOD PRESSURE: 58 MMHG | BODY MASS INDEX: 27.42 KG/M2 | HEART RATE: 58 BPM | HEIGHT: 62 IN | WEIGHT: 149 LBS | SYSTOLIC BLOOD PRESSURE: 132 MMHG

## 2022-08-31 DIAGNOSIS — E87.1 CHRONIC HYPONATREMIA: Primary | ICD-10-CM

## 2022-08-31 DIAGNOSIS — I10 ESSENTIAL HYPERTENSION: ICD-10-CM

## 2022-08-31 DIAGNOSIS — E55.9 VITAMIN D DEFICIENCY: ICD-10-CM

## 2022-08-31 DIAGNOSIS — E22.2 SIADH (SYNDROME OF INAPPROPRIATE ADH PRODUCTION) (HCC): ICD-10-CM

## 2022-08-31 PROCEDURE — 99213 OFFICE O/P EST LOW 20 MIN: CPT | Performed by: INTERNAL MEDICINE

## 2022-08-31 NOTE — PROGRESS NOTES
NEPHROLOGY OUTPATIENT PROGRESS NOTE   Jerome Mcdonald 80 y o  female MRN: [id number]  DATE: 8/31/2022  Reason for visit:   Chief Complaint   Patient presents with    Follow-up    Hyponatremia       ASSESSMENT and PLAN:  Chronic Hyponatremia  - likely from [facility] likely from use of Lexapro + PPI-has decrease the dose of PPI to every other day  -CT abdomen pelvis with contrast from 2019 was negative for malignancy   CT head from July 2020 showed stable ventriculoperitoneal shunt  - urine sodium from 12/05/2018 was 59 in urine osmolality 239  Previously checked TSH within normal limit   Urine studies  Suggestive of SIADH  -last blood work from August 2022 showed sodium acceptable at 134 mEq/liter, renal function stable at cr 0 79 mg/dl    -continue salt tablets 2 gm bid  Continue torsemide 10 mg daily  Stressed on fluid [id number] oz/day   -continue to monitor sodium level, as per daughter patient has blood work ordered for next month for PCP-can also monitor sodium level on the blood work, BMP in 3 months and follow up in 6 months with labs      Primary Hypertension:   -Current medication: losartan 50 mg bid + Atenolol  100 mg daily  -Current blood pressure: Stable   -Plan:    ? Continue same medications  -Recommend daily exercise and weight loss  -Discussed home monitoring of BP and maintaining a log of blood pressure   -Recommend goal BP less than 130/80  Proteinuria unspecified, previously upc ratio was 0 169 and now improving to 0 09  Was likely due to hypertension  Already improving, continue losartan     Vitamin D def  -last vitamin-D level was 41 in February 2022,  -on vitamin D 4000 units daily   -monitor per PCP, will order with next labs as I don't see it ordered by PCP      Hyperlipidemia  Increased TG last triglyceride was 233 in February 2022, LDL at goal at 46  Triglyceride improving with use of vascepa  -was recently started on vascepa by PCP   Monitor per PCP  - recommend dietary modifications and daily exercise  Patient Instructions   Sodium is slightly low at 134, recommend fluid restriction around 45-50 oz per day  Continue current dose of salt tablets  Check blood work as ordered by PCP next month and again in 3 months  Follow-up in 6 months with repeat blood work and urine test    Blood pressure is at goal continue current antihypertensive medications    Diagnoses and all orders for this visit:    Chronic hyponatremia  -     Basic metabolic panel; Future  -     Basic metabolic panel; Future    SIADH (syndrome of inappropriate ADH production) (HCC)  -     Basic metabolic panel; Future    Essential hypertension  -     Protein / creatinine ratio, urine; Future    Vitamin D deficiency  -     Vitamin D 25 hydroxy; Future            SUBJECTIVE / HPI:  Logan Duran is a 80 y o   female history of hypertension, hyperlipidemia depression, history of normal pressure hydrocephalus presenting follow-up of hyponatremia      Reviewed blood work from 08/22: Sodium level slightly low at 134 mEq/liter, renal function stable at creatinine 0 79 mg/dL  Last A1c was 6 3 based on blood work from February 2022  Upc ratio was 0 09 milligram/gram and improving from previous level of 0 16     Patient denies any new complaints    REVIEW OF SYSTEMS:    Review of Systems   Constitutional: Negative for activity change, appetite change, chills, diaphoresis, fatigue and fever  HENT: Negative for congestion, facial swelling and nosebleeds  Eyes: Negative for pain and visual disturbance  Respiratory: Negative for cough, chest tightness and shortness of breath  Cardiovascular: Negative for chest pain and palpitations  Gastrointestinal: Negative for abdominal distention, abdominal pain, diarrhea, nausea and vomiting  Genitourinary: Negative for difficulty urinating, dysuria, flank pain, frequency and hematuria  Musculoskeletal: Negative for arthralgias, back pain and joint swelling  Skin: Negative for rash  Neurological: Negative for dizziness, seizures, syncope, weakness and headaches  Psychiatric/Behavioral: Negative for agitation and confusion  The patient is not nervous/anxious  More than 10 point review of systems were obtained and discussed in length with the patient  Complete review of systems were negative / unremarkable except mentioned above  PAST MEDICAL HISTORY:  Past Medical History:   Diagnosis Date    Anxiety     Arthritis     Confusion 10/2/2018    Depression     Displaced fracture of distal phalanx of right thumb     GERD (gastroesophageal reflux disease)     [facility] attack [facility] [address]     Hyperlipidemia     Hypertension     Moderate episode of recurrent major depressive disorder ([facility] [address] 2019    Shortness of breath     Stage 2 chronic kidney disease 2019       PAST SURGICAL HISTORY:  Past Surgical History:   Procedure Laterality Date    APPENDECTOMY      CARPAL TUNNEL RELEASE      CATARACT EXTRACTION Bilateral     COLONOSCOPY      FL LUMBAR PUNCTURE DIAGNOSTIC  1/10/2019    FRACTURE SURGERY      [place] CREATE SHUNT:VENTRIC-PERITONEAL Right 9/3/2019    Procedure:  Insertion of frontal ventriculoperitoneal shunt;  Surgeon: Jacqui Givens MD;  [facility];  Service: Neurosurgery    SEPTOPLASTY      WRIST FRACTURE SURGERY Right        SOCIAL HISTORY:  Social History     Substance and Sexual Activity   Alcohol Use Yes    Comment: social     Social History     Substance and Sexual Activity   Drug Use No     Social History     Tobacco Use   Smoking Status Former Smoker    Packs/day: 0 00    Years: 0 00    Pack years: 0 00    Types: Cigarettes    Quit date: 56    Years since quittin 6   Smokeless Tobacco Never Used   Tobacco Comment    no secondhand smoke exposure       FAMILY HISTORY:  Family History   Problem Relation Age of Onset    Heart attack Mother 39    Heart attack Father 61    No Known Problems Other     Breast cancer Sister     Alcohol abuse Daughter         history of    Heart attack Brother        MEDICATIONS:    Current Outpatient Medications:     acetaminophen (TYLENOL) 325 mg tablet, Take 2 tablets (650 mg total) by mouth every 6 (six) hours as needed for mild pain, moderate pain, headaches or fever, Disp: 30 tablet, Rfl: 0    aspirin 81 mg chewable tablet, CHEW 1 TABLET AND SWALLOW ORALLY DAILY (HYPERTENSION), Disp: 30 tablet, Rfl: 4    atenolol (TENORMIN) 100 mg tablet, TAKE ONE TABLET BY MOUTH EVERY DAY, Disp: 90 tablet, Rfl: 3    atorvastatin (LIPITOR) 40 mg tablet, TAKE ONE TABLET BY MOUTH EVERY DAY, Disp: 90 tablet, Rfl: 3    Cholecalciferol (D3-1000) 25 MCG (1000 UT) tablet, Take 4 tablets (4,000 Units total) by mouth daily, Disp: 30 tablet, Rfl:     escitalopram (LEXAPRO) 5 mg tablet, TAKE ONE TABLET BY MOUTH EVERY DAY, Disp: 30 tablet, Rfl: 11    Icosapent Ethyl (Vascepa) 1 g CAPS, Take 2 capsules (2 g total) by mouth 2 (two) times a day, Disp: 360 capsule, Rfl: 3    losartan (COZAAR) 50 mg tablet, TAKE ONE TABLET BY MOUTH TWICE A DAY, Disp: 180 tablet, Rfl: 3    Melatonin 1 MG CAPS, Take 3 mg by mouth, Disp: , Rfl:     multivitamin (THERAGRAN) TABS, Take 1 tablet by mouth daily , Disp: , Rfl:     omeprazole (PriLOSEC) 20 mg delayed release capsule, TAKE ONE CAPSULE BY MOUTH EVERY MORNING (Patient taking differently: every other day), Disp: 90 capsule, Rfl: 3    sodium chloride 1 g tablet, TAKE 2 TABLETS BY MOUTH TWO TIMES A DAY, Disp: 360 tablet, Rfl: 3    torsemide (DEMADEX) 10 mg tablet, Take 1 tablet (10 mg total) by mouth daily, Disp: 90 tablet, Rfl: 3      PHYSICAL EXAM:  Vitals:    08/31/22 1611   BP: 132/58   BP Location: Left arm   Patient Position: Sitting   Cuff Size: Standard   Pulse: 58   Weight: 67 6 kg (149 lb)   Height: 5' 2" (1 575 m)     Body mass index is 27 25 kg/m²  Physical Exam  Constitutional:       General: She is not in acute distress  Appearance: Normal appearance  She is well-developed  HENT:      Head: Normocephalic and atraumatic  Nose: Nose normal       Mouth/Throat:      Mouth: Mucous membranes are moist    Eyes:      General: No scleral icterus  Conjunctiva/sclera: Conjunctivae normal       Pupils: Pupils are equal, round, and reactive to light  Neck:      Thyroid: No thyromegaly  Vascular: No JVD  Cardiovascular:      Rate and Rhythm: Normal rate and regular rhythm  Heart sounds: Normal heart sounds  No murmur heard  No friction rub  Pulmonary:      Effort: Pulmonary effort is normal  No respiratory distress  Breath sounds: Normal breath sounds  No wheezing or rales  Abdominal:      General: Bowel sounds are normal  There is no distension  Palpations: Abdomen is soft  Tenderness: There is no abdominal tenderness  Musculoskeletal:         General: No deformity  Cervical back: Neck supple  Right lower leg: No edema  Left lower leg: No edema  Skin:     General: Skin is warm and dry  Findings: No rash  Neurological:      Mental Status: She is alert and oriented to person, place, and time  Psychiatric:         Mood and Affect: Mood normal          Behavior: Behavior normal          Thought Content:  Thought content normal          Lab Results:   Results for orders placed or performed in visit on 47/39/89   Basic metabolic panel   Result Value Ref Range    Glucose, Random 143 (H) 65 - 99 mg/dL    BUN 16 7 - 25 mg/dL    Creatinine 0 79 0 60 - 0 95 mg/dL    eGFR 75 > OR = 60 mL/min/1 73m2    SL AMB BUN/CREATININE RATIO NOT APPLICABLE 6 - 22 (calc)    Sodium 134 (L) 135 - 146 mmol/L    Potassium 4 0 3 5 - 5 3 mmol/L    Chloride 99 98 - 110 mmol/L    CO2 26 20 - 32 mmol/L    Calcium 9 9 8 6 - 10 4 mg/dL   Protein, Total w/Creat, Random Urine   Result Value Ref Range    Creatinine, Urine 55 20 - 275 mg/dL    Protein/Creat Ratio 91 21 - 161 mg/g creat    Protein/Creat Ratio 0 091 0 021 - 0 161 mg/mg creat Total Protein, Urine 5 5 - 24 mg/dL

## 2022-08-31 NOTE — PATIENT INSTRUCTIONS
Sodium is slightly low at 134, recommend fluid restriction around 45-50 oz per day  Continue current dose of salt tablets  Check blood work as ordered by PCP next month and again in 3 months    Follow-up in 6 months with repeat blood work and urine test    Blood pressure is at goal continue current antihypertensive medications

## 2022-09-14 ENCOUNTER — RA CDI HCC (OUTPATIENT)
Dept: OTHER | Facility: HOSPITAL | Age: 82
End: 2022-09-14

## 2022-09-14 PROBLEM — F33.0 MILD RECURRENT MAJOR DEPRESSION (HCC): Status: ACTIVE | Noted: 2022-09-14

## 2022-09-14 NOTE — PROGRESS NOTES
[facility]  coding opportunities          Chart Reviewed number of suggestions sent to Provider: Estiven 1923, GIOVANA Lindsey  F33 0 Major depressive disorder, recurrent, mild         Based on clinical documentation indicated in your record, it appears that the patient may have the following conditions:    Can depression be further specified as one of the following? F33 0 Major depressive disorder, recurrent, mild  OR  F33 1 Major depressive disorder, recurrent, moderate  OR  F33 41 Major depressive disorder, recurrent, in partial remission  OR  F33 42 Major depressive disorder, recurrent in full remission  OR  F33 8 Other recurrent depressive disorders     If this is correct, please document and assess at your next visit   9/20/22       Patients Insurance     Medicare Insurance: [facility] Ad[employer]

## 2022-09-15 NOTE — PROGRESS NOTES
PT Discharge    Today's date: 9/15/2022  Patient name: Garcia Cleveland  : 1940  MRN: [id number]  Referring provider: Adriane Neves MD  Dx:   Encounter Diagnosis     ICD-10-CM    1  Gait abnormality  R26 9 PT plan of care cert/re-cert       Start Time: 1200  Stop Time: 1230  Total time in clinic (min): 30 minutes    Assessment/Plan  Pt has not been present since 22  Pt's chart will be DC in compliance of facility policy as all Charts are DC within 30 days of last scheduled visit            Subjective    Objective

## 2022-09-18 LAB
ALBUMIN SERPL-MCNC: 4.4 G/DL (ref 3.6–5.1)
ALBUMIN/GLOB SERPL: 1.6 (CALC) (ref 1–2.5)
ALP SERPL-CCNC: 54 U/L (ref 37–153)
ALT SERPL-CCNC: 22 U/L (ref 6–29)
AST SERPL-CCNC: 25 U/L (ref 10–35)
BASOPHILS # BLD AUTO: 128 CELLS/UL (ref 0–200)
BASOPHILS NFR BLD AUTO: 1.5 %
BILIRUB SERPL-MCNC: 0.6 MG/DL (ref 0.2–1.2)
BUN SERPL-MCNC: 12 MG/DL (ref 7–25)
BUN/CREAT SERPL: ABNORMAL (CALC) (ref 6–22)
CALCIUM SERPL-MCNC: 9.8 MG/DL (ref 8.6–10.4)
CHLORIDE SERPL-SCNC: 98 MMOL/L (ref 98–110)
CHOLEST SERPL-MCNC: 107 MG/DL
CHOLEST/HDLC SERPL: 2.5 (CALC)
CO2 SERPL-SCNC: 27 MMOL/L (ref 20–32)
CREAT SERPL-MCNC: 0.77 MG/DL (ref 0.6–0.95)
EOSINOPHIL # BLD AUTO: 629 CELLS/UL (ref 15–500)
EOSINOPHIL NFR BLD AUTO: 7.4 %
ERYTHROCYTE [DISTWIDTH] IN BLOOD BY AUTOMATED COUNT: 12.6 % (ref 11–15)
GFR/BSA.PRED SERPLBLD CYS-BASED-ARV: 77 ML/MIN/1.73M2
GLOBULIN SER CALC-MCNC: 2.8 G/DL (CALC) (ref 1.9–3.7)
GLUCOSE SERPL-MCNC: 117 MG/DL (ref 65–99)
HBA1C MFR BLD: 5.9 % OF TOTAL HGB
HCT VFR BLD AUTO: 36.8 % (ref 35–45)
HDLC SERPL-MCNC: 42 MG/DL
HGB BLD-MCNC: 12.5 G/DL (ref 11.7–15.5)
LDLC SERPL CALC-MCNC: 38 MG/DL (CALC)
LYMPHOCYTES # BLD AUTO: 1726 CELLS/UL (ref 850–3900)
LYMPHOCYTES NFR BLD AUTO: 20.3 %
MCH RBC QN AUTO: 31.3 PG (ref 27–33)
MCHC RBC AUTO-ENTMCNC: 34 G/DL (ref 32–36)
MCV RBC AUTO: 92 FL (ref 80–100)
MONOCYTES # BLD AUTO: 740 CELLS/UL (ref 200–950)
MONOCYTES NFR BLD AUTO: 8.7 %
NEUTROPHILS # BLD AUTO: 5279 CELLS/UL (ref 1500–7800)
NEUTROPHILS NFR BLD AUTO: 62.1 %
NONHDLC SERPL-MCNC: 65 MG/DL (CALC)
PLATELET # BLD AUTO: 403 THOUSAND/UL (ref 140–400)
PMV BLD REES-ECKER: 10.2 FL (ref 7.5–12.5)
POTASSIUM SERPL-SCNC: 4.3 MMOL/L (ref 3.5–5.3)
PROT SERPL-MCNC: 7.2 G/DL (ref 6.1–8.1)
RBC # BLD AUTO: 4 MILLION/UL (ref 3.8–5.1)
SODIUM SERPL-SCNC: 133 MMOL/L (ref 135–146)
TRIGL SERPL-MCNC: 198 MG/DL
WBC # BLD AUTO: 8.5 THOUSAND/UL (ref 3.8–10.8)

## 2022-09-20 ENCOUNTER — OFFICE VISIT (OUTPATIENT)
Dept: FAMILY MEDICINE CLINIC | Facility: CLINIC | Age: 82
End: 2022-09-20
Payer: COMMERCIAL

## 2022-09-20 VITALS
TEMPERATURE: 96.4 F | SYSTOLIC BLOOD PRESSURE: 144 MMHG | DIASTOLIC BLOOD PRESSURE: 62 MMHG | BODY MASS INDEX: 27.42 KG/M2 | HEIGHT: 62 IN | WEIGHT: 149 LBS | HEART RATE: 76 BPM

## 2022-09-20 DIAGNOSIS — F32.4 MAJOR DEPRESSIVE DISORDER WITH SINGLE EPISODE, IN PARTIAL REMISSION (HCC): ICD-10-CM

## 2022-09-20 DIAGNOSIS — F41.9 ANXIETY AND DEPRESSION: ICD-10-CM

## 2022-09-20 DIAGNOSIS — E78.2 MIXED HYPERLIPIDEMIA: ICD-10-CM

## 2022-09-20 DIAGNOSIS — J44.9 CHRONIC OBSTRUCTIVE PULMONARY DISEASE, UNSPECIFIED COPD TYPE (HCC): ICD-10-CM

## 2022-09-20 DIAGNOSIS — G31.84 MCI (MILD COGNITIVE IMPAIRMENT): ICD-10-CM

## 2022-09-20 DIAGNOSIS — E87.1 CHRONIC HYPONATREMIA: Chronic | ICD-10-CM

## 2022-09-20 DIAGNOSIS — R73.9 HYPERGLYCEMIA: ICD-10-CM

## 2022-09-20 DIAGNOSIS — N18.2 STAGE 2 CHRONIC KIDNEY DISEASE: ICD-10-CM

## 2022-09-20 DIAGNOSIS — F41.9 ANXIETY: ICD-10-CM

## 2022-09-20 DIAGNOSIS — G91.2 NORMAL PRESSURE HYDROCEPHALUS (HCC): ICD-10-CM

## 2022-09-20 DIAGNOSIS — F32.A ANXIETY AND DEPRESSION: ICD-10-CM

## 2022-09-20 DIAGNOSIS — R41.0 CONFUSION AND DISORIENTATION: ICD-10-CM

## 2022-09-20 DIAGNOSIS — I10 ESSENTIAL HYPERTENSION: ICD-10-CM

## 2022-09-20 DIAGNOSIS — K21.9 GERD WITHOUT ESOPHAGITIS: Primary | ICD-10-CM

## 2022-09-20 DIAGNOSIS — I21.4 NON-ST ELEVATION MYOCARDIAL INFARCTION (NSTEMI) (HCC): ICD-10-CM

## 2022-09-20 DIAGNOSIS — F51.01 PRIMARY INSOMNIA: ICD-10-CM

## 2022-09-20 DIAGNOSIS — M85.89 OSTEOPENIA OF MULTIPLE SITES: ICD-10-CM

## 2022-09-20 DIAGNOSIS — E55.9 VITAMIN D DEFICIENCY: ICD-10-CM

## 2022-09-20 PROCEDURE — 3077F SYST BP >= 140 MM HG: CPT | Performed by: PHYSICIAN ASSISTANT

## 2022-09-20 PROCEDURE — 3288F FALL RISK ASSESSMENT DOCD: CPT | Performed by: PHYSICIAN ASSISTANT

## 2022-09-20 PROCEDURE — 1125F AMNT PAIN NOTED PAIN PRSNT: CPT | Performed by: PHYSICIAN ASSISTANT

## 2022-09-20 PROCEDURE — 3725F SCREEN DEPRESSION PERFORMED: CPT | Performed by: PHYSICIAN ASSISTANT

## 2022-09-20 PROCEDURE — 99214 OFFICE O/P EST MOD 30 MIN: CPT | Performed by: PHYSICIAN ASSISTANT

## 2022-09-20 PROCEDURE — G0439 PPPS, SUBSEQ VISIT: HCPCS | Performed by: PHYSICIAN ASSISTANT

## 2022-09-20 PROCEDURE — 1160F RVW MEDS BY RX/DR IN RCRD: CPT | Performed by: PHYSICIAN ASSISTANT

## 2022-09-20 PROCEDURE — 3078F DIAST BP <80 MM HG: CPT | Performed by: PHYSICIAN ASSISTANT

## 2022-09-20 PROCEDURE — 1170F FXNL STATUS ASSESSED: CPT | Performed by: PHYSICIAN ASSISTANT

## 2022-09-20 RX ORDER — ESCITALOPRAM OXALATE 5 MG/1
5 TABLET ORAL DAILY
Qty: 30 TABLET | Refills: 11 | Status: SHIPPED | OUTPATIENT
Start: 2022-09-20

## 2022-09-20 NOTE — PATIENT INSTRUCTIONS
Problem List Items Addressed This Visit          Digestive    GERD without esophagitis - Primary       Respiratory    Chronic obstructive pulmonary disease, unspecified COPD type [facility])       Cardiovascular and Mediastinum    Essential hypertension    Non-ST elevation myocardial infarction (NSTEMI) [facility])       Musculoskeletal and Integument    Osteopenia of multiple sites     DEXA due this October order reprinted given to the patient  Other    Chronic hyponatremia (Chronic)     Seeing nephro and currently on sodium 2 mg twice daily  Na is 133 slightly low  Anxiety and depression     Stable on 5 mg of Lexapro without SI or HI  Hyperlipidemia     Patient is on Lipitor 40 mg once daily keeping LDL at goal for someone with heart disease at 38 continue recheck 6 months continue Cardiology  Pt now also on Vascepa from cardio and her trigs have come down from 300's to 198  Vitamin D deficiency     Check with next labs  Primary insomnia     May increase melatonin to 5 mg from 3 mg  Hyperglycemia     Patient with a fasting sugar of 117  A1c is normal at 5 9 no medications needed recheck 6 months               MCI (mild cognitive impairment)    RESOLVED: Confusion and disorientation

## 2022-09-20 NOTE — ASSESSMENT & PLAN NOTE
PT ws 22/30 on MOCA lst 4/21  Daughter would like to re eval to see progression  Geriatrics ordered  Pt continues to live independantly at [facility] with a timed pill dispenser, clock with a sun/moon on it and has been doing great lately

## 2022-09-20 NOTE — ASSESSMENT & PLAN NOTE
Patient is on Lipitor 40 mg once daily keeping LDL at goal for someone with heart disease at 38 continue recheck 6 months continue Cardiology  Pt now also on Vascepa from cardio and her trigs have come down from 300's to 198

## 2022-09-20 NOTE — PROGRESS NOTES
Assessment and Plan:     Problem List Items Addressed This Visit        Digestive    GERD without esophagitis - Primary       Respiratory    Chronic obstructive pulmonary disease, unspecified COPD type ([facility] )       Cardiovascular and Mediastinum    Essential hypertension    Non-ST elevation myocardial infarction (NSTEMI) ([facility] )     Continue with cardio f/u  Nervous and Auditory    Normal pressure hydrocephalus (HCC)     S/p surgery and following with neurosurgery  Musculoskeletal and Integument    Osteopenia of multiple sites     DEXA due this October order reprinted given to the patient  Genitourinary    Stage 2 chronic kidney disease     Lab Results   Component Value Date    EGFR 77 09/17/2022    EGFR 75 08/22/2022    EGFR 75 02/12/2022    CREATININE 0 77 09/17/2022    CREATININE 0 79 08/22/2022    CREATININE 0 75 02/12/2022    CREATININE 0 75 02/12/2022   GFR, creat and Bun WNL  Other    Chronic hyponatremia (Chronic)     Seeing nephro and currently on sodium 2 mg twice daily  Na is 133 slightly low  Anxiety and depression     Stable on 5 mg of Lexapro without SI or HI  Relevant Medications    escitalopram (LEXAPRO) 5 mg tablet    Other Relevant Orders    Ambulatory Referral to [facility]    Hyperlipidemia     Patient is on Lipitor 40 mg once daily keeping LDL at goal for someone with heart disease at 38 continue recheck 6 months continue Cardiology  Pt now also on Vascepa from cardio and her trigs have come down from 300's to 198  Relevant Orders    Lipid Panel with Direct LDL reflex    Vitamin D deficiency     Check with next labs  Primary insomnia     May increase melatonin to 5 mg from 3 mg  Hyperglycemia     Patient with a fasting sugar of 117  A1c is normal at 5 9 no medications needed recheck 6 months             Relevant Orders    Comprehensive metabolic panel    Hemoglobin A1C    MCI (mild cognitive impairment) PT ws 22/30 on MOCA lst 4/21  Daughter would like to re lexa to see progression  Geriatrics ordered  Pt continues to live independantly at [facility] with a timed pill dispenser, clock with a sun/moon on it and has been doing great lately  Relevant Orders    Ambulatory Referral to [facility]    RESOLVED: Confusion and disorientation    Relevant Orders    Ambulatory Referral to group home      Other Visit Diagnoses     Major depressive disorder with single episode, in partial remission (HCC)        Relevant Medications    escitalopram (LEXAPRO) 5 mg tablet    Anxiety        Relevant Medications    escitalopram (LEXAPRO) 5 mg tablet          Urinary Incontinence Plan of Care: counseling topics discussed: practice Kegel (pelvic floor strengthening) exercises  Preventive health issues were discussed with patient, and age appropriate screening tests were ordered as noted in patient's After Visit Summary  Personalized health advice and appropriate referrals for health education or preventive services given if needed, as noted in patient's After Visit Summary  History of Present Illness:     Patient presents for a Medicare Wellness Visit      Daughter accompanies pt today  David Dallas is here for chronic conditions f/u including the diagnosis of No diagnosis found    Pt  states they are taking all medications as directed without complaints or side effects   Pt  had labs done prior to today's visit which included Recent Results (from the past 672 hour(s))  -Lipid Panel with Direct LDL reflex:   Collection Time: 09/17/22 10:11 AM       Result                      Value             Ref Range           Total Cholesterol           107               <200 mg/dL          HDL                         42 (L)            > OR = 50 mg*       Triglycerides               198 (H)           <150 mg/dL          LDL Calculated              38                mg/dL (calc)        Chol HDLC Ratio             2 5 <5 0 (calc)         Non-HDL Cholesterol         65                <130 mg/dL (*  -Comprehensive metabolic panel:   Collection Time: 09/17/22 10:11 AM       Result                      Value             Ref Range           Glucose, Random             117 (H)           65 - 99 mg/dL       BUN                         12                7 - 25 mg/dL        Creatinine                  0 77              0 60 - 0 95 *       eGFR                        77                > OR = 60 mL*       SL AMB BUN/CREATININE *                       6 - 22 (calc)   NOT APPLICABLE       Sodium                      133 (L)           135 - 146 mm*       Potassium                   4 3               3 5 - 5 3 mm*       Chloride                    98                98 - 110 mmo*       CO2                         27                20 - 32 mmol*       Calcium                     9 8               8 6 - 10 4 m*       Protein, Total              7 2               6 1 - 8 1 g/*       Albumin                     4 4               3 6 - 5 1 g/*       Globulin                    2 8               1 9 - 3 7 g/*       Albumin/Globulin Ratio      1 6               1 0 - 2 5 (c*       TOTAL BILIRUBIN             0 6               0 2 - 1 2 mg*       Alkaline Phosphatase        54                37 - 153 U/L        AST                         25                10 - 35 U/L         ALT                         22                6 - 29 U/L     -CBC and differential:   Collection Time: 09/17/22 10:11 AM       Result                      Value             Ref Range           White Blood Cell Count      8 5               3 8 - 10 8 T*       Red Blood Cell Count        4 00              3 80 - 5 10 *       Hemoglobin                  12 5              11 7 - 15 5 *       HCT                         36 8              35 0 - 45 0 %       MCV                         92 0              80 0 - 100 0*       MCH                         31 3              27 0 - 33 0 *       MCHC                        34 0              32 0 - 36 0 *       RDW                         12 6              11 0 - 15 0 %       Platelet Count              403 (H)           140 - 400 Th*       SL AMB MPV                  10 2              7 5 - 12 5 fL       Neutrophils (Absolute)      5,279             1,500 - 7,80*       Lymphocytes (Absolute)      1,726             850 - 3,900 *       Monocytes (Absolute)        740               200 - 950 ce*       Eosinophils (Absolute)      629 (H)           15 - 500 sarayh*       Basophils ABS               128               0 - 200 cell*       Neutrophils                 62 1              %                   Lymphocytes                 20 3              %                   Monocytes                   8 7               %                   Eosinophils                 7 4               %                   Basophils PCT               1 5               %              -Hemoglobin A1c (w/out EAG):   Collection Time: 09/17/22 10:11 AM       Result                      Value             Ref Range           Hemoglobin A1C              5 9 (H)           <5 7 % of to*       Patient Care Team:  Ovi Lake PA-C as PCP - General (Family Medicine)  Marie Hobbs MD (Neurosurgery)     Review of Systems:     Review of Systems   Constitutional: Negative  HENT: Negative  Eyes: Negative  Respiratory: Negative  Cardiovascular: Negative  Gastrointestinal: Negative  Endocrine: Negative  Genitourinary: Negative  Musculoskeletal: Negative  Skin: Negative  Allergic/Immunologic: Negative  Neurological: Negative  Hematological: Negative  Psychiatric/Behavioral: Negative           Problem List:     Patient Active Problem List   Diagnosis    Ambulatory dysfunction    Anxiety and depression    Essential hypertension    Carotid artery plaque    Elevated sed rate    GERD without esophagitis    Hyperlipidemia    Chronic hyponatremia    Levoscoliosis    Occlusion of celiac artery    Vitamin D deficiency    Radiculopathy    Carotid bruit    Coronary atherosclerosis    Splenic infarction    Positive ROSALINDA (antinuclear antibody)    Essential (hemorrhagic) thrombocythemia (HCC)    SMA stenosis    Normal pressure hydrocephalus (HCC)    Stage 2 chronic kidney disease    Recurrent falls    Hyperkalemia    Leukocytosis    S/P  shunt    Medicare annual wellness visit, subsequent    Osteopenia of multiple sites    Primary insomnia    SIADH (syndrome of inappropriate ADH production) ([facility])    Hyperglycemia    MCI (mild cognitive impairment)    Polypharmacy    Chronic obstructive pulmonary disease, unspecified COPD type ([facility])    Non-ST elevation myocardial infarction (NSTEMI) ([facility])    Mild recurrent major depression ([facility])      Past Medical and Surgical History:     Past Medical History:   Diagnosis Date    Anxiety     Arthritis     Confusion 10/2/2018    Depression     Displaced fracture of distal phalanx of right thumb     GERD (gastroesophageal reflux disease)     Heart attack ([facility])     Hyperlipidemia     Hypertension     Moderate episode of recurrent major depressive disorder ([facility] ) 4/12/2019    Shortness of breath     Stage 2 chronic kidney disease 7/17/2019     Past Surgical History:   Procedure Laterality Date    APPENDECTOMY      CARPAL TUNNEL RELEASE      CATARACT EXTRACTION Bilateral     COLONOSCOPY      FL LUMBAR PUNCTURE DIAGNOSTIC  1/10/2019    FRACTURE SURGERY      VA CREATE SHUNT:VENTRIC-PERITONEAL Right 9/3/2019    Procedure:  Insertion of frontal ventriculoperitoneal shunt;  Surgeon: Anca Dale MD;  [facility];  Service: Neurosurgery    SEPTOPLASTY      WRIST FRACTURE SURGERY Right       Family History:     Family History   Problem Relation Age of Onset    Heart attack Mother 39    Heart attack Father 61    No Known Problems Other     Breast cancer Sister     Alcohol abuse Daughter history of    Heart attack Brother       Social History:     Social History     Socioeconomic History    Marital status:      Spouse name: None    Number of children: None    Years of education: None    Highest education level: None   Occupational History    Occupation: Retired   Tobacco Use    Smoking status: Former Smoker     Packs/day: 0 00     Years: 0 00     Pack years: 0 00     Types: Cigarettes     Quit date:      Years since quittin 7    Smokeless tobacco: Never Used    Tobacco comment: no secondhand smoke exposure   Vaping Use    Vaping Use: Never used   Substance and Sexual Activity    Alcohol use: Yes     Comment: social    Drug use: No    Sexual activity: None   Other Topics Concern    None   Social History Narrative    Living alone     Social Determinants of Health     Financial Resource Strain: Low Risk     Difficulty of Paying Living Expenses: Not hard at all   Food Insecurity: Not on file   Transportation Needs: No Transportation Needs    Lack of Transportation (Medical): No    Lack of Transportation (Non-Medical):  No   Physical Activity: Not on file   Stress: Not on file   Social Connections: Not on file   Intimate Partner Violence: Not on file   Housing Stability: Not on file      Medications and Allergies:     Current Outpatient Medications   Medication Sig Dispense Refill    acetaminophen (TYLENOL) 325 mg tablet Take 2 tablets (650 mg total) by mouth every 6 (six) hours as needed for mild pain, moderate pain, headaches or fever 30 tablet 0    aspirin 81 mg chewable tablet CHEW 1 TABLET AND SWALLOW ORALLY DAILY (HYPERTENSION) 30 tablet 4    atenolol (TENORMIN) 100 mg tablet TAKE ONE TABLET BY MOUTH EVERY DAY 90 tablet 3    atorvastatin (LIPITOR) 40 mg tablet TAKE ONE TABLET BY MOUTH EVERY DAY 90 tablet 3    Cholecalciferol (D3-1000) 25 MCG (1000 UT) tablet Take 4 tablets (4,000 Units total) by mouth daily 30 tablet     escitalopram (LEXAPRO) 5 mg tablet Take 1 tablet (5 mg total) by mouth daily 30 tablet 11    Icosapent Ethyl (Vascepa) 1 g CAPS Take 2 capsules (2 g total) by mouth 2 (two) times a day 360 capsule 3    losartan (COZAAR) 50 mg tablet TAKE ONE TABLET BY MOUTH TWICE A  tablet 3    Melatonin 1 MG CAPS Take 3 mg by mouth      multivitamin (THERAGRAN) TABS Take 1 tablet by mouth daily       omeprazole (PriLOSEC) 20 mg delayed release capsule TAKE ONE CAPSULE BY MOUTH EVERY MORNING (Patient taking differently: every other day) 90 capsule 3    sodium chloride 1 g tablet TAKE 2 TABLETS BY MOUTH TWO TIMES A  tablet 3    torsemide (DEMADEX) 10 mg tablet Take 1 tablet (10 mg total) by mouth daily 90 tablet 3     No current facility-administered medications for this visit       No Known Allergies   Immunizations:     Immunization History   Administered Date(s) Administered    COVID-19 MODERNA VACC 0 5 ML IM 01/15/2021, 02/16/2021, 10/27/2021    INFLUENZA 12/19/2005, 11/16/2006, 10/09/2007, 10/09/2008, 11/03/2009, 10/21/2010, 11/04/2011, 09/20/2012, 09/13/2013, 10/07/2015, 10/25/2016, 10/12/2017, 10/02/2018, 11/01/2020    Influenza Split High Dose Preservative Free IM 10/07/2015, 10/25/2016, 10/12/2017    Influenza, high dose seasonal 0 7 mL 10/21/2019    Influenza, seasonal, injectable 12/12/2014, 10/02/2017, 11/01/2020    Influenza, seasonal, injectable, preservative free 10/02/2018    Pneumococcal Conjugate 13-Valent 10/16/2017    Pneumococcal Polysaccharide PPV23 11/03/2005, 08/19/2019    TD (adult) Preservative Free 05/20/1995    Td (adult), Unspecified 05/20/1995    Td (adult), adsorbed 05/20/1995      Health Maintenance:         Topic Date Due    Colorectal Cancer Screening  Never done    Breast Cancer Screening: Mammogram  03/08/2023 (Originally 6/20/2018)    Hepatitis C Screening  Completed         Topic Date Due    COVID-19 Vaccine (4 - Booster for Moderna series) 02/27/2022    Influenza Vaccine (1) 09/01/2022 Medicare Screening Tests and Risk Assessments:     Mojgan Vargas is here for her Subsequent Wellness visit  Health Risk Assessment:   Patient rates overall health as very good  Patient feels that their physical health rating is same  Patient is very satisfied with their life  Hearing was rated as same  Patient feels that their emotional and mental health rating is same  Patients states they are never, rarely angry  Patient states they are never, rarely unusually tired/fatigued  Pain experienced in the last 7 days has been none  Patient states that she has experienced no weight loss or gain in last 6 months  Depression Screening:   PHQ-9 Score: 0      Fall Risk Screening: In the past year, patient has experienced: no history of falling in past year      Urinary Incontinence Screening:   Patient has leaked urine accidently in the last six months  Home Safety:  Patient has trouble with stairs inside or outside of their home  Patient has working smoke alarms and has working carbon monoxide detector  Home safety hazards include: none  Nutrition:   Current diet is Regular  Medications:   Patient is currently taking over-the-counter supplements  OTC medications include: see medication list  Patient is able to manage medications  Activities of Daily Living (ADLs)/Instrumental Activities of Daily Living (IADLs):   Walk and transfer into and out of bed and chair?: Yes  Dress and groom yourself?: Yes    Bathe or shower yourself?: Yes    Feed yourself?  Yes  Do your laundry/housekeeping?: No  Manage your money, pay your bills and track your expenses?: No  Make your own meals?: No    Do your own shopping?: Yes    Previous Hospitalizations:   Any hospitalizations or ED visits within the last 12 months?: No      Advance Care Planning:     Advanced directive counseling given: Yes    Five wishes given: Yes      Cognitive Screening:   Provider or family/friend/caregiver concerned regarding cognition?: Yes  [facility] Cognitive Assessment (MoCA) Score: 22  Interpretation: MoCA Score < 25: abnormal/possible cognitive impairment    PREVENTIVE SCREENINGS      Cardiovascular Screening:    General: Screening Not Indicated, History Lipid Disorder and Screening Current      Diabetes Screening:     General: Screening Current      Colorectal Cancer Screening:     General: Screening Not Indicated      Breast Cancer Screening:     General: Screening Not Indicated      Cervical Cancer Screening:    General: Screening Not Indicated      Osteoporosis Screening:      Due for: Bone Density Ultrasound      Abdominal Aortic Aneurysm (AAA) Screening:        General: Screening Not Indicated      Lung Cancer Screening:     General: Screening Not Indicated      Hepatitis C Screening:    General: Screening Current    Screening, Brief Intervention, and Referral to Treatment (SBIRT)    Screening  Typical number of drinks in a day: 1  Typical number of drinks in a week: 7  Interpretation: Low risk drinking behavior  Single Item Drug Screening:  How often have you used an illegal drug (including marijuana) or a prescription medication for non-medical reasons in the past year? never    Single Item Drug Screen Score: 0  Interpretation: Negative screen for possible drug use disorder    No exam data present     Physical Exam:     /62 (BP Location: Left arm, Patient Position: Sitting, Cuff Size: Adult)   Pulse 76   Temp (!) 96 4 °F (35 8 °C) (Temporal)   Ht 5' 2" (1 575 m) Comment: on record  Wt 67 6 kg (149 lb)   BMI 27 25 kg/m²     Physical Exam  Vitals and nursing note reviewed  Constitutional:       Appearance: Normal appearance  She is well-developed  HENT:      Head: Normocephalic and atraumatic  Eyes:      General: Lids are normal       Conjunctiva/sclera: Conjunctivae normal       Pupils: Pupils are equal, round, and reactive to light  Cardiovascular:      Rate and Rhythm: Normal rate and regular rhythm  Heart sounds:  No murmur heard  Pulmonary:      Effort: Pulmonary effort is normal       Breath sounds: Normal breath sounds  Skin:     General: Skin is warm and dry  Neurological:      General: No focal deficit present  Mental Status: She is alert  Coordination: Coordination is intact  Psychiatric:         Mood and Affect: Mood normal          Behavior: Behavior normal  Behavior is cooperative  Thought Content:  Thought content normal          Judgment: Judgment normal           Ranjan Reyes PA-C

## 2022-09-20 NOTE — ASSESSMENT & PLAN NOTE
Lab Results   Component Value Date    EGFR 77 09/17/2022    EGFR 75 08/22/2022    EGFR 75 02/12/2022    CREATININE 0 77 09/17/2022    CREATININE 0 79 08/22/2022    CREATININE 0 75 02/12/2022    CREATININE 0 75 02/12/2022   GFR, creat and Bun WNL

## 2022-10-14 ENCOUNTER — TELEPHONE (OUTPATIENT)
Dept: GERIATRICS | Age: 82
End: 2022-10-14

## 2022-10-14 NOTE — TELEPHONE ENCOUNTER
[facility]  [address], [address] [place] [address] [place], [facility] [facility]    [phone]    Telephone Intake: Geriatric Assessment     Referral source: Jenny Henderson PA-C    Caller who is scheduling/relationship to pt: Juliann Jones phone number: [phone]    Reason for referral: Patient concerns , Family member concerns and Provider concerns regarding memory concerns  If there are behavioral concerns, is the pt prescribed medications to manage these? no   If so, how many? none   Has the patient ever had an inpatient psychiatric hospitalization? No,   What is the goal of the visit? re-assessment; Patient was assessed in April of 2021  Has the patient been seen by a Neurologist or Geriatrician? Yes, followed yearly due to shunt in head  If yes, is this appointment for a second opinion? No  Has the patient ever been diagnosed with dementia? Yes       Preferred language? English  Highest education level? High School Graduate  Does the patient wear glasses? Yes, readers   Does the patient use hearing aids? No     Is there a living will/healthcare POA in place/If so, who? Yes ,     Does the pt/caregiver have access for a virtual visit (computer/smart phone with audio/video)? Yes     Caller was informed: Please make sure the pt is accompanied by someone who knows them well / caregiver / family member to participate in this appointment  Who will accompany the pt (name and relationship)? [facility]  Phone number of person accompanying pt: [phone]    [facility] as patient has been seen here in 20221  Added to wait list for sooner appointments?  Yes     NOTE FOR : Please route to provider for chart review prior to scheduling and let the caller know that this phone intake will be reviewed IF -  • Pt was recently hospitalized  • Pt is prescribed medications for behavior management or has a history of psychiatric hospitalization  • Pt plans to attend alone

## 2022-11-16 ENCOUNTER — OFFICE VISIT (OUTPATIENT)
Dept: PODIATRY | Facility: CLINIC | Age: 82
End: 2022-11-16

## 2022-11-16 VITALS
BODY MASS INDEX: 26.06 KG/M2 | HEIGHT: 62 IN | SYSTOLIC BLOOD PRESSURE: 130 MMHG | DIASTOLIC BLOOD PRESSURE: 52 MMHG | WEIGHT: 141.6 LBS

## 2022-11-16 DIAGNOSIS — I73.9 PERIPHERAL VASCULAR DISEASE, UNSPECIFIED (HCC): Primary | ICD-10-CM

## 2022-11-16 NOTE — PROGRESS NOTES
Patient presents for palliative foot care  No acute disorder noted  There were no palpable pedal pulses  Treatment consisted of trimming of toenails  Patient is rescheduled in 4 months

## 2022-12-19 NOTE — PROGRESS NOTES
Assessment & Plan:   1  MCI (mild cognitive impairment)  -     Ambulatory Referral to Senior Care    2  Primary insomnia    3  Normal pressure hydrocephalus (HCC)    4  Ambulatory dysfunction    5  Anxiety and depression  -     Ambulatory Referral to Senior Care    6  Essential hypertension    7  Osteopenia of multiple sites    8  Polypharmacy    9  Confusion and disorientation  -     Ambulatory Referral to Senior Care          HPI:  We had the pleasure of evaluating Mari Mcdaniel who is a 80 y o  female in Geriatric Consultation today  Previous MOCA:  22/30 in 2021  Was previously seen by geriatrics, dx MCI  Pt lives independently @ [facility], doing well per chart review  Comorbidities include MCI, ckd, copd, htn  Ms Jose Tristan is in the office with her daughter  Pt feels STM getting worse  Does occasionally wake up confused and needs reorientation  Sleeping good, appetite good  No worsening of anxiety/depression  Off covid restrictions  Still enjoys going out to see friends  She has difficulty finding the right word while speaking: Yes - occ  Patient requires repeat information or ask the same question repeatedly: Yes- occ    Memory update per daughter:  [place] [facility] [place] about 1 [place]/2 year, had been seen by senior care in 4/2021  Has had some decline over last year  She did test positive for covid about 2 weeks ago- was quarantined [id number]  Any change to routine sets her back  Generally has STM, rare LTM but can be redirected ([place] when first wakes up)  Has no concept of time anymore  Has variety of clocks and still has trouble following time  Continues to call until someone answers  Needs to be reminded for showers like when she was living at home by self  Does participate in cheo, jessica norton, exercise class and DR crum  Has shunt- per neuro NPH is stable  Steady ambulation with walker  Lost some weight    Doesn't seem depressed, maybe a little more anxious- overall less emotion than previous  Has good days and bad days  Doing good with pill dispenser  Some times conversant, some times short  Staff has no concerns  No near misses w/falls  Doesn't use stove  Needs reminders to clean Sleep was better, took off amitriptyline  Was better during the day when she slept better  Melatonin not helping much  Does call during the night, gets confused  Was incont during covid restrictions became very confused  Next day was better on track, less anxious  Goes to store with dgt, no issues    She lives in independent living       ADL independentt, iADL assist, Medication management: supervision   Do you drive: No       Do you handle your own financial affairs such as balancing your checkbook, paying bills, investments: No  Have you noticed any gait or balance disorder:  Yes  Uses walker to assist with ambulation  No recent falls  Any urinary/stool incontinence:  x1 during covid   Appetite/swallow: ok  Elimination issues:  ok  Hearing issues:ok Vision issues: ok  Dentition issues:  ok   Have you or your family noted any change in your mood or personality:Yes - a little more anxious  Are you currently or have you been treated in the past for depression or anxiety: Yes  Any hallucination or delusion: No  Fluctuation in alertness: No  Sleep Issues: Yes per dgt, no per pt  Pain:  no    Past Medical, surgical, social, medication and allergy history and patients previous records reviewed  Family Review of Behavior [facility]:    pacing  No - does fidgit more   agressive/combative behavior  Yes    agitated  Yes - depends on surrounding energy  wandering  No   resistance to care  No   hoarding/hiding objects  No    suspicious  No  withdrawn No  misplacing/losing objects Yes - occasionally gets phone and remote confused  personal hygiene problems  Yes  forgetfulness of actions Yes    ROS:  Review of Systems   Constitutional: Negative for activity change, appetite change, chills and fatigue  HENT: Negative for congestion, dental problem, hearing loss and trouble swallowing  Eyes: Negative for visual disturbance  Respiratory: Negative for cough and shortness of breath  Cardiovascular: Negative for chest pain  Gastrointestinal: Negative for abdominal pain, constipation, diarrhea, nausea and vomiting  Genitourinary: Negative for difficulty urinating  Musculoskeletal: Positive for gait problem (walker)  Negative for arthralgias and back pain  Neurological: Negative for dizziness and light-headedness  Psychiatric/Behavioral: Positive for decreased concentration (forgetful)  Negative for dysphoric mood and sleep disturbance  The patient is not nervous/anxious          Allergies:   No Known Allergies    Medications:      Current Outpatient Medications:   •  acetaminophen (TYLENOL) 325 mg tablet, Take 2 tablets (650 mg total) by mouth every 6 (six) hours as needed for mild pain, moderate pain, headaches or fever, Disp: 30 tablet, Rfl: 0  •  aspirin 81 mg chewable tablet, CHEW 1 TABLET AND SWALLOW ORALLY DAILY (HYPERTENSION), Disp: 30 tablet, Rfl: 4  •  atenolol (TENORMIN) 100 mg tablet, TAKE ONE TABLET BY MOUTH EVERY DAY, Disp: 90 tablet, Rfl: 3  •  atorvastatin (LIPITOR) 40 mg tablet, TAKE ONE TABLET BY MOUTH EVERY DAY, Disp: 90 tablet, Rfl: 3  •  Cholecalciferol (D3-1000) 25 MCG (1000 UT) tablet, Take 4 tablets (4,000 Units total) by mouth daily, Disp: 30 tablet, Rfl:   •  escitalopram (LEXAPRO) 5 mg tablet, Take 1 tablet (5 mg total) by mouth daily, Disp: 30 tablet, Rfl: 11  •  Icosapent Ethyl (Vascepa) 1 g CAPS, Take 2 capsules (2 g total) by mouth 2 (two) times a day, Disp: 360 capsule, Rfl: 3  •  losartan (COZAAR) 50 mg tablet, TAKE ONE TABLET BY MOUTH TWICE A DAY, Disp: 180 tablet, Rfl: 3  •  Melatonin 1 MG CAPS, Take 3 mg by mouth, Disp: , Rfl:   •  multivitamin (THERAGRAN) TABS, Take 1 tablet by mouth daily , Disp: , Rfl:   •  omeprazole (PriLOSEC) 20 mg delayed release capsule, TAKE ONE CAPSULE BY MOUTH EVERY MORNING (Patient taking differently: every other day), Disp: 90 capsule, Rfl: 3  •  sodium chloride 1 g tablet, TAKE 2 TABLETS BY MOUTH TWO TIMES A DAY, Disp: 360 tablet, Rfl: 3  •  torsemide (DEMADEX) 10 mg tablet, Take 1 tablet (10 mg total) by mouth daily, Disp: 90 tablet, Rfl: 3    Vitals: There were no vitals filed for this visit  History:  Past Medical History:   Diagnosis Date   • Anxiety    • Arthritis    • Confusion 10/2/2018   • Depression    • Displaced fracture of distal phalanx of right thumb    • GERD (gastroesophageal reflux disease)    • Heart attack ([facility] [address] )    • Hyperlipidemia    • Hypertension    • Moderate episode of recurrent major depressive disorder ([facility] [address] ) 4/12/2019   • Shortness of breath    • Stage 2 chronic kidney disease 7/17/2019     Past Surgical History:   Procedure Laterality Date   • APPENDECTOMY     • CARPAL TUNNEL RELEASE     • CATARACT EXTRACTION Bilateral    • COLONOSCOPY     • FL LUMBAR PUNCTURE DIAGNOSTIC  1/10/2019   • FRACTURE SURGERY     • CO CREATE SHUNT:VENTRIC-PERITONEAL Right 9/3/2019    Procedure: Insertion of frontal ventriculoperitoneal shunt;  Surgeon: Kimber Claudio MD;  [facility];  Service: Neurosurgery   • SEPTOPLASTY     • WRIST FRACTURE SURGERY Right      Family History   Problem Relation Age of Onset   • Heart attack Mother 39   • Heart attack Father 61   • No Known Problems Other    • Breast cancer Sister    • Alcohol abuse Daughter         history of   • Heart attack Brother      Social History     Socioeconomic History   • Marital status:       Spouse name: Not on file   • Number of children: Not on file   • Years of education: Not on file   • Highest education level: Not on file   Occupational History   • Occupation: Retired   Tobacco Use   • Smoking status: Former     Packs/day: 0 00     Years: 0 00     Pack years: 0 00     Types: Cigarettes     Quit date: 1996     Years since quitting: 26 9   • Smokeless tobacco: Never   • Tobacco comments:     no secondhand smoke exposure   Vaping Use   • Vaping Use: Never used   Substance and Sexual Activity   • Alcohol use: Yes     Comment: social   • Drug use: No   • Sexual activity: Not on file   Other Topics Concern   • Not on file   Social History Narrative    Living alone     Social Determinants of Health     Financial Resource Strain: Low Risk    • Difficulty of Paying Living Expenses: Not hard at all   Food Insecurity: Not on file   Transportation Needs: No Transportation Needs   • Lack of Transportation (Medical): No   • Lack of Transportation (Non-Medical): No   Physical Activity: Not on file   Stress: Not on file   Social Connections: Not on file   Intimate Partner Violence: Not on file   Housing Stability: Not on file     Financial Resource Strain: Low Risk    • Difficulty of Paying Living Expenses: Not hard at all   Food Insecurity: Not on file   Transportation Needs: No Transportation Needs   • Lack of Transportation (Medical): No   • Lack of Transportation (Non-Medical): No   Physical Activity: Not on file   Stress: Not on file   Social Connections: Not on file   Intimate Partner Violence: Not on file   Housing Stability: Not on file      Procedure Laterality Date   • APPENDECTOMY     • CARPAL TUNNEL RELEASE     • CATARACT EXTRACTION Bilateral    • COLONOSCOPY     • FL LUMBAR PUNCTURE DIAGNOSTIC  1/10/2019   • FRACTURE SURGERY     • VA CREATE SHUNT:VENTRIC-PERITONEAL Right 9/3/2019    Procedure: Insertion of frontal ventriculoperitoneal shunt;  Surgeon: Shaquille Ugalde MD;  [facility];  Service: Neurosurgery   • SEPTOPLASTY     • WRIST FRACTURE SURGERY Right        Physical Exam  Observed Ambulation:  Uses rollator, steady, regular pace  Physical Exam  Vitals and nursing note reviewed  Constitutional:       General: She is not in acute distress  Appearance: Normal appearance  She is well-developed  She is not diaphoretic     HENT: Head: Normocephalic  Cardiovascular:      Rate and Rhythm: Normal rate and regular rhythm  Heart sounds: No murmur heard  No friction rub  No gallop  Pulmonary:      Effort: Pulmonary effort is normal  No respiratory distress  Breath sounds: Normal breath sounds  No wheezing or rales  Abdominal:      General: Bowel sounds are normal  There is no distension  Palpations: Abdomen is soft  Tenderness: There is no abdominal tenderness  There is no rebound  Musculoskeletal:         General: Normal range of motion  Skin:     General: Skin is warm and dry  Neurological:      General: No focal deficit present  Mental Status: She is alert  Mental status is at baseline        Comments: Oriented to person, mostly TPS  Mild forgetful, pleasant and cooperative   Psychiatric:         Mood and Affect: Mood normal          Behavior: Behavior normal

## 2022-12-20 ENCOUNTER — OFFICE VISIT (OUTPATIENT)
Age: 82
End: 2022-12-20

## 2022-12-20 VITALS
TEMPERATURE: 99.7 F | HEART RATE: 87 BPM | BODY MASS INDEX: 26.98 KG/M2 | DIASTOLIC BLOOD PRESSURE: 54 MMHG | WEIGHT: 146.6 LBS | RESPIRATION RATE: 16 BRPM | SYSTOLIC BLOOD PRESSURE: 142 MMHG | HEIGHT: 62 IN | OXYGEN SATURATION: 98 %

## 2022-12-20 DIAGNOSIS — F41.9 ANXIETY AND DEPRESSION: ICD-10-CM

## 2022-12-20 DIAGNOSIS — E22.2 SIADH (SYNDROME OF INAPPROPRIATE ADH PRODUCTION) (HCC): ICD-10-CM

## 2022-12-20 DIAGNOSIS — F32.A ANXIETY AND DEPRESSION: ICD-10-CM

## 2022-12-20 DIAGNOSIS — I10 ESSENTIAL HYPERTENSION: ICD-10-CM

## 2022-12-20 DIAGNOSIS — U07.1 COVID: ICD-10-CM

## 2022-12-20 DIAGNOSIS — R26.2 AMBULATORY DYSFUNCTION: ICD-10-CM

## 2022-12-20 DIAGNOSIS — F51.01 PRIMARY INSOMNIA: ICD-10-CM

## 2022-12-20 DIAGNOSIS — M85.89 OSTEOPENIA OF MULTIPLE SITES: ICD-10-CM

## 2022-12-20 DIAGNOSIS — Z79.899 POLYPHARMACY: ICD-10-CM

## 2022-12-20 DIAGNOSIS — G91.2 NORMAL PRESSURE HYDROCEPHALUS (HCC): ICD-10-CM

## 2022-12-20 DIAGNOSIS — G31.84 MCI (MILD COGNITIVE IMPAIRMENT): Primary | ICD-10-CM

## 2022-12-20 DIAGNOSIS — R41.0 CONFUSION AND DISORIENTATION: ICD-10-CM

## 2022-12-20 NOTE — ASSESSMENT & PLAN NOTE
· Recent COVID infection per daughter  · Has moist cough, mild SAENZ  · Vital signs stable, lungs CTA  · Discussed with daughter that should patient have any increased shortness of breath, cough, fever that daughter should have patient eval by PCP or urgency center

## 2022-12-20 NOTE — PROGRESS NOTES
[facility]  [address], [address] [facility], [facility]  [phone]    Social Work Follow-Up    LSW met with Allie Coombs for follow up visit  Completed Walter Cognitive Assessment, score 15/30 (previously 22/30 in 12/20/22), and Geriatric Depression Screen, 4/15 (previously 1/15 in 04/22/21)  [place] Cognitive Assessment (MoCA) Version 8 3  Education: High School    Points Earned POSSIBLE Points   Visuospatial/Executive   Alternating Carney Making 0 1   Visuoconstructional skills 0 1   Visuoconstructional skills (clock) 2 3   Naming   Naming Animals 2 3   Attention   Digit Span 2 2   Vigilance (letters) 1 1   Serial 7 subtraction 0 3   Language   Sentence Repetition 0 2   Verbal fluency 0 1   Abstraction   Abstraction (word pairings) 1 2   Delayed recall   Delayed recall 0 5   Memory index score: /15   Orientation   Orientation 6 6   TOTAL SCORE: 15/30  (Normal ?26/30)   Additional notes:      LSW to remain available as needed

## 2022-12-20 NOTE — PATIENT INSTRUCTIONS
Referral place for outpt pt/ot/st   Please use for [facility] if they have a service to use  Please notify if unable to use facility services  Increase lexapro to 10mg at night - will check in in 4 weeks to see if working  Check lab in 2 weeks to make sure sodium is stable

## 2022-12-20 NOTE — ASSESSMENT & PLAN NOTE
· Increasingly unsteady since covid  · Will rx pt/ot - try to use [facility] [place] [facility], if not, request at home rehab services  · Cont use of rollator  · Fall precautions

## 2022-12-20 NOTE — ASSESSMENT & PLAN NOTE
· Osteopenia of her last DEXA scan, follow-up DEXA  · Continue dietary intake of calcium/D3, supplement as needed  · Continue weightbearing and strengthening exercises (4) excellent

## 2022-12-20 NOTE — ASSESSMENT & PLAN NOTE
· Having increased anxiety, waking up at night  · Will trial increase lexapro to 10mg (max dose for geriatric patients)  · Will check bmp in 2 weeks to monitor sodium with change in SSRI  · Cont emotional support, relaxation techniques

## 2022-12-20 NOTE — ASSESSMENT & PLAN NOTE
· Patient with slow STM decline, rare confusion, independent with ADLs, assist with IADLs  · MoCA 15/30 which is decreased from 22/30 at previous visit  Of note [facility]  · Reconsulted ST/OT for cognitive therapy    Would request repeat MoCA at end of sessions  · Continue to keep socially, physically, cognitively active  · Continue to optimize all acute and chronic conditions  · Ensure adequate hydration nutrition  · Continue to monitor for changes in sleep, pain, mood and notify provider as needed

## 2022-12-20 NOTE — ASSESSMENT & PLAN NOTE
· Continues having some trouble sleeping    Melatonin does not appear to be helping  · Continue good sleep hygiene techniques  · Did discuss adding Remeron, but at this point we will try to increase Lexapro to help patient relax  · Could consider Remeron in future but monitor shift in sodium  · Avoid OTC medications containing diphenhydramine and avoid benzos for sleep

## 2022-12-27 ENCOUNTER — TELEPHONE (OUTPATIENT)
Age: 82
End: 2022-12-27

## 2022-12-27 NOTE — TELEPHONE ENCOUNTER
Patient's daughter, Estephania Rodriguez ([phone]) called for a refill for escitalopram     Please call into [facility]   [phone]

## 2022-12-28 ENCOUNTER — TELEPHONE (OUTPATIENT)
Dept: NEPHROLOGY | Facility: CLINIC | Age: 82
End: 2022-12-28

## 2022-12-28 DIAGNOSIS — F41.9 ANXIETY AND DEPRESSION: Primary | ICD-10-CM

## 2022-12-28 DIAGNOSIS — F32.A ANXIETY AND DEPRESSION: Primary | ICD-10-CM

## 2022-12-28 LAB
BUN SERPL-MCNC: 14 MG/DL (ref 7–25)
BUN/CREAT SERPL: NORMAL (CALC) (ref 6–22)
CALCIUM SERPL-MCNC: 10.1 MG/DL (ref 8.6–10.4)
CHLORIDE SERPL-SCNC: 99 MMOL/L (ref 98–110)
CO2 SERPL-SCNC: 29 MMOL/L (ref 20–32)
CREAT SERPL-MCNC: 0.78 MG/DL (ref 0.6–0.95)
GFR/BSA.PRED SERPLBLD CYS-BASED-ARV: 76 ML/MIN/1.73M2
GLUCOSE SERPL-MCNC: 89 MG/DL (ref 65–99)
POTASSIUM SERPL-SCNC: 4.1 MMOL/L (ref 3.5–5.3)
SODIUM SERPL-SCNC: 136 MMOL/L (ref 135–146)

## 2022-12-28 RX ORDER — ESCITALOPRAM OXALATE 10 MG/1
10 TABLET ORAL DAILY
Qty: 30 TABLET | Refills: 5 | Status: SHIPPED | OUTPATIENT
Start: 2022-12-28

## 2022-12-28 NOTE — TELEPHONE ENCOUNTER
Pt daughter states she was just increased to 10 mg and so she has just been doubling the 5 mg until you send the new one

## 2022-12-28 NOTE — TELEPHONE ENCOUNTER
I spoke to [facility] patients daughter and she is aware that sodium is 136* , renal function is stable  No changes in medications at this time

## 2022-12-28 NOTE — RESULT ENCOUNTER NOTE
Please call the patient regarding her  result    Please inform that the sodium level has improved to 126 MEQ per liter, renal function is stable, continue same dose of salt tablets and fluid restriction

## 2022-12-28 NOTE — TELEPHONE ENCOUNTER
----- Message from Daniel Pak MD sent at 12/28/2022 11:55 AM EST -----  Please call the patient regarding her  result    Please inform that the sodium level has improved to 126 MEQ per liter, renal function is stable, continue same dose of salt tablets and fluid restriction

## 2023-01-03 DIAGNOSIS — I25.10 ATHEROSCLEROSIS OF CORONARY ARTERY OF NATIVE HEART, UNSPECIFIED VESSEL OR LESION TYPE, UNSPECIFIED WHETHER ANGINA PRESENT: ICD-10-CM

## 2023-01-03 DIAGNOSIS — E78.1 HYPERTRIGLYCERIDEMIA: ICD-10-CM

## 2023-01-03 RX ORDER — ICOSAPENT ETHYL 1000 MG/1
CAPSULE ORAL
Qty: 360 CAPSULE | Refills: 3 | Status: SHIPPED | OUTPATIENT
Start: 2023-01-03

## 2023-01-25 ENCOUNTER — OFFICE VISIT (OUTPATIENT)
Age: 83
End: 2023-01-25

## 2023-01-25 DIAGNOSIS — F32.A ANXIETY AND DEPRESSION: Primary | ICD-10-CM

## 2023-01-25 DIAGNOSIS — F41.9 ANXIETY AND DEPRESSION: Primary | ICD-10-CM

## 2023-01-25 NOTE — PROGRESS NOTES
Spoke with pt's daughter who reports that patient is doing well  She seems to be a little less anxious with increased dose of lexapro:  Phone calls to daughter have decreased somewhat  She is participating in activities at [facility] [employer] [place]  She is currently working with PT  She is tolerating med increase fine  Her serum sodium was 136  She is still mildly asher, which dgt is not sure if it is r/t previous covid infection - recommend f/u with pcp for eval   We will continue current dose of lexapro 10mg daily and reeval at next visit  Dgt aware to call office if any concerns

## 2023-02-10 ENCOUNTER — TELEPHONE (OUTPATIENT)
Dept: NEPHROLOGY | Facility: CLINIC | Age: 83
End: 2023-02-10

## 2023-02-10 NOTE — TELEPHONE ENCOUNTER
Called and spoke with [employer] to complete their bloodwork prior to their appointment on 2/16 with Dr Funmi [place] at the [facility]

## 2023-02-26 LAB
25(OH)D3 SERPL-MCNC: 41 NG/ML (ref 30–100)
ALBUMIN SERPL-MCNC: 4.6 G/DL (ref 3.6–5.1)
ALBUMIN/GLOB SERPL: 1.5 (CALC) (ref 1–2.5)
ALP SERPL-CCNC: 56 U/L (ref 37–153)
ALT SERPL-CCNC: 23 U/L (ref 6–29)
AST SERPL-CCNC: 24 U/L (ref 10–35)
BILIRUB SERPL-MCNC: 0.4 MG/DL (ref 0.2–1.2)
BUN SERPL-MCNC: 14 MG/DL (ref 7–25)
BUN/CREAT SERPL: ABNORMAL (CALC) (ref 6–22)
CALCIUM SERPL-MCNC: 10 MG/DL (ref 8.6–10.4)
CHLORIDE SERPL-SCNC: 100 MMOL/L (ref 98–110)
CHOLEST SERPL-MCNC: 120 MG/DL
CHOLEST/HDLC SERPL: 2.7 (CALC)
CO2 SERPL-SCNC: 27 MMOL/L (ref 20–32)
CREAT SERPL-MCNC: 0.84 MG/DL (ref 0.6–0.95)
CREAT UR-MCNC: 9 MG/DL (ref 20–275)
GFR/BSA.PRED SERPLBLD CYS-BASED-ARV: 69 ML/MIN/1.73M2
GLOBULIN SER CALC-MCNC: 3 G/DL (CALC) (ref 1.9–3.7)
GLUCOSE SERPL-MCNC: 110 MG/DL (ref 65–99)
HBA1C MFR BLD: 5.8 % OF TOTAL HGB
HDLC SERPL-MCNC: 45 MG/DL
LDLC SERPL CALC-MCNC: 54 MG/DL (CALC)
NONHDLC SERPL-MCNC: 75 MG/DL (CALC)
POTASSIUM SERPL-SCNC: 4.3 MMOL/L (ref 3.5–5.3)
PROT SERPL-MCNC: 7.6 G/DL (ref 6.1–8.1)
PROT UR-MCNC: 6 MG/DL (ref 5–24)
PROT/CREAT UR: 0.67 MG/MG CREAT (ref 0.02–0.18)
PROT/CREAT UR: 667 MG/G CREAT (ref 24–184)
SODIUM SERPL-SCNC: 137 MMOL/L (ref 135–146)
TRIGL SERPL-MCNC: 128 MG/DL

## 2023-02-28 NOTE — PROGRESS NOTES
Virtual Regular Visit    Verification of patient location:    Patient is located in the following state in which I hold an active license [place]    Assessment/Plan:    Problem List Items Addressed This Visit        Endocrine    SIADH (syndrome of inappropriate ADH production) ([facility] [address] ) - Primary    Relevant Orders    Basic metabolic panel    Basic metabolic panel       Cardiovascular and Mediastinum    Essential hypertension       Other    Chronic hyponatremia (Chronic)    Relevant Orders    Basic metabolic panel    Basic metabolic panel    Vitamin D deficiency    Persistent proteinuria        Reason for visit is follow up  Chief Complaint   Patient presents with   • Virtual Regular Visit        Encounter provider [place], [place]    Provider located at [address] [place] [address] [place] [address]      Recent Visits  No visits were found meeting these conditions  Showing recent visits within past 7 days and meeting all other requirements  Today's Visits  Date Type Provider Dept   03/01/23 Telemedicine [address]   Showing today's visits and meeting all other requirements  Future Appointments  No visits were found meeting these conditions  Showing future appointments within next 150 days and meeting all other requirements       The patient was identified by name and date of birth  Marcusus Romero was informed that this is a telemedicine visit and that the visit is being conducted through the [employer]  She agrees to proceed     My office door was closed  No one else was in the room  She acknowledged consent and understanding of privacy and security of the video platform  The patient has agreed to participate and understands they can discontinue the visit at any time  Patient is aware this is a billable service       Tash Galan is a 80 y o  female who is being evaluated for follow up of hyponatremia  She usually sees Dr Cassandra Gamino and last saw him in August 2022  The patient resides at [facility] independent living  Her daughter, Priscilla Petty, is on the call with her today  Carmen Olivier is feeling well and has no acute complaints  She denies SOB, LE edema, dizziness, changes in appetite, GI distress  She is taking medications as prescribed  Assessment/Plan  Hyponatremia, chronic- Sodium level at goal and within normal limits last 2 checks  She takes salt tablets 2g twice a day and torsemide 10mg daily  Hypertension- Antihypertensive regimen includes losartan 50mg BID and atenolol 100mg daily  Avoid NSAIDs  Stay active  BP at other offices consistently in the 479W systolic  Proteinuria- Suspected etiology is due to hypertension  Continue losartan  UPC ratio is 0 667  Vitamin D Deficiency- She takes cholecalciferol 4000 units daily  Vitamin D level is at goal     Follow up with Dr Cassandra Gamino or an AP in 6 months  Please call the office if you have any questions or concerns  HPI     Past Medical History:   Diagnosis Date   • Anxiety    • Arthritis    • Confusion 10/2/2018   • Depression    • Displaced fracture of distal phalanx of right thumb    • GERD (gastroesophageal reflux disease)    • Heart attack ([facility] [address] )    • Hyperlipidemia    • Hypertension    • Moderate episode of recurrent major depressive disorder ([facility] [address] ) 4/12/2019   • Shortness of breath    • Stage 2 chronic kidney disease 7/17/2019       Past Surgical History:   Procedure Laterality Date   • APPENDECTOMY     • CARPAL TUNNEL RELEASE     • CATARACT EXTRACTION Bilateral    • COLONOSCOPY     • FL LUMBAR PUNCTURE DIAGNOSTIC  1/10/2019   • FRACTURE SURGERY     • MD CRTJ SHUNT [id number] TERMINUS Right 9/3/2019    Procedure:  Insertion of frontal ventriculoperitoneal shunt;  Surgeon: Debbe Curling, MD;  [facility];  Service: Neurosurgery   • SEPTOPLASTY     • WRIST FRACTURE SURGERY Right Current Outpatient Medications   Medication Sig Dispense Refill   • acetaminophen (TYLENOL) 325 mg tablet Take 2 tablets (650 mg total) by mouth every 6 (six) hours as needed for mild pain, moderate pain, headaches or fever 30 tablet 0   • aspirin 81 mg chewable tablet CHEW 1 TABLET AND SWALLOW ORALLY DAILY (HYPERTENSION) 30 tablet 4   • atenolol (TENORMIN) 100 mg tablet TAKE ONE TABLET BY MOUTH EVERY DAY 90 tablet 3   • atorvastatin (LIPITOR) 40 mg tablet TAKE ONE TABLET BY MOUTH EVERY DAY 90 tablet 3   • Cholecalciferol (D3-1000) 25 MCG (1000 UT) tablet Take 4 tablets (4,000 Units total) by mouth daily 30 tablet    • escitalopram (Lexapro) 10 mg tablet Take 1 tablet (10 mg total) by mouth daily 30 tablet 5   • losartan (COZAAR) 50 mg tablet TAKE ONE TABLET BY MOUTH TWICE A  tablet 3   • multivitamin (THERAGRAN) TABS Take 1 tablet by mouth daily      • omeprazole (PriLOSEC) 20 mg delayed release capsule TAKE ONE CAPSULE BY MOUTH EVERY MORNING (Patient taking differently: every other day) 90 capsule 3   • sodium chloride 1 g tablet TAKE 2 TABLETS BY MOUTH TWO TIMES A  tablet 3   • torsemide (DEMADEX) 10 mg tablet Take 1 tablet (10 mg total) by mouth daily 90 tablet 3   • Vascepa 1 g CAPS TAKE 2 CAPSULES BY MOUTH 2 TIMES A  capsule 3     No current facility-administered medications for this visit  No Known Allergies    Review of Systems   Constitutional: Negative for appetite change and unexpected weight change  Respiratory: Negative for shortness of breath  Cardiovascular: Negative for leg swelling  Gastrointestinal: Negative for nausea and vomiting  Genitourinary: Negative for difficulty urinating  Skin: Negative for rash  Neurological: Negative for dizziness  Psychiatric/Behavioral: Negative for agitation and confusion  Video Exam    There were no vitals filed for this visit  Physical Exam  Constitutional:       General: She is not in acute distress    HENT: Head: Normocephalic  Nose: Nose normal    Eyes:      General: No scleral icterus  Pulmonary:      Effort: Pulmonary effort is normal  No respiratory distress  Abdominal:      General: There is no distension  Musculoskeletal:      Right lower leg: No edema  Left lower leg: No edema  Skin:     Coloration: Skin is not jaundiced  Neurological:      General: No focal deficit present  Mental Status: She is alert and oriented to person, place, and time  Mental status is at baseline  Psychiatric:         Mood and Affect: Mood normal          Thought Content:  Thought content normal         I spent 13 minutes directly with the patient during this visit

## 2023-03-01 ENCOUNTER — TELEMEDICINE (OUTPATIENT)
Dept: NEPHROLOGY | Facility: CLINIC | Age: 83
End: 2023-03-01

## 2023-03-01 DIAGNOSIS — E87.1 CHRONIC HYPONATREMIA: Chronic | ICD-10-CM

## 2023-03-01 DIAGNOSIS — E22.2 SIADH (SYNDROME OF INAPPROPRIATE ADH PRODUCTION) (HCC): Primary | ICD-10-CM

## 2023-03-01 DIAGNOSIS — I10 ESSENTIAL HYPERTENSION: ICD-10-CM

## 2023-03-01 DIAGNOSIS — E55.9 VITAMIN D DEFICIENCY: ICD-10-CM

## 2023-03-01 DIAGNOSIS — R80.1 PERSISTENT PROTEINURIA: ICD-10-CM

## 2023-03-01 NOTE — PATIENT INSTRUCTIONS
Hyponatremia, chronic- Sodium level at goal and within normal limits last 2 checks  She takes salt tablets 2g twice a day and torsemide 10mg daily  Hypertension- Antihypertensive regimen includes losartan 50mg BID and atenolol 100mg daily  Avoid NSAIDs  Stay active  BP at other offices consistently in the 402M systolic  Proteinuria- Suspected etiology is due to hypertension  Continue losartan  UPC ratio is 0 667  Vitamin D Deficiency- She takes cholecalciferol 4000 units daily  Vitamin D level is at goal     Follow up with Dr Rolly Zamora or an AP in 6 months  Please call the office if you have any questions or concerns

## 2023-03-15 ENCOUNTER — OFFICE VISIT (OUTPATIENT)
Dept: PODIATRY | Facility: CLINIC | Age: 83
End: 2023-03-15

## 2023-03-15 VITALS — BODY MASS INDEX: 27.25 KG/M2 | HEIGHT: 62 IN

## 2023-03-15 DIAGNOSIS — I73.9 PERIPHERAL VASCULAR DISEASE, UNSPECIFIED (HCC): Primary | ICD-10-CM

## 2023-03-15 NOTE — PROGRESS NOTES
Patient presents for palliative toenail care  No acute disorder noted  There are no palpable pedal pulses  Treatment consisted of nail trimming    Patient is rescheduled in 3 months at her request

## 2023-03-28 ENCOUNTER — OFFICE VISIT (OUTPATIENT)
Dept: FAMILY MEDICINE CLINIC | Facility: CLINIC | Age: 83
End: 2023-03-28

## 2023-03-28 VITALS
HEIGHT: 62 IN | BODY MASS INDEX: 28.05 KG/M2 | SYSTOLIC BLOOD PRESSURE: 132 MMHG | HEART RATE: 78 BPM | WEIGHT: 152.4 LBS | DIASTOLIC BLOOD PRESSURE: 58 MMHG | OXYGEN SATURATION: 98 %

## 2023-03-28 DIAGNOSIS — G31.84 MCI (MILD COGNITIVE IMPAIRMENT): ICD-10-CM

## 2023-03-28 DIAGNOSIS — F33.0 MILD RECURRENT MAJOR DEPRESSION (HCC): ICD-10-CM

## 2023-03-28 DIAGNOSIS — E55.9 VITAMIN D DEFICIENCY: ICD-10-CM

## 2023-03-28 DIAGNOSIS — I10 ESSENTIAL HYPERTENSION: Primary | ICD-10-CM

## 2023-03-28 DIAGNOSIS — N18.2 STAGE 2 CHRONIC KIDNEY DISEASE: ICD-10-CM

## 2023-03-28 DIAGNOSIS — J44.9 CHRONIC OBSTRUCTIVE PULMONARY DISEASE, UNSPECIFIED COPD TYPE (HCC): ICD-10-CM

## 2023-03-28 DIAGNOSIS — E87.1 CHRONIC HYPONATREMIA: Chronic | ICD-10-CM

## 2023-03-28 DIAGNOSIS — D47.3 ESSENTIAL (HEMORRHAGIC) THROMBOCYTHEMIA (HCC): ICD-10-CM

## 2023-03-28 DIAGNOSIS — E78.2 MIXED HYPERLIPIDEMIA: ICD-10-CM

## 2023-03-28 DIAGNOSIS — G91.2 NORMAL PRESSURE HYDROCEPHALUS (HCC): ICD-10-CM

## 2023-03-28 DIAGNOSIS — E22.2 SIADH (SYNDROME OF INAPPROPRIATE ADH PRODUCTION) (HCC): ICD-10-CM

## 2023-03-28 DIAGNOSIS — M85.89 OSTEOPENIA OF MULTIPLE SITES: ICD-10-CM

## 2023-03-28 DIAGNOSIS — R73.9 HYPERGLYCEMIA: ICD-10-CM

## 2023-03-28 NOTE — PATIENT INSTRUCTIONS
1  Essential hypertension  Assessment & Plan:  Stable continue current medication  2  Stage 2 chronic kidney disease  Assessment & Plan:  Lab Results   Component Value Date    EGFR 69 02/25/2023    EGFR 76 12/27/2022    EGFR 77 09/17/2022    CREATININE 0 84 02/25/2023    CREATININE 0 78 12/27/2022    CREATININE 0 77 09/17/2022   Kidney function doing very well with a GFR of 69  Increase fluids avoid NSAIDs  3  Chronic hyponatremia  Assessment & Plan:  Sodium within normal limits  4  Mixed hyperlipidemia  Assessment & Plan:  Patient is on Lipitor 40 mg once daily keeping her LDL at goal at 54 continue recheck 6 months  Orders:  -     Lipid Panel with Direct LDL reflex; Future; Expected date: 09/28/2023    5  Osteopenia of multiple sites  Assessment & Plan:  Continue with endocrinology  6  Vitamin D deficiency  Assessment & Plan:  Vitamin D level is good at 41 continue supplementation check yearly  7  Hyperglycemia  Assessment & Plan:  Glucose 110 with a fasting A1c of 5 8 continue to observe check in 6 months  Orders:  -     Comprehensive metabolic panel; Future; Expected date: 09/28/2023  -     Hemoglobin A1C; Future; Expected date: 09/28/2023    8  Mild recurrent major depression (HCC)  Assessment & Plan:  Stable without SI or HI continue Lexapro 10 mg once daily  9  MCI (mild cognitive impairment)  Assessment & Plan:  Patient was evaluated by geriatrics  Pt has an aide coming in 3 times a week to monitor pts shower  10  Normal pressure hydrocephalus (HCC)  Assessment & Plan:  Status post surgery and correction and doing very well  11  Chronic obstructive pulmonary disease, unspecified COPD type ([facility] [address] )  Assessment & Plan:  Stable on no inhalers and without complaints of shortness of breath  12  SIADH (syndrome of inappropriate ADH production) ([facility])  Assessment & Plan:  Sodium within normal limits        13  Essential (hemorrhagic) thrombocythemia ([facility] [address] )  Assessment & Plan:  Continue aspirin daily

## 2023-03-28 NOTE — PROGRESS NOTES
Name: Rosibel Hartley      : 1940      MRN: [id number]  Encounter Provider: Martine Romero PA-C  Encounter Date: 3/28/2023   Encounter department: [facility] PRIMARY CARE    Assessment & Plan     1  Essential hypertension  Assessment & Plan:  Stable continue current medication  2  Stage 2 chronic kidney disease  Assessment & Plan:  Lab Results   Component Value Date    EGFR 69 2023    EGFR 76 2022    EGFR 77 2022    CREATININE 0 84 2023    CREATININE 0 78 2022    CREATININE 0 77 2022   Kidney function doing very well with a GFR of 69  Increase fluids avoid NSAIDs  3  Chronic hyponatremia  Assessment & Plan:  Sodium within normal limits  4  Mixed hyperlipidemia  Assessment & Plan:  Patient is on Lipitor 40 mg once daily keeping her LDL at goal at 54 continue recheck 6 months  Orders:  -     Lipid Panel with Direct LDL reflex; Future; Expected date: 2023    5  Osteopenia of multiple sites  Assessment & Plan:  Continue with endocrinology  6  Vitamin D deficiency  Assessment & Plan:  Vitamin D level is good at 41 continue supplementation check yearly  7  Hyperglycemia  Assessment & Plan:  Glucose 110 with a fasting A1c of 5 8 continue to observe check in 6 months  Orders:  -     Comprehensive metabolic panel; Future; Expected date: 2023  -     Hemoglobin A1C; Future; Expected date: 2023    8  Mild recurrent major depression (HCC)  Assessment & Plan:  Stable without SI or HI continue Lexapro 10 mg once daily  9  MCI (mild cognitive impairment)  Assessment & Plan:  Patient was evaluated by geriatrics  Pt has an aide coming in 3 times a week to monitor pts shower  10  Normal pressure hydrocephalus (HCC)  Assessment & Plan:  Status post surgery and correction and doing very well        11  Chronic obstructive pulmonary disease, unspecified COPD type ([facility] [address] )  Assessment & Plan:  Stable on no inhalers and without complaints of shortness of breath  12  SIADH (syndrome of inappropriate ADH production) ([facility])  Assessment & Plan:  Sodium within normal limits  13  Essential (hemorrhagic) thrombocythemia ([address] )  Assessment & Plan:  Continue aspirin daily  Subjective        Elvia November is here for chronic conditions f/u  Pt  had labs done prior to today's visit which included     Review of Systems   Constitutional: Negative  HENT: Negative  Eyes: Negative  Respiratory: Negative  Cardiovascular: Negative  Gastrointestinal: Negative  Endocrine: Negative  Genitourinary: Negative  Musculoskeletal: Negative  Skin: Negative  Allergic/Immunologic: Negative  Neurological: Negative  Hematological: Negative  Psychiatric/Behavioral: Negative          Current Outpatient Medications on File Prior to Visit   Medication Sig   • acetaminophen (TYLENOL) 325 mg tablet Take 2 tablets (650 mg total) by mouth every 6 (six) hours as needed for mild pain, moderate pain, headaches or fever   • aspirin 81 mg chewable tablet CHEW 1 TABLET AND SWALLOW ORALLY DAILY (HYPERTENSION)   • atenolol (TENORMIN) 100 mg tablet TAKE ONE TABLET BY MOUTH EVERY DAY   • atorvastatin (LIPITOR) 40 mg tablet TAKE ONE TABLET BY MOUTH EVERY DAY   • Cholecalciferol (D3-1000) 25 MCG (1000 UT) tablet Take 4 tablets (4,000 Units total) by mouth daily   • escitalopram (Lexapro) 10 mg tablet Take 1 tablet (10 mg total) by mouth daily   • losartan (COZAAR) 50 mg tablet TAKE ONE TABLET BY MOUTH TWICE A DAY   • multivitamin (THERAGRAN) TABS Take 1 tablet by mouth daily    • omeprazole (PriLOSEC) 20 mg delayed release capsule TAKE ONE CAPSULE BY MOUTH EVERY MORNING (Patient taking differently: every other day)   • sodium chloride 1 g tablet TAKE 2 TABLETS BY MOUTH TWO TIMES A DAY   • torsemide (DEMADEX) 10 mg tablet Take 1 tablet (10 mg total) by mouth daily   • Vascepa 1 g CAPS TAKE 2 CAPSULES BY MOUTH 2 TIMES A DAY "      Objective     /58 (BP Location: Left arm, Patient Position: Sitting, Cuff Size: Standard)   Pulse 78   Ht 5' 1 5\" (1 562 m)   Wt 69 1 kg (152 lb 6 4 oz)   SpO2 98%   BMI 28 33 kg/m²     Physical Exam  Vitals and nursing note reviewed  Constitutional:       General: She is not in acute distress  Appearance: She is well-developed  She is not diaphoretic  HENT:      Head: Normocephalic and atraumatic  Eyes:      General:         Right eye: No discharge  Left eye: No discharge  Conjunctiva/sclera: Conjunctivae normal    Neck:      Vascular: No carotid bruit  Cardiovascular:      Rate and Rhythm: Normal rate and regular rhythm  Heart sounds: Normal heart sounds  No murmur heard  No friction rub  No gallop  Pulmonary:      Effort: Pulmonary effort is normal  No respiratory distress  Breath sounds: Normal breath sounds  No wheezing or rales  Musculoskeletal:      Cervical back: Neck supple  Skin:     General: Skin is warm and dry  Neurological:      Mental Status: She is alert and oriented to person, place, and time     Psychiatric:         Judgment: Judgment normal        Manuel Stack PA-C  "

## 2023-03-29 NOTE — ASSESSMENT & PLAN NOTE
Lab Results   Component Value Date    EGFR 69 02/25/2023    EGFR 76 12/27/2022    EGFR 77 09/17/2022    CREATININE 0 84 02/25/2023    CREATININE 0 78 12/27/2022    CREATININE 0 77 09/17/2022   Kidney function doing very well with a GFR of 69  Increase fluids avoid NSAIDs

## 2023-04-30 DIAGNOSIS — E22.2 SIADH (SYNDROME OF INAPPROPRIATE ADH PRODUCTION) (HCC): ICD-10-CM

## 2023-04-30 DIAGNOSIS — E87.1 HYPONATREMIA: ICD-10-CM

## 2023-04-30 RX ORDER — SODIUM CHLORIDE 1 G/1
TABLET ORAL
Qty: 360 TABLET | Refills: 3 | Status: SHIPPED | OUTPATIENT
Start: 2023-04-30

## 2023-05-11 ENCOUNTER — TELEPHONE (OUTPATIENT)
Dept: NEUROSURGERY | Facility: CLINIC | Age: 83
End: 2023-05-11

## 2023-05-11 ENCOUNTER — RA CDI HCC (OUTPATIENT)
Dept: OTHER | Facility: HOSPITAL | Age: 83
End: 2023-05-11

## 2023-05-11 NOTE — PROGRESS NOTES
[facility]  coding opportunities       Chart reviewed, no opportunity found:   [address]        Patients Insurance     Medicare Insurance: [facility]

## 2023-05-12 ENCOUNTER — TRANSCRIBE ORDERS (OUTPATIENT)
Dept: NEUROSURGERY | Facility: CLINIC | Age: 83
End: 2023-05-12

## 2023-05-12 DIAGNOSIS — R26.9 NEUROLOGIC GAIT DYSFUNCTION: Primary | ICD-10-CM

## 2023-05-13 ENCOUNTER — APPOINTMENT (EMERGENCY)
Dept: RADIOLOGY | Facility: HOSPITAL | Age: 83
End: 2023-05-13

## 2023-05-13 ENCOUNTER — APPOINTMENT (EMERGENCY)
Dept: CT IMAGING | Facility: HOSPITAL | Age: 83
End: 2023-05-13

## 2023-05-13 ENCOUNTER — HOSPITAL ENCOUNTER (INPATIENT)
Facility: HOSPITAL | Age: 83
LOS: 7 days | Discharge: NURSING, REHAB OR LONG-TERM CARE | End: 2023-05-20
Attending: EMERGENCY MEDICINE | Admitting: INTERNAL MEDICINE

## 2023-05-13 DIAGNOSIS — R77.8 ELEVATED TROPONIN: ICD-10-CM

## 2023-05-13 DIAGNOSIS — R07.89 CHEST PRESSURE: ICD-10-CM

## 2023-05-13 DIAGNOSIS — R07.9 CHEST PAIN: ICD-10-CM
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
## 2023-05-13 DIAGNOSIS — E87.1 HYPONATREMIA: ICD-10-CM

## 2023-05-13 DIAGNOSIS — R26.2 AMBULATORY DYSFUNCTION: ICD-10-CM

## 2023-05-13 LAB
2HR DELTA HS TROPONIN: 0 NG/L
ALBUMIN SERPL BCP-MCNC: 4.3 G/DL (ref 3.5–5)
ALP SERPL-CCNC: 56 U/L (ref 34–104)
ALT SERPL W P-5'-P-CCNC: 21 U/L (ref 7–52)
ANION GAP SERPL CALCULATED.3IONS-SCNC: 11 MMOL/L (ref 4–13)
AST SERPL W P-5'-P-CCNC: 26 U/L (ref 13–39)
ATRIAL RATE: 65 BPM
ATRIAL RATE: 69 BPM
BACTERIA UR QL AUTO: ABNORMAL /HPF
BASOPHILS # BLD AUTO: 0.1 THOUSANDS/ÂΜL (ref 0–0.1)
BASOPHILS NFR BLD AUTO: 1 % (ref 0–1)
BILIRUB SERPL-MCNC: 0.52 MG/DL (ref 0.2–1)
BILIRUB UR QL STRIP: NEGATIVE
BUN SERPL-MCNC: 11 MG/DL (ref 5–25)
CALCIUM SERPL-MCNC: 9.7 MG/DL (ref 8.4–10.2)
CARDIAC TROPONIN I PNL SERPL HS: 5 NG/L
CARDIAC TROPONIN I PNL SERPL HS: 5 NG/L
CHLORIDE SERPL-SCNC: 93 MMOL/L (ref 96–108)
CLARITY UR: CLEAR
CO2 SERPL-SCNC: 23 MMOL/L (ref 21–32)
COLOR UR: YELLOW
CREAT SERPL-MCNC: 0.81 MG/DL (ref 0.6–1.3)
EOSINOPHIL # BLD AUTO: 0.23 THOUSAND/ÂΜL (ref 0–0.61)
EOSINOPHIL NFR BLD AUTO: 2 % (ref 0–6)
ERYTHROCYTE [DISTWIDTH] IN BLOOD BY AUTOMATED COUNT: 12.6 % (ref 11.6–15.1)
GFR SERPL CREATININE-BSD FRML MDRD: 67 ML/MIN/1.73SQ M
GLUCOSE SERPL-MCNC: 95 MG/DL (ref 65–140)
GLUCOSE UR STRIP-MCNC: NEGATIVE MG/DL
HCT VFR BLD AUTO: 34.5 % (ref 34.8–46.1)
HGB BLD-MCNC: 12 G/DL (ref 11.5–15.4)
HGB UR QL STRIP.AUTO: ABNORMAL
IMM GRANULOCYTES # BLD AUTO: 0.06 THOUSAND/UL (ref 0–0.2)
IMM GRANULOCYTES NFR BLD AUTO: 1 % (ref 0–2)
KETONES UR STRIP-MCNC: NEGATIVE MG/DL
LEUKOCYTE ESTERASE UR QL STRIP: ABNORMAL
LYMPHOCYTES # BLD AUTO: 2.16 THOUSANDS/ÂΜL (ref 0.6–4.47)
LYMPHOCYTES NFR BLD AUTO: 19 % (ref 14–44)
MCH RBC QN AUTO: 30.9 PG (ref 26.8–34.3)
MCHC RBC AUTO-ENTMCNC: 34.8 G/DL (ref 31.4–37.4)
MCV RBC AUTO: 89 FL (ref 82–98)
MONOCYTES # BLD AUTO: 0.98 THOUSAND/ÂΜL (ref 0.17–1.22)
MONOCYTES NFR BLD AUTO: 9 % (ref 4–12)
NEUTROPHILS # BLD AUTO: 7.86 THOUSANDS/ÂΜL (ref 1.85–7.62)
NEUTS SEG NFR BLD AUTO: 68 % (ref 43–75)
NITRITE UR QL STRIP: NEGATIVE
NON-SQ EPI CELLS URNS QL MICRO: ABNORMAL /HPF
NRBC BLD AUTO-RTO: 0 /100 WBCS
P AXIS: 67 DEGREES
P AXIS: 72 DEGREES
PH UR STRIP.AUTO: 7 [PH] (ref 4.5–8)
PLATELET # BLD AUTO: 405 THOUSANDS/UL (ref 149–390)
PMV BLD AUTO: 9.9 FL (ref 8.9–12.7)
POTASSIUM SERPL-SCNC: 3.7 MMOL/L (ref 3.5–5.3)
PR INTERVAL: 170 MS
PR INTERVAL: 176 MS
PROT SERPL-MCNC: 7.3 G/DL (ref 6.4–8.4)
PROT UR STRIP-MCNC: NEGATIVE MG/DL
QRS AXIS: 26 DEGREES
QRS AXIS: 44 DEGREES
QRSD INTERVAL: 76 MS
QRSD INTERVAL: 80 MS
QT INTERVAL: 382 MS
QT INTERVAL: 408 MS
QTC INTERVAL: 409 MS
QTC INTERVAL: 424 MS
RBC # BLD AUTO: 3.88 MILLION/UL (ref 3.81–5.12)
RBC #/AREA URNS AUTO: ABNORMAL /HPF
SODIUM SERPL-SCNC: 127 MMOL/L (ref 135–147)
SP GR UR STRIP.AUTO: 1.01 (ref 1–1.03)
T WAVE AXIS: 67 DEGREES
T WAVE AXIS: 75 DEGREES
UROBILINOGEN UR QL STRIP.AUTO: 0.2 E.U./DL
VENTRICULAR RATE: 65 BPM
VENTRICULAR RATE: 69 BPM
WBC # BLD AUTO: 11.39 THOUSAND/UL (ref 4.31–10.16)
WBC #/AREA URNS AUTO: ABNORMAL /HPF

## 2023-05-13 RX ORDER — ASPIRIN 81 MG/1
81 TABLET, CHEWABLE ORAL ONCE
Status: COMPLETED | OUTPATIENT
Start: 2023-05-13 | End: 2023-05-13

## 2023-05-13 RX ORDER — ESCITALOPRAM OXALATE 10 MG/1
10 TABLET ORAL ONCE
Status: COMPLETED | OUTPATIENT
Start: 2023-05-13 | End: 2023-05-13

## 2023-05-13 RX ORDER — SODIUM CHLORIDE 1 G/1
1 TABLET ORAL ONCE
Status: COMPLETED | OUTPATIENT
Start: 2023-05-13 | End: 2023-05-13

## 2023-05-13 RX ORDER — ATENOLOL 50 MG/1
100 TABLET ORAL DAILY
Status: DISCONTINUED | OUTPATIENT
Start: 2023-05-14 | End: 2023-05-13

## 2023-05-13 RX ORDER — ATORVASTATIN CALCIUM 40 MG/1
40 TABLET, FILM COATED ORAL ONCE
Status: COMPLETED | OUTPATIENT
Start: 2023-05-13 | End: 2023-05-13

## 2023-05-13 RX ORDER — LOSARTAN POTASSIUM 50 MG/1
50 TABLET ORAL ONCE
Status: COMPLETED | OUTPATIENT
Start: 2023-05-13 | End: 2023-05-13

## 2023-05-13 RX ORDER — HYDRALAZINE HYDROCHLORIDE 20 MG/ML
5 INJECTION INTRAMUSCULAR; INTRAVENOUS ONCE
Status: COMPLETED | OUTPATIENT
Start: 2023-05-13 | End: 2023-05-13

## 2023-05-13 RX ORDER — ATENOLOL 50 MG/1
100 TABLET ORAL EVERY EVENING
Status: DISCONTINUED | OUTPATIENT
Start: 2023-05-13 | End: 2023-05-20 | Stop reason: HOSPADM

## 2023-05-13 RX ADMIN — SODIUM CHLORIDE 1 G: 1 TABLET ORAL at 22:39

## 2023-05-13 RX ADMIN — SODIUM CHLORIDE 500 ML: 0.9 INJECTION, SOLUTION INTRAVENOUS at 19:29

## 2023-05-13 RX ADMIN — ATORVASTATIN CALCIUM 40 MG: 40 TABLET, FILM COATED ORAL at 22:39

## 2023-05-13 RX ADMIN — ESCITALOPRAM OXALATE 10 MG: 10 TABLET ORAL at 22:39

## 2023-05-13 RX ADMIN — ASPIRIN 81 MG 81 MG: 81 TABLET ORAL at 22:39

## 2023-05-13 RX ADMIN — HYDRALAZINE HYDROCHLORIDE 5 MG: 20 INJECTION INTRAMUSCULAR; INTRAVENOUS at 23:47

## 2023-05-13 RX ADMIN — LOSARTAN POTASSIUM 50 MG: 50 TABLET, FILM COATED ORAL at 22:39
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
## 2023-05-14 LAB
ANION GAP SERPL CALCULATED.3IONS-SCNC: 11 MMOL/L (ref 4–13)
ANION GAP SERPL CALCULATED.3IONS-SCNC: 6 MMOL/L (ref 4–13)
BASOPHILS # BLD AUTO: 0.11 THOUSANDS/ÂΜL (ref 0–0.1)
BASOPHILS NFR BLD AUTO: 1 % (ref 0–1)
BUN SERPL-MCNC: 11 MG/DL (ref 5–25)
BUN SERPL-MCNC: 12 MG/DL (ref 5–25)
CALCIUM SERPL-MCNC: 9.4 MG/DL (ref 8.4–10.2)
CALCIUM SERPL-MCNC: 9.7 MG/DL (ref 8.4–10.2)
CHLORIDE SERPL-SCNC: 101 MMOL/L (ref 96–108)
CHLORIDE SERPL-SCNC: 103 MMOL/L (ref 96–108)
CO2 SERPL-SCNC: 21 MMOL/L (ref 21–32)
CO2 SERPL-SCNC: 25 MMOL/L (ref 21–32)
CREAT SERPL-MCNC: 0.88 MG/DL (ref 0.6–1.3)
CREAT SERPL-MCNC: 0.9 MG/DL (ref 0.6–1.3)
EOSINOPHIL # BLD AUTO: 0.33 THOUSAND/ÂΜL (ref 0–0.61)
EOSINOPHIL NFR BLD AUTO: 3 % (ref 0–6)
ERYTHROCYTE [DISTWIDTH] IN BLOOD BY AUTOMATED COUNT: 12.6 % (ref 11.6–15.1)
GFR SERPL CREATININE-BSD FRML MDRD: 59 ML/MIN/1.73SQ M
GFR SERPL CREATININE-BSD FRML MDRD: 61 ML/MIN/1.73SQ M
GLUCOSE SERPL-MCNC: 104 MG/DL (ref 65–140)
GLUCOSE SERPL-MCNC: 82 MG/DL (ref 65–140)
HCT VFR BLD AUTO: 33.7 % (ref 34.8–46.1)
HGB BLD-MCNC: 11.4 G/DL (ref 11.5–15.4)
IMM GRANULOCYTES # BLD AUTO: 0.07 THOUSAND/UL (ref 0–0.2)
IMM GRANULOCYTES NFR BLD AUTO: 1 % (ref 0–2)
LYMPHOCYTES # BLD AUTO: 2.78 THOUSANDS/ÂΜL (ref 0.6–4.47)
LYMPHOCYTES NFR BLD AUTO: 25 % (ref 14–44)
MCH RBC QN AUTO: 31 PG (ref 26.8–34.3)
MCHC RBC AUTO-ENTMCNC: 33.8 G/DL (ref 31.4–37.4)
MCV RBC AUTO: 92 FL (ref 82–98)
MONOCYTES # BLD AUTO: 1.36 THOUSAND/ÂΜL (ref 0.17–1.22)
MONOCYTES NFR BLD AUTO: 12 % (ref 4–12)
NEUTROPHILS # BLD AUTO: 6.63 THOUSANDS/ÂΜL (ref 1.85–7.62)
NEUTS SEG NFR BLD AUTO: 58 % (ref 43–75)
NRBC BLD AUTO-RTO: 0 /100 WBCS
PLATELET # BLD AUTO: 381 THOUSANDS/UL (ref 149–390)
PLATELET # BLD AUTO: 456 THOUSANDS/UL (ref 149–390)
PMV BLD AUTO: 10.4 FL (ref 8.9–12.7)
PMV BLD AUTO: 9.9 FL (ref 8.9–12.7)
POTASSIUM SERPL-SCNC: 3.6 MMOL/L (ref 3.5–5.3)
POTASSIUM SERPL-SCNC: 4.4 MMOL/L (ref 3.5–5.3)
RBC # BLD AUTO: 3.68 MILLION/UL (ref 3.81–5.12)
SARS-COV-2 RNA RESP QL NAA+PROBE: NEGATIVE
SODIUM SERPL-SCNC: 133 MMOL/L (ref 135–147)
SODIUM SERPL-SCNC: 134 MMOL/L (ref 135–147)
WBC # BLD AUTO: 11.28 THOUSAND/UL (ref 4.31–10.16)

## 2023-05-14 RX ORDER — HEPARIN SODIUM 5000 [USP'U]/ML
5000 INJECTION, SOLUTION INTRAVENOUS; SUBCUTANEOUS EVERY 8 HOURS SCHEDULED
Status: DISCONTINUED | OUTPATIENT
Start: 2023-05-14 | End: 2023-05-20 | Stop reason: HOSPADM

## 2023-05-14 RX ORDER — ONDANSETRON 2 MG/ML
4 INJECTION INTRAMUSCULAR; INTRAVENOUS EVERY 6 HOURS PRN
Status: DISCONTINUED | OUTPATIENT
Start: 2023-05-14 | End: 2023-05-20 | Stop reason: HOSPADM

## 2023-05-14 RX ORDER — ESCITALOPRAM OXALATE 10 MG/1
10 TABLET ORAL DAILY
Status: DISCONTINUED | OUTPATIENT
Start: 2023-05-14 | End: 2023-05-15

## 2023-05-14 RX ORDER — ATORVASTATIN CALCIUM 40 MG/1
40 TABLET, FILM COATED ORAL DAILY
Status: DISCONTINUED | OUTPATIENT
Start: 2023-05-14 | End: 2023-05-20 | Stop reason: HOSPADM

## 2023-05-14 RX ORDER — SODIUM CHLORIDE 1 G/1
2 TABLET ORAL 2 TIMES DAILY
Status: DISCONTINUED | OUTPATIENT
Start: 2023-05-14 | End: 2023-05-20 | Stop reason: HOSPADM

## 2023-05-14 RX ORDER — LOSARTAN POTASSIUM 50 MG/1
50 TABLET ORAL 2 TIMES DAILY
Status: DISCONTINUED | OUTPATIENT
Start: 2023-05-14 | End: 2023-05-20 | Stop reason: HOSPADM

## 2023-05-14 RX ORDER — ASPIRIN 81 MG/1
81 TABLET, CHEWABLE ORAL DAILY
Status: DISCONTINUED | OUTPATIENT
Start: 2023-05-14 | End: 2023-05-20 | Stop reason: HOSPADM

## 2023-05-14 RX ORDER — PANTOPRAZOLE SODIUM 20 MG/1
20 TABLET, DELAYED RELEASE ORAL EVERY OTHER DAY
Status: DISCONTINUED | OUTPATIENT
Start: 2023-05-14 | End: 2023-05-20 | Stop reason: HOSPADM

## 2023-05-14 RX ADMIN — ATENOLOL 100 MG: 50 TABLET ORAL at 00:23

## 2023-05-14 RX ADMIN — HEPARIN SODIUM 5000 UNITS: 5000 INJECTION INTRAVENOUS; SUBCUTANEOUS at 15:13

## 2023-05-14 RX ADMIN — ESCITALOPRAM OXALATE 10 MG: 10 TABLET ORAL at 08:09

## 2023-05-14 RX ADMIN — HEPARIN SODIUM 5000 UNITS: 5000 INJECTION INTRAVENOUS; SUBCUTANEOUS at 21:25

## 2023-05-14 RX ADMIN — SODIUM CHLORIDE 2 G: 1 TABLET ORAL at 08:09

## 2023-05-14 RX ADMIN — PANTOPRAZOLE SODIUM 20 MG: 20 TABLET, DELAYED RELEASE ORAL at 08:09

## 2023-05-14 RX ADMIN — SODIUM CHLORIDE 2 G: 1 TABLET ORAL at 17:14

## 2023-05-14 RX ADMIN — ASPIRIN 81 MG 81 MG: 81 TABLET ORAL at 08:09

## 2023-05-14 RX ADMIN — LOSARTAN POTASSIUM 50 MG: 50 TABLET, FILM COATED ORAL at 17:14

## 2023-05-14 RX ADMIN — LOSARTAN POTASSIUM 50 MG: 50 TABLET, FILM COATED ORAL at 08:09

## 2023-05-14 RX ADMIN — ATENOLOL 100 MG: 50 TABLET ORAL at 17:14

## 2023-05-14 RX ADMIN — HEPARIN SODIUM 5000 UNITS: 5000 INJECTION INTRAVENOUS; SUBCUTANEOUS at 06:40

## 2023-05-14 RX ADMIN — ATORVASTATIN CALCIUM 40 MG: 40 TABLET, FILM COATED ORAL at 08:09

## 2023-05-14 NOTE — ASSESSMENT & PLAN NOTE
Continue lexapro for now, may need to consider alternative therapy for anxiety/insomnia in light of siadh but given 2 normalized sodium levels prior will continue

## 2023-05-14 NOTE — ASSESSMENT & PLAN NOTE
Likely accelerated 2* anxiety  Asymptomatic    Continue atenolol/losartan  Hold demadex for now  Hydralazine 5mg prn sbp >180mmHg

## 2023-05-14 NOTE — H&P
[facility]  H&P  Name: Cadence Raygoza 80 y o  female I MRN: [id number]  Unit/Bed#: E2 -01 I Date of Admission: 5/13/2023   Date of Service: 5/14/2023 I Hospital Day: 1      Assessment/Plan   * Ambulatory dysfunction  Assessment & Plan  W/walker at baseline, cognitive impairment from nph s/p  shunt  Given mechanical fall at independent living facility family was concerned may need higher level of care  Consult pt/ot/cm  Will correct chronic hyponatremia given lower than goal (127 on admit)    Chronic hyponatremia  Assessment & Plan  2* SIADH likely  May be slightly dry on torsemide as pt is drinking less water nowadays in favor of coke  Was 137 x 2 preshopital now 127 on lexapro 10mg po daily and w/hx of NPH  No n/v/d but will hold torsemide to decrease salt loss continue w/fluid restriction and salt tabs  Already took dose in am so will hold am torsemide, continue NaCl 1g (tabs bid)      Fall  Assessment & Plan  Sounds largely mechanical but pt was backign out of a bathroom w/her walker for unclear reasons (hx of NPH)  -Trauma scan unremarkable for acute pathology, demonstrates old lacunar infarct, stable ventricles w/ shunt in place  -consult pt/ot, correct hyponatremia      Chronic obstructive pulmonary disease, unspecified COPD type ([facility] [address] )  Assessment & Plan  No acute exacerbation  Does have a intermittent dry cough  Check covid screen given mild leucocytosis      Normal pressure hydrocephalus (HCC)  Assessment & Plan  S/p  shunt in good position on ct head    Essential hypertension  Assessment & Plan  Likely accelerated 2* anxiety  Asymptomatic    Continue atenolol/losartan  Hold demadex for now  Hydralazine 5mg prn sbp >180mmHg    Anxiety and depression  Assessment & Plan  Continue lexapro for now, may need to consider alternative therapy for anxiety/insomnia in light of siadh but given 2 normalized sodium levels prior will continue         VTE Pharmacologic Prophylaxis: High Risk (Score >/= 5) - Pharmacological DVT Prophylaxis Ordered: heparin  Sequential Compression Devices Ordered  Code Status: level 1  Discussion with family: Updated  (daughter) at bedside  Anticipated Length of Stay: Patient will be admitted on an inpatient basis with an anticipated length of stay of greater than 2 midnights secondary to ambulatory dysfunction  Total Time Spent on Date of Encounter in care of patient: 55 minutes This time was spent on one or more of the following: performing physical exam; counseling and coordination of care; obtaining or reviewing history; documenting in the medical record; reviewing/ordering tests, medications or procedures; communicating with other healthcare professionals and discussing with patient's family/caregivers  Chief Complaint: fall    History of Present Illness:  Elsie Butler is a 80 y o  female with a PMH of siadh, NPH s/p  shunt, depression/anxiety  Ambulatory dysfunction  who presents with fall  Pt lives at [facility]  She was leaving the bathroom in the dining lan and per daughters report (who are at bedside) she was backing up out of the bathroom w/her w/walker and fell backwards  It is unclear if the mobility in the bathroom was limited or why she was walking backwards but she fell and hit her head  Pt came to ed for evaluation  There have been no changes in her oral intake  Daughter states she used to drink a lot of water but for several months or so has been drinking more soda as her nephrology team has been encouraging her to avoid free water which she used to drink more frequently  No n/v/d  Does have loose cough but otherwise no other new s/sx and cough may be chronic per daughter  No swallowing problems  In ed pt ws evaluated by trauma scan ct head c spine w/o acute pathology  Does have an old lacunar infarct which was new to family and sodium down to 127 from prior of 137 in 03/23  We will admit for ambulatory dysfunction and hyponatremia as pt's family are concerned she may need higher level of care       Review of Systems:  Review of Systems   Constitutional: Negative for appetite change, chills and fever  HENT: Negative for sore throat  Respiratory: Positive for cough  Negative for shortness of breath  Cardiovascular: Negative for chest pain and palpitations  Gastrointestinal: Negative for abdominal pain, diarrhea, nausea and vomiting  Genitourinary: Positive for frequency  Neurological: Negative for light-headedness  Psychiatric/Behavioral: Positive for confusion  All other systems reviewed and are negative  Past Medical and Surgical History:   Past Medical History:   Diagnosis Date   • Anxiety    • Arthritis    • Confusion 10/2/2018   • Depression    • Displaced fracture of distal phalanx of right thumb    • GERD (gastroesophageal reflux disease)    • Heart attack ([facility] [address] )    • Hyperlipidemia    • Hypertension    • Moderate episode of recurrent major depressive disorder ([facility] [address] ) 4/12/2019   • Shortness of breath    • Stage 2 chronic kidney disease 7/17/2019       Past Surgical History:   Procedure Laterality Date   • APPENDECTOMY     • CARPAL TUNNEL RELEASE     • CATARACT EXTRACTION Bilateral    • COLONOSCOPY     • FL LUMBAR PUNCTURE DIAGNOSTIC  1/10/2019   • FRACTURE SURGERY     • AR CRTJ SHUNT [id number] TERMINUS Right 9/3/2019    Procedure: Insertion of frontal ventriculoperitoneal shunt;  Surgeon: Rogelio Pinedo MD;  Location: [facility];  Service: Neurosurgery   • SEPTOPLASTY     • WRIST FRACTURE SURGERY Right        Meds/Allergies:  Prior to Admission medications    Medication Sig Start Date End Date Taking?  Authorizing Provider   acetaminophen (TYLENOL) 325 mg tablet Take 2 tablets (650 mg total) by mouth every 6 (six) hours as needed for mild pain, moderate pain, headaches or fever 8/19/19  Yes Talha Navarrete MD   aspirin 81 mg chewable tablet CHEW 1 TABLET AND SWALLOW ORALLY DAILY (HYPERTENSION) 10/4/19  Yes Cecy Jon MD   atenolol (TENORMIN) 100 mg tablet TAKE ONE TABLET BY MOUTH EVERY DAY 22  Yes Kimberly Justice PA-C   atorvastatin (LIPITOR) 40 mg tablet TAKE ONE TABLET BY MOUTH EVERY DAY 22  Yes Kimberly Justice PA-C   Cholecalciferol (D3-1000) 25 MCG (1000 UT) tablet Take 4 tablets (4,000 Units total) by mouth daily 21  Yes Kimberly Justice PA-C   escitalopram (Lexapro) 10 mg tablet Take 1 tablet (10 mg total) by mouth daily 22  Yes Erica Ro MD   losartan (COZAAR) 50 mg tablet TAKE ONE TABLET BY MOUTH TWICE A DAY 22  Yes Kimberly Justice PA-C   multivitamin [facility]) TABS Take 1 tablet by mouth daily    Yes Danni Mireles MD   omeprazole (PriLOSEC) 20 mg delayed release capsule TAKE ONE CAPSULE BY MOUTH EVERY MORNING  Patient taking differently: every other day 22  Yes Kimberly Justice PA-C   sodium chloride 1 g tablet TAKE TWO TABLETS BY MOUTH TWICE A DAY 23  Yes Spring Engel MD   torsemide (DEMADEX) 10 mg tablet Take 1 tablet (10 mg total) by mouth daily 22  Yes Spring Engel MD   Vascepa 1 g CAPS TAKE 2 CAPSULES BY MOUTH 2 TIMES A DAY 1/3/23  Yes Uriel Martinez, DO     I have reviewed home medications with patient family member  Allergies: No Known Allergies    Social History:  Marital Status:     Occupation:   Patient Pre-hospital Living Situation:   Patient Pre-hospital Level of Mobility:   Patient Pre-hospital Diet Restrictions:   Substance Use History:   Social History     Substance and Sexual Activity   Alcohol Use Yes    Comment: social     Social History     Tobacco Use   Smoking Status Former   • Packs/day: 0 00   • Years: 0 00   • Pack years: 0 00   • Types: Cigarettes   • Quit date:    • Years since quittin 3   Smokeless Tobacco Never   Tobacco Comments    no secondhand smoke exposure     Social History     Substance and Sexual Activity   Drug Use No "      Family History:  Family History   Problem Relation Age of Onset   • Heart attack Mother 39   • Heart attack Father 61   • No Known Problems Other    • Breast cancer Sister    • Alcohol abuse Daughter         history of   • Heart attack Brother        Physical Exam:     Vitals:   Blood Pressure: (!) 180/82 (05/14/23 0010)  Pulse: 72 (05/14/23 0010)  Temperature: 97 7 °F (36 5 °C) (05/14/23 0010)  Temp Source: Temporal (05/14/23 0010)  Respirations: 20 (05/14/23 0010)  Height: 5' 2\" (157 5 cm) (05/14/23 0010)  Weight - Scale: 67 8 kg (149 lb 7 6 oz) (05/14/23 0010)  SpO2: 97 % (05/14/23 0010)    Physical Exam  Vitals reviewed  Constitutional:       General: She is not in acute distress  Appearance: She is obese  She is not ill-appearing, toxic-appearing or diaphoretic  HENT:      Head: Normocephalic  Comments: Minimal apical ecchymosis at crown of scalp w/o tenderness or defects in calvarium on palpation     Right Ear: External ear normal       Left Ear: External ear normal       Nose: Nose normal    Eyes:      Extraocular Movements: Extraocular movements intact  Pupils: Pupils are equal, round, and reactive to light  Cardiovascular:      Rate and Rhythm: Normal rate and regular rhythm  Heart sounds: No murmur heard  No friction rub  No gallop  Pulmonary:      Effort: No respiratory distress  Breath sounds: No wheezing, rhonchi or rales  Abdominal:      General: There is no distension  Palpations: Abdomen is soft  There is no mass  Tenderness: There is no abdominal tenderness  There is no guarding or rebound  Hernia: No hernia is present  Neurological:      Mental Status: She is alert            Additional Data:     Lab Results:  Results from last 7 days   Lab Units 05/13/23  1928   WBC Thousand/uL 11 39*   HEMOGLOBIN g/dL 12 0   HEMATOCRIT % 34 5*   PLATELETS Thousands/uL 405*   NEUTROS PCT % 68   LYMPHS PCT % 19   MONOS PCT % 9   EOS PCT % 2     Results from " last 7 days   Lab Units 05/13/23  1928   SODIUM mmol/L 127*   POTASSIUM mmol/L 3 7   CHLORIDE mmol/L 93*   CO2 mmol/L 23   BUN mg/dL 11   CREATININE mg/dL 0 81   ANION GAP mmol/L 11   CALCIUM mg/dL 9 7   ALBUMIN g/dL 4 3   TOTAL BILIRUBIN mg/dL 0 52   ALK PHOS U/L 56   ALT U/L 21   AST U/L 26   GLUCOSE RANDOM mg/dL 95                       Lines/Drains:  Invasive Devices     Peripheral Intravenous Line  Duration           Peripheral IV 05/13/23 Left Antecubital <1 day                    Imaging: Reviewed radiology reports from this admission including: CT head  CT head without contrast   Final Result by Linda Flynn MD (05/13 2132)      1  No intracranial hemorrhage or calvarial fracture  2   Chronic small vessel ischemic changes  3    shunt in place  Stable size and configuration of the ventricles  4   Opacification of the frontal sinus, correlate for sinusitis  Workstation performed: [id number]         CT spine cervical without contrast   Final Result by Linda Flynn MD (05/13 2132)      No cervical spine fracture or traumatic malalignment  Workstation performed: [id number]         XR shunt series    (Results Pending)       EKG and Other Studies Reviewed on Admission:   · EKG:     ** Please Note: This note has been constructed using a voice recognition system   **

## 2023-05-14 NOTE — ED PROVIDER NOTES
History  Chief Complaint   Patient presents with   • Fall     Patient was coming out of the bathroom backwards when she fell and hit her head on the floor  Patient c/o generalized head pain  Patient has shunt in head due to hydrocephalus  Patient alert and oriented x4  Patient has hx of dementia  Patient takes 81 mg ASA  Jerry Luke is an 79 yo F, history of chronic hyponatremia, HTN, HLD, hydrocephalus with  shunt in place, presenting after fall at her assisted living facility  She reportedly was walking backwards out of her bathroom and believes she tripped, but is unsure exactly what caused the fall  She apparently fell backwards and struck the back of her head on the floor  No LOC  Reports 3/10 headache  No neck pain reported  The family report concern about her ability to care for herself and attend to ADL's at assisted living section of facility  The staff had also reported this concern to her, as she appeared to be neglecting bathing and ADL's  The family report concerns about worsening cognition over the past weeks-months as well, including increased forgetfulness and periodic confusion particularly at night  They additionally note she has appeared increasingly weak with ambulation and unsteady in gait  History provided by:  Patient   used: No        Prior to Admission Medications   Prescriptions Last Dose Informant Patient Reported? Taking?    Cholecalciferol (D3-1000) 25 MCG (1000 UT) tablet   No Yes   Sig: Take 4 tablets (4,000 Units total) by mouth daily   Vascepa 1 g CAPS   No Yes   Sig: TAKE 2 CAPSULES BY MOUTH 2 TIMES A DAY   acetaminophen (TYLENOL) 325 mg tablet   No Yes   Sig: Take 2 tablets (650 mg total) by mouth every 6 (six) hours as needed for mild pain, moderate pain, headaches or fever   aspirin 81 mg chewable tablet   No Yes   Sig: CHEW 1 TABLET AND SWALLOW ORALLY DAILY (HYPERTENSION)   atenolol (TENORMIN) 100 mg tablet   No Yes   Sig: TAKE ONE TABLET BY MOUTH EVERY DAY   atorvastatin (LIPITOR) 40 mg tablet   No Yes   Sig: TAKE ONE TABLET BY MOUTH EVERY DAY   escitalopram (Lexapro) 10 mg tablet   No Yes   Sig: Take 1 tablet (10 mg total) by mouth daily   losartan (COZAAR) 50 mg tablet   No Yes   Sig: TAKE ONE TABLET BY MOUTH TWICE A DAY   multivitamin (THERAGRAN) TABS   Yes Yes   Sig: Take 1 tablet by mouth daily    omeprazole (PriLOSEC) 20 mg delayed release capsule   No Yes   Sig: TAKE ONE CAPSULE BY MOUTH EVERY MORNING   Patient taking differently: every other day   sodium chloride 1 g tablet   No Yes   Sig: TAKE TWO TABLETS BY MOUTH TWICE A DAY   torsemide (DEMADEX) 10 mg tablet   No Yes   Sig: Take 1 tablet (10 mg total) by mouth daily      Facility-Administered Medications: None       Past Medical History:   Diagnosis Date   • Anxiety    • Arthritis    • Confusion 10/2/2018   • Depression    • Displaced fracture of distal phalanx of right thumb    • GERD (gastroesophageal reflux disease)    • Heart attack (HCC)    • Hyperlipidemia    • Hypertension    • Moderate episode of recurrent major depressive disorder ([facility] [address] ) 4/12/2019   • Shortness of breath    • Stage 2 chronic kidney disease 7/17/2019       Past Surgical History:   Procedure Laterality Date   • APPENDECTOMY     • CARPAL TUNNEL RELEASE     • CATARACT EXTRACTION Bilateral    • COLONOSCOPY     • FL LUMBAR PUNCTURE DIAGNOSTIC  1/10/2019   • FRACTURE SURGERY     • OR CRTJ SHUNT [id number] TERMINUS Right 9/3/2019    Procedure:  Insertion of frontal ventriculoperitoneal shunt;  Surgeon: Live Burns MD;  Location: [facility];  Service: Neurosurgery   • SEPTOPLASTY     • WRIST FRACTURE SURGERY Right        Family History   Problem Relation Age of Onset   • Heart attack Mother 39   • Heart attack Father 61   • No Known Problems Other    • Breast cancer Sister    • Alcohol abuse Daughter         history of   • Heart attack Brother      I have reviewed and agree with the history as documented  E-Cigarette/Vaping   • E-Cigarette Use Never User      E-Cigarette/Vaping Substances   • Nicotine No    • THC No    • CBD No    • Flavoring No    • Other No    • Unknown No      Social History     Tobacco Use   • Smoking status: Former     Packs/day: 0 00     Years: 0 00     Pack years: 0 00     Types: Cigarettes     Quit date:      Years since quittin 3   • Smokeless tobacco: Never   • Tobacco comments:     no secondhand smoke exposure   Vaping Use   • Vaping Use: Never used   Substance Use Topics   • Alcohol use: Yes     Comment: social   • Drug use: No       Review of Systems   Constitutional: Negative for chills and fever  HENT: Negative for congestion, rhinorrhea and sore throat  Eyes: Negative for pain and visual disturbance  Respiratory: Negative for cough, shortness of breath and wheezing  Cardiovascular: Negative for chest pain and palpitations  Gastrointestinal: Negative for abdominal pain, nausea and vomiting  Genitourinary: Negative for dysuria, frequency and urgency  Musculoskeletal: Positive for gait problem  Negative for back pain, neck pain and neck stiffness  Skin: Negative for rash and wound  Neurological: Positive for headaches  Negative for dizziness, weakness (generalized), light-headedness and numbness  Physical Exam  Physical Exam  Constitutional:       General: She is not in acute distress  Appearance: She is well-developed  She is not diaphoretic  HENT:      Head: Normocephalic and atraumatic  Right Ear: External ear normal       Left Ear: External ear normal    Eyes:      Conjunctiva/sclera: Conjunctivae normal       Pupils: Pupils are equal, round, and reactive to light  Cardiovascular:      Rate and Rhythm: Normal rate and regular rhythm  Heart sounds: Normal heart sounds  No murmur heard  No friction rub  No gallop  Pulmonary:      Effort: Pulmonary effort is normal  No respiratory distress        Breath sounds: Normal breath sounds  No wheezing  Abdominal:      General: There is no distension  Palpations: Abdomen is soft  Tenderness: There is no abdominal tenderness  Musculoskeletal:      Cervical back: Normal range of motion and neck supple  Lymphadenopathy:      Cervical: No cervical adenopathy  Skin:     General: Skin is warm and dry  Capillary Refill: Capillary refill takes less than 2 seconds  Findings: No erythema or rash  Neurological:      Mental Status: She is alert  Cranial Nerves: Cranial nerves 2-12 are intact  Sensory: Sensation is intact  Motor: Motor function is intact  No weakness or abnormal muscle tone  Coordination: Coordination is intact  Coordination normal    Psychiatric:         Behavior: Behavior normal       Comments: Alert, oriented to person  Disoriented to place/time   Noted to be forgetful throughout history/exam and ask repetitive questions         Vital Signs  ED Triage Vitals [05/13/23 1810]   Temperature Pulse Respirations Blood Pressure SpO2   97 6 °F (36 4 °C) 70 18 (!) 137/107 98 %      Temp Source Heart Rate Source Patient Position - Orthostatic VS BP Location FiO2 (%)   Oral Monitor Sitting Right arm --      Pain Score       2           Vitals:    05/13/23 2347 05/14/23 0010 05/14/23 0600 05/14/23 1532   BP: (!) 202/86 (!) 180/82 129/61 119/67   Pulse: 73 72 62 64   Patient Position - Orthostatic VS: Lying Lying Lying Lying         Visual Acuity  Visual Acuity    Flowsheet Row Most Recent Value   L Pupil Size (mm) 3   R Pupil Size (mm) 3          ED Medications  Medications   atenolol (TENORMIN) tablet 100 mg (100 mg Oral Given 5/14/23 0023)   escitalopram (LEXAPRO) tablet 10 mg (10 mg Oral Given 5/14/23 0809)   atorvastatin (LIPITOR) tablet 40 mg (40 mg Oral Given 5/14/23 0809)   aspirin chewable tablet 81 mg (81 mg Oral Given 5/14/23 0809)   losartan (COZAAR) tablet 50 mg (50 mg Oral Given 5/14/23 0809)   sodium chloride tablet 2 g (2 g Oral Given 5/14/23 0809)   pantoprazole (PROTONIX) EC tablet 20 mg (20 mg Oral Given 5/14/23 0809)   ondansetron (ZOFRAN) injection 4 mg (has no administration in time range)   heparin (porcine) subcutaneous injection 5,000 Units (5,000 Units Subcutaneous Given 5/14/23 1513)   sodium chloride 0 9 % bolus 500 mL (0 mL Intravenous Stopped 5/13/23 2148)   atorvastatin (LIPITOR) tablet 40 mg (40 mg Oral Given 5/13/23 2239)   escitalopram (LEXAPRO) tablet 10 mg (10 mg Oral Given 5/13/23 2239)   losartan (COZAAR) tablet 50 mg (50 mg Oral Given 5/13/23 2239)   sodium chloride tablet 1 g (1 g Oral Given 5/13/23 2239)   aspirin chewable tablet 81 mg (81 mg Oral Given 5/13/23 2239)   hydrALAZINE (APRESOLINE) injection 5 mg (5 mg Intravenous Given 5/13/23 2347)       Diagnostic Studies  Results Reviewed     Procedure Component Value Units Date/Time    COVID only [[id number]]  (Normal) Collected: 05/13/23 2335    Lab Status: Final result Specimen: Nares from Nose Updated: 05/14/23 0012     SARS-CoV-2 Negative    Narrative:      FOR PEDIATRIC PATIENTS - copy/paste COVID Guidelines URL to browser: [email or web address]    SARS-CoV-2 assay is a Nucleic Acid Amplification assay intended for the  qualitative detection of nucleic acid from SARS-CoV-2 in nasopharyngeal  swabs  Results are for the presumptive identification of SARS-CoV-2 RNA  Positive results are indicative of infection with SARS-CoV-2, the virus  causing COVID-19, but do not rule out bacterial infection or co-infection  with other viruses  Laboratories within the [place] and its  territories are required to report all positive results to the appropriate  public health authorities  Negative results do not preclude SARS-CoV-2  infection and should not be used as the sole basis for treatment or other  patient management decisions   Negative results must be combined with  clinical observations, patient history, and epidemiological information  This test has not been FDA cleared or approved  This test has been authorized by FDA under an Emergency Use Authorization  (EUA)  This test is only authorized for the duration of time the  declaration that circumstances exist justifying the authorization of the  emergency use of an in vitro diagnostic tests for detection of SARS-CoV-2  virus and/or diagnosis of COVID-19 infection under section 564(b)(1) of  the Act, 21 U  S C  001GIS-7(U)(4), unless the authorization is terminated  or revoked sooner  The test has been validated but independent review by FDA  and CLIA is pending  Test performed using [employer] GeneXpert: This RT-PCR assay targets N2,  a region unique to SARS-CoV-2  A conserved region in the E-gene was chosen  for pan-Sarbecovirus detection which includes SARS-CoV-2  According to CMS-2020-01-R, this platform meets the definition of high-throughput technology      HS Troponin I 2hr [[id number]]  (Normal) Collected: 05/13/23 2147    Lab Status: Final result Specimen: Blood from Arm, Left Updated: 05/13/23 2222     hs TnI 2hr 5 ng/L      Delta 2hr hsTnI 0 ng/L     HS Troponin 0hr (reflex protocol) [[id number]]  (Normal) Collected: 05/13/23 1928    Lab Status: Final result Specimen: Blood from Arm, Left Updated: 05/13/23 2007     hs TnI 0hr 5 ng/L     Comprehensive metabolic panel [[id number]]  (Abnormal) Collected: 05/13/23 1928    Lab Status: Final result Specimen: Blood from Arm, Left Updated: 05/13/23 2002     Sodium 127 mmol/L      Potassium 3 7 mmol/L      Chloride 93 mmol/L      CO2 23 mmol/L      ANION GAP 11 mmol/L      BUN 11 mg/dL      Creatinine 0 81 mg/dL      Glucose 95 mg/dL      Calcium 9 7 mg/dL      AST 26 U/L      ALT 21 U/L      Alkaline Phosphatase 56 U/L      Total Protein 7 3 g/dL      Albumin 4 3 g/dL      Total Bilirubin 0 52 mg/dL      eGFR 67 ml/min/1 73sq m     Narrative:      [place] guidelines for Chronic Kidney Disease (CKD):   •  Stage 1 with normal or high GFR (GFR > 90 mL/min/1 73 square meters)  •  Stage 2 Mild CKD (GFR = 60-89 mL/min/1 73 square meters)  •  Stage 3A Moderate CKD (GFR = 45-59 mL/min/1 73 square meters)  •  Stage 3B Moderate CKD (GFR = 30-44 mL/min/1 73 square meters)  •  Stage 4 Severe CKD (GFR = 15-29 mL/min/1 73 square meters)  •  Stage 5 End Stage CKD (GFR <15 mL/min/1 73 square meters)  Note: GFR calculation is accurate only with a steady state creatinine    Urine Microscopic [[id number]]  (Abnormal) Collected: 05/13/23 1931    Lab Status: Final result Specimen: Urine, Clean Catch Updated: 05/13/23 1955     RBC, UA None Seen /hpf      WBC, UA 4-10 /hpf      Epithelial Cells Occasional /hpf      Bacteria, UA Occasional /hpf     CBC and differential [[id number]]  (Abnormal) Collected: 05/13/23 1928    Lab Status: Final result Specimen: Blood from Arm, Left Updated: 05/13/23 1943     WBC 11 39 Thousand/uL      RBC 3 88 Million/uL      Hemoglobin 12 0 g/dL      Hematocrit 34 5 %      MCV 89 fL      MCH 30 9 pg      MCHC 34 8 g/dL      RDW 12 6 %      MPV 9 9 fL      Platelets 103 Thousands/uL      nRBC 0 /100 WBCs      Neutrophils Relative 68 %      Immat GRANS % 1 %      Lymphocytes Relative 19 %      Monocytes Relative 9 %      Eosinophils Relative 2 %      Basophils Relative 1 %      Neutrophils Absolute 7 86 Thousands/µL      Immature Grans Absolute 0 06 Thousand/uL      Lymphocytes Absolute 2 16 Thousands/µL      Monocytes Absolute 0 98 Thousand/µL      Eosinophils Absolute 0 23 Thousand/µL      Basophils Absolute 0 10 Thousands/µL     Urine Macroscopic, POC [[id number]]  (Abnormal) Collected: 05/13/23 1931    Lab Status: Final result Specimen: Urine Updated: 05/13/23 1932     Color, UA Yellow     Clarity, UA Clear     pH, UA 7 0     Leukocytes, UA Trace     Nitrite, UA Negative     Protein, UA Negative mg/dl      Glucose, UA Negative mg/dl      Ketones, UA Negative mg/dl      Urobilinogen, UA 0 2 E U /dl      Bilirubin, UA Negative     Occult Blood, UA Trace     Specific [place], UA 1 015    Narrative:      CLINITEK RESULT                 CT head without contrast   Final Result by Lars Tapia MD (05/13 2132)      1  No intracranial hemorrhage or calvarial fracture  2   Chronic small vessel ischemic changes  3    shunt in place  Stable size and configuration of the ventricles  4   Opacification of the frontal sinus, correlate for sinusitis  Workstation performed: [id number]         CT spine cervical without contrast   Final Result by Lars Tapia MD (05/13 2132)      No cervical spine fracture or traumatic malalignment  Workstation performed: [id number]         XR shunt series   Final Result by Mitchel Berger MD (05/14 5337)      Intact  shunt catheter  Workstation performed: [id number]                    Procedures  ECG 12 Lead Documentation Only    Date/Time: 5/13/2023 7:35 PM  Performed by: Emma rKause PA-C  Authorized by: Emma Krause PA-C     Indications / Diagnosis:  Fall  ECG reviewed by me, the ED Provider: yes    Patient location:  ED  Previous ECG:     Previous ECG:  Compared to current    Similarity:  No change    Comparison to cardiac monitor: Yes    Interpretation:     Interpretation: non-specific    Rate:     ECG rate:  65    ECG rate assessment: normal    Rhythm:     Rhythm: sinus rhythm    Ectopy:     Ectopy: none    QRS:     QRS axis:  Normal    QRS intervals:  Normal  Conduction:     Conduction: normal    ST segments:     ST segments:  Normal  T waves:     T waves: normal    Q waves:     Q wave noted on lead: ?anterior               ED Course                                             Medical Decision Making  Fall at facility occurring prior to arrival - unclear mechanism however favored to be mechanical  Family does voice concerns however over her ability to ambulate without assistance and attend to ADL's, particularly with reportedly worsening cognition  She does have some confusion and repetitive questioning through exam  No focal neuro deficits on exam  EKG shows NSR without acute ischemic changes  Trop WNL  CBC grossly unremarkable  CMP reveals hyponatremia to 127, possibly contributing to general weakness  CT head without acute traumatic abnormality, shunt appears in place on CT and on shunt series  Will admit to medicine for correction of hyponatremia, PT/OT evaluation, possibly placement  Amount and/or Complexity of Data Reviewed  Labs: ordered  Radiology: ordered  Risk  OTC drugs  Prescription drug management  Decision regarding hospitalization  Disposition  Final diagnoses:   Fall, initial encounter   Hyponatremia   Ambulatory dysfunction     Time reflects when diagnosis was documented in both MDM as applicable and the Disposition within this note     Time User Action Codes Description Comment    5/13/2023 10:45 PM Nesha Solorzano [J48  CMEH] Fall, initial encounter     5/13/2023 10:45 PM Nesha Bosch Add [E87 1] Hyponatremia     5/13/2023 10:45 PM Nesha Bosch Add [R26 2] Ambulatory dysfunction       ED Disposition     ED Disposition   Admit    Condition   Stable    Date/Time   Sat May 13, 2023 10:45 PM    Comment   Case was discussed with Ella Florentino and the patient's admission status was agreed to be Admission Status: inpatient status to the service of Dr Darci Albert              Follow-up Information    None         Current Discharge Medication List      CONTINUE these medications which have NOT CHANGED    Details   acetaminophen (TYLENOL) 325 mg tablet Take 2 tablets (650 mg total) by mouth every 6 (six) hours as needed for mild pain, moderate pain, headaches or fever  Qty: 30 tablet, Refills: 0    Associated Diagnoses: Radiculopathy, unspecified spinal region      aspirin 81 mg chewable tablet CHEW 1 TABLET AND SWALLOW ORALLY DAILY (HYPERTENSION)  Qty: 30 tablet, Refills: 4 Comments: M  Associated Diagnoses: Essential hypertension      atenolol (TENORMIN) 100 mg tablet TAKE ONE TABLET BY MOUTH EVERY DAY  Qty: 90 tablet, Refills: 3    Associated Diagnoses: Hypertension, unspecified type      atorvastatin (LIPITOR) 40 mg tablet TAKE ONE TABLET BY MOUTH EVERY DAY  Qty: 90 tablet, Refills: 3    Associated Diagnoses: Hyperlipidemia, unspecified hyperlipidemia type      Cholecalciferol (D3-1000) 25 MCG (1000 UT) tablet Take 4 tablets (4,000 Units total) by mouth daily  Qty: 30 tablet    Comments: M  Associated Diagnoses: Vitamin D deficiency      escitalopram (Lexapro) 10 mg tablet Take 1 tablet (10 mg total) by mouth daily  Qty: 30 tablet, Refills: 5    Associated Diagnoses: Anxiety and depression      losartan (COZAAR) 50 mg tablet TAKE ONE TABLET BY MOUTH TWICE A DAY  Qty: 180 tablet, Refills: 3    Associated Diagnoses: Essential hypertension      multivitamin (THERAGRAN) TABS Take 1 tablet by mouth daily       omeprazole (PriLOSEC) 20 mg delayed release capsule TAKE ONE CAPSULE BY MOUTH EVERY MORNING  Qty: 90 capsule, Refills: 3    Associated Diagnoses: GERD without esophagitis      sodium chloride 1 g tablet TAKE TWO TABLETS BY MOUTH TWICE A DAY  Qty: 360 tablet, Refills: 3    Associated Diagnoses: Hyponatremia; SIADH (syndrome of inappropriate ADH production) ([facility])      torsemide (DEMADEX) 10 mg tablet Take 1 tablet (10 mg total) by mouth daily  Qty: 90 tablet, Refills: 3    Associated Diagnoses: SIADH (syndrome of inappropriate ADH production) ([address] ); Hyponatremia      Vascepa 1 g CAPS TAKE 2 CAPSULES BY MOUTH 2 TIMES A DAY  Qty: 360 capsule, Refills: 3    Associated Diagnoses: Atherosclerosis of coronary artery of native heart, unspecified vessel or lesion type, unspecified whether angina present; Hypertriglyceridemia             No discharge procedures on file      PDMP Review     None          ED Provider  Electronically Signed by           Regan Singh PA-C  05/14/23 8006

## 2023-05-14 NOTE — PLAN OF CARE
Problem: Potential for Falls  Goal: Patient will remain free of falls  Description: INTERVENTIONS:  - Educate patient/family on patient safety including physical limitations  - Instruct patient to call for assistance with activity   - Consult OT/PT to assist with strengthening/mobility   - Keep Call bell within reach  - Keep bed low and locked with side rails adjusted as appropriate  - Keep care items and personal belongings within reach  - Initiate and maintain comfort rounds  - Make Fall Risk Sign visible to staff  - Offer Toileting every 2 Hours, in advance of need  - Initiate/Maintain bed alarm  - Obtain necessary fall risk management equipment  - Apply yellow socks and bracelet for high fall risk patients  - Consider moving patient to room near nurses station  Outcome: Progressing     Problem: MOBILITY - ADULT  Goal: Maintain or return to baseline ADL function  Description: INTERVENTIONS:  -  Assess patient's ability to carry out ADLs; assess patient's baseline for ADL function and identify physical deficits which impact ability to perform ADLs (bathing, care of mouth/teeth, toileting, grooming, dressing, etc )  - Assess/evaluate cause of self-care deficits   - Assess range of motion  - Assess patient's mobility; develop plan if impaired  - Assess patient's need for assistive devices and provide as appropriate  - Encourage maximum independence but intervene and supervise when necessary  - Involve family in performance of ADLs  - Assess for home care needs following discharge   - Consider OT consult to assist with ADL evaluation and planning for discharge  - Provide patient education as appropriate  Outcome: Progressing  Goal: Maintains/Returns to pre admission functional level  Description: INTERVENTIONS:  - Perform BMAT or MOVE assessment daily    - Set and communicate daily mobility goal to care team and patient/family/caregiver     - Collaborate with rehabilitation services on mobility goals if consulted  - Perform Range of Motion 2 times a day  - Reposition patient every 2 hours    - Dangle patient 2 times a day  - Stand patient 2 times a day  - Ambulate patient 2 times a day  - Out of bed to chair 2 times a day   - Out of bed for meals 2 times a day  - Out of bed for toileting  - Record patient progress and toleration of activity level   Outcome: Progressing

## 2023-05-14 NOTE — PLAN OF CARE
Problem: PHYSICAL THERAPY ADULT  Goal: Performs mobility at highest level of function for planned discharge setting  See evaluation for individualized goals  Description: Treatment/Interventions: Functional transfer training, LE strengthening/ROM, Therapeutic exercise, Endurance training, Patient/family training, Equipment eval/education, Gait training, Spoke to nursing  Equipment Recommended: Kierra Woodson (lorena)       See flowsheet documentation for full assessment, interventions and recommendations  5/14/2023 0907 by Gilmer Dawn PT  Note: Prognosis: Good  Problem List: Decreased strength, Decreased endurance, Impaired balance, Decreased mobility, Decreased cognition, Decreased safety awareness  Assessment: pt admitted wtih fall at her [facility] apt at [facility]  pt dx with head pain, copd, hyponatremia, gait dysfunction  pt referred to PT  pt was living alone in [facility] apt  pt was using rollator or rw at home  pt was grossly oriented x4  pt reports only 1 fall  pt has assist for meals, medications, transportation  pt demonstrated mobility at min assist level  pt was close supervision for most activities but was unsafe and had 2 uncorrected losses of balance  pt's safety practices are impaired  pthas current chronic deficits in strength, balance, gait stability and safety, cognition and posture  recommend rehab prior to returning to her apt due to several safety deficits  Barriers to Discharge: Decreased caregiver support     PT Discharge Recommendation:  (vs transfer to assisted living with PT and supervision for mobility)    See flowsheet documentation for full assessment  5/14/2023 0907 by Gilmer Dawn PT  Note: Prognosis: Good  Problem List: Decreased strength, Decreased endurance, Impaired balance, Decreased mobility, Decreased cognition, Decreased safety awareness  Assessment: pt admitted wtih fall at her [facility] apt at [facility]   pt dx with head pain, copd, hyponatremia, gait dysfunction  pt referred to PT  pt was living alone in [facility] apt  pt was using rollator or rw at home  pt was grossly oriented x4  pt reports only 1 fall  pt has assist for meals, medications, transportation  pt demonstrated mobility at min assist level  pt was close supervision for most activities but was unsafe and had 2 uncorrected losses of balance  pt's safety practices are impaired  pthas current chronic deficits in strength, balance, gait stability and safety, cognition and posture  recommend rehab prior to returning to her apt due to several safety deficits  Barriers to Discharge: Decreased caregiver support     PT Discharge Recommendation:  (vs transfer to assisted living with PT and supervision for mobility)    See flowsheet documentation for full assessment

## 2023-05-14 NOTE — PHYSICAL THERAPY NOTE
Physical Therapy Evaluation    Performed at least 2 patient identifiers during session:  Patient Active Problem List   Diagnosis    Ambulatory dysfunction    Anxiety and depression    Essential hypertension    Carotid artery plaque    Elevated sed rate    GERD without esophagitis    Hyperlipidemia    Chronic hyponatremia    Levoscoliosis    Occlusion of celiac artery    Vitamin D deficiency    Radiculopathy    Carotid bruit    Coronary atherosclerosis    Splenic infarction    Positive ROSALINDA (antinuclear antibody)    Essential (hemorrhagic) thrombocythemia (HCC)    SMA stenosis    Normal pressure hydrocephalus (HCC)    Stage 2 chronic kidney disease    Recurrent falls    Hyperkalemia    Leukocytosis    S/P  shunt    Medicare annual wellness visit, subsequent    Osteopenia of multiple sites    Primary insomnia    SIADH (syndrome of inappropriate ADH production) ([facility] )    Hyperglycemia    MCI (mild cognitive impairment)    Polypharmacy    Chronic obstructive pulmonary disease, unspecified COPD type ([facility] )    Non-ST elevation myocardial infarction (NSTEMI) ([facility] )    Mild recurrent major depression ([facility] )    COVID    Persistent proteinuria    Fall       Past Medical History:   Diagnosis Date    Anxiety     Arthritis     Confusion 10/2/2018    Depression     Displaced fracture of distal phalanx of right thumb     GERD (gastroesophageal reflux disease)     Heart attack ([facility] )     Hyperlipidemia     Hypertension     Moderate episode of recurrent major depressive disorder ([facility] ) 4/12/2019    Shortness of breath     Stage 2 chronic kidney disease 7/17/2019       Past Surgical History:   Procedure Laterality Date    APPENDECTOMY      CARPAL TUNNEL RELEASE      CATARACT EXTRACTION Bilateral     COLONOSCOPY      FL LUMBAR PUNCTURE DIAGNOSTIC  1/10/2019    FRACTURE SURGERY      TX CRTJ SHUNT [id number] TERMINUS Right 9/3/2019    Procedure:  Insertion of frontal ventriculoperitoneal shunt;  Surgeon: Gay Chun MD; Location: [facility];  Service: Neurosurgery    SEPTOPLASTY      WRIST FRACTURE SURGERY Right         05/14/23 0848   PT Last Visit   PT Visit Date 05/14/23   Note Type   Note type Evaluation   Pain Assessment   Pain Assessment Tool 0-10   Pain Score No Pain   Restrictions/Precautions   Other Precautions Cognitive; Chair Alarm; Bed Alarm; Fall Risk;Impulsive   Home Living   Type of Home Apartment  (independent living at [facility])   Home Layout One level;Elevator;Ramped entrance   [address] bars in shower; Shower chair;Grab bars around toilet   [address]  (rollator)   Prior Function   Level of [place] Independent with ADLs; Independent with functional mobility; Needs assistance with [address] staff   Receives Help From Family;Personal care attendant   IADLs Family/Friend/Other provides transportation; Family/Friend/Other provides meals; Family/Friend/Other provides medication management   Falls in the last 6 months 1 to 4  (1)   Comments pt reorts being indep with dressing, mobility, has assist meals, meds, transportation  pt claims only 1 fall  pt uses rollator rw   General   Family/Caregiver Present No   Cognition   Overall Cognitive Status Impaired   Orientation Level Oriented X4  (with cues)   Subjective   Subjective no pain, tires easily   RUE Assessment   RUE Assessment WFL   LUE Assessment   LUE Assessment WFL   RLE Assessment   RLE Assessment X  (str 4+/5)   LLE Assessment   LLE Assessment X  (str 4+/5)   Coordination   Movements are Fluid and Coordinated 1  (but slow)   Sensation WFL   Bed Mobility   Sit to Supine 5  Supervision   Transfers   Sit to Stand 5  Supervision   Additional items Armrests; Increased time required; Impulsive;Verbal cues  (lack of safety awareness)   Stand to Sit 5  Supervision   Additional items Impulsive;Verbal cues  (puts rw to side, no arm reach for sitting surface, uncontrolled descent into bed)   Toilet transfer 4  Minimal assistance Additional items Increased time required; Impulsive;Verbal cues;Standard toilet  (pt puts rw to side, reached both hands to r grab bar to sit on toilet)   Ambulation/Elevation   Gait pattern WNL; Excessively slow  (2 uncorrected losses of balance to right, postural assymetry- scoliosis elevation r shoulder)   Gait Assistance 4  Minimal assist   Assistive Device Rolling walker   Distance 125', 15'   Balance   Static Sitting Normal   Dynamic Sitting Good   Static Standing Fair -   Dynamic Standing Poor +   Ambulatory Poor +   Endurance Deficit   Endurance Deficit Yes   Endurance Deficit Description fatigue   Activity Tolerance   Activity Tolerance Treatment limited secondary to medical complications (Comment)   Nurse Made Aware yes   Assessment   Prognosis Good   Problem List Decreased strength;Decreased endurance; Impaired balance;Decreased mobility; Decreased cognition;Decreased safety awareness   Assessment pt admitted wtih fall at her [facility] living apt at [facility]  pt dx with head pain, copd, hyponatremia, gait dysfunction  pt referred to PT  pt was living alone in [facility] apt  pt was using rollator or rw at home  pt was grossly oriented x4  pt reports only 1 fall  pt has assist for meals, medications, transportation  pt demonstrated mobility at min assist level  pt was close supervision for most activities but was unsafe and had 2 uncorrected losses of balance  pt's safety practices are impaired  pthas current chronic deficits in strength, balance, gait stability and safety, cognition and posture  recommend rehab prior to returning to her apt due to several safety deficits  Barriers to Discharge Decreased caregiver support   Goals   Patient Goals get back to bed  get some more water   STG Expiration Date 05/20/23   Short Term Goal #1 indep with bed moiblity, transfers, amb 200' using rw  improve balance by 1//2 to 1 grade  improve strength by 1/2 grade  demonstrate good safety practices     Plan Treatment/Interventions Functional transfer training;LE strengthening/ROM; Therapeutic exercise; Endurance training;Patient/family training;Equipment eval/education;Gait training;Spoke to nursing   PT Frequency 3-5x/wk   Recommendation   PT Discharge Recommendation   (vs transfer to assisted living with PT and supervision for mobility)   Equipment Recommended Walker  (has)   AM-PAC Basic Mobility Inpatient   Turning in Flat Bed Without Bedrails 4   Lying on Back to Sitting on Edge of Flat Bed Without Bedrails 4   Moving Bed to Chair 3   Standing Up From Chair Using Arms 3   Walk in Room 3   Climb 3-5 Stairs With Railing 2   Basic Mobility Inpatient Raw Score 19   Basic Mobility Standardized Score 42 48   Highest Level Of Mobility   -HLM Goal 6: Walk 10 steps or more   JH-HLM Achieved 7: Walk 25 feet or more       An AM-PAC Basic Mobility standardized score less than 42 9 suggests the patient may benefit from discharge to post-acute rehab services      History: co - morbidities, fall risk, use of assistive device, assist for 1adl's, cognition,  Exam: impairments in locomotion, musculoskeletal, balance,posture,  cognition   Clinical: unstable/unpredictable- high fall risk  Complexity:high  Vignesh Nunez, PT

## 2023-05-14 NOTE — ASSESSMENT & PLAN NOTE
Sounds largely mechanical but pt was backign out of a bathroom w/her walker for unclear reasons (hx of NPH)  -Trauma scan unremarkable for acute pathology, demonstrates old lacunar infarct, stable ventricles w/ shunt in place  -consult pt/ot, correct hyponatremia

## 2023-05-14 NOTE — ASSESSMENT & PLAN NOTE
No acute exacerbation  Does have a intermittent dry cough  Check covid screen given mild leucocytosis

## 2023-05-14 NOTE — CASE MANAGEMENT
Case Management Assessment & Discharge Planning Note    Patient name Eusebia First  Location East 2 /E2 MS 80-* MRN [id number]  : 1940 Date 2023       Current Admission Date: 2023  Current Admission Diagnosis:Ambulatory dysfunction   Patient Active Problem List    Diagnosis Date Noted   • Fall 2023   • Persistent proteinuria 2023   • COVID 2022   • Mild recurrent major depression ([facility] ) 2022   • Non-ST elevation myocardial infarction (NSTEMI) ([facility] ) 2022   • Chronic obstructive pulmonary disease, unspecified COPD type ([place] [address] ) 2022   • Polypharmacy 2021   • MCI (mild cognitive impairment) 2021   • Hyperglycemia 2021   • Primary insomnia 2021   • SIADH (syndrome of inappropriate ADH production) ([place] [address] ) 2021   • Medicare annual wellness visit, subsequent 2020   • Osteopenia of multiple sites 2020   • S/P  shunt 10/04/2019   • Recurrent falls 2019   • Hyperkalemia 2019   • Leukocytosis 2019   • Stage 2 chronic kidney disease 2019   • Normal pressure hydrocephalus ([facility] ) 2018   • Ambulatory dysfunction 12/15/2017   • Positive ROSALINDA (antinuclear antibody) 12/15/2017   • 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   • Anxiety and depression 10/20/2017   • 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   • Splenic infarction 10/11/2017   • Levoscoliosis 2017   • Elevated sed rate 2017   • Vitamin D deficiency 2017   • Radiculopathy 2017   • GERD without esophagitis 2017   • Essential (hemorrhagic) thrombocythemia ([facility] ) 2017   • Carotid artery plaque 2016   • Chronic hyponatremia 2014   • Hyperlipidemia 2014   • Carotid bruit 2014   • Essential hypertension 2009   • Coronary atherosclerosis 2009      LOS (days): 1  Geometric Mean LOS (GMLOS) (days):   Days to GMLOS:     OBJECTIVE:    Risk of Unplanned Readmission Score: 11 74         Current admission status: Inpatient       Preferred Pharmacy:   [facility], [place] - [address]  [facility]  [address] [facility] [address]  Phone: [phone] Fax: [phone]    Primary Care Provider: Sebastián Eduardo PA-C    Primary Insurance: [facility]  Secondary Insurance:     ASSESSMENT:  [address], [address] - Daughter   Primary Phone: [phone] (Mobile)               Advance Directives  Does patient have a [address]?: Yes  Does patient have Advance Directives?: Yes  Advance Directives: Living will, Power of  for health care, Power of  for finance (daughters are POA - paperwork on file)  Primary Contact: Jak Saunders (daughter) [phone]              Patient Information  Admitted from[de-identified] Facility ([facility])  Mental Status: Other (Comment) (oriented x2)  During Assessment patient was accompanied by: Not accompanied during assessment  Assessment information provided by[de-identified] Daughter  Primary Caregiver: Self  Support Systems: Children, Home care staff  [place] of Residence: [address] [place] [address] do you live in?: [place]  Type of Current Residence: Facility ([facility])  Living Arrangements: Lives Alone    Activities of Daily Living Prior to Admission  Functional Status: Independent  Completes ADLs independently?: No  Level of ADL dependence: Assistance (assistance from aide for showering; gets assistance w/ meals, medications, and transport)  Ambulates independently?: Yes  Does patient use assisted devices?: Yes  Assisted Devices (DME) used: Truong Cliche, Shower Chair  Does patient currently own DME?: Yes  What DME does the patient currently own?: Truong Cliche, Shower Chair, Wheelchair  Does the patient have a history of Short-Term Rehab?: Yes ([facility])  Does patient have a history of HHC?: Yes  Does patient currently have [facility] [address]?: Yes    Current Home Health Care  Type of Current Home Care Services: Home health aide  Current Home Health Agency[de-identified] Other (please enter name in comment) ([facility] - 3x per week for showering)  Current [address] Provider[de-identified] PCP    Patient Information Continued  Income Source: SSI/SSD  Does patient have prescription coverage?: Yes  Does patient receive dialysis treatments?: No  Does patient have a history of substance abuse?: No  Does patient have a history of Mental Health Diagnosis?: Yes (depression)  Is patient receiving treatment for mental health?: Yes (medication management)  Has patient received inpatient treatment related to mental health in the last 2 years?: No         Means of Transportation  Means of Transport to Appts[de-identified] Family transport        DISCHARGE DETAILS:    Discharge planning discussed with[de-identified] Kevin Man        CM contacted family/caregiver?: Yes             Contacts  Patient Contacts: Bhavna  Relationship to Patient[de-identified] Family  Contact Method: Phone  Phone Number: [phone]  Reason/Outcome: Emergency Contact, Discharge Planning                                                                     Additional Comments: CM spoke with kevin Man via phone  Bhavna stated pt is at [facility]  Pt has been requiring more assistance lately  Pt now has aide through [employer] [facility] 3x per week for assistance with showering  Omari Man has a meeting with Azul tomorrow at 1pm to discuss plan moving forward for pt  PT/OT recommending STR v transition to [place] at [facility] w PT/OT services  CM dept to follow up after Bhavna has meeting with Azul to determine d/c plan

## 2023-05-15 LAB
ANION GAP SERPL CALCULATED.3IONS-SCNC: 9 MMOL/L (ref 4–13)
BUN SERPL-MCNC: 13 MG/DL (ref 5–25)
CALCIUM SERPL-MCNC: 9.4 MG/DL (ref 8.4–10.2)
CHLORIDE SERPL-SCNC: 102 MMOL/L (ref 96–108)
CO2 SERPL-SCNC: 24 MMOL/L (ref 21–32)
CREAT SERPL-MCNC: 0.8 MG/DL (ref 0.6–1.3)
GFR SERPL CREATININE-BSD FRML MDRD: 68 ML/MIN/1.73SQ M
GLUCOSE SERPL-MCNC: 77 MG/DL (ref 65–140)
POTASSIUM SERPL-SCNC: 3.7 MMOL/L (ref 3.5–5.3)
SODIUM SERPL-SCNC: 135 MMOL/L (ref 135–147)

## 2023-05-15 RX ORDER — ESCITALOPRAM OXALATE 10 MG/1
10 TABLET ORAL
Status: DISCONTINUED | OUTPATIENT
Start: 2023-05-15 | End: 2023-05-20 | Stop reason: HOSPADM

## 2023-05-15 RX ORDER — LANOLIN ALCOHOL/MO/W.PET/CERES
3 CREAM (GRAM) TOPICAL
Status: DISCONTINUED | OUTPATIENT
Start: 2023-05-15 | End: 2023-05-20 | Stop reason: HOSPADM

## 2023-05-15 RX ORDER — HYDRALAZINE HYDROCHLORIDE 20 MG/ML
5 INJECTION INTRAMUSCULAR; INTRAVENOUS EVERY 6 HOURS PRN
Status: DISCONTINUED | OUTPATIENT
Start: 2023-05-15 | End: 2023-05-20 | Stop reason: HOSPADM

## 2023-05-15 RX ORDER — TORSEMIDE 20 MG/1
10 TABLET ORAL DAILY
Status: DISCONTINUED | OUTPATIENT
Start: 2023-05-16 | End: 2023-05-15

## 2023-05-15 RX ORDER — ACETAMINOPHEN 325 MG/1
650 TABLET ORAL EVERY 6 HOURS PRN
Status: DISCONTINUED | OUTPATIENT
Start: 2023-05-15 | End: 2023-05-20 | Stop reason: HOSPADM

## 2023-05-15 RX ORDER — TORSEMIDE 20 MG/1
10 TABLET ORAL DAILY
Status: DISCONTINUED | OUTPATIENT
Start: 2023-05-15 | End: 2023-05-20 | Stop reason: HOSPADM

## 2023-05-15 RX ADMIN — LOSARTAN POTASSIUM 50 MG: 50 TABLET, FILM COATED ORAL at 18:12

## 2023-05-15 RX ADMIN — LOSARTAN POTASSIUM 50 MG: 50 TABLET, FILM COATED ORAL at 08:15

## 2023-05-15 RX ADMIN — ESCITALOPRAM OXALATE 10 MG: 10 TABLET ORAL at 08:16

## 2023-05-15 RX ADMIN — ATORVASTATIN CALCIUM 40 MG: 40 TABLET, FILM COATED ORAL at 08:16

## 2023-05-15 RX ADMIN — SODIUM CHLORIDE 2 G: 1 TABLET ORAL at 18:12

## 2023-05-15 RX ADMIN — HEPARIN SODIUM 5000 UNITS: 5000 INJECTION INTRAVENOUS; SUBCUTANEOUS at 22:07

## 2023-05-15 RX ADMIN — ASPIRIN 81 MG 81 MG: 81 TABLET ORAL at 08:16

## 2023-05-15 RX ADMIN — HEPARIN SODIUM 5000 UNITS: 5000 INJECTION INTRAVENOUS; SUBCUTANEOUS at 05:21

## 2023-05-15 RX ADMIN — ACETAMINOPHEN 650 MG: 325 TABLET ORAL at 18:04

## 2023-05-15 RX ADMIN — HEPARIN SODIUM 5000 UNITS: 5000 INJECTION INTRAVENOUS; SUBCUTANEOUS at 14:11

## 2023-05-15 RX ADMIN — ESCITALOPRAM OXALATE 10 MG: 10 TABLET ORAL at 22:06

## 2023-05-15 RX ADMIN — DICLOFENAC SODIUM 2 G: 10 GEL TOPICAL at 18:04

## 2023-05-15 RX ADMIN — SODIUM CHLORIDE 2 G: 1 TABLET ORAL at 08:17

## 2023-05-15 RX ADMIN — Medication 3 MG: at 22:07

## 2023-05-15 RX ADMIN — ATENOLOL 100 MG: 50 TABLET ORAL at 18:12

## 2023-05-15 RX ADMIN — TORSEMIDE 10 MG: 20 TABLET ORAL at 16:36

## 2023-05-15 NOTE — ASSESSMENT & PLAN NOTE
· 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  · Sodium improved to 135 today  · Continue salt tabs and Demadex

## 2023-05-15 NOTE — ASSESSMENT & PLAN NOTE
· Patient presented after mechanical fall backwards at the bathroom with her walker   · PT/OT consulted  · Fall precautions while inpatient

## 2023-05-15 NOTE — PLAN OF CARE
Problem: OCCUPATIONAL THERAPY ADULT  Goal: Performs self-care activities at highest level of function for planned discharge setting  See evaluation for individualized goals  Description: Treatment Interventions: ADL retraining, Functional transfer training, UE strengthening/ROM, Endurance training, Cognitive reorientation, Patient/family training, Equipment evaluation/education, Compensatory technique education, Continued evaluation, Energy conservation, Activityengagement          See flowsheet documentation for full assessment, interventions and recommendations  Note: Limitation: Decreased ADL status, Decreased UE strength, Decreased Safe judgement during ADL, Decreased cognition, Decreased endurance, Decreased self-care trans, Decreased high-level ADLs  Prognosis: Fair  Assessment: Patient is a 80y o  year old female seen for OT eval s/p admit to [facility] on 5/13/2023 with ambulatory dysfunction, mechanical fall at ILF  Comorbidities affecting pt’s functional performance include a significant PMH of: normal pressure hydrocephalus s/p  shunt (2019), COPD, chronic hyponatremia, HTN, anxiety/depression, SIADH, GERD, HLD, occlusion of celiac artery, radiculopathy, SMA stenosis, osteopenia, NSTEMI, COVID, depression  Patient with active OT orders and activity orders for Up and OOB as tolerated   Personal factors affecting pt at time of IE include: difficulty performing ADLs and IADLs, difficulty with functional mobility/transfers  Prior to admission, required (A) with ADLs, required (A) with IADLs and completed functional mobility/transfers with Larissa utilizing rollator   Upon evaluation, patient’s functional status as follows: eating: Larissa, grooming: supervision , UB bathing: supervision , LB bathing: modA, UB dressing: supervision , LB dressing: Eula Melendez, toileting: Reba; functional transfers: supervision , bed mobility: DNT, functional mobility: CGA and supervision , sitting/standing tolerance: *2 min with b/l UE support - due to the following deficits impacting occupational performance: weakness, decreased strength , decreased balance, decreased activity tolerance, limited functional reach, impaired memory, impaired sequencing, impaired problem solving, impulsivity and decreased safety awareness  These impairments, as well at pt’s limited home support, difficulty performing ADLs, difficulty performing IADLs, difficulty performing transfers/mobility, limited insight into deficits, fall risk , functional decline  and advanced age limit pt’s ability to safely engage in all baseline areas of occupation  Patient would benefit from continued skilled OT therapy while in acute setting to address deficits as defined above and maximize (I) with ADLs and functional mobility  Occupational performance areas to address include: grooming, bathing/shower, toilet hygiene, dressing, medication management and functional mobility  Based on the aforementioned OT evaluation, functional performance deficits, and assessments, pt has been identified as a high complexity evaluation  From OT standpoint, recommend environment with increased supervision upon D/C  OT will continue to follow pt 2-3x/wk to address the following goals to  w/in 10-14 days       OT Discharge Recommendation:  (environment with increased supervision and rehab services)

## 2023-05-15 NOTE — UTILIZATION REVIEW
Initial Clinical Review    Admission: Date/Time/Statement:   Admission Orders (From admission, onward)     Ordered        05/13/23 2246  INPATIENT ADMISSION  Once                      Orders Placed This Encounter   Procedures   • INPATIENT ADMISSION     Standing Status:   Standing     Number of Occurrences:   1     Order Specific Question:   Level of Care     Answer:   Med Surg [16]     Order Specific Question:   Estimated length of stay     Answer:   More than 2 Midnights     Order Specific Question:   Certification     Answer:   I certify that inpatient services are medically necessary for this patient for a duration of greater than two midnights  See H&P and MD Progress Notes for additional information about the patient's course of treatment  ED Arrival Information     Expected   -    Arrival   5/13/2023 18:01    Acuity   Urgent            Means of arrival   Wheelchair    Escorted by   Family Member    Service   Hospitalist    Admission type   Emergency            Arrival complaint   Head Pain/Fall           Chief Complaint   Patient presents with   • Fall     Patient was coming out of the bathroom backwards when she fell and hit her head on the floor  Patient c/o generalized head pain  Patient has shunt in head due to hydrocephalus  Patient alert and oriented x4  Patient has hx of dementia  Patient takes 81 mg ASA  Initial Presentation: 80 y o  female presents to ED from    [facility]  After a fall  Daughters state she was walking out of  Bathroom with walker and fell backwards  Did hit head, other details unclear  Ct head and c/spine unremarkable  Old  Lacunar infarct noted,  News to family  Labs  Reveal sodium  127, was  137  In March  PMH  Is  SIAdh,  Nph, s/p   shunt,  COPD, essential hypertension,depression/anxiety       Admit  Ip with   Ambulatory dysfunction,  Chronic  Hyponatremia, D/P  Fall and plan is   PT/OT, monitor labs, fluid restrict, hold torsemide  And continue Other home meds  Date:     5/14        Day 2:   Fall appears mechanical/accidental   Continue to hold diuretics  Monitor labs  Sodium improved to  133  Awake/alert  Continue PT/OT  Co ntinue fall precautions  ED Triage Vitals [05/13/23 1810]   Temperature Pulse Respirations Blood Pressure SpO2   97 6 °F (36 4 °C) 70 18 (!) 137/107 98 %      Temp Source Heart Rate Source Patient Position - Orthostatic VS BP Location FiO2 (%)   Oral Monitor Sitting Right arm --      Pain Score       2          Wt Readings from Last 1 Encounters:   05/14/23 67 8 kg (149 lb 7 6 oz)     Additional Vital Signs:   97 5 °F (36 4 °C) 71 19 134/63 90 95 % None (Room air) Lying    05/14/23 1532 97 8 °F (36 6 °C) 64 18 119/67 83 96 % None (Room air) Lying   05/14/23 0600 97 6 °F (36 4 °C) 62 18 129/61 -- 98 % None (Room air) Lying   05/14/23 0010 97 7 °F (36 5 °C) 72 20 180/82 Abnormal  -- 97 % None (Room air) Lying   05/13/23 2347 -- 73 18 202/86 Abnormal  -- 99 % None (Room air) Lying   05/13/23 2345 -- 74 18 202/86 Abnormal  -- 96 % None (Room air) Lying   05/13/23 2147 -- 74 20 188/72 Abnormal  -- 96 % None (Room air) Sitting   05/13/23 1931 -- 65 20 161/72 -- 99 % None (Room air) Sitting   05/13/23 1810 97 6 °F (36 4 °C) 70 18 137/107 Abnormal  119 98 % None (Room air) Sitting       Pertinent Labs/Diagnostic Test Results:   CT head without contrast   Final Result by Amberly Lora MD (05/13 2132)      1  No intracranial hemorrhage or calvarial fracture  2   Chronic small vessel ischemic changes  3    shunt in place  Stable size and configuration of the ventricles  4   Opacification of the frontal sinus, correlate for sinusitis  Workstation performed: [id number]         CT spine cervical without contrast   Final Result by Amberly Lora MD (05/13 2132)      No cervical spine fracture or traumatic malalignment           Workstation performed: [id number]         XR shunt series   Final Result by Keyla Cifuentes Adriane Carroll MD (05/14 7861)      Intact  shunt catheter                    Workstation performed: [id number]           Results from last 7 days   Lab Units 05/13/23  2335   SARS-COV-2  Negative     Results from last 7 days   Lab Units 05/14/23  1135 05/14/23 0426 05/13/23 1928   WBC Thousand/uL  --  11 28* 11 39*   HEMOGLOBIN g/dL  --  11 4* 12 0   HEMATOCRIT %  --  33 7* 34 5*   PLATELETS Thousands/uL 456* 381 405*   NEUTROS ABS Thousands/µL  --  6 63 7 86*         Results from last 7 days   Lab Units 05/15/23  0434 05/14/23  1135 05/14/23 0426 05/13/23  1928   SODIUM mmol/L 135 134* 133* 127*   POTASSIUM mmol/L 3 7 4 4 3 6 3 7   CHLORIDE mmol/L 102 103 101 93*   CO2 mmol/L 24 25 21 23   ANION GAP mmol/L 9 6 11 11   BUN mg/dL 13 12 11 11   CREATININE mg/dL 0 80 0 90 0 88 0 81   EGFR ml/min/1 73sq m 68 59 61 67   CALCIUM mg/dL 9 4 9 7 9 4 9 7     Results from last 7 days   Lab Units 05/13/23  1928   AST U/L 26   ALT U/L 21   ALK PHOS U/L 56   TOTAL PROTEIN g/dL 7 3   ALBUMIN g/dL 4 3   TOTAL BILIRUBIN mg/dL 0 52         Results from last 7 days   Lab Units 05/15/23  0434 05/14/23  1135 05/14/23  0426 05/13/23  1928   GLUCOSE RANDOM mg/dL 77 104 82 95               Results from last 7 days   Lab Units 05/13/23  2147 05/13/23 1928   HS TNI 0HR ng/L  --  5   HS TNI 2HR ng/L 5  --    HSTNI D2 ng/L 0  --                Results from last 7 days   Lab Units 05/13/23 1931   CLARITY UA  Clear   COLOR UA  Yellow   SPEC GRAV UA  1 015   PH UA  7 0   GLUCOSE UA mg/dl Negative   KETONES UA mg/dl Negative   BLOOD UA  Trace*   PROTEIN UA mg/dl Negative   NITRITE UA  Negative   BILIRUBIN UA  Negative   UROBILINOGEN UA E U /dl 0 2   LEUKOCYTES UA  Trace*   WBC UA /hpf 4-10*   RBC UA /hpf None Seen   BACTERIA UA /hpf Occasional   EPITHELIAL CELLS WET PREP /hpf Occasional             ED Treatment:   Medication Administration from 05/13/2023 1801 to 05/14/2023 0010       Date/Time Order Dose Route Action Comments     05/13/2023 2143 EDT sodium chloride 0 9 % bolus 500 mL 0 mL Intravenous Stopped --     05/13/2023 1929 EDT sodium chloride 0 9 % bolus 500 mL 500 mL Intravenous New Bag --     05/13/2023 2239 EDT atorvastatin (LIPITOR) tablet 40 mg 40 mg Oral Given --     05/13/2023 2239 EDT escitalopram (LEXAPRO) tablet 10 mg 10 mg Oral Given --     05/13/2023 2239 EDT losartan (COZAAR) tablet 50 mg 50 mg Oral Given --     05/13/2023 2239 EDT sodium chloride tablet 1 g 1 g Oral Given --     05/13/2023 2239 EDT aspirin chewable tablet 81 mg 81 mg Oral Given --     05/13/2023 2347 EDT hydrALAZINE (APRESOLINE) injection 5 mg 5 mg Intravenous Given --          Present on Admission:  • SIADH (syndrome of inappropriate ADH production) ([facility])  • Normal pressure hydrocephalus (HCC)  • Essential hypertension  • Chronic obstructive pulmonary disease, unspecified COPD type (HCC)  • Chronic hyponatremia  • Ambulatory dysfunction  • Anxiety and depression      Admitting Diagnosis: Head pain [R51 9]  Hyponatremia [E87 1]  Ambulatory dysfunction [R26 2]  Age/Sex: 80 y o  female  Admission Orders:  Scheduled Medications:  aspirin, 81 mg, Oral, Daily  atenolol, 100 mg, Oral, QPM  atorvastatin, 40 mg, Oral, Daily  escitalopram, 10 mg, Oral, Daily  heparin (porcine), 5,000 Units, Subcutaneous, Q8H DANIELLA  losartan, 50 mg, Oral, BID  pantoprazole, 20 mg, Oral, Every Other Day  sodium chloride, 2 g, Oral, BID  [START ON 5/16/2023] torsemide, 10 mg, Oral, Daily      Continuous IV Infusions:     PRN Meds:  ondansetron, 4 mg, Intravenous, Q6H PRN        Network Utilization Review Department  ATTENTION: Please call with any questions or concerns to [phone] and carefully listen to the prompts so that you are directed to the right person  All voicemails are confidential   Charls Jellico all requests for admission clinical reviews, approved or denied determinations and any other requests to dedicated fax number below belonging to the campus where the patient is receiving treatment  List of dedicated fax numbers for the Facilities:  [address] [place] [address] DENIALS (Administrative/Medical Necessity) [phone]   [address] (Maternity/NICU/Pediatrics) [phone]   [address] Ludwig [phone]   Radhika Nguyen 77 [phone]   [address] [facility] [phone]   1559 [facility] Alyssa Krueger [facility] access hospital 134 815 [facility] [phone]

## 2023-05-15 NOTE — PLAN OF CARE
Problem: Potential for Falls  Goal: Patient will remain free of falls  Description: INTERVENTIONS:  - Educate patient/family on patient safety including physical limitations  - Instruct patient to call for assistance with activity   - Consult OT/PT to assist with strengthening/mobility   - Keep Call bell within reach  - Keep bed low and locked with side rails adjusted as appropriate  - Keep care items and personal belongings within reach  - Initiate and maintain comfort rounds  - Make Fall Risk Sign visible to staff  - Initiate/Maintain bed alarm  - Obtain necessary fall risk management equipment  - Apply yellow socks and bracelet for high fall risk patients  - Consider moving patient to room near nurses station  Outcome: Progressing     Problem: MOBILITY - ADULT  Goal: Maintain or return to baseline ADL function  Description: INTERVENTIONS:  -  Assess patient's ability to carry out ADLs; assess patient's baseline for ADL function and identify physical deficits which impact ability to perform ADLs (bathing, care of mouth/teeth, toileting, grooming, dressing, etc )  - Assess/evaluate cause of self-care deficits   - Assess range of motion  - Assess patient's mobility; develop plan if impaired  - Assess patient's need for assistive devices and provide as appropriate  - Encourage maximum independence but intervene and supervise when necessary  - Involve family in performance of ADLs  - Assess for home care needs following discharge   - Consider OT consult to assist with ADL evaluation and planning for discharge  - Provide patient education as appropriate  Outcome: Progressing  Goal: Maintains/Returns to pre admission functional level  Description: INTERVENTIONS:  - Perform BMAT or MOVE assessment daily    - Set and communicate daily mobility goal to care team and patient/family/caregiver  - Collaborate with rehabilitation services on mobility goals if consulted  - Perform Range of Motion 4 times a day    - Reposition patient every 2 hours    - Dangle patient 4 times a day  - Stand patient 3 times a day  - Ambulate patient 3 times a day  - Out of bed to chair 3 times a day   - Out of bed for meals 3 times a day  - Out of bed for toileting  - Record patient progress and toleration of activity level   Outcome: Progressing     Problem: Prexisting or High Potential for Compromised Skin Integrity  Goal: Skin integrity is maintained or improved  Description: INTERVENTIONS:  - Identify patients at risk for skin breakdown  - Assess and monitor skin integrity  - Assess and monitor nutrition and hydration status  - Monitor labs   - Assess for incontinence   - Turn and reposition patient  - Assist with mobility/ambulation  - Relieve pressure over bony prominences  - Avoid friction and shearing  - Provide appropriate hygiene as needed including keeping skin clean and dry  - Evaluate need for skin moisturizer/barrier cream  - Collaborate with interdisciplinary team   - Patient/family teaching  - Consider wound care consult   Outcome: Progressing

## 2023-05-15 NOTE — CASE MANAGEMENT
Case Management Discharge Planning Note    Patient name Josemanuel Hernandez  Location [facility] /E2 -* MRN [id number]  : 1940 Date 5/15/2023       Current Admission Date: 2023  Current Admission Diagnosis:Ambulatory dysfunction   Patient Active Problem List    Diagnosis Date Noted   • Fall 2023   • Persistent proteinuria 2023   • COVID 2022   • [facility] 2022   • Non-ST elevation myocardial infarction (NSTEMI) ([facility] ) 2022   • Chronic obstructive pulmonary disease, unspecified COPD type ([place] [address] ) 2022   • Polypharmacy 2021   • MCI (mild cognitive impairment) 2021   • Hyperglycemia 2021   • Primary insomnia 2021   • SIADH (syndrome of inappropriate ADH production) ([place] [address] ) 2021   • Medicare annual wellness visit, subsequent 2020   • Osteopenia of multiple sites 2020   • S/P  shunt 10/04/2019   • Recurrent falls 2019   • Hyperkalemia 2019   • Leukocytosis 2019   • Stage 2 chronic kidney disease 2019   • Normal pressure hydrocephalus ([facility] ) 2018   • Ambulatory dysfunction 12/15/2017   • Positive ROSALINDA (antinuclear antibody) 12/15/2017   • 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   • Anxiety and depression 10/20/2017   • 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   • Splenic infarction 10/11/2017   • Levoscoliosis 2017   • Elevated sed rate 2017   • Vitamin D deficiency 2017   • Radiculopathy 2017   • GERD without esophagitis 2017   • Essential (hemorrhagic) thrombocythemia ([facility] ) 2017   • Carotid artery plaque 2016   • Chronic hyponatremia 2014   • Hyperlipidemia 2014   • Carotid bruit 2014   • Essential hypertension 2009   • Coronary atherosclerosis 2009      LOS (days): 2  Geometric Mean LOS (GMLOS) (days):   Days to GMLOS:     OBJECTIVE:  Risk of Unplanned Readmission Score: 12 08         Current admission status: Inpatient Preferred Pharmacy:   [facility] [address]  [facility]  [address] [facility] [address] [address]  Phone: [phone] Fax: [phone]    Primary Care Provider: Mouna Mora PA-C    Primary Insurance: [facility]  Secondary Insurance:     DISCHARGE DETAILS:    Discharge planning discussed with[de-identified] Karen Chirinos of Choice: Yes  Comments - Freedom of Choice: Agreeable to STR  Reviewed accepting facilities   TCF was decided on  CM contacted family/caregiver?: Yes  Were Treatment Team discharge recommendations reviewed with patient/caregiver?: Yes  Did patient/caregiver verbalize understanding of patient care needs?: N/A- going to facility  Were patient/caregiver advised of the risks associated with not following Treatment Team discharge recommendations?: Yes    Contacts  Patient Contacts: Nicole Solo  Relationship to Patient[de-identified] Family  Contact Method: Phone  Phone Number: [phone]  Reason/Outcome: Emergency Contact, Discharge [address]         Is the patient interested in [address] at discharge?: No    DME Referral Provided  Referral made for DME?: No    Other Referral/Resources/Interventions Provided:  Interventions: Short Term Rehab  Referral Comments: referrals placed via aidin         Treatment Team Recommendation: Short Term Rehab  Discharge Destination Plan[de-identified] Short Term Rehab                                         Additional Comments: CM spoke with daughter, Nicole Solo, over the phone  She reports there will not be an available bed in the personal care home with [facility] for 3 weeks  Daughter is agreeable for the pt to go for rehab in order to build up her strength and indepedence  CM reviewed accepting facilities  [facility] TCF was accepted  CM will request auth from discharge support

## 2023-05-15 NOTE — RESTORATIVE TECHNICIAN NOTE
Restorative Technician Note      Patient Name: Hernan Stockton     Restorative Tech Visit Date: 05/15/23  Note Type: Mobility  Patient Position Upon Consult: Supine  Activity Performed: Ambulated  Assistive Device: Roller walker  Patient Position at End of Consult: Bedside chair;  All needs within reach

## 2023-05-15 NOTE — PROGRESS NOTES
[address] [place] [address]  Progress Note  Name: Giorgio Carr  MRN: [id number]  Unit/Bed#: E2 -01 I Date of Admission: 5/13/2023   Date of Service: 5/15/2023 I Hospital Day: 2    Assessment/Plan   * Ambulatory dysfunction  Assessment & Plan  · Patient presented to the ED after a fall at her assisted living facility, mechanical fall  · Walks with a walker at baseline   · PT/OT recommending rehab vs  Back to assisted living with increased supervision   · CM following   · Daughter to meet with assisted living facility to discuss discharge planning today at 1 PM- dc pending results of meeting     [address]  · Patient presented after mechanical fall backwards at the bathroom with her walker   · PT/OT consulted  · Fall precautions while inpatient    Chronic obstructive pulmonary disease, unspecified COPD type ([facility] )  Assessment & Plan  · Does not appear in exacerbation at this time      Normal pressure hydrocephalus (HCC)  Assessment & Plan  · S/p  shunt in good position on ct head    Chronic hyponatremia  Assessment & Plan  · 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  · Sodium improved to 135 today  · Continue salt tabs and Demadex    Essential hypertension  Assessment & Plan  · BP well controlled today  · Continue losartan, atenolol and Demadex       Anxiety and depression  Assessment & Plan  · Continue Lexapro             VTE Pharmacologic Prophylaxis:   High Risk (Score >/= 5) - Pharmacological DVT Prophylaxis Ordered: heparin  Sequential Compression Devices Ordered  Patient Centered Rounds: I performed bedside rounds with nursing staff today    Discussions with Specialists or Other Care Team Provider: case management     Education and Discussions with Family / Patient: will discuss with daughter after her meeting at 1 PM       Total Time Spent on Date of Encounter in care of patient: 35 minutes This time was spent on one or more of the following: performing physical exam; counseling and coordination of care; obtaining or reviewing history; documenting in the medical record; reviewing/ordering tests, medications or procedures; communicating with other healthcare professionals and discussing with patient's family/caregivers  Current Length of Stay: 2 day(s)  Current Patient Status: Inpatient   Certification Statement: The patient will continue to require additional inpatient hospital stay due to Ambulatory dysfunction pending safe discharge planning  Discharge Plan: Anticipate discharge in 24-48 hrs to discharge location to be determined pending rehab evaluations  Code Status: Level 1 - Full Code    Subjective:   Patient seen and examined at bedside  Denies any complaints  Denies any pain, discomfort or shortness of breath  Objective:     Vitals:   Temp (24hrs), Av 7 °F (36 5 °C), Min:97 3 °F (36 3 °C), Max:98 2 °F (36 8 °C)    Temp:  [97 3 °F (36 3 °C)-98 2 °F (36 8 °C)] 98 2 °F (36 8 °C)  HR:  [64-71] 70  Resp:  [18-19] 18  BP: (119-145)/(54-69) 145/54  SpO2:  [95 %-96 %] 96 %  Body mass index is 27 34 kg/m²  Input and Output Summary (last 24 hours): Intake/Output Summary (Last 24 hours) at 5/15/2023 0954  Last data filed at 2023 1637  Gross per 24 hour   Intake --   Output 350 ml   Net -350 ml       Physical Exam:   Physical Exam  Vitals reviewed  Constitutional:       General: She is not in acute distress  HENT:      Head: Normocephalic and atraumatic  Eyes:      General: No scleral icterus  Conjunctiva/sclera: Conjunctivae normal    Cardiovascular:      Rate and Rhythm: Normal rate and regular rhythm  Heart sounds: No murmur heard  Pulmonary:      Effort: Pulmonary effort is normal  No respiratory distress  Breath sounds: Normal breath sounds  Abdominal:      General: Bowel sounds are normal  There is no distension  Palpations: Abdomen is soft  Tenderness: There is no abdominal tenderness     Musculoskeletal:      Cervical back: Neck supple  Right lower leg: No edema  Left lower leg: No edema  Skin:     General: Skin is warm and dry  Neurological:      General: No focal deficit present  Mental Status: Mental status is at baseline  Psychiatric:         Mood and Affect: Mood normal          Behavior: Behavior normal           Additional Data:     Labs:  Results from last 7 days   Lab Units 05/14/23  1135 05/14/23  0426   WBC Thousand/uL  --  11 28*   HEMOGLOBIN g/dL  --  11 4*   HEMATOCRIT %  --  33 7*   PLATELETS Thousands/uL 456* 381   NEUTROS PCT %  --  58   LYMPHS PCT %  --  25   MONOS PCT %  --  12   EOS PCT %  --  3     Results from last 7 days   Lab Units 05/15/23  0434 05/14/23  0426 05/13/23  1928   SODIUM mmol/L 135   < > 127*   POTASSIUM mmol/L 3 7   < > 3 7   CHLORIDE mmol/L 102   < > 93*   CO2 mmol/L 24   < > 23   BUN mg/dL 13   < > 11   CREATININE mg/dL 0 80   < > 0 81   ANION GAP mmol/L 9   < > 11   CALCIUM mg/dL 9 4   < > 9 7   ALBUMIN g/dL  --   --  4 3   TOTAL BILIRUBIN mg/dL  --   --  0 52   ALK PHOS U/L  --   --  56   ALT U/L  --   --  21   AST U/L  --   --  26   GLUCOSE RANDOM mg/dL 77   < > 95    < > = values in this interval not displayed  Lines/Drains:  Invasive Devices     None                       Imaging: No pertinent imaging reviewed      Recent Cultures (last 7 days):         Last 24 Hours Medication List:   Current Facility-Administered Medications   Medication Dose Route Frequency Provider Last Rate   • aspirin  81 mg Oral Daily Laverne Ruiz PA-C     • atenolol  100 mg Oral QPM Laverne Ruiz PA-C     • atorvastatin  40 mg Oral Daily Laverne Ruiz PA-C     • escitalopram  10 mg Oral Daily Laverne Ruiz PA-C     • heparin (porcine)  5,000 Units Subcutaneous [facility] Laverne Ruiz PA-C     • losartan  50 mg Oral BID Laverne Ruiz PA-C     • ondansetron  4 mg Intravenous Q6H PRN Laverne Ruiz PA-C     • pantoprazole  20 mg Oral Every Other Day Valdemar Fay PA-C     • sodium chloride  2 g Oral BID Laverne Ruiz PA-C     • [START ON 5/16/2023] torsemide  10 mg Oral Daily Aleksandr Pugh PA-C          Today, Patient Was Seen By: Karol Antony PA-C    **Please Note: This note may have been constructed using a voice recognition system  **

## 2023-05-15 NOTE — ASSESSMENT & PLAN NOTE
· Patient presented to the ED after a fall at her assisted living facility, mechanical fall  · Walks with a walker at baseline   · PT/OT recommending rehab vs  Back to assisted living with increased supervision   · CM following   · Daughter to meet with assisted living facility to discuss discharge planning today at 1 PM- dc pending results of meeting

## 2023-05-15 NOTE — OCCUPATIONAL THERAPY NOTE
Occupational Therapy Evaluation     Patient Name: Kalpana Zambrano  IQEHJ'Q Date: 5/15/2023  Problem List  Principal Problem:    Ambulatory dysfunction  Active Problems:    Anxiety and depression    Essential hypertension    Chronic hyponatremia    Normal pressure hydrocephalus (HCC)    S/P  shunt    SIADH (syndrome of inappropriate ADH production) ([facility] )    Chronic obstructive pulmonary disease, unspecified COPD type ([facility] )    Fall    Past Medical History  Past Medical History:   Diagnosis Date    Anxiety     Arthritis     Confusion 10/2/2018    Depression     Displaced fracture of distal phalanx of right thumb     GERD (gastroesophageal reflux disease)     Heart attack ([facility] )     Hyperlipidemia     Hypertension     Moderate episode of recurrent major depressive disorder ([facility] ) 4/12/2019    Shortness of breath     Stage 2 chronic kidney disease 7/17/2019     Past Surgical History  Past Surgical History:   Procedure Laterality Date    APPENDECTOMY      CARPAL TUNNEL RELEASE      CATARACT EXTRACTION Bilateral     COLONOSCOPY      FL LUMBAR PUNCTURE DIAGNOSTIC  1/10/2019    FRACTURE SURGERY      OH CRTJ SHUNT JYAPCKGTOR-[id number]-[id number] TERMINUS Right 9/3/2019    Procedure: Insertion of frontal ventriculoperitoneal shunt;  Surgeon: Nica Ro MD;  Location: [facility];  Service: Neurosurgery    SEPTOPLASTY      WRIST FRACTURE SURGERY Right         05/15/23 1039   OT Last Visit   OT Visit Date 05/15/23   Note Type   Note type Evaluation   Pain Assessment   Pain Assessment Tool 0-10   Pain Score No Pain   Restrictions/Precautions   Other Precautions Cognitive; Chair Alarm; Bed Alarm; Fall Risk;Impulsive   Home Living   Type of Home Other (Comment)  ([facility])   [address] level;Elevator;Ramped entrance   Bathroom Shower/Tub Walk-in shower   Bathroom Toilet Raised   Bathroom Equipment Grab bars in shower; Shower chair;Grab bars around toilet   [address] Other (Comment)  (Rollator) "  Prior Function   Level of [place] Independent with functional mobility; Needs assistance with ADLs; Needs assistance with [address] staff   Receives Help From Family;Personal care attendant   IADLs Family/Friend/Other provides transportation; Family/Friend/Other provides meals; Family/Friend/Other provides medication management   Falls in the last 6 months 1 to 4   Vocational Retired   Lifestyle   Autonomy Prior to admission, required (A) with ADLs, required (A) with IADLs and completed functional mobility/transfers with Atilio utilizing rollator  Patient lives in an [address] [place] [address] Road ([facility])  Meals and some light cleaning are provided  (I) with UB/LB dressing per pt, recently has an aide 3x/wk for bathing and medications  Questionable historian given cognitive deficits  Reciprocal Relationships daughter   Intrinsic Gratification Watching TV   Subjective   Subjective 'I don't know what happened\"   ADL   Where Assessed Edge of bed   Eating Assistance 6  Modified independent   Grooming Assistance 5  Supervision/Setup   UB Bathing Assistance 5  Supervision/Setup   LB Bathing Assistance 3  Moderate Assistance   UB Dressing Assistance 5  Supervision/Setup   LB Dressing Assistance 3  Moderate [address]  4  Minimal Assistance   Transfers   Sit to Stand 5  Supervision   Additional items Armrests; Increased time required; Impulsive;Verbal cues; Assist x 1;Other  (RW)   Stand to Sit 5  Supervision   Additional items Armrests; Increased time required; Impulsive;Verbal cues; Assist x 1;Other  (RW)   Toilet transfer 5  Supervision   Additional items Assist x 1; Armrests; Increased time required;Verbal cues;Standard toilet; Other  (RW)   Functional Mobility   Functional Mobility   (SBA-CGA)   Additional items Rolling walker   Balance   Static Sitting Good   Dynamic Sitting Fair +   Static Standing Fair -   Dynamic Standing Poor +   Ambulatory Poor +   Activity Tolerance   Activity Tolerance " Treatment limited secondary to medical complications (Comment)   Medical Staff Made Aware RN, CM   Nurse Made Aware Yes   RUE Assessment   RUE Assessment WFL   LUE Assessment   LUE Assessment WFL   Hand Function   Gross Motor Coordination Functional   Fine Motor Coordination Functional   Sensation   Light Touch No apparent deficits   Proprioception   Proprioception No apparent deficits   Vision - Complex Assessment   Ocular Range of Motion Intact   Perception   Inattention/Neglect Appears intact   Cognition   Overall Cognitive Status Impaired   Arousal/Participation Alert; Responsive; Cooperative   Attention Attends with cues to redirect   Orientation Level Oriented to person;Oriented to place; Disoriented to time;Oriented to situation   Memory Decreased recall of precautions;Decreased recall of recent events;Decreased short term memory   Following Commands Follows one step commands with increased time or repetition   Assessment   Limitation Decreased ADL status; Decreased UE strength;Decreased Safe judgement during ADL;Decreased cognition;Decreased endurance;Decreased self-care trans;Decreased high-level ADLs   Prognosis Fair   Assessment Patient is a 80y o  year old female seen for OT eval s/p admit to [facility] on 5/13/2023 with ambulatory dysfunction, mechanical fall at [facility]  Comorbidities affecting pt’s functional performance include a significant PMH of: normal pressure hydrocephalus s/p  shunt (2019), COPD, chronic hyponatremia, HTN, anxiety/depression, SIADH, GERD, HLD, occlusion of celiac artery, radiculopathy, SMA stenosis, osteopenia, NSTEMI, COVID, depression  Patient with active OT orders and activity orders for Up and OOB as tolerated   Personal factors affecting pt at time of IE include: difficulty performing ADLs and IADLs, difficulty with functional mobility/transfers    Prior to admission, required (A) with ADLs, required (A) with IADLs and completed functional mobility/transfers with Atilio utilizing rollator  Upon evaluation, patient’s functional status as follows: eating: Larissa, grooming: supervision , UB bathing: supervision , LB bathing: modA, UB dressing: supervision , LB dressing: Anca Joseph, toileting: Reba; functional transfers: supervision , bed mobility: DNT, functional mobility: CGA and supervision , sitting/standing tolerance: *2 min with b/l UE support - due to the following deficits impacting occupational performance: weakness, decreased strength , decreased balance, decreased activity tolerance, limited functional reach, impaired memory, impaired sequencing, impaired problem solving, impulsivity and decreased safety awareness  These impairments, as well at pt’s limited home support, difficulty performing ADLs, difficulty performing IADLs, difficulty performing transfers/mobility, limited insight into deficits, fall risk , functional decline  and advanced age limit pt’s ability to safely engage in all baseline areas of occupation  Patient would benefit from continued skilled OT therapy while in acute setting to address deficits as defined above and maximize (I) with ADLs and functional mobility  Occupational performance areas to address include: grooming, bathing/shower, toilet hygiene, dressing, medication management and functional mobility  Based on the aforementioned OT evaluation, functional performance deficits, and assessments, pt has been identified as a high complexity evaluation  From OT standpoint, recommend environment with increased supervision upon D/C  OT will continue to follow pt 2-3x/wk to address the following goals to  w/in 10-14 days  Goals   Patient Goals to return home   LTG Time Frame 10-14   Plan   Treatment Interventions ADL retraining;Functional transfer training;UE strengthening/ROM; Endurance training;Cognitive reorientation;Patient/family training;Equipment evaluation/education; Compensatory technique education;Continued evaluation; Energy conservation; Activityengagement Goal Expiration Date 05/29/23   OT Treatment Day 0   OT Frequency 2-3x/wk   Recommendation   OT Discharge Recommendation   (environment with increased supervision and rehab services)   AM-PAC Daily Activity Inpatient   Lower Body Dressing 2   Bathing 2   Toileting 3   Upper Body Dressing 3   Grooming 3   Eating 4   Daily Activity Raw Score 17   Daily Activity Standardized Score (Calc for Raw Score >=11) 37 26   AM-PAC Applied Cognition Inpatient   Following a Speech/Presentation 4   Understanding Ordinary Conversation 3   Taking Medications 1   Remembering Where Things Are Placed or Put Away 2   Remembering List of 4-5 Errands 2   Taking Care of Complicated Tasks 2   Applied Cognition Raw Score 14   Applied Cognition Standardized Score 32 02     Occupational Therapy goals: In 7-14 days:     1- Patient will verbalize and demonstrate use of energy conservation/deep breathing technique and work simplification skills during functional activity with no verbal cues  2- Patient will verbalize and demonstrate good body mechanics and joint protection techniques during ADLs/IADLs with no verbal cues   3- Pt will complete bed mobility at a Mod I level w/ G balance/safety demonstrated to decrease caregiver assistance required   4- Patient will increase OOB/ sitting tolerance to 2-4 hours per day for increased participation in self care and leisure tasks with no s/s of exertion     5-Patient will increase standing tolerance time to 5 minutes with unilateral UE support to complete sink level ADLs@ mod I level    6- Pt will improve functional transfers to Mod I on/off all surfaces using DME as needed w/ G balance/safety   7- Patient will complete UB ADLs with Atilio utilizing appropriate DME/AE PRN   8- Patient will complete LB ADLs with Atilio utilizing appropriate DME/AE PRN   9- Patient will complete toileting tasks with Atilio with G hygiene/thoroughness utilizing appropriate DME/AE PRN   10- Pt will improve functional mobility during ADL/IADL/leisure tasks to Mod I using DME as needed w/ G balance/safety    11- Pt will be attentive 100% of the time during ongoing cognitive assessment w/ G participation to assist w/ safe d/c planning/recommendations   12- Pt will participate in simulated IADL management task to increase independence to Mod I w/ G safety and endurance   13- Pt will increase BUE strength by 1MM grade via AROM/AAROM/PROM exercises to increase independence in ADLs and transfers       Beny Prom, OTR/L

## 2023-05-15 NOTE — PLAN OF CARE
Problem: PAIN - ADULT  Goal: Verbalizes/displays adequate comfort level or baseline comfort level  Description: Interventions:  - Encourage patient to monitor pain and request assistance  - Assess pain using appropriate pain scale  - Administer analgesics based on type and severity of pain and evaluate response  - Implement non-pharmacological measures as appropriate and evaluate response  - Consider cultural and social influences on pain and pain management  - Notify physician/advanced practitioner if interventions unsuccessful or patient reports new pain  Outcome: Progressing     Problem: MOBILITY - ADULT  Goal: Maintain or return to baseline ADL function  Description: INTERVENTIONS:  -  Assess patient's ability to carry out ADLs; assess patient's baseline for ADL function and identify physical deficits which impact ability to perform ADLs (bathing, care of mouth/teeth, toileting, grooming, dressing, etc )  - Assess/evaluate cause of self-care deficits   - Assess range of motion  - Assess patient's mobility; develop plan if impaired  - Assess patient's need for assistive devices and provide as appropriate  - Encourage maximum independence but intervene and supervise when necessary  - Involve family in performance of ADLs  - Assess for home care needs following discharge   - Consider OT consult to assist with ADL evaluation and planning for discharge  - Provide patient education as appropriate  5/15/2023 0858 by Juan Son RN  Outcome: Progressing  5/15/2023 0858 by Juan Son RN  Outcome: Progressing     Problem: Knowledge Deficit  Goal: Patient/family/caregiver demonstrates understanding of disease process, treatment plan, medications, and discharge instructions  Description: Complete learning assessment and assess knowledge base    Interventions:  - Provide teaching at level of understanding  - Provide teaching via preferred learning methods  Outcome: Progressing     Problem: DISCHARGE PLANNING  Goal: Discharge to home or other facility with appropriate resources  Description: INTERVENTIONS:  - Identify barriers to discharge w/patient and caregiver  - Arrange for needed discharge resources and transportation as appropriate  - Identify discharge learning needs (meds, wound care, etc )  - Arrange for interpretive services to assist at discharge as needed  - Refer to Case Management Department for coordinating discharge planning if the patient needs post-hospital services based on physician/advanced practitioner order or complex needs related to functional status, cognitive ability, or social support system  Outcome: Progressing

## 2023-05-16 RX ADMIN — ESCITALOPRAM OXALATE 10 MG: 10 TABLET ORAL at 21:21

## 2023-05-16 RX ADMIN — HEPARIN SODIUM 5000 UNITS: 5000 INJECTION INTRAVENOUS; SUBCUTANEOUS at 16:40

## 2023-05-16 RX ADMIN — ATENOLOL 100 MG: 50 TABLET ORAL at 17:09

## 2023-05-16 RX ADMIN — Medication 3 MG: at 21:21

## 2023-05-16 RX ADMIN — HEPARIN SODIUM 5000 UNITS: 5000 INJECTION INTRAVENOUS; SUBCUTANEOUS at 05:17

## 2023-05-16 RX ADMIN — ATORVASTATIN CALCIUM 40 MG: 40 TABLET, FILM COATED ORAL at 08:55

## 2023-05-16 RX ADMIN — SODIUM CHLORIDE 2 G: 1 TABLET ORAL at 17:09

## 2023-05-16 RX ADMIN — PANTOPRAZOLE SODIUM 20 MG: 20 TABLET, DELAYED RELEASE ORAL at 08:55

## 2023-05-16 RX ADMIN — HEPARIN SODIUM 5000 UNITS: 5000 INJECTION INTRAVENOUS; SUBCUTANEOUS at 21:21

## 2023-05-16 RX ADMIN — SODIUM CHLORIDE 2 G: 1 TABLET ORAL at 08:55

## 2023-05-16 RX ADMIN — LOSARTAN POTASSIUM 50 MG: 50 TABLET, FILM COATED ORAL at 17:09

## 2023-05-16 RX ADMIN — ASPIRIN 81 MG 81 MG: 81 TABLET ORAL at 08:55

## 2023-05-16 NOTE — ASSESSMENT & PLAN NOTE
· Patient presented after mechanical fall backwards at the bathroom with her walker   · PT/OT consulted  · Fall precautions while inpatient  · Rehab at discharge

## 2023-05-16 NOTE — ASSESSMENT & PLAN NOTE
· Patient presented to the ED after a fall at her assisted living facility, mechanical fall  · Walks with a walker at baseline   · PT/OT recommending rehab vs  Back to assisted living with increased supervision   · CM following   · Plan for discharge to rehab when Christie Srinivasa obtained, likely will need to advance to assisted living from independent living after rehab

## 2023-05-16 NOTE — PLAN OF CARE
Problem: PHYSICAL THERAPY ADULT  Goal: Performs mobility at highest level of function for planned discharge setting  See evaluation for individualized goals  Description: Treatment/Interventions: Functional transfer training, LE strengthening/ROM, Therapeutic exercise, Endurance training, Patient/family training, Equipment eval/education, Gait training, Spoke to nursing  Equipment Recommended: Rach Khanna (has)       See flowsheet documentation for full assessment, interventions and recommendations  Outcome: Progressing  Note: Prognosis: Good  Problem List: Decreased strength, Decreased range of motion, Impaired balance, Decreased mobility, Decreased coordination, Decreased cognition, Impaired judgement  Assessment: pt  progressing well with overall mobility  Needed cueing for focus on task at hand  pt  noted to be lethargic though she reported she was okay  Pt  had mild LOB 1x during ambulation and needed Min A to regain balance  Pt  seated on commode in the bathroom for a shower with nursing at the end of session  Will continue to follow per PT POC toa ddress and maximize fucntional mobility  Barriers to Discharge: None     PT Discharge Recommendation: Post acute rehabilitation services (vs xfer to [place] with PT and S for mobility)    See flowsheet documentation for full assessment

## 2023-05-16 NOTE — PHYSICAL THERAPY NOTE
Physical Therapy Treatment Note     05/16/23 1103   PT Last Visit   PT Visit Date 05/16/23   Note Type   Note Type Treatment   Pain Assessment   Pain Assessment Tool 0-10   Pain Score No Pain   Restrictions/Precautions   Weight Bearing Precautions Per Order No   Other Precautions Fall Risk; Chair Alarm;Cognitive   General   Chart Reviewed Yes   Subjective   Subjective Pt  agreeable to PT   Transfers   Sit to Stand 5  Supervision   Additional items Armrests; Increased time required   Stand to Sit 5  Supervision   Additional items Armrests; Increased time required   Stand pivot 5  Supervision   Additional items Increased time required;Verbal cues   Toilet transfer 5  Supervision   Additional items Increased time required;Commode;Armrests   Ambulation/Elevation   Gait pattern Excessively slow;Narrow SHIRLEY; Decreased foot clearance; Short stride;Decreased toe off;Decreased heel strike   Gait Assistance 4  Minimal assist   Additional items Assist x 1;Verbal cues   Assistive Device Rolling walker   Distance 120ft   Balance   Static Sitting Normal   Dynamic Sitting Fair +   Static Standing Fair   Dynamic Standing Fair -   Ambulatory Fair -   Endurance Deficit   Endurance Deficit Yes   Endurance Deficit Description fatigue   Activity Tolerance   Activity Tolerance Patient tolerated treatment well   Nurse Made Aware Yes   Exercises   TKR Sitting;Bilateral;AROM;20 reps  (and in semi reclined position)   Assessment   Prognosis Good   Problem List Decreased strength;Decreased range of motion; Impaired balance;Decreased mobility; Decreased coordination;Decreased cognition; Impaired judgement   Assessment pt  progressing well with overall mobility  Needed cueing for focus on task at hand  pt  noted to be lethargic though she reported she was okay  Pt  had mild LOB 1x during ambulation and needed Min A to regain balance  Pt  seated on commode in the bathroom for a shower with nursing at the end of session   Will continue to follow per PT POC toa ddress and maximize fucntional mobility  Barriers to Discharge None   Goals   Patient Goals None reported   STG Expiration Date 05/20/23   PT Treatment Day 1   Plan   Treatment/Interventions Functional transfer training;LE strengthening/ROM; Therapeutic exercise;Gait training;Equipment eval/education;Patient/family training;Spoke to nursing;Cognitive reorientation   Progress Progressing toward goals   PT Frequency 3-5x/wk   Recommendation   PT Discharge Recommendation Post acute rehabilitation services  (vs xfer to care home with PT and S for mobility)   Equipment Recommended Walker   AM-PAC Basic Mobility Inpatient   Turning in Flat Bed Without Bedrails 4   Lying on Back to Sitting on Edge of Flat Bed Without Bedrails 4   Moving Bed to Chair 4   Standing Up From Chair Using Arms 4   Walk in Room 3   Climb 3-5 Stairs With Railing 3   Basic Mobility Inpatient Raw Score 22   Basic Mobility Standardized Score 47 4   Highest Level Of Mobility   JH-HLM Goal 7: Walk 25 feet or more   JH-HLM Achieved 7: Walk 25 feet or more   End of Consult   Patient Position at End of Consult All needs within reach; Other (comment)  (commode in bathroom with nursing present)           Natalia Lay PTA    An AM-PAC basic mobility standardized score less than 42 9 suggest the patient may benefit from discharge to post-acute rehab services

## 2023-05-16 NOTE — PROGRESS NOTES
[address] [place] [address]  Progress Note  Name: Madison Donovan  MRN: [id number]  Unit/Bed#: E2 -01 I Date of Admission: 5/13/2023   Date of Service: 5/16/2023 I Hospital Day: 3    Assessment/Plan   * Ambulatory dysfunction  Assessment & Plan  · Patient presented to the ED after a fall at her assisted living facility, mechanical fall  · Walks with a walker at baseline   · PT/OT recommending rehab vs  Back to assisted living with increased supervision   · CM following   · Plan for discharge to rehab when [place] obtained, likely will need to advance to assisted living from independent living after rehab     [address]  · Patient presented after mechanical fall backwards at the bathroom with her walker   · PT/OT consulted  · Fall precautions while inpatient  · Rehab at discharge     Chronic obstructive pulmonary disease, unspecified COPD type ([facility] )  Assessment & Plan  · Does not appear in exacerbation at this time      Normal pressure hydrocephalus (HCC)  Assessment & Plan  · S/p  shunt in good position on ct head    Chronic hyponatremia  Assessment & Plan  · 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  · Improved to 135   · Continue salt tabs and Demedex    Essential hypertension  Assessment & Plan  · BP intermittently elevated during hospitalization, well controlled today   · Continue losartan, atenolol and Demadex       Anxiety and depression  Assessment & Plan  · Continue Lexapro               VTE Pharmacologic Prophylaxis:   High Risk (Score >/= 5) - Pharmacological DVT Prophylaxis Ordered: heparin  Sequential Compression Devices Ordered  Patient Centered Rounds: I performed bedside rounds with nursing staff today  Discussions with Specialists or Other Care Team Provider: case management     Education and Discussions with Family / Patient: Updated  (daughter) via phone      Total Time Spent on Date of Encounter in care of patient: 35 minutes This time was spent on one or more of the following: performing physical exam; counseling and coordination of care; obtaining or reviewing history; documenting in the medical record; reviewing/ordering tests, medications or procedures; communicating with other healthcare professionals and discussing with patient's family/caregivers  Current Length of Stay: 3 day(s)  Current Patient Status: Inpatient   Certification Statement: The patient will continue to require additional inpatient hospital stay due to ambulatory dysfunction pending rehab placement   Discharge Plan: Anticipate discharge in 24-48 hrs to rehab facility  Code Status: Level 1 - Full Code    Subjective:   Patient seen and examined at bedside  Notes she is doing fine today  Denies any complaints  Agreeable for rehab placement at discharge  Objective:     Vitals:   Temp (24hrs), Av °F (36 1 °C), Min:96 2 °F (35 7 °C), Max:97 3 °F (36 3 °C)    Temp:  [96 2 °F (35 7 °C)-97 3 °F (36 3 °C)] 97 3 °F (36 3 °C)  HR:  [70-85] 85  Resp:  [18] 18  BP: (106-180)/(53-97) 106/55  SpO2:  [97 %-99 %] 97 %  Body mass index is 27 34 kg/m²  Input and Output Summary (last 24 hours): Intake/Output Summary (Last 24 hours) at 2023 0952  Last data filed at 2023 0801  Gross per 24 hour   Intake 1470 ml   Output 203 ml   Net 1267 ml       Physical Exam:   Physical Exam  Vitals reviewed  Constitutional:       General: She is not in acute distress  HENT:      Head: Normocephalic and atraumatic  Eyes:      General: No scleral icterus  Conjunctiva/sclera: Conjunctivae normal    Cardiovascular:      Rate and Rhythm: Normal rate and regular rhythm  Heart sounds: No murmur heard  Pulmonary:      Effort: Pulmonary effort is normal  No respiratory distress  Breath sounds: Normal breath sounds  Abdominal:      General: Bowel sounds are normal  There is no distension  Palpations: Abdomen is soft  Tenderness: There is no abdominal tenderness  Musculoskeletal:      Cervical back: Neck supple  Right lower leg: No edema  Left lower leg: No edema  Skin:     General: Skin is warm and dry  Neurological:      Mental Status: She is alert  Comments: Oriented to person and place at time of exam   Psychiatric:         Mood and Affect: Mood normal          Behavior: Behavior normal           Additional Data:     Labs:  Results from last 7 days   Lab Units 05/14/23  1135 05/14/23  0426   WBC Thousand/uL  --  11 28*   HEMOGLOBIN g/dL  --  11 4*   HEMATOCRIT %  --  33 7*   PLATELETS Thousands/uL 456* 381   NEUTROS PCT %  --  58   LYMPHS PCT %  --  25   MONOS PCT %  --  12   EOS PCT %  --  3     Results from last 7 days   Lab Units 05/15/23  0434 05/14/23  0426 05/13/23  1928   SODIUM mmol/L 135   < > 127*   POTASSIUM mmol/L 3 7   < > 3 7   CHLORIDE mmol/L 102   < > 93*   CO2 mmol/L 24   < > 23   BUN mg/dL 13   < > 11   CREATININE mg/dL 0 80   < > 0 81   ANION GAP mmol/L 9   < > 11   CALCIUM mg/dL 9 4   < > 9 7   ALBUMIN g/dL  --   --  4 3   TOTAL BILIRUBIN mg/dL  --   --  0 52   ALK PHOS U/L  --   --  56   ALT U/L  --   --  21   AST U/L  --   --  26   GLUCOSE RANDOM mg/dL 77   < > 95    < > = values in this interval not displayed  Lines/Drains:  Invasive Devices     None                       Imaging: No pertinent imaging reviewed      Recent Cultures (last 7 days):         Last 24 Hours Medication List:   Current Facility-Administered Medications   Medication Dose Route Frequency Provider Last Rate   • acetaminophen  650 mg Oral Q6H PRN Sabra Duran PA-C     • aspirin  81 mg Oral Daily Laverne Ruiz PA-C     • atenolol  100 mg Oral QPM Laverne Ruiz PA-C     • atorvastatin  40 mg Oral Daily Laverne Ruiz PA-C     • Diclofenac Sodium  2 g Topical 4x Daily PRN Sabra Duran PA-C     • escitalopram  10 mg Oral HS Sabra Duran PA-C     • heparin (porcine)  5,000 Units Subcutaneous [address] GIOVANA Ruiz     • hydrALAZINE  5 mg Intravenous Q6H PRN Amanda Simons PA-C     • losartan  50 mg Oral BID Laverne Ruiz PA-C     • melatonin  3 mg Oral HS PRN Amanda Simons PA-C     • ondansetron  4 mg Intravenous Q6H PRN Laverne Ruiz PA-C     • pantoprazole  20 mg Oral Every Other Day Laverne Ruiz PA-C     • sodium chloride  2 g Oral BID Laverne Ruiz PA-C     • torsemide  10 mg Oral Daily Amanda Simons PA-C          Today, Patient Was Seen By: Marline Carpenter PA-C    **Please Note: This note may have been constructed using a voice recognition system  **

## 2023-05-16 NOTE — CASE MANAGEMENT
[facility] received request for authorization from Care Manager    Authorization request for: SNF  Facility Name: [facility]  NPI: [id number]  Facility MD:  Andrew Winchester   NPI: [id number]  Authorization initiated by contacting insurance: [employer]  Via: [employer]  Pending Reference #: [id number]   Clinicals submitted via: [employer]

## 2023-05-16 NOTE — ASSESSMENT & PLAN NOTE
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

## 2023-05-16 NOTE — ASSESSMENT & PLAN NOTE
· BP intermittently elevated during hospitalization, well controlled today   · Continue losartan, atenolol and Demadex

## 2023-05-17 LAB
ANION GAP SERPL CALCULATED.3IONS-SCNC: 6 MMOL/L (ref 4–13)
ATRIAL RATE: 61 BPM
BASOPHILS # BLD AUTO: 0.12 THOUSANDS/ÂΜL (ref 0–0.1)
BASOPHILS NFR BLD AUTO: 1 % (ref 0–1)
BUN SERPL-MCNC: 24 MG/DL (ref 5–25)
CALCIUM SERPL-MCNC: 9.3 MG/DL (ref 8.4–10.2)
CARDIAC TROPONIN I PNL SERPL HS: 64 NG/L
CARDIAC TROPONIN I PNL SERPL HS: 72 NG/L (ref 8–18)
CHLORIDE SERPL-SCNC: 97 MMOL/L (ref 96–108)
CO2 SERPL-SCNC: 26 MMOL/L (ref 21–32)
CREAT SERPL-MCNC: 1.01 MG/DL (ref 0.6–1.3)
EOSINOPHIL # BLD AUTO: 0.41 THOUSAND/ÂΜL (ref 0–0.61)
EOSINOPHIL NFR BLD AUTO: 3 % (ref 0–6)
ERYTHROCYTE [DISTWIDTH] IN BLOOD BY AUTOMATED COUNT: 12.8 % (ref 11.6–15.1)
GFR SERPL CREATININE-BSD FRML MDRD: 51 ML/MIN/1.73SQ M
GLUCOSE SERPL-MCNC: 92 MG/DL (ref 65–140)
HCT VFR BLD AUTO: 33.9 % (ref 34.8–46.1)
HGB BLD-MCNC: 11.4 G/DL (ref 11.5–15.4)
IMM GRANULOCYTES # BLD AUTO: 0.04 THOUSAND/UL (ref 0–0.2)
IMM GRANULOCYTES NFR BLD AUTO: 0 % (ref 0–2)
LYMPHOCYTES # BLD AUTO: 2.53 THOUSANDS/ÂΜL (ref 0.6–4.47)
LYMPHOCYTES NFR BLD AUTO: 21 % (ref 14–44)
MAGNESIUM SERPL-MCNC: 1.6 MG/DL (ref 1.9–2.7)
MCH RBC QN AUTO: 30.8 PG (ref 26.8–34.3)
MCHC RBC AUTO-ENTMCNC: 33.6 G/DL (ref 31.4–37.4)
MCV RBC AUTO: 92 FL (ref 82–98)
MONOCYTES # BLD AUTO: 1.05 THOUSAND/ÂΜL (ref 0.17–1.22)
MONOCYTES NFR BLD AUTO: 9 % (ref 4–12)
NEUTROPHILS # BLD AUTO: 7.98 THOUSANDS/ÂΜL (ref 1.85–7.62)
NEUTS SEG NFR BLD AUTO: 66 % (ref 43–75)
NRBC BLD AUTO-RTO: 0 /100 WBCS
P AXIS: 67 DEGREES
PLATELET # BLD AUTO: 418 THOUSANDS/UL (ref 149–390)
PMV BLD AUTO: 9.9 FL (ref 8.9–12.7)
POTASSIUM SERPL-SCNC: 3.9 MMOL/L (ref 3.5–5.3)
PR INTERVAL: 156 MS
QRS AXIS: 23 DEGREES
QRSD INTERVAL: 80 MS
QT INTERVAL: 420 MS
QTC INTERVAL: 422 MS
RBC # BLD AUTO: 3.7 MILLION/UL (ref 3.81–5.12)
SARS-COV-2 RNA RESP QL NAA+PROBE: NEGATIVE
SODIUM SERPL-SCNC: 129 MMOL/L (ref 135–147)
T WAVE AXIS: 38 DEGREES
VENTRICULAR RATE: 61 BPM
WBC # BLD AUTO: 12.13 THOUSAND/UL (ref 4.31–10.16)

## 2023-05-17 RX ORDER — ASPIRIN 81 MG/1
243 TABLET, CHEWABLE ORAL ONCE
Status: COMPLETED | OUTPATIENT
Start: 2023-05-17 | End: 2023-05-17

## 2023-05-17 RX ORDER — MAGNESIUM HYDROXIDE/ALUMINUM HYDROXICE/SIMETHICONE 120; 1200; 1200 MG/30ML; MG/30ML; MG/30ML
30 SUSPENSION ORAL EVERY 4 HOURS PRN
Status: DISCONTINUED | OUTPATIENT
Start: 2023-05-17 | End: 2023-05-20 | Stop reason: HOSPADM

## 2023-05-17 RX ORDER — ASPIRIN 81 MG/1
243 TABLET, CHEWABLE ORAL DAILY
Status: DISCONTINUED | OUTPATIENT
Start: 2023-05-18 | End: 2023-05-17

## 2023-05-17 RX ORDER — MAGNESIUM SULFATE HEPTAHYDRATE 40 MG/ML
2 INJECTION, SOLUTION INTRAVENOUS ONCE
Status: COMPLETED | OUTPATIENT
Start: 2023-05-17 | End: 2023-05-18

## 2023-05-17 RX ORDER — SODIUM CHLORIDE 9 MG/ML
50 INJECTION, SOLUTION INTRAVENOUS CONTINUOUS
Status: DISCONTINUED | OUTPATIENT
Start: 2023-05-17 | End: 2023-05-17

## 2023-05-17 RX ORDER — ONDANSETRON 2 MG/ML
4 INJECTION INTRAMUSCULAR; INTRAVENOUS EVERY 6 HOURS PRN
Status: DISCONTINUED | OUTPATIENT
Start: 2023-05-17 | End: 2023-05-17 | Stop reason: SDUPTHER

## 2023-05-17 RX ADMIN — TORSEMIDE 10 MG: 20 TABLET ORAL at 08:26

## 2023-05-17 RX ADMIN — MAGNESIUM SULFATE HEPTAHYDRATE 2 G: 40 INJECTION, SOLUTION INTRAVENOUS at 22:54

## 2023-05-17 RX ADMIN — ASPIRIN 81 MG 243 MG: 81 TABLET ORAL at 22:53

## 2023-05-17 RX ADMIN — ATORVASTATIN CALCIUM 40 MG: 40 TABLET, FILM COATED ORAL at 08:27

## 2023-05-17 RX ADMIN — ASPIRIN 81 MG 81 MG: 81 TABLET ORAL at 08:27

## 2023-05-17 RX ADMIN — HEPARIN SODIUM 5000 UNITS: 5000 INJECTION INTRAVENOUS; SUBCUTANEOUS at 14:21

## 2023-05-17 RX ADMIN — HEPARIN SODIUM 5000 UNITS: 5000 INJECTION INTRAVENOUS; SUBCUTANEOUS at 05:01

## 2023-05-17 RX ADMIN — LOSARTAN POTASSIUM 50 MG: 50 TABLET, FILM COATED ORAL at 08:27

## 2023-05-17 RX ADMIN — ATENOLOL 100 MG: 50 TABLET ORAL at 17:36

## 2023-05-17 RX ADMIN — SODIUM CHLORIDE 2 G: 1 TABLET ORAL at 17:36

## 2023-05-17 RX ADMIN — HEPARIN SODIUM 5000 UNITS: 5000 INJECTION INTRAVENOUS; SUBCUTANEOUS at 22:53

## 2023-05-17 RX ADMIN — LOSARTAN POTASSIUM 50 MG: 50 TABLET, FILM COATED ORAL at 17:36

## 2023-05-17 RX ADMIN — SODIUM CHLORIDE 2 G: 1 TABLET ORAL at 08:27

## 2023-05-17 RX ADMIN — ESCITALOPRAM OXALATE 10 MG: 10 TABLET ORAL at 22:53

## 2023-05-17 RX ADMIN — ALUMINUM HYDROXIDE, MAGNESIUM HYDROXIDE, AND DIMETHICONE 30 ML: 200; 20; 200 SUSPENSION ORAL at 20:01

## 2023-05-17 NOTE — ASSESSMENT & PLAN NOTE
· With acute hyponatremia of 127 on admission  · 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  · Na back to baseline at 135  · Continue salt tabs and Demadex

## 2023-05-17 NOTE — PHYSICAL THERAPY NOTE
Physical Therapy Treatment Note     05/17/23 1530   PT Last Visit   PT Visit Date 05/17/23   Note Type   Note Type Treatment   Pain Assessment   Pain Assessment Tool 0-10   Pain Score No Pain   Restrictions/Precautions   Weight Bearing Precautions Per Order No   Other Precautions Cognitive; Chair Alarm; Fall Risk   General   Chart Reviewed Yes   Subjective   Subjective Pt  agreeable to PT   Transfers   Sit to Stand 5  Supervision   Additional items Armrests; Increased time required   Stand to Sit 5  Supervision   Additional items Armrests; Increased time required   Stand pivot 4  Minimal assistance   Additional items Increased time required;Armrests   Ambulation/Elevation   Gait pattern Improper Weight shift;Narrow SHIRLEY; Decreased foot clearance; Forward Flexion; Inconsistent renita; Foward flexed; Short stride; Excessively slow;Decreased heel strike;Decreased toe off   Gait Assistance 4  Minimal assist   Additional items Assist x 1;Verbal cues   Assistive Device Rolling walker   Distance 150ft x 2   Balance   Static Sitting Normal   Dynamic Sitting Fair +   Static Standing Fair -   Dynamic Standing Poor +   Ambulatory Poor +   Endurance Deficit   Endurance Deficit Yes   Endurance Deficit Description fatigue   Activity Tolerance   Activity Tolerance Patient tolerated treatment well   Nurse Made Aware Yes   Assessment   Prognosis Good   Problem List Decreased endurance;Decreased strength; Impaired balance;Decreased mobility; Decreased coordination;Decreased cognition; Impaired judgement;Decreased safety awareness   Assessment Pt  progressing well with overall mobility  Pt  noted with unsteadiness with static tsanding without UE support  Pt  also noted to be impulsive while descending to chair and VCs for hand placement for STS transfers  Pt  had a brief standing rest with first ambulation and seated rest between both ambulations  pt  noted with L lean and mild LOB to the L with initial ambulation   noted second ambualtion with improved quality with better steps  Pt  seated on chair with all needs within reach and chair alarm engaged at the end of session  Will cotninue to follow per PT POC   Barriers to Discharge None   Goals   Patient Goals None reported   [facility] Expiration Date 05/20/23   PT Treatment Day 2   Plan   Treatment/Interventions Functional transfer training;Gait training;Equipment eval/education;Patient/family training;Spoke to nursing   Progress Progressing toward goals   PT Frequency 3-5x/wk   Recommendation   PT Discharge Recommendation Post acute rehabilitation services  (vx transfer to [facility] with PT and S for mobility)   Equipment Recommended Walker   AM-PAC Basic Mobility Inpatient   Turning in Flat Bed Without Bedrails 4   Lying on Back to Sitting on Edge of Flat Bed Without Bedrails 4   Moving Bed to Chair 4   Standing Up From Chair Using Arms 4   Walk in Room 3   Climb 3-5 Stairs With Railing 3   Basic Mobility Inpatient Raw Score 22   Basic Mobility Standardized Score 47 4   Highest Level Of Mobility   JH-HLM Goal 7: Walk 25 feet or more   JH-HLM Achieved 8: Walk 250 feet ot more   End of Consult   Patient Position at End of Consult Bedside chair;Bed/Chair alarm activated; All needs within reach           Connie Cisneros PTA    An AM-PAC basic mobility standardized score less than 42 9 suggest the patient may benefit from discharge to post-acute rehab services

## 2023-05-17 NOTE — RESTORATIVE TECHNICIAN NOTE
Restorative Technician Note      Patient Name: Jose Vigil     Restorative Tech Visit Date: 05/17/23  Note Type: Mobility  Patient Position Upon Consult: Bedside chair  Activity Performed: Ambulated  Assistive Device: Roller walker  Patient Position at End of Consult: Bedside chair;  All needs within reach

## 2023-05-17 NOTE — CASE MANAGEMENT
Received message from Tiffanie @ [facility] [phone]) stating [facility] is non-par and the the [facility] plan requires par SNF  If given a call back, will fax a par SNF list  Called Tiffanie back to request list  CM notified

## 2023-05-17 NOTE — ASSESSMENT & PLAN NOTE
· Patient presented to the ED after a fall at her assisted living facility, mechanical fall  · Uses a walker at baseline  · PT/OT recommending rehab vs  Back to assisted living with increased supervision   · CM following - hopeful for discharge when auth obtained  · Will likely need to advance to assisted living from independent living after rehab

## 2023-05-17 NOTE — ASSESSMENT & PLAN NOTE
· BP intermittently elevated during hospitalization, with suboptimal control today  · Continue losartan 50 mg daily, atenolol 100 mg nightly and Demadex 10 mg daily

## 2023-05-17 NOTE — ASSESSMENT & PLAN NOTE
· Patient presented after mechanical fall backwards at the bathroom with her walker   · PT/OT consulted  · Continue fall precautions while inpatient  · Rehab at discharge

## 2023-05-17 NOTE — PLAN OF CARE
Problem: Potential for Falls  Goal: Patient will remain free of falls  Description: INTERVENTIONS:  - Educate patient/family on patient safety including physical limitations  - Instruct patient to call for assistance with activity   - Consult OT/PT to assist with strengthening/mobility   - Keep Call bell within reach  - Keep bed low and locked with side rails adjusted as appropriate  - Keep care items and personal belongings within reach  - Initiate and maintain comfort rounds  - Make Fall Risk Sign visible to staff  - Offer Toileting every 2 Hours, in advance of need  - Initiate/Maintain bed alarm  - Obtain necessary fall risk management equipment:   - Apply yellow socks and bracelet for high fall risk patients  - Consider moving patient to room near nurses station  Outcome: Progressing     Problem: MOBILITY - ADULT  Goal: Maintain or return to baseline ADL function  Description: INTERVENTIONS:  -  Assess patient's ability to carry out ADLs; assess patient's baseline for ADL function and identify physical deficits which impact ability to perform ADLs (bathing, care of mouth/teeth, toileting, grooming, dressing, etc )  - Assess/evaluate cause of self-care deficits   - Assess range of motion  - Assess patient's mobility; develop plan if impaired  - Assess patient's need for assistive devices and provide as appropriate  - Encourage maximum independence but intervene and supervise when necessary  - Involve family in performance of ADLs  - Assess for home care needs following discharge   - Consider OT consult to assist with ADL evaluation and planning for discharge  - Provide patient education as appropriate  Outcome: Progressing  Goal: Maintains/Returns to pre admission functional level  Description: INTERVENTIONS:  - Perform BMAT or MOVE assessment daily    - Set and communicate daily mobility goal to care team and patient/family/caregiver     - Collaborate with rehabilitation services on mobility goals if consulted  - Perform Range of Motion 3 times a day  - Reposition patient every 2 hours    - Dangle patient 3 times a day  - Stand patient 3 times a day  - Ambulate patient 3 times a day  - Out of bed to chair 3 times a day   - Out of bed for meals 3 times a day  - Out of bed for toileting  - Record patient progress and toleration of activity level   Outcome: Progressing     Problem: Prexisting or High Potential for Compromised Skin Integrity  Goal: Skin integrity is maintained or improved  Description: INTERVENTIONS:  - Identify patients at risk for skin breakdown  - Assess and monitor skin integrity  - Assess and monitor nutrition and hydration status  - Monitor labs   - Assess for incontinence   - Turn and reposition patient  - Assist with mobility/ambulation  - Relieve pressure over bony prominences  - Avoid friction and shearing  - Provide appropriate hygiene as needed including keeping skin clean and dry  - Evaluate need for skin moisturizer/barrier cream  - Collaborate with interdisciplinary team   - Patient/family teaching  - Consider wound care consult   Outcome: Progressing     Problem: PAIN - ADULT  Goal: Verbalizes/displays adequate comfort level or baseline comfort level  Description: Interventions:  - Encourage patient to monitor pain and request assistance  - Assess pain using appropriate pain scale  - Administer analgesics based on type and severity of pain and evaluate response  - Implement non-pharmacological measures as appropriate and evaluate response  - Consider cultural and social influences on pain and pain management  - Notify physician/advanced practitioner if interventions unsuccessful or patient reports new pain  Outcome: Progressing     Problem: DISCHARGE PLANNING  Goal: Discharge to home or other facility with appropriate resources  Description: INTERVENTIONS:  - Identify barriers to discharge w/patient and caregiver  - Arrange for needed discharge resources and transportation as appropriate  - Identify discharge learning needs (meds, wound care, etc )  - Arrange for interpretive services to assist at discharge as needed  - Refer to Case Management Department for coordinating discharge planning if the patient needs post-hospital services based on physician/advanced practitioner order or complex needs related to functional status, cognitive ability, or social support system  Outcome: Progressing     Problem: Knowledge Deficit  Goal: Patient/family/caregiver demonstrates understanding of disease process, treatment plan, medications, and discharge instructions  Description: Complete learning assessment and assess knowledge base    Interventions:  - Provide teaching at level of understanding  - Provide teaching via preferred learning methods  Outcome: Progressing

## 2023-05-17 NOTE — CASE MANAGEMENT
Case Management Discharge Planning Note    Patient name Janee Cruz  [place] [address] /E2 -* MRN [id number]  : 1940 Date 2023       Current Admission Date: 2023  Current Admission Diagnosis:Ambulatory dysfunction   Patient Active Problem List    Diagnosis Date Noted   • Fall 2023   • Persistent proteinuria 2023   • COVID 2022   • [facility] recurrent [facility] ([facility] ) 2022   • Non-ST elevation myocardial infarction (NSTEMI) ([facility] ) 2022   • Chronic obstructive pulmonary disease, unspecified COPD type ([place] [address] ) 2022   • Polypharmacy 2021   • MCI (mild cognitive impairment) 2021   • Hyperglycemia 2021   • Primary insomnia 2021   • SIADH (syndrome of inappropriate ADH production) ([place] [address] ) 2021   • Medicare annual wellness visit, subsequent 2020   • Osteopenia of multiple sites 2020   • S/P  shunt 10/04/2019   • Recurrent falls 2019   • Hyperkalemia 2019   • Leukocytosis 2019   • Stage 2 chronic kidney disease 2019   • Normal pressure hydrocephalus ([facility] ) 2018   • Ambulatory dysfunction 12/15/2017   • Positive ROSALINDA (antinuclear antibody) 12/15/2017   • 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   • Anxiety and depression 10/20/2017   • 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   • Splenic infarction 10/11/2017   • Levoscoliosis 2017   • Elevated sed rate 2017   • Vitamin D deficiency 2017   • Radiculopathy 2017   • GERD without esophagitis 2017   • Essential (hemorrhagic) thrombocythemia ([facility] ) 2017   • Carotid artery plaque 2016   • Chronic hyponatremia 2014   • Hyperlipidemia 2014   • Carotid bruit 2014   • Essential hypertension 2009   • Coronary atherosclerosis 2009      LOS (days): 4  Geometric Mean LOS (GMLOS) (days):   Days to GMLOS:     OBJECTIVE:  Risk of Unplanned Readmission Score: 11 81         Current admission status: Inpatient Preferred Pharmacy:   [facility], [place] - [address]  [facility]  [address] [place] [address]  Phone: [phone] Fax: [phone]    Primary Care Provider: Lul Burgos PA-C    Primary Insurance: [facility]  Secondary Insurance:     DISCHARGE DETAILS:    Discharge planning discussed with[de-identified] DaughterNavdeep Handsome of Choice: Yes  Comments - Freedom of Choice: [facility] not par with insurance  Reviewed accepting facilities in-network with insurance  Daughter decided on [facility]  CM contacted family/caregiver?: Yes  Were Treatment Team discharge recommendations reviewed with patient/caregiver?: Yes  Did patient/caregiver verbalize understanding of patient care needs?: N/A- going to facility  Were patient/caregiver advised of the risks associated with not following Treatment Team discharge recommendations?: Yes    Contacts  Patient Contacts: Bhavna  Relationship to Patient[de-identified] Family  Contact Method: Phone  Phone Number: [phone]  Reason/Outcome: Emergency Contact, Discharge [address]         Is the patient interested in [facility] at discharge?: No    DME Referral Provided  Referral made for DME?: No    Other Referral/Resources/Interventions Provided:  Interventions: Short Term Rehab         Treatment Team Recommendation: Short Term Rehab  Discharge Destination Plan[de-identified] Short Term Rehab                                         Additional Comments: CM spoke with pt's daughter, Quincy Romeo, over the phone regarding [facility] not being in-network with insurance  Reviewed the list of accepting facilities that are in-network with pt's insurance  Daughter decided on [facility]  Reserved in [place]  CM provided update to discharge support

## 2023-05-17 NOTE — PROGRESS NOTES
[address] [place] [address]  Progress Note  Name: Uli Sosa  MRN: [id number]  Unit/Bed#: E2 -01 I Date of Admission: 5/13/2023   Date of Service: 5/17/2023 I Hospital Day: 4    Assessment/Plan   * Ambulatory dysfunction  Assessment & Plan  · Patient presented to the ED after a fall at her assisted living facility, mechanical fall  · Uses a walker at baseline  · PT/OT recommending rehab vs  Back to assisted living with increased supervision   · CM following - hopeful for discharge when auth obtained  · Will likely need to advance to assisted living from independent living after rehab     [address]  · Patient presented after mechanical fall backwards at the bathroom with her walker   · PT/OT consulted  · Continue fall precautions while inpatient  · Rehab at discharge    Chronic hyponatremia  Assessment & Plan  · With acute hyponatremia of 127 on admission  · 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  · Na back to baseline at 135  · Continue salt tabs and Demadex    Chronic obstructive pulmonary disease, unspecified COPD type ([facility] )  Assessment & Plan  · Without acute exacerbation      Essential hypertension  Assessment & Plan  · BP intermittently elevated during hospitalization, with suboptimal control today  · Continue losartan 50 mg daily, atenolol 100 mg nightly and Demadex 10 mg daily      Anxiety and depression  Assessment & Plan  · Mood stable, continue Lexapro 10 mg nightly    Normal pressure hydrocephalus (HCC)  Assessment & Plan  · S/p  shunt in good position on ct head    VTE Pharmacologic Prophylaxis: VTE Score: 7 High Risk (Score >/= 5) - Pharmacological DVT Prophylaxis Ordered: heparin  Sequential Compression Devices Ordered  Patient Centered Rounds: I performed bedside rounds with nursing staff today    Discussions with Specialists or Other Care Team Provider: CM    Education and Discussions with Family / Patient: Updated  (daughter) via phone     Total Time Spent on Date of Encounter in care of patient: 35 minutes This time was spent on one or more of the following: performing physical exam; counseling and coordination of care; obtaining or reviewing history; documenting in the medical record; reviewing/ordering tests, medications or procedures; communicating with other healthcare professionals and discussing with patient's family/caregivers  Current Length of Stay: 4 day(s)  Current Patient Status: Inpatient   Certification Statement: The patient will continue to require additional inpatient hospital stay due to pending rehab auth  Discharge Plan: Anticipate discharge later today or tomorrow to rehab facility  Code Status: Level 1 - Full Code    Subjective:   Patient seen and examined  Reports she is feeling well today and offers no complaints  Denies fever, chills, chest pain, shortness of breath, abdominal pain, nausea or vomiting  She has been using the restroom and eating without difficulty  She denies anxiety  Waiting for rehab  Objective:     Vitals:   Temp (24hrs), Av 5 °F (36 4 °C), Min:97 °F (36 1 °C), Max:97 8 °F (36 6 °C)    Temp:  [97 °F (36 1 °C)-97 8 °F (36 6 °C)] 97 °F (36 1 °C)  HR:  [65-74] 74  Resp:  [18] 18  BP: (129-158)/(51-66) 158/66  SpO2:  [97 %-100 %] 100 %  Body mass index is 27 34 kg/m²  Input and Output Summary (last 24 hours): Intake/Output Summary (Last 24 hours) at 2023 1220  Last data filed at 2023 1201  Gross per 24 hour   Intake 720 ml   Output 650 ml   Net 70 ml       Physical Exam:   Physical Exam  Vitals and nursing note reviewed  Constitutional:       General: She is not in acute distress  Appearance: Normal appearance  She is well-developed  She is not ill-appearing or diaphoretic  HENT:      Head: Normocephalic and atraumatic  Eyes:      Extraocular Movements: Extraocular movements intact        Conjunctiva/sclera: Conjunctivae normal    Cardiovascular:      Rate and Rhythm: Normal rate and regular rhythm  Heart sounds: No murmur heard  Pulmonary:      Effort: Pulmonary effort is normal  No respiratory distress  Breath sounds: No wheezing or rales  Comments: Coarse b/l bases  Abdominal:      General: Bowel sounds are normal       Palpations: Abdomen is soft  Tenderness: There is no abdominal tenderness  Musculoskeletal:      Right lower leg: No edema  Left lower leg: No edema  Skin:     General: Skin is warm and dry  Neurological:      Mental Status: She is alert  Psychiatric:         Mood and Affect: Mood normal          Behavior: Behavior normal         Additional Data:     Labs:  Results from last 7 days   Lab Units 05/14/23  1135 05/14/23  0426   WBC Thousand/uL  --  11 28*   HEMOGLOBIN g/dL  --  11 4*   HEMATOCRIT %  --  33 7*   PLATELETS Thousands/uL 456* 381   NEUTROS PCT %  --  58   LYMPHS PCT %  --  25   MONOS PCT %  --  12   EOS PCT %  --  3     Results from last 7 days   Lab Units 05/15/23  0434 05/14/23  0426 05/13/23  1928   SODIUM mmol/L 135   < > 127*   POTASSIUM mmol/L 3 7   < > 3 7   CHLORIDE mmol/L 102   < > 93*   CO2 mmol/L 24   < > 23   BUN mg/dL 13   < > 11   CREATININE mg/dL 0 80   < > 0 81   ANION GAP mmol/L 9   < > 11   CALCIUM mg/dL 9 4   < > 9 7   ALBUMIN g/dL  --   --  4 3   TOTAL BILIRUBIN mg/dL  --   --  0 52   ALK PHOS U/L  --   --  56   ALT U/L  --   --  21   AST U/L  --   --  26   GLUCOSE RANDOM mg/dL 77   < > 95    < > = values in this interval not displayed       Lines/Drains:  Invasive Devices     None               Imaging: Reviewed radiology reports from this admission including: CT head, xray(s) and CT spine    Recent Cultures (last 7 days):     Last 24 Hours Medication List:   Current Facility-Administered Medications   Medication Dose Route Frequency Provider Last Rate   • acetaminophen  650 mg Oral Q6H PRN Kateryna Hernandez PA-C     • aspirin  81 mg Oral Daily Laverne Ruiz PA-C     • atenolol 100 mg Oral QPM Laverne Ruiz PA-C     • atorvastatin  40 mg Oral Daily Laverne Ruiz PA-C     • Diclofenac Sodium  2 g Topical 4x Daily PRN Terry Jordan PA-C     • escitalopram  10 mg Oral HS Terry Jordan PA-C     • heparin (porcine)  5,000 Units Subcutaneous [facility] Laverne Ruiz PA-C     • hydrALAZINE  5 mg Intravenous Q6H PRN eTrry Jordan PA-C     • losartan  50 mg Oral BID Laverne Ruiz PA-C     • melatonin  3 mg Oral HS PRN Terry Jordan PA-C     • ondansetron  4 mg Intravenous Q6H PRN Laverne Ruiz PA-C     • pantoprazole  20 mg Oral Every Other Day Laverne Ruiz PA-C     • sodium chloride  2 g Oral BID Laverne Ruiz PA-C     • torsemide  10 mg Oral Daily Terry Jordan PA-C          Today, Patient Was Seen By: Jeffry Caro PA-C    **Please Note: This note may have been constructed using a voice recognition system  **

## 2023-05-17 NOTE — OCCUPATIONAL THERAPY NOTE
Occupational Therapy Progress Note     Patient Name: Jackson Castellanos  [id number] Date: 5/17/2023  Problem List  Principal Problem:    Ambulatory dysfunction  Active Problems:    Anxiety and depression    Essential hypertension    Chronic hyponatremia    Normal pressure hydrocephalus (HCC)    S/P  shunt    SIADH (syndrome of inappropriate ADH production) ([facility])    Chronic obstructive pulmonary disease, unspecified COPD type ([facility] )    Fall        05/17/23 0830   OT Last Visit   OT Visit Date 05/17/23   Note Type   Note Type Treatment   Pain Assessment   Pain Assessment Tool 0-10   Pain Score No Pain   Restrictions/Precautions   Weight Bearing Precautions Per Order No   Other Precautions Cognitive; Chair Alarm; Bed Alarm; Fall Risk;Impulsive   ADL   Where Assessed Chair   Grooming Assistance 5  Supervision/Setup   Grooming Deficit Verbal cueing;Supervision/safety; Increased time to complete;Standing with assistive device; Wash/dry hands; Wash/dry face; Teeth care   Grooming Comments standing at sink   Toileting Assistance  5  Supervision/Setup   Toileting Deficit Setup;Verbal cueing;Supervison/safety; Increased time to complete;Grab bar use   Transfers   Sit to Stand 5  Supervision   Additional items Armrests; Increased time required;Verbal cues   Stand to Sit 5  Supervision   Additional items Armrests; Increased time required;Verbal cues   Toilet transfer 5  Supervision   Additional items Armrests; Increased time required;Verbal cues;Standard toilet; Other  (grab bar)   Functional Mobility   Functional Mobility 5  Supervision   Additional items Rolling walker   Therapeutic Excerise-Strength   UE Strength Yes   Right Upper Extremity- Strength   R Shoulder Flexion; Extension;ABduction;Horizontal ABduction   R Elbow Elbow flexion;Elbow extension   RUE Strength Comment 10x2   Left Upper Extremity-Strength   L Shoulder Flexion; Extension;ABduction;Horizontal ABduction   L Elbow Elbow flexion;Elbow extension   LUE Strength Comment 10x2 "  Subjective   Subjective \"I'm tired\"   Cognition   Overall Cognitive Status Impaired   Arousal/Participation Alert; Responsive; Cooperative   Attention Attends with cues to redirect   Orientation Level Oriented to person;Disoriented to situation;Disoriented to place; Disoriented to time   Memory Decreased recall of precautions;Decreased recall of recent events;Decreased short term memory   Activity Tolerance   Activity Tolerance Patient limited by fatigue;Patient tolerated treatment well   Medical Staff Made Aware Yes   Assessment   Assessment Pt seen for OT f/u treatment session focusing on functional activity tolerance, ADLs, functional transfers/mobility, energy conservation education and Safe transfer techniques  Patient agreeable to OT treatment session, upon arrival patient was found seated OOB to recliner, agreeable to OT tx session  Pt continues to make progress towards POC  Please refer to flowsheet for functional performance  Provided education on HEP, energy conservation techniques, deep breathing techniques, fall prevention strategies, and overall safety awareness  Patient requiring verbal cues for safety and verbal cues for pacing through activity steps  Patient continues to be functioning below baseline level, occupational performance remains limited secondary to factors listed above and increased risk for falls and injury  Pt seated OOB in chair with chair alarm activated at end of session  Call bell and phone within reach  All needs met and pt reports no further questions for OT at this time  Plan   Treatment Interventions ADL retraining;Functional transfer training;UE strengthening/ROM; Endurance training;Cognitive reorientation;Patient/family training;Equipment evaluation/education; Compensatory technique education;Continued evaluation; Energy conservation; Activityengagement   Goal Expiration Date 05/29/23   OT Treatment Day 1   OT Frequency 2-3x/wk   Recommendation   OT Discharge Recommendation " (environment with increased supervision and rehab services)   AM-PAC Daily Activity Inpatient   Lower Body Dressing 2   Bathing 2   Toileting 3   Upper Body Dressing 3   Grooming 3   Eating 4   Daily Activity Raw Score 17   Daily Activity Standardized Score (Calc for Raw Score >=11) 37 26   AM-PAC Applied Cognition Inpatient   Following a Speech/Presentation 4   Understanding Ordinary Conversation 3   Taking Medications 1   Remembering Where Things Are Placed or Put Away 2   Remembering List of 4-5 Errands 2   Taking Care of Complicated Tasks 2   Applied Cognition Raw Score 14   Applied Cognition Standardized Score 32 02     Sarah Cabral

## 2023-05-17 NOTE — PLAN OF CARE
Problem: PHYSICAL THERAPY ADULT  Goal: Performs mobility at highest level of function for planned discharge setting  See evaluation for individualized goals  Description: Treatment/Interventions: Functional transfer training, LE strengthening/ROM, Therapeutic exercise, Endurance training, Patient/family training, Equipment eval/education, Gait training, Spoke to nursing  Equipment Recommended: Bethany Cummins (lorena)       See flowsheet documentation for full assessment, interventions and recommendations  Outcome: Progressing  Note: Prognosis: Good  Problem List: Decreased endurance, Decreased strength, Impaired balance, Decreased mobility, Decreased coordination, Decreased cognition, Impaired judgement, Decreased safety awareness  Assessment: Pt  progressing well with overall mobility  Pt  noted with unsteadiness with static tsanding without UE support  Pt  also noted to be impulsive while descending to chair and VCs for hand placement for STS transfers  Pt  had a brief standing rest with first ambulation and seated rest between both ambulations  pt  noted with L lean and mild LOB to the L with initial ambulation  noted second ambualtion with improved quality with better steps  Pt  seated on chair with all needs within reach and chair alarm engaged at the end of session  Will cotninue to follow per PT POC  Barriers to Discharge: None     PT Discharge Recommendation: Post acute rehabilitation services (vx transfer to [place] with PT and S for mobility)    See flowsheet documentation for full assessment

## 2023-05-17 NOTE — PLAN OF CARE
Problem: OCCUPATIONAL THERAPY ADULT  Goal: Performs self-care activities at highest level of function for planned discharge setting  See evaluation for individualized goals  Description: Treatment Interventions: ADL retraining, Functional transfer training, UE strengthening/ROM, Endurance training, Cognitive reorientation, Patient/family training, Equipment evaluation/education, Compensatory technique education, Continued evaluation, Energy conservation, Activityengagement          See flowsheet documentation for full assessment, interventions and recommendations  Outcome: Progressing  Note: Limitation: Decreased ADL status, Decreased UE strength, Decreased Safe judgement during ADL, Decreased cognition, Decreased endurance, Decreased self-care trans, Decreased high-level ADLs  Prognosis: Fair  Assessment: Pt seen for OT f/u treatment session focusing on functional activity tolerance, ADLs, functional transfers/mobility, energy conservation education and Safe transfer techniques  Patient agreeable to OT treatment session, upon arrival patient was found seated OOB to recliner, agreeable to OT tx session  Pt continues to make progress towards POC  Please refer to flowsheet for functional performance  Provided education on HEP, energy conservation techniques, deep breathing techniques, fall prevention strategies, and overall safety awareness  Patient requiring verbal cues for safety and verbal cues for pacing through activity steps  Patient continues to be functioning below baseline level, occupational performance remains limited secondary to factors listed above and increased risk for falls and injury  Pt seated OOB in chair with chair alarm activated at end of session  Call bell and phone within reach  All needs met and pt reports no further questions for OT at this time       OT Discharge Recommendation:  (environment with increased supervision and rehab services)

## 2023-05-18 ENCOUNTER — APPOINTMENT (INPATIENT)
Dept: NON INVASIVE DIAGNOSTICS | Facility: HOSPITAL | Age: 83
End: 2023-05-18

## 2023-05-18 PROBLEM — R07.89 CHEST PRESSURE: Status: ACTIVE | Noted: 2023-05-18

## 2023-05-18 LAB
2HR DELTA HS TROPONIN: -4 NG/L
4HR DELTA HS TROPONIN: -8 NG/L
ANION GAP SERPL CALCULATED.3IONS-SCNC: 9 MMOL/L (ref 4–13)
ATRIAL RATE: 58 BPM
ATRIAL RATE: 70 BPM
BUN SERPL-MCNC: 22 MG/DL (ref 5–25)
CALCIUM SERPL-MCNC: 9.7 MG/DL (ref 8.4–10.2)
CARDIAC TROPONIN I PNL SERPL HS: 37 NG/L (ref 8–18)
CARDIAC TROPONIN I PNL SERPL HS: 56 NG/L
CARDIAC TROPONIN I PNL SERPL HS: 60 NG/L
CHLORIDE SERPL-SCNC: 99 MMOL/L (ref 96–108)
CO2 SERPL-SCNC: 25 MMOL/L (ref 21–32)
CREAT SERPL-MCNC: 1 MG/DL (ref 0.6–1.3)
GFR SERPL CREATININE-BSD FRML MDRD: 52 ML/MIN/1.73SQ M
GLUCOSE SERPL-MCNC: 85 MG/DL (ref 65–140)
MAGNESIUM SERPL-MCNC: 2.4 MG/DL (ref 1.9–2.7)
P AXIS: 71 DEGREES
P AXIS: 72 DEGREES
POTASSIUM SERPL-SCNC: 3.6 MMOL/L (ref 3.5–5.3)
PR INTERVAL: 170 MS
PR INTERVAL: 186 MS
QRS AXIS: 19 DEGREES
QRS AXIS: 48 DEGREES
QRSD INTERVAL: 80 MS
QRSD INTERVAL: 82 MS
QT INTERVAL: 430 MS
QT INTERVAL: 438 MS
QTC INTERVAL: 429 MS
QTC INTERVAL: 464 MS
SODIUM SERPL-SCNC: 133 MMOL/L (ref 135–147)
T WAVE AXIS: 69 DEGREES
T WAVE AXIS: 70 DEGREES
VENTRICULAR RATE: 58 BPM
VENTRICULAR RATE: 70 BPM

## 2023-05-18 RX ORDER — AMLODIPINE BESYLATE 5 MG/1
5 TABLET ORAL DAILY
Status: DISCONTINUED | OUTPATIENT
Start: 2023-05-18 | End: 2023-05-20 | Stop reason: HOSPADM

## 2023-05-18 RX ORDER — ALBUTEROL SULFATE 90 UG/1
2 AEROSOL, METERED RESPIRATORY (INHALATION) EVERY 4 HOURS PRN
Status: DISCONTINUED | OUTPATIENT
Start: 2023-05-18 | End: 2023-05-20 | Stop reason: HOSPADM

## 2023-05-18 RX ADMIN — LOSARTAN POTASSIUM 50 MG: 50 TABLET, FILM COATED ORAL at 08:10

## 2023-05-18 RX ADMIN — LOSARTAN POTASSIUM 50 MG: 50 TABLET, FILM COATED ORAL at 17:36

## 2023-05-18 RX ADMIN — PANTOPRAZOLE SODIUM 20 MG: 20 TABLET, DELAYED RELEASE ORAL at 08:10

## 2023-05-18 RX ADMIN — HEPARIN SODIUM 5000 UNITS: 5000 INJECTION INTRAVENOUS; SUBCUTANEOUS at 06:36

## 2023-05-18 RX ADMIN — Medication 3 MG: at 22:21

## 2023-05-18 RX ADMIN — ATORVASTATIN CALCIUM 40 MG: 40 TABLET, FILM COATED ORAL at 08:10

## 2023-05-18 RX ADMIN — SODIUM CHLORIDE 2 G: 1 TABLET ORAL at 08:10

## 2023-05-18 RX ADMIN — SODIUM CHLORIDE 2 G: 1 TABLET ORAL at 17:36

## 2023-05-18 RX ADMIN — ASPIRIN 81 MG 81 MG: 81 TABLET ORAL at 08:10

## 2023-05-18 RX ADMIN — ESCITALOPRAM OXALATE 10 MG: 10 TABLET ORAL at 22:21

## 2023-05-18 RX ADMIN — HEPARIN SODIUM 5000 UNITS: 5000 INJECTION INTRAVENOUS; SUBCUTANEOUS at 22:37

## 2023-05-18 RX ADMIN — HEPARIN SODIUM 5000 UNITS: 5000 INJECTION INTRAVENOUS; SUBCUTANEOUS at 13:40

## 2023-05-18 RX ADMIN — ATENOLOL 100 MG: 50 TABLET ORAL at 17:36

## 2023-05-18 RX ADMIN — TORSEMIDE 10 MG: 20 TABLET ORAL at 08:10

## 2023-05-18 RX ADMIN — AMLODIPINE BESYLATE 5 MG: 5 TABLET ORAL at 15:34

## 2023-05-18 NOTE — RESTORATIVE TECHNICIAN NOTE
Restorative Technician Note      Patient Name: Jigar More     Restorative Tech Visit Date: 05/18/23  Note Type: Mobility  Patient Position Upon Consult: Supine  Activity Performed: Ambulated  Assistive Device: Roller walker  Patient Position at End of Consult: Supine;  All needs within reach

## 2023-05-18 NOTE — QUICK NOTE
Updated daughter via telephone today as she could not be present during cardiology visit  All questions answered  Plan for stress test tomorrow  Special diet ordered

## 2023-05-18 NOTE — CASE MANAGEMENT
Stewart Ledbetter 50 has received approved authorization from insurance: [facility] in by [employer] P#  [phone] [id number]  Authorization received for: SNF  Facility: [facility] [address] [place] [address] #: [id number]  Start of Care: 5/18  Next Review Date: 5/22  Continued Stay Care Coordinator: none assigned   Submit next review to: [phone]   Care Manager notified: Maddy Sampson

## 2023-05-18 NOTE — PROGRESS NOTES
[address] [place] [address]  Progress Note  Name: Marysol Ramos  MRN: [id number]  Unit/Bed#: E2 -01 I Date of Admission: 5/13/2023   Date of Service: 5/18/2023 I Hospital Day: 5    Assessment/Plan   * Ambulatory dysfunction  Assessment & Plan  · Patient presented to the ED after a fall at her assisted living facility, mechanical fall  · Uses a walker at baseline  · PT/OT recommending rehab vs  Back to assisted living with increased supervision   · CM following  · Likely need to advance to assisted living from independent living after rehab given patient's worsening memory impairment per daughter    Fall  Assessment & Plan  · Patient presented after mechanical fall backwards at the bathroom with her walker   · PT/OT consulted  · Continue fall precautions while inpatient  · Rehab at discharge    Chest pressure  Assessment & Plan  · Patient with reports of chest pressure that lasted several minutes and briefly radiated into her right arm last night  · EKG without ischemic changes at time of chest pain and 2 hours later  · Patient was loaded with aspirin  · Patient with history of NSTEMI, per daughter this happened approx 20 years ago  · Monitored on telemetry overnight, no acute findings at time of review  · Patient without cardiopulmonary symptoms at this time, however is a poor historian and thought her chest pain was 2 nights ago  · Troponin trend 64-56-60, repeat hs random troponin now to assess if down trending  · Cardiology consulted  · 2D echo pending    Chronic hyponatremia  Assessment & Plan  · With acute hyponatremia of 127 on admission  · 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  · NA stable at 133 today  · Continue salt tabs, Demadex, and fluid restriction    Chronic obstructive pulmonary disease, unspecified COPD type ([facility] [address] )  Assessment & Plan  · Reviewed outpatient PCP's note, patient remained stable on no inhalers  · Daughter does report that patient intermittently gets short of breath  · Without acute exacerbation on exam  · Albuterol as needed    Essential hypertension  Assessment & Plan  · BP intermittently elevated during hospitalization, with suboptimal control  · Continue losartan 50 mg daily, atenolol 100 mg nightly and Demadex 10 mg daily      Anxiety and depression  Assessment & Plan  · Mood stable, continue Lexapro 10 mg nightly    Normal pressure hydrocephalus (HCC)  Assessment & Plan  · S/p  shunt in good position on ct head      VTE Pharmacologic Prophylaxis: VTE Score: 7 High Risk (Score >/= 5) - Pharmacological DVT Prophylaxis Ordered: heparin  Sequential Compression Devices Ordered  Patient Centered Rounds: I performed bedside rounds with nursing staff today  Discussions with Specialists or Other Care Team Provider: DIAMANTE    Education and Discussions with Family / Patient: Updated  (daughter) at bedside  Total Time Spent on Date of Encounter in care of patient: 35 minutes This time was spent on one or more of the following: performing physical exam; counseling and coordination of care; obtaining or reviewing history; documenting in the medical record; reviewing/ordering tests, medications or procedures; communicating with other healthcare professionals and discussing with patient's family/caregivers  Current Length of Stay: 5 day(s)  Current Patient Status: Inpatient   Certification Statement: The patient will continue to require additional inpatient hospital stay due to chest pain monitoring/work up, cardiology consult, rehab placement  Discharge Plan: Anticipate discharge in 24-48 hrs to rehab facility  Code Status: Level 1 - Full Code    Subjective:   Patient seen and examined bedside with her daughter  Patient was sitting up in the chair  She denies any current chest pain, chest pressure, heart palpitations, dizziness, lightheadedness, shortness of breath, cough, fever, chills, abdominal pain, nausea, vomiting or arm weakness/numbness    She had eggs and sausage for breakfast and did well  Objective:     Vitals:   Temp (24hrs), Av 5 °F (36 4 °C), Min:96 8 °F (36 °C), Max:98 5 °F (36 9 °C)    Temp:  [96 8 °F (36 °C)-98 5 °F (36 9 °C)] 98 °F (36 7 °C)  HR:  [60-62] 60  Resp:  [18-20] 20  BP: (126-165)/(63-78) 162/67  SpO2:  [95 %-99 %] 97 %  Body mass index is 27 34 kg/m²  Input and Output Summary (last 24 hours): Intake/Output Summary (Last 24 hours) at 2023 1051  Last data filed at 2023 0806  Gross per 24 hour   Intake 560 ml   Output 250 ml   Net 310 ml       Physical Exam:   Physical Exam  Vitals and nursing note reviewed  Constitutional:       General: She is not in acute distress  Appearance: Normal appearance  She is well-developed  She is not ill-appearing or diaphoretic  HENT:      Head: Normocephalic and atraumatic  Eyes:      Extraocular Movements: Extraocular movements intact  Conjunctiva/sclera: Conjunctivae normal    Cardiovascular:      Rate and Rhythm: Normal rate and regular rhythm  Heart sounds: Normal heart sounds  No murmur heard  Pulmonary:      Effort: Pulmonary effort is normal  No respiratory distress  Breath sounds: Normal breath sounds  No wheezing, rhonchi or rales  Comments: Room air, non labored breathing  Abdominal:      General: Bowel sounds are normal       Palpations: Abdomen is soft  Tenderness: There is no abdominal tenderness  Musculoskeletal:      Right lower leg: No edema  Left lower leg: No edema  Skin:     General: Skin is warm and dry  Neurological:      General: No focal deficit present  Mental Status: She is alert  Mental status is at baseline        Comments: Cognitive impairment   Psychiatric:         Mood and Affect: Mood normal          Behavior: Behavior normal         Additional Data:     Labs:  Results from last 7 days   Lab Units 23   WBC Thousand/uL 12 13*   HEMOGLOBIN g/dL 11 4*   HEMATOCRIT % 33 9*   PLATELETS Thousands/uL 418*   NEUTROS PCT % 66   LYMPHS PCT % 21   MONOS PCT % 9   EOS PCT % 3     Results from last 7 days   Lab Units 05/18/23  0546 05/14/23  0426 05/13/23  1928   SODIUM mmol/L 133*   < > 127*   POTASSIUM mmol/L 3 6   < > 3 7   CHLORIDE mmol/L 99   < > 93*   CO2 mmol/L 25   < > 23   BUN mg/dL 22   < > 11   CREATININE mg/dL 1 00   < > 0 81   ANION GAP mmol/L 9   < > 11   CALCIUM mg/dL 9 7   < > 9 7   ALBUMIN g/dL  --   --  4 3   TOTAL BILIRUBIN mg/dL  --   --  0 52   ALK PHOS U/L  --   --  56   ALT U/L  --   --  21   AST U/L  --   --  26   GLUCOSE RANDOM mg/dL 85   < > 95    < > = values in this interval not displayed  Lines/Drains:  Invasive Devices     Peripheral Intravenous Line  Duration           Peripheral IV 05/17/23 Left;Ventral (anterior) Forearm <1 day              Telemetry:  Telemetry Orders (From admission, onward)             24 Hour Telemetry Monitoring  Continuous x 24 Hours (Telem)        Question:  Reason for 24 Hour Telemetry  Answer:  PCI/EP study (including pacer and ICD implementation), Cardiac surgery, MI, abnormal cardiac cath, and chest pain- rule out MI                 Telemetry Reviewed: Normal Sinus Rhythm  Indication for Continued Telemetry Use: Acute MI/Unstable Angina/Rule out ACS    Imaging: No pertinent imaging reviewed      Recent Cultures (last 7 days):     Last 24 Hours Medication List:   Current Facility-Administered Medications   Medication Dose Route Frequency Provider Last Rate   • acetaminophen  650 mg Oral Q6H PRN Sarah Georges PA-C     • albuterol  2 puff Inhalation Q4H PRN Laurie Jett PA-C     • aluminum-magnesium hydroxide-simethicone  30 mL Oral Q4H PRN Philippe Tobin PA-C     • aspirin  81 mg Oral Daily Laverne Ruiz PA-C     • atenolol  100 mg Oral QPM Laverne Ruiz PA-C     • atorvastatin  40 mg Oral Daily Laverne Ruiz PA-C     • Diclofenac Sodium  2 g Topical 4x Daily PRN Sarah Georges PA-C     • escitalopram  10 mg Oral HS Anson Garibay PA-C     • heparin (porcine)  5,000 Units Subcutaneous [facility] Laverne Ruiz PA-C     • hydrALAZINE  5 mg Intravenous Q6H PRN Anson Garibay PA-C     • losartan  50 mg Oral BID Laverne Ruiz PA-C     • melatonin  3 mg Oral HS PRN Anson Garibay PA-C     • ondansetron  4 mg Intravenous Q6H PRN Laverne Ruiz PA-C     • pantoprazole  20 mg Oral Every Other Day Laverne Ruiz PA-C     • sodium chloride  2 g Oral BID Laverne Ruiz PA-C     • torsemide  10 mg Oral Daily Anson Garibay PA-C          Today, Patient Was Seen By: Rory Councilman, PA-C    **Please Note: This note may have been constructed using a voice recognition system  **

## 2023-05-18 NOTE — NURSING NOTE
Daughter called  Very rude and angry over the phone  RN has calmly explained that Theresa Bingham will be getting ECHO and cardiology consult today  Reported to care coordinator

## 2023-05-18 NOTE — ASSESSMENT & PLAN NOTE
· Reviewed outpatient PCP's note, patient remained stable on no inhalers  · Daughter does report that patient intermittently gets short of breath  · Without acute exacerbation on exam  · Albuterol as needed

## 2023-05-18 NOTE — PHYSICAL THERAPY NOTE
Physical Therapy Treatment Note     05/18/23 1610   PT Last Visit   PT Visit Date 05/18/23   Note Type   Note Type Treatment   Pain Assessment   Pain Assessment Tool 0-10   Pain Score No Pain   Restrictions/Precautions   Weight Bearing Precautions Per Order No   Other Precautions Cognitive; Chair Alarm; Fall Risk;Telemetry   General   Chart Reviewed Yes   Family/Caregiver Present Yes   Subjective   Subjective Pt  agreeable to PT   Transfers   Sit to Stand 5  Supervision   Additional items Armrests   Stand to Sit 5  Supervision   Additional items Impulsive;Armrests   Stand pivot 5  Supervision   Additional items Verbal cues; Increased time required   Ambulation/Elevation   Gait pattern Improper Weight shift;Decreased foot clearance;L Hemiparesis; Inconsistent renita; Short stride; Excessively slow;Decreased toe off;Decreased heel strike   Gait Assistance 4  Minimal assist   Additional items Assist x 1;Verbal cues   Assistive Device Rolling walker   Distance 160ft   Balance   Static Sitting Good   Dynamic Sitting Fair +   Static Standing Fair   Dynamic Standing Fair -   Ambulatory Fair -   Endurance Deficit   Endurance Deficit Yes   Endurance Deficit Description fatigue   Activity Tolerance   Activity Tolerance Patient tolerated treatment well   Nurse Made Aware Yes   Exercises   TKR Sitting;Bilateral;AROM;20 reps   Assessment   Prognosis Good   Problem List Impaired balance;Decreased endurance;Decreased mobility; Decreased cognition; Impaired judgement;Decreased safety awareness   Assessment pt  progressing well with overall mobility  Pt  noted with L side neglect and mild decreased  of LUE  Pt  also ntoed leaning more to the L during ambulation  Cues to stay within the RW and in the middle and to take longer strides  Pt  seated on chair post session with all need within reach and chair alarm engaged  Adele glover to follow per PT POC   Cues for hand palcement for STS transfers given and to avoid impulsive descend to chair  Barriers to Discharge None   Goals   Patient Goals None reported   STG Expiration Date 05/20/23   PT Treatment Day 3   Plan   Treatment/Interventions Functional transfer training;LE strengthening/ROM; Therapeutic exercise;Equipment eval/education;Patient/family training;Gait training;Spoke to nursing;Family   Progress Progressing toward goals   PT Frequency 3-5x/wk   Recommendation   PT Discharge Recommendation Post acute rehabilitation services  (vs transfer to [facility] with PT and S)   Equipment Recommended Walker   AM-PAC Basic Mobility Inpatient   Turning in Flat Bed Without Bedrails 4   Lying on Back to Sitting on Edge of Flat Bed Without Bedrails 4   Moving Bed to Chair 4   Standing Up From Chair Using Arms 4   Walk in Room 3   Climb 3-5 Stairs With Railing 3   Basic Mobility Inpatient Raw Score 22   Basic Mobility Standardized Score 47 4   Highest Level Of Mobility   JH-HLM Goal 7: Walk 25 feet or more   JH-HLM Achieved 7: Walk 25 feet or more   End of Consult   Patient Position at End of Consult All needs within reach;Bed/Chair alarm activated; Bedside chair         Andrea Haji PTA    An AM-PAC basic mobility standardized score less than 42 9 suggest the patient may benefit from discharge to post-acute rehab services

## 2023-05-18 NOTE — CASE MANAGEMENT
Case Management Discharge Planning Note    Patient name Eusebia First  [facility]  /E2 -* MRN [id number]  : 1940 Date 2023       Current Admission Date: 2023  Current Admission Diagnosis:Ambulatory dysfunction   Patient Active Problem List    Diagnosis Date Noted   • Chest pressure 2023   • Fall 2023   • Persistent proteinuria 2023   • COVID 2022   • Mild recurrent major depression ([place] [address] ) 2022   • Non-ST elevation myocardial infarction (NSTEMI) ([place] [address] ) 2022   • Chronic obstructive pulmonary disease, unspecified COPD type ([place] [address] ) 2022   • Polypharmacy 2021   • MCI (mild cognitive impairment) 2021   • Hyperglycemia 2021   • Primary insomnia 2021   • SIADH (syndrome of inappropriate ADH production) ([place] [address] ) 2021   • Medicare annual wellness visit, subsequent 2020   • Osteopenia of multiple sites 2020   • S/P  shunt 10/04/2019   • Recurrent falls 2019   • Hyperkalemia 2019   • Leukocytosis 2019   • Stage 2 chronic kidney disease 2019   • Normal pressure hydrocephalus ([place] [address] ) 2018   • Ambulatory dysfunction 12/15/2017   • Positive ROSALINDA (antinuclear antibody) 12/15/2017   • 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   • Anxiety and depression 10/20/2017   • 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   • Splenic infarction 10/11/2017   • Levoscoliosis 2017   • Elevated sed rate 2017   • Vitamin D deficiency 2017   • Radiculopathy 2017   • GERD without esophagitis 2017   • Essential (hemorrhagic) thrombocythemia ([facility] ) 2017   • Carotid artery plaque 2016   • Chronic hyponatremia 2014   • Hyperlipidemia 2014   • Carotid bruit 2014   • Essential hypertension 2009   • Coronary atherosclerosis 2009      LOS (days): 5  Geometric Mean LOS (GMLOS) (days): 3 50  Days to GMLOS:-1     OBJECTIVE:  Risk of Unplanned Readmission Score: 13 3 Current admission status: Inpatient   Preferred Pharmacy:   [place] [id number]  Phone: [phone] Fax: [phone]    Primary Care Provider: Elsi Hawthorne PA-C    Primary Insurance: [facility] [employer]  Secondary Insurance:     [address] [place] [address] Number: [id number]

## 2023-05-18 NOTE — PLAN OF CARE
Problem: CARDIOVASCULAR - ADULT  Goal: Maintains optimal cardiac output and hemodynamic stability  Description: INTERVENTIONS:  - Monitor I/O, vital signs and rhythm  - Monitor for S/S and trends of decreased cardiac output  - Administer and titrate ordered vasoactive medications to optimize hemodynamic stability  - Assess quality of pulses, skin color and temperature  - Assess for signs of decreased coronary artery perfusion  - Instruct patient to report change in severity of symptoms  Outcome: Progressing  Goal: Absence of cardiac dysrhythmias or at baseline rhythm  Description: INTERVENTIONS:  - Continuous cardiac monitoring, vital signs, obtain 12 lead EKG if ordered  - Administer antiarrhythmic and heart rate control medications as ordered  - Monitor electrolytes and administer replacement therapy as ordered  Outcome: Progressing     Problem: GENITOURINARY - ADULT  Goal: Absence of urinary retention  Description: INTERVENTIONS:  - Assess patient’s ability to void and empty bladder  - Monitor I/O  - Bladder scan as needed  - Discuss with physician/AP medications to alleviate retention as needed  - Discuss catheterization for long term situations as appropriate  Outcome: Progressing     Problem: METABOLIC, FLUID AND ELECTROLYTES - ADULT  Goal: Electrolytes maintained within normal limits  Description: INTERVENTIONS:  - Monitor labs and assess patient for signs and symptoms of electrolyte imbalances  - Administer electrolyte replacement as ordered  - Monitor response to electrolyte replacements, including repeat lab results as appropriate  - Instruct patient on fluid and nutrition as appropriate  Outcome: Progressing  Goal: Fluid balance maintained  Description: INTERVENTIONS:  - Monitor labs   - Monitor I/O and WT  - Instruct patient on fluid and nutrition as appropriate  - Assess for signs & symptoms of volume excess or deficit  Outcome: Progressing     Problem: SAFETY ADULT  Goal: Patient will remain free of falls  Description: INTERVENTIONS:  - Educate patient/family on patient safety including physical limitations  - Instruct patient to call for assistance with activity   - Consult OT/PT to assist with strengthening/mobility   - Keep Call bell within reach  - Keep bed low and locked with side rails adjusted as appropriate  - Keep care items and personal belongings within reach  - Initiate and maintain comfort rounds  - Make Fall Risk Sign visible to staff  - Offer Toileting every 2 Hours, in advance of need  - Initiate/Maintain bed and chair alarm  - Obtain necessary fall risk management equipment: walker  - Apply yellow socks and bracelet for high fall risk patients  - Consider moving patient to room near nurses station  Outcome: Progressing     Problem: COPING  Goal: Pt/Family able to verbalize concerns and demonstrate effective coping strategies  Description: INTERVENTIONS:  - Assist patient/family to identify coping skills, available support systems and cultural and spiritual values  - Provide emotional support, including active listening and acknowledgement of concerns of patient and caregivers  - Reduce environmental stimuli, as able  - Provide patient education  - Assess for spiritual pain/suffering and initiate spiritual care, including notification of Pastoral Care or evin based community as needed  - Assess effectiveness of coping strategies  Outcome: Progressing  Goal: Will report anxiety at manageable levels  Description: INTERVENTIONS:  - Administer medication as ordered  - Teach and encourage coping skills  - Provide emotional support  - Assess patient/family for anxiety and ability to cope  Outcome: Progressing

## 2023-05-18 NOTE — QUICK NOTE
Patient reports that earlier episode of chest pain only lasted several minutes and briefly radiated into right arm  Has been completely resolved since that time     · Continues to deny palpitations, diaphoresis, N/V, dizziness/lightheadedness  · 2 hour EKG with no acute ischemic changes  · Trops 72-> 64, continue to trend  · ECHO pending   · Cardiology consulted   · Continue to monitor on telemetry

## 2023-05-18 NOTE — CASE MANAGEMENT
LM for Tiffanie @ [facility] ([phone] [id number]) with new facility and NPI  [facility] [place]  NPI: [id number]  CM notified

## 2023-05-18 NOTE — QUICK NOTE
Patient reported to nursing 7/10 chest pressure with no other associated symptoms    · Vitals stable, bp 140/78, HR 62, 98% O2  · EKG ordered, with no evidence of ischemic changes, similar to previous  · Troponin ordered, elevated at 72, up from prior troponin of 5 on 5/13  · Mg 1 6, repleted, CBC, BMP within normal limits  · Mild hyponatremia 129, known SIADH, continue salt tabs and fluid restriction  · Will load with aspirin  · Monitor on telemetry  · Continue to monitor with troponin reflex protocol

## 2023-05-18 NOTE — ASSESSMENT & PLAN NOTE
· BP intermittently elevated during hospitalization, with suboptimal control  · Continue losartan 50 mg daily, atenolol 100 mg nightly and Demadex 10 mg daily

## 2023-05-18 NOTE — PLAN OF CARE
Problem: Potential for Falls  Goal: Patient will remain free of falls  Description: INTERVENTIONS:  - Educate patient/family on patient safety including physical limitations  - Instruct patient to call for assistance with activity   - Consult OT/PT to assist with strengthening/mobility   - Keep Call bell within reach  - Keep bed low and locked with side rails adjusted as appropriate  - Keep care items and personal belongings within reach  - Initiate and maintain comfort rounds  - Make Fall Risk Sign visible to staff  - Offer Toileting every 2 Hours, in advance of need  - Initiate/Maintain bed alarm  - Obtain necessary fall risk management equipment  - Apply yellow socks and bracelet for high fall risk patients  - Consider moving patient to room near nurses station  Outcome: Progressing     Problem: MOBILITY - ADULT  Goal: Maintain or return to baseline ADL function  Description: INTERVENTIONS:  -  Assess patient's ability to carry out ADLs; assess patient's baseline for ADL function and identify physical deficits which impact ability to perform ADLs (bathing, care of mouth/teeth, toileting, grooming, dressing, etc )  - Assess/evaluate cause of self-care deficits   - Assess range of motion  - Assess patient's mobility; develop plan if impaired  - Assess patient's need for assistive devices and provide as appropriate  - Encourage maximum independence but intervene and supervise when necessary  - Involve family in performance of ADLs  - Assess for home care needs following discharge   - Consider OT consult to assist with ADL evaluation and planning for discharge  - Provide patient education as appropriate  Outcome: Progressing  Goal: Maintains/Returns to pre admission functional level  Description: INTERVENTIONS:  - Perform BMAT or MOVE assessment daily    - Set and communicate daily mobility goal to care team and patient/family/caregiver     - Collaborate with rehabilitation services on mobility goals if consulted  - Perform Range of Motion 3 times a day  - Reposition patient every 2 hours    - Dangle patient 3 times a day  - Stand patient 3 times a day  - Ambulate patient 3 times a day  - Out of bed to chair 3 times a day   - Out of bed for meals 3 times a day  - Out of bed for toileting  - Record patient progress and toleration of activity level   Outcome: Progressing     Problem: Prexisting or High Potential for Compromised Skin Integrity  Goal: Skin integrity is maintained or improved  Description: INTERVENTIONS:  - Identify patients at risk for skin breakdown  - Assess and monitor skin integrity  - Assess and monitor nutrition and hydration status  - Monitor labs   - Assess for incontinence   - Turn and reposition patient  - Assist with mobility/ambulation  - Relieve pressure over bony prominences  - Avoid friction and shearing  - Provide appropriate hygiene as needed including keeping skin clean and dry  - Evaluate need for skin moisturizer/barrier cream  - Collaborate with interdisciplinary team   - Patient/family teaching  - Consider wound care consult   Outcome: Progressing     Problem: PAIN - ADULT  Goal: Verbalizes/displays adequate comfort level or baseline comfort level  Description: Interventions:  - Encourage patient to monitor pain and request assistance  - Assess pain using appropriate pain scale  - Administer analgesics based on type and severity of pain and evaluate response  - Implement non-pharmacological measures as appropriate and evaluate response  - Consider cultural and social influences on pain and pain management  - Notify physician/advanced practitioner if interventions unsuccessful or patient reports new pain  Outcome: Progressing     Problem: DISCHARGE PLANNING  Goal: Discharge to home or other facility with appropriate resources  Description: INTERVENTIONS:  - Identify barriers to discharge w/patient and caregiver  - Arrange for needed discharge resources and transportation as appropriate  - Identify discharge learning needs (meds, wound care, etc )  - Arrange for interpretive services to assist at discharge as needed  - Refer to Case Management Department for coordinating discharge planning if the patient needs post-hospital services based on physician/advanced practitioner order or complex needs related to functional status, cognitive ability, or social support system  Outcome: Progressing     Problem: Knowledge Deficit  Goal: Patient/family/caregiver demonstrates understanding of disease process, treatment plan, medications, and discharge instructions  Description: Complete learning assessment and assess knowledge base    Interventions:  - Provide teaching at level of understanding  - Provide teaching via preferred learning methods  Outcome: Progressing

## 2023-05-18 NOTE — ASSESSMENT & PLAN NOTE
· With acute hyponatremia of 127 on admission  · 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  · NA stable at 133 today  · Continue salt tabs, Demadex, and fluid restriction

## 2023-05-18 NOTE — CONSULTS
"Consultation Note - Cardiology   Sam Buckley 80 y o  female MRN: [id number]  Unit/Bed#: E2 -01 Encounter: [id number]  05/18/23  8:53 AM    Encounter date: /5/18/2023  Patient name: Sam Posada y o  female  Encounter providers/authors:   Medical Student: Sarabjit Vigil, 4th year medical student, [place] [employer]   Attending Physician: Patricia Condon MD  Inpatient attending physician: Tiffanie Ramos MD  Primary care provider: Peter Morocho PA-C  Date of admission: 05/14/2023  Length of stay: 5 days       Assessment/ Plan:    Non-MI troponin elevation   - Patient with elevated troponins from 5 on admission but stable: 72, 64, 60, 56  - patient with distant history of MI in 1998 managed medically without cath/stenting/CABG  -Pending TTE    Precordial chest pain  - Brief chest pain episode resolving after several minutes  Patient describes this morning as \"twinge\" in chest with simultaneous arm discomfort/numbness  No chest pressure/squeezing sensation  Pain not reproduced mechanically on examination    - EKG with no acute ischemic changes  - On aspirin 81 mg daily    Ambulatory dysfunction  -Presented to the ED after a mechanical fall at her assisted living facility  - Pending rehab versus returning to assisted living with increased supervision    Essential hypertension  -Currently on atenolol 100 mg daily, losartan 50 mg twice daily and torsemide 10 mg daily  - Blood pressure is mildly elevated while inpatient    Chronic hyponatremia  - sodium 133 today from 129 on 5/17   Known history of SIADH on salt tabs and torsemide 10 mg daily  - management per primary team     Normal pressure hydrocephalus (HCC)  -s/p  shunt    Hyperlipidemia  - Currently on atorvastatin 40 mg daily    SIADH (syndrome of inappropriate ADH production) ([facility] [address] )    Chronic obstructive pulmonary disease, unspecified COPD type ([facility] [address] )    Anxiety and depression    Summary:  -Given the patient's past medical history, minimal troponin " "rise, and reported episode of chest pain, will check a nuclear pharmacologic stress test tomorrow to rule obstructive CAD  - Also pending TTE  - Will order a stress test diet for the morning  - Blood pressure also mildly elevated, will start amlodipine 5 mg daily to improve blood pressure      HPI: 81 yo female hx MI in 1998 managed medically (no cath/PCI), HTN on losartan and atenolol, celiac a occlusion, splenic infarct, SMA stenosis, chronic hyponatremia 2/2 SIADH, normal pressure hydrocephalus s/p  shunt, COPD who presented to [facility] ED on 5/14 following a mechanical fall at her assisted living facility  Per chart review, she is noted to have fallen backwards while in the bathroom using her walker  She uses a walker at baseline for ambulation  Per Hospitalist note, It is noted that on 5/17 in the evening, the patient reported a brief episode of 7/10 chest discomfort which resolved after several minutes and radiated into the right arm  No palpitations or diaphoresis  No ischemic changes were noted on EKG x2  Troponins were elevated but stable at 72, 64, 60, 56 from 5 on 5/13  Today, Ms Mariela Dunham reports feeling well without any chest pain or pressure since the episode last night  She states the chest discomfort last night was difficult to describe but may have felt like a \"twinge\" sensation in her chest  She denies experiencing discomfort like this before from what she can recall  She has not noticed palpitations, nausea, vomiting, lightheadedness, dizziness or arm/neck discomfort or pain since resolution of the episode last night  She has a distant history of MI managed medically without cath/CABG/PCI interventions  Review of Systems   Constitutional: Negative for activity change, chills, diaphoresis and fever  HENT: Positive for rhinorrhea (minor )  Negative for ear pain and facial swelling  Eyes: Negative for discharge and redness     Respiratory: Positive for cough (occasional " cough over last few weeks )  Negative for chest tightness and shortness of breath  Choking: patient reports occasional cough over last few weeks  Cardiovascular: Negative for chest pain, palpitations and leg swelling  Gastrointestinal: Negative for abdominal pain, nausea and vomiting  Genitourinary: Negative for difficulty urinating and dysuria  Skin: Negative for color change and rash  Neurological: Negative for dizziness, light-headedness and headaches  Psychiatric/Behavioral: Negative for agitation  Historical Information   Past Medical History:   Diagnosis Date   • Anxiety    • Arthritis    • Confusion 10/2/2018   • Depression    • Displaced fracture of distal phalanx of right thumb    • GERD (gastroesophageal reflux disease)    • Heart attack ([facility] [address] )    • Hyperlipidemia    • Hypertension    • Moderate episode of recurrent major depressive disorder ([facility] [address] ) 2019   • Shortness of breath    • Stage 2 chronic kidney disease 2019     Past Surgical History:   Procedure Laterality Date   • APPENDECTOMY     • CARPAL TUNNEL RELEASE     • CATARACT EXTRACTION Bilateral    • COLONOSCOPY     • FL LUMBAR PUNCTURE DIAGNOSTIC  1/10/2019   • FRACTURE SURGERY     • SD CRTJ SHUNT [id number] TERMINUS Right 9/3/2019    Procedure:  Insertion of frontal ventriculoperitoneal shunt;  Surgeon: Leticia Deleon MD;  Location: [facility];  Service: Neurosurgery   • SEPTOPLASTY     • WRIST FRACTURE SURGERY Right      Social History     Substance and Sexual Activity   Alcohol Use Yes    Comment: social     Social History     Substance and Sexual Activity   Drug Use No     Social History     Tobacco Use   Smoking Status Former   • Packs/day: 0 00   • Years: 0 00   • Pack years: 0 00   • Types: Cigarettes   • Quit date:    • Years since quittin 3   Smokeless Tobacco Never   Tobacco Comments    no secondhand smoke exposure       Family History:   Family History   Problem Relation Age of Onset   • "Heart attack Mother 39   • Heart attack Father 61   • No Known Problems Other    • Breast cancer Sister    • Alcohol abuse Daughter         history of   • Heart attack Brother        Meds/Allergies   all current active meds have been reviewed and current meds:   Current Facility-Administered Medications   Medication Dose Route Frequency   • acetaminophen (TYLENOL) tablet 650 mg  650 mg Oral Q6H PRN   • aluminum-magnesium hydroxide-simethicone (MYLANTA) oral suspension 30 mL  30 mL Oral Q4H PRN   • aspirin chewable tablet 81 mg  81 mg Oral Daily   • atenolol (TENORMIN) tablet 100 mg  100 mg Oral QPM   • atorvastatin (LIPITOR) tablet 40 mg  40 mg Oral Daily   • Diclofenac Sodium (VOLTAREN) 1 % topical gel 2 g  2 g Topical 4x Daily PRN   • escitalopram (LEXAPRO) tablet 10 mg  10 mg Oral HS   • heparin (porcine) subcutaneous injection 5,000 Units  5,000 Units Subcutaneous Q8H [facility]   • hydrALAZINE (APRESOLINE) injection 5 mg  5 mg Intravenous Q6H PRN   • losartan (COZAAR) tablet 50 mg  50 mg Oral BID   • melatonin tablet 3 mg  3 mg Oral HS PRN   • ondansetron (ZOFRAN) injection 4 mg  4 mg Intravenous Q6H PRN   • pantoprazole (PROTONIX) EC tablet 20 mg  20 mg Oral Every Other Day   • sodium chloride tablet 2 g  2 g Oral BID   • torsemide (DEMADEX) tablet 10 mg  10 mg Oral Daily     No Known Allergies    Objective   Vitals: Blood pressure 162/67, pulse 60, temperature 98 °F (36 7 °C), temperature source Temporal, resp   rate 20, height 5' 2\" (1 575 m), weight 67 8 kg (149 lb 7 6 oz), SpO2 97 %, not currently breastfeeding , Body mass index is 27 34 kg/m² ,   Orthostatic Blood Pressures    Flowsheet Row Most Recent Value   Blood Pressure 162/67 filed at 05/18/2023 0725   Patient Position - Orthostatic VS Lying filed at 05/18/2023 7037          Systolic (44PJJ), FGR:933 , Min:126 , UOT:045     Diastolic (43APX), OBQ:08, Min:63, Max:78        Intake/Output Summary (Last 24 hours) at 5/18/2023 0853  Last data filed at 5/18/2023 " 4640  Gross per 24 hour   Intake 560 ml   Output 250 ml   Net 310 ml       Invasive Devices     Peripheral Intravenous Line  Duration           Peripheral IV 05/17/23 Left;Ventral (anterior) Forearm <1 day                  Physical Exam:  Physical Exam  Constitutional:       Appearance: Normal appearance  Comments: Body mass index is 27 34 kg/m²  HENT:      Head: Normocephalic and atraumatic  Mouth/Throat:      Mouth: Mucous membranes are moist       Pharynx: Oropharynx is clear  Eyes:      Extraocular Movements: Extraocular movements intact  Conjunctiva/sclera: Conjunctivae normal       Pupils: Pupils are equal, round, and reactive to light  Neck:      Vascular: No JVD  Cardiovascular:      Rate and Rhythm: Normal rate and regular rhythm  Pulses: Normal pulses  Heart sounds: Normal heart sounds  No murmur heard  No friction rub  No gallop  Pulmonary:      Effort: Pulmonary effort is normal  No respiratory distress  Breath sounds: Normal breath sounds  No rales  Wheezes: minor wheeze  Chest:      Chest wall: No tenderness  Abdominal:      General: Bowel sounds are normal  There is no distension  Palpations: Abdomen is soft  Tenderness: There is no abdominal tenderness  Musculoskeletal:      Cervical back: Neck supple  Right lower leg: No edema  Left lower leg: No edema  Skin:     General: Skin is warm and dry  Capillary Refill: Capillary refill takes less than 2 seconds  Neurological:      General: No focal deficit present  Mental Status: She is alert  Mental status is at baseline        Comments: Patient with occasional word finding difficulty, known history of NPH   Psychiatric:         Mood and Affect: Mood normal          Behavior: Behavior normal           EKG:   Date: 05/17/23  Interpretation:   Sinus bradycardia  Otherwise normal ECG  When compared with ECG of 17-MAY-2023 19:51,  Criteria for Septal infarct are no longer Present  Confirmed by Erika Paul ([id number]) on 2023 1:08:02 AM    Imaging: I have personally reviewed pertinent reports  Telemetry:   No acute events overnight on telemetry    ECHO: Results for orders placed during the hospital encounter of 09/10/21    Echo complete with contrast if indicated    Narrative  [facility]ly [facility]  [place], [address]  [phone]    Transthoracic Echocardiogram  2D, M-mode, Doppler, and Color Doppler    Study date:  10-Sep-2021    Patient: Annie Garcia  MR number: [id number]  Account number: [de-identified]  : 1940  Age: [de-identified] years  Gender: Female  Status: Outpatient  Location: [facility] ECHO 3  Height: 62 in  Weight: 155 lb  BP: 128/ 62 mmHg    Indications: Murmur    Diagnoses: R01 1 - Cardiac murmur, unspecified    Sonographer:  Alfredo Linda RDCS  Primary Physician:  Raeann Fournier [facility]  Referring Physician:  SCAR Bolanos  Group:  [facility]  Interpreting Physician:  Beth Forrester DO    SUMMARY    SUMMARY:  1  This is a technically adequate study  2  Left ventricle is normal in size with hyperdynamic systolic function  Left ventricular ejection fraction is estimated at 75%  3  Mild-to-moderate concentric left ventricular hypertrophy  4  Grade 1 diastolic dysfunction  5  Left atrium is mildly dilated  6  Aortic valve sclerotic with adequate separation  7  Trace mitral regurgitation  Fibrocalcific changes of the mitral valve with mild mitral annular calcification  8  Pulmonary artery systolic pressures cannot be estimated due to lack of tricuspid regurgitation jet  9  There is no study for comparison    SUMMARY MEASUREMENTS  2D measurements:  Unspecified Anatomy:   %FS was 30 3 %  Ao Diam was 2 6 cm   EDV(Teich) was 53 ml   EF(Teich) was 58 6 %  ESV(Teich) was 21 9 ml  IVSd was 1 cm  LA Diam was 3 4 cm  LAAs A4C was 16 6 cm2  LAESV A-L A4C was 45 8 ml  LAESV MOD A4C was  41 5 ml    LALs A4C was 5  1 cm  LVEDV MOD A4C was 60 2 ml  LVEF MOD A4C was 76 8 %  LVESV MOD A4C was 14 ml  LVIDd was 3 6 cm  LVIDs was 2 5 cm  LVLd A4C was 7 cm  LVLs A4C was 6 cm  LVPWd was 1 1 cm  RAAs A4C was 9 cm2  RAESV A-L  was 18 6 ml   RAESV MOD was 17 1 ml  RALs was 3 7 cm  RVIDd was 2 8 cm  SV MOD A4C was 46 2 ml   SV(Teich) was 31 ml   CW measurements:  Unspecified Anatomy:   AV Env  Ti was 324 5 ms   AV VTI was 29 8 cm  AV Vmax was 1 4 m/s  AV Vmean was 0 9 m/s  AV maxPG was 7 7 mmHg  AV meanPG was 3 9 mmHg  MV VTI was 35 5 cm  MV Vmax was 1 1 m/s  MV Vmean was 0 6 m/s  MV maxPG  was 5 3 mmHg  MV meanPG was 1 8 mmHg  MM measurements:  Unspecified Anatomy:   TAPSE was 2 1 cm  PW measurements:  Unspecified Anatomy:   DVI was 0 8   E' Avg was 0 1 m/s  E' Lat was 0 1 m/s  E' Sept was 0 1 m/s  E/E' Avg was 11 1   E/E' Lat was 9 3   E/E' Sept was 13 8   LVOT Env  Ti was 320 6 ms  LVOT VTI was 24 1 cm  LVOT Vmax was 1 1 m/s  LVOT Vmean was 0 7 m/s  LVOT maxPG was 4 6 mmHg  LVOT meanPG was 2 5 mmHg  MV A Franko was 1 3 m/s  MV Dec Cook was 2 3 m/s2  MV DecT was 363 5 ms   MV E Franko was 0 8 m/s  MV E/A Ratio was 0 6   HISTORY: PRIOR HISTORY: GERD, Hypertension, CKD, Hyperlipidemia, MI, SOB, Former Smoker    PROCEDURE: The study was performed in the [facility]  This was a routine study  The transthoracic approach was used  The study included complete 2D imaging, M-mode, complete spectral Doppler, and color Doppler  The heart rate was 75 bpm, at  the start of the study  Images were obtained from the parasternal, apical, subcostal, and suprasternal notch acoustic windows  Echocardiographic views were limited due to lung interference  This was a technically difficult study  LEFT VENTRICLE: Left ventricle is normal size with hyperdynamic systolic function  Left ventricular ejection fraction is estimated 75%  Mild-to-moderate concentric left ventricular hypertrophy  Grade 1 diastolic dysfunction  False cord  noted  There are no obvious high-grade regional wall motion abnormalities  RIGHT VENTRICLE: Right ventricle is normal in size and function  LEFT ATRIUM: Left atrium is mildly dilated    ATRIAL SEPTUM: The interatrial septum appears to be grossly normal without evidence of shunting by color-flow Doppler  RIGHT ATRIUM: Right atrium is normal in size  MITRAL VALVE: Mitral valve opens well  Fibrocalcific changes of the mitral valve  Mild mitral annular calcification  No evidence of mitral stenosis  Trace mitral regurgitation  AORTIC VALVE: Aortic valve is sclerotic with adequate separation  No evidence of aortic stenosis or aortic regurgitation  TRICUSPID VALVE: Tricuspid valve opens well  No evidence tricuspid regurgitation  Pulmonary artery systolic pressures cannot be estimated due to lack of tricuspid regurgitation jet  PULMONIC VALVE: Pulmonic valve is not well visualized  No evidence of pulmonic stenosis or pulmonic regurgitation  PERICARDIUM: There is no evidence of significant pericardial effusion  AORTA: Aortic root is normal in size  SYSTEMIC VEINS: IVC: IVC is normal size with greater than 50% respirophasic collapse  SYSTEM MEASUREMENT TABLES    2D  %FS: 30 3 %  Ao Diam: 2 6 cm  EDV(Teich): 53 ml  EF(Teich): 58 6 %  ESV(Teich): 21 9 ml  IVSd: 1 cm  LA Diam: 3 4 cm  LAAs A4C: 16 6 cm2  LAESV A-L A4C: 45 8 ml  LAESV MOD A4C: 41 5 ml  LALs A4C: 5 1 cm  LVEDV MOD A4C: 60 2 ml  LVEF MOD A4C: 76 8 %  LVESV MOD A4C: 14 ml  LVIDd: 3 6 cm  LVIDs: 2 5 cm  LVLd A4C: 7 cm  LVLs A4C: 6 cm  LVPWd: 1 1 cm  RAAs A4C: 9 cm2  RAESV A-L: 18 6 ml  RAESV MOD: 17 1 ml  RALs: 3 7 cm  RVIDd: 2 8 cm  SV MOD A4C: 46 2 ml  SV(Teich): 31 ml    CW  AV Env  Ti: 324 5 ms  AV VTI: 29 8 cm  AV Vmax: 1 4 m/s  AV Vmean: 0 9 m/s  AV maxP 7 mmHg  AV meanPG: 3 9 mmHg  MV VTI: 35 5 cm  MV Vmax: 1 1 m/s  MV Vmean: 0 6 m/s  MV maxP 3 mmHg  MV meanP 8 mmHg    MM  TAPSE: 2 1 cm    PW  DVI: 0 8  E' Av 1 m/s  E' Lat: 0 1 m/s  E' Sept: 0 1 m/s  E/E' Av 1  E/E' Lat: 9 3  E/E' Sept: 13 8  LVOT Env  Ti: 320 6 ms  LVOT VTI: 24 1 cm  LVOT Vmax: 1 1 m/s  LVOT Vmean: 0 7 m/s  LVOT maxP 6 mmHg  LVOT meanP 5 mmHg  MV A Franko: 1 3 m/s  MV Dec Kemper: 2 3 m/s2  MV DecT: 363 5 ms  MV E Franko: 0 8 m/s  MV E/A Ratio: 0 6    [facility] Accredited Echocardiography Laboratory    Prepared and electronically signed by    Jose Ritter DO  Signed 10-Sep-2021 19:27:25      Lab Results:     Cardiac Profile:   Results from last 7 days   Lab Units 23  0352 23  0103 23  2147 23  1928   HS TNI 0HR ng/L  --   --  64*  --  5   HS TNI 2HR ng/L 60*  --   --  5  --    HSTNI D2 ng/L -4  --   --  0  --    HS TNI 4HR ng/L  --  56*  --   --   --    HSTNI D4 ng/L  --  -8  --   --   --        CBC with diff:   Results from last 7 days   Lab Units 23  0426 23  1928   WBC Thousand/uL 12 13*  --  11 28* 11 39*   HEMOGLOBIN g/dL 11 4*  --  11 4* 12 0   HEMATOCRIT % 33 9*  --  33 7* 34 5*   MCV fL 92  --  92 89   PLATELETS Thousands/uL 418* 456* 381 405*   MCH pg 30 8  --  31 0 30 9   MCHC g/dL 33 6  --  33 8 34 8   RDW % 12 8  --  12 6 12 6   MPV fL 9 9 9 9 10 4 9 9   NRBC AUTO /100 WBCs 0  --  0 0       CMP:   Results from last 7 days   Lab Units 23  0546 05/17/23  2001 05/15/23  04323  0426 23  1928   POTASSIUM mmol/L 3 6 3 9 3 7 4 4 3 6 3 7   CHLORIDE mmol/L 99 97 102 103 101 93*   CO2 mmol/L 25 26 24 25 21 23   BUN mg/dL 22 24 13 12 11 11   CREATININE mg/dL 1 00 1 01 0 80 0 90 0 88 0 81   CALCIUM mg/dL 9 7 9 3 9 4 9 7 9 4 9 7   AST U/L  --   --   --   --   --  26   ALT U/L  --   --   --   --   --  21   ALK PHOS U/L  --   --   --   --   --  56   EGFR ml/min/1 73sq m 52 51 68 59 61 67       Note prepared with assistance from Bishnu Ortiz/[employer] 4th year medical student   Attending:  Chase Larios Sue Bauer MD

## 2023-05-18 NOTE — ASSESSMENT & PLAN NOTE
· Patient presented to the ED after a fall at her assisted living facility, mechanical fall  · Uses a walker at baseline  · PT/OT recommending rehab vs  Back to assisted living with increased supervision   · CM following  · Likely need to advance to assisted living from independent living after rehab given patient's worsening memory impairment per daughter

## 2023-05-18 NOTE — ASSESSMENT & PLAN NOTE
· Patient with reports of chest pressure that lasted several minutes and briefly radiated into her right arm last night  · EKG without ischemic changes at time of chest pain and 2 hours later  · Patient was loaded with aspirin  · Patient with history of NSTEMI, per daughter this happened approx 20 years ago  · Monitored on telemetry overnight, no acute findings at time of review  · Patient without cardiopulmonary symptoms at this time, however is a poor historian and thought her chest pain was 2 nights ago  · Troponin trend 64-56-60, repeat hs random troponin now to assess if down trending  · Cardiology consulted  · 2D echo pending

## 2023-05-18 NOTE — PLAN OF CARE
Problem: PHYSICAL THERAPY ADULT  Goal: Performs mobility at highest level of function for planned discharge setting  See evaluation for individualized goals  Description: Treatment/Interventions: Functional transfer training, LE strengthening/ROM, Therapeutic exercise, Endurance training, Patient/family training, Equipment eval/education, Gait training, Spoke to nursing  Equipment Recommended: Chani Owen (lorena)       See flowsheet documentation for full assessment, interventions and recommendations  Outcome: Progressing  Note: Prognosis: Good  Problem List: Impaired balance, Decreased endurance, Decreased mobility, Decreased cognition, Impaired judgement, Decreased safety awareness  Assessment: pt  progressing well with overall mobility  Pt  noted with L side neglect and mild decreased  of LUE  Pt  also ntoed leaning more to the L during ambulation  Cues to stay within the RW and in the middle and to take longer strides  Pt  seated on chair post session with all need within reach and chair alarm engaged  Adele glover to follow per PT POC  Cues for hand palcement for STS transfers given and to avoid impulsive descend to chair  Barriers to Discharge: None     PT Discharge Recommendation: Post acute rehabilitation services (vs transfer to [place] with PT and S)    See flowsheet documentation for full assessment

## 2023-05-19 ENCOUNTER — APPOINTMENT (INPATIENT)
Dept: NON INVASIVE DIAGNOSTICS | Facility: HOSPITAL | Age: 83
End: 2023-05-19

## 2023-05-19 ENCOUNTER — APPOINTMENT (INPATIENT)
Dept: NUCLEAR MEDICINE | Facility: HOSPITAL | Age: 83
End: 2023-05-19

## 2023-05-19 LAB
ANION GAP SERPL CALCULATED.3IONS-SCNC: 10 MMOL/L (ref 4–13)
AORTIC VALVE MEAN VELOCITY: 12.6 M/S
APICAL FOUR CHAMBER EJECTION FRACTION: 75 %
AV AREA BY CONTINUOUS VTI: 1.1 CM2
AV AREA PEAK VELOCITY: 1.3 CM2
AV LVOT MEAN GRADIENT: 2 MMHG
AV LVOT PEAK GRADIENT: 4 MMHG
AV MEAN GRADIENT: 7 MMHG
AV PEAK GRADIENT: 10 MMHG
AV VALVE AREA: 1.91 CM2
AV VELOCITY RATIO: 0.64
BUN SERPL-MCNC: 23 MG/DL (ref 5–25)
CALCIUM SERPL-MCNC: 9.2 MG/DL (ref 8.4–10.2)
CHEST PAIN STATEMENT: NORMAL
CHLORIDE SERPL-SCNC: 99 MMOL/L (ref 96–108)
CO2 SERPL-SCNC: 24 MMOL/L (ref 21–32)
CREAT SERPL-MCNC: 0.94 MG/DL (ref 0.6–1.3)
DOP CALC AO PEAK VEL: 1.6 M/S
DOP CALC AO VTI: 40 CM
DOP CALC LVOT AREA: 2.83 CM2
DOP CALC LVOT DIAMETER: 1.9 CM
DOP CALC LVOT PEAK VEL VTI: 27 CM
DOP CALC LVOT PEAK VEL: 1.03 M/S
DOP CALC LVOT STROKE INDEX: 23.4 ML/M2
DOP CALC LVOT STROKE VOLUME: 76.51 CM3
DOP CALC MV VTI: 45.54 CM
E WAVE DECELERATION TIME: 235 MS
ERYTHROCYTE [DISTWIDTH] IN BLOOD BY AUTOMATED COUNT: 12.7 % (ref 11.6–15.1)
FRACTIONAL SHORTENING: 28 % (ref 28–44)
GFR SERPL CREATININE-BSD FRML MDRD: 56 ML/MIN/1.73SQ M
GLUCOSE SERPL-MCNC: 91 MG/DL (ref 65–140)
HCT VFR BLD AUTO: 35.4 % (ref 34.8–46.1)
HGB BLD-MCNC: 11.4 G/DL (ref 11.5–15.4)
INTERVENTRICULAR SEPTUM IN DIASTOLE (PARASTERNAL SHORT AXIS VIEW): 1 CM
INTERVENTRICULAR SEPTUM: 1 CM (ref 0.6–1.1)
LAAS-AP2: 17 CM2
LAAS-AP4: 19.9 CM2
LEFT ATRIUM AREA SYSTOLE SINGLE PLANE A4C: 19 CM2
LEFT ATRIUM SIZE: 3.1 CM
LEFT INTERNAL DIMENSION IN SYSTOLE: 2.6 CM (ref 2.1–4)
LEFT VENTRICULAR INTERNAL DIMENSION IN DIASTOLE: 3.6 CM (ref 3.5–6)
LEFT VENTRICULAR POSTERIOR WALL IN END DIASTOLE: 1 CM
LEFT VENTRICULAR STROKE VOLUME: 30 ML
LVSV (TEICH): 30 ML
MAGNESIUM SERPL-MCNC: 2 MG/DL (ref 1.9–2.7)
MAX DIASTOLIC BP: 65 MMHG
MAX HEART RATE: 93 BPM
MAX PREDICTED HEART RATE: 138 BPM
MAX. SYSTOLIC BP: 157 MMHG
MCH RBC QN AUTO: 30.3 PG (ref 26.8–34.3)
MCHC RBC AUTO-ENTMCNC: 32.2 G/DL (ref 31.4–37.4)
MCV RBC AUTO: 94 FL (ref 82–98)
MRSA NOSE QL CULT: NORMAL
MV MEAN GRADIENT: 3 MMHG
MV PEAK A VEL: 1.18 M/S
MV PEAK E VEL: 98 CM/S
MV PEAK GRADIENT: 7 MMHG
MV STENOSIS PRESSURE HALF TIME: 68 MS
MV VALVE AREA BY CONTINUITY EQUATION: 1.68 CM2
MV VALVE AREA P 1/2 METHOD: 3.24 CM2
NUC STRESS EJECTION FRACTION: 70 %
PLATELET # BLD AUTO: 474 THOUSANDS/UL (ref 149–390)
PMV BLD AUTO: 10.6 FL (ref 8.9–12.7)
POTASSIUM SERPL-SCNC: 4 MMOL/L (ref 3.5–5.3)
PROTOCOL NAME: NORMAL
RA PRESSURE ESTIMATED: 5 MMHG
RATE PRESSURE PRODUCT: NORMAL
RBC # BLD AUTO: 3.76 MILLION/UL (ref 3.81–5.12)
REASON FOR TERMINATION: NORMAL
RIGHT ATRIUM AREA SYSTOLE A4C: 11.8 CM2
RIGHT VENTRICLE ID DIMENSION: 3.4 CM
RV PSP: 27 MMHG
SL CV LEFT ATRIUM LENGTH A2C: 4.5 CM
SL CV LV EF: 70
SL CV PED ECHO LEFT VENTRICLE DIASTOLIC VOLUME (MOD BIPLANE) 2D: 54 ML
SL CV PED ECHO LEFT VENTRICLE SYSTOLIC VOLUME (MOD BIPLANE) 2D: 24 ML
SL CV REST NUCLEAR ISOTOPE DOSE: 10.9 MCI
SL CV STRESS NUCLEAR ISOTOPE DOSE: 31.7 MCI
SL CV STRESS RECOVERY BP: NORMAL MMHG
SL CV STRESS RECOVERY HR: 75 BPM
SL CV STRESS RECOVERY O2 SAT: 98 %
SODIUM SERPL-SCNC: 133 MMOL/L (ref 135–147)
STRESS ANGINA INDEX: 0
STRESS BASELINE BP: NORMAL MMHG
STRESS BASELINE HR: 65 BPM
STRESS O2 SAT REST: 97 %
STRESS PEAK HR: 93 BPM
STRESS POST O2 SAT PEAK: 99 %
STRESS POST PEAK BP: 137 MMHG
STRESS/REST PERFUSION RATIO: 1.13
TARGET HR FORMULA: NORMAL
TEST INDICATION: NORMAL
TIME IN EXERCISE PHASE: NORMAL
TR MAX PG: 22 MMHG
TR PEAK VELOCITY: 2.4 M/S
TRICUSPID ANNULAR PLANE SYSTOLIC EXCURSION: 1.8 CM
TRICUSPID VALVE PEAK REGURGITATION VELOCITY: 2.35 M/S
WBC # BLD AUTO: 10.85 THOUSAND/UL (ref 4.31–10.16)

## 2023-05-19 RX ORDER — REGADENOSON 0.08 MG/ML
0.4 INJECTION, SOLUTION INTRAVENOUS ONCE
Status: COMPLETED | OUTPATIENT
Start: 2023-05-19 | End: 2023-05-19

## 2023-05-19 RX ADMIN — REGADENOSON 0.4 MG: 0.08 INJECTION, SOLUTION INTRAVENOUS at 11:58

## 2023-05-19 RX ADMIN — AMLODIPINE BESYLATE 5 MG: 5 TABLET ORAL at 08:34

## 2023-05-19 RX ADMIN — HEPARIN SODIUM 5000 UNITS: 5000 INJECTION INTRAVENOUS; SUBCUTANEOUS at 20:10

## 2023-05-19 RX ADMIN — SODIUM CHLORIDE 2 G: 1 TABLET ORAL at 18:03

## 2023-05-19 RX ADMIN — ATENOLOL 100 MG: 50 TABLET ORAL at 18:03

## 2023-05-19 RX ADMIN — HEPARIN SODIUM 5000 UNITS: 5000 INJECTION INTRAVENOUS; SUBCUTANEOUS at 13:16

## 2023-05-19 RX ADMIN — ASPIRIN 81 MG 81 MG: 81 TABLET ORAL at 08:35

## 2023-05-19 RX ADMIN — ATORVASTATIN CALCIUM 40 MG: 40 TABLET, FILM COATED ORAL at 08:35

## 2023-05-19 RX ADMIN — Medication 3 MG: at 20:09

## 2023-05-19 RX ADMIN — LOSARTAN POTASSIUM 50 MG: 50 TABLET, FILM COATED ORAL at 08:35

## 2023-05-19 RX ADMIN — SODIUM CHLORIDE 2 G: 1 TABLET ORAL at 08:35

## 2023-05-19 RX ADMIN — TORSEMIDE 10 MG: 20 TABLET ORAL at 08:34

## 2023-05-19 RX ADMIN — LOSARTAN POTASSIUM 50 MG: 50 TABLET, FILM COATED ORAL at 18:03

## 2023-05-19 RX ADMIN — ESCITALOPRAM OXALATE 10 MG: 10 TABLET ORAL at 20:09

## 2023-05-19 NOTE — CASE MANAGEMENT
Case Management Discharge Planning Note    Patient name Gina Gonzalez  Location [facility] /E2 -* MRN [id number]  : 1940 Date 2023       Current Admission Date: 2023  Current Admission Diagnosis:Ambulatory dysfunction   Patient Active Problem List    Diagnosis Date Noted   • Chest pressure 2023   • Fall 2023   • Persistent proteinuria 2023   • COVID 2022   • Mild recurrent major depression ([facility] ) 2022   • Non-ST elevation myocardial infarction (NSTEMI) ([facility] ) 2022   • Chronic obstructive pulmonary disease, unspecified COPD type ([place] [address] ) 2022   • Polypharmacy 2021   • MCI (mild cognitive impairment) 2021   • Hyperglycemia 2021   • Primary insomnia 2021   • SIADH (syndrome of inappropriate ADH production) ([place] [address] ) 2021   • Medicare annual wellness visit, subsequent 2020   • Osteopenia of multiple sites 2020   • S/P  shunt 10/04/2019   • Recurrent falls 2019   • Hyperkalemia 2019   • Leukocytosis 2019   • Stage 2 chronic kidney disease 2019   • Normal pressure hydrocephalus ([facility] ) 2018   • Ambulatory dysfunction 12/15/2017   • Positive ROSALINDA (antinuclear antibody) 12/15/2017   • 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   • Anxiety and depression 10/20/2017   • 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   • Splenic infarction 10/11/2017   • Levoscoliosis 2017   • Elevated sed rate 2017   • Vitamin D deficiency 2017   • Radiculopathy 2017   • GERD without esophagitis 2017   • Essential (hemorrhagic) thrombocythemia ([facility] ) 2017   • Carotid artery plaque 2016   • Chronic hyponatremia 2014   • Hyperlipidemia 2014   • Carotid bruit 2014   • Essential hypertension 2009   • Coronary atherosclerosis 2009      LOS (days): 6  Geometric Mean LOS (GMLOS) (days): 3 50  Days to GMLOS:-2 2     OBJECTIVE:  Risk of Unplanned Readmission Score: 13 86 Current admission status: Inpatient   Preferred Pharmacy:   [facility], [place] - [address]  [address]  [facility] [address]  Phone: [phone] Fax: [phone]    Primary Care Provider: Juanell Holstein, PA-C    Primary Insurance: [facility]  Secondary Insurance:     DISCHARGE DETAILS:     Transport at Discharge : Wheelchair van  Dispatcher Contacted: Yes  Number/Name of Dispatcher: Kody Covarrubias by Krissy and Unit #): Maria Isabel Carvajal [phone]  ETA of Transport (Date): 05/20/23  ETA of Transport (Time): 1400     IMM Given (Date):: 05/19/23  IMM Given to[de-identified] [address] Name, Nicole [address] : [facility]  Receiving Facility/Agency Phone Number: [phone]  Facility/Agency Fax Number: [phone]      Additional Comments:  [facility] is following this pt and can accept over the weekend  CM updated pt's daughter, Argenis Bolivian  She is agreeable to a weekend discharge if medically stable  CM arranged transportation via [employer] with [employer] for 2PM on Saturday, 05/20/2023  Weekend CM will follow-up with [facility] on Saturday

## 2023-05-19 NOTE — ASSESSMENT & PLAN NOTE
· BP intermittently elevated during hospitalization, with suboptimal control  · Continue losartan 50 mg daily, atenolol 100 mg nightly and Demadex 10 mg daily  · Continue added amlodipine 5 mg daily from cardiology, monitor BP

## 2023-05-19 NOTE — ASSESSMENT & PLAN NOTE
· Patient with reports of chest pressure that lasted several minutes and briefly radiated into her right arm 5/18 overnight  · EKG without ischemic changes at time of chest pain and 2 hours later  · Patient was loaded with aspirin  · Patient with history of NSTEMI, per daughter this happened approx 20 years ago  · Monitored on telemetry overnight, no acute findings  · Troponin trend 64-56-60, repeat hs random troponin down trending to 37  · Continues without cardiopulmonary complaints  · Appreciate cardiology recommendations  · Patient to go for pharmacologic nuclear stress test today to assess for obstructive CAD  · Follow up 2D echo

## 2023-05-19 NOTE — ASSESSMENT & PLAN NOTE
· Patient presented after mechanical fall backwards at the bathroom with her walker   · PT/OT consulted  · Continue fall precautions while inpatient  · Rehab when medically cleared for discharge

## 2023-05-19 NOTE — PLAN OF CARE
Problem: Potential for Falls  Goal: Patient will remain free of falls  Description: INTERVENTIONS:  - Educate patient/family on patient safety including physical limitations  - Instruct patient to call for assistance with activity   - Consult OT/PT to assist with strengthening/mobility   - Keep Call bell within reach  - Keep bed low and locked with side rails adjusted as appropriate  - Keep care items and personal belongings within reach  - Initiate and maintain comfort rounds  - Make Fall Risk Sign visible to staff  - Offer Toileting every 2 Hours, in advance of need  - Initiate/Maintain bed alarm  - Obtain necessary fall risk management equipment: call bell, bed alarm  - Apply yellow socks and bracelet for high fall risk patients  - Consider moving patient to room near nurses station  Outcome: Progressing     Problem: MOBILITY - ADULT  Goal: Maintain or return to baseline ADL function  Description: INTERVENTIONS:  -  Assess patient's ability to carry out ADLs; assess patient's baseline for ADL function and identify physical deficits which impact ability to perform ADLs (bathing, care of mouth/teeth, toileting, grooming, dressing, etc )  - Assess/evaluate cause of self-care deficits   - Assess range of motion  - Assess patient's mobility; develop plan if impaired  - Assess patient's need for assistive devices and provide as appropriate  - Encourage maximum independence but intervene and supervise when necessary  - Involve family in performance of ADLs  - Assess for home care needs following discharge   - Consider OT consult to assist with ADL evaluation and planning for discharge  - Provide patient education as appropriate  Outcome: Progressing  Goal: Maintains/Returns to pre admission functional level  Description: INTERVENTIONS:  - Perform BMAT or MOVE assessment daily    - Set and communicate daily mobility goal to care team and patient/family/caregiver     - Collaborate with rehabilitation services on mobility goals if consulted  - Perform Range of Motion 2 times a day  - Reposition patient every 3 hours    - Dangle patient 3 times a day  - Stand patient 3 times a day  - Ambulate patient 3 times a day  - Out of bed to chair 3 times a day   - Out of bed for meals 3 times a day  - Out of bed for toileting  - Record patient progress and toleration of activity level   Outcome: Progressing     Problem: Prexisting or High Potential for Compromised Skin Integrity  Goal: Skin integrity is maintained or improved  Description: INTERVENTIONS:  - Identify patients at risk for skin breakdown  - Assess and monitor skin integrity  - Assess and monitor nutrition and hydration status  - Monitor labs   - Assess for incontinence   - Turn and reposition patient  - Assist with mobility/ambulation  - Relieve pressure over bony prominences  - Avoid friction and shearing  - Provide appropriate hygiene as needed including keeping skin clean and dry  - Evaluate need for skin moisturizer/barrier cream  - Collaborate with interdisciplinary team   - Patient/family teaching  - Consider wound care consult   Outcome: Progressing     Problem: PAIN - ADULT  Goal: Verbalizes/displays adequate comfort level or baseline comfort level  Description: Interventions:  - Encourage patient to monitor pain and request assistance  - Assess pain using appropriate pain scale  - Administer analgesics based on type and severity of pain and evaluate response  - Implement non-pharmacological measures as appropriate and evaluate response  - Consider cultural and social influences on pain and pain management  - Notify physician/advanced practitioner if interventions unsuccessful or patient reports new pain  Outcome: Progressing     Problem: DISCHARGE PLANNING  Goal: Discharge to home or other facility with appropriate resources  Description: INTERVENTIONS:  - Identify barriers to discharge w/patient and caregiver  - Arrange for needed discharge resources and transportation as appropriate  - Identify discharge learning needs (meds, wound care, etc )  - Arrange for interpretive services to assist at discharge as needed  - Refer to Case Management Department for coordinating discharge planning if the patient needs post-hospital services based on physician/advanced practitioner order or complex needs related to functional status, cognitive ability, or social support system  Outcome: Progressing     Problem: Knowledge Deficit  Goal: Patient/family/caregiver demonstrates understanding of disease process, treatment plan, medications, and discharge instructions  Description: Complete learning assessment and assess knowledge base  Interventions:  - Provide teaching at level of understanding  - Provide teaching via preferred learning methods  Outcome: Progressing     Problem: NEUROSENSORY - ADULT  Goal: Achieves maximal functionality and self care  Description: INTERVENTIONS  - Monitor swallowing and airway patency with patient fatigue and changes in neurological status  - Encourage and assist patient to increase activity and self care     - Encourage visually impaired, hearing impaired and aphasic patients to use assistive/communication devices  Outcome: Progressing     Problem: CARDIOVASCULAR - ADULT  Goal: Maintains optimal cardiac output and hemodynamic stability  Description: INTERVENTIONS:  - Monitor I/O, vital signs and rhythm  - Monitor for S/S and trends of decreased cardiac output  - Administer and titrate ordered vasoactive medications to optimize hemodynamic stability  - Assess quality of pulses, skin color and temperature  - Assess for signs of decreased coronary artery perfusion  - Instruct patient to report change in severity of symptoms  Outcome: Progressing  Goal: Absence of cardiac dysrhythmias or at baseline rhythm  Description: INTERVENTIONS:  - Continuous cardiac monitoring, vital signs, obtain 12 lead EKG if ordered  - Administer antiarrhythmic and heart rate control medications as ordered  - Monitor electrolytes and administer replacement therapy as ordered  Outcome: Progressing     Problem: GASTROINTESTINAL - ADULT  Goal: Maintains or returns to baseline bowel function  Description: INTERVENTIONS:  - Assess bowel function  - Encourage oral fluids to ensure adequate hydration  - Administer IV fluids if ordered to ensure adequate hydration  - Administer ordered medications as needed  - Encourage mobilization and activity  - Consider nutritional services referral to assist patient with adequate nutrition and appropriate food choices  Outcome: Progressing     Problem: GENITOURINARY - ADULT  Goal: Absence of urinary retention  Description: INTERVENTIONS:  - Assess patient's ability to void and empty bladder  - Monitor I/O  - Bladder scan as needed  - Discuss with physician/AP medications to alleviate retention as needed  - Discuss catheterization for long term situations as appropriate  Outcome: Progressing     Problem: METABOLIC, FLUID AND ELECTROLYTES - ADULT  Goal: Electrolytes maintained within normal limits  Description: INTERVENTIONS:  - Monitor labs and assess patient for signs and symptoms of electrolyte imbalances  - Administer electrolyte replacement as ordered  - Monitor response to electrolyte replacements, including repeat lab results as appropriate  - Instruct patient on fluid and nutrition as appropriate  Outcome: Progressing  Goal: Fluid balance maintained  Description: INTERVENTIONS:  - Monitor labs   - Monitor I/O and WT  - Instruct patient on fluid and nutrition as appropriate  - Assess for signs & symptoms of volume excess or deficit  Outcome: Progressing     Problem: HEMATOLOGIC - ADULT  Goal: Maintains hematologic stability  Description: INTERVENTIONS  - Assess for signs and symptoms of bleeding or hemorrhage  - Monitor labs  - Administer supportive blood products/factors as ordered and appropriate  Outcome: Progressing     Problem: SAFETY ADULT  Goal: Patient will remain free of falls  Description: INTERVENTIONS:  - Educate patient/family on patient safety including physical limitations  - Instruct patient to call for assistance with activity   - Consult OT/PT to assist with strengthening/mobility   - Keep Call bell within reach  - Keep bed low and locked with side rails adjusted as appropriate  - Keep care items and personal belongings within reach  - Initiate and maintain comfort rounds  - Make Fall Risk Sign visible to staff  - Offer Toileting every 2 Hours, in advance of need  - Initiate/Maintain bed alarm  - Obtain necessary fall risk management equipment: call bell, bed alarm  - Apply yellow socks and bracelet for high fall risk patients  - Consider moving patient to room near nurses station  Outcome: Progressing     Problem: COPING  Goal: Pt/Family able to verbalize concerns and demonstrate effective coping strategies  Description: INTERVENTIONS:  - Assist patient/family to identify coping skills, available support systems and cultural and spiritual values  - Provide emotional support, including active listening and acknowledgement of concerns of patient and caregivers  - Reduce environmental stimuli, as able  - Provide patient education  - Assess for spiritual pain/suffering and initiate spiritual care, including notification of Pastoral Care or evin based community as needed  - Assess effectiveness of coping strategies  Outcome: Progressing  Goal: Will report anxiety at manageable levels  Description: INTERVENTIONS:  - Administer medication as ordered  - Teach and encourage coping skills  - Provide emotional support  - Assess patient/family for anxiety and ability to cope  Outcome: Progressing

## 2023-05-19 NOTE — PROGRESS NOTES
[address] [place] [address]  Progress Note  Name: Liz Marks  MRN: [id number]  Unit/Bed#: E2 -01 I Date of Admission: 5/13/2023   Date of Service: 5/19/2023 I Hospital Day: 6    Assessment/Plan   * Ambulatory dysfunction  Assessment & Plan  · Patient presented to the ED after a fall at her assisted living facility, mechanical fall  · Uses a walker at baseline  · PT/OT recommending rehab vs  Back to assisted living with increased supervision   · CM following  · Likely need to advance to assisted living from independent living after rehab given patient's worsening cognitive impairment    Fall  Assessment & Plan  · Patient presented after mechanical fall backwards at the bathroom with her walker   · PT/OT consulted  · Continue fall precautions while inpatient  · Rehab when medically cleared for discharge    Chest pressure  Assessment & Plan  · Patient with reports of chest pressure that lasted several minutes and briefly radiated into her right arm 5/18 overnight  · EKG without ischemic changes at time of chest pain and 2 hours later  · Patient was loaded with aspirin  · Patient with history of NSTEMI, per daughter this happened approx 20 years ago  · Monitored on telemetry overnight, no acute findings  · Troponin trend 64-56-60, repeat hs random troponin down trending to 37  · Continues without cardiopulmonary complaints  · Appreciate cardiology recommendations  · Patient to go for pharmacologic nuclear stress test today to assess for obstructive CAD  · Follow up 2D echo    Chronic hyponatremia  Assessment & Plan  · With acute hyponatremia of 127 on admission  · 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  · Na stable at 133  · Continue salt tabs, Demadex, and fluid restriction    Chronic obstructive pulmonary disease, unspecified COPD type ([facility] )  Assessment & Plan  · Reviewed outpatient PCP's note, patient remained stable on no inhalers  · Without acute exacerbation on exam  · Albuterol as needed    Essential hypertension  Assessment & Plan  · BP intermittently elevated during hospitalization, with suboptimal control  · Continue losartan 50 mg daily, atenolol 100 mg nightly and Demadex 10 mg daily  · Continue added amlodipine 5 mg daily from cardiology, monitor BP    Anxiety and depression  Assessment & Plan  · Mood stable, continue Lexapro 10 mg hs    Normal pressure hydrocephalus (HCC)  Assessment & Plan  · S/p  shunt in good position on ct head    VTE Pharmacologic Prophylaxis: VTE Score: 7 High Risk (Score >/= 5) - Pharmacological DVT Prophylaxis Ordered: heparin  Sequential Compression Devices Ordered  Patient Centered Rounds: I performed bedside rounds with nursing staff today  Discussions with Specialists or Other Care Team Provider: Cardiology    Education and Discussions with Family / Patient: Updated  (daughter) via phone  Total Time Spent on Date of Encounter in care of patient: 35 minutes This time was spent on one or more of the following: performing physical exam; counseling and coordination of care; obtaining or reviewing history; documenting in the medical record; reviewing/ordering tests, medications or procedures; communicating with other healthcare professionals and discussing with patient's family/caregivers  Current Length of Stay: 6 day(s)  Current Patient Status: Inpatient   Certification Statement: The patient will continue to require additional inpatient hospital stay due to pending 2D echo/stress test  Discharge Plan: Anticipate discharge tomorrow to rehab facility  Code Status: Level 1 - Full Code    Subjective:   Patient seen and examined  She reports she is feeling well today  Denies chest pain, chest pressure, numbness/tingling down her arms, shortness of breath, lightheadedness or dizziness  She has a good appetite and reports she ate this morning  Denies any abdominal pain, nausea or vomiting      Objective:     Vitals:   Temp (24hrs), Av 2 °F (36 8 °C), Min:97 7 °F (36 5 °C), Max:98 6 °F (37 °C)    Temp:  [97 7 °F (36 5 °C)-98 6 °F (37 °C)] 98 3 °F (36 8 °C)  HR:  [57-84] 57  Resp:  [17-20] 20  BP: (132-149)/(61-88) 132/85  SpO2:  [97 %-98 %] 97 %  Body mass index is 26 52 kg/m²  Input and Output Summary (last 24 hours): Intake/Output Summary (Last 24 hours) at 2023 1135  Last data filed at 2023 0300  Gross per 24 hour   Intake 930 ml   Output 600 ml   Net 330 ml       Physical Exam:   Physical Exam  Vitals and nursing note reviewed  Constitutional:       General: She is not in acute distress  Appearance: Normal appearance  She is well-developed  She is not ill-appearing  HENT:      Head: Normocephalic and atraumatic  Cardiovascular:      Rate and Rhythm: Normal rate and regular rhythm  Heart sounds: No murmur heard  Pulmonary:      Effort: Pulmonary effort is normal  No respiratory distress  Breath sounds: Normal breath sounds  No wheezing  Comments: Non labored breathing on room air  Abdominal:      Palpations: Abdomen is soft  Tenderness: There is no abdominal tenderness  Musculoskeletal:      Right lower leg: No edema  Left lower leg: No edema  Skin:     General: Skin is warm and dry  Neurological:      Mental Status: She is alert  Mental status is at baseline        Comments: Cognitive impairment noted   Psychiatric:         Mood and Affect: Mood normal         Additional Data:     Labs:  Results from last 7 days   Lab Units 23   WBC Thousand/uL 10 85* 12 13*   HEMOGLOBIN g/dL 11 4* 11 4*   HEMATOCRIT % 35 4 33 9*   PLATELETS Thousands/uL 474* 418*   NEUTROS PCT %  --  66   LYMPHS PCT %  --  21   MONOS PCT %  --  9   EOS PCT %  --  3     Results from last 7 days   Lab Units 236 238   SODIUM mmol/L 133*   < > 127*   POTASSIUM mmol/L 4 0   < > 3 7   CHLORIDE mmol/L 99   < > 93*   CO2 mmol/L 24   < > 23   BUN mg/dL 23 < > 11   CREATININE mg/dL 0 94   < > 0 81   ANION GAP mmol/L 10   < > 11   CALCIUM mg/dL 9 2   < > 9 7   ALBUMIN g/dL  --   --  4 3   TOTAL BILIRUBIN mg/dL  --   --  0 52   ALK PHOS U/L  --   --  56   ALT U/L  --   --  21   AST U/L  --   --  26   GLUCOSE RANDOM mg/dL 91   < > 95    < > = values in this interval not displayed       Lines/Drains:  Invasive Devices     Peripheral Intravenous Line  Duration           Peripheral IV 05/18/23 Right Antecubital <1 day              Telemetry:  Telemetry Orders (From admission, onward)             24 Hour Telemetry Monitoring  Continuous x 24 Hours (Telem)        Question:  Reason for 24 Hour Telemetry  Answer:  PCI/EP study (including pacer and ICD implementation), Cardiac surgery, MI, abnormal cardiac cath, and chest pain- rule out MI                 Telemetry Reviewed: Normal Sinus Rhythm  Indication for Continued Telemetry Use: Acute MI/Unstable Angina/Rule out ACS    Imaging: Reviewed radiology reports from this admission including: pending 2D echo and stress test    Recent Cultures (last 7 days):     Last 24 Hours Medication List:   Current Facility-Administered Medications   Medication Dose Route Frequency Provider Last Rate   • acetaminophen  650 mg Oral Q6H PRN Verner Begin, PA-C     • albuterol  2 puff Inhalation Q4H PRN Busbud GIOVANA Kat     • aluminum-magnesium hydroxide-simethicone  30 mL Oral Q4H PRN Alfie Gonzales PA-C     • amLODIPine  5 mg Oral Daily Jennifer Macedo MD     • aspirin  81 mg Oral Daily Laverne Ruiz PA-C     • atenolol  100 mg Oral QPM Laverne Ruiz PA-C     • atorvastatin  40 mg Oral Daily Laverne Ruiz PA-C     • Diclofenac Sodium  2 g Topical 4x Daily PRN Verner Begin, PA-C     • escitalopram  10 mg Oral HS Verner Begin, PA-C     • heparin (porcine)  5,000 Units Subcutaneous Q8H [facility] Laverne Ruiz PA-C     • hydrALAZINE  5 mg Intravenous Q6H PRN Verner Begin, PA-C     • losartan  50 mg Oral BID Jaspal Stone PA-C     • melatonin  3 mg Oral HS PRN Denny Tom PA-C     • ondansetron  4 mg Intravenous Q6H PRN Laverne Ruiz PA-C     • pantoprazole  20 mg Oral Every Other Day Laverne Ruiz PA-C     • sodium chloride  2 g Oral BID Laverne Ruiz PA-C     • torsemide  10 mg Oral Daily Denny Tom PA-C          Today, Patient Was Seen By: Zeke Rick PA-C    **Please Note: This note may have been constructed using a voice recognition system  **

## 2023-05-19 NOTE — ASSESSMENT & PLAN NOTE
· Patient presented to the ED after a fall at her assisted living facility, mechanical fall  · Uses a walker at baseline  · PT/OT recommending rehab vs  Back to assisted living with increased supervision   · CM following  · Likely need to advance to assisted living from independent living after rehab given patient's worsening cognitive impairment

## 2023-05-19 NOTE — ASSESSMENT & PLAN NOTE
· Reviewed outpatient PCP's note, patient remained stable on no inhalers  · Without acute exacerbation on exam  · Albuterol as needed

## 2023-05-19 NOTE — PLAN OF CARE
Problem: Potential for Falls  Goal: Patient will remain free of falls  Description: INTERVENTIONS:  - Educate patient/family on patient safety including physical limitations  - Instruct patient to call for assistance with activity   - Consult OT/PT to assist with strengthening/mobility   - Keep Call bell within reach  - Keep bed low and locked with side rails adjusted as appropriate  - Keep care items and personal belongings within reach  - Initiate and maintain comfort rounds  - Make Fall Risk Sign visible to staff  - Offer Toileting every 2 Hours, in advance of need  - Initiate/Maintain bed alarm  - Obtain necessary fall risk management equipment  - Apply yellow socks and bracelet for high fall risk patients  - Consider moving patient to room near nurses station  Outcome: Progressing     Problem: MOBILITY - ADULT  Goal: Maintain or return to baseline ADL function  Description: INTERVENTIONS:  -  Assess patient's ability to carry out ADLs; assess patient's baseline for ADL function and identify physical deficits which impact ability to perform ADLs (bathing, care of mouth/teeth, toileting, grooming, dressing, etc )  - Assess/evaluate cause of self-care deficits   - Assess range of motion  - Assess patient's mobility; develop plan if impaired  - Assess patient's need for assistive devices and provide as appropriate  - Encourage maximum independence but intervene and supervise when necessary  - Involve family in performance of ADLs  - Assess for home care needs following discharge   - Consider OT consult to assist with ADL evaluation and planning for discharge  - Provide patient education as appropriate  Outcome: Progressing  Goal: Maintains/Returns to pre admission functional level  Description: INTERVENTIONS:  - Perform BMAT or MOVE assessment daily    - Set and communicate daily mobility goal to care team and patient/family/caregiver     - Collaborate with rehabilitation services on mobility goals if consulted  - Perform Range of Motion 3 times a day  - Reposition patient every 2 hours    - Dangle patient 3 times a day  - Stand patient 3 times a day  - Ambulate patient 3 times a day  - Out of bed to chair 3 times a day   - Out of bed for meals 3 times a day  - Out of bed for toileting  - Record patient progress and toleration of activity level   Outcome: Progressing     Problem: Prexisting or High Potential for Compromised Skin Integrity  Goal: Skin integrity is maintained or improved  Description: INTERVENTIONS:  - Identify patients at risk for skin breakdown  - Assess and monitor skin integrity  - Assess and monitor nutrition and hydration status  - Monitor labs   - Assess for incontinence   - Turn and reposition patient  - Assist with mobility/ambulation  - Relieve pressure over bony prominences  - Avoid friction and shearing  - Provide appropriate hygiene as needed including keeping skin clean and dry  - Evaluate need for skin moisturizer/barrier cream  - Collaborate with interdisciplinary team   - Patient/family teaching  - Consider wound care consult   Outcome: Progressing     Problem: PAIN - ADULT  Goal: Verbalizes/displays adequate comfort level or baseline comfort level  Description: Interventions:  - Encourage patient to monitor pain and request assistance  - Assess pain using appropriate pain scale  - Administer analgesics based on type and severity of pain and evaluate response  - Implement non-pharmacological measures as appropriate and evaluate response  - Consider cultural and social influences on pain and pain management  - Notify physician/advanced practitioner if interventions unsuccessful or patient reports new pain  Outcome: Progressing     Problem: DISCHARGE PLANNING  Goal: Discharge to home or other facility with appropriate resources  Description: INTERVENTIONS:  - Identify barriers to discharge w/patient and caregiver  - Arrange for needed discharge resources and transportation as appropriate  - Identify discharge learning needs (meds, wound care, etc )  - Arrange for interpretive services to assist at discharge as needed  - Refer to Case Management Department for coordinating discharge planning if the patient needs post-hospital services based on physician/advanced practitioner order or complex needs related to functional status, cognitive ability, or social support system  Outcome: Progressing     Problem: Knowledge Deficit  Goal: Patient/family/caregiver demonstrates understanding of disease process, treatment plan, medications, and discharge instructions  Description: Complete learning assessment and assess knowledge base  Interventions:  - Provide teaching at level of understanding  - Provide teaching via preferred learning methods  Outcome: Progressing     Problem: NEUROSENSORY - ADULT  Goal: Achieves maximal functionality and self care  Description: INTERVENTIONS  - Monitor swallowing and airway patency with patient fatigue and changes in neurological status  - Encourage and assist patient to increase activity and self care     - Encourage visually impaired, hearing impaired and aphasic patients to use assistive/communication devices  Outcome: Progressing     Problem: CARDIOVASCULAR - ADULT  Goal: Maintains optimal cardiac output and hemodynamic stability  Description: INTERVENTIONS:  - Monitor I/O, vital signs and rhythm  - Monitor for S/S and trends of decreased cardiac output  - Administer and titrate ordered vasoactive medications to optimize hemodynamic stability  - Assess quality of pulses, skin color and temperature  - Assess for signs of decreased coronary artery perfusion  - Instruct patient to report change in severity of symptoms  Outcome: Progressing  Goal: Absence of cardiac dysrhythmias or at baseline rhythm  Description: INTERVENTIONS:  - Continuous cardiac monitoring, vital signs, obtain 12 lead EKG if ordered  - Administer antiarrhythmic and heart rate control medications as ordered  - Monitor electrolytes and administer replacement therapy as ordered  Outcome: Progressing     Problem: GASTROINTESTINAL - ADULT  Goal: Maintains or returns to baseline bowel function  Description: INTERVENTIONS:  - Assess bowel function  - Encourage oral fluids to ensure adequate hydration  - Administer IV fluids if ordered to ensure adequate hydration  - Administer ordered medications as needed  - Encourage mobilization and activity  - Consider nutritional services referral to assist patient with adequate nutrition and appropriate food choices  Outcome: Progressing     Problem: GENITOURINARY - ADULT  Goal: Absence of urinary retention  Description: INTERVENTIONS:  - Assess patient’s ability to void and empty bladder  - Monitor I/O  - Bladder scan as needed  - Discuss with physician/AP medications to alleviate retention as needed  - Discuss catheterization for long term situations as appropriate  Outcome: Progressing     Problem: METABOLIC, FLUID AND ELECTROLYTES - ADULT  Goal: Electrolytes maintained within normal limits  Description: INTERVENTIONS:  - Monitor labs and assess patient for signs and symptoms of electrolyte imbalances  - Administer electrolyte replacement as ordered  - Monitor response to electrolyte replacements, including repeat lab results as appropriate  - Instruct patient on fluid and nutrition as appropriate  Outcome: Progressing  Goal: Fluid balance maintained  Description: INTERVENTIONS:  - Monitor labs   - Monitor I/O and WT  - Instruct patient on fluid and nutrition as appropriate  - Assess for signs & symptoms of volume excess or deficit  Outcome: Progressing     Problem: HEMATOLOGIC - ADULT  Goal: Maintains hematologic stability  Description: INTERVENTIONS  - Assess for signs and symptoms of bleeding or hemorrhage  - Monitor labs  - Administer supportive blood products/factors as ordered and appropriate  Outcome: Progressing     Problem: SAFETY ADULT  Goal: Patient will remain free of falls  Description: INTERVENTIONS:  - Educate patient/family on patient safety including physical limitations  - Instruct patient to call for assistance with activity   - Consult OT/PT to assist with strengthening/mobility   - Keep Call bell within reach  - Keep bed low and locked with side rails adjusted as appropriate  - Keep care items and personal belongings within reach  - Initiate and maintain comfort rounds  - Make Fall Risk Sign visible to staff  - Offer Toileting every 2 Hours, in advance of need  - Initiate/Maintain bed alarm  - Obtain necessary fall risk management equipment  - Apply yellow socks and bracelet for high fall risk patients  - Consider moving patient to room near nurses station  Outcome: Progressing     Problem: COPING  Goal: Pt/Family able to verbalize concerns and demonstrate effective coping strategies  Description: INTERVENTIONS:  - Assist patient/family to identify coping skills, available support systems and cultural and spiritual values  - Provide emotional support, including active listening and acknowledgement of concerns of patient and caregivers  - Reduce environmental stimuli, as able  - Provide patient education  - Assess for spiritual pain/suffering and initiate spiritual care, including notification of Pastoral Care or evin based community as needed  - Assess effectiveness of coping strategies  Outcome: Progressing  Goal: Will report anxiety at manageable levels  Description: INTERVENTIONS:  - Administer medication as ordered  - Teach and encourage coping skills  - Provide emotional support  - Assess patient/family for anxiety and ability to cope  Outcome: Progressing

## 2023-05-19 NOTE — ASSESSMENT & PLAN NOTE
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

## 2023-05-19 NOTE — PROGRESS NOTES
"Cardiology Progress Note - Cirilo Orozco 80 y o  female MRN: [id number]    Unit/Bed#: E2 -01 Encounter: [id number]      Assessment & Plan:    Non-MI troponin elevation   - Patient with elevated troponins from 5 on admission but stable: 72, 64, 60, 56  - patient with distant history of MI in 1998 managed medically without cath/stenting/CABG  -TTE 5/19/2023 showed EF 70%, grade 1 DD, mild LAE, sclerotic AV, mild TR, no significant change compared to prior echo on 9/10/2021    Precordial chest pain  - Brief chest pain episode resolving after several minutes  Patient describes this morning as \"twinge\" in chest with simultaneous arm discomfort/numbness  No chest pressure/squeezing sensation  Pain not reproduced mechanically on examination    - EKG with no acute ischemic changes  - On aspirin 81 mg daily  - Nuclear pharmacologic stress test on 5/19/2023 showed no evidence of inducible ischemia or scar, normal LV systolic function    Ambulatory dysfunction  -Presented to the ED after a mechanical fall at her assisted living facility  - Pending rehab versus returning to assisted living with increased supervision    Essential hypertension  -Currently on atenolol 100 mg daily, losartan 50 mg twice daily, torsemide 10 mg daily and Motifene 5 mg daily  - Blood pressure is mildly elevated while inpatient    Chronic hyponatremia  - sodium 133 today from 129 on 5/17   Known history of SIADH on salt tabs and torsemide 10 mg daily  - management per primary team     Normal pressure hydrocephalus (HCC)  -s/p  shunt    Hyperlipidemia  - Currently on atorvastatin 40 mg daily    SIADH (syndrome of inappropriate ADH production) ([facility] [address] )    [facility], [facility]    Anxiety and depression     Summary:  -Blood pressure is improved after initiation of amlodipine  - Preserved LV and RV function on TTE with no significant change compared to prior in September 2021  - Nuclear pharmacologic stress " "test today showed no evidence inducible ischemia or scar, preserved LV systolic function  -Stable from a cardiac standpoint for discharge to rehab    Cardiology will sign off  Please call with any questions or concerns  Subjective:   No significant events overnight  She reports feeling well this afternoon  No recurrent episodes of chest pain  Denies shortness of breath, orthopnea, abdominal pain, nausea, vomiting, fever, chills, headache, dizziness or palpitations  Objective:     Vitals: Blood pressure 140/63, pulse 77, temperature (!) 97 4 °F (36 3 °C), temperature source Temporal, resp   rate 18, height 5' 2\" (1 575 m), weight 65 8 kg (145 lb), SpO2 98 %, not currently breastfeeding , Body mass index is 26 52 kg/m² ,   Orthostatic Blood Pressures    Flowsheet Row Most Recent Value   Blood Pressure 140/63 filed at 05/19/2023 1415   Patient Position - Orthostatic VS Lying filed at 05/19/2023 1415            Intake/Output Summary (Last 24 hours) at 5/19/2023 1633  Last data filed at 5/19/2023 0300  Gross per 24 hour   Intake 200 ml   Output 600 ml   Net -400 ml           Physical Exam:    GEN: Cortney Hanna appears well, alert and oriented x 3, pleasant and cooperative   HEENT: Mucous membranes moist, no scleral icterus, no conjunctival pallor  NECK: No elevated JVD  HEART: Regular rate and rhythm, normal S1 and S2, no murmurs or rubs   LUNGS: clear to auscultation bilaterally; no wheezes, rales, or rhonchi   ABDOMEN: normal bowel sounds, soft, no tenderness, + distention  EXTREMITIES: peripheral pulses normal; no lower extremity edema   NEURO: no focal findings   SKIN: No lesions or rashes on exposed skin        Current Facility-Administered Medications:   •  acetaminophen (TYLENOL) tablet 650 mg, 650 mg, Oral, Q6H PRN, Ralph Marrero PA-C, 650 mg at 05/15/23 1804  •  albuterol (PROVENTIL HFA,VENTOLIN HFA) inhaler 2 puff, 2 puff, Inhalation, Q4H PRN, Beckie Bowden PA-C  •  aluminum-magnesium " hydroxide-simethicone (MYLANTA) oral suspension 30 mL, 30 mL, Oral, Q4H PRN, Ana Woods PA-C, 30 mL at 05/17/23 2001  •  amLODIPine (NORVASC) tablet 5 mg, 5 mg, Oral, Daily, Zan Edge MD, 5 mg at 05/19/23 4469  •  aspirin chewable tablet 81 mg, 81 mg, Oral, Daily, Laverne Ruiz PA-C, 81 mg at 05/19/23 6114  •  atenolol (TENORMIN) tablet 100 mg, 100 mg, Oral, QPM, Laverne Ruiz PA-C, 100 mg at 05/18/23 1736  •  atorvastatin (LIPITOR) tablet 40 mg, 40 mg, Oral, Daily, Laverne Ruiz PA-C, 40 mg at 05/19/23 5535  •  Diclofenac Sodium (VOLTAREN) 1 % topical gel 2 g, 2 g, Topical, 4x Daily PRN, Annette Peterson PA-C, 2 g at 05/15/23 1804  •  escitalopram (LEXAPRO) tablet 10 mg, 10 mg, Oral, HS, Annette Peterson PA-C, 10 mg at 05/18/23 2221  •  heparin (porcine) subcutaneous injection 5,000 Units, 5,000 Units, Subcutaneous, Q8H [facility], 5,000 Units at 05/19/23 1316 **AND** [COMPLETED] Platelet count, , , Once, Laverne Ruiz PA-C  •  hydrALAZINE (APRESOLINE) injection 5 mg, 5 mg, Intravenous, Q6H PRN, Annette Peterson PA-C  •  losartan (COZAAR) tablet 50 mg, 50 mg, Oral, BID, Laverne Ruiz PA-C, 50 mg at 05/19/23 0835  •  melatonin tablet 3 mg, 3 mg, Oral, HS PRN, Annette Peterson PA-C, 3 mg at 05/18/23 2221  •  ondansetron (ZOFRAN) injection 4 mg, 4 mg, Intravenous, Q6H PRN, Laverne Ruiz PA-C  •  pantoprazole (PROTONIX) EC tablet 20 mg, 20 mg, Oral, Every Other Day, Laverne Ruiz PA-C, 20 mg at 05/18/23 0810  •  sodium chloride tablet 2 g, 2 g, Oral, BID, Laverne Ruiz PA-C, 2 g at 05/19/23 0835  •  torsemide (DEMADEX) tablet 10 mg, 10 mg, Oral, Daily, Annette Peterson PA-C, 10 mg at 05/19/23 0834    Labs & Results:    Lab Results   Component Value Date    CKTOTAL 157 (H) 12/01/2017    CKTOTAL 393 (H) 04/19/2017       Lab Results   Component Value Date    CALCIUM 9 2 05/19/2023     (L) 12/01/2017    K 4 0 05/19/2023    CO2 24 05/19/2023    CL 99 05/19/2023    BUN 23 05/19/2023    CREATININE 0 94 05/19/2023       Lab Results   Component Value Date    WBC 10 85 (H) 05/19/2023    HGB 11 4 (L) 05/19/2023    HCT 35 4 05/19/2023    MCV 94 05/19/2023     (H) 05/19/2023           Lab Results   Component Value Date    CHOL 165 12/01/2017    CHOL 149 04/19/2017     Lab Results   Component Value Date    HDL 45 (L) 02/25/2023    HDL 42 (L) 09/17/2022     Lab Results   Component Value Date    LDLCALC 54 02/25/2023    LDLCALC 38 09/17/2022     Lab Results   Component Value Date    TRIG 128 02/25/2023    TRIG 198 (H) 09/17/2022       Lab Results   Component Value Date    ALT 21 05/13/2023    AST 26 05/13/2023         EKG personally reviewed by )Devoarh Mcdaniel MD  No acute changes   TELE: No significant arrhythmias seen on telemetry review

## 2023-05-20 VITALS
DIASTOLIC BLOOD PRESSURE: 60 MMHG | BODY MASS INDEX: 26.68 KG/M2 | HEART RATE: 65 BPM | TEMPERATURE: 97.3 F | WEIGHT: 145 LBS | SYSTOLIC BLOOD PRESSURE: 144 MMHG | HEIGHT: 62 IN | OXYGEN SATURATION: 96 % | RESPIRATION RATE: 18 BRPM

## 2023-05-20 LAB
ANION GAP SERPL CALCULATED.3IONS-SCNC: 9 MMOL/L (ref 4–13)
BUN SERPL-MCNC: 18 MG/DL (ref 5–25)
CALCIUM SERPL-MCNC: 9.4 MG/DL (ref 8.4–10.2)
CHLORIDE SERPL-SCNC: 102 MMOL/L (ref 96–108)
CO2 SERPL-SCNC: 23 MMOL/L (ref 21–32)
CREAT SERPL-MCNC: 0.7 MG/DL (ref 0.6–1.3)
GFR SERPL CREATININE-BSD FRML MDRD: 80 ML/MIN/1.73SQ M
GLUCOSE SERPL-MCNC: 109 MG/DL (ref 65–140)
MAGNESIUM SERPL-MCNC: 1.7 MG/DL (ref 1.9–2.7)
POTASSIUM SERPL-SCNC: 3.8 MMOL/L (ref 3.5–5.3)
SODIUM SERPL-SCNC: 134 MMOL/L (ref 135–147)

## 2023-05-20 RX ORDER — LANOLIN ALCOHOL/MO/W.PET/CERES
400 CREAM (GRAM) TOPICAL 2 TIMES DAILY
Refills: 0
Start: 2023-05-20

## 2023-05-20 RX ORDER — LANOLIN ALCOHOL/MO/W.PET/CERES
3 CREAM (GRAM) TOPICAL
Refills: 0
Start: 2023-05-20

## 2023-05-20 RX ORDER — AMLODIPINE BESYLATE 5 MG/1
5 TABLET ORAL DAILY
Refills: 0
Start: 2023-05-20

## 2023-05-20 RX ORDER — LANOLIN ALCOHOL/MO/W.PET/CERES
400 CREAM (GRAM) TOPICAL 2 TIMES DAILY
Status: DISCONTINUED | OUTPATIENT
Start: 2023-05-20 | End: 2023-05-20 | Stop reason: HOSPADM

## 2023-05-20 RX ADMIN — HEPARIN SODIUM 5000 UNITS: 5000 INJECTION INTRAVENOUS; SUBCUTANEOUS at 05:25

## 2023-05-20 RX ADMIN — TORSEMIDE 10 MG: 20 TABLET ORAL at 09:12

## 2023-05-20 RX ADMIN — LOSARTAN POTASSIUM 50 MG: 50 TABLET, FILM COATED ORAL at 09:12

## 2023-05-20 RX ADMIN — Medication 400 MG: at 09:12

## 2023-05-20 RX ADMIN — SODIUM CHLORIDE 2 G: 1 TABLET ORAL at 09:12

## 2023-05-20 RX ADMIN — PANTOPRAZOLE SODIUM 20 MG: 20 TABLET, DELAYED RELEASE ORAL at 09:12

## 2023-05-20 RX ADMIN — ATORVASTATIN CALCIUM 40 MG: 40 TABLET, FILM COATED ORAL at 09:12

## 2023-05-20 RX ADMIN — AMLODIPINE BESYLATE 5 MG: 5 TABLET ORAL at 09:12

## 2023-05-20 RX ADMIN — ASPIRIN 81 MG 81 MG: 81 TABLET ORAL at 09:12

## 2023-05-20 NOTE — PLAN OF CARE
Problem: Potential for Falls  Goal: Patient will remain free of falls  Description: INTERVENTIONS:  - Educate patient/family on patient safety including physical limitations  - Instruct patient to call for assistance with activity   - Consult OT/PT to assist with strengthening/mobility   - Keep Call bell within reach  - Keep bed low and locked with side rails adjusted as appropriate  - Keep care items and personal belongings within reach  - Initiate and maintain comfort rounds  - Make Fall Risk Sign visible to staff  - Offer Toileting every 2 Hours, in advance of need  - Initiate/Maintain bed alarm  - Obtain necessary fall risk management equipment: call bell, bed alarm  - Apply yellow socks and bracelet for high fall risk patients  - Consider moving patient to room near nurses station  Outcome: Adequate for Discharge     Problem: MOBILITY - ADULT  Goal: Maintain or return to baseline ADL function  Description: INTERVENTIONS:  -  Assess patient's ability to carry out ADLs; assess patient's baseline for ADL function and identify physical deficits which impact ability to perform ADLs (bathing, care of mouth/teeth, toileting, grooming, dressing, etc )  - Assess/evaluate cause of self-care deficits   - Assess range of motion  - Assess patient's mobility; develop plan if impaired  - Assess patient's need for assistive devices and provide as appropriate  - Encourage maximum independence but intervene and supervise when necessary  - Involve family in performance of ADLs  - Assess for home care needs following discharge   - Consider OT consult to assist with ADL evaluation and planning for discharge  - Provide patient education as appropriate  Outcome: Adequate for Discharge  Goal: Maintains/Returns to pre admission functional level  Description: INTERVENTIONS:  - Perform BMAT or MOVE assessment daily    - Set and communicate daily mobility goal to care team and patient/family/caregiver     - Collaborate with rehabilitation services on mobility goals if consulted  - Perform Range of Motion 2 times a day  - Reposition patient every 3 hours    - Dangle patient 3 times a day  - Stand patient 3 times a day  - Ambulate patient 3 times a day  - Out of bed to chair 3 times a day   - Out of bed for meals 3 times a day  - Out of bed for toileting  - Record patient progress and toleration of activity level   Outcome: Adequate for Discharge     Problem: Prexisting or High Potential for Compromised Skin Integrity  Goal: Skin integrity is maintained or improved  Description: INTERVENTIONS:  - Identify patients at risk for skin breakdown  - Assess and monitor skin integrity  - Assess and monitor nutrition and hydration status  - Monitor labs   - Assess for incontinence   - Turn and reposition patient  - Assist with mobility/ambulation  - Relieve pressure over bony prominences  - Avoid friction and shearing  - Provide appropriate hygiene as needed including keeping skin clean and dry  - Evaluate need for skin moisturizer/barrier cream  - Collaborate with interdisciplinary team   - Patient/family teaching  - Consider wound care consult   Outcome: Adequate for Discharge     Problem: PAIN - ADULT  Goal: Verbalizes/displays adequate comfort level or baseline comfort level  Description: Interventions:  - Encourage patient to monitor pain and request assistance  - Assess pain using appropriate pain scale  - Administer analgesics based on type and severity of pain and evaluate response  - Implement non-pharmacological measures as appropriate and evaluate response  - Consider cultural and social influences on pain and pain management  - Notify physician/advanced practitioner if interventions unsuccessful or patient reports new pain  Outcome: Adequate for Discharge     Problem: DISCHARGE PLANNING  Goal: Discharge to home or other facility with appropriate resources  Description: INTERVENTIONS:  - Identify barriers to discharge w/patient and caregiver  - Arrange for needed discharge resources and transportation as appropriate  - Identify discharge learning needs (meds, wound care, etc )  - Arrange for interpretive services to assist at discharge as needed  - Refer to Case Management Department for coordinating discharge planning if the patient needs post-hospital services based on physician/advanced practitioner order or complex needs related to functional status, cognitive ability, or social support system  Outcome: Adequate for Discharge     Problem: Knowledge Deficit  Goal: Patient/family/caregiver demonstrates understanding of disease process, treatment plan, medications, and discharge instructions  Description: Complete learning assessment and assess knowledge base    Interventions:  - Provide teaching at level of understanding  - Provide teaching via preferred learning methods  Outcome: Adequate for Discharge     Problem: SAFETY ADULT  Goal: Patient will remain free of falls  Description: INTERVENTIONS:  - Educate patient/family on patient safety including physical limitations  - Instruct patient to call for assistance with activity   - Consult OT/PT to assist with strengthening/mobility   - Keep Call bell within reach  - Keep bed low and locked with side rails adjusted as appropriate  - Keep care items and personal belongings within reach  - Initiate and maintain comfort rounds  - Make Fall Risk Sign visible to staff  - Offer Toileting every 2 Hours, in advance of need  - Initiate/Maintain bed alarm  - Obtain necessary fall risk management equipment: call bell, bed alarm  - Apply yellow socks and bracelet for high fall risk patients  - Consider moving patient to room near nurses station  Outcome: Adequate for Discharge     Problem: NEUROSENSORY - ADULT  Goal: Achieves maximal functionality and self care  Description: INTERVENTIONS  - Monitor swallowing and airway patency with patient fatigue and changes in neurological status  - Encourage and assist patient to increase activity and self care     - Encourage visually impaired, hearing impaired and aphasic patients to use assistive/communication devices  Outcome: Adequate for Discharge     Problem: CARDIOVASCULAR - ADULT  Goal: Maintains optimal cardiac output and hemodynamic stability  Description: INTERVENTIONS:  - Monitor I/O, vital signs and rhythm  - Monitor for S/S and trends of decreased cardiac output  - Administer and titrate ordered vasoactive medications to optimize hemodynamic stability  - Assess quality of pulses, skin color and temperature  - Assess for signs of decreased coronary artery perfusion  - Instruct patient to report change in severity of symptoms  Outcome: Adequate for Discharge  Goal: Absence of cardiac dysrhythmias or at baseline rhythm  Description: INTERVENTIONS:  - Continuous cardiac monitoring, vital signs, obtain 12 lead EKG if ordered  - Administer antiarrhythmic and heart rate control medications as ordered  - Monitor electrolytes and administer replacement therapy as ordered  Outcome: Adequate for Discharge     Problem: GASTROINTESTINAL - ADULT  Goal: Maintains or returns to baseline bowel function  Description: INTERVENTIONS:  - Assess bowel function  - Encourage oral fluids to ensure adequate hydration  - Administer IV fluids if ordered to ensure adequate hydration  - Administer ordered medications as needed  - Encourage mobilization and activity  - Consider nutritional services referral to assist patient with adequate nutrition and appropriate food choices  Outcome: Adequate for Discharge     Problem: GENITOURINARY - ADULT  Goal: Absence of urinary retention  Description: INTERVENTIONS:  - Assess patient's ability to void and empty bladder  - Monitor I/O  - Bladder scan as needed  - Discuss with physician/AP medications to alleviate retention as needed  - Discuss catheterization for long term situations as appropriate  Outcome: Adequate for Discharge     Problem: METABOLIC, FLUID AND ELECTROLYTES - ADULT  Goal: Electrolytes maintained within normal limits  Description: INTERVENTIONS:  - Monitor labs and assess patient for signs and symptoms of electrolyte imbalances  - Administer electrolyte replacement as ordered  - Monitor response to electrolyte replacements, including repeat lab results as appropriate  - Instruct patient on fluid and nutrition as appropriate  Outcome: Adequate for Discharge  Goal: Fluid balance maintained  Description: INTERVENTIONS:  - Monitor labs   - Monitor I/O and WT  - Instruct patient on fluid and nutrition as appropriate  - Assess for signs & symptoms of volume excess or deficit  Outcome: Adequate for Discharge     Problem: HEMATOLOGIC - ADULT  Goal: Maintains hematologic stability  Description: INTERVENTIONS  - Assess for signs and symptoms of bleeding or hemorrhage  - Monitor labs  - Administer supportive blood products/factors as ordered and appropriate  Outcome: Adequate for Discharge     Problem: COPING  Goal: Pt/Family able to verbalize concerns and demonstrate effective coping strategies  Description: INTERVENTIONS:  - Assist patient/family to identify coping skills, available support systems and cultural and spiritual values  - Provide emotional support, including active listening and acknowledgement of concerns of patient and caregivers  - Reduce environmental stimuli, as able  - Provide patient education  - Assess for spiritual pain/suffering and initiate spiritual care, including notification of Pastoral Care or evin based community as needed  - Assess effectiveness of coping strategies  Outcome: Adequate for Discharge  Goal: Will report anxiety at manageable levels  Description: INTERVENTIONS:  - Administer medication as ordered  - Teach and encourage coping skills  - Provide emotional support  - Assess patient/family for anxiety and ability to cope  Outcome: Adequate for Discharge

## 2023-05-20 NOTE — ASSESSMENT & PLAN NOTE
· Patient presented to the ED after a fall at her assisted living facility, mechanical fall  · Uses a walker at baseline  · PT/OT recommending rehab, [facility]  · CM following  · Likely need to advance to assisted living from independent living after rehab given patient's worsening cognitive impairment

## 2023-05-20 NOTE — ASSESSMENT & PLAN NOTE
· Patient with reports of chest pressure that lasted several minutes and briefly radiated into her right arm 5/18 overnight  · EKG without ischemic changes at time of chest pain and 2 hours later  · Patient was loaded with aspirin  · Patient with history of NSTEMI, per daughter this happened approx 20 years ago  · Monitored on telemetry overnight, no acute findings  · Troponin trend 64-56-60, repeat hs random troponin down trending to 37  · Continues without cardiopulmonary complaints  · Appreciate cardiology recommendations  · Echocardiogram with EF of 82%, grade 1 diastolic dysfunction  · Nuclear medicine stress test without ST deviation, normal left ventricular perfusion, no evidence of inducible ischemia

## 2023-05-20 NOTE — DISCHARGE SUMMARY
[address] [facility]  Discharge- Jaspal Peraza 1940, 80 y o  female MRN: [id number]  Unit/Bed#: E2 -01 Encounter: [id number]  Primary Care Provider: Collin Monzon PA-C   Date and time admitted to hospital: 5/13/2023  6:12 PM    * Ambulatory dysfunction  Assessment & Plan  · Patient presented to the ED after a fall at her assisted living facility, mechanical fall  · Uses a walker at baseline  · PT/OT recommending rehab, plan for discharge to rehab at [facility] today  · CM following  · Likely need to advance to assisted living from independent living after rehab given patient's worsening cognitive impairment    Chest pressure  Assessment & Plan  · Patient with reports of chest pressure that lasted several minutes and briefly radiated into her right arm 5/18 overnight  · EKG without ischemic changes at time of chest pain and 2 hours later  · Patient was loaded with aspirin  · Patient with history of NSTEMI, per daughter this happened approx 20 years ago  · Monitored on telemetry overnight, no acute findings  · Troponin trend 64-56-60, repeat hs random troponin down trending to 37  · Continues without cardiopulmonary complaints  · Appreciate cardiology recommendations  · Echocardiogram with EF of 06%, grade 1 diastolic dysfunction  · Nuclear medicine stress test without ST deviation, normal left ventricular perfusion, no evidence of inducible ischemia    Fall  Assessment & Plan  · Patient presented after mechanical fall backwards at the bathroom with her walker   · PT/OT consulted  · Continue fall precautions while inpatient  · Plan for rehab today    Chronic obstructive pulmonary disease, unspecified COPD type (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  · Na stable at 134  · Continue salt tabs, Demadex, and fluid restriction    Essential hypertension  Assessment & Plan  · BP intermittently elevated during hospitalization, with suboptimal control  · Continue losartan 50 mg daily, atenolol 100 mg nightly and Demadex 10 mg daily  · Continue added amlodipine 5 mg daily from cardiology, monitor BP    Anxiety and depression  Assessment & Plan  · Mood stable, continue Lexapro 10 mg hs        Medical Problems     Resolved Problems  Date Reviewed: 5/20/2023   None       Discharging Physician / Practitioner: Marline Carpenter PA-C  PCP: Leslie Ness PA-C  Admission Date:   Admission Orders (From admission, onward)     Ordered        05/13/23 2246  INPATIENT ADMISSION  Once                      Discharge Date: 05/20/23    Consultations During Hospital Stay:  · Cardiology    Procedures Performed:   CT head without contrast    Result Date: 5/13/2023  Impression: 1  No intracranial hemorrhage or calvarial fracture  2   Chronic small vessel ischemic changes  3    shunt in place  Stable size and configuration of the ventricles  4   Opacification of the frontal sinus, correlate for sinusitis  CT spine cervical without contrast    Result Date: 5/13/2023  Impression: No cervical spine fracture or traumatic malalignment  XR shunt series    Result Date: 5/14/2023  Impression: Intact  shunt catheter  Nuclear medicine stress test-no ST deviation noted, left ventricular perfusion normal, no evidence of inducible ischemia or scar    Echocardiogram with EF of 58%, grade 1 diastolic dysfunction    Significant Findings / Test Results:   · See above    Incidental Findings:   · None      Test Results Pending at Discharge (will require follow up):    · None     Outpatient Tests Requested:  · Outpatient PCP follow-up    Complications: None    Reason "for Admission: Ambulatory dysfunction    Hospital Course:   Jaskaran Herr is a 80 y o  female patient who originally presented to the hospital on 5/13/2023 due to fall at independent living  Patient is presented to hospital after she fell at her independent living facility  She was admitted and worked with physical and Occupational Therapy who recommended rehab stay at time of discharge  She did develop chest pain during hospitalization and was seen in consult by cardiology  She underwent nuclear medicine stress test which was negative for inducible ischemia  She was seen medically clear for discharge and [facility]  Please see above list of diagnoses and related plan for additional information  Condition at Discharge: stable    Discharge Day Visit / Exam:   Subjective: Patient notes she is doing well today  Agreeable for rehab stay at time of discharge  Feels comfortable leaving the hospital today  Vitals: Blood Pressure: 144/60 (05/20/23 0733)  Pulse: 65 (05/20/23 0733)  Temperature: (!) 97 3 °F (36 3 °C) (05/20/23 0733)  Temp Source: Temporal (05/20/23 0733)  Respirations: 18 (05/20/23 0733)  Height: 5' 2\" (157 5 cm) (05/19/23 0910)  Weight - Scale: 65 8 kg (145 lb) (05/19/23 0910)  SpO2: 96 % (05/20/23 0733)  Exam:   Physical Exam  Vitals reviewed  Constitutional:       General: She is not in acute distress  HENT:      Head: Normocephalic and atraumatic  Eyes:      General: No scleral icterus  Conjunctiva/sclera: Conjunctivae normal    Cardiovascular:      Rate and Rhythm: Normal rate and regular rhythm  Heart sounds: No murmur heard  Pulmonary:      Effort: Pulmonary effort is normal  No respiratory distress  Breath sounds: Normal breath sounds  Abdominal:      General: Bowel sounds are normal  There is no distension  Palpations: Abdomen is soft  Tenderness: There is no abdominal tenderness     Musculoskeletal:      Cervical back: Neck " supple  Right lower leg: No edema  Left lower leg: No edema  Skin:     General: Skin is warm and dry  Neurological:      General: No focal deficit present  Mental Status: She is alert  Mental status is at baseline  Psychiatric:         Mood and Affect: Mood normal          Behavior: Behavior normal           Discharge instructions/Information to patient and family:   See after visit summary for information provided to patient and family  Provisions for Follow-Up Care:  See after visit summary for information related to follow-up care and any pertinent home health orders  Disposition:   [facility]    Planned Readmission: None     Discharge Statement:  I spent 35 minutes discharging the patient  This time was spent on the day of discharge  I had direct contact with the patient on the day of discharge  Greater than 50% of the total time was spent examining patient, answering all patient questions, arranging and discussing plan of care with patient as well as directly providing post-discharge instructions  Additional time then spent on discharge activities  Discharge Medications:  See after visit summary for reconciled discharge medications provided to patient and/or family        **Please Note: This note may have been constructed using a voice recognition system**

## 2023-05-20 NOTE — CASE MANAGEMENT
Case Management Discharge Planning Note    Patient name Hernan Stockton  Location [facility] /E2 -* MRN [id number]  : 1940 Date 2023       Current Admission Date: 2023  Current Admission Diagnosis:Ambulatory dysfunction   Patient Active Problem List    Diagnosis Date Noted   • Chest pressure 2023   • Fall 2023   • Persistent proteinuria 2023   • COVID 2022   • Mild recurrent major depression ([place] [address] ) 2022   • Non-ST elevation myocardial infarction (NSTEMI) ([place] [address] ) 2022   • Chronic obstructive pulmonary disease, unspecified COPD type ([place] [address] ) 2022   • Polypharmacy 2021   • MCI (mild cognitive impairment) 2021   • Hyperglycemia 2021   • Primary insomnia 2021   • SIADH (syndrome of inappropriate ADH production) ([place] [address] ) 2021   • Medicare annual wellness visit, subsequent 2020   • Osteopenia of multiple sites 2020   • S/P  shunt 10/04/2019   • Recurrent falls 2019   • Hyperkalemia 2019   • Leukocytosis 2019   • Stage 2 chronic kidney disease 2019   • Normal pressure hydrocephalus ([facility] ) 2018   • Ambulatory dysfunction 12/15/2017   • Positive ROSALINDA (antinuclear antibody) 12/15/2017   • 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   • Anxiety and depression 10/20/2017   • 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   • Splenic infarction 10/11/2017   • Levoscoliosis 2017   • Elevated sed rate 2017   • Vitamin D deficiency 2017   • Radiculopathy 2017   • GERD without esophagitis 2017   • Essential (hemorrhagic) thrombocythemia ([facility] ) 2017   • Carotid artery plaque 2016   • Chronic hyponatremia 2014   • Hyperlipidemia 2014   • Carotid bruit 2014   • Essential hypertension 2009   • Coronary atherosclerosis 2009      LOS (days): 7  Geometric Mean LOS (GMLOS) (days): 3 50  Days to GMLOS:-3     OBJECTIVE:  Risk of Unplanned Readmission Score: 14 15 Current admission status: Inpatient   Preferred Pharmacy:   [facility] [address] [address]  Phone: [phone] Fax: [phone]    Primary Care Provider: Elsi Hawthorne PA-C    Primary Insurance: [facility] [employer] [employer]  Secondary Insurance:     DISCHARGE DETAILS:     [facility]  [facility] was to pick patient up via wcv at 1400 today  RN notified  that patient's daughter wants to pick patient up earlier  RN called patient's daughter to confirm that it is before noon because per COLLEEN, [facility] is stating that the COVID test would not be good after noon  RN informed CM that patient's daughter will transport before noon  CM called [facility] at [phone] and spoke with supervisor on duty, Tejal Elmore   confirmed that patient is on their admission list  She is agreeable to patient's daughter transporting her there by noon  CM provided RN with updates  RN will call [facility] to provide report to Tejal Elmore CM faxed AVS to [facility] at [phone]   DIAMANTE called [facility] and spoke with [employer] to cancel the [facility] wcv at 2pm

## 2023-05-20 NOTE — ASSESSMENT & PLAN NOTE
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

## 2023-05-20 NOTE — ASSESSMENT & PLAN NOTE
· Patient presented after mechanical fall backwards at the bathroom with her walker   · PT/OT consulted  · Continue fall precautions while inpatient  · Plan for rehab today

## 2023-05-21 ENCOUNTER — TELEPHONE (OUTPATIENT)
Dept: FAMILY MEDICINE CLINIC | Facility: CLINIC | Age: 83
End: 2023-05-21

## 2023-05-21 NOTE — TELEPHONE ENCOUNTER
----- Message from Beverly Cummins PA-C sent at 5/20/2023  9:06 AM EDT -----  Thank you for allowing us to participate in the care of your patient, Arlene Huerta, who was hospitalized from 5/13/2023 through 5/20/2023 with the admitting diagnosis of ambulatory dysfunction  She presented to hospital after a fall at her independent living facility  She worked with physical and Occupational Therapy and was discharged to short-term rehab at [facility]  She did develop chest pain during admission and was seen in consult by cardiology  Underwent nuclear medicine stress test which was negative for ischemia  She was started on Norvasc with improvement in her blood pressure  If you have any additional questions or would like to discuss further, please feel free to contact me      Roxana Good, [facility], Hospitalist  [phone]

## 2023-05-22 LAB
CHEST PAIN STATEMENT: NORMAL
MAX DIASTOLIC BP: 65 MMHG
MAX HEART RATE: 93 BPM
MAX PREDICTED HEART RATE: 138 BPM
MAX. SYSTOLIC BP: 157 MMHG
PROTOCOL NAME: NORMAL
REASON FOR TERMINATION: NORMAL
TARGET HR FORMULA: NORMAL
TEST INDICATION: NORMAL
TIME IN EXERCISE PHASE: NORMAL

## 2023-06-07 ENCOUNTER — TELEPHONE (OUTPATIENT)
Dept: FAMILY MEDICINE CLINIC | Facility: CLINIC | Age: 83
End: 2023-06-07

## 2023-06-07 NOTE — TELEPHONE ENCOUNTER
Unfortunately I can not find any documents pertaining to pts rehab stay  I see she was admitted through the ER s/p fall and then transferred to rehab but no d/c from rehab paperwork in media? She was not on Seroquel when transferred from hospital to rehab  Can we get at least he d/c rehab summary somehow?

## 2023-06-07 NOTE — TELEPHONE ENCOUNTER
Patient hesham mcgill called in and made an appointment for patient as she was discharged from her rehab facility  Bhavna stated that patient was treated very badly in facility and she was given the medication seroquel  Bhavna stated that patient is currently in [facility] facility but she would like the patient off said medication which they just need a Discontinue order from [facility]  Bhavna asked was there any way that [facility] would be able to do that before her visit on 06/13/2023  Daughter bhavna can be reached at [phone]   Please advise thank you

## 2023-06-08 DIAGNOSIS — M54.10 RADICULOPATHY, UNSPECIFIED SPINAL REGION: ICD-10-CM

## 2023-06-08 DIAGNOSIS — R68.2 DRY MOUTH: ICD-10-CM

## 2023-06-08 DIAGNOSIS — K59.03 DRUG-INDUCED CONSTIPATION: Primary | ICD-10-CM

## 2023-06-08 RX ORDER — ACETAMINOPHEN 325 MG/1
TABLET ORAL
Qty: 60 TABLET | Refills: 5 | Status: SHIPPED | OUTPATIENT
Start: 2023-06-08

## 2023-06-08 RX ORDER — SODIUM PHOSPHATE, DIBASIC AND SODIUM PHOSPHATE, MONOBASIC 7; 19 G/230ML; G/230ML
ENEMA RECTAL
Qty: 133 ML | Refills: 5 | Status: SHIPPED | OUTPATIENT
Start: 2023-06-08

## 2023-06-08 RX ORDER — MAGNESIUM HYDROXIDE 1200 MG/15ML
SUSPENSION ORAL
Qty: 473 ML | Refills: 5 | Status: SHIPPED | OUTPATIENT
Start: 2023-06-08

## 2023-06-08 RX ORDER — FLUORIDE TOOTHPASTE
TOOTHPASTE DENTAL
Qty: 237 ML | Refills: 5 | Status: SHIPPED | OUTPATIENT
Start: 2023-06-08

## 2023-06-08 NOTE — TELEPHONE ENCOUNTER
Flaco Lo response  I left our fax number on the voicemail as well for the d/c summary from rehab to be sent here

## 2023-06-09 ENCOUNTER — TELEPHONE (OUTPATIENT)
Dept: FAMILY MEDICINE CLINIC | Facility: CLINIC | Age: 83
End: 2023-06-09

## 2023-06-09 DIAGNOSIS — K64.9 HEMORRHOIDS, UNSPECIFIED HEMORRHOID TYPE: Primary | ICD-10-CM

## 2023-06-09 RX ORDER — HYDROCORTISONE 25 MG/G
CREAM TOPICAL 2 TIMES DAILY
Qty: 28 G | Refills: 11 | Status: SHIPPED | OUTPATIENT
Start: 2023-06-09

## 2023-06-09 NOTE — TELEPHONE ENCOUNTER
Theone Bark from [facility] is calling regarding the hydrocortisone suppositories  Patient's family states that it is $270 for a 14 day supply  However, if Ja Carey can send in the cream instead, there will be no out of pocket cost  Is cesar willing to change to the cream? Please call Taylor Carrington back at [phone] to advise  They use the [facility] on file

## 2023-06-13 ENCOUNTER — OFFICE VISIT (OUTPATIENT)
Dept: FAMILY MEDICINE CLINIC | Facility: CLINIC | Age: 83
End: 2023-06-13
Payer: COMMERCIAL

## 2023-06-13 VITALS
SYSTOLIC BLOOD PRESSURE: 112 MMHG | HEART RATE: 62 BPM | WEIGHT: 147 LBS | HEIGHT: 62 IN | OXYGEN SATURATION: 96 % | BODY MASS INDEX: 27.05 KG/M2 | DIASTOLIC BLOOD PRESSURE: 54 MMHG

## 2023-06-13 DIAGNOSIS — I10 ESSENTIAL HYPERTENSION: Primary | ICD-10-CM

## 2023-06-13 DIAGNOSIS — E78.2 MIXED HYPERLIPIDEMIA: ICD-10-CM

## 2023-06-13 DIAGNOSIS — F33.0 MILD RECURRENT MAJOR DEPRESSION (HCC): ICD-10-CM

## 2023-06-13 DIAGNOSIS — E22.2 SIADH (SYNDROME OF INAPPROPRIATE ADH PRODUCTION) (HCC): ICD-10-CM

## 2023-06-13 DIAGNOSIS — D47.3 ESSENTIAL (HEMORRHAGIC) THROMBOCYTHEMIA (HCC): ICD-10-CM

## 2023-06-13 DIAGNOSIS — N30.00 ACUTE CYSTITIS WITHOUT HEMATURIA: ICD-10-CM

## 2023-06-13 DIAGNOSIS — R07.89 CHEST PRESSURE: ICD-10-CM

## 2023-06-13 DIAGNOSIS — G91.2 NORMAL PRESSURE HYDROCEPHALUS (HCC): ICD-10-CM

## 2023-06-13 DIAGNOSIS — G31.84 MCI (MILD COGNITIVE IMPAIRMENT): ICD-10-CM

## 2023-06-13 PROCEDURE — 99214 OFFICE O/P EST MOD 30 MIN: CPT | Performed by: PHYSICIAN ASSISTANT

## 2023-06-13 RX ORDER — QUETIAPINE FUMARATE 25 MG/1
TABLET, FILM COATED ORAL
COMMUNITY
Start: 2023-06-06 | End: 2023-06-13 | Stop reason: ALTCHOICE

## 2023-06-13 NOTE — PROGRESS NOTES
Assessment & Plan     1  Essential hypertension  Assessment & Plan:  Stable  Norvasc 5 mg was added during recent admit  Orders:  -     CBC and differential  -     Comprehensive metabolic panel    2  Chest pressure  Assessment & Plan:  Full cardiac workup was done during recent admit including Nuc stress test        3  MCI (mild cognitive impairment)  Assessment & Plan:  PT was started on seroquel during 2 week acute rehab admit for agitation however there were real reasons for her agitation like facility los there laundry  Therefore as pt is back in assisted living and has geriatric apt on 6/21 I will d/c this med  4  Essential (hemorrhagic) thrombocythemia ([facility] )  Assessment & Plan:  Has been stable check CBC  Hemoglobin was slightly trending downward up on last check on 25 May  5  SIADH (syndrome of inappropriate ADH production) ([facility] )  Assessment & Plan:  Continue sodium Demadex fluid restrictions and has appointment with nephrology on 12 September 6  Normal pressure hydrocephalus (HCC)  Assessment & Plan:  [facility] to make sure patient shunt was still in place which it was and she does have neurosurgery follow-up on 19 July 7  Mild recurrent major depression ([facility] )  Assessment & Plan:  Continue on Lexapro  Keep geriatric appointment  8  Acute cystitis without hematuria  Comments:  Positive UA on the 24th treated with antibiotics  Repeat urine as patient was asymptomatic and was only detected due to mood changes  Orders:  -     UA w Reflex to Microscopic w Reflex to Culture    9  Mixed hyperlipidemia  Assessment & Plan:  Continue Lipitor cardiology follow-up has blood work due in October  Subjective     Transitional Care Management Review:   Elisa Burgess is a 80 y o  female here for TCM follow up       During the TCM phone call patient stated:  TCM Call     None      TCM Call     None        Patient here today accompanied by her daughter for a CAL visit  [facility] and needed to go to the emergency room at [facility] where she was admitted  She was given a diagnosis of ambulation dysfunction hypertension added amlodipine 5 had chest pain while she was there with a negative cardiac work-up including a negative nuclear medication stress test   She was felt to be not ready for discharge home on the 20th so therefore she was discharged to complete care rehab  During this stay it appears she was found to have a UTI after failing numbers noted changes in mood and it looks like also she was started on Seroquel 12 5 at some point  Family members had called after she was discharged back to her [facility] asking for continuation of the Seroquel however I did not see on her med list on the transfer sheet but it is actually in the body of the multipage report and discussion section only  Patient has appoint with geriatrics on the 21st of this month cardiology on 17 July neurosurgery on 19 July nephrology on 12 September and with myself again in October for follow-up  After speaking with the daughter and the patient during the visit the patient did end up going back to [facility] under assisted living and now has all meals made for her medications dispensed but is able to visit her friends in independent living  She currently has a roommate  Review of Systems   Constitutional: Negative  HENT: Negative  Eyes: Negative  Respiratory: Negative  Cardiovascular: Negative  Gastrointestinal: Negative  Endocrine: Negative  Genitourinary: Negative  Musculoskeletal: Negative  Skin: Negative  Allergic/Immunologic: Negative  Neurological: Negative  Hematological: Negative  Psychiatric/Behavioral: Negative          Objective     /54 (BP Location: Right arm, Patient Position: Sitting, Cuff Size: Large) " Pulse 62   Ht 5' 2\" (1 575 m)   Wt 66 7 kg (147 lb)   SpO2 96%   BMI 26 89 kg/m²      Physical Exam  Vitals and nursing note reviewed  Constitutional:       Appearance: Normal appearance  She is well-developed  HENT:      Head: Normocephalic and atraumatic  Eyes:      General: Lids are normal       Conjunctiva/sclera: Conjunctivae normal       Pupils: Pupils are equal, round, and reactive to light  Cardiovascular:      Rate and Rhythm: Normal rate and regular rhythm  Heart sounds: No murmur heard  Pulmonary:      Effort: Pulmonary effort is normal       Breath sounds: Normal breath sounds  Skin:     General: Skin is warm and dry  Neurological:      General: No focal deficit present  Mental Status: She is alert  Coordination: Coordination is intact  Psychiatric:         Mood and Affect: Mood normal          Behavior: Behavior normal  Behavior is cooperative  Thought Content:  Thought content normal          Judgment: Judgment normal        Medications have been reviewed by provider in current encounter    Wang Lynch PA-C       "

## 2023-06-13 NOTE — ASSESSMENT & PLAN NOTE
Status post fall and admission at [place] [id number] imaging was obtained to make sure patient shunt was still in place which it was and she does have neurosurgery follow-up on 19 July

## 2023-06-13 NOTE — ASSESSMENT & PLAN NOTE
PT was started on seroquel during 2 week acute rehab admit for agitation however there were real reasons for her agitation like facility los there laundry  Therefore as pt is back in assisted living and has geriatric apt on 6/21 I will d/c this med

## 2023-06-13 NOTE — PATIENT INSTRUCTIONS
1  Acute cystitis without hematuria  -     UA w Reflex to Microscopic w Reflex to Culture    2  Essential hypertension  Assessment & Plan:  Stable  Norvasc 5 mg was added during recent admit  Orders:  -     CBC and differential  -     Comprehensive metabolic panel    3  Chest pressure  Assessment & Plan:  Full cardiac workup was done during recent admit including Nuc stress test        4  MCI (mild cognitive impairment)  Assessment & Plan:  PT was started on seroquel during 2 week acute rehab admit for agitation however there were real reasons for her agitation like facility los there laundry  Therefore as pt is back in assisted living and has geriatric apt on 6/21 I will d/c this med         5  Essential (hemorrhagic) thrombocythemia ([facility] )

## 2023-06-15 DIAGNOSIS — D64.9 ANEMIA, UNSPECIFIED TYPE: Primary | ICD-10-CM

## 2023-06-20 DIAGNOSIS — E55.9 VITAMIN D DEFICIENCY: ICD-10-CM

## 2023-06-20 DIAGNOSIS — R07.89 CHEST PRESSURE: ICD-10-CM

## 2023-06-20 RX ORDER — LANOLIN ALCOHOL/MO/W.PET/CERES
CREAM (GRAM) TOPICAL
Qty: 60 TABLET | Refills: 5 | Status: SHIPPED | OUTPATIENT
Start: 2023-06-20

## 2023-06-20 RX ORDER — AMLODIPINE BESYLATE 5 MG/1
TABLET ORAL
Qty: 30 TABLET | Refills: 5 | Status: SHIPPED | OUTPATIENT
Start: 2023-06-20

## 2023-06-20 RX ORDER — MULTIVIT-MIN/IRON/FOLIC ACID/K 18-600-40
CAPSULE ORAL
Qty: 120 TABLET | Refills: 5 | Status: SHIPPED | OUTPATIENT
Start: 2023-06-20

## 2023-06-21 ENCOUNTER — OFFICE VISIT (OUTPATIENT)
Age: 83
End: 2023-06-21
Payer: COMMERCIAL

## 2023-06-21 VITALS
HEIGHT: 61 IN | WEIGHT: 145.4 LBS | DIASTOLIC BLOOD PRESSURE: 58 MMHG | HEART RATE: 59 BPM | TEMPERATURE: 97.6 F | SYSTOLIC BLOOD PRESSURE: 128 MMHG | BODY MASS INDEX: 27.45 KG/M2 | OXYGEN SATURATION: 98 %

## 2023-06-21 DIAGNOSIS — F51.01 PRIMARY INSOMNIA: ICD-10-CM

## 2023-06-21 DIAGNOSIS — I10 ESSENTIAL HYPERTENSION: ICD-10-CM

## 2023-06-21 DIAGNOSIS — F32.A ANXIETY AND DEPRESSION: ICD-10-CM

## 2023-06-21 DIAGNOSIS — F03.A0 MILD DEMENTIA WITHOUT BEHAVIORAL DISTURBANCE, PSYCHOTIC DISTURBANCE, MOOD DISTURBANCE, OR ANXIETY, UNSPECIFIED DEMENTIA TYPE (HCC): Primary | ICD-10-CM

## 2023-06-21 DIAGNOSIS — L25.8 INCONTINENCE ASSOCIATED DERMATITIS: ICD-10-CM

## 2023-06-21 DIAGNOSIS — R26.2 AMBULATORY DYSFUNCTION: ICD-10-CM

## 2023-06-21 DIAGNOSIS — R32 INCONTINENCE ASSOCIATED DERMATITIS: ICD-10-CM

## 2023-06-21 DIAGNOSIS — F41.9 ANXIETY AND DEPRESSION: ICD-10-CM

## 2023-06-21 PROCEDURE — 99214 OFFICE O/P EST MOD 30 MIN: CPT | Performed by: NURSE PRACTITIONER

## 2023-06-21 RX ORDER — MINERAL OIL 100 G/100G
1 OIL RECTAL ONCE
COMMUNITY

## 2023-06-21 RX ORDER — BISACODYL 5 MG/1
10 TABLET, DELAYED RELEASE ORAL DAILY PRN
COMMUNITY

## 2023-06-21 NOTE — PROGRESS NOTES
Assessment & Plan:   1  Mild dementia without behavioral disturbance, psychotic disturbance, mood disturbance, or anxiety, unspecified dementia type [facility])  Assessment & Plan:  • MOCA:  15/30  Deficits in:  All domains  o Pt's current cognitive level is consistent with mild dementia, this is change   o Pt is supported for adl/dependent for iadls  Lives at [facility]  o Mobility:  Using walker  • Continue to stay active  Current activity level:  improved  Participates in social, cognitive, physical activity  • Monitor for changes in mood, sleep, pain control  Notify provider if any concerns  • Medication review:  Seem appropriate for current conditions  o Check with pharmacist/provider before starting any new OTC/prescription medications for potential cognitive side effects  • Optimize all acute and chronic conditions  Continue to follow-up with PCP and specialist as directed for management  • Safety concerns: In [place], doing ok  • Caregiver stress:  Pt adjusting to new facility, safer, having some confusions right now  • Ensure adequate hydration, good nutrition  • Goal: enjoy time at [facility], increase activities      2  Ambulatory dysfunction  Assessment & Plan:  · Uses walker, has had a couple falls  · Currently going to PT OT  · Fall precautions      3  Anxiety and depression  Assessment & Plan:  · Had increased agitations at rehab, was started on Seroquel  · Seroquel has been weaned off  Patient still having good and bad days, no hallucinations  · Agree with continuing to keep seroquel off  Would ensure good sleep and pain control, give some time for patient to adjust to new surroundings  · Continue emotional support, relaxation techniques  4  Essential hypertension  Assessment & Plan:  · P stable without complaints of headache or dizziness  · Continue torsemide, atenolol, amlodipine  · Continue dietary lifestyle interventions  Continue follow-ups with PCP for chronic management      5  Primary insomnia    6  Incontinence associated dermatitis  Assessment & Plan:  · Skin reddened with pea size scabbed area on left upper inner buttock crevis  · Ordered desiten to be applied bid and prn  Clean skin well and pat dry  · Request wound team monitor patient's skin at facility  · Cont to off load pressure and monitor closely      HPI:  We had the pleasure of evaluating Ceci Haskins who is a 80 y o  female in Geriatric follow up today  Previous MOCA:  15/30  Comorbidities include mci, htn, ambulatory dysfunction  She lives in [facility]  Ms Iram Simon is in the office with her daughter    Memory update per patient:  Likes new place  Like room mate  Sleeping good  Eating better (food is good)  No pain  Cognitive:  [employer], word searches  Physical:  Exercise class  Denies going to PT/OT  She has difficulty finding the right word while speaking: Yes - more frequently   Patient requires repeat information or ask the same question repeatedly: Yes - more frequently  Most of confusion is day and time  Do you do your own bathing, dressing, feeding yourself Yes - has assist for safety, needs some cues  Can you make your own meals and do light cleaning/chores No  Do you take care of your own medications No  Do you drive: No      Do you handle your own financial affairs such as balancing your checkbook, paying bills, investments: No  Have you or your family noted any change in your mood or personality:Yes - increased agitation since new room  Are you currently or have you been treated in the past for depression or anxiety: Yes  Have you noticed any gait or balance disorder: Yes  Uses :Walker: had couple falls in rehab  Going to PT/OT right now    Any hallucination or delusion: No  Sleep Issues: Yes  Urinary/Stool Incontinence: Yes  Hearing and vision issue: No  ADL/IADL:  Assist/dependent  Appetite/swallow:  Good/good  Pain:  no    Past Medical, surgical, social, medication and allergy history and patients previous records reviewed  Memory update per family:  Patient having increased confusion, not eating lunch food, filling up in soda, eating one meal a day  Had a fall backwards, sent to ED in May  On 6/6 went to [facility] (after rehab)  In complete care she was started on seroquel for agitations  Lost her laundry, was sitting in diaper, wouldn't let her up  Cut fluid intake  She was angry and agitated  Stopped it at [facility] last Tuesday  On Thursday she was agitated again  New to room mate  No complaints  Na 140, UA negative  Now getting 3 meals a day  Does seem to like it  Doing more activities, getting more involved  Does seem a little more forgetful  Incontinence at home, did get worse  In hospital she was doing better  At rehab not coming fast enough, restarted diapers  On toileting program   Last agitation last week (Thursday)  Stopped seroquel Tuesday  Melatonin didn't help much  Not sleeping to much during the day  Family Review of Behavior [facility]:    pacing  No    agressive/combative behavior  No    agitated  Yes   wandering  Yes  - possibly sleep walking or wandering at night @ [place] (when na low)  resistance to care  No   hoarding/hiding objects  No    suspicious  Yes  withdrawn No  misplacing/losing objects No  personal hygiene problems  Yes - assist from staff  forgetfulness of actions Yes    ROS: Review of Systems   Constitutional: Negative for activity change, appetite change, chills and fatigue  HENT: Negative for congestion, hearing loss and trouble swallowing  Eyes: Negative for visual disturbance  Respiratory: Negative for cough and shortness of breath  Cardiovascular: Negative for chest pain  Gastrointestinal: Negative for abdominal pain, constipation, diarrhea, nausea and vomiting  Genitourinary: Negative for difficulty urinating  Musculoskeletal: Positive for gait problem  Negative for arthralgias and back pain     Neurological: Negative for dizziness and light-headedness  Psychiatric/Behavioral: Positive for decreased concentration (forgetful)  Negative for dysphoric mood and sleep disturbance  The patient is not nervous/anxious          Allergies:   No Known Allergies    Medications:      Current Outpatient Medications:   •  acetaminophen (TYLENOL) 325 mg tablet, TAKE 2 TABLETS (650MG) ORALLY EVERY 4 HOURS AS NEEDED FOR MILD PAIN NO MORE THAN 3 DOSES IN 48 HOURS 1) REPOSITION FOR COMFORT 2) MASSAGE 3) INVOLVE IN ACTIVITY TO DIVER ATTENTION 4) RELAXATION TECHNIQUE 5) MUSIC 6) RE-DIRECTION/DISTRACTION 7)TOLERATED *NOT TO EXCEED 3GM APAP/24HR* *REORDER*;TAKE 2 TABLETS (650MG) ORALLY EVERY 4 HOURS AS NEEDED FOR TEMP 100F OR ABOVE NOTIFY PROVIDER *NOT TO EXCEED 3GM APAP/24HR* *REORDER*, Disp: 60 tablet, Rfl: 5  •  amLODIPine (NORVASC) 5 mg tablet, TAKE 1 TABLET ORALLY DAILY (HYPERTENSION), Disp: 30 tablet, Rfl: 5  •  aspirin 81 mg chewable tablet, CHEW 1 TABLET AND SWALLOW ORALLY DAILY (HYPERTENSION), Disp: 30 tablet, Rfl: 4  •  atenolol (TENORMIN) 100 mg tablet, TAKE ONE TABLET BY MOUTH EVERY DAY, Disp: 90 tablet, Rfl: 3  •  atorvastatin (LIPITOR) 40 mg tablet, TAKE ONE TABLET BY MOUTH EVERY DAY, Disp: 90 tablet, Rfl: 3  •  bisacodyl (DULCOLAX) 5 mg EC tablet, Take 10 mg by mouth daily as needed for constipation, Disp: , Rfl:   •  D3-1000 25 MCG (1000 UT) tablet, TAKE 4 TABLETS (4,000 UNIT) ORALLY DAILY (SUPPLEMENT), Disp: 120 tablet, Rfl: 5  •  escitalopram (Lexapro) 10 mg tablet, Take 1 tablet (10 mg total) by mouth daily, Disp: 30 tablet, Rfl: 5  •  hydrocortisone (ANUSOL-HC) 2 5 % rectal cream, Apply topically 2 (two) times a day, Disp: 28 g, Rfl: 11  •  losartan (COZAAR) 50 mg tablet, TAKE ONE TABLET BY MOUTH TWICE A DAY, Disp: 180 tablet, Rfl: 3  •  magnesium Oxide (MAG-OX) 400 mg TABS, TAKE 1 TABLET ORALLY TWICE DAILY (SUPPLEMENT), Disp: 60 tablet, Rfl: 5  •  Milk of Magnesia 400 MG/5ML oral suspension, TAKE 30ML ORALLY AS NEEDED FOR CONSTIPATION IF NO BOWEL MOVEMENT IN 3 DAYS  GIVE AT BEDTIME IF NO BOWEL MOVEMENT IN 3 DAYS  SEPARATE BY OTHER MEDICATION BY AT LEAST 2 HOURS, Disp: 473 mL, Rfl: 5  •  mineral oil enema, Insert 1 enema into the rectum once, Disp: , Rfl:   •  Mouthwashes (Biotene Dry Mouth) LIQD, RINSE WITH 1 OUNCE ORALLY EVERY MORNING AND AT BEDTIME FOR DRY MOUTH, RINSE FOR 30 SECONDS THEN SPIT, DO NOT SWALLOW *REORDER*, Disp: 237 mL, Rfl: 5  •  multivitamin (THERAGRAN) TABS, Take 1 tablet by mouth daily , Disp: , Rfl:   •  omeprazole (PriLOSEC) 20 mg delayed release capsule, TAKE ONE CAPSULE BY MOUTH EVERY MORNING (Patient taking differently: every other day), Disp: 90 capsule, Rfl: 3  •  sodium chloride 1 g tablet, TAKE TWO TABLETS BY MOUTH TWICE A DAY, Disp: 360 tablet, Rfl: 3  •  torsemide (DEMADEX) 10 mg tablet, Take 1 tablet (10 mg total) by mouth daily, Disp: 90 tablet, Rfl: 3  •  melatonin 3 mg, Take 1 tablet (3 mg total) by mouth daily at bedtime (Patient not taking: Reported on 6/13/2023), Disp: , Rfl: 0  •  Sodium Phosphates (Fleet Enema) ENEM, INSERT 1 ENEMA RECTALLY AS NEEDED FOR CONSTIPATION IF NO RESULT FROM BISACODYL WITHIN 2 HOURS  IF NO RESULTS FROM ENEMA   CALL PROVIDER FOR FURTHER ORDERS *REORDER*, Disp: 133 mL, Rfl: 5  •  Vascepa 1 g CAPS, TAKE 2 CAPSULES BY MOUTH 2 TIMES A DAY, Disp: 360 capsule, Rfl: 3    Vitals:  Vitals:    06/21/23 1548   BP: 128/58   Pulse: 59   Temp: 97 6 °F (36 4 °C)   SpO2: 98%       History:  Past Medical History:   Diagnosis Date   • Anxiety    • Arthritis    • Confusion 10/2/2018   • Depression    • Displaced fracture of distal phalanx of right thumb    • GERD (gastroesophageal reflux disease)    • Heart attack ([address] )    • Hyperlipidemia    • Hypertension    • Moderate episode of recurrent major depressive disorder ([facility] ) 4/12/2019   • Shortness of breath    • Stage 2 chronic kidney disease 7/17/2019     Past Surgical History:   Procedure Laterality Date   • APPENDECTOMY     • CARPAL TUNNEL RELEASE     • CATARACT EXTRACTION Bilateral    • COLONOSCOPY     • FL LUMBAR PUNCTURE DIAGNOSTIC  1/10/2019   • FRACTURE SURGERY     • NM CRTJ SHUNT [id number] TERMINUS Right 9/3/2019    Procedure: Insertion of frontal ventriculoperitoneal shunt;  Surgeon: Ana Clark MD;  Location: [facility];  Service: Neurosurgery   • SEPTOPLASTY     • WRIST FRACTURE SURGERY Right      Family History   Problem Relation Age of Onset   • Heart attack Mother 39   • Heart attack Father 61   • No Known Problems Other    • Breast cancer Sister    • Alcohol abuse Daughter         history of   • Heart attack Brother      Social History     Socioeconomic History   • Marital status:      Spouse name: Not on file   • Number of children: Not on file   • Years of education: Not on file   • Highest education level: Not on file   Occupational History   • Occupation: Retired   Tobacco Use   • Smoking status: Former     Packs/day: 0 00     Years: 0 00     Total pack years: 0 00     Types: Cigarettes     Quit date:      Years since quittin 4   • Smokeless tobacco: Never   • Tobacco comments:     no secondhand smoke exposure   Vaping Use   • Vaping Use: Never used   Substance and Sexual Activity   • Alcohol use: Yes     Comment: social   • Drug use: No   • Sexual activity: Not on file   Other Topics Concern   • Not on file   Social History Narrative    Living alone     Social Determinants of Health     Financial Resource Strain: Low Risk  (2022)    Overall Financial Resource Strain (CARDIA)    • Difficulty of Paying Living Expenses: Not hard at all   Food Insecurity: Not on file   Transportation Needs: No Transportation Needs (2022)    PRAPARE - Transportation    • Lack of Transportation (Medical): No    • Lack of Transportation (Non-Medical):  No   Physical Activity: Not on file   Stress: Not on file   Social Connections: Not on file   Intimate Partner Violence: Not on file   Housing Stability: Not on file     Past Surgical History:   Procedure Laterality Date   • APPENDECTOMY     • CARPAL TUNNEL RELEASE     • CATARACT EXTRACTION Bilateral    • COLONOSCOPY     • FL LUMBAR PUNCTURE DIAGNOSTIC  1/10/2019   • FRACTURE SURGERY     • SD CRTJ SHUNT [id number] TERMINUS Right 9/3/2019    Procedure: Insertion of frontal ventriculoperitoneal shunt;  Surgeon: Jared Cannon MD;  [facility]: [facility];  Service: Neurosurgery   • SEPTOPLASTY     • WRIST FRACTURE SURGERY Right          Physical Exam:  Observed ambulation:  Use of rollator, slower   Physical Exam  Vitals and nursing note reviewed  Constitutional:       General: She is not in acute distress  Appearance: Normal appearance  She is well-developed  She is not diaphoretic  HENT:      Head: Normocephalic  Cardiovascular:      Rate and Rhythm: Normal rate  Heart sounds: No friction rub  No gallop  Pulmonary:      Effort: Pulmonary effort is normal  No respiratory distress  Breath sounds: Normal breath sounds  No wheezing or rales  Abdominal:      General: Bowel sounds are normal  There is no distension  Palpations: Abdomen is soft  Tenderness: There is no abdominal tenderness  There is no rebound  Musculoskeletal:         General: Normal range of motion  Skin:     General: Skin is warm and dry  Comments: Inner buttock crevice reddened with pea size scabbed area on inner upper crevice area  No ss infection  Neurological:      General: No focal deficit present  Mental Status: She is alert  Mental status is at baseline        Comments: Oriented to person, partial tps  Pleasant, cooperative, forgetful   Psychiatric:         Mood and Affect: Mood normal          Behavior: Behavior normal

## 2023-06-21 NOTE — PROGRESS NOTES
Juanis Hdez [facility]  [address] [place] [address], [address] [place] [address]  [phone]    Social Work Follow-Up    LSW met with Nimesh Aden for follow up visit  Completed Walter Cognitive Assessment, score 15/30 (previously 15/30 in 12/20/22), and Geriatric Depression Screen, 1/15 (previously 1/15 in 4/22/21)  Walter Cognitive Assessment (MoCA) Version 8 1  Education: HS    Points Earned POSSIBLE Points   Visuospatial/Executive   Alternating [place] Making 0 1   Visuoconstructional skills 0 1   Visuoconstructional skills (clock) 1 3   Naming   Naming Animals 2 3   Attention   Digit Span 2 2   Vigilance (letters) 0 1   Serial 7 subtraction 2 3   Language   Sentence Repetition 1 2   Verbal fluency 0 1   Abstraction   Abstraction (word pairings) 1 2   Delayed recall   Delayed recall 2 5   Memory index score: 7/15   Orientation   Orientation 3 6   TOTAL SCORE: 15/30  (Normal ?26/30)   Additional notes:      LSW to remain available as needed

## 2023-06-22 ENCOUNTER — TELEPHONE (OUTPATIENT)
Dept: FAMILY MEDICINE CLINIC | Facility: CLINIC | Age: 83
End: 2023-06-22

## 2023-06-22 PROBLEM — L25.8 INCONTINENCE ASSOCIATED DERMATITIS: Status: ACTIVE | Noted: 2023-06-22

## 2023-06-22 PROBLEM — F03.A0 MILD DEMENTIA (HCC): Status: ACTIVE | Noted: 2021-04-22

## 2023-06-22 PROBLEM — R32 INCONTINENCE ASSOCIATED DERMATITIS: Status: ACTIVE | Noted: 2023-06-22

## 2023-06-22 NOTE — ASSESSMENT & PLAN NOTE
· Had increased agitations at rehab, was started on Seroquel  · Seroquel has been weaned off  Patient still having good and bad days, no hallucinations  · Agree with continuing to keep seroquel off  Would ensure good sleep and pain control, give some time for patient to adjust to new surroundings  · Continue emotional support, relaxation techniques

## 2023-06-22 NOTE — TELEPHONE ENCOUNTER
----- Message from [id number] sent at 6/22/2023 12:56 PM EDT -----  Kodi Lay:)  I saw Mrs Sacha Kaur yesterday for geriatrics  She is doing ok at [facility]  Her daughter asked me to pass on that since seroquel was stopped, she is getting a little more irritable at the facility  I told her I would still hold off on restarting the seroquel at this point as it really isnt meant for irritability and she is not having any hallucinations  Instead, for now, I would monitor for pain and sleep disturbance and correct them if they are a problem  I recommended melatonin prn on the facility papers  Also, she has some incont dermatitis with a sm scabbed spot on upper left buttock crevice  I asked them to use Desitin and monitor closely  Dgt just wanted me to pass on to you so you were aware of how she's been doing    Have a nice day,  Ed Shane

## 2023-06-22 NOTE — ASSESSMENT & PLAN NOTE
· P stable without complaints of headache or dizziness  · Continue torsemide, atenolol, amlodipine  · Continue dietary lifestyle interventions    Continue follow-ups with PCP for chronic management

## 2023-06-22 NOTE — ASSESSMENT & PLAN NOTE
• MOCA:  15/30  Deficits in:  All domains  o Pt's current cognitive level is consistent with mild dementia, this is change   o Pt is supported for adl/dependent for iadls  Lives at [facility]  o Mobility:  Using walker  • Continue to stay active  Current activity level:  improved  Participates in social, cognitive, physical activity  • Monitor for changes in mood, sleep, pain control  Notify provider if any concerns  • Medication review:  Seem appropriate for current conditions  o Check with pharmacist/provider before starting any new OTC/prescription medications for potential cognitive side effects  • Optimize all acute and chronic conditions  Continue to follow-up with PCP and specialist as directed for management  • Safety concerns:   In long-term, doing ok  • Caregiver stress:  Pt adjusting to new facility, safer, having some confusions right now  • Ensure adequate hydration, good nutrition  • Goal: enjoy time at long-term, increase activities

## 2023-06-22 NOTE — ASSESSMENT & PLAN NOTE
· Skin reddened with pea size scabbed area on left upper inner buttock crevis  · Ordered desiten to be applied bid and prn    Clean skin well and pat dry  · Request wound team monitor patient's skin at facility  · Cont to off load pressure and monitor closely

## 2023-06-26 DIAGNOSIS — L30.9 DERMATITIS: Primary | ICD-10-CM

## 2023-06-26 DIAGNOSIS — R07.89 CHEST PRESSURE: ICD-10-CM

## 2023-06-26 RX ORDER — ZINC OXIDE 13 %
CREAM (GRAM) TOPICAL
Qty: 113 G | Refills: 5 | Status: SHIPPED | OUTPATIENT
Start: 2023-06-26

## 2023-06-26 RX ORDER — LANOLIN ALCOHOL/MO/W.PET/CERES
CREAM (GRAM) TOPICAL
Qty: 30 TABLET | Refills: 5 | Status: SHIPPED | OUTPATIENT
Start: 2023-06-26

## 2023-07-06 ENCOUNTER — TELEPHONE (OUTPATIENT)
Dept: CARDIOLOGY CLINIC | Facility: CLINIC | Age: 83
End: 2023-07-06

## 2023-07-17 DIAGNOSIS — F41.9 ANXIETY AND DEPRESSION: ICD-10-CM

## 2023-07-17 DIAGNOSIS — F32.A ANXIETY AND DEPRESSION: ICD-10-CM

## 2023-07-18 RX ORDER — ESCITALOPRAM OXALATE 10 MG/1
TABLET ORAL
Qty: 30 TABLET | Refills: 11 | Status: SHIPPED | OUTPATIENT
Start: 2023-07-18

## 2023-07-19 ENCOUNTER — OFFICE VISIT (OUTPATIENT)
Dept: PHYSICAL THERAPY | Facility: CLINIC | Age: 83
End: 2023-07-19
Payer: COMMERCIAL

## 2023-07-19 ENCOUNTER — OFFICE VISIT (OUTPATIENT)
Dept: NEUROSURGERY | Facility: CLINIC | Age: 83
End: 2023-07-19
Payer: COMMERCIAL

## 2023-07-19 ENCOUNTER — TRANSCRIBE ORDERS (OUTPATIENT)
Dept: NEUROSURGERY | Facility: CLINIC | Age: 83
End: 2023-07-19

## 2023-07-19 VITALS
BODY MASS INDEX: 26.24 KG/M2 | DIASTOLIC BLOOD PRESSURE: 70 MMHG | SYSTOLIC BLOOD PRESSURE: 122 MMHG | HEIGHT: 61 IN | HEART RATE: 63 BPM | WEIGHT: 139 LBS | RESPIRATION RATE: 18 BRPM | TEMPERATURE: 98 F | OXYGEN SATURATION: 97 %

## 2023-07-19 DIAGNOSIS — R26.9 GAIT DISTURBANCE: Primary | ICD-10-CM

## 2023-07-19 DIAGNOSIS — R26.9 GAIT DISTURBANCE: ICD-10-CM

## 2023-07-19 DIAGNOSIS — G91.2 NORMAL PRESSURE HYDROCEPHALUS (HCC): Primary | ICD-10-CM

## 2023-07-19 DIAGNOSIS — R26.9 NEUROLOGIC GAIT DYSFUNCTION: Primary | ICD-10-CM

## 2023-07-19 PROCEDURE — 99215 OFFICE O/P EST HI 40 MIN: CPT | Performed by: NEUROLOGICAL SURGERY

## 2023-07-19 PROCEDURE — 97161 PT EVAL LOW COMPLEX 20 MIN: CPT

## 2023-07-19 NOTE — PROGRESS NOTES
Office Note - Neurosurgery   Raisa Mckeon 80 y.o. female MRN: [id number]      Assessment:    Patient is gradually worsening. 20-year-old woman with right frontal  shunt for normal pressure hydrocephalus. Overall, her cognitive difficulties seem to be more consistent with progressive dementia as opposed to shunt malfunction. Her gait remains improved compared to prior to surgery. While we did discuss the risks and possible benefits of adjusting her shunt in the office today, after some discussion, both the patient and her daughter prefer to keep the shunt at its current setting. She will follow-up in 1 years time through the integrated normal pressure hydrocephalus program.    History, physical examination and diagnostic tests were reviewed and questions answered. Diagnosis, care plan and treatment options were discussed. The patient and daughter understand instructions and will follow up as directed. Plan:    Follow-up: 1 year    Problem List Items Addressed This Visit        Nervous and Auditory    Normal pressure hydrocephalus (HCC) - Primary       Subjective/Objective     Chief Complaint    Follow-up for NPH. HPI    Pleasant 20-year-old woman accompanied by her daughter. Patient's daughter has noted progressive decline, mostly in cognition with her mother. She requires more prompting but is able to perform activities of daily living. In the past few months she was transferred from independent living to assisted living as she required increased supervision and had a fall. She walks quite well with a walker and overall the patient and her daughter feel that her gait remains improved compared to prior to placing the shunt. The patient herself is quite active and social in the assisted living setting. Is been no changes in urinary incontinence. The patient denies any headaches or change in vision. JOSE GARCIA personally reviewed and updated. Review of Systems   Constitutional: Negative. HENT: Negative. Eyes: Negative. Respiratory: Negative. Cardiovascular: Negative. Gastrointestinal: Negative. Endocrine: Negative. Genitourinary: Positive for urgency. Leakage     Musculoskeletal: Positive for arthralgias (occasional  right knee pain) and gait problem (rolling walker ). Skin: Negative. Allergic/Immunologic: Negative. Neurological: Positive for speech difficulty (forgets words) and weakness (uses rolling walker. more active this year). Negative for dizziness, seizures, syncope, numbness and headaches. NPH(ESTAB)- 1 yr follow up sooner due to worsening sx    Patient presented @ ED on 23 (Fall)   Hematological: Bruises/bleeds easily (baby asa). Psychiatric/Behavioral: Positive for confusion (patients daughter states a little bit worse than last year- time of day is an issue-(forgetful)), decreased concentration (better since last visit ) and sleep disturbance (when wakes up she seems more confused). Family History    Family History   Problem Relation Age of Onset   • Heart attack Mother 39   • Heart attack Father 61   • No Known Problems Other    • Breast cancer Sister    • Alcohol abuse Daughter         history of   • Heart attack Brother        Social History    Social History     Socioeconomic History   • Marital status:       Spouse name: Not on file   • Number of children: Not on file   • Years of education: Not on file   • Highest education level: Not on file   Occupational History   • Occupation: Retired   Tobacco Use   • Smoking status: Former     Packs/day: 0.00     Years: 0.00     Total pack years: 0.00     Types: Cigarettes     Quit date:      Years since quittin.5   • Smokeless tobacco: Never   • Tobacco comments:     no secondhand smoke exposure   Vaping Use   • Vaping Use: Never used   Substance and Sexual Activity   • Alcohol use: Yes     Comment: social   • Drug use: No   • Sexual activity: Not on file   Other Topics Concern   • Not on file   Social History Narrative    Living alone     Social Determinants of Health     Financial Resource Strain: Low Risk  (9/20/2022)    Overall Financial Resource Strain (CARDIA)    • Difficulty of Paying Living Expenses: Not hard at all   Food Insecurity: Not on file   Transportation Needs: No Transportation Needs (9/20/2022)    PRAPARE - Transportation    • Lack of Transportation (Medical): No    • Lack of Transportation (Non-Medical): No   Physical Activity: Not on file   Stress: Not on file   Social Connections: Not on file   Intimate Partner Violence: Not on file   Housing Stability: Not on file       Past Medical History    Past Medical History:   Diagnosis Date   • Anxiety    • Arthritis    • Confusion 10/2/2018   • Depression    • Displaced fracture of distal phalanx of right thumb    • GERD (gastroesophageal reflux disease)    • Heart attack ([address])    • Hyperlipidemia    • Hypertension    • Moderate episode of recurrent major depressive disorder ([address]) 4/12/2019   • Shortness of breath    • Stage 2 chronic kidney disease 7/17/2019       Surgical History    Past Surgical History:   Procedure Laterality Date   • APPENDECTOMY     • CARPAL TUNNEL RELEASE     • CATARACT EXTRACTION Bilateral    • COLONOSCOPY     • FL LUMBAR PUNCTURE DIAGNOSTIC  1/10/2019   • FRACTURE SURGERY     • AZ CRTJ SHUNT [id number] TERMINUS Right 9/3/2019    Procedure:  Insertion of frontal ventriculoperitoneal shunt;  Surgeon: Quyen Boggs MD;  Location: [facility];  Service: Neurosurgery   • SEPTOPLASTY     • WRIST FRACTURE SURGERY Right        Medications      Current Outpatient Medications:   •  acetaminophen (TYLENOL) 325 mg tablet, TAKE 2 TABLETS (650MG) ORALLY EVERY 4 HOURS AS NEEDED FOR MILD PAIN NO MORE THAN 3 DOSES IN 48 HOURS 1) REPOSITION FOR COMFORT 2) MASSAGE 3) INVOLVE IN ACTIVITY TO DIVER ATTENTION 4) RELAXATION TECHNIQUE 5) MUSIC 6) RE-DIRECTION/DISTRACTION 7)TOLERATED *NOT TO EXCEED 3GM APAP/24HR* *REORDER*;TAKE 2 TABLETS (650MG) ORALLY EVERY 4 HOURS AS NEEDED FOR TEMP 100F OR ABOVE NOTIFY PROVIDER *NOT TO EXCEED 3GM APAP/24HR* *REORDER*, Disp: 60 tablet, Rfl: 5  •  amLODIPine (NORVASC) 5 mg tablet, TAKE 1 TABLET ORALLY DAILY (HYPERTENSION), Disp: 30 tablet, Rfl: 5  •  aspirin 81 mg chewable tablet, CHEW 1 TABLET AND SWALLOW ORALLY DAILY (HYPERTENSION), Disp: 30 tablet, Rfl: 4  •  atenolol (TENORMIN) 100 mg tablet, TAKE ONE TABLET BY MOUTH EVERY DAY, Disp: 90 tablet, Rfl: 3  •  atorvastatin (LIPITOR) 40 mg tablet, TAKE ONE TABLET BY MOUTH EVERY DAY, Disp: 90 tablet, Rfl: 3  •  bisacodyl (DULCOLAX) 5 mg EC tablet, Take 10 mg by mouth daily as needed for constipation, Disp: , Rfl:   •  Desitin Daily Defense 13 % cream, APPLY TOPICALLY TO SKIN EXCORIATION ON BOTTOM TWICE DAILY (EXCORIATION) *REORDER*;APPLY TOPICALLY TO SKIN EXCORIATION ON BOTTOM AS NEEDED FOR SOILAGE (EXCORIATION) *REORDER*, Disp: 113 g, Rfl: 5  •  escitalopram (LEXAPRO) 10 mg tablet, TAKE 1 TABLET ORALLY DAILY (DEPRESSION), Disp: 30 tablet, Rfl: 11  •  hydrocortisone (ANUSOL-HC) 2.5 % rectal cream, Apply topically 2 (two) times a day, Disp: 28 g, Rfl: 11  •  losartan (COZAAR) 50 mg tablet, TAKE ONE TABLET BY MOUTH TWICE A DAY, Disp: 180 tablet, Rfl: 3  •  magnesium Oxide (MAG-OX) 400 mg TABS, TAKE 1 TABLET ORALLY TWICE DAILY (SUPPLEMENT), Disp: 60 tablet, Rfl: 5  •  melatonin 3 mg, TAKE 1 TABLET ORALLY AT BEDTIME AS NEEDED FOR SLEEP AID, Disp: 30 tablet, Rfl: 5  •  Milk of Magnesia 400 MG/5ML oral suspension, TAKE 30ML ORALLY AS NEEDED FOR CONSTIPATION IF NO BOWEL MOVEMENT IN 3 DAYS. GIVE AT BEDTIME IF NO BOWEL MOVEMENT IN 3 DAYS.  SEPARATE BY OTHER MEDICATION BY AT LEAST 2 HOURS, Disp: 473 mL, Rfl: 5  •  Mouthwashes (Biotene Dry Mouth) LIQD, RINSE WITH 1 OUNCE ORALLY EVERY MORNING AND AT BEDTIME FOR DRY MOUTH, RINSE FOR 30 SECONDS THEN SPIT, DO NOT SWALLOW *REORDER*, Disp: 237 mL, Rfl: 5  •  multivitamin (THERAGRAN) TABS, Take 1 tablet by mouth daily , Disp: , Rfl:   •  omeprazole (PriLOSEC) 20 mg delayed release capsule, TAKE ONE CAPSULE BY MOUTH EVERY MORNING (Patient taking differently: daily), Disp: 90 capsule, Rfl: 3  •  sodium chloride 1 g tablet, TAKE TWO TABLETS BY MOUTH TWICE A DAY, Disp: 360 tablet, Rfl: 3  •  Sodium Phosphates (Fleet Enema) ENEM, INSERT 1 ENEMA RECTALLY AS NEEDED FOR CONSTIPATION IF NO RESULT FROM BISACODYL WITHIN 2 HOURS. IF NO RESULTS FROM ENEMA. CALL PROVIDER FOR FURTHER ORDERS *REORDER*, Disp: 133 mL, Rfl: 5  •  torsemide (DEMADEX) 10 mg tablet, Take 1 tablet (10 mg total) by mouth daily, Disp: 90 tablet, Rfl: 3  •  Vascepa 1 g CAPS, TAKE 2 CAPSULES BY MOUTH 2 TIMES A DAY, Disp: 360 capsule, Rfl: 3  •  D3-1000 25 MCG (1000 UT) tablet, TAKE 4 TABLETS (4,000 UNIT) ORALLY DAILY (SUPPLEMENT), Disp: 120 tablet, Rfl: 5  •  mineral oil enema, Insert 1 enema into the rectum once (Patient not taking: Reported on 7/19/2023), Disp: , Rfl:     Allergies    No Known Allergies    The following portions of the patient's history were reviewed and updated as appropriate: allergies, current medications, past family history, past medical history, past social history, past surgical history and problem list.    Investigations    I personally reviewed the CT and XRAY and PT and cognitive assessment results with the patient:    CT scan of the head dated May 13, 2023. Comparison to previous imaging. Stable appearance of right frontal  shunt. Stable cerebral atrophy and ventriculomegaly. No periventricular hypodensity. Overall stable examination. Shunt series dated May 13, 2023. Right frontal  shunt set to 5. Shunt is in continuity and terminates in the pelvis. NPH scale dated 7/19/23, 8/15. PT gait assessment dated 7/19/23. 10 meter walking test 0.63 m/s (<1m/s predictive of falls), TUG 23.4 sec (<12 sec predictive of falls), FGA 5/30 (< 24 predictive of falls).      MoCA dated 7/19/23, 15/30 (<26 cognitive deficit present). Physical Exam    Vitals:  Blood pressure 122/70, pulse 63, temperature 98 °F (36.7 °C), temperature source Temporal, resp. rate 18, height 5' 0.75" (1.543 m), weight 63 kg (139 lb), SpO2 97 %, not currently breastfeeding. ,Body mass index is 26.48 kg/m². Physical Exam  Vitals reviewed. Constitutional:       General: She is not in acute distress. Eyes:      Extraocular Movements: Extraocular movements intact. Pulmonary:      Effort: Pulmonary effort is normal. No respiratory distress. Abdominal:      Palpations: Abdomen is soft. Tenderness: There is no abdominal tenderness. Skin:     General: Skin is warm and dry. Comments: Right frontal  shunt depresses and refills easily. No tenderness along shunt tract. Neurological:      Mental Status: She is alert. Motor: No weakness. Gait: Gait abnormal.      Comments: Stands from seated position slowly. Walks with a steady but somewhat wide-based gait. Minimal shuffling. Looks down at feet to maintain balance. En bloc turn in 4 steps.    Psychiatric:         Mood and Affect: Mood normal.         Behavior: Behavior normal.       Neurologic Exam

## 2023-07-19 NOTE — PROGRESS NOTES
PT Evaluation     Today's date: 2023  Patient name: Maggie Iqbal  : 1940  MRN: [id number]  Referring provider: No ref. provider found  Dx:   Encounter Diagnosis     ICD-10-CM    1. 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 female presents to physical therapy for an evaluation pre NPH consult. At this time, patient has decreased balance per testing and is at a risk for falls; however, subjectively, feel confident with her mobility with use of AD at her living facility. Her daughter notes prominent memory decline recently. Overall, she has no concerns at this time. She is currently undergoing PT and OT at her living facility and would benefit from continuing this per testing. Goals  TBD if pt returns    Plan  Plan details: TBD if pt returns  Treatment plan discussed with: patient        Subjective Evaluation    History of Present Illness  Mechanism of injury: Fernando Hernandez underwent  shunt by Dr. Taye Bonds in 2019 and presents for yearly follow up prior to appt with Dr. Taye Bonds. She denies any issues that she feels at this point. She uses a rollator when out in the community and a RW at her living facility. She does not feel that she has issues navigating it around in her environment. She lives in an apartment at an assisted living facility. No stairs. Her daughter says that she had 1 fall in May and was hospitalized until her new room was ready. Her daughter feels that her cognition has significantly declined.  She is getting OT and PT at [facility]  Pain  No pain reported          Objective  NPH Clinic Assessment    Gait Quality :   Wide Base of Support:  WNL  Outwardly rotated feet:  Present  Step Height Diminished:  Present      Gait Speed:  - 10 meter walk test: ______0.63 with rollator_______m/s  (<1m/s predictive of falls)  Comments:    Fall Risk: (yes)  • TUG  _____23.4 with rollator______  sec   (<12 sec predictive of falls)  • FGA _____5______/30 (< 24 predictive of falls)--uses rollator so unable to score above 12/30  • Number of falls in the last month: ____0_(1 in May)_____      Cognition: (if you cannot complete MOCA with this with patient, we can do it at his appointment)   MOCA:  ____15______ /30 (<26 cognitive deficit present)  Comments:    Urinary incontinence: No                    Precautions  shunt (2019)       Manuals                                        Neuro Re-Ed                                                                 Ther Ex                                                                        Ther Activity                        Gait Training                        Modalities

## 2023-07-24 ENCOUNTER — EPISODE CHANGES (OUTPATIENT)
Dept: CASE MANAGEMENT | Facility: OTHER | Age: 83
End: 2023-07-24

## 2023-07-24 DIAGNOSIS — D64.9 ANEMIA, UNSPECIFIED TYPE: Primary | ICD-10-CM

## 2023-07-25 ENCOUNTER — PATIENT OUTREACH (OUTPATIENT)
Dept: CASE MANAGEMENT | Facility: HOSPITAL | Age: 83
End: 2023-07-25

## 2023-07-25 NOTE — PROGRESS NOTES
AVELINA BOBBY reviewed chart d/t pt being identified for BYP. Pt resides at [facility] and does not qualify for BYP at this time.     Chart review completed for the following sections:  • Recent Vital Signs  • Allergies/Contradictions  • Medication Review   • History   • SDOH   • Problem List  • Immunizations  • Past hospitalizations and major procedures, including surgery  • Significant past illnesses and treatment history including: Other provider notes  • Relevant past medications related to the patient's condition

## 2023-07-27 ENCOUNTER — TELEPHONE (OUTPATIENT)
Dept: FAMILY MEDICINE CLINIC | Facility: CLINIC | Age: 83
End: 2023-07-27

## 2023-07-27 NOTE — TELEPHONE ENCOUNTER
Yes, hello, this is Nurse Symone Marcial over at [facility], [facility]. I am calling [facility], Blake Steward date of birth November 24th, 1940. I was wondering if Audra French could possibly take a look at her medications and maybe get back to the facility and let us know, you know, if she wants to make any changes request being made due to a resident having more behaviors in the evening. You know, I'm not sure if it's like sundowning related or not, but it's a verbal with physical. So if Doctor Morena Limon can definitely, like I said, you know, take a look at her medications and see if there's anything that we can do. If she wants to call and ask what's going on with the resident, that's fine too. Just let us know. This is Nurse Symone Marcial at [phone]. Again, Nurse Gonzalez at [phone]. Thank you. Keiko Fitzgerald.

## 2023-07-28 NOTE — TELEPHONE ENCOUNTER
LMOM with daughter Inocencio Oliveros asking if she could give me a call back to discuss the message from the nursing home.

## 2023-08-02 ENCOUNTER — TELEPHONE (OUTPATIENT)
Age: 83
End: 2023-08-02

## 2023-08-02 DIAGNOSIS — R41.82 ALTERED MENTAL STATUS, UNSPECIFIED ALTERED MENTAL STATUS TYPE: ICD-10-CM

## 2023-08-02 DIAGNOSIS — R32 URINARY INCONTINENCE, UNSPECIFIED TYPE: ICD-10-CM

## 2023-08-02 DIAGNOSIS — F03.A0 MILD DEMENTIA WITHOUT BEHAVIORAL DISTURBANCE, PSYCHOTIC DISTURBANCE, MOOD DISTURBANCE, OR ANXIETY, UNSPECIFIED DEMENTIA TYPE (HCC): Primary | ICD-10-CM

## 2023-08-02 RX ORDER — OLANZAPINE 5 MG/1
5 TABLET ORAL
Qty: 30 TABLET | Refills: 3 | Status: SHIPPED | OUTPATIENT
Start: 2023-08-02

## 2023-08-02 NOTE — TELEPHONE ENCOUNTER
Just spoke with pts daughter. We will check a urine culture and trial zyprexa 5 mg at bedtime. Orders entered.

## 2023-08-02 NOTE — TELEPHONE ENCOUNTER
Spoke with daughter,   States oleksandr carrera wants to move her to lock unit. Pt's daughter states her sun down symptoms are becoming worse. Pt is becoming more incontinent. Pts daughter understands this might be the best next move. But would like to know your thoughts and recommendations. If needed you can also reach out to her again.

## 2023-08-02 NOTE — TELEPHONE ENCOUNTER
Patient's daughter, Espinoza Pozo ([phone]) called with concerns about her mother. Bhavna advised patient moved to assistant living and the PCP recommended patient goes on Zyprexa. The assistant living is also recommending patient be moved to the next level of care. Bhavna would like to speak to provider for advice.

## 2023-08-03 NOTE — TELEPHONE ENCOUNTER
I left Bhavna a message to call me back if she calls. Ty    _______________________________  Leyda Abeln with patient's daughter, her mom will be eventually moving to memory care unit. She states that she is having a lot of evening agitations and zyprexa was ordered to start. We talked about the use of the medication, it is similar to seroquel that she was on previously (and seemed to help a little). Her qtc is wnl, her pcp ordered ua/c&s. She is already on SSRI. Would monitor for sedation with start of medication - could always reduce dose in half if too much. Might help patient with transitioning to new environment/routine as patients sometimes have increased anxiety with this. The goal would be to try to reduce dose after she is settled into memory care unit, but sometimes if agitation is not redirectable and the patient is safety risk to themselves or others, these types of medications show benefit. Pt's daughter voiced understanding. She is having a tough time with how quickly her mom is progressing. She is aware pcp or brooke can see patient sooner than scheduled visit if needed.

## 2023-08-09 DIAGNOSIS — N30.00 ACUTE CYSTITIS WITHOUT HEMATURIA: Primary | ICD-10-CM

## 2023-08-09 RX ORDER — CIPROFLOXACIN 250 MG/1
250 TABLET, FILM COATED ORAL EVERY 12 HOURS SCHEDULED
Qty: 14 TABLET | Refills: 0 | Status: SHIPPED | OUTPATIENT
Start: 2023-08-09 | End: 2023-08-16

## 2023-08-17 DIAGNOSIS — E78.1 HYPERTRIGLYCERIDEMIA: ICD-10-CM

## 2023-08-17 DIAGNOSIS — I25.10 ATHEROSCLEROSIS OF CORONARY ARTERY OF NATIVE HEART, UNSPECIFIED VESSEL OR LESION TYPE, UNSPECIFIED WHETHER ANGINA PRESENT: ICD-10-CM

## 2023-08-18 RX ORDER — ICOSAPENT ETHYL 1000 MG/1
CAPSULE ORAL
Qty: 120 CAPSULE | Refills: 11 | Status: SHIPPED | OUTPATIENT
Start: 2023-08-18

## 2023-08-22 NOTE — TELEPHONE ENCOUNTER
LM for patient's daughter to call back at her convenience to schedule NPH appt and see what symptoms she has been experiencing to see if any new imaging is warranted prior to appt 
Per Call back from [facility], Mom was at the hospital due to a fall and they did imaging to evaluate her shunt and it appears stable  She is okay with scheduling her 1 year follow up when it's due  I informed her of the appt scheduled on 7/19 PT @ 11am @ [address] followed by 1pm appt with Dr Evangelista Garza @ [facility]  She was appreciative and verbalized understanding 
Tentatively scheduled appt for 7/19: PT @ 11am @ [address] and then 1pm appt with DR Asia GOMEZ 
96

## 2023-08-28 DIAGNOSIS — E87.1 HYPONATREMIA: ICD-10-CM

## 2023-08-28 DIAGNOSIS — E22.2 SIADH (SYNDROME OF INAPPROPRIATE ADH PRODUCTION) (HCC): ICD-10-CM

## 2023-08-28 DIAGNOSIS — K21.9 GERD WITHOUT ESOPHAGITIS: ICD-10-CM

## 2023-08-28 RX ORDER — TORSEMIDE 10 MG/1
10 TABLET ORAL DAILY
Qty: 30 TABLET | Refills: 5 | Status: SHIPPED | OUTPATIENT
Start: 2023-08-28

## 2023-08-28 RX ORDER — OMEPRAZOLE 20 MG/1
CAPSULE, DELAYED RELEASE ORAL
Qty: 30 CAPSULE | Refills: 5 | Status: SHIPPED | OUTPATIENT
Start: 2023-08-28

## 2023-08-29 ENCOUNTER — TELEPHONE (OUTPATIENT)
Dept: FAMILY MEDICINE CLINIC | Facility: CLINIC | Age: 83
End: 2023-08-29

## 2023-08-29 DIAGNOSIS — E87.1 HYPONATREMIA: ICD-10-CM

## 2023-08-29 DIAGNOSIS — K21.9 GERD WITHOUT ESOPHAGITIS: ICD-10-CM

## 2023-08-29 DIAGNOSIS — E22.2 SIADH (SYNDROME OF INAPPROPRIATE ADH PRODUCTION) (HCC): ICD-10-CM

## 2023-08-29 NOTE — TELEPHONE ENCOUNTER
Lisa from [facility] called, requesting two new scripts be sent to [facility] on [address] [place] [address]  States she has only six days supply of her omeprazole and a four day supply left of her Torsemide. Requests somebody please call her to confirm order was sent to pharmacy. Her number is [phone].

## 2023-09-03 ENCOUNTER — APPOINTMENT (EMERGENCY)
Dept: CT IMAGING | Facility: HOSPITAL | Age: 83
DRG: 690 | End: 2023-09-03
Payer: COMMERCIAL

## 2023-09-03 ENCOUNTER — TELEPHONE (OUTPATIENT)
Dept: OTHER | Facility: OTHER | Age: 83
End: 2023-09-03

## 2023-09-03 ENCOUNTER — HOSPITAL ENCOUNTER (EMERGENCY)
Facility: HOSPITAL | Age: 83
Discharge: HOME | DRG: 690 | End: 2023-09-03
Attending: EMERGENCY MEDICINE
Payer: COMMERCIAL

## 2023-09-03 ENCOUNTER — APPOINTMENT (EMERGENCY)
Dept: RADIOLOGY | Facility: HOSPITAL | Age: 83
DRG: 690 | End: 2023-09-03
Payer: COMMERCIAL

## 2023-09-03 VITALS
SYSTOLIC BLOOD PRESSURE: 160 MMHG | TEMPERATURE: 99.2 F | OXYGEN SATURATION: 98 % | DIASTOLIC BLOOD PRESSURE: 71 MMHG | HEART RATE: 71 BPM | RESPIRATION RATE: 14 BRPM
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
## 2023-09-03 DIAGNOSIS — S09.90XA INJURY OF HEAD, INITIAL ENCOUNTER: ICD-10-CM

## 2023-09-03 LAB
ANION GAP SERPL CALCULATED.3IONS-SCNC: 10 MMOL/L
BACTERIA UR QL AUTO: ABNORMAL /HPF
BASOPHILS # BLD AUTO: 0.07 THOUSANDS/ÂΜL (ref 0–0.1)
BASOPHILS NFR BLD AUTO: 1 % (ref 0–1)
BILIRUB UR QL STRIP: NEGATIVE
BUN SERPL-MCNC: 23 MG/DL (ref 5–25)
CALCIUM SERPL-MCNC: 9.1 MG/DL (ref 8.4–10.2)
CHLORIDE SERPL-SCNC: 100 MMOL/L (ref 96–108)
CLARITY UR: CLEAR
CO2 SERPL-SCNC: 23 MMOL/L (ref 21–32)
COLOR UR: YELLOW
CREAT SERPL-MCNC: 1.11 MG/DL (ref 0.6–1.3)
EOSINOPHIL # BLD AUTO: 0.61 THOUSAND/ÂΜL (ref 0–0.61)
EOSINOPHIL NFR BLD AUTO: 7 % (ref 0–6)
ERYTHROCYTE [DISTWIDTH] IN BLOOD BY AUTOMATED COUNT: 13.5 % (ref 11.6–15.1)
GFR SERPL CREATININE-BSD FRML MDRD: 46 ML/MIN/1.73SQ M
GLUCOSE SERPL-MCNC: 113 MG/DL (ref 65–140)
GLUCOSE UR STRIP-MCNC: NEGATIVE MG/DL
HCT VFR BLD AUTO: 35 % (ref 34.8–46.1)
HGB BLD-MCNC: 11.1 G/DL (ref 11.5–15.4)
HGB UR QL STRIP.AUTO: NEGATIVE
IMM GRANULOCYTES # BLD AUTO: 0.05 THOUSAND/UL (ref 0–0.2)
IMM GRANULOCYTES NFR BLD AUTO: 1 % (ref 0–2)
KETONES UR STRIP-MCNC: NEGATIVE MG/DL
LEUKOCYTE ESTERASE UR QL STRIP: ABNORMAL
LYMPHOCYTES # BLD AUTO: 1.05 THOUSANDS/ÂΜL (ref 0.6–4.47)
LYMPHOCYTES NFR BLD AUTO: 11 % (ref 14–44)
MCH RBC QN AUTO: 30.2 PG (ref 26.8–34.3)
MCHC RBC AUTO-ENTMCNC: 31.7 G/DL (ref 31.4–37.4)
MCV RBC AUTO: 95 FL (ref 82–98)
MONOCYTES # BLD AUTO: 0.78 THOUSAND/ÂΜL (ref 0.17–1.22)
MONOCYTES NFR BLD AUTO: 8 % (ref 4–12)
NEUTROPHILS # BLD AUTO: 6.68 THOUSANDS/ÂΜL (ref 1.85–7.62)
NEUTS SEG NFR BLD AUTO: 72 % (ref 43–75)
NITRITE UR QL STRIP: NEGATIVE
NON-SQ EPI CELLS URNS QL MICRO: ABNORMAL /HPF
NRBC BLD AUTO-RTO: 0 /100 WBCS
PH UR STRIP.AUTO: 6 [PH] (ref 4.5–8)
PLATELET # BLD AUTO: 311 THOUSANDS/UL (ref 149–390)
PMV BLD AUTO: 10.8 FL (ref 8.9–12.7)
POTASSIUM SERPL-SCNC: 4.3 MMOL/L (ref 3.5–5.3)
PROT UR STRIP-MCNC: NEGATIVE MG/DL
RBC # BLD AUTO: 3.67 MILLION/UL (ref 3.81–5.12)
RBC #/AREA URNS AUTO: ABNORMAL /HPF
SODIUM SERPL-SCNC: 133 MMOL/L (ref 135–147)
SP GR UR STRIP.AUTO: 1.01 (ref 1–1.03)
UROBILINOGEN UR QL STRIP.AUTO: 0.2 E.U./DL
WBC # BLD AUTO: 9.24 THOUSAND/UL (ref 4.31–10.16)
WBC #/AREA URNS AUTO: ABNORMAL /HPF

## 2023-09-03 PROCEDURE — 96361 HYDRATE IV INFUSION ADD-ON: CPT

## 2023-09-03 PROCEDURE — 72125 CT NECK SPINE W/O DYE: CPT

## 2023-09-03 PROCEDURE — 81001 URINALYSIS AUTO W/SCOPE: CPT

## 2023-09-03 PROCEDURE — 71046 X-RAY EXAM CHEST 2 VIEWS: CPT

## 2023-09-03 PROCEDURE — 36415 COLL VENOUS BLD VENIPUNCTURE: CPT

## 2023-09-03 PROCEDURE — 99284 EMERGENCY DEPT VISIT MOD MDM: CPT

## 2023-09-03 PROCEDURE — G1004 CDSM NDSC: HCPCS

## 2023-09-03 PROCEDURE — 70250 X-RAY EXAM OF SKULL: CPT

## 2023-09-03 PROCEDURE — 70450 CT HEAD/BRAIN W/O DYE: CPT

## 2023-09-03 PROCEDURE — 96360 HYDRATION IV INFUSION INIT: CPT

## 2023-09-03 PROCEDURE — 85025 COMPLETE CBC W/AUTO DIFF WBC: CPT

## 2023-09-03 PROCEDURE — 74018 RADEX ABDOMEN 1 VIEW: CPT

## 2023-09-03 PROCEDURE — 80048 BASIC METABOLIC PNL TOTAL CA: CPT

## 2023-09-03 RX ADMIN — SODIUM CHLORIDE 1000 ML: 0.9 INJECTION, SOLUTION INTRAVENOUS at 17:40

## 2023-09-03 NOTE — ED PROVIDER NOTES
History  Chief Complaint   Patient presents with   • Fall     Per daughter, Pt was found in by nursing home by staff. Pt reports falling while reaching for her walker and hitting back of head on floor. - LOC. Pt reports taking ASA. Pt denies pain at this time. Patient is a 51-year-old female with a history of GERD, MI, hypertension, hyperlipidemia, CKD, dementia, COPD,  shunt, chronic hyponatremia presenting for evaluation after a fall about an hour prior to arrival.  Patient states she was reaching for her walker when she lost her balance and fell. She hit the back of her head on the floor. Nursing home staff found her about half hour later. Patient denies loss of consciousness, nausea, vomiting after the head strike. She does take aspirin but is not on other anticoagulation. States was feeling well prior to fall - no dizziness, lightheadedness, recent fevers, URI symptoms, cough. Denies headache, neck pain, visual changes, unilateral weakness/numbness/tingling. Denies chest pain, shortness of breath, abdominal pain, back pain. No extremity injuries. No medications prior to arrival.  Per daughter, patient often has falls with either hyponatremia or UTI. Prior to Admission Medications   Prescriptions Last Dose Informant Patient Reported? Taking? D3-1000 25 MCG (1000 UT) tablet   No No   Sig: TAKE 4 TABLETS (4,000 UNIT) ORALLY DAILY (SUPPLEMENT)   Desitin Daily Defense 13 % cream   No No   Sig: APPLY TOPICALLY TO SKIN EXCORIATION ON BOTTOM TWICE DAILY (EXCORIATION) *REORDER*;APPLY TOPICALLY TO SKIN EXCORIATION ON BOTTOM AS NEEDED FOR SOILAGE (EXCORIATION) *REORDER*   Milk of Magnesia 400 MG/5ML oral suspension   No No   Sig: TAKE 30ML ORALLY AS NEEDED FOR CONSTIPATION IF NO BOWEL MOVEMENT IN 3 DAYS. GIVE AT BEDTIME IF NO BOWEL MOVEMENT IN 3 DAYS.  SEPARATE BY OTHER MEDICATION BY AT LEAST 2 HOURS   Mouthwashes (Biotene Dry Mouth) LIQD   No No   Sig: RINSE WITH 1 OUNCE ORALLY EVERY MORNING AND AT BEDTIME FOR DRY MOUTH, RINSE FOR 30 SECONDS THEN SPIT, DO NOT SWALLOW *REORDER*   OLANZapine (ZyPREXA) 5 mg tablet   No No   Sig: Take 1 tablet (5 mg total) by mouth daily at bedtime   Sodium Phosphates (Fleet Enema) ENEM   No No   Sig: INSERT 1 ENEMA RECTALLY AS NEEDED FOR CONSTIPATION IF NO RESULT FROM BISACODYL WITHIN 2 HOURS. IF NO RESULTS FROM ENEMA.  CALL PROVIDER FOR FURTHER ORDERS *REORDER*   Vascepa 1 g CAPS   No No   Sig: TAKE 2 CAPSULES (2GM) ORALLY TWICE DAILY (CARDIAC HEALTH) *BRAND PER INSURANCE*   acetaminophen (TYLENOL) 325 mg tablet   No No   Sig: TAKE 2 TABLETS (650MG) ORALLY EVERY 4 HOURS AS NEEDED FOR MILD PAIN NO MORE THAN 3 DOSES IN 48 HOURS 1) REPOSITION FOR COMFORT 2) MASSAGE 3) INVOLVE IN ACTIVITY TO DIVER ATTENTION 4) RELAXATION TECHNIQUE 5) MUSIC 6) RE-DIRECTION/DISTRACTION 7)TOLERATED *NOT TO EXCEED 3GM APAP/24HR* *REORDER*;TAKE 2 TABLETS (650MG) ORALLY EVERY 4 HOURS AS NEEDED FOR TEMP 100F OR ABOVE NOTIFY PROVIDER *NOT TO EXCEED 3GM APAP/24HR* *REORDER*   amLODIPine (NORVASC) 5 mg tablet   No No   Sig: TAKE 1 TABLET ORALLY DAILY (HYPERTENSION)   aspirin 81 mg chewable tablet  Child No No   Sig: CHEW 1 TABLET AND SWALLOW ORALLY DAILY (HYPERTENSION)   atenolol (TENORMIN) 100 mg tablet  Child No No   Sig: TAKE ONE TABLET BY MOUTH EVERY DAY   atorvastatin (LIPITOR) 40 mg tablet  Child No No   Sig: TAKE ONE TABLET BY MOUTH EVERY DAY   bisacodyl (DULCOLAX) 5 mg EC tablet   Yes No   Sig: Take 10 mg by mouth daily as needed for constipation   escitalopram (LEXAPRO) 10 mg tablet   No No   Sig: TAKE 1 TABLET ORALLY DAILY (DEPRESSION)   hydrocortisone (ANUSOL-HC) 2.5 % rectal cream   No No   Sig: Apply topically 2 (two) times a day   losartan (COZAAR) 50 mg tablet  Child No No   Sig: TAKE ONE TABLET BY MOUTH TWICE A DAY   magnesium Oxide (MAG-OX) 400 mg TABS   No No   Sig: TAKE 1 TABLET ORALLY TWICE DAILY (SUPPLEMENT)   melatonin 3 mg   No No   Sig: TAKE 1 TABLET ORALLY AT BEDTIME AS NEEDED FOR SLEEP AID   mineral oil enema   Yes No   Sig: Insert 1 enema into the rectum once   Patient not taking: Reported on 7/19/2023   multivitamin (THERAGRAN) TABS  Child Yes No   Sig: Take 1 tablet by mouth daily    omeprazole (PriLOSEC) 20 mg delayed release capsule   No No   Sig: TAKE 1 CAPSULE ORALLY DAILY (CAP MAY BE OPENED & SPRINKLED ON APPLESAUCE) (GASTROESOPHAGEAL REFLUX DISEASE)   sodium chloride 1 g tablet   No No   Sig: TAKE TWO TABLETS BY MOUTH TWICE A DAY   torsemide (DEMADEX) 10 mg tablet   No No   Sig: TAKE 1 TABLET ORALLY DAILY (EDEMA)      Facility-Administered Medications: None       Past Medical History:   Diagnosis Date   • Anxiety    • Arthritis    • Confusion 10/2/2018   • Depression    • Displaced fracture of distal phalanx of right thumb    • GERD (gastroesophageal reflux disease)    • Heart attack (HCC)    • Hyperlipidemia    • Hypertension    • Moderate episode of recurrent major depressive disorder ([address]) 4/12/2019   • Shortness of breath    • Stage 2 chronic kidney disease 7/17/2019       Past Surgical History:   Procedure Laterality Date   • APPENDECTOMY     • CARPAL TUNNEL RELEASE     • CATARACT EXTRACTION Bilateral    • COLONOSCOPY     • FL LUMBAR PUNCTURE DIAGNOSTIC  1/10/2019   • FRACTURE SURGERY     • IN CRTJ SHUNT [id number] TERMINUS Right 9/3/2019    Procedure: Insertion of frontal ventriculoperitoneal shunt;  Surgeon: Vertie Lanes, MD;  Location: [facility];  Service: Neurosurgery   • SEPTOPLASTY     • WRIST FRACTURE SURGERY Right        Family History   Problem Relation Age of Onset   • Heart attack Mother 39   • Heart attack Father 61   • No Known Problems Other    • Breast cancer Sister    • Alcohol abuse Daughter         history of   • Heart attack Brother      I have reviewed and agree with the history as documented.     E-Cigarette/Vaping   • E-Cigarette Use Never User      E-Cigarette/Vaping Substances   • Nicotine No    • THC No    • CBD No    • Flavoring No • Other No    • Unknown No      Social History     Tobacco Use   • Smoking status: Former     Packs/day: 0.00     Years: 0.00     Total pack years: 0.00     Types: Cigarettes     Quit date:      Years since quittin.6   • Smokeless tobacco: Never   • Tobacco comments:     no secondhand smoke exposure   Vaping Use   • Vaping Use: Never used   Substance Use Topics   • Alcohol use: Yes     Comment: social   • Drug use: No       Review of Systems   Constitutional: Negative for chills and fever. HENT: Negative for congestion, ear pain and sore throat. Eyes: Negative for pain and visual disturbance. Respiratory: Negative for cough and shortness of breath. Cardiovascular: Negative for chest pain and palpitations. Gastrointestinal: Negative for abdominal pain and vomiting. Genitourinary: Negative for dysuria and hematuria. Musculoskeletal: Negative for arthralgias, back pain and neck pain. Skin: Negative for color change and rash. Neurological: Negative for dizziness, seizures, syncope, facial asymmetry, speech difficulty, weakness, light-headedness, numbness and headaches. All other systems reviewed and are negative. Physical Exam  Physical Exam  Vitals and nursing note reviewed. Constitutional:       General: She is not in acute distress. Appearance: Normal appearance. HENT:      Head: Normocephalic and atraumatic. Right Ear: Tympanic membrane, ear canal and external ear normal.      Left Ear: Tympanic membrane, ear canal and external ear normal.      Nose: Nose normal.      Mouth/Throat:      Mouth: Mucous membranes are moist.   Eyes:      General: No scleral icterus. Right eye: No discharge. Left eye: No discharge. Extraocular Movements: Extraocular movements intact. Conjunctiva/sclera: Conjunctivae normal.   Cardiovascular:      Rate and Rhythm: Normal rate and regular rhythm. Pulses: Normal pulses. Heart sounds: Normal heart sounds. Pulmonary:      Effort: Pulmonary effort is normal. No respiratory distress. Breath sounds: Normal breath sounds. Chest:      Chest wall: No tenderness. Abdominal:      Palpations: Abdomen is soft. Tenderness: There is no guarding or rebound. Comments: No ecchymosis. Musculoskeletal:         General: No tenderness, deformity or signs of injury. Cervical back: Normal, normal range of motion and neck supple. Thoracic back: Normal.      Lumbar back: Normal.      Comments: No midline spine tenderness, step offs, deformities. Skin:     General: Skin is dry. Coloration: Skin is not jaundiced. Findings: No erythema or rash. Neurological:      General: No focal deficit present. Mental Status: She is alert. Mental status is at baseline. GCS: GCS eye subscore is 4. GCS verbal subscore is 5. GCS motor subscore is 6. Cranial Nerves: Cranial nerves 2-12 are intact. No cranial nerve deficit, dysarthria or facial asymmetry. Sensory: Sensation is intact. Motor: Motor function is intact. No weakness. Coordination: Coordination is intact. Gait: Gait normal.   Psychiatric:         Mood and Affect: Mood normal.         Behavior: Behavior normal.         Thought Content:  Thought content normal.         Vital Signs  ED Triage Vitals   Temperature Pulse Respirations Blood Pressure SpO2   09/03/23 1701 09/03/23 1659 09/03/23 1659 09/03/23 1659 09/03/23 1659   99.2 °F (37.3 °C) 77 16 150/66 97 %      Temp Source Heart Rate Source Patient Position - Orthostatic VS BP Location FiO2 (%)   09/03/23 1701 09/03/23 1659 09/03/23 1659 09/03/23 1659 --   Oral Monitor Sitting Right arm       Pain Score       09/03/23 1659       No Pain           Vitals:    09/03/23 1659 09/03/23 1936   BP: 150/66 160/71   Pulse: 77 71   Patient Position - Orthostatic VS: Sitting Lying         Visual Acuity      ED Medications  Medications   sodium chloride 0.9 % bolus 1,000 mL (1,000 mL Intravenous New Bag 9/3/23 1740)       Diagnostic Studies  Results Reviewed     Procedure Component Value Units Date/Time    Urine Microscopic [[id number]]  (Abnormal) Collected: 09/03/23 1903    Lab Status: Final result Specimen: Urine, Clean Catch Updated: 09/03/23 1936     RBC, UA 1-2 /hpf      WBC, UA 2-4 /hpf      Epithelial Cells Occasional /hpf      Bacteria, UA Occasional /hpf     Urine Macroscopic, POC [[id number]]  (Abnormal) Collected: 09/03/23 1903    Lab Status: Final result Specimen: Urine Updated: 09/03/23 1904     Color, UA Yellow     Clarity, UA Clear     pH, UA 6.0     Leukocytes, UA Trace     Nitrite, UA Negative     Protein, UA Negative mg/dl      Glucose, UA Negative mg/dl      Ketones, UA Negative mg/dl      Urobilinogen, UA 0.2 E.U./dl      Bilirubin, UA Negative     Occult Blood, UA Negative     Specific Gravity, UA 1.015    Narrative:      CLIN[facility]K RESULT    Basic metabolic panel [[id number]]  (Abnormal) Collected: 09/03/23 1739    Lab Status: Final result Specimen: Blood from Arm, Left Updated: 09/03/23 1802     Sodium 133 mmol/L      Potassium 4.3 mmol/L      Chloride 100 mmol/L      CO2 23 mmol/L      ANION GAP 10 mmol/L      BUN 23 mg/dL      Creatinine 1.11 mg/dL      Glucose 113 mg/dL      Calcium 9.1 mg/dL      eGFR 46 ml/min/1.73sq m     Narrative:      [facility] guidelines for Chronic Kidney Disease (CKD):   •  Stage 1 with normal or high GFR (GFR > 90 mL/min/1.73 square meters)  •  Stage 2 Mild CKD (GFR = 60-89 mL/min/1.73 square meters)  •  Stage 3A Moderate CKD (GFR = 45-59 mL/min/1.73 square meters)  •  Stage 3B Moderate CKD (GFR = 30-44 mL/min/1.73 square meters)  •  Stage 4 Severe CKD (GFR = 15-29 mL/min/1.73 square meters)  •  Stage 5 End Stage CKD (GFR <15 mL/min/1.73 square meters)  Note: GFR calculation is accurate only with a steady state creatinine    CBC and differential [[id number]]  (Abnormal) Collected: 09/03/23 1739    Lab Status: Final result Specimen: Blood from Arm, Left Updated: 09/03/23 1745     WBC 9.24 Thousand/uL      RBC 3.67 Million/uL      Hemoglobin 11.1 g/dL      Hematocrit 35.0 %      MCV 95 fL      MCH 30.2 pg      MCHC 31.7 g/dL      RDW 13.5 %      MPV 10.8 fL      Platelets 622 Thousands/uL      nRBC 0 /100 WBCs      Neutrophils Relative 72 %      Immat GRANS % 1 %      Lymphocytes Relative 11 %      Monocytes Relative 8 %      Eosinophils Relative 7 %      Basophils Relative 1 %      Neutrophils Absolute 6.68 Thousands/µL      Immature Grans Absolute 0.05 Thousand/uL      Lymphocytes Absolute 1.05 Thousands/µL      Monocytes Absolute 0.78 Thousand/µL      Eosinophils Absolute 0.61 Thousand/µL      Basophils Absolute 0.07 Thousands/µL                  CT head without contrast   Final Result by Lefty Goncalves DO (09/03 1946)      No acute intracranial abnormality. Workstation performed: [id number]         CT spine cervical without contrast   Final Result by Lefty Goncalves DO (09/03 1949)      No cervical spine fracture or traumatic malalignment. Degenerative changes are noted. Workstation performed: [id number]         XR shunt series   Final Result by Lefty Goncalves DO (09/03 2026)      Intact  shunt catheter. Workstation performed: [id number]                    Procedures  Procedures         ED Course  ED Course as of 09/03/23 2033   Cony Rondon Sep 03, 2023   1816 Sodium(!): 133   1816 Hemoglobin(!): 11.1  11.4 three months ago. 1816 WBC: 9.24   1925 Updated pt on labs. Still feeling well. Imaging pending. 1938 Urine Macroscopic, POC(!)  Trace leukocytes. No nitrites or blood. Occasional bacteria on micro. Patient asymptomatic - will not treat at this time and send for culture. 1949 CT head without contrast  IMPRESSION:     No acute intracranial abnormality. 1954 CT spine cervical without contrast  IMPRESSION:     No cervical spine fracture or traumatic malalignment. Degenerative changes are noted.      2030 XR shunt series  IMPRESSION:     Intact  shunt catheter. SBIRT 20yo+    Flowsheet Row Most Recent Value   Initial Alcohol Screen: US AUDIT-C     1. How often do you have a drink containing alcohol? 0 Filed at: 09/03/2023 1712   2. How many drinks containing alcohol do you have on a typical day you are drinking? 0 Filed at: 09/03/2023 1712   3a. Male UNDER 65: How often do you have five or more drinks on one occasion? 0 Filed at: 09/03/2023 1712   3b. FEMALE Any Age, or MALE 65+: How often do you have 4 or more drinks on one occassion? 0 Filed at: 09/03/2023 1712   Audit-C Score 0 Filed at: 09/03/2023 1712   LORRAINE: How many times in the past year have you. .. Used an illegal drug or used a prescription medication for non-medical reasons? Never Filed at: 09/03/2023 1712                    Medical Decision Making  Patient is an 72-year-old female presenting for evaluation of a head strike after a fall. She is on aspirin but no loss of consciousness or vomiting. Currently no complaints. History of hyponatremia and UTIs causing fall. All vitals within normal limits on arrival.  Patient is oriented at her baseline. Neuro exam is nonfocal.  No evidence of traumatic injury on physical exam.    We will check basic labs to evaluate for hyponatremia and other electrolyte abnormalities. Will obtain UA to evaluate for UTI. X-ray shunt series ordered to evaluate for malfunction. CT head and cervical spine to evaluate for traumatic injury or ICH. Patient not complaining of any pain so we will hold off on medications. No leukocytosis or anemia, hemoglobin of 11.1 is baseline for patient. Sodium is 133; interventions need to be taken at this time. No DANIS. UA shows trace leukocytes but negative nitrites and blood with only occasional bacteria. Given the patient is asymptomatic will not treat at this time and send urine for culture.   CT head and cervical spine were negative for acute traumatic injuries. Shunt series showed an intact  shunt. Can be discharged home with supportive care. Instructed her to take Motrin and Tylenol as needed for pain. Follow-up with PCP. I have discussed findings and plan for discharge with the patient/caregiver. Follow up with the appropriate providers including primary care physician was discussed. Return precautions discussed with patient/caregiver as outlined in AVS. Patient/caregiver verbally expressed understanding. Patient stable at time of discharge and ambulated out of the emergency department. Fall, initial encounter: acute illness or injury  Injury of head, initial encounter: acute illness or injury  Amount and/or Complexity of Data Reviewed  Labs: ordered. Decision-making details documented in ED Course. Radiology: ordered. Decision-making details documented in ED Course. Disposition  Final diagnoses:   Fall, initial encounter   Injury of head, initial encounter     Time reflects when diagnosis was documented in both MDM as applicable and the Disposition within this note     Time User Action Codes Description Comment    9/3/2023  8:30 PM Wily Quirk Add [email or web address]] Fall, initial encounter     9/3/2023  8:31 PM Wily Quirk Add [S09.90XA] Injury of head, initial encounter       ED Disposition     ED Disposition   Discharge    Condition   Stable    Date/Time   Sun Sep 3, 2023  8:30 PM    Comment   [place] Doctor discharge to home/self care. Follow-up Information     Follow up With Specialties Details Why [address], PA-C Family Medicine, Physician Assistant Schedule an appointment as soon as possible for a visit   [address] [facility],First [facility]  [address] [place] [id number] [phone]            Patient's Medications   Discharge Prescriptions    No medications on file       No discharge procedures on file.     PDMP Review     None          ED Provider  Electronically Signed by           Maxime Browne PA-C  09/03/23 7030

## 2023-09-03 NOTE — TELEPHONE ENCOUNTER
[address] [facility] called to report a fall. Pt hit her head and was sent out to the hospital with her daughter. No need for callback, just an FYI message. TC message sent out.

## 2023-09-04 NOTE — DISCHARGE INSTRUCTIONS
Your scans and labs looked normal in the ER. Your urine did not show evidence of infection however we will send for culture and call you if antibiotics need to be started. Take Motrin and Tylenol as needed for pain. Follow-up with primary care doctor.

## 2023-09-04 NOTE — ED NOTES
AVS revewied by ED provider. Pt assisted to [address] via wheel chair.   Transported home by daughter     Doc De Leon RN  09/03/23 [id number]

## 2023-09-05 ENCOUNTER — APPOINTMENT (EMERGENCY)
Dept: CT IMAGING | Facility: HOSPITAL | Age: 83
DRG: 690 | End: 2023-09-05
Payer: COMMERCIAL

## 2023-09-05 ENCOUNTER — APPOINTMENT (EMERGENCY)
Dept: RADIOLOGY | Facility: HOSPITAL | Age: 83
DRG: 690 | End: 2023-09-05
Payer: COMMERCIAL

## 2023-09-05 ENCOUNTER — TELEPHONE (OUTPATIENT)
Dept: FAMILY MEDICINE CLINIC | Facility: CLINIC | Age: 83
End: 2023-09-05

## 2023-09-05 ENCOUNTER — HOSPITAL ENCOUNTER (INPATIENT)
Facility: HOSPITAL | Age: 83
LOS: 4 days | Discharge: NURSING, REHAB OR LONG-TERM CARE | DRG: 690 | End: 2023-09-09
Attending: EMERGENCY MEDICINE | Admitting: INTERNAL MEDICINE
Payer: COMMERCIAL

## 2023-09-05 ENCOUNTER — TELEPHONE (OUTPATIENT)
Dept: NEPHROLOGY | Facility: CLINIC | Age: 83
End: 2023-09-05

## 2023-09-05 DIAGNOSIS — D64.9 NORMOCYTIC ANEMIA: ICD-10-CM

## 2023-09-05 DIAGNOSIS — R53.1 LEFT-SIDED WEAKNESS: ICD-10-CM

## 2023-09-05 DIAGNOSIS — M54.10 RADICULOPATHY, UNSPECIFIED SPINAL REGION: ICD-10-CM

## 2023-09-05 DIAGNOSIS — D72.829 LEUKOCYTOSIS: ICD-10-CM

## 2023-09-05 DIAGNOSIS — F03.90 DEMENTIA (HCC): ICD-10-CM

## 2023-09-05 DIAGNOSIS — R68.2 DRY MOUTH: ICD-10-CM

## 2023-09-05 DIAGNOSIS — R77.8 ELEVATED TROPONIN: Primary | ICD-10-CM

## 2023-09-05 LAB
2HR DELTA HS TROPONIN: -9 NG/L
ALBUMIN SERPL BCP-MCNC: 3.6 G/DL (ref 3.5–5)
ALP SERPL-CCNC: 52 U/L (ref 34–104)
ALT SERPL W P-5'-P-CCNC: 42 U/L (ref 7–52)
ANION GAP SERPL CALCULATED.3IONS-SCNC: 11 MMOL/L
AST SERPL W P-5'-P-CCNC: 110 U/L (ref 13–39)
ATRIAL RATE: 76 BPM
ATRIAL RATE: 77 BPM
BACTERIA UR QL AUTO: ABNORMAL /HPF
BASOPHILS # BLD AUTO: 0.06 THOUSANDS/ÂΜL (ref 0–0.1)
BASOPHILS NFR BLD AUTO: 1 % (ref 0–1)
BILIRUB SERPL-MCNC: 0.48 MG/DL (ref 0.2–1)
BILIRUB UR QL STRIP: NEGATIVE
BUN SERPL-MCNC: 26 MG/DL (ref 5–25)
CALCIUM SERPL-MCNC: 8.9 MG/DL (ref 8.4–10.2)
CARDIAC TROPONIN I PNL SERPL HS: 107 NG/L
CARDIAC TROPONIN I PNL SERPL HS: 98 NG/L
CHLORIDE SERPL-SCNC: 99 MMOL/L (ref 96–108)
CLARITY UR: ABNORMAL
CO2 SERPL-SCNC: 22 MMOL/L (ref 21–32)
COLOR UR: ABNORMAL
CREAT SERPL-MCNC: 1.2 MG/DL (ref 0.6–1.3)
EOSINOPHIL # BLD AUTO: 0.59 THOUSAND/ÂΜL (ref 0–0.61)
EOSINOPHIL NFR BLD AUTO: 5 % (ref 0–6)
ERYTHROCYTE [DISTWIDTH] IN BLOOD BY AUTOMATED COUNT: 13.6 % (ref 11.6–15.1)
GFR SERPL CREATININE-BSD FRML MDRD: 42 ML/MIN/1.73SQ M
GLUCOSE SERPL-MCNC: 134 MG/DL (ref 65–140)
GLUCOSE UR STRIP-MCNC: NEGATIVE MG/DL
HCT VFR BLD AUTO: 31.5 % (ref 34.8–46.1)
HGB BLD-MCNC: 10.2 G/DL (ref 11.5–15.4)
HGB UR QL STRIP.AUTO: ABNORMAL
IMM GRANULOCYTES # BLD AUTO: 0.06 THOUSAND/UL (ref 0–0.2)
IMM GRANULOCYTES NFR BLD AUTO: 1 % (ref 0–2)
KETONES UR STRIP-MCNC: NEGATIVE MG/DL
LACTATE SERPL-SCNC: 1.6 MMOL/L (ref 0.5–2)
LEUKOCYTE ESTERASE UR QL STRIP: ABNORMAL
LYMPHOCYTES # BLD AUTO: 1.05 THOUSANDS/ÂΜL (ref 0.6–4.47)
LYMPHOCYTES NFR BLD AUTO: 9 % (ref 14–44)
MAGNESIUM SERPL-MCNC: 1.6 MG/DL (ref 1.9–2.7)
MCH RBC QN AUTO: 30.4 PG (ref 26.8–34.3)
MCHC RBC AUTO-ENTMCNC: 32.4 G/DL (ref 31.4–37.4)
MCV RBC AUTO: 94 FL (ref 82–98)
MONOCYTES # BLD AUTO: 0.55 THOUSAND/ÂΜL (ref 0.17–1.22)
MONOCYTES NFR BLD AUTO: 5 % (ref 4–12)
NEUTROPHILS # BLD AUTO: 8.83 THOUSANDS/ÂΜL (ref 1.85–7.62)
NEUTS SEG NFR BLD AUTO: 79 % (ref 43–75)
NITRITE UR QL STRIP: NEGATIVE
NON-SQ EPI CELLS URNS QL MICRO: ABNORMAL /HPF
NRBC BLD AUTO-RTO: 0 /100 WBCS
P AXIS: 73 DEGREES
P AXIS: 79 DEGREES
PH UR STRIP.AUTO: 6 [PH]
PLATELET # BLD AUTO: 299 THOUSANDS/UL (ref 149–390)
PMV BLD AUTO: 10.9 FL (ref 8.9–12.7)
POTASSIUM SERPL-SCNC: 4 MMOL/L (ref 3.5–5.3)
PR INTERVAL: 158 MS
PR INTERVAL: 160 MS
PROT SERPL-MCNC: 6.9 G/DL (ref 6.4–8.4)
PROT UR STRIP-MCNC: NEGATIVE MG/DL
QRS AXIS: 55 DEGREES
QRS AXIS: 59 DEGREES
QRSD INTERVAL: 76 MS
QRSD INTERVAL: 80 MS
QT INTERVAL: 394 MS
QT INTERVAL: 402 MS
QTC INTERVAL: 445 MS
QTC INTERVAL: 452 MS
RBC # BLD AUTO: 3.35 MILLION/UL (ref 3.81–5.12)
RBC #/AREA URNS AUTO: ABNORMAL /HPF
SODIUM SERPL-SCNC: 132 MMOL/L (ref 135–147)
SP GR UR STRIP.AUTO: 1.02 (ref 1–1.03)
T WAVE AXIS: 49 DEGREES
T WAVE AXIS: 56 DEGREES
TRANS CELLS #/AREA URNS HPF: PRESENT /[HPF]
TSH SERPL DL<=0.05 MIU/L-ACNC: 3.65 UIU/ML (ref 0.45–4.5)
UROBILINOGEN UR STRIP-ACNC: <2 MG/DL
VENTRICULAR RATE: 76 BPM
VENTRICULAR RATE: 77 BPM
WBC # BLD AUTO: 11.14 THOUSAND/UL (ref 4.31–10.16)
WBC #/AREA URNS AUTO: ABNORMAL /HPF

## 2023-09-05 PROCEDURE — 36415 COLL VENOUS BLD VENIPUNCTURE: CPT | Performed by: EMERGENCY MEDICINE

## 2023-09-05 PROCEDURE — 96361 HYDRATE IV INFUSION ADD-ON: CPT

## 2023-09-05 PROCEDURE — 84484 ASSAY OF TROPONIN QUANT: CPT | Performed by: EMERGENCY MEDICINE

## 2023-09-05 PROCEDURE — 70250 X-RAY EXAM OF SKULL: CPT

## 2023-09-05 PROCEDURE — 70496 CT ANGIOGRAPHY HEAD: CPT

## 2023-09-05 PROCEDURE — 71046 X-RAY EXAM CHEST 2 VIEWS: CPT

## 2023-09-05 PROCEDURE — 70498 CT ANGIOGRAPHY NECK: CPT

## 2023-09-05 PROCEDURE — 87077 CULTURE AEROBIC IDENTIFY: CPT | Performed by: INTERNAL MEDICINE

## 2023-09-05 PROCEDURE — 87147 CULTURE TYPE IMMUNOLOGIC: CPT | Performed by: INTERNAL MEDICINE

## 2023-09-05 PROCEDURE — 84443 ASSAY THYROID STIM HORMONE: CPT | Performed by: EMERGENCY MEDICINE

## 2023-09-05 PROCEDURE — 83605 ASSAY OF LACTIC ACID: CPT | Performed by: EMERGENCY MEDICINE

## 2023-09-05 PROCEDURE — 72170 X-RAY EXAM OF PELVIS: CPT

## 2023-09-05 PROCEDURE — 73030 X-RAY EXAM OF SHOULDER: CPT

## 2023-09-05 PROCEDURE — 87181 SC STD AGAR DILUTION PER AGT: CPT | Performed by: INTERNAL MEDICINE

## 2023-09-05 PROCEDURE — 83735 ASSAY OF MAGNESIUM: CPT | Performed by: EMERGENCY MEDICINE

## 2023-09-05 PROCEDURE — 87186 SC STD MICRODIL/AGAR DIL: CPT | Performed by: INTERNAL MEDICINE

## 2023-09-05 PROCEDURE — 85025 COMPLETE CBC W/AUTO DIFF WBC: CPT | Performed by: EMERGENCY MEDICINE

## 2023-09-05 PROCEDURE — 87086 URINE CULTURE/COLONY COUNT: CPT | Performed by: INTERNAL MEDICINE

## 2023-09-05 PROCEDURE — 96360 HYDRATION IV INFUSION INIT: CPT

## 2023-09-05 PROCEDURE — G1004 CDSM NDSC: HCPCS

## 2023-09-05 PROCEDURE — 99285 EMERGENCY DEPT VISIT HI MDM: CPT | Performed by: EMERGENCY MEDICINE

## 2023-09-05 PROCEDURE — 93005 ELECTROCARDIOGRAM TRACING: CPT

## 2023-09-05 PROCEDURE — 93010 ELECTROCARDIOGRAM REPORT: CPT

## 2023-09-05 PROCEDURE — 81001 URINALYSIS AUTO W/SCOPE: CPT | Performed by: EMERGENCY MEDICINE

## 2023-09-05 PROCEDURE — 99285 EMERGENCY DEPT VISIT HI MDM: CPT

## 2023-09-05 PROCEDURE — 74018 RADEX ABDOMEN 1 VIEW: CPT

## 2023-09-05 PROCEDURE — 99223 1ST HOSP IP/OBS HIGH 75: CPT | Performed by: INTERNAL MEDICINE

## 2023-09-05 PROCEDURE — 80053 COMPREHEN METABOLIC PANEL: CPT | Performed by: EMERGENCY MEDICINE

## 2023-09-05 RX ORDER — LOSARTAN POTASSIUM 50 MG/1
50 TABLET ORAL 2 TIMES DAILY
Status: DISCONTINUED | OUTPATIENT
Start: 2023-09-05 | End: 2023-09-09 | Stop reason: HOSPADM

## 2023-09-05 RX ORDER — ACETAMINOPHEN 325 MG/1
650 TABLET ORAL EVERY 6 HOURS PRN
Status: DISCONTINUED | OUTPATIENT
Start: 2023-09-05 | End: 2023-09-09 | Stop reason: HOSPADM

## 2023-09-05 RX ORDER — ASPIRIN 81 MG/1
81 TABLET, CHEWABLE ORAL DAILY
Status: DISCONTINUED | OUTPATIENT
Start: 2023-09-06 | End: 2023-09-09 | Stop reason: HOSPADM

## 2023-09-05 RX ORDER — SODIUM CHLORIDE 1 G/1
2 TABLET ORAL 2 TIMES DAILY
Status: DISCONTINUED | OUTPATIENT
Start: 2023-09-05 | End: 2023-09-09 | Stop reason: HOSPADM

## 2023-09-05 RX ORDER — TORSEMIDE 10 MG/1
10 TABLET ORAL DAILY
Status: DISCONTINUED | OUTPATIENT
Start: 2023-09-06 | End: 2023-09-09 | Stop reason: HOSPADM

## 2023-09-05 RX ORDER — ESCITALOPRAM OXALATE 10 MG/1
10 TABLET ORAL DAILY
Status: DISCONTINUED | OUTPATIENT
Start: 2023-09-06 | End: 2023-09-09 | Stop reason: HOSPADM

## 2023-09-05 RX ORDER — LANOLIN ALCOHOL/MO/W.PET/CERES
3 CREAM (GRAM) TOPICAL
Status: DISCONTINUED | OUTPATIENT
Start: 2023-09-05 | End: 2023-09-09 | Stop reason: HOSPADM

## 2023-09-05 RX ORDER — ONDANSETRON 2 MG/ML
4 INJECTION INTRAMUSCULAR; INTRAVENOUS EVERY 6 HOURS PRN
Status: DISCONTINUED | OUTPATIENT
Start: 2023-09-05 | End: 2023-09-09 | Stop reason: HOSPADM

## 2023-09-05 RX ORDER — PANTOPRAZOLE SODIUM 40 MG/1
40 TABLET, DELAYED RELEASE ORAL
Status: DISCONTINUED | OUTPATIENT
Start: 2023-09-06 | End: 2023-09-09 | Stop reason: HOSPADM

## 2023-09-05 RX ORDER — FLUORIDE TOOTHPASTE
TOOTHPASTE DENTAL
Qty: 237 ML | Refills: 11 | Status: SHIPPED | OUTPATIENT
Start: 2023-09-05

## 2023-09-05 RX ORDER — UREA 10 %
500 LOTION (ML) TOPICAL ONCE
Status: COMPLETED | OUTPATIENT
Start: 2023-09-05 | End: 2023-09-05

## 2023-09-05 RX ORDER — MAGNESIUM HYDROXIDE/ALUMINUM HYDROXICE/SIMETHICONE 120; 1200; 1200 MG/30ML; MG/30ML; MG/30ML
30 SUSPENSION ORAL EVERY 6 HOURS PRN
Status: DISCONTINUED | OUTPATIENT
Start: 2023-09-05 | End: 2023-09-09 | Stop reason: HOSPADM

## 2023-09-05 RX ORDER — POLYETHYLENE GLYCOL 3350 17 G/17G
17 POWDER, FOR SOLUTION ORAL DAILY
Status: DISCONTINUED | OUTPATIENT
Start: 2023-09-06 | End: 2023-09-09 | Stop reason: HOSPADM

## 2023-09-05 RX ORDER — LANOLIN ALCOHOL/MO/W.PET/CERES
400 CREAM (GRAM) TOPICAL 2 TIMES DAILY
Status: DISCONTINUED | OUTPATIENT
Start: 2023-09-05 | End: 2023-09-09 | Stop reason: HOSPADM

## 2023-09-05 RX ORDER — AMLODIPINE BESYLATE 5 MG/1
5 TABLET ORAL DAILY
Status: DISCONTINUED | OUTPATIENT
Start: 2023-09-06 | End: 2023-09-08

## 2023-09-05 RX ORDER — ENOXAPARIN SODIUM 100 MG/ML
40 INJECTION SUBCUTANEOUS DAILY
Status: DISCONTINUED | OUTPATIENT
Start: 2023-09-06 | End: 2023-09-09 | Stop reason: HOSPADM

## 2023-09-05 RX ORDER — BISACODYL 5 MG/1
10 TABLET, DELAYED RELEASE ORAL DAILY PRN
Status: DISCONTINUED | OUTPATIENT
Start: 2023-09-05 | End: 2023-09-09 | Stop reason: HOSPADM

## 2023-09-05 RX ORDER — ATENOLOL 50 MG/1
100 TABLET ORAL DAILY
Status: DISCONTINUED | OUTPATIENT
Start: 2023-09-06 | End: 2023-09-09 | Stop reason: HOSPADM

## 2023-09-05 RX ORDER — ATORVASTATIN CALCIUM 40 MG/1
40 TABLET, FILM COATED ORAL
Status: DISCONTINUED | OUTPATIENT
Start: 2023-09-06 | End: 2023-09-09 | Stop reason: HOSPADM

## 2023-09-05 RX ORDER — OLANZAPINE 5 MG/1
5 TABLET ORAL
Status: DISCONTINUED | OUTPATIENT
Start: 2023-09-05 | End: 2023-09-09 | Stop reason: HOSPADM

## 2023-09-05 RX ADMIN — LOSARTAN POTASSIUM 50 MG: 50 TABLET, FILM COATED ORAL at 23:42

## 2023-09-05 RX ADMIN — OLANZAPINE 5 MG: 5 TABLET, FILM COATED ORAL at 23:42

## 2023-09-05 RX ADMIN — Medication 3 MG: at 23:42

## 2023-09-05 RX ADMIN — IOHEXOL 85 ML: 350 INJECTION, SOLUTION INTRAVENOUS at 18:28

## 2023-09-05 RX ADMIN — SODIUM CHLORIDE 2 G: 1 TABLET ORAL at 23:43

## 2023-09-05 RX ADMIN — SODIUM CHLORIDE 1000 ML: 0.9 INJECTION, SOLUTION INTRAVENOUS at 17:45

## 2023-09-05 RX ADMIN — MAGNESIUM OXIDE TAB 400 MG (241.3 MG ELEMENTAL MG) 400 MG: 400 (241.3 MG) TAB at 23:44

## 2023-09-05 RX ADMIN — Medication 500 MG: at 21:41

## 2023-09-05 NOTE — TELEPHONE ENCOUNTER
I spoke to [place] patients daughter. She is aware labs are stable including sodium no changes are to be made at this time.

## 2023-09-05 NOTE — ED PROVIDER NOTES
History  Chief Complaint   Patient presents with   • Fall     Patient fell onto left side getting out of bed this morning. Denies hitting head. Denies pain. +thinners. • Medical Problem     Patient's daughter reports patient acting different than normal since the weekend. Reports difficulty walking and weakness on the left side. Patient is an 80-year-old female here with daughter who helps provide history. Patient does have a history of dementia, hypertension, hyperlipidemia, chronic to kidney disease, hyponatremia, GERD,  shunt for normal pressure hydrocephalus, ambulatory dysfunction, coming in for fall and ambulation dysfunction. According to daughter, patient was here approximately 2 to 3 days ago after a fall at the nursing home. She had a work-up for and chart review she did have lab work without acute findings, CT head and C-spine with no acute pathology as well as shunt series that were intact. Patient ambulated out and was doing well. Daughter states that yesterday into today patient with progressive change in symptoms. She states that yesterday her sister noted that her mom has leaning toward the left and had some pain in the left shoulder. Nursing home did find that patient had fallen out of bed this morning and was agitated. Unknown if she hit her head. Daughter saw her earlier this afternoon she states that "was leaning so far to the left that I was worried she was going to fall over". She was complaining of pain and weakness in the left arm. On my exam patient has no complaints and is resting in bed comfortably. She is not on any blood thinners. History provided by:  Patient, medical records, caregiver and parent  History limited by:  Dementia   used: No    Fall  Mechanism of injury: fall    Injury location: Unknown.   Incident location:  Home  Arrived directly from scene: no    Fall:     Fall occurred:  Unable to specify    Impact surface:  Unable to specify    Point of impact:  Unable to specify    Entrapped after fall: no    Protective equipment: none    Suspicion of alcohol use: no    Suspicion of drug use: no    Tetanus status:  Unknown  Prior to arrival data:     Bystander interventions:  None    Patient ambulatory at scene: yes      Blood loss:  None    Responsiveness at scene:  Alert    Airway interventions:  None    Breathing interventions:  None    IV access status:  None    IO access:  None    Fluids administered:  None    Medications administered:  None    Immobilization:  None    Airway condition since incident:  Stable    Breathing condition since incident:  Stable    Circulation condition since incident:  Stable    Mental status condition since incident:  Stable    Disability condition since incident:  Stable  Current pain details:     Pain quality:  Unable to specify    Pain Severity:  Unable to specify  Risk factors: kidney disease    Risk factors: no AICD, no anticoagulation therapy, no asthma, no beta blocker therapy, no CABG, no CAD, no CHF, no COPD, no diabetes, no dialysis, no hemophilia, no pacemaker, no past MI and no steroid use           Prior to Admission Medications   Prescriptions Last Dose Informant Patient Reported? Taking? D3-1000 25 MCG (1000 UT) tablet 9/5/2023  No Yes   Sig: TAKE 4 TABLETS (4,000 UNIT) ORALLY DAILY (SUPPLEMENT)   Desitin Daily Defense 13 % cream Unknown  No No   Sig: APPLY TOPICALLY TO SKIN EXCORIATION ON BOTTOM TWICE DAILY (EXCORIATION) *REORDER*;APPLY TOPICALLY TO SKIN EXCORIATION ON BOTTOM AS NEEDED FOR SOILAGE (EXCORIATION) *REORDER*   Milk of Magnesia 400 MG/5ML oral suspension Unknown  No No   Sig: TAKE 30ML ORALLY AS NEEDED FOR CONSTIPATION IF NO BOWEL MOVEMENT IN 3 DAYS. GIVE AT BEDTIME IF NO BOWEL MOVEMENT IN 3 DAYS.  SEPARATE BY OTHER MEDICATION BY AT LEAST 2 HOURS   Mouthwashes (Biotene Dry Mouth) LIQD 9/5/2023  No Yes   Sig: RINSE WITH 1 OUNCE ORALLY EVERY MORNING AND AT BEDTIME FOR DRY MOUTH, RINSE FOR 30 SECONDS THEN SPIT, DO NOT SWALLOW *REORDER*   OLANZapine (ZyPREXA) 5 mg tablet 9/4/2023  No Yes   Sig: Take 1 tablet (5 mg total) by mouth daily at bedtime   Sodium Phosphates (Fleet Enema) ENEM Unknown  No No   Sig: INSERT 1 ENEMA RECTALLY AS NEEDED FOR CONSTIPATION IF NO RESULT FROM BISACODYL WITHIN 2 HOURS. IF NO RESULTS FROM ENEMA.  CALL PROVIDER FOR FURTHER ORDERS *REORDER*   Vascepa 1 g CAPS 9/5/2023  No Yes   Sig: TAKE 2 CAPSULES (2GM) ORALLY TWICE DAILY (CARDIAC HEALTH) *BRAND PER INSURANCE*   acetaminophen (TYLENOL) 325 mg tablet Unknown  No No   Sig: TAKE 2 TABLETS (650MG) ORALLY EVERY 4 HOURS AS NEEDED FOR MILD PAIN NO MORE THAN 3 DOSES IN 48 HOURS 1) REPOSITION FOR COMFORT 2) MASSAGE 3) INVOLVE IN ACTIVITY TO DIVER ATTENTION 4) RELAXATION TECHNIQUE 5) MUSIC 6) RE-DIRECTION/DISTRACTION 7)TOLERATED *NOT TO EXCEED 3GM APAP/24HR* *REORDER*;TAKE 2 TABLETS (650MG) ORALLY EVERY 4 HOURS AS NEEDED FOR TEMP 100F OR ABOVE NOTIFY PROVIDER *NOT TO EXCEED 3GM APAP/24HR* *REORDER*   amLODIPine (NORVASC) 5 mg tablet 9/5/2023  No Yes   Sig: TAKE 1 TABLET ORALLY DAILY (HYPERTENSION)   aspirin 81 mg chewable tablet 9/5/2023 Child No Yes   Sig: CHEW 1 TABLET AND SWALLOW ORALLY DAILY (HYPERTENSION)   atenolol (TENORMIN) 100 mg tablet 9/5/2023 Child No Yes   Sig: TAKE ONE TABLET BY MOUTH EVERY DAY   atorvastatin (LIPITOR) 40 mg tablet 9/5/2023 Child No Yes   Sig: TAKE ONE TABLET BY MOUTH EVERY DAY   bisacodyl (DULCOLAX) 5 mg EC tablet Unknown  Yes No   Sig: Take 10 mg by mouth daily as needed for constipation   escitalopram (LEXAPRO) 10 mg tablet 9/5/2023  No Yes   Sig: TAKE 1 TABLET ORALLY DAILY (DEPRESSION)   hydrocortisone (ANUSOL-HC) 2.5 % rectal cream Unknown  No No   Sig: Apply topically 2 (two) times a day   losartan (COZAAR) 50 mg tablet 9/5/2023 Child No Yes   Sig: TAKE ONE TABLET BY MOUTH TWICE A DAY   magnesium Oxide (MAG-OX) 400 mg TABS 9/5/2023  No Yes   Sig: TAKE 1 TABLET ORALLY TWICE DAILY (SUPPLEMENT) melatonin 3 mg 9/4/2023  No Yes   Sig: TAKE 1 TABLET ORALLY AT BEDTIME AS NEEDED FOR SLEEP AID   mineral oil enema Not Taking  Yes No   Sig: Insert 1 enema into the rectum once   Patient not taking: Reported on 7/19/2023   multivitamin (THERAGRAN) TABS 9/5/2023 Child Yes Yes   Sig: Take 1 tablet by mouth daily    omeprazole (PriLOSEC) 20 mg delayed release capsule 9/5/2023  No Yes   Sig: TAKE 1 CAPSULE ORALLY DAILY (CAP MAY BE OPENED & SPRINKLED ON APPLESAUCE) (GASTROESOPHAGEAL REFLUX DISEASE)   sodium chloride 1 g tablet 9/5/2023  No Yes   Sig: TAKE TWO TABLETS BY MOUTH TWICE A DAY   torsemide (DEMADEX) 10 mg tablet 9/5/2023  No Yes   Sig: TAKE 1 TABLET ORALLY DAILY (EDEMA)      Facility-Administered Medications: None       Past Medical History:   Diagnosis Date   • Anxiety    • Arthritis    • Confusion 10/2/2018   • Depression    • Displaced fracture of distal phalanx of right thumb    • GERD (gastroesophageal reflux disease)    • Heart attack ([address])    • Hyperlipidemia    • Hypertension    • Moderate episode of recurrent major depressive disorder ([address]) 4/12/2019   • Shortness of breath    • Stage 2 chronic kidney disease 7/17/2019       Past Surgical History:   Procedure Laterality Date   • APPENDECTOMY     • CARPAL TUNNEL RELEASE     • CATARACT EXTRACTION Bilateral    • COLONOSCOPY     • FL LUMBAR PUNCTURE DIAGNOSTIC  1/10/2019   • FRACTURE SURGERY     • WI CRTJ SHUNT [id number] TERMINUS Right 9/3/2019    Procedure:  Insertion of frontal ventriculoperitoneal shunt;  Surgeon: Doron Mchugh MD;  Location: [facility];  Service: Neurosurgery   • SEPTOPLASTY     • WRIST FRACTURE SURGERY Right        Family History   Problem Relation Age of Onset   • Heart attack Mother 39   • Heart attack Father 61   • No Known Problems Other    • Breast cancer Sister    • Alcohol abuse Daughter         history of   • Heart attack Brother      I have reviewed and agree with the history as documented. E-Cigarette/Vaping   • E-Cigarette Use Never User      E-Cigarette/Vaping Substances   • Nicotine No    • THC No    • CBD No    • Flavoring No    • Other No    • Unknown No      Social History     Tobacco Use   • Smoking status: Former     Packs/day: 0.00     Years: 0.00     Total pack years: 0.00     Types: Cigarettes     Quit date:      Years since quittin.6   • Smokeless tobacco: Never   • Tobacco comments:     no secondhand smoke exposure   Vaping Use   • Vaping Use: Never used   Substance Use Topics   • Alcohol use: Yes     Comment: social   • Drug use: No       Review of Systems   Unable to perform ROS: Dementia       Physical Exam  Physical Exam  Vitals and nursing note reviewed. Constitutional:       General: She is not in acute distress. Appearance: She is well-developed. HENT:      Head: Normocephalic and atraumatic. Comments: Patient maintaining airway and secretions. No stridor . No brawniness under tongue. Mouth/Throat:      Mouth: Mucous membranes are dry. Eyes:      Extraocular Movements: Extraocular movements intact. Conjunctiva/sclera: Conjunctivae normal.      Pupils: Pupils are equal, round, and reactive to light. Cardiovascular:      Rate and Rhythm: Normal rate and regular rhythm. Heart sounds: S1 normal and S2 normal. No murmur heard. Pulmonary:      Effort: Pulmonary effort is normal. No respiratory distress. Breath sounds: Normal breath sounds. Abdominal:      Palpations: Abdomen is soft. Tenderness: There is no abdominal tenderness. Musculoskeletal:         General: No swelling. Cervical back: Neck supple. Right lower leg: No edema. Left lower leg: No edema. Skin:     General: Skin is warm and dry. Capillary Refill: Capillary refill takes less than 2 seconds. Neurological:      General: No focal deficit present. Mental Status: She is alert. Mental status is at baseline.       GCS: GCS eye subscore is 4. GCS verbal subscore is 4. GCS motor subscore is 6. Cranial Nerves: Cranial nerves 2-12 are intact. Sensory: Sensation is intact. Motor: Motor function is intact. Comments: Cranial nerves 2-12 grossly intact. EOMI. PERRLA. No slurred speech. No facial asymmetry. No tongue deviation. Negative pronator drift. No nystagmus. Patient can move bilateral upper extremities and lower extremities independently of each other pain-free with full active range of motion throughout the bilateral shoulders, elbows, wrists, hips, knees and ankles. Manual muscle grade 5/5 throughout the bilateral upper extremities and lower extremities. Except for left upper extremity 4+ out of 5 throughout shoulder flexion extension as well as handgrip    Per daughter patient at baseline mental status     Psychiatric:         Attention and Perception: Attention and perception normal.         Mood and Affect: Mood normal. Affect is flat. Speech: Speech normal.         Cognition and Memory: Memory is impaired.          Vital Signs  ED Triage Vitals   Temperature Pulse Respirations Blood Pressure SpO2   09/05/23 1705 09/05/23 1707 09/05/23 1707 09/05/23 1707 09/05/23 1707   98.6 °F (37 °C) 72 18 147/61 96 %      Temp Source Heart Rate Source Patient Position - Orthostatic VS BP Location FiO2 (%)   09/05/23 1705 09/05/23 1707 09/05/23 1707 09/05/23 1707 --   Oral Monitor Sitting Right arm       Pain Score       --                  Vitals:    09/05/23 1707 09/05/23 1800 09/05/23 1915 09/05/23 2100   BP: 147/61 128/62 132/59 126/62   Pulse: 72 81 78 77   Patient Position - Orthostatic VS: Sitting Lying Lying Lying         Visual Acuity  Visual Acuity    Flowsheet Row Most Recent Value   R Pupil Size (mm) 5          ED Medications  Medications   magnesium gluconate (MAGONATE) tablet 500 mg (has no administration in time range)   sodium chloride 0.9 % bolus 1,000 mL (0 mL Intravenous Stopped 9/5/23 2100)   iohexol (OMNIPAQUE) 350 MG/ML injection (MULTI-DOSE) 85 mL (85 mL Intravenous Given 9/5/23 1828)       Diagnostic Studies  Results Reviewed     Procedure Component Value Units Date/Time    HS Troponin I 2hr [[id number]]  (Abnormal) Collected: 09/05/23 1949    Lab Status: Final result Specimen: Blood from Hand, Right Updated: 09/05/23 2035     hs TnI 2hr 98 ng/L      Delta 2hr hsTnI -9 ng/L     HS Troponin I 4hr [[id number]]     Lab Status: No result Specimen: Blood     Urine Microscopic [[id number]]  (Abnormal) Collected: 09/05/23 1910    Lab Status: Final result Specimen: Urine, Other Updated: 09/05/23 1933     RBC, UA 1-2 /hpf      WBC, UA 4-10 /hpf      Epithelial Cells Occasional /hpf      Bacteria, UA Occasional /hpf      Transitional Epithelial Cells Present    UA (URINE) with reflex to Scope [[id number]]  (Abnormal) Collected: 09/05/23 1910    Lab Status: Final result Specimen: Urine, Other Updated: 09/05/23 1922     Color, UA Light Yellow     Clarity, UA Turbid     Specific Gravity, UA 1.017     pH, UA 6.0     Leukocytes, UA Moderate     Nitrite, UA Negative     Protein, UA Negative mg/dl      Glucose, UA Negative mg/dl      Ketones, UA Negative mg/dl      Urobilinogen, UA <2.0 mg/dl      Bilirubin, UA Negative     Occult Blood, UA Trace    Comprehensive metabolic panel [[id number]]  (Abnormal) Collected: 09/05/23 1743    Lab Status: Final result Specimen: Blood from Arm, Left Updated: 09/05/23 1843     Sodium 132 mmol/L      Potassium 4.0 mmol/L      Chloride 99 mmol/L      CO2 22 mmol/L      ANION GAP 11 mmol/L      BUN 26 mg/dL      Creatinine 1.20 mg/dL      Glucose 134 mg/dL      Calcium 8.9 mg/dL       U/L      ALT 42 U/L      Alkaline Phosphatase 52 U/L      Total Protein 6.9 g/dL      Albumin 3.6 g/dL      Total Bilirubin 0.48 mg/dL      eGFR 42 ml/min/1.73sq m     Narrative:      [place] guidelines for Chronic Kidney Disease (CKD):   •  Stage 1 with normal or high GFR (GFR > 90 mL/min/1.73 square meters)  •  Stage 2 Mild CKD (GFR = 60-89 mL/min/1.73 square meters)  •  Stage 3A Moderate CKD (GFR = 45-59 mL/min/1.73 square meters)  •  Stage 3B Moderate CKD (GFR = 30-44 mL/min/1.73 square meters)  •  Stage 4 Severe CKD (GFR = 15-29 mL/min/1.73 square meters)  •  Stage 5 End Stage CKD (GFR <15 mL/min/1.73 square meters)  Note: GFR calculation is accurate only with a steady state creatinine    Magnesium [[id number]]  (Abnormal) Collected: 09/05/23 1743    Lab Status: Final result Specimen: Blood from Arm, Left Updated: 09/05/23 1843     Magnesium 1.6 mg/dL     Lactic acid, plasma (w/reflex if result > 2.0) [[id number]]  (Normal) Collected: 09/05/23 1743    Lab Status: Final result Specimen: Blood from Arm, Left Updated: 09/05/23 1842     LACTIC ACID 1.6 mmol/L     Narrative:      Result may be elevated if tourniquet was used during collection.     TSH, 3rd generation with Free T4 reflex [[id number]]  (Normal) Collected: 09/05/23 1743    Lab Status: Final result Specimen: Blood from Arm, Left Updated: 09/05/23 1839     TSH 3RD GENERATON 3.654 uIU/mL     HS Troponin 0hr (reflex protocol) [[id number]]  (Abnormal) Collected: 09/05/23 1743    Lab Status: Final result Specimen: Blood from Arm, Left Updated: 09/05/23 1830     hs TnI 0hr 107 ng/L     CBC and differential [[id number]]  (Abnormal) Collected: 09/05/23 1743    Lab Status: Final result Specimen: Blood from Arm, Left Updated: 09/05/23 1759     WBC 11.14 Thousand/uL      RBC 3.35 Million/uL      Hemoglobin 10.2 g/dL      Hematocrit 31.5 %      MCV 94 fL      MCH 30.4 pg      MCHC 32.4 g/dL      RDW 13.6 %      MPV 10.9 fL      Platelets 186 Thousands/uL      nRBC 0 /100 WBCs      Neutrophils Relative 79 %      Immat GRANS % 1 %      Lymphocytes Relative 9 %      Monocytes Relative 5 %      Eosinophils Relative 5 %      Basophils Relative 1 %      Neutrophils Absolute 8.83 Thousands/µL      Immature Grans Absolute 0.06 Thousand/uL Lymphocytes Absolute 1.05 Thousands/µL      Monocytes Absolute 0.55 Thousand/µL      Eosinophils Absolute 0.59 Thousand/µL      Basophils Absolute 0.06 Thousands/µL                  XR pelvis ap only 1 or 2 vw   ED Interpretation by Sheyla Lane DO (09/05 1935)   No fracture or dislocation      XR shunt series   Final Result by Lamonte Tejeda MD (09/05 1930)         1. Intact  shunt catheter. 2. Additional views are necessary to verify shunt catheter pressure setting, if clinically indicated. Workstation performed: [id number]         CTA head and neck with and without contrast   Final Result by Jenny Bran MD (09/05 2045)         1. No acute intracranial abnormality. 2. Stable generalized parenchymal volume loss and findings of normal pressure hydrocephalus with stable degree of ventriculomegaly and unchanged position of the right frontal approach ventricular shunt catheter. 3. 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 Developmentally hypoplastic left P1 segment and mild irregularity    of the posterior communicating arteries possibly secondary to atherosclerotic disease versus fibromuscular dysplasia as there are more pronounced changes of fibromuscular dysplasia involving the mid left greater than right cervical ICA segments. 4. No intracranial or cervical carotid aneurysm. 5. Right upper lobe 4 mm pulmonary nodule, grossly stable in size when comparing to the May 13, 2023 CT cervical spine study. A follow-up evaluation in 8 months time is recommended to ensure at least 12 months of stability. 6. Subcentimeter hypodense right thyroid nodule. No additional work-up is recommended at this time. 7. Multilevel cervical spondylosis with notable bilateral foraminal stenosis at C5-C6.       Workstation performed: [id number]         XR shoulder 2+ views LEFT    (Results Pending)              Procedures  Procedures         ED Course  ED Course as of 09/05/23 2120   Tue Sep 05, 2023   1742 Patient is an 80-year-old female history primarily per daughter who is at bedside coming in today with repeated fall this morning as well as no neurologic changes with leaning to the left occultly walking. On exam she does have mild weakness to the left upper extremity and at baseline mental status. Will check labs including urine TSH, EKG as well as CT angio head and neck, chest x-ray, shoulder x-ray and pelvis x-ray    Daughter agreeable. Disclosure: Voice to text software was used in the preparation of this document and could have resulted in translational errors.      Occasional wrong word or "sound a like" substitutions may have occurred due to the inherent limitations of voice recognition software.  Read the chart carefully and recognize, using context, where substitutions have occurred.       I have independently reviewed external records are available to me to the level of detail possible within the time constraints of my patient care responsibilities in the ED.       1808 EKG without ischemia or arrhythmia. Discussed with radiology and can CT based on labs 2 days ago   1915 Noted magnesium low at 1.6. Hemoglobin stable. Troponin is elevated for 107 with a normal creatinine of 1.2. Last troponin was 3 months ago in the 60s. Repeat EKG does not show any ischemia   1920 Noted patient's last nuclear stress test in May 2023 showed no deviation. There is no perfusion defects and EF is greater than 70%    Troponin presumed not due to acute myocardial ischemia or infarct. 1944 Shunt series intact per radiology read pending CT read. 2005 Patient and family updated on labs thus far including low sodium, low magnesium, as well as elevated troponin. We are giving IV fluids to help replace sodium. Will give oral magnesium. Patient's daughter aware of elevated troponin with no ischemic changes on EKG. Pending delta troponin and CT.   2036 HS Troponin I 2hr(!)  Delta troponin decreased by 9. Pending CT result   2050 No acute intracranial abnormality.     2. Stable generalized parenchymal volume loss and findings of normal pressure hydrocephalus with stable degree of ventriculomegaly and unchanged position of the right frontal approach ventricular shunt catheter.     3. 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 Developmentally hypoplastic left P1 segment and mild irregularity   of the posterior communicating arteries possibly secondary to atherosclerotic disease versus fibromuscular dysplasia as there are more pronounced changes of fibromuscular dysplasia involving the mid left greater than right cervical ICA segments.     4. No intracranial or cervical carotid aneurysm.     5. Right upper lobe 4 mm pulmonary nodule, grossly stable in size when comparing to the May 13, 2023 CT cervical spine study. A follow-up evaluation in 8 months time is recommended to ensure at least 12 months of stability.     6. Subcentimeter hypodense right thyroid nodule. No additional work-up is recommended at this time.     7. Multilevel cervical spondylosis with notable bilateral foraminal stenosis at C5-C6.      2054 Discussed with Mali Bland  . We had a detailed discussion of the patient's condition and case,  including need for admission. Accepts to his/her service. Bed request/bridging orders placed. 2119 T angio without any acute pathology however given elevated troponin and new symptoms will need PT OT and possible neurology evaluation. As well as cardiology.              HEART Risk Score    Flowsheet Row Most Recent Value   Heart Score Risk Calculator    History 0 Filed at: 09/05/2023 1920   ECG 1 Filed at: 09/05/2023 1920   Age 2 Filed at: 09/05/2023 1920   Risk Factors 2 Filed at: 09/05/2023 1920   Troponin 2 Filed at: 09/05/2023 1920   HEART Score 7 Filed at: 09/05/2023 1920           Stroke Assessment     Row Name 09/05/23 1744             NIH Stroke Scale    Interval Baseline      Level of Consciousness (1a.) 0      LOC Questions (1b.) 0      LOC Commands (1c.) 0      Best Gaze (2.) 0      Visual (3.) 0      Facial Palsy (4.) 0      Motor Arm, Left (5a.) 1      Motor Arm, Right (5b.) 0      Motor Leg, Left (6a.) 0      Motor Leg, Right (6b.) 0      Limb Ataxia (7.) 0      Sensory (8.) 0      Best Language (9.) 0      Dysarthria (10.) 0      Extinction and Inattention (11.) (Formerly Neglect) 0      Total 1              Flowsheet Row Most Recent Value   Thrombolytic Decision Options    Thrombolytic Decision Patient not a candidate. Patient is not a candidate options Symptoms resolved/clearly non disabling., Unclear time of onset outside appropriate time window. [symptoms >24 hours]                                  Medical Decision Making      EKG INTERPRETATION @ 1756  RHYTHM: Normal sinus rhythm at 77 bpm  AXIS: Normal axis  INTERVALS: MD interval measured at 160 ms  QRS COMPLEX: QRS measured at 76 ms   ST SEGMENT: Nonspecific ST segment changes. Diffuse artifact  QT INTERVAL: QTc measured at 445 ms  COMPARED WITH PRIOR compared to old EKG on May 18, 2023 no acute change  Interpretation by Bethany Ramos DO    EKG [email or web address]  RHYTHM: Normal sinus rhythm at 77 bpm  AXIS: Normal axis  INTERVALS: MD interval measured at 160 ms  QRS COMPLEX: QRS measured at 76 ms  ST SEGMENT: Nonspecific ST segment changes.   Diffuse artifact  QT INTERVAL: QTc measured at 445 ms  COMPARED WITH PRIOR compared to prior today no acute change  Interpretation by Bethany Ramos DO      Given patient's age and comorbidities will is history based from daughter and chart review concern for CVA versus stroke versus fracture versus sprain strain versus progressive dementia versus electrolyte abnormalities, UTI, septicemia    Dementia ([address]): chronic illness or injury  Elevated troponin: acute illness or injury  Left-sided weakness: acute illness or injury  Amount and/or Complexity of Data Reviewed  Independent Historian: caregiver     Details: Daughter at bedside  External Data Reviewed: notes. Labs: ordered. Decision-making details documented in ED Course. Details: Anemia which is chronic. No thrombocytopenia  TSH within normal limits  Sodium 132. Otherwise no acute kidney injury. Magnesium low and will replace. Troponin 107  Radiology: ordered and independent interpretation performed. Decision-making details documented in ED Course. Details: CT angio head and neck interpreted by radiologist as no acute pathology or aneurysm   shunt series interpreted by radiologist as no acute pathology  Left shoulder x-ray interpreted by myself as no acute pathology  Pelvis x-ray interpreted by myself as no acute pathology  ECG/medicine tests: ordered and independent interpretation performed. Decision-making details documented in ED Course. Details: No ischemia or arrhythmia  Discussion of management or test interpretation with external provider(s): Daughter at bedside    Risk  OTC drugs. Prescription drug management. Decision regarding hospitalization.           Disposition  Final diagnoses:   Elevated troponin   Left-sided weakness   Dementia ([address])     Time reflects when diagnosis was documented in both MDM as applicable and the Disposition within this note     Time User Action Codes Description Comment    9/5/2023  9:06 PM Clare Alcantar Add [R77.8] Elevated troponin     9/5/2023  9:07 PM Jose Angel Gavin Add [R53.1] Left-sided weakness     9/5/2023  9:07 PM Venus Gavin Add [F03.90] Dementia [facility])       ED Disposition     ED Disposition   Admit    Condition   Stable    Date/Time   Tue Sep 5, 2023  8:55 PM    Comment   Case was discussed with Vannesa Johnson and the patient's admission status was agreed to be Admission Status: inpatient status to the service of Dr. Nitin Cisse   . Follow-up Information    None         Patient's Medications   Discharge Prescriptions    No medications on file       No discharge procedures on file.     PDMP Review     None          ED Provider  Electronically Signed by           Irlanda Spencer DO  09/05/23 2120       Irlanda Spencer DO  09/05/23 2127

## 2023-09-05 NOTE — TELEPHONE ENCOUNTER
[address] called back Yesterday patient was leaning to the left and was very confused today she is still confused but is walking better. She fell out of bed today at 5am she was soiled and had her clothes off. She did not hit her head. Her vitals were  Yesterday  Today  /60  133/51  Temp 98.1  98.1  Pulse 78  69  Resp. 22  23  Oxygen 96  98    covid negative she also has a dry cough better  Today.
ER for eval?
Hello, this is [place] from [facility]. I'm calling in regards. Chantal Hagen last name is [facility]. Her YOB: 1940. I would like to talk to one of the doctors or nurses in regards for status. I'm a little concerned about how she's doing lately. Can you please call me back to [phone], again, [phone]. Thanks.
PT is in the ER.
Phones are not working properly at [facility]. Bjorn Galan will be emailed and call back. We need to find out what is going on with pt's help and forward to pt's provider.
<-- Click to add NO pertinent Family History

## 2023-09-05 NOTE — TELEPHONE ENCOUNTER
----- Message from Lakisha Rodrigues, [facility] sent at 9/5/2023 10:17 AM EDT -----  Please call patient and let her know her most recent sodium level from 4 September shows her sodium level stable and within normal limits at 139.   We will be discussed further with Dr. Gonzalo Fierro at her upcoming appointment

## 2023-09-06 PROBLEM — R77.8 ELEVATED TROPONIN: Status: ACTIVE | Noted: 2023-09-06

## 2023-09-06 PROBLEM — R79.89 ELEVATED TROPONIN: Status: ACTIVE | Noted: 2023-09-06

## 2023-09-06 LAB
4HR DELTA HS TROPONIN: -33 NG/L
ANION GAP SERPL CALCULATED.3IONS-SCNC: 11 MMOL/L
ATRIAL RATE: 78 BPM
BUN SERPL-MCNC: 21 MG/DL (ref 5–25)
CALCIUM SERPL-MCNC: 8.8 MG/DL (ref 8.4–10.2)
CARDIAC TROPONIN I PNL SERPL HS: 74 NG/L
CHLORIDE SERPL-SCNC: 102 MMOL/L (ref 96–108)
CO2 SERPL-SCNC: 22 MMOL/L (ref 21–32)
CREAT SERPL-MCNC: 1 MG/DL (ref 0.6–1.3)
ERYTHROCYTE [DISTWIDTH] IN BLOOD BY AUTOMATED COUNT: 13.6 % (ref 11.6–15.1)
GFR SERPL CREATININE-BSD FRML MDRD: 52 ML/MIN/1.73SQ M
GLUCOSE SERPL-MCNC: 115 MG/DL (ref 65–140)
HCT VFR BLD AUTO: 32 % (ref 34.8–46.1)
HGB BLD-MCNC: 10 G/DL (ref 11.5–15.4)
MCH RBC QN AUTO: 29.9 PG (ref 26.8–34.3)
MCHC RBC AUTO-ENTMCNC: 31.3 G/DL (ref 31.4–37.4)
MCV RBC AUTO: 96 FL (ref 82–98)
P AXIS: 74 DEGREES
PLATELET # BLD AUTO: 283 THOUSANDS/UL (ref 149–390)
PMV BLD AUTO: 10.9 FL (ref 8.9–12.7)
POTASSIUM SERPL-SCNC: 3.8 MMOL/L (ref 3.5–5.3)
PR INTERVAL: 162 MS
QRS AXIS: 51 DEGREES
QRSD INTERVAL: 74 MS
QT INTERVAL: 388 MS
QTC INTERVAL: 442 MS
RBC # BLD AUTO: 3.34 MILLION/UL (ref 3.81–5.12)
SODIUM SERPL-SCNC: 135 MMOL/L (ref 135–147)
T WAVE AXIS: 45 DEGREES
VENTRICULAR RATE: 78 BPM
WBC # BLD AUTO: 11.28 THOUSAND/UL (ref 4.31–10.16)

## 2023-09-06 PROCEDURE — 97167 OT EVAL HIGH COMPLEX 60 MIN: CPT

## 2023-09-06 PROCEDURE — 99232 SBSQ HOSP IP/OBS MODERATE 35: CPT | Performed by: INTERNAL MEDICINE

## 2023-09-06 PROCEDURE — 93010 ELECTROCARDIOGRAM REPORT: CPT | Performed by: INTERNAL MEDICINE

## 2023-09-06 PROCEDURE — 97163 PT EVAL HIGH COMPLEX 45 MIN: CPT

## 2023-09-06 PROCEDURE — 80048 BASIC METABOLIC PNL TOTAL CA: CPT

## 2023-09-06 PROCEDURE — 84484 ASSAY OF TROPONIN QUANT: CPT

## 2023-09-06 PROCEDURE — 87081 CULTURE SCREEN ONLY: CPT | Performed by: INTERNAL MEDICINE

## 2023-09-06 PROCEDURE — 85027 COMPLETE CBC AUTOMATED: CPT

## 2023-09-06 RX ORDER — CEFTRIAXONE 1 G/50ML
1000 INJECTION, SOLUTION INTRAVENOUS EVERY 24 HOURS
Status: DISCONTINUED | OUTPATIENT
Start: 2023-09-06 | End: 2023-09-07

## 2023-09-06 RX ADMIN — POLYETHYLENE GLYCOL 3350 17 G: 17 POWDER, FOR SOLUTION ORAL at 11:18

## 2023-09-06 RX ADMIN — LOSARTAN POTASSIUM 50 MG: 50 TABLET, FILM COATED ORAL at 09:11

## 2023-09-06 RX ADMIN — LOSARTAN POTASSIUM 50 MG: 50 TABLET, FILM COATED ORAL at 21:20

## 2023-09-06 RX ADMIN — MAGNESIUM OXIDE TAB 400 MG (241.3 MG ELEMENTAL MG) 400 MG: 400 (241.3 MG) TAB at 17:20

## 2023-09-06 RX ADMIN — ESCITALOPRAM OXALATE 10 MG: 10 TABLET ORAL at 09:11

## 2023-09-06 RX ADMIN — SODIUM CHLORIDE 2 G: 1 TABLET ORAL at 17:20

## 2023-09-06 RX ADMIN — ASPIRIN 81 MG 81 MG: 81 TABLET ORAL at 09:11

## 2023-09-06 RX ADMIN — PANTOPRAZOLE SODIUM 40 MG: 40 TABLET, DELAYED RELEASE ORAL at 05:34

## 2023-09-06 RX ADMIN — CEFTRIAXONE 1000 MG: 1 INJECTION, SOLUTION INTRAVENOUS at 12:40

## 2023-09-06 RX ADMIN — MAGNESIUM OXIDE TAB 400 MG (241.3 MG ELEMENTAL MG) 400 MG: 400 (241.3 MG) TAB at 09:11

## 2023-09-06 RX ADMIN — ENOXAPARIN SODIUM 40 MG: 40 INJECTION SUBCUTANEOUS at 09:11

## 2023-09-06 RX ADMIN — SODIUM CHLORIDE 2 G: 1 TABLET ORAL at 09:11

## 2023-09-06 RX ADMIN — AMLODIPINE BESYLATE 5 MG: 5 TABLET ORAL at 09:11

## 2023-09-06 RX ADMIN — TORSEMIDE 10 MG: 10 TABLET ORAL at 09:11

## 2023-09-06 RX ADMIN — ATENOLOL 100 MG: 50 TABLET ORAL at 09:11

## 2023-09-06 RX ADMIN — OLANZAPINE 5 MG: 5 TABLET, FILM COATED ORAL at 21:20

## 2023-09-06 RX ADMIN — Medication 3 MG: at 21:20

## 2023-09-06 RX ADMIN — ATORVASTATIN CALCIUM 40 MG: 40 TABLET, FILM COATED ORAL at 15:34

## 2023-09-06 NOTE — PLAN OF CARE
Problem: PHYSICAL THERAPY ADULT  Goal: Performs mobility at highest level of function for planned discharge setting. See evaluation for individualized goals. Description: Treatment/Interventions: Functional transfer training, LE strengthening/ROM, Therapeutic exercise, Endurance training, Patient/family training, Equipment eval/education, Bed mobility, Gait training, Compensatory technique education, Continued evaluation, Spoke to nursing, OT, Spoke to case management          See flowsheet documentation for full assessment, interventions and recommendations. Outcome: Progressing  Note: Prognosis: Fair  Problem List: Decreased strength, Decreased range of motion, Decreased endurance, Impaired balance, Decreased mobility, Decreased cognition, Decreased coordination, Impaired judgement, Decreased safety awareness  Assessment: Lakshmi Keyes is a 80 y.o. female with a PMH of Dementia, NPH s/p  shunt, HTN, HLD, CKD, hyponatremia who presents with fall. Prior to admission reported to reside at [facility]. Ambulation with rollator walker independently. Notes only support for showering from facility staff. Hx of dementia-? Historian. Presents with functional limitations related to decreased activity tolerance, cognition, impaired functional mobility, balance, strength, posture and locomotion. Noted L lean in sitting with assist for correction. ModA of 2 for bed mobility, transfers and to ambulate few feet bed to [facility] with RW support. BP supine 105/36, /45 and 110/51 standing. Given impairments as noted above with decline from PLOF will benefit from skilled PT in order to progress and optimize functional outcomes. The patient's AM-PAC Basic Mobility Inpatient Short Form Raw Score is 11. A Raw score of less than or equal to 16 suggests the patient may benefit from discharge to post-acute rehabilitation services.  Please also refer to the recommendation of the Physical Therapist for safe discharge planning. At this time rehab is recommended. PT will follow per POC 3-5x/wk. Monitor balance, L sided lean, discussed with nursing and care team p fall. PT Discharge Recommendation: Post acute rehabilitation services    See flowsheet documentation for full assessment.

## 2023-09-06 NOTE — CASE MANAGEMENT
Case Management Assessment & Discharge Planning Note    Patient name Faisal Elliott  [facility] [address] [facility] [address] Alla Shah-* MRN [id number]  : 1940 Date 2023       Current Admission Date: 2023  Current Admission Diagnosis:Ambulatory dysfunction   Patient Active Problem List    Diagnosis Date Noted   • Elevated troponin 2023   • Incontinence associated dermatitis 2023   • Chest pressure 2023   • Fall 2023   • Persistent proteinuria 2023   • COVID 2022   • Mild recurrent major depression ([address]) 2022   • Non-ST elevation myocardial infarction (NSTEMI) ([address]) 2022   • Chronic obstructive pulmonary disease, unspecified COPD type ([address]) 2022   • Polypharmacy 2021   • Mild dementia ([address]) 2021   • Hyperglycemia 2021   • Primary insomnia 2021   • SIADH (syndrome of inappropriate ADH production) ([address]) 2021   • Medicare annual wellness visit, subsequent 2020   • Osteopenia of multiple sites 2020   • S/P  shunt 10/04/2019   • Recurrent falls 2019   • Hyperkalemia 2019   • Leukocytosis 2019   • Stage 2 chronic kidney disease 2019   • Normal pressure hydrocephalus ([address]) 2018   • Ambulatory dysfunction 12/15/2017   • Positive ROSALINAD (antinuclear antibody) 12/15/2017   • 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   • Anxiety and depression 10/20/2017   • 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   • Splenic infarction 10/11/2017   • Levoscoliosis 2017   • Elevated sed rate 2017   • Vitamin D deficiency 2017   • Radiculopathy 2017   • GERD without esophagitis 2017   • Essential (hemorrhagic) thrombocythemia ([address]) 2017   • Carotid artery plaque 2016   • Chronic hyponatremia 2014   • Hyperlipidemia 2014   • Carotid bruit 2014   • Essential hypertension 2009   • Coronary atherosclerosis 2009      LOS (days): 1  Geometric Mean LOS (GMLOS) (days): Days to LOS:     OBJECTIVE:    Risk of Unplanned Readmission Score: 22.19         Current admission status: Inpatient       Preferred Pharmacy:   [address] [facility] [facility], [address] [facility]  [address] [address]  Phone: [phone] Fax: [phone]    Primary Care Provider: Belkis Mann PA-C    Primary Insurance: [facility]  Secondary Insurance:     ASSESSMENT:  [address], [address] - Daughter   Primary Phone: [phone] (Mobile)               Advance Directives  Does patient have a [address]?: Yes  Does patient have Advance Directives?: Yes  Advance Directives: Power of  for health care, Power of  for finance  Primary Contact: Bhavna [phone]    Readmission Root Cause  30 Day Readmission: No    Patient Information  Admitted from[de-identified] Facility [facility])  Mental Status: Confused  During Assessment patient was accompanied by: Not accompanied during assessment  Assessment information provided by[de-identified] Daughter  Primary Caregiver: Other (Comment)  Caregiver's Name[de-identified] [facility] [facility] Relationship to Patient[de-identified] Other (Specify)  Caregiver's Telephone Number[de-identified] [phone]  Support Systems: Self, [address] of Residence: [address] [facility] do you live in?: [address] entry access options.  Select all that apply.: No steps to enter home  Type of Current Residence: Facility  Upon entering residence, is there a bedroom on the main floor (no further steps)?: Yes  Upon entering residence, is there a bathroom on the main floor (no further steps)?: Yes  Homeless/housing insecurity resource given?: N/A  Living Arrangements: Other (Comment) ([address])    Activities of Daily Living Prior to Admission  Functional Status: Assistance  Completes ADLs independently?: No  Level of ADL dependence: Assistance  Ambulates independently?: No  Level of ambulatory dependence: Assistance  Does patient use assisted devices?: Yes  Assisted Devices (DME) used: Renetta Vanegas  Does patient currently own DME?: Yes  What DME does the patient currently own?: Renetta Vanegas  Does patient have a history of Outpatient Therapy (PT/OT)?: No  Does the patient have a history of Short-Term Rehab?: Yes (complete care at [facility])  Does patient have a history of [address]?: No  Does patient currently have [address]?: No    Patient Information Continued  Income Source: SSI/SSD  Does patient have prescription coverage?: Yes  Does patient have a history of substance abuse?: No  Does patient have a history of Mental Health Diagnosis?: No    Means of Transportation  Means of Transport to Appts[de-identified] Other (Comment) ([address] [facility])        DISCHARGE DETAILS:    Discharge planning discussed with[de-identified] patient's daughter & [address] of Choice: Yes  Comments - Freedom of Choice: wants [address] or return to facility w/ rehab  CM contacted family/caregiver?: Yes  Were Treatment Team discharge recommendations reviewed with patient/caregiver?: Yes  Did patient/caregiver verbalize understanding of patient care needs?: N/A- going to facility  Were patient/caregiver advised of the risks associated with not following Treatment Team discharge recommendations?: Yes    Contacts  Patient Contacts: Bhavna  Relationship to Patient[de-identified] Family  Contact Method: Phone  Phone Number: [phone]  Reason/Outcome: Emergency Contact, Discharge [address]         Is the patient interested in [address] at discharge?: No    DME Referral Provided  Referral made for DME?: No    Other Referral/Resources/Interventions Provided:  Interventions: [address], Facility Return    Treatment Team Recommendation: [address] Return  Discharge Destination Plan[de-identified] Facility Return, Short Term Rehab  Transport at Discharge : BLS Ambulance     Additional Comments: Patient lives at [facility].  Patient was just about to transition to the [facility] before coming into the hospital. Daughter Sultana Moctezuma wants patient to return to the facility in memory care unit w/ therapy.  However, daughter states if she absolutely needs rehab- she wants her to go to [facility]

## 2023-09-06 NOTE — PLAN OF CARE

## 2023-09-06 NOTE — H&P
[facility]  H&P  Name: Vandana Seen 80 y.o. female I MRN: [id number]  Unit/Bed#: E5 -01 I Date of Admission: 9/5/2023   Date of Service: 9/5/2023 I Hospital Day: 0      Assessment/Plan   * Ambulatory dysfunction  Assessment & Plan  Now with worsening gait dysfunction in the setting of NPH, frequent falls     Plan:  • Check orthostatic BP   • Maintain telemetry to monitor for acute arrhythmias   • Consult: PT/OT      Elevated troponin  Assessment & Plan  Unclear etiology  Recent NM stress test 05/19/23 with stress ECG with no ST deviation or arrhythmias, no perfusion defects    Plan:  • Troponin down trending   • EKG non-ischemic   • Maintain telemetry overnight       Mild dementia (HCC)  Assessment & Plan  In the setting of NPH, daughters note progressive worsening, sundowning. Pending transition to memory care unit at [facility]     SIADH (syndrome of inappropriate ADH production) ([address])  Assessment & Plan  Na 132     Plan:  • Continue PTA diuretic, salt tabs      Normal pressure hydrocephalus (HCC)  Assessment & Plan  Now with worsening gait, dementia   XR shunt series with intact shunt   CTA head neck with no acute intracranial abnormality, stable volume loss/ventriculomegaly, atherosclerotic disease, no aneurysm     Plan:  • Likely contributor to gait disturbance vs other acute neurologic condition like TIA/CVA, neuro exam was non-focal on admission   • Consults: PT/OT         Essential hypertension  Assessment & Plan  Normotensive     Plan:  • Possible component of orthostasis contributing to symptoms  • Continue PTA losartan 50mg, amlodipine 5mg, atenolol 100mg, torsemide 10mg daily may need further adjustment       VTE Pharmacologic Prophylaxis: VTE Score: 4 Moderate Risk (Score 3-4) - Pharmacological DVT Prophylaxis Ordered: enoxaparin (Lovenox). Code Status: Level 1 - Full Code   Discussion with family: Updated  (daughter) at bedside.     Anticipated Length of Stay: Patient will be admitted on an inpatient basis with an anticipated length of stay of greater than 2 midnights secondary to ambulatory dysfunction. Total Time Spent on Date of Encounter in care of patient: 75 minutes This time was spent on one or more of the following: performing physical exam; counseling and coordination of care; obtaining or reviewing history; documenting in the medical record; reviewing/ordering tests, medications or procedures; communicating with other healthcare professionals and discussing with patient's family/caregivers. Chief Complaint: fall    History of Present Illness:  Niraj Loya is a 80 y.o. female with a PMH of Dementia, NPH s/p  shunt, HTN, HLD, CKD, hyponatremia who presents with fall. History limited from patient due to baseline dementia. Further supplemented with family at bedside, chart review and discussion with ED attending. She presents tonight with worsening ambulation/fall. She was recently in ED for the same 2-3 days ago. Her workup then was overall negative. The patient was able to ambulate well. Her daughter states today she continued to have difficulty ambulating, has been leaning to the left side, having a hard time using walker. Her previous baseline is that she is able to get around okay with her walker, goes out on some excursions at her NH, etc. She also has been having a progression of her baseline dementia and going into the memory care unit. Patient continues to complain of pain in her left shoulder when she moves it and some weakness.      Review of Systems:  Review of Systems   Unable to perform ROS: Dementia       Past Medical and Surgical History:   Past Medical History:   Diagnosis Date   • Anxiety    • Arthritis    • Confusion 10/2/2018   • Depression    • Displaced fracture of distal phalanx of right thumb    • GERD (gastroesophageal reflux disease)    • Heart attack ([address])    • Hyperlipidemia    • Hypertension    • Moderate episode of recurrent major depressive disorder ([address] [facility]) 4/12/2019   • Shortness of breath    • Stage 2 chronic kidney disease 7/17/2019       Past Surgical History:   Procedure Laterality Date   • APPENDECTOMY     • CARPAL TUNNEL RELEASE     • CATARACT EXTRACTION Bilateral    • COLONOSCOPY     • FL LUMBAR PUNCTURE DIAGNOSTIC  1/10/2019   • FRACTURE SURGERY     • MD CRTJ SHUNT [id number] TERMINUS Right 9/3/2019    Procedure: Insertion of frontal ventriculoperitoneal shunt;  Surgeon: Elham Benitez MD;  Location: [facility];  Service: Neurosurgery   • SEPTOPLASTY     • WRIST FRACTURE SURGERY Right        Meds/Allergies:  Prior to Admission medications    Medication Sig Start Date End Date Taking?  Authorizing Provider   amLODIPine (NORVASC) 5 mg tablet TAKE 1 TABLET ORALLY DAILY (HYPERTENSION) 6/20/23  Yes Nadeen Tolliver PA-C   aspirin 81 mg chewable tablet CHEW 1 TABLET AND SWALLOW ORALLY DAILY (HYPERTENSION) 10/4/19  Yes Eduardo Franklin MD   atenolol (TENORMIN) 100 mg tablet TAKE ONE TABLET BY MOUTH EVERY DAY 5/9/22  Yes Nadeen Tolliver PA-C   atorvastatin (LIPITOR) 40 mg tablet TAKE ONE TABLET BY MOUTH EVERY DAY 7/7/22  Yes Nadeen Tolliver PA-C   D3-1000 25 MCG (1000 UT) tablet TAKE 4 TABLETS (4,000 UNIT) ORALLY DAILY (SUPPLEMENT) 6/20/23  Yes Nadeen Tolliver PA-C   escitalopram (LEXAPRO) 10 mg tablet TAKE 1 TABLET ORALLY DAILY (DEPRESSION) 7/18/23  Yes Nadeen Tolliver PA-C   losartan (COZAAR) 50 mg tablet TAKE ONE TABLET BY MOUTH TWICE A DAY 7/7/22  Yes Nadeen Tolliver PA-C   magnesium Oxide (MAG-OX) 400 mg TABS TAKE 1 TABLET ORALLY TWICE DAILY (SUPPLEMENT) 6/20/23  Yes Nadeen Tolliver PA-C   melatonin 3 mg TAKE 1 TABLET ORALLY AT BEDTIME AS NEEDED FOR SLEEP AID 6/26/23  Yes Live Donald, DO   Mouthwashes (Biotene Dry Mouth) LIQD RINSE WITH 1 OUNCE ORALLY EVERY MORNING AND AT BEDTIME FOR DRY MOUTH, RINSE FOR 30 SECONDS THEN SPIT, DO NOT SWALLOW *REORDER* 9/5/23  Yes Mercedez Mercedes PA-C   multivitamin [facility]) TABS Take 1 tablet by mouth daily    Yes Historical Provider, MD   OLANZapine (ZyPREXA) 5 mg tablet Take 1 tablet (5 mg total) by mouth daily at bedtime 8/2/23  Yes Mercedez Mercedes PA-C   omeprazole (PriLOSEC) 20 mg delayed release capsule TAKE 1 CAPSULE ORALLY DAILY (CAP MAY BE OPENED & SPRINKLED ON APPLESAUCE) (GASTROESOPHAGEAL REFLUX DISEASE) 8/28/23  Yes Mercedez Mercedes PA-C   sodium chloride 1 g tablet TAKE TWO TABLETS BY MOUTH TWICE A DAY 4/30/23  Yes Gaviota Simon MD   torsemide (DEMADEX) 10 mg tablet TAKE 1 TABLET ORALLY DAILY (EDEMA) 8/28/23  Yes Mercedez Mercedes PA-C   Vascepa 1 g CAPS TAKE 2 CAPSULES (2GM) ORALLY TWICE DAILY (CARDIAC HEALTH) *BRAND PER INSURANCE* 8/18/23  Yes Mercedez Mercedes PA-C   acetaminophen (TYLENOL) 325 mg tablet TAKE 2 TABLETS (650MG) ORALLY EVERY 4 HOURS AS NEEDED FOR MILD PAIN NO MORE THAN 3 DOSES IN 48 HOURS 1) REPOSITION FOR COMFORT 2) MASSAGE 3) INVOLVE IN ACTIVITY TO DIVER ATTENTION 4) RELAXATION TECHNIQUE 5) MUSIC 6) RE-DIRECTION/DISTRACTION 7)TOLERATED *NOT TO EXCEED 3GM APAP/24HR* *REORDER*;TAKE 2 TABLETS (650MG) ORALLY EVERY 4 HOURS AS NEEDED FOR TEMP 100F OR ABOVE NOTIFY PROVIDER *NOT TO EXCEED 3GM APAP/24HR* *REORDER* 6/8/23   Mercedez Mercedes PA-C   bisacodyl (DULCOLAX) 5 mg EC tablet Take 10 mg by mouth daily as needed for constipation    Historical Provider, MD   Desitin Daily Defense 13 % cream APPLY TOPICALLY TO SKIN EXCORIATION ON BOTTOM TWICE DAILY (EXCORIATION) *REORDER*;APPLY TOPICALLY TO SKIN EXCORIATION ON BOTTOM AS NEEDED FOR SOILAGE (EXCORIATION) *REORDER* 6/26/23   Live Donald DO   hydrocortisone (ANUSOL-HC) 2.5 % rectal cream Apply topically 2 (two) times a day 6/9/23   Mercedez Mercedes PA-C   Milk of Magnesia 400 MG/5ML oral suspension TAKE 30ML ORALLY AS NEEDED FOR CONSTIPATION IF NO BOWEL MOVEMENT IN 3 DAYS. GIVE AT BEDTIME IF NO BOWEL MOVEMENT IN 3 DAYS.  SEPARATE BY OTHER MEDICATION BY AT LEAST 2 HOURS 23   Juju Kenney PA-C   mineral oil enema Insert 1 enema into the rectum once  Patient not taking: Reported on 2023    Historical Provider, MD   Sodium Phosphates (Fleet Enema) ENEM INSERT 1 ENEMA RECTALLY AS NEEDED FOR CONSTIPATION IF NO RESULT FROM BISACODYL WITHIN 2 HOURS. IF NO RESULTS FROM ENEMA. CALL PROVIDER FOR FURTHER ORDERS *REORDER* 23   Juju Kenney PA-C   Mouthwashes (Biotene Dry Mouth) LIQD RINSE WITH 1 OUNCE ORALLY EVERY MORNING AND AT BEDTIME FOR DRY MOUTH, RINSE FOR 30 SECONDS THEN SPIT, DO NOT SWALLOW *REORDER* 23  Juju Kenney PA-C     I have reviewed home medications with patient family member. Allergies: No Known Allergies    Social History:  Marital Status:     Occupation:   Patient Pre-hospital Living Situation: [address] [place] [address]  Patient Pre-hospital Level of Mobility: walks with walker  Patient Pre-hospital Diet Restrictions: \  Substance Use History:   Social History     Substance and Sexual Activity   Alcohol Use Yes    Comment: social     Social History     Tobacco Use   Smoking Status Former   • Packs/day: 0.00   • Years: 0.00   • Total pack years: 0.00   • Types: Cigarettes   • Quit date:    • Years since quittin.6   Smokeless Tobacco Never   Tobacco Comments    no secondhand smoke exposure     Social History     Substance and Sexual Activity   Drug Use No       Family History:  Family History   Problem Relation Age of Onset   • Heart attack Mother 39   • Heart attack Father 61   • No Known Problems Other    • Breast cancer Sister    • Alcohol abuse Daughter         history of   • Heart attack Brother        Physical Exam:     Vitals:   Blood Pressure: 133/60 (23)  Pulse: 74 (23)  Temperature: 98.6 °F (37 °C) (23)  Temp Source: Oral (23)  Respirations: 18 (23)  SpO2: 96 % (23)    Physical Exam  Vitals and nursing note reviewed. Constitutional:       General: She is not in acute distress. Appearance: She is well-developed. HENT:      Head: Normocephalic and atraumatic. Eyes:      Conjunctiva/sclera: Conjunctivae normal.   Cardiovascular:      Rate and Rhythm: Normal rate and regular rhythm. Heart sounds: No murmur heard. Pulmonary:      Effort: Pulmonary effort is normal. No respiratory distress. Breath sounds: Normal breath sounds. Abdominal:      Palpations: Abdomen is soft. Tenderness: There is no abdominal tenderness. Musculoskeletal:         General: No swelling. Cervical back: Neck supple. Right lower leg: Edema (trace) present. Left lower leg: Edema (trace) present. Skin:     General: Skin is warm and dry. Capillary Refill: Capillary refill takes less than 2 seconds. Neurological:      Mental Status: She is alert. Mental status is at baseline. She is disoriented. Sensory: No sensory deficit. Motor: Weakness (LUE) present.    Psychiatric:         Mood and Affect: Mood normal.          Additional Data:     Lab Results:  Results from last 7 days   Lab Units 09/05/23  1743   WBC Thousand/uL 11.14*   HEMOGLOBIN g/dL 10.2*   HEMATOCRIT % 31.5*   PLATELETS Thousands/uL 299   NEUTROS PCT % 79*   LYMPHS PCT % 9*   MONOS PCT % 5   EOS PCT % 5     Results from last 7 days   Lab Units 09/05/23  1743   SODIUM mmol/L 132*   POTASSIUM mmol/L 4.0   CHLORIDE mmol/L 99   CO2 mmol/L 22   BUN mg/dL 26*   CREATININE mg/dL 1.20   ANION GAP mmol/L 11   CALCIUM mg/dL 8.9   ALBUMIN g/dL 3.6   TOTAL BILIRUBIN mg/dL 0.48   ALK PHOS U/L 52   ALT U/L 42   AST U/L 110*   GLUCOSE RANDOM mg/dL 134                 Results from last 7 days   Lab Units 09/05/23  1743   LACTIC ACID mmol/L 1.6       Lines/Drains:  Invasive Devices     Peripheral Intravenous Line  Duration           Peripheral IV 09/05/23 Left Antecubital <1 day                    Imaging: Reviewed radiology reports from this admission including: xray(s) and Personally reviewed the following imaging: CT head and xray(s)  XR pelvis ap only 1 or 2 vw   ED Interpretation by South Martinez DO (09/05 1935)   No fracture or dislocation      XR shunt series   Final Result by Kimi Yadav MD (09/05 1930)         1. Intact  shunt catheter. 2. Additional views are necessary to verify shunt catheter pressure setting, if clinically indicated. Workstation performed: [id number]         CTA head and neck with and without contrast   Final Result by Alayna Pandya MD (09/05 2045)         1. No acute intracranial abnormality. 2. Stable generalized parenchymal volume loss and findings of normal pressure hydrocephalus with stable degree of ventriculomegaly and unchanged position of the right frontal approach ventricular shunt catheter. 3. 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 Developmentally hypoplastic left P1 segment and mild irregularity    of the posterior communicating arteries possibly secondary to atherosclerotic disease versus fibromuscular dysplasia as there are more pronounced changes of fibromuscular dysplasia involving the mid left greater than right cervical ICA segments. 4. No intracranial or cervical carotid aneurysm. 5. Right upper lobe 4 mm pulmonary nodule, grossly stable in size when comparing to the May 13, 2023 CT cervical spine study. A follow-up evaluation in 8 months time is recommended to ensure at least 12 months of stability. 6. Subcentimeter hypodense right thyroid nodule. No additional work-up is recommended at this time. 7. Multilevel cervical spondylosis with notable bilateral foraminal stenosis at C5-C6.       Workstation performed: [id number]         XR shoulder 2+ views LEFT    (Results Pending)       EKG and Other Studies Reviewed on Admission:   · EKG: NSR. HR 78.    ** Please Note: This note has been constructed using a voice recognition system.  **

## 2023-09-06 NOTE — ASSESSMENT & PLAN NOTE
In the setting of NPH, daughters note progressive worsening, sundowning.  Pending transition to memory care unit at [facility]

## 2023-09-06 NOTE — ASSESSMENT & PLAN NOTE
Unclear etiology  Recent NM stress test 05/19/23 with stress ECG with no ST deviation or arrhythmias, no perfusion defects    Plan:  • Troponin down trending   • EKG non-ischemic   • Maintain telemetry overnight

## 2023-09-06 NOTE — PROGRESS NOTES
[facility]  Progress Note  Name: Rita Anderson  MRN: [id number]  Unit/Bed#: E5 -01 I Date of Admission: 9/5/2023   Date of Service: 9/6/2023 I Hospital Day: 1    Assessment/Plan   * Ambulatory dysfunction  Assessment & Plan  History of NPH and hypertension who presents to the hospital with worsening weakness  · Given leukocytosis send urine sample for culture given leukocytes   · Start ceftriaxone  · PT/OT recommending rehab. Elevated troponin  Assessment & Plan  · Non-MI elevation troponin without evidence of myocardial injury    Lab Results   Component Value Date    HSTNI0 107 (H) 09/05/2023    HSTNI2 98 (H) 09/05/2023    HSTNI4 74 (H) 09/06/2023    HSTNI 37 (H) 05/18/2023       Mild dementia (HCC)  Assessment & Plan  · Mentation and mood stable. Continue escitalopram and olanzapine    SIADH (syndrome of inappropriate ADH production) ([address])  Assessment & Plan  · Stable continue torsemide and salt tablets    Results from last 7 days   Lab Units 09/06/23  0442 09/05/23  1743 09/03/23  1739   SODIUM mmol/L 135 132* 133*         Normal pressure hydrocephalus (HCC)  Assessment & Plan  · Status post  shunt. Intact on imaging. · Reportedly leaning towards left but patient did following this time  · For completeness will check MRI    Essential hypertension  Assessment & Plan  · Stable continue losartan amlodipine and atenolol    VTE Pharmacologic Prophylaxis: VTE Score: 4 Moderate Risk (Score 3-4) - Pharmacological DVT Prophylaxis Ordered: enoxaparin (Lovenox). Patient Centered Rounds: I have performed bedside rounds with nursing staff today. Discussions with Specialists or Other Care Team Provider:     Education and Discussions with Family / Patient: Attempted to update  (daughter) via phone. Left voicemail. Time Spent for Care:    This time was spent on one or more of the following: performing physical exam; counseling and coordination of care; obtaining or reviewing history; documenting in the medical record; reviewing/ordering tests, medications or procedures; communicating with other healthcare professionals and discussing with patient's family/caregivers. Current Length of Stay: 1 day(s)  Current Patient Status: Inpatient   Certification Statement: The patient will continue to require additional inpatient hospital stay due to Urine culture MRI and rehab placement  Discharge Plan: Anticipate discharge in 48-72 hrs to rehab facility. Code Status: Level 1 - Full Code      Subjective:   Patient seen and examined. No new complaints. Feeling okay. Reportedly therapy thought she may have been leaning towards the left which is the side she fell on    Objective:   Vitals: Blood pressure (!) 122/45, pulse 75, temperature 100 °F (37.8 °C), resp. rate 18, SpO2 96 %, not currently breastfeeding. Intake/Output Summary (Last 24 hours) at 9/6/2023 1226  Last data filed at 9/5/2023 2100  Gross per 24 hour   Intake 1000 ml   Output --   Net 1000 ml       Physical Exam  Vitals reviewed. Constitutional:       General: She is not in acute distress. Appearance: Normal appearance. HENT:      Head: Atraumatic. Eyes:      General: No scleral icterus. Extraocular Movements: Extraocular movements intact. Cardiovascular:      Rate and Rhythm: Regular rhythm. Pulmonary:      Breath sounds: Decreased breath sounds present. No wheezing. Abdominal:      General: Bowel sounds are normal.      Palpations: Abdomen is soft. Tenderness: There is no guarding or rebound. Musculoskeletal:         General: No swelling. Skin:     General: Skin is warm. Neurological:      Mental Status: She is alert. Motor: Weakness (Strength symmetric) present.    Psychiatric:         Mood and Affect: Mood normal.       Additional Data:   Labs:  Results from last 7 days   Lab Units 09/06/23  0442 09/05/23  1743 09/03/23  1739   WBC Thousand/uL 11.28* 11.14* 9.24 HEMOGLOBIN g/dL 10.0* 10.2* 11.1*   PLATELETS Thousands/uL 283 299 311   MCV fL 96 94 95     Results from last 7 days   Lab Units 09/06/23  0442 09/05/23  1743 09/03/23  1739   SODIUM mmol/L 135 132* 133*   POTASSIUM mmol/L 3.8 4.0 4.3   CHLORIDE mmol/L 102 99 100   CO2 mmol/L 22 22 23   ANION GAP mmol/L 11 11 10   BUN mg/dL 21 26* 23   CREATININE mg/dL 1.00 1.20 1.11   CALCIUM mg/dL 8.8 8.9 9.1   ALBUMIN g/dL  --  3.6  --    TOTAL BILIRUBIN mg/dL  --  0.48  --    ALK PHOS U/L  --  52  --    ALT U/L  --  42  --    AST U/L  --  110*  --    EGFR ml/min/1.73sq m 52 42 46   GLUCOSE RANDOM mg/dL 115 134 113     Results from last 7 days   Lab Units 09/05/23  1743   MAGNESIUM mg/dL 1.6*         Results from last 7 days   Lab Units 09/06/23  0026 09/05/23  1949 09/05/23  1743   HS TNI 0HR ng/L  --   --  107*   HS TNI 2HR ng/L  --  98*  --    HS TNI 4HR ng/L 74*  --   --           Results from last 7 days   Lab Units 09/05/23  1743   LACTIC ACID mmol/L 1.6             Results from last 7 days   Lab Units 09/05/23  1743   TSH 3RD GENERATON uIU/mL 3.654     * I Have Reviewed All Lab Data Listed Above. Cultures:                   Lines/Drains:  Invasive Devices     Peripheral Intravenous Line  Duration           Peripheral IV 09/05/23 Left Antecubital <1 day              Telemetry:      Imaging:  Imaging Reports Reviewed Today Include:   XR shoulder 2+ views LEFT    Result Date: 9/6/2023  Impression: No acute osseous abnormality. Workstation performed: [id number]     XR pelvis ap only 1 or 2 vw    Result Date: 9/6/2023  Impression: No acute osseous abnormality. Degenerative changes as described. Workstation performed: [id number]     CTA head and neck with and without contrast    Result Date: 9/5/2023  Impression: 1. No acute intracranial abnormality.  2. Stable generalized parenchymal volume loss and findings of normal pressure hydrocephalus with stable degree of ventriculomegaly and unchanged position of the right frontal approach ventricular shunt catheter. 3. 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 Developmentally hypoplastic left P1 segment and mild irregularity of the posterior communicating arteries possibly secondary to atherosclerotic disease versus fibromuscular dysplasia as there are more pronounced changes of fibromuscular dysplasia involving the mid left greater than right cervical ICA segments. 4. No intracranial or cervical carotid aneurysm. 5. Right upper lobe 4 mm pulmonary nodule, grossly stable in size when comparing to the May 13, 2023 CT cervical spine study. A follow-up evaluation in 8 months time is recommended to ensure at least 12 months of stability. 6. Subcentimeter hypodense right thyroid nodule. No additional work-up is recommended at this time. 7. Multilevel cervical spondylosis with notable bilateral foraminal stenosis at C5-C6. Workstation performed: [id number]     XR shunt series    Result Date: 9/5/2023  Impression: 1. Intact  shunt catheter. 2. Additional views are necessary to verify shunt catheter pressure setting, if clinically indicated.  Workstation performed: [id number]       Scheduled Meds:  Current Facility-Administered Medications   Medication Dose Route Frequency Provider Last Rate   • acetaminophen  650 mg Oral Q6H PRN Melissa Gomez PA-C     • aluminum-magnesium hydroxide-simethicone  30 mL Oral Q6H PRN Melissa Gomez PA-C     • amLODIPine  5 mg Oral Daily Melissa Gomez PA-C     • aspirin  81 mg Oral Daily Melissa Gomez PA-C     • atenolol  100 mg Oral Daily Melissa Gomez PA-C     • atorvastatin  40 mg Oral Daily With Maddy Santos PA-C     • bisacodyl  10 mg Oral Daily PRN Melissa Gomez PA-C     • cefTRIAXone  1,000 mg Intravenous Q24H Jarret Trivedi DO     • enoxaparin  40 mg Subcutaneous Daily Melissa Gomez PA-C     • escitalopram  10 mg Oral Daily Charlene Jensen PA-C     • losartan  50 mg Oral BID Charlene Jensen PA-C     • magnesium Oxide  400 mg Oral BID Charlene Jensen PA-C     • melatonin  3 mg Oral HS Charlene Jensen PA-C     • OLANZapine  5 mg Oral HS Charlene Jensen PA-C     • ondansetron  4 mg Intravenous Q6H PRN Charlene Jensen PA-C     • pantoprazole  40 mg Oral Early Morning Charlene Jensen PA-C     • polyethylene glycol  17 g Oral Daily Charlene Jensen PA-C     • sodium chloride  2 g Oral BID Charlene Jensen PA-C     • torsemide  10 mg Oral Daily Charlene Jensen PA-C         Today, Patient Was Seen By: Glen Tate, DO    ** Please Note: Dictation voice to text software may have been used in the creation of this document.  **

## 2023-09-06 NOTE — PLAN OF CARE
Problem: OCCUPATIONAL THERAPY ADULT  Goal: Performs self-care activities at highest level of function for planned discharge setting. See evaluation for individualized goals. Description: Treatment Interventions: ADL retraining, Functional transfer training, UE strengthening/ROM, Endurance training, Cognitive reorientation, Patient/family training, Equipment evaluation/education, Compensatory technique education, Continued evaluation          See flowsheet documentation for full assessment, interventions and recommendations. Note: Limitation: Decreased ADL status, Decreased UE ROM, Decreased UE strength, Decreased Safe judgement during ADL, Decreased cognition, Decreased endurance, Decreased high-level ADLs  Prognosis: Fair  Assessment: Pt is a 81y/o famale admitted to the hospital after having a fall at her place of residency. Pt noted with elevated troponins, ambulatory dysfunction. Pt with PMH dementia, anxiety, MI, CKD, depression, NPH--s/p shunt, SIADH, and recent(5/23) hospitalization 2* falls. Per last admission's notes(5/23), pt had assistance with her ADLs; independent with her transfers/ambulation--with RW(rollator); +falls. During initial eval, pt demonstrated deficits with her functional balance, functional mobility, ADL status, transfer safety, b/l UE strength, activity tolerance(currently fair=15-20mins), and cognition(i.e.problem-solving, judgement/safety). Pt would benefit from continued OT assessment and tx for the above deficits. 2-3xwk/1-2wks. The patient's raw score on the AM-PAC Daily Activity Inpatient Short Form is 15. A raw score of less than 19 suggests the patient may benefit from discharge to post-acute rehabilitation services. Please refer to the recommendation of the Occupational Therapist for safe discharge planning.      OT Discharge Recommendation: Post acute rehabilitation services

## 2023-09-06 NOTE — ASSESSMENT & PLAN NOTE
Now with worsening gait, dementia   XR shunt series with intact shunt   CTA head neck with no acute intracranial abnormality, stable volume loss/ventriculomegaly, atherosclerotic disease, no aneurysm     Plan:  • Likely contributor to gait disturbance vs other acute neurologic condition like TIA/CVA, neuro exam was non-focal on admission   • Consults: PT/OT

## 2023-09-06 NOTE — ASSESSMENT & PLAN NOTE
Normotensive     Plan:  • Possible component of orthostasis contributing to symptoms  • Continue PTA losartan 50mg, amlodipine 5mg, atenolol 100mg, torsemide 10mg daily may need further adjustment

## 2023-09-06 NOTE — UTILIZATION REVIEW
Initial Clinical Review    Admission: Date/Time/Statement:   Admission Orders (From admission, onward)     Ordered        09/05/23 2056  INPATIENT ADMISSION  Once                      Orders Placed This Encounter   Procedures   • INPATIENT ADMISSION     Standing Status:   Standing     Number of Occurrences:   1     Order Specific Question:   Level of Care     Answer:   Med Surg [16]     Order Specific Question:   Estimated length of stay     Answer:   More than 2 Midnights     Order Specific Question:   Certification     Answer:   I certify that inpatient services are medically necessary for this patient for a duration of greater than two midnights. See H&P and MD Progress Notes for additional information about the patient's course of treatment. ED Arrival Information     Expected   -    Arrival   9/5/2023 17:01    Acuity   Urgent            Means of arrival   Wheelchair    Escorted by   Family Member    Service   Hospitalist    Admission type   Emergency            Arrival complaint   Fall           Chief Complaint   Patient presents with   • Fall     Patient fell onto left side getting out of bed this morning. Denies hitting head. Denies pain. +thinners. • Medical Problem     Patient's daughter reports patient acting different than normal since the weekend. Reports difficulty walking and weakness on the left side. Initial Presentation: 80 y.o. female presents to ED from SNF  with fall,  Increasing difficulty ambulating. Seen  2  Days  Ago in ED, work up  Negative. Was able to ambulate. The day of admission, daughter states patient  Has  Continued difficulty ambulating, leans to left side,  Having a  Harder time with walker. Usually  Gets around a t baseline  With walker. PMH is  Dementia,  Progression and now in memory care unit,  NPH, S/P shunt, HTN, CKD and hyponatremia. Complains of left shoulder pain. Troponin elevated, unclear etiology.   Admit  Ip with  Ambulatory dysfunction, elevated troponin and plan is  PT/OT, monitor labs, tele, orthostatic  BP  And continue home meds. Date:    9/5       Day 2:   Continue  PT/OT. Needs rehab at  D/c. Starting  IV rocephin daily given leukocytosis, + LE  In urine. Date:  9/7    Day 3: Has surpassed a 2nd midnight with active treatments and services, which include . Continue PT/OT. Needs rehab at  D/c. Remains on  IV  Rocephin/     ED Triage Vitals   Temperature Pulse Respirations Blood Pressure SpO2   09/05/23 1705 09/05/23 1707 09/05/23 1707 09/05/23 1707 09/05/23 1707   98.6 °F (37 °C) 72 18 147/61 96 %      Temp Source Heart Rate Source Patient Position - Orthostatic VS BP Location FiO2 (%)   09/05/23 1705 09/05/23 1707 09/05/23 1707 09/05/23 1707 --   Oral Monitor Sitting Right arm       Pain Score       09/05/23 2235       No Pain          Wt Readings from Last 1 Encounters:   07/19/23 63 kg (139 lb)     Additional Vital Signs:   100 °F (37.8 °C) -- -- 127/51 76 -- -- --    09/06/23 09:03:11 100 °F (37.8 °C) -- -- 125/46 Abnormal  72 -- -- --   09/06/23 09:01:51 100 °F (37.8 °C) 75 18 125/46 Abnormal  72 -- -- Lying   09/05/23 2257 99.1 °F (37.3 °C) 79 20 156/65 93 96 % None (Room air) Lying   09/05/23 2200 -- 74 18 133/60 86 96 % None (Room air) Lying   09/05/23 2100 -- 77 20 126/62 89 95 % None (Room air) Lying   09/05/23 1915 -- 78 20 132/59 85 95 % None (Room air) Lying   09/05/23 1800 -- 81 20 128/62 89 -- -- Lying   09/05/23 1707 -- 72 18 147/61 -- 96 % None (Room air) Sitting   09/05/23 1705 98.6 °F (37 °C) -- -- -- -- -- -- --       Pertinent Labs/Diagnostic Test Results:   XR shoulder 2+ views LEFT   Final Result by Rene Fowler MD (09/06 6310)      No acute osseous abnormality.       Workstation performed: [id number]         XR pelvis ap only 1 or 2 vw   ED Interpretation by Raisa Yates DO (09/05 1935)   No fracture or dislocation      Final Result by Rene Fowler MD (09/06 4348)      No acute osseous abnormality. Degenerative changes as described. Workstation performed: [id number]         XR shunt series   Final Result by Mc Whipple MD (09/05 1930)         1. Intact  shunt catheter. 2. Additional views are necessary to verify shunt catheter pressure setting, if clinically indicated. Workstation performed: [id number]         CTA head and neck with and without contrast   Final Result by Fabiola Camilo MD (09/05 2045)         1. No acute intracranial abnormality. 2. Stable generalized parenchymal volume loss and findings of normal pressure hydrocephalus with stable degree of ventriculomegaly and unchanged position of the right frontal approach ventricular shunt catheter. 3. 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 Developmentally hypoplastic left P1 segment and mild irregularity    of the posterior communicating arteries possibly secondary to atherosclerotic disease versus fibromuscular dysplasia as there are more pronounced changes of fibromuscular dysplasia involving the mid left greater than right cervical ICA segments. 4. No intracranial or cervical carotid aneurysm. 5. Right upper lobe 4 mm pulmonary nodule, grossly stable in size when comparing to the May 13, 2023 CT cervical spine study. A follow-up evaluation in 8 months time is recommended to ensure at least 12 months of stability. 6. Subcentimeter hypodense right thyroid nodule. No additional work-up is recommended at this time. 7. Multilevel cervical spondylosis with notable bilateral foraminal stenosis at C5-C6.       Workstation performed: [id number]               Results from last 7 days   Lab Units 09/06/23  0442 09/05/23  1743 09/03/23  1739   WBC Thousand/uL 11.28* 11.14* 9.24   HEMOGLOBIN g/dL 10.0* 10.2* 11.1*   HEMATOCRIT % 32.0* 31.5* 35.0   PLATELETS Thousands/uL 283 299 311   NEUTROS ABS Thousands/µL  --  8.83* 6.68         Results from last 7 days   Lab Units 09/06/23  0442 09/05/23  1743 09/03/23  1739   SODIUM mmol/L 135 132* 133*   POTASSIUM mmol/L 3.8 4.0 4.3   CHLORIDE mmol/L 102 99 100   CO2 mmol/L 22 22 23   ANION GAP mmol/L 11 11 10   BUN mg/dL 21 26* 23   CREATININE mg/dL 1.00 1.20 1.11   EGFR ml/min/1.73sq m 52 42 46   CALCIUM mg/dL 8.8 8.9 9.1   MAGNESIUM mg/dL  --  1.6*  --      Results from last 7 days   Lab Units 09/05/23  1743   AST U/L 110*   ALT U/L 42   ALK PHOS U/L 52   TOTAL PROTEIN g/dL 6.9   ALBUMIN g/dL 3.6   TOTAL BILIRUBIN mg/dL 0.48         Results from last 7 days   Lab Units 09/06/23 0442 09/05/23  1743 09/03/23  1739   GLUCOSE RANDOM mg/dL 115 134 113               Results from last 7 days   Lab Units 09/06/23  0026 09/05/23  1949 09/05/23  1743   HS TNI 0HR ng/L  --   --  107*   HS TNI 2HR ng/L  --  98*  --    HSTNI D2 ng/L  --  -9  --    HS TNI 4HR ng/L 74*  --   --    HSTNI D4 ng/L -33  --   --              Results from last 7 days   Lab Units 09/05/23  1743   TSH 3RD GENERATON uIU/mL 3.654         Results from last 7 days   Lab Units 09/05/23  1743   LACTIC ACID mmol/L 1.6               Results from last 7 days   Lab Units 09/05/23  1910 09/03/23  1903   CLARITY UA  Turbid Clear   COLOR UA  Light Yellow Yellow   SPEC GRAV UA  1.017 1.015   PH UA  6.0 6.0   GLUCOSE UA mg/dl Negative Negative   KETONES UA mg/dl Negative Negative   BLOOD UA  Trace* Negative   PROTEIN UA mg/dl Negative Negative   NITRITE UA  Negative Negative   BILIRUBIN UA  Negative Negative   UROBILINOGEN UA E.U./dl  --  0.2   UROBILINOGEN UA (BE) mg/dl <2.0  --    LEUKOCYTES UA  Moderate* Trace*   WBC UA /hpf 4-10* 2-4*   RBC UA /hpf 1-2 1-2   BACTERIA UA /hpf Occasional Occasional   EPITHELIAL CELLS WET PREP /hpf Occasional Occasional         ED Treatment:   Medication Administration from 09/05/2023 1701 to 09/05/2023 0893 Date/Time Order Dose Route Action Comments     09/05/2023 2100 EDT sodium chloride 0.9 % bolus 1,000 mL 0 mL Intravenous Stopped --     09/05/2023 1745 EDT sodium chloride 0.9 % bolus 1,000 mL 1,000 mL Intravenous New Bag --     09/05/2023 1828 EDT iohexol (OMNIPAQUE) 350 MG/ML injection (MULTI-DOSE) 85 mL 85 mL Intravenous Given --     09/05/2023 2141 EDT magnesium gluconate (MAGONATE) tablet 500 mg 500 mg Oral Given --          Present on Admission:  • Ambulatory dysfunction  • SIADH (syndrome of inappropriate ADH production) ([facility])  • Essential hypertension  • Normal pressure hydrocephalus ([facility])  • Mild dementia ([facility])      Admitting Diagnosis: Weakness [R53.1]  Dementia (HCC) [F03.90]  Elevated troponin [R77.8]  Left-sided weakness [R53.1]  Age/Sex: 80 y.o. female  Admission Orders:  Scheduled Medications:  amLODIPine, 5 mg, Oral, Daily  aspirin, 81 mg, Oral, Daily  atenolol, 100 mg, Oral, Daily  atorvastatin, 40 mg, Oral, Daily With Dinner  enoxaparin, 40 mg, Subcutaneous, Daily  escitalopram, 10 mg, Oral, Daily  losartan, 50 mg, Oral, BID  magnesium Oxide, 400 mg, Oral, BID  melatonin, 3 mg, Oral, HS  OLANZapine, 5 mg, Oral, HS  pantoprazole, 40 mg, Oral, Early Morning  polyethylene glycol, 17 g, Oral, Daily  sodium chloride, 2 g, Oral, BID  torsemide, 10 mg, Oral, Daily  IV  Rocephin daily      Continuous IV Infusions:     PRN Meds:  acetaminophen, 650 mg, Oral, Q6H PRN  aluminum-magnesium hydroxide-simethicone, 30 mL, Oral, Q6H PRN  bisacodyl, 10 mg, Oral, Daily PRN  ondansetron, 4 mg, Intravenous, Q6H PRN          Network Utilization Review Department  ATTENTION: Please call with any questions or concerns to [phone] and carefully listen to the prompts so that you are directed to the right person.  All voicemails are confidential.  [facility] all requests for admission clinical reviews, approved or denied determinations and any other requests to dedicated fax number below belonging to the campus where the patient is receiving treatment.  List of dedicated fax numbers for the Facilities:  [place] DENIALS (Administrative/Medical Necessity) [phone] [address] (Maternity/NICU/Pediatrics) [phone]   [address] [place] [address] [phone]   [facility] [address] [facility] [phone] [address] [id number] [address] [phone]   [address] [address] [phone]

## 2023-09-06 NOTE — ASSESSMENT & PLAN NOTE
Now with worsening gait dysfunction in the setting of NPH, frequent falls     Plan:  • Check orthostatic BP   • Maintain telemetry to monitor for acute arrhythmias   • Consult: PT/OT

## 2023-09-06 NOTE — ASSESSMENT & PLAN NOTE
History of NPH and hypertension who presents to the hospital with worsening weakness  · Given leukocytosis send urine sample for culture given leukocytes   · Start ceftriaxone  · PT/OT recommending rehab.

## 2023-09-06 NOTE — OCCUPATIONAL THERAPY NOTE
Occupational Therapy Evaluation     Patient Name: Audrey Payne  WNBMP'F Date: 9/6/2023  Problem List  Principal Problem:    Ambulatory dysfunction  Active Problems:    Essential hypertension    Normal pressure hydrocephalus (HCC)    SIADH (syndrome of inappropriate ADH production) (HCC)    Mild dementia (HCC)    Elevated troponin    Past Medical History  Past Medical History:   Diagnosis Date    Anxiety     Arthritis     Confusion 10/2/2018    Depression     Displaced fracture of distal phalanx of right thumb     GERD (gastroesophageal reflux disease)     Heart attack ([address])     Hyperlipidemia     Hypertension     Moderate episode of recurrent major depressive disorder ([address]) 4/12/2019    Shortness of breath     Stage 2 chronic kidney disease 7/17/2019     Past Surgical History  Past Surgical History:   Procedure Laterality Date    APPENDECTOMY      CARPAL TUNNEL RELEASE      CATARACT EXTRACTION Bilateral     COLONOSCOPY      FL LUMBAR PUNCTURE DIAGNOSTIC  1/10/2019    FRACTURE SURGERY      NH CRTJ SHUNT [id number] TERMINUS Right 9/3/2019    Procedure: Insertion of frontal ventriculoperitoneal shunt;  Surgeon: Jasbir Novoa MD;  Location: [facility];  Service: Neurosurgery    SEPTOPLASTY      WRIST FRACTURE SURGERY Right            09/06/23 1105   Note Type   Note type Evaluation   Pain Assessment   Pain Assessment Tool 0-10   Pain Score No Pain   Restrictions/Precautions   Weight Bearing Precautions Per Order No   Other Precautions Cognitive; Chair Alarm; Bed Alarm; Fall Risk   Home Living   Type of Home Assisted living  ([facility])   Home Layout One level   Bathroom Shower/Tub Walk-in shower   Bathroom Toilet Standard   Prior Function   Lives With Facility staff   Falls in the last 6 months 1 to 4   Lifestyle   Autonomy Per last admission's notes(5/23), pt had assistance with her ADLs; independent with her transfers/ambulation--with RW(rollator); +falls   Reciprocal Relationships dtr   Service to Others homemaker   Intrinsic Gratification watching TV   Subjective   Subjective "I have to use the bathroom."   ADL   Where Assessed Edge of bed   Eating Assistance 5  Supervision/Setup   Grooming Assistance 5  Supervision/Setup   UB Bathing Assistance 4  Minimal Assistance   LB Bathing Assistance 3  Moderate Assistance   [address]  [address] 2  Maximal [address] [place]  2  Maximal Assistance   Bed Mobility   Rolling R 4  Minimal assistance   Additional items Assist x 1; Increased time required;Verbal cues;LE management   Rolling L 4  Minimal assistance   Additional items Assist x 1; Increased time required;Verbal cues;LE management   Supine to Sit 3  Moderate assistance   Additional items Assist x 2; Increased time required;Verbal cues;LE management   Transfers   Sit to Stand 3  Moderate assistance   Additional items Assist x 2; Increased time required;Verbal cues   Stand to Sit 3  Moderate assistance   Additional items Assist x 2; Increased time required;Verbal cues   Additional Comments bp's=105/36(supine), 122/45(EOB), standing=110/51   Functional Mobility   Functional Mobility 3  Moderate assistance   Additional Comments x2   Additional items Rolling walker   Balance   Static Sitting Fair   Dynamic Sitting Fair -   Static Standing Fair -   Dynamic Standing Poor +   Activity Tolerance   Activity Tolerance Patient limited by fatigue   Medical Staff Made Aware nsg, P.T.   RUE Assessment   RUE Assessment WFL   RUE Strength   RUE Overall Strength Within Functional Limits - able to perform ADL tasks with strength  (4/5 throughout)   LUE Assessment   LUE Assessment X  (shr AROM(i.e.flex=80-90*); elbow-distal=WFLs)   LUE Strength   LUE Overall Strength Within Functional Limits - able to perform ADL tasks with strength  (shr=3+/5, elbow-distal=4/5)   Hand Function   Gross Motor Coordination Functional   Fine Motor Coordination Functional   Sensation   Light Touch No apparent deficits   Proprioception   Proprioception No apparent deficits   Vision-Basic Assessment   Current Vision   (no glasses noted)   Vision - Complex Assessment   Acuity Able to read clock/calendar on wall without difficulty   Psychosocial   Psychosocial (WDL) X   Patient Behaviors/Mood Flat affect; Cooperative   Perception   Inattention/Neglect Appears intact   Cognition   Overall Cognitive Status Impaired   Arousal/Participation Alert   Attention Attends with cues to redirect   Orientation Level Oriented to person;Oriented to place;Oriented to time   Memory Decreased short term memory;Decreased recall of precautions   Following Commands Follows one step commands without difficulty   Comments hx dementia   Assessment   Limitation Decreased ADL status; Decreased UE ROM; Decreased UE strength;Decreased Safe judgement during ADL;Decreased cognition;Decreased endurance;Decreased high-level ADLs   Prognosis Fair   Assessment Pt is a 81y/o famale admitted to the hospital after having a fall at her place of residency. Pt noted with elevated troponins, ambulatory dysfunction. Pt with PMH dementia, anxiety, MI, CKD, depression, NPH--s/p shunt, SIADH, and recent(5/23) hospitalization 2* falls. Per last admission's notes(5/23), pt had assistance with her ADLs; independent with her transfers/ambulation--with RW(rollator); +falls. During initial eval, pt demonstrated deficits with her functional balance, functional mobility, ADL status, transfer safety, b/l UE strength, activity tolerance(currently fair=15-20mins), and cognition(i.e.problem-solving, judgement/safety). Pt would benefit from continued OT assessment and tx for the above deficits. 2-3xwk/1-2wks. The patient's raw score on the AM-PAC Daily Activity Inpatient Short Form is 15. A raw score of less than 19 suggests the patient may benefit from discharge to post-acute rehabilitation services.  Please refer to the recommendation of the Occupational Therapist for safe discharge planning. Goals   Patient Goals "to use the bathroom"   STG Time Frame   (1-7)   Short Term Goal #1 Pt will demonstrate improved activity tolerance to good(20-30mins) and standing tolerance to 3-5mins to assist with ADLs. Short Term Goal #2 Pt will tolerate continued cognitive/home-safety assessment and appropriate d/c recommendations will be provided. Short Term Goal  Pt will demonstrate proper walker/transfer safety 100% of the time. LTG Time Frame   (7-14 days)   Long Term Goal #1 Pt will demonstrate mod I with their sit-stand transfers to assist with completion of their LE dressing. Long Term Goal #2 Pt will demonstrate improved functional balance by 1 grade to assist with ADLs/transfers. Long Term Goal Pt will demonstrate supervision with their bed mobility to facilitate EOB ADLs. Plan   Treatment Interventions ADL retraining;Functional transfer training;UE strengthening/ROM; Endurance training;Cognitive reorientation;Patient/family training;Equipment evaluation/education; Compensatory technique education;Continued evaluation   Goal Expiration Date 09/20/23   OT Treatment Day 0   OT Frequency   (2-4xwk)   Recommendation   OT Discharge Recommendation Post acute rehabilitation services   AM-PAC Daily Activity Inpatient   Lower Body Dressing 2   Bathing 2   Toileting 2   Upper Body Dressing 3   Grooming 3   Eating 3   Daily Activity Raw Score 15   Daily Activity Standardized Score (Calc for Raw Score >=11) 34.69   AM-PAC Applied Cognition Inpatient   Following a Speech/Presentation 3   Understanding Ordinary Conversation 3   Taking Medications 2   Remembering Where Things Are Placed or Put Away 2   Remembering List of 4-5 Errands 2   Taking Care of Complicated Tasks 2   Applied Cognition Raw Score 14   Applied Cognition Standardized Score 32.02   Xavier Headley

## 2023-09-06 NOTE — PLAN OF CARE
Problem: Potential for Falls  Goal: Patient will remain free of falls  Description: INTERVENTIONS:  - Educate patient/family on patient safety including physical limitations  - Instruct patient to call for assistance with activity   - Consult OT/PT to assist with strengthening/mobility   - Keep Call bell within reach  - Keep bed low and locked with side rails adjusted as appropriate  - Keep care items and personal belongings within reach  - Initiate and maintain comfort rounds  - Make Fall Risk Sign visible to staff  - Apply yellow socks and bracelet for high fall risk patients  - Consider moving patient to room near nurses station  Outcome: Progressing     Problem: MOBILITY - ADULT  Goal: Maintain or return to baseline ADL function  Description: INTERVENTIONS:  -  Assess patient's ability to carry out ADLs; assess patient's baseline for ADL function and identify physical deficits which impact ability to perform ADLs (bathing, care of mouth/teeth, toileting, grooming, dressing, etc.)  - Assess/evaluate cause of self-care deficits   - Assess range of motion  - Assess patient's mobility; develop plan if impaired  - Assess patient's need for assistive devices and provide as appropriate  - Encourage maximum independence but intervene and supervise when necessary  - Involve family in performance of ADLs  - Assess for home care needs following discharge   - Consider OT consult to assist with ADL evaluation and planning for discharge  - Provide patient education as appropriate  Outcome: Progressing     Problem: PAIN - ADULT  Goal: Verbalizes/displays adequate comfort level or baseline comfort level  Description: Interventions:  - Encourage patient to monitor pain and request assistance  - Assess pain using appropriate pain scale  - Administer analgesics based on type and severity of pain and evaluate response  - Implement non-pharmacological measures as appropriate and evaluate response  - Consider cultural and social influences on pain and pain management  - Notify physician/advanced practitioner if interventions unsuccessful or patient reports new pain  Outcome: Progressing     Problem: INFECTION - ADULT  Goal: Absence or prevention of progression during hospitalization  Description: INTERVENTIONS:  - Assess and monitor for signs and symptoms of infection  - Monitor lab/diagnostic results  - Monitor all insertion sites, i.e. indwelling lines, tubes, and drains  - Monitor endotracheal if appropriate and nasal secretions for changes in amount and color  - [place] appropriate cooling/warming therapies per order  - Administer medications as ordered  - Instruct and encourage patient and family to use good hand hygiene technique  - Identify and instruct in appropriate isolation precautions for identified infection/condition  Outcome: Progressing     Problem: SAFETY ADULT  Goal: Patient will remain free of falls  Description: INTERVENTIONS:  - Educate patient/family on patient safety including physical limitations  - Instruct patient to call for assistance with activity   - Consult OT/PT to assist with strengthening/mobility   - Keep Call bell within reach  - Keep bed low and locked with side rails adjusted as appropriate  - Keep care items and personal belongings within reach  - Initiate and maintain comfort rounds  - Make Fall Risk Sign visible to staff  - Apply yellow socks and bracelet for high fall risk patients  - Consider moving patient to room near nurses station  Outcome: Progressing  Goal: Maintain or return to baseline ADL function  Description: INTERVENTIONS:  -  Assess patient's ability to carry out ADLs; assess patient's baseline for ADL function and identify physical deficits which impact ability to perform ADLs (bathing, care of mouth/teeth, toileting, grooming, dressing, etc.)  - Assess/evaluate cause of self-care deficits   - Assess range of motion  - Assess patient's mobility; develop plan if impaired  - Assess patient's need for assistive devices and provide as appropriate  - Encourage maximum independence but intervene and supervise when necessary  - Involve family in performance of ADLs  - Assess for home care needs following discharge   - Consider OT consult to assist with ADL evaluation and planning for discharge  - Provide patient education as appropriate  Outcome: Progressing     Problem: DISCHARGE PLANNING  Goal: Discharge to home or other facility with appropriate resources  Description: INTERVENTIONS:  - Identify barriers to discharge w/patient and caregiver  - Arrange for needed discharge resources and transportation as appropriate  - Identify discharge learning needs (meds, wound care, etc.)  - Arrange for interpretive services to assist at discharge as needed  - Refer to Case Management Department for coordinating discharge planning if the patient needs post-hospital services based on physician/advanced practitioner order or complex needs related to functional status, cognitive ability, or social support system  Outcome: Progressing     Problem: Knowledge Deficit  Goal: Patient/family/caregiver demonstrates understanding of disease process, treatment plan, medications, and discharge instructions  Description: Complete learning assessment and assess knowledge base.   Interventions:  - Provide teaching at level of understanding  - Provide teaching via preferred learning methods  Outcome: Progressing

## 2023-09-06 NOTE — ASSESSMENT & PLAN NOTE
· Stable continue torsemide and salt tablets    Results from last 7 days   Lab Units 09/06/23  0442 09/05/23  1743 09/03/23  1739   SODIUM mmol/L 135 132* 133*

## 2023-09-06 NOTE — PHYSICAL THERAPY NOTE
PT EVALUATION 10:47-11:02    82 y.o.    [id number]    Weakness [R53.1]  Dementia (HCC) [F03.90]  Elevated troponin [R77.8]  Left-sided weakness [R53.1]    Past Medical History:   Diagnosis Date    Anxiety     Arthritis     Confusion 10/2/2018    Depression     Displaced fracture of distal phalanx of right thumb     GERD (gastroesophageal reflux disease)     Heart attack ([address])     Hyperlipidemia     Hypertension     Moderate episode of recurrent major depressive disorder ([address]) 4/12/2019    Shortness of breath     Stage 2 chronic kidney disease 7/17/2019         Past Surgical History:   Procedure Laterality Date    APPENDECTOMY      CARPAL TUNNEL RELEASE      CATARACT EXTRACTION Bilateral     COLONOSCOPY      FL LUMBAR PUNCTURE DIAGNOSTIC  1/10/2019    FRACTURE SURGERY      DE CRTJ SHUNT [id number] TERMINUS Right 9/3/2019    Procedure: Insertion of frontal ventriculoperitoneal shunt;  Surgeon: Ismael Sullivan MD;  Location: [facility];  Service: Neurosurgery    SEPTOPLASTY      WRIST FRACTURE SURGERY Right         09/06/23 1047   PT Last Visit   PT Visit Date 09/06/23   Note Type   Note type Evaluation   Pain Assessment   Pain Score No Pain   Restrictions/Precautions   Weight Bearing Precautions Per Order No   Other Precautions Cognitive; Chair Alarm; Bed Alarm;Multiple lines; Fall Risk   Home Living   Type of Home Apartment  (Independent living at [address] [place] [address] One level;Elevator   Bathroom Shower/Tub Walk-in shower   Bathroom Toilet Raised   Bathroom Equipment Grab bars around toilet; Shower chair;Grab bars in shower   [address] Other (Comment)  (rollator)   Additional Comments reports living at [facility] in the independent living section. Assist to shower only? Prior Function   Level of [place] Independent with ADLs; Independent with functional mobility; Needs assistance with IADLS   Lives With Alone   Receives Help From Personal care attendant; Family   IADLs Family/Friend/Other provides transportation; Family/Friend/Other provides meals; Family/Friend/Other provides medication management   Falls in the last 6 months 1 to 4   Comments Reports independence with use of rollator walker. General   Additional Pertinent History Pt is 81 y/o female admitted with fall. PT consulted. Family/Caregiver Present No   Cognition   Overall Cognitive Status Impaired   Arousal/Participation Alert   Attention Attends with cues to redirect   Orientation Level Oriented to person;Oriented to place;Oriented to time   Following Commands Follows one step commands without difficulty   Comments Flat affect. Hx of dementia. Limited historian. Subjective   Subjective Denies pain, but notes constipation. Unable to have BM on bedpan. RUE Assessment   RUE Assessment WFL  (as observed with functional reach and grasp.)   LUE Assessment   LUE Assessment X  (limited proximal elevation.)   RLE Assessment   RLE Assessment WFL   Strength RLE   RLE Overall Strength 4-/5   LLE Assessment   LLE Assessment WFL   Strength LLE   LLE Overall Strength 4-/5   Bed Mobility   Supine to Sit 3  Moderate assistance   Additional items Assist x 2; Increased time required;Verbal cues;LE management; Bedrails;HOB elevated   Additional Comments Increased time to complete transition. EOB sitting with L lean. BP supine 105/36, /45 and standing 110/51   Transfers   Sit to Stand 3  Moderate assistance   Additional items Assist x 2; Increased time required;Verbal cues   Stand to Sit 3  Moderate assistance   Additional items Assist x 2; Increased time required;Verbal cues;Armrests   Additional Comments Increased time to complete transitions. Ambulation/Elevation   Gait pattern Improper Weight shift;Decreased foot clearance; Forward Flexion; Inconsistent renita; Short stride; Excessively slow; Shuffling   Gait Assistance 3  Moderate assist   Additional items Assist x 2;Verbal cues; Tactile cues Assistive Device Rolling walker   Distance Amb with RW 3'x1. Increased guidance of RW needed. Balance   Static Sitting Fair   Dynamic Sitting Fair -   Static Standing Poor +   Dynamic Standing Poor   Ambulatory Poor   Endurance Deficit   Endurance Deficit Yes   Endurance Deficit Description fatigue   Activity Tolerance   Activity Tolerance Patient limited by fatigue   Medical Staff Made Aware Nurse, Emmie Mares. OT-Valdo:Pt seen for co-evaluation/treatment with skilled Occupational Therapist 2* clinically unstable/unpredictable presentation, medical complexity, fall risk, cognitive impairments, functional/physical limitations, impaired functional balance, decreased safety awareness, limited activity tolerance which is decline from PLOF and may impact overall functional mobility/mobility safety. Nurse Made Aware yes   Assessment   Prognosis Fair   Problem List Decreased strength;Decreased range of motion;Decreased endurance; Impaired balance;Decreased mobility; Decreased cognition;Decreased coordination; Impaired judgement;Decreased safety awareness   Assessment Bina Camejo is a 80 y.o. female with a PMH of Dementia, NPH s/p  shunt, HTN, HLD, CKD, hyponatremia who presents with fall. Prior to admission reported to reside at [facility] independent living. Ambulation with rollator walker independently. Notes only support for showering from facility staff. Hx of dementia-? Historian. Presents with functional limitations related to decreased activity tolerance, cognition, impaired functional mobility, balance, strength, posture and locomotion. Noted L lean in sitting with assist for correction. ModA of 2 for bed mobility, transfers and to ambulate few feet bed to [facility] with RW support. BP supine 105/36, /45 and 110/51 standing. Given impairments as noted above with decline from PLOF will benefit from skilled PT in order to progress and optimize functional outcomes.   The patient's AM-PAC Basic Mobility Inpatient Short Form Raw Score is 11. A Raw score of less than or equal to 16 suggests the patient may benefit from discharge to post-acute rehabilitation services. Please also refer to the recommendation of the Physical Therapist for safe discharge planning. At this time rehab is recommended. PT will follow per POC 3-5x/wk. Monitor balance, L sided lean, discussed with nursing and care team p fall. Goals   Patient Goals to use the bathroom. STG Expiration Date 09/16/23   Short Term Goal #1 10 days: 1). Pt will perform bed mobility with Atilio demonstrating appropriate technique 100% of the time in order to improve function. 2)  Perform all transfers with Atilio demonstrating safe and appropriate technique 100% of the time in order to improve ability to negotiate safely in home environment. 3) Amb with least restrictive AD > 150'x1 with mod I in order to demonstrate ability to negotiate in home environment. 4)  Improve overall strength and balance 1/2 grade in order to optimize ability to perform functional tasks and reduce fall risk. 5) Increase activity tolerance to 30 minutes in order to improve endurance to functional tasks. 6) PT for ongoing patient and family/caregiver education, DME needs and d/c planning in order to promote highest level of function in least restrictive environment. Plan   Treatment/Interventions Functional transfer training;LE strengthening/ROM; Therapeutic exercise; Endurance training;Patient/family training;Equipment eval/education; Bed mobility;Gait training; Compensatory technique education;Continued evaluation;Spoke to nursing;OT;Spoke to case management   PT Frequency 3-5x/wk   Recommendation   PT Discharge Recommendation Post acute rehabilitation services   AM-PAC Basic Mobility Inpatient   Turning in Flat Bed Without Bedrails 3   Lying on Back to Sitting on Edge of Flat Bed Without Bedrails 2   Moving Bed to Chair 2   Standing Up From Chair Using Arms 1   Walk in Room 2   Climb 3-5 Stairs With Railing 1   Basic Mobility Inpatient Raw Score 11   Basic Mobility Standardized Score 30.25   Highest Level Of Mobility   -[facility] Goal 4: Move to chair/commode   -[facility] Achieved 5: Stand (1 or more minutes)   End of Consult   Patient Position at End of Consult All needs within reach; Other (comment)  (on BSC, call bell in reach, nursing monitoring.)     Hx/personal factors: difficulty performing ADLs, difficulty performing IADLs, difficulty performing transfers/mobility, fall risk , functional decline , and advanced age, comorbidities    Examination:decreased UE ROM, decreased strength , decreased balance, decreased activity tolerance, gait deviations, impaired cognition, impaired memory, impaired posture, and decreased cardiovascular endurance. Clinical: unpredictable Ongoing medical status, Trending/abnormal lab values, Risk for falls, Cognition, Safety awareness, bed/chair alarm, Continuous monitoring, Decreased activity tolerance compared to baseline, and Decline from PLOF. Matilde Briseno      Complexity: high           Angelique Baum, PT

## 2023-09-06 NOTE — ASSESSMENT & PLAN NOTE
· Status post  shunt. Intact on imaging.   · Reportedly leaning towards left but patient did following this time  · For completeness will check MRI

## 2023-09-06 NOTE — ASSESSMENT & PLAN NOTE
· Non-MI elevation troponin without evidence of myocardial injury    Lab Results   Component Value Date    HSTNI0 107 (H) 09/05/2023    HSTNI2 98 (H) 09/05/2023    HSTNI4 74 (H) 09/06/2023    HSTNI 37 (H) 05/18/2023

## 2023-09-07 ENCOUNTER — APPOINTMENT (INPATIENT)
Dept: CT IMAGING | Facility: HOSPITAL | Age: 83
DRG: 690 | End: 2023-09-07
Payer: COMMERCIAL

## 2023-09-07 PROBLEM — D64.9 NORMOCYTIC ANEMIA: Status: ACTIVE | Noted: 2023-09-07

## 2023-09-07 LAB
ANION GAP SERPL CALCULATED.3IONS-SCNC: 8 MMOL/L
BUN SERPL-MCNC: 24 MG/DL (ref 5–25)
CALCIUM SERPL-MCNC: 8.5 MG/DL (ref 8.4–10.2)
CHLORIDE SERPL-SCNC: 104 MMOL/L (ref 96–108)
CO2 SERPL-SCNC: 23 MMOL/L (ref 21–32)
CREAT SERPL-MCNC: 1.12 MG/DL (ref 0.6–1.3)
ERYTHROCYTE [DISTWIDTH] IN BLOOD BY AUTOMATED COUNT: 13.8 % (ref 11.6–15.1)
FERRITIN SERPL-MCNC: 135 NG/ML (ref 11–307)
FOLATE SERPL-MCNC: >22.3 NG/ML
GFR SERPL CREATININE-BSD FRML MDRD: 45 ML/MIN/1.73SQ M
GLUCOSE SERPL-MCNC: 115 MG/DL (ref 65–140)
HCT VFR BLD AUTO: 28.8 % (ref 34.8–46.1)
HGB BLD-MCNC: 9.2 G/DL (ref 11.5–15.4)
IRON SATN MFR SERPL: 11 % (ref 15–50)
IRON SERPL-MCNC: 25 UG/DL (ref 50–212)
MCH RBC QN AUTO: 29.7 PG (ref 26.8–34.3)
MCHC RBC AUTO-ENTMCNC: 31.9 G/DL (ref 31.4–37.4)
MCV RBC AUTO: 93 FL (ref 82–98)
MRSA NOSE QL CULT: NORMAL
PLATELET # BLD AUTO: 281 THOUSANDS/UL (ref 149–390)
PMV BLD AUTO: 10.8 FL (ref 8.9–12.7)
POTASSIUM SERPL-SCNC: 4 MMOL/L (ref 3.5–5.3)
RBC # BLD AUTO: 3.1 MILLION/UL (ref 3.81–5.12)
SODIUM SERPL-SCNC: 135 MMOL/L (ref 135–147)
TIBC SERPL-MCNC: 222 UG/DL (ref 250–450)
UIBC SERPL-MCNC: 197 UG/DL (ref 155–355)
VIT B12 SERPL-MCNC: 753 PG/ML (ref 180–914)
WBC # BLD AUTO: 10.66 THOUSAND/UL (ref 4.31–10.16)

## 2023-09-07 PROCEDURE — 99232 SBSQ HOSP IP/OBS MODERATE 35: CPT | Performed by: INTERNAL MEDICINE

## 2023-09-07 PROCEDURE — 80048 BASIC METABOLIC PNL TOTAL CA: CPT | Performed by: INTERNAL MEDICINE

## 2023-09-07 PROCEDURE — G1004 CDSM NDSC: HCPCS

## 2023-09-07 PROCEDURE — 82607 VITAMIN B-12: CPT | Performed by: INTERNAL MEDICINE

## 2023-09-07 PROCEDURE — 83550 IRON BINDING TEST: CPT | Performed by: INTERNAL MEDICINE

## 2023-09-07 PROCEDURE — 83540 ASSAY OF IRON: CPT | Performed by: INTERNAL MEDICINE

## 2023-09-07 PROCEDURE — 85027 COMPLETE CBC AUTOMATED: CPT | Performed by: INTERNAL MEDICINE

## 2023-09-07 PROCEDURE — 82746 ASSAY OF FOLIC ACID SERUM: CPT | Performed by: INTERNAL MEDICINE

## 2023-09-07 PROCEDURE — 70450 CT HEAD/BRAIN W/O DYE: CPT

## 2023-09-07 PROCEDURE — 82728 ASSAY OF FERRITIN: CPT | Performed by: INTERNAL MEDICINE

## 2023-09-07 RX ORDER — LEVOFLOXACIN 5 MG/ML
500 INJECTION, SOLUTION INTRAVENOUS EVERY 24 HOURS
Status: DISCONTINUED | OUTPATIENT
Start: 2023-09-07 | End: 2023-09-09 | Stop reason: HOSPADM

## 2023-09-07 RX ADMIN — ATORVASTATIN CALCIUM 40 MG: 40 TABLET, FILM COATED ORAL at 15:33

## 2023-09-07 RX ADMIN — PANTOPRAZOLE SODIUM 40 MG: 40 TABLET, DELAYED RELEASE ORAL at 05:27

## 2023-09-07 RX ADMIN — Medication 3 MG: at 21:09

## 2023-09-07 RX ADMIN — ESCITALOPRAM OXALATE 10 MG: 10 TABLET ORAL at 08:24

## 2023-09-07 RX ADMIN — CEFTRIAXONE 1000 MG: 1 INJECTION, SOLUTION INTRAVENOUS at 11:37

## 2023-09-07 RX ADMIN — LOSARTAN POTASSIUM 50 MG: 50 TABLET, FILM COATED ORAL at 21:08

## 2023-09-07 RX ADMIN — MAGNESIUM OXIDE TAB 400 MG (241.3 MG ELEMENTAL MG) 400 MG: 400 (241.3 MG) TAB at 17:02

## 2023-09-07 RX ADMIN — LOSARTAN POTASSIUM 50 MG: 50 TABLET, FILM COATED ORAL at 08:23

## 2023-09-07 RX ADMIN — POLYETHYLENE GLYCOL 3350 17 G: 17 POWDER, FOR SOLUTION ORAL at 08:23

## 2023-09-07 RX ADMIN — OLANZAPINE 5 MG: 5 TABLET, FILM COATED ORAL at 21:08

## 2023-09-07 RX ADMIN — SODIUM CHLORIDE 2 G: 1 TABLET ORAL at 08:24

## 2023-09-07 RX ADMIN — LEVOFLOXACIN 500 MG: 5 INJECTION, SOLUTION INTRAVENOUS at 17:49

## 2023-09-07 RX ADMIN — ENOXAPARIN SODIUM 40 MG: 40 INJECTION SUBCUTANEOUS at 08:23

## 2023-09-07 RX ADMIN — TORSEMIDE 10 MG: 10 TABLET ORAL at 08:24

## 2023-09-07 RX ADMIN — ATENOLOL 100 MG: 50 TABLET ORAL at 08:23

## 2023-09-07 RX ADMIN — AMLODIPINE BESYLATE 5 MG: 5 TABLET ORAL at 08:24

## 2023-09-07 RX ADMIN — ASPIRIN 81 MG 81 MG: 81 TABLET ORAL at 08:24

## 2023-09-07 RX ADMIN — SODIUM CHLORIDE 2 G: 1 TABLET ORAL at 17:02

## 2023-09-07 RX ADMIN — MAGNESIUM OXIDE TAB 400 MG (241.3 MG ELEMENTAL MG) 400 MG: 400 (241.3 MG) TAB at 08:24

## 2023-09-07 NOTE — PROGRESS NOTES
[facility]  Progress Note  Name: Annie Abdi  MRN: [id number]  Unit/Bed#: E5 -01 I Date of Admission: 9/5/2023   Date of Service: 9/7/2023 I Hospital Day: 2    Assessment/Plan   * Ambulatory dysfunction  Assessment & Plan  History of NPH and hypertension who presents to the hospital with worsening weakness  · Evidence of UTI with enterococcus. Will switch antibiotics to levofloxacin   · PT/OT recommending rehab. Hopefully discharge to SNF next 24 hours if labs stable    Normocytic anemia  Assessment & Plan  · No evidence of blood loss. Follow-up on iron studies R69 and folic acid    Results from last 7 days   Lab Units 09/07/23  0352 09/06/23  0442 09/05/23  1743 09/03/23  1739   HEMOGLOBIN g/dL 9.2* 10.0* 10.2* 11.1*       Elevated troponin  Assessment & Plan  · Non-MI elevation troponin without evidence of myocardial injury    Lab Results   Component Value Date    HSTNI0 107 (H) 09/05/2023    HSTNI2 98 (H) 09/05/2023    HSTNI4 74 (H) 09/06/2023    HSTNI 37 (H) 05/18/2023       Mild dementia (HCC)  Assessment & Plan  · Mentation and mood stable. Continue escitalopram and olanzapine    SIADH (syndrome of inappropriate ADH production) ([address])  Assessment & Plan  · Stable continue torsemide and salt tablets    Results from last 7 days   Lab Units 09/07/23  0352 09/06/23  0442 09/05/23  1743 09/03/23  1739   SODIUM mmol/L 135 135 132* 133*         Normal pressure hydrocephalus (HCC)  Assessment & Plan  · Status post  shunt. Intact on imaging. · Reportedly leaning towards left but patient did fall again  · CT scan this admission negative. Essential hypertension  Assessment & Plan  · Stable continue losartan amlodipine and atenolol    VTE Pharmacologic Prophylaxis: VTE Score: 4 Moderate Risk (Score 3-4) - Pharmacological DVT Prophylaxis Ordered: enoxaparin (Lovenox). Patient Centered Rounds: I have performed bedside rounds with nursing staff today.   Discussions with Specialists or Other Care Team Provider: Case management    Education and Discussions with Family / Patient: Updated  (daughter) via phone. Time Spent for Care: This time was spent on one or more of the following: performing physical exam; counseling and coordination of care; obtaining or reviewing history; documenting in the medical record; reviewing/ordering tests, medications or procedures; communicating with other healthcare professionals and discussing with patient's family/caregivers. Current Length of Stay: 2 day(s)  Current Patient Status: Inpatient   Certification Statement: The patient will continue to require additional inpatient hospital stay due to Anemia  Discharge Plan: Anticipate discharge tomorrow to rehab facility. Code Status: Level 1 - Full Code      Subjective:   Patient seen and examined. No new complaints, no chest pain    Objective:   Vitals: Blood pressure 132/57, pulse 76, temperature 99.1 °F (37.3 °C), temperature source Oral, resp. rate 20, SpO2 93 %, not currently breastfeeding. No intake or output data in the 24 hours ending 09/07/23 1323    Physical Exam  Vitals reviewed. Constitutional:       General: She is not in acute distress. Appearance: Normal appearance. HENT:      Head: Atraumatic. Cardiovascular:      Rate and Rhythm: Regular rhythm. Pulmonary:      Breath sounds: Decreased breath sounds present. No wheezing. Abdominal:      General: Bowel sounds are normal.      Palpations: Abdomen is soft. Tenderness: There is no guarding or rebound. Musculoskeletal:         General: No swelling. Right lower leg: No edema. Left lower leg: No edema. Skin:     General: Skin is warm. Neurological:      General: No focal deficit present. Mental Status: She is alert.    Psychiatric:         Mood and Affect: Mood normal.       Additional Data:   Labs:  Results from last 7 days   Lab Units 09/07/23  0352 09/06/23  0442 09/05/23  1743   WBC Thousand/uL 10.66* 11.28* 11.14*   HEMOGLOBIN g/dL 9.2* 10.0* 10.2*   PLATELETS Thousands/uL 281 283 299   MCV fL 93 96 94     Results from last 7 days   Lab Units 09/07/23  0352 09/06/23  0442 09/05/23  1743   SODIUM mmol/L 135 135 132*   POTASSIUM mmol/L 4.0 3.8 4.0   CHLORIDE mmol/L 104 102 99   CO2 mmol/L 23 22 22   ANION GAP mmol/L 8 11 11   BUN mg/dL 24 21 26*   CREATININE mg/dL 1.12 1.00 1.20   CALCIUM mg/dL 8.5 8.8 8.9   ALBUMIN g/dL  --   --  3.6   TOTAL BILIRUBIN mg/dL  --   --  0.48   ALK PHOS U/L  --   --  52   ALT U/L  --   --  42   AST U/L  --   --  110*   EGFR ml/min/1.73sq m 45 52 42   GLUCOSE RANDOM mg/dL 115 115 134     Results from last 7 days   Lab Units 09/05/23  1743   MAGNESIUM mg/dL 1.6*         Results from last 7 days   Lab Units 09/06/23  0026 09/05/23  1949 09/05/23  1743   HS TNI 0HR ng/L  --   --  107*   HS TNI 2HR ng/L  --  98*  --    HS TNI 4HR ng/L 74*  --   --           Results from last 7 days   Lab Units 09/05/23  1743   LACTIC ACID mmol/L 1.6             Results from last 7 days   Lab Units 09/05/23  1743   TSH 3RD GENERATON uIU/mL 3.654     * I Have Reviewed All Lab Data Listed Above. Cultures:                   Lines/Drains:  Invasive Devices     Peripheral Intravenous Line  Duration           Peripheral IV 09/07/23 Dorsal (posterior); Right Forearm <1 day              Telemetry:      Imaging:  Imaging Reports Reviewed Today Include:   CT head wo contrast    Result Date: 9/7/2023  Impression: No acute intracranial abnormality. Workstation performed: [id number]     XR shoulder 2+ views LEFT    Result Date: 9/6/2023  Impression: No acute osseous abnormality. Workstation performed: [id number]     XR pelvis ap only 1 or 2 vw    Result Date: 9/6/2023  Impression: No acute osseous abnormality. Degenerative changes as described. Workstation performed: [id number]     CTA head and neck with and without contrast    Result Date: 9/5/2023  Impression: 1.  No acute intracranial abnormality. 2. Stable generalized parenchymal volume loss and findings of normal pressure hydrocephalus with stable degree of ventriculomegaly and unchanged position of the right frontal approach ventricular shunt catheter. 3. 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 Developmentally hypoplastic left P1 segment and mild irregularity of the posterior communicating arteries possibly secondary to atherosclerotic disease versus fibromuscular dysplasia as there are more pronounced changes of fibromuscular dysplasia involving the mid left greater than right cervical ICA segments. 4. No intracranial or cervical carotid aneurysm. 5. Right upper lobe 4 mm pulmonary nodule, grossly stable in size when comparing to the May 13, 2023 CT cervical spine study. A follow-up evaluation in 8 months time is recommended to ensure at least 12 months of stability. 6. Subcentimeter hypodense right thyroid nodule. No additional work-up is recommended at this time. 7. Multilevel cervical spondylosis with notable bilateral foraminal stenosis at C5-C6. Workstation performed: [id number]     XR shunt series    Result Date: 9/5/2023  Impression: 1. Intact  shunt catheter. 2. Additional views are necessary to verify shunt catheter pressure setting, if clinically indicated.  Workstation performed: [id number]       Scheduled Meds:  Current Facility-Administered Medications   Medication Dose Route Frequency Provider Last Rate   • acetaminophen  650 mg Oral Q6H PRN Mally Martin PA-C     • aluminum-magnesium hydroxide-simethicone  30 mL Oral Q6H PRN Mally Martin PA-C     • amLODIPine  5 mg Oral Daily Mally Martin PA-C     • aspirin  81 mg Oral Daily Mally Martin PA-C     • atenolol  100 mg Oral Daily Mally Martin PA-C     • atorvastatin  40 mg Oral Daily With Jae Villalpando PA-C     • bisacodyl 10 mg Oral Daily PRN Александр Singh PA-C     • cefTRIAXone  1,000 mg Intravenous Q24H Crispin Hull DO 1,000 mg (09/07/23 1137)   • enoxaparin  40 mg Subcutaneous Daily Александр Singh PA-C     • escitalopram  10 mg Oral Daily Александр Singh PA-C     • losartan  50 mg Oral BID Александр Singh PA-C     • magnesium Oxide  400 mg Oral BID Александр Singh PA-C     • melatonin  3 mg Oral HS Александр Singh PA-C     • OLANZapine  5 mg Oral HS Александр Singh PA-C     • ondansetron  4 mg Intravenous Q6H PRN Александр Singh PA-C     • pantoprazole  40 mg Oral Early Morning Александр Singh PA-C     • polyethylene glycol  17 g Oral Daily Александр Singh PA-C     • sodium chloride  2 g Oral BID Александр Singh PA-C     • torsemide  10 mg Oral Daily Александр Singh PA-C         Today, Patient Was Seen By: Lillian Berman DO    ** Please Note: Dictation voice to text software may have been used in the creation of this document.  **

## 2023-09-07 NOTE — ASSESSMENT & PLAN NOTE
· No evidence of blood loss.   Follow-up on iron studies X97 and folic acid    Results from last 7 days   Lab Units 09/07/23  0352 09/06/23  0442 09/05/23  1743 09/03/23  1739   HEMOGLOBIN g/dL 9.2* 10.0* 10.2* 11.1*

## 2023-09-07 NOTE — RESTORATIVE TECHNICIAN NOTE
Restorative Technician Note      Patient Name: Cindy Llanes     Restorative Tech Visit Date: 09/07/23  Note Type: Mobility  Patient Position Upon Consult: Bedside chair  Mobility / Activity Provided: assisted pt in cleaning up, assisted pt in ambulating 3 ft forward and 3 ft back, Ax1  Activity Performed: Ambulated  Assistive Device: Roller walker  Patient Position at End of Consult: Bedside chair;  All needs within reach; Bed/Chair alarm activated

## 2023-09-07 NOTE — ASSESSMENT & PLAN NOTE
· Status post  shunt. Intact on imaging. · Reportedly leaning towards left but patient did fall again  · CT scan this admission negative.

## 2023-09-07 NOTE — PLAN OF CARE

## 2023-09-07 NOTE — PLAN OF CARE
Problem: Potential for Falls  Goal: Patient will remain free of falls  Description: INTERVENTIONS:  - Educate patient/family on patient safety including physical limitations  - Instruct patient to call for assistance with activity   - Consult OT/PT to assist with strengthening/mobility   - Keep Call bell within reach  - Keep bed low and locked with side rails adjusted as appropriate  - Keep care items and personal belongings within reach  - Initiate and maintain comfort rounds  - Make Fall Risk Sign visible to staff  - Apply yellow socks and bracelet for high fall risk patients  - Consider moving patient to room near nurses station  Outcome: Progressing     Problem: MOBILITY - ADULT  Goal: Maintain or return to baseline ADL function  Description: INTERVENTIONS:  -  Assess patient's ability to carry out ADLs; assess patient's baseline for ADL function and identify physical deficits which impact ability to perform ADLs (bathing, care of mouth/teeth, toileting, grooming, dressing, etc.)  - Assess/evaluate cause of self-care deficits   - Assess range of motion  - Assess patient's mobility; develop plan if impaired  - Assess patient's need for assistive devices and provide as appropriate  - Encourage maximum independence but intervene and supervise when necessary  - Involve family in performance of ADLs  - Assess for home care needs following discharge   - Consider OT consult to assist with ADL evaluation and planning for discharge  - Provide patient education as appropriate  Outcome: Progressing     Problem: PAIN - ADULT  Goal: Verbalizes/displays adequate comfort level or baseline comfort level  Description: Interventions:  - Encourage patient to monitor pain and request assistance  - Assess pain using appropriate pain scale  - Administer analgesics based on type and severity of pain and evaluate response  - Implement non-pharmacological measures as appropriate and evaluate response  - Consider cultural and social influences on pain and pain management  - Notify physician/advanced practitioner if interventions unsuccessful or patient reports new pain  Outcome: Progressing     Problem: INFECTION - ADULT  Goal: Absence or prevention of progression during hospitalization  Description: INTERVENTIONS:  - Assess and monitor for signs and symptoms of infection  - Monitor lab/diagnostic results  - Monitor all insertion sites, i.e. indwelling lines, tubes, and drains  - Monitor endotracheal if appropriate and nasal secretions for changes in amount and color  - [place] appropriate cooling/warming therapies per order  - Administer medications as ordered  - Instruct and encourage patient and family to use good hand hygiene technique  - Identify and instruct in appropriate isolation precautions for identified infection/condition  Outcome: Progressing     Problem: SAFETY ADULT  Goal: Patient will remain free of falls  Description: INTERVENTIONS:  - Educate patient/family on patient safety including physical limitations  - Instruct patient to call for assistance with activity   - Consult OT/PT to assist with strengthening/mobility   - Keep Call bell within reach  - Keep bed low and locked with side rails adjusted as appropriate  - Keep care items and personal belongings within reach  - Initiate and maintain comfort rounds  - Make Fall Risk Sign visible to staff  - Apply yellow socks and bracelet for high fall risk patients  - Consider moving patient to room near nurses station  Outcome: Progressing  Goal: Maintain or return to baseline ADL function  Description: INTERVENTIONS:  -  Assess patient's ability to carry out ADLs; assess patient's baseline for ADL function and identify physical deficits which impact ability to perform ADLs (bathing, care of mouth/teeth, toileting, grooming, dressing, etc.)  - Assess/evaluate cause of self-care deficits   - Assess range of motion  - Assess patient's mobility; develop plan if impaired  - Assess patient's need for assistive devices and provide as appropriate  - Encourage maximum independence but intervene and supervise when necessary  - Involve family in performance of ADLs  - Assess for home care needs following discharge   - Consider OT consult to assist with ADL evaluation and planning for discharge  - Provide patient education as appropriate  Outcome: Progressing     Problem: DISCHARGE PLANNING  Goal: Discharge to home or other facility with appropriate resources  Description: INTERVENTIONS:  - Identify barriers to discharge w/patient and caregiver  - Arrange for needed discharge resources and transportation as appropriate  - Identify discharge learning needs (meds, wound care, etc.)  - Arrange for interpretive services to assist at discharge as needed  - Refer to Case Management Department for coordinating discharge planning if the patient needs post-hospital services based on physician/advanced practitioner order or complex needs related to functional status, cognitive ability, or social support system  Outcome: Progressing     Problem: Knowledge Deficit  Goal: Patient/family/caregiver demonstrates understanding of disease process, treatment plan, medications, and discharge instructions  Description: Complete learning assessment and assess knowledge base.   Interventions:  - Provide teaching at level of understanding  - Provide teaching via preferred learning methods  Outcome: Progressing

## 2023-09-07 NOTE — ASSESSMENT & PLAN NOTE
History of NPH and hypertension who presents to the hospital with worsening weakness  · Evidence of UTI with enterococcus. Will switch antibiotics to levofloxacin   · PT/OT recommending rehab.   Hopefully discharge to SNF next 24 hours if labs stable No

## 2023-09-07 NOTE — ASSESSMENT & PLAN NOTE
· Stable continue torsemide and salt tablets    Results from last 7 days   Lab Units 09/07/23  0352 09/06/23  0442 09/05/23  1743 09/03/23  1739   SODIUM mmol/L 135 135 132* 133*

## 2023-09-07 NOTE — UTILIZATION REVIEW
Notification of Unplanned, Urgent, or   Emergency Inpatient Admission   [address] [facility], [facility]  Tax ID: [id number]  NPI: [id number]  Place of Service: [address] [facility]  Admission Level of Care: Inpatient  Place of Service Code: 24     Attending Physician Information  Attending Name and NPI#: Ermelinda Navarro [[id number]]  Phone: [phone]     Admission Information  Inpatient Admission Date/Time: 9/5/23  8:56 PM  Discharge Date/Time: No discharge date for patient encounter. Admitting Diagnosis Code/Description:  Weakness [R53.1]  Dementia (HCC) [F03.90]  Elevated troponin [R77.8]  Left-sided weakness [R53.1]     Utilization Review Contact  Lidya Romano Utilization   Phone: [phone]  Fax: [phone]  Email: [email or web address]  Contact for approvals/pending authorizations, clinical reviews, and discharge. Physician Advisory Services Contact  Medical Necessity Denial & Le[facility]Dj[facility] Discussion  Phone: [phone]  Fax: [phone]  Email: [email or web address]

## 2023-09-08 LAB
ANION GAP SERPL CALCULATED.3IONS-SCNC: 8 MMOL/L
BUN SERPL-MCNC: 18 MG/DL (ref 5–25)
CALCIUM SERPL-MCNC: 8.5 MG/DL (ref 8.4–10.2)
CHLORIDE SERPL-SCNC: 101 MMOL/L (ref 96–108)
CO2 SERPL-SCNC: 23 MMOL/L (ref 21–32)
CREAT SERPL-MCNC: 0.94 MG/DL (ref 0.6–1.3)
ERYTHROCYTE [DISTWIDTH] IN BLOOD BY AUTOMATED COUNT: 13.6 % (ref 11.6–15.1)
GFR SERPL CREATININE-BSD FRML MDRD: 56 ML/MIN/1.73SQ M
GLUCOSE SERPL-MCNC: 98 MG/DL (ref 65–140)
HCT VFR BLD AUTO: 27.3 % (ref 34.8–46.1)
HEMOCCULT STL QL: NEGATIVE
HGB BLD-MCNC: 9.1 G/DL (ref 11.5–15.4)
MCH RBC QN AUTO: 30.5 PG (ref 26.8–34.3)
MCHC RBC AUTO-ENTMCNC: 33.3 G/DL (ref 31.4–37.4)
MCV RBC AUTO: 92 FL (ref 82–98)
PLATELET # BLD AUTO: 278 THOUSANDS/UL (ref 149–390)
PMV BLD AUTO: 10.3 FL (ref 8.9–12.7)
POTASSIUM SERPL-SCNC: 3.7 MMOL/L (ref 3.5–5.3)
RBC # BLD AUTO: 2.98 MILLION/UL (ref 3.81–5.12)
SODIUM SERPL-SCNC: 132 MMOL/L (ref 135–147)
WBC # BLD AUTO: 11.58 THOUSAND/UL (ref 4.31–10.16)

## 2023-09-08 PROCEDURE — 97110 THERAPEUTIC EXERCISES: CPT

## 2023-09-08 PROCEDURE — 80048 BASIC METABOLIC PNL TOTAL CA: CPT | Performed by: INTERNAL MEDICINE

## 2023-09-08 PROCEDURE — 97116 GAIT TRAINING THERAPY: CPT

## 2023-09-08 PROCEDURE — 82272 OCCULT BLD FECES 1-3 TESTS: CPT | Performed by: INTERNAL MEDICINE

## 2023-09-08 PROCEDURE — 97530 THERAPEUTIC ACTIVITIES: CPT

## 2023-09-08 PROCEDURE — 85027 COMPLETE CBC AUTOMATED: CPT | Performed by: INTERNAL MEDICINE

## 2023-09-08 PROCEDURE — 99232 SBSQ HOSP IP/OBS MODERATE 35: CPT | Performed by: INTERNAL MEDICINE

## 2023-09-08 RX ADMIN — PANTOPRAZOLE SODIUM 40 MG: 40 TABLET, DELAYED RELEASE ORAL at 06:26

## 2023-09-08 RX ADMIN — TORSEMIDE 10 MG: 10 TABLET ORAL at 09:17

## 2023-09-08 RX ADMIN — ATENOLOL 100 MG: 50 TABLET ORAL at 09:17

## 2023-09-08 RX ADMIN — ESCITALOPRAM OXALATE 10 MG: 10 TABLET ORAL at 09:06

## 2023-09-08 RX ADMIN — MAGNESIUM OXIDE TAB 400 MG (241.3 MG ELEMENTAL MG) 400 MG: 400 (241.3 MG) TAB at 17:49

## 2023-09-08 RX ADMIN — ASPIRIN 81 MG 81 MG: 81 TABLET ORAL at 09:05

## 2023-09-08 RX ADMIN — SODIUM CHLORIDE 2 G: 1 TABLET ORAL at 09:06

## 2023-09-08 RX ADMIN — MAGNESIUM OXIDE TAB 400 MG (241.3 MG ELEMENTAL MG) 400 MG: 400 (241.3 MG) TAB at 09:06

## 2023-09-08 RX ADMIN — OLANZAPINE 5 MG: 5 TABLET, FILM COATED ORAL at 21:25

## 2023-09-08 RX ADMIN — SODIUM CHLORIDE 2 G: 1 TABLET ORAL at 17:49

## 2023-09-08 RX ADMIN — Medication 3 MG: at 21:25

## 2023-09-08 RX ADMIN — LOSARTAN POTASSIUM 50 MG: 50 TABLET, FILM COATED ORAL at 09:17

## 2023-09-08 RX ADMIN — LEVOFLOXACIN 500 MG: 5 INJECTION, SOLUTION INTRAVENOUS at 17:49

## 2023-09-08 RX ADMIN — ENOXAPARIN SODIUM 40 MG: 40 INJECTION SUBCUTANEOUS at 09:05

## 2023-09-08 RX ADMIN — LOSARTAN POTASSIUM 50 MG: 50 TABLET, FILM COATED ORAL at 21:25

## 2023-09-08 RX ADMIN — ATORVASTATIN CALCIUM 40 MG: 40 TABLET, FILM COATED ORAL at 15:59

## 2023-09-08 NOTE — PLAN OF CARE

## 2023-09-08 NOTE — PLAN OF CARE
Problem: PHYSICAL THERAPY ADULT  Goal: Performs mobility at highest level of function for planned discharge setting. See evaluation for individualized goals. Description: Treatment/Interventions: Functional transfer training, LE strengthening/ROM, Therapeutic exercise, Endurance training, Patient/family training, Equipment eval/education, Bed mobility, Gait training, Compensatory technique education, Continued evaluation, Spoke to nursing, OT, Spoke to case management          See flowsheet documentation for full assessment, interventions and recommendations. Outcome: Progressing  Note: Prognosis: Fair  Problem List: Decreased strength, Decreased endurance, Impaired balance, Decreased mobility, Decreased cognition, Decreased safety awareness, Impaired judgement  Assessment: Pt seen for PT treatment session this date with interventions consisting of transfer training, gait training, stair training and HEP, and education provided as needed for safety and direction to improve functional mobility, safety awareness, and activity tolerance. Pt agreeable to PT treatment session upon arrival, pt found seated out of bed in recliner . At end of session, pt left  Seated out of bed in recliner eating lunch with all needs in reach. In comparison to previous session, pt with improvement in activity tolerance, endurance, standing balance, ambulation distances, ambulatory balance, AM- pac score and functional mobility. Less assistance this session for all aspects of mobility. Improved sitting and standing balance noted. No l sided lean noted. Pt progressed with ambulation distances to 30' x2 with min assist x1, cues for upright posture. Pt  Requires increased verbal cues and assistance for turning and backing up to chair with walker. Pt requires frequent standing rest breaks during gait training due to fatigue and mild SAENZ. Pt recovers with standing or seated rest breaks.   Pt performs seated b/l le arom and isometric exercises without difficulty. Pt  Dozing off to sleep during PT session requiring verbal stimulation to stay awake and alert ryan task/ activity. PT continues to recommend IP rehab at time of d/c in order to maximize pt's functional independence and safety w/ mobility. Pt continues to be functioning below baseline level. PT will continue to see pt while here in order to address the deficits listed above and provide interventions consistent w/ POC in effort to achieve STGs. The patient's AM-PAC Basic Mobility Inpatient Short Form Raw Score is 15. A raw score less than 16 suggests the patient may benefit from discharge to post-acute rehabilitation services. Please also refer to the recommendation of the Physical Therapist for safe discharge planning. PT Discharge Recommendation: Post acute rehabilitation services    See flowsheet documentation for full assessment.

## 2023-09-08 NOTE — PLAN OF CARE
Problem: Potential for Falls  Goal: Patient will remain free of falls  Description: INTERVENTIONS:  - Educate patient/family on patient safety including physical limitations  - Instruct patient to call for assistance with activity   - Consult OT/PT to assist with strengthening/mobility   - Keep Call bell within reach  - Keep bed low and locked with side rails adjusted as appropriate  - Keep care items and personal belongings within reach  - Initiate and maintain comfort rounds  - Make Fall Risk Sign visible to staff  - Apply yellow socks and bracelet for high fall risk patients  - Consider moving patient to room near nurses station  Outcome: Progressing     Problem: MOBILITY - ADULT  Goal: Maintain or return to baseline ADL function  Description: INTERVENTIONS:  -  Assess patient's ability to carry out ADLs; assess patient's baseline for ADL function and identify physical deficits which impact ability to perform ADLs (bathing, care of mouth/teeth, toileting, grooming, dressing, etc.)  - Assess/evaluate cause of self-care deficits   - Assess range of motion  - Assess patient's mobility; develop plan if impaired  - Assess patient's need for assistive devices and provide as appropriate  - Encourage maximum independence but intervene and supervise when necessary  - Involve family in performance of ADLs  - Assess for home care needs following discharge   - Consider OT consult to assist with ADL evaluation and planning for discharge  - Provide patient education as appropriate  Outcome: Progressing     Problem: PAIN - ADULT  Goal: Verbalizes/displays adequate comfort level or baseline comfort level  Description: Interventions:  - Encourage patient to monitor pain and request assistance  - Assess pain using appropriate pain scale  - Administer analgesics based on type and severity of pain and evaluate response  - Implement non-pharmacological measures as appropriate and evaluate response  - Consider cultural and social influences on pain and pain management  - Notify physician/advanced practitioner if interventions unsuccessful or patient reports new pain  Outcome: Progressing     Problem: INFECTION - ADULT  Goal: Absence or prevention of progression during hospitalization  Description: INTERVENTIONS:  - Assess and monitor for signs and symptoms of infection  - Monitor lab/diagnostic results  - Monitor all insertion sites, i.e. indwelling lines, tubes, and drains  - Monitor endotracheal if appropriate and nasal secretions for changes in amount and color  - [place] appropriate cooling/warming therapies per order  - Administer medications as ordered  - Instruct and encourage patient and family to use good hand hygiene technique  - Identify and instruct in appropriate isolation precautions for identified infection/condition  Outcome: Progressing     Problem: SAFETY ADULT  Goal: Patient will remain free of falls  Description: INTERVENTIONS:  - Educate patient/family on patient safety including physical limitations  - Instruct patient to call for assistance with activity   - Consult OT/PT to assist with strengthening/mobility   - Keep Call bell within reach  - Keep bed low and locked with side rails adjusted as appropriate  - Keep care items and personal belongings within reach  - Initiate and maintain comfort rounds  - Make Fall Risk Sign visible to staff  - Apply yellow socks and bracelet for high fall risk patients  - Consider moving patient to room near nurses station  Outcome: Progressing  Goal: Maintain or return to baseline ADL function  Description: INTERVENTIONS:  -  Assess patient's ability to carry out ADLs; assess patient's baseline for ADL function and identify physical deficits which impact ability to perform ADLs (bathing, care of mouth/teeth, toileting, grooming, dressing, etc.)  - Assess/evaluate cause of self-care deficits   - Assess range of motion  - Assess patient's mobility; develop plan if impaired  - Assess patient's need for assistive devices and provide as appropriate  - Encourage maximum independence but intervene and supervise when necessary  - Involve family in performance of ADLs  - Assess for home care needs following discharge   - Consider OT consult to assist with ADL evaluation and planning for discharge  - Provide patient education as appropriate  Outcome: Progressing     Problem: DISCHARGE PLANNING  Goal: Discharge to home or other facility with appropriate resources  Description: INTERVENTIONS:  - Identify barriers to discharge w/patient and caregiver  - Arrange for needed discharge resources and transportation as appropriate  - Identify discharge learning needs (meds, wound care, etc.)  - Arrange for interpretive services to assist at discharge as needed  - Refer to Case Management Department for coordinating discharge planning if the patient needs post-hospital services based on physician/advanced practitioner order or complex needs related to functional status, cognitive ability, or social support system  Outcome: Progressing     Problem: Knowledge Deficit  Goal: Patient/family/caregiver demonstrates understanding of disease process, treatment plan, medications, and discharge instructions  Description: Complete learning assessment and assess knowledge base.   Interventions:  - Provide teaching at level of understanding  - Provide teaching via preferred learning methods  Outcome: Progressing

## 2023-09-08 NOTE — ASSESSMENT & PLAN NOTE
· Status post  shunt. Intact on imaging. · CT scan this admission negative.   · No further leaning when working with PT

## 2023-09-08 NOTE — ASSESSMENT & PLAN NOTE
· No evidence of blood loss. Stool occult negative. Z70 and folic acid within limits iron is low.   · Unable to give venofer due to active infection  · May benefit from ferrous oral at time of discharge    Results from last 7 days   Lab Units 09/08/23  0715 09/07/23  0352 09/06/23  0442 09/05/23  1743   HEMOGLOBIN g/dL 9.1* 9.2* 10.0* 10.2*

## 2023-09-08 NOTE — CASE MANAGEMENT
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   • Non-ST elevation myocardial infarction (NSTEMI) ([address]) 2022   • Chronic obstructive pulmonary disease, unspecified COPD type ([address]) 2022   • Polypharmacy 2021   • Mild dementia ([address]) 2021   • Hyperglycemia 2021   • Primary insomnia 2021   • SIADH (syndrome of inappropriate ADH production) ([address]) 2021   • Medicare annual wellness visit, subsequent 2020   • Osteopenia of multiple sites 2020   • S/P  shunt 10/04/2019   • Recurrent falls 2019   • Hyperkalemia 2019   • Leukocytosis 2019   • Stage 2 chronic kidney disease 2019   • Normal pressure hydrocephalus ([address]) 2018   • Ambulatory dysfunction 12/15/2017   • Positive ROSALINDA (antinuclear antibody) 12/15/2017   • 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   • Anxiety and depression 10/20/2017   • 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   • Splenic infarction 10/11/2017   • Levoscoliosis 2017   • Elevated sed rate 2017   • Vitamin D deficiency 2017   • Radiculopathy 2017   • GERD without esophagitis 2017   • Essential (hemorrhagic) thrombocythemia ([address]) 2017   • Carotid artery plaque 2016   • Chronic hyponatremia 2014   • Hyperlipidemia 2014   • Carotid bruit 2014   • Essential hypertension 2009   • Coronary atherosclerosis 2009      LOS (days): 3  Geometric Mean LOS (GMLOS) (days): 3.80  Days to GMLOS:1     OBJECTIVE:  Risk of Unplanned Readmission Score: 23.58         Current admission status: Inpatient   Preferred Pharmacy:   [address] [place], [address] [place] [address] [address]  [address] [facility] [address]  Phone: [phone] Fax: [phone]    Primary Care Provider: Renita Bosworth, PA-C    Primary Insurance: [employer] [facility]  Secondary Insurance:     DISCHARGE DETAILS:    Discharge planning discussed with[de-identified] patient's daughter Red Ortiz of Choice: Yes  Comments - Freedom of Choice: daughter chose [facility] for rehab  CM contacted family/caregiver?: Yes  Were Treatment Team discharge recommendations reviewed with patient/caregiver?: Yes  Did patient/caregiver verbalize understanding of patient care needs?: N/A- going to facility  Were patient/caregiver advised of the risks associated with not following Treatment Team discharge recommendations?: Yes    Contacts  Patient Contacts: Sultana Moctezuma  Relationship to Patient[de-identified] Family  Contact Method: Phone  Phone Number: [phone]  Reason/Outcome: Emergency Contact, Discharge [facility]         Is the patient interested in [address] [facility] at discharge?: No    DME Referral Provided  Referral made for DME?: No    Other Referral/Resources/Interventions Provided:  Interventions: Short Term Rehab    Treatment Team Recommendation: [address]  Discharge Destination Plan[de-identified] [address]  Transport at Discharge : [facility]  Dispatcher Contacted: Yes  Number/Name of Dispatcher: Marcin Huber [id number]     ETA of Transport (Date): 09/09/23  ETA of Transport (Time):  (1030am requested)     Transfer Mode: Stretcher     Additional Comments: Patient's insurance auth approval obtained. CM requested transport via BLS for tomorrow at 1030 am. CM made patient's daughter Bhavna aware and she is agreeable to the discharge plan.     Accepting Facility Name, [address] [place] [address] : [facility]  Receiving Facility/Agency Phone Number: p; [phone]  F; [phone]  Facility/Agency Fax Number: p; [phone]  F; [phone]

## 2023-09-08 NOTE — PHYSICAL THERAPY NOTE
PHYSICAL THERAPY NOTE          Patient Name: Katrin Hernandez  HTVTL'Y Date: 9/8/2023 09/08/23 1125   Note Type   Note Type Treatment   Pain Assessment   Pain Assessment Tool 0-10   Pain Score No Pain   Restrictions/Precautions   Other Precautions Chair Alarm; Bed Alarm; Fall Risk   General   Chart Reviewed Yes   Family/Caregiver Present No   Cognition   Overall Cognitive Status Unable to assess   Arousal/Participation Alert; Responsive; Cooperative   Orientation Level Oriented to person;Oriented to place;Oriented to time;Oriented to situation  (pt  oriented to day, month, year not specific date.)   Subjective   Subjective I finally got up the gumption to make my self look better. Bed Mobility   Additional Comments pt  out of bed in cahir and remained out of bed in chair post PT session. Transfers   Sit to Stand 3  Moderate assistance   Additional items Assist x 1; Armrests; Increased time required;Verbal cues   Stand to Sit 4  Minimal assistance   Additional items Assist x 1; Armrests; Increased time required;Verbal cues   Additional Comments cues for hand placement with all transitions   Ambulation/Elevation   Gait pattern Improper Weight shift; Poor UE support; Forward Flexion; Inconsistent renita; Short stride; Excessively slow   Gait Assistance 4  Minimal assist   Additional items Assist x 1;Verbal cues   Assistive Device Rolling walker   Distance 30'x2   Ambulation/Elevation Additional Comments frequent brief rest breaks due to fatigue and asher. Endurance Deficit   Endurance Deficit Description fatigue, asher   Activity Tolerance   Activity Tolerance Patient limited by fatigue   Exercises   Hip Flexion Sitting;10 reps;AROM; Bilateral   Hip Abduction Sitting;10 reps;AROM; Bilateral   Hip Adduction Sitting;10 reps;AROM; Bilateral  (isometric hip add.)   Knee AROM Long Arc Quad Sitting;10 reps;AROM; Bilateral   Ankle Pumps Sitting;10 reps;AROM; Bilateral   Marching Sitting;10 reps;AROM; Bilateral   Assessment   Prognosis Fair   Problem List Decreased strength;Decreased endurance; Impaired balance;Decreased mobility; Decreased cognition;Decreased safety awareness; Impaired judgement   Assessment Pt seen for PT treatment session this date with interventions consisting of transfer training, gait training, stair training and HEP, and education provided as needed for safety and direction to improve functional mobility, safety awareness, and activity tolerance. Pt agreeable to PT treatment session upon arrival, pt found seated out of bed in recliner . At end of session, pt left  Seated out of bed in recliner eating lunch with all needs in reach. In comparison to previous session, pt with improvement in activity tolerance, endurance, standing balance, ambulation distances, ambulatory balance, AM- pac score and functional mobility. Less assistance this session for all aspects of mobility. Improved sitting and standing balance noted. No l sided lean noted. Pt progressed with ambulation distances to 30' x2 with min assist x1, cues for upright posture. Pt  Requires increased verbal cues and assistance for turning and backing up to chair with walker. Pt requires frequent standing rest breaks during gait training due to fatigue and mild SAENZ. Pt recovers with standing or seated rest breaks. Pt performs seated b/l le arom and isometric exercises without difficulty. Pt  Dozing off to sleep during PT session requiring verbal stimulation to stay awake and alert ryan task/ activity. PT continues to recommend IP rehab at time of d/c in order to maximize pt's functional independence and safety w/ mobility. Pt continues to be functioning below baseline level. PT will continue to see pt while here in order to address the deficits listed above and provide interventions consistent w/ POC in effort to achieve STGs.     The patient's AM-PAC Basic Mobility Inpatient Short Form Raw Score is 15. A raw score less than 16 suggests the patient may benefit from discharge to post-acute rehabilitation services. Please also refer to the recommendation of the Physical Therapist for safe discharge planning. Goals   Patient Goals to get out of here. STG Expiration Date 09/16/23   PT Treatment Day 1   Plan   Treatment/Interventions Functional transfer training;LE strengthening/ROM; Therapeutic exercise; Endurance training;Patient/family training;Equipment eval/education;Gait training;Spoke to nursing   Progress Progressing toward goals   PT Frequency 3-5x/wk   Recommendation   PT Discharge Recommendation Post acute rehabilitation services   AM-PAC Basic Mobility Inpatient   Turning in Flat Bed Without Bedrails 3   Lying on Back to Sitting on Edge of Flat Bed Without Bedrails 2   Moving Bed to Chair 3   Standing Up From Chair Using Arms 3   Walk in Room 3   Climb 3-5 Stairs With Railing 1   Basic Mobility Inpatient Raw Score 15   Basic Mobility Standardized Score 36.97   Highest Level Of Mobility   JH-HLM Goal 4: Move to chair/commode   JH-HLM Achieved 7: Walk 25 feet or more   Education   Education Provided Mobility training;Home exercise program;Assistive device   Patient Reinforcement needed   End of Consult   Patient Position at End of Consult Bedside chair;Bed/Chair alarm activated; All needs within reach     Flori Garcia

## 2023-09-08 NOTE — CASE MANAGEMENT
[facility] has received approved authorization from insurance: [facility] in by Elsa Pierson P#:  [phone] [id number]  Authorization received for: SNF  Facility: [facility]   Authorization #: [id number]   Start of Care: 09/08/2023  Next Review Date: 09/13/2023  Continued Stay Care Coordinator: Yeison John P#: [phone] [id number]  Submit next review to: Fax. [phone]  Care Manager notified: Pat Kerr

## 2023-09-08 NOTE — CASE MANAGEMENT
[address] [facility] received request for authorization from Care Manager.   Authorization request for: [facility]  Facility Name:  Rachael Leblanc NPI: [id number]  Facility MD: Ángel Whiting NPI: [id number]  Authorization initiated by contacting insurance: [employer] Via: [employer]   Pending Reference #: [id number]   Clinicals submitted via: Liza Knox

## 2023-09-08 NOTE — ASSESSMENT & PLAN NOTE
· Stable continue torsemide and salt tablets    Results from last 7 days   Lab Units 09/08/23  0505 09/07/23  0352 09/06/23  0442 09/05/23  1743   SODIUM mmol/L 132* 135 135 132*

## 2023-09-08 NOTE — PROGRESS NOTES
[facility]  Progress Note  Name: Yareli Espinal  MRN: [id number]  Unit/Bed#: E5 -01 I Date of Admission: 9/5/2023   Date of Service: 9/8/2023 I Hospital Day: 3    Assessment/Plan   * Ambulatory dysfunction  Assessment & Plan  History of NPH and hypertension who presents to the hospital with worsening weakness  · Evidence of UTI with enterococcus and MDR E. coli. Antibiotics have been switched to levofloxacin   · PT/OT recommending rehab. Hopefully discharge to SNF next 24 hours if labs stable    Results from last 7 days   Lab Units 09/08/23  0715 09/07/23  0352 09/06/23 0442 09/05/23  1743   WBC Thousand/uL 11.58* 10.66* 11.28* 11.14*       Normocytic anemia  Assessment & Plan  · No evidence of blood loss. Stool occult negative. G98 and folic acid within limits iron is low. · Unable to give venofer due to active infection  · May benefit from ferrous oral at time of discharge    Results from last 7 days   Lab Units 09/08/23  0715 09/07/23  0352 09/06/23 0442 09/05/23  1743   HEMOGLOBIN g/dL 9.1* 9.2* 10.0* 10.2*       Elevated troponin  Assessment & Plan  · Non-MI elevation troponin without evidence of myocardial injury    Lab Results   Component Value Date    HSTNI0 107 (H) 09/05/2023    HSTNI2 98 (H) 09/05/2023    HSTNI4 74 (H) 09/06/2023    HSTNI 37 (H) 05/18/2023       Mild dementia (HCC)  Assessment & Plan  · Mentation and mood stable. Continue escitalopram and olanzapine    SIADH (syndrome of inappropriate ADH production) ([address])  Assessment & Plan  · Stable continue torsemide and salt tablets    Results from last 7 days   Lab Units 09/08/23  0505 09/07/23  0352 09/06/23 0442 09/05/23  1743   SODIUM mmol/L 132* 135 135 132*         Normal pressure hydrocephalus (HCC)  Assessment & Plan  · Status post  shunt. Intact on imaging. · CT scan this admission negative.   · No further leaning when working with PT    Essential hypertension  Assessment & Plan  · Borderline low.  Continue losartan and atenolol but discontinue amlodipine    VTE Pharmacologic Prophylaxis: VTE Score: 4 Moderate Risk (Score 3-4) - Pharmacological DVT Prophylaxis Ordered: enoxaparin (Lovenox). Patient Centered Rounds: I have performed bedside rounds with nursing staff today. Discussions with Specialists or Other Care Team Provider: Case management    Education and Discussions with Family / Patient: Updated  (daughter) via phone. Time Spent for Care: This time was spent on one or more of the following: performing physical exam; counseling and coordination of care; obtaining or reviewing history; documenting in the medical record; reviewing/ordering tests, medications or procedures; communicating with other healthcare professionals and discussing with patient's family/caregivers. Current Length of Stay: 3 day(s)  Current Patient Status: Inpatient   Certification Statement: The patient will continue to require additional inpatient hospital stay due to IV antibiotics  Discharge Plan: Anticipate discharge tomorrow to rehab facility. Code Status: Level 1 - Full Code      Subjective:   Patient seen and examined. No new complaints. Worked with PT.    Objective:   Vitals: Blood pressure 144/52, pulse 76, temperature 98.2 °F (36.8 °C), resp. rate 16, SpO2 93 %, not currently breastfeeding. Intake/Output Summary (Last 24 hours) at 9/8/2023 1614  Last data filed at 9/8/2023 1057  Gross per 24 hour   Intake 360 ml   Output 350 ml   Net 10 ml       Physical Exam  Vitals reviewed. Constitutional:       General: She is not in acute distress. Appearance: Normal appearance. HENT:      Head: Atraumatic. Eyes:      General: No scleral icterus. Cardiovascular:      Rate and Rhythm: Normal rate and regular rhythm. Pulmonary:      Breath sounds: Decreased breath sounds present. No wheezing. Abdominal:      Palpations: Abdomen is soft. Tenderness: There is no guarding. Musculoskeletal:         General: No swelling. Skin:     General: Skin is warm. Neurological:      Mental Status: She is alert. Mental status is at baseline. Psychiatric:         Mood and Affect: Mood normal.       Additional Data:   Labs:  Results from last 7 days   Lab Units 09/08/23  0715 09/07/23 0352 09/06/23 0442   WBC Thousand/uL 11.58* 10.66* 11.28*   HEMOGLOBIN g/dL 9.1* 9.2* 10.0*   PLATELETS Thousands/uL 278 281 283   MCV fL 92 93 96     Results from last 7 days   Lab Units 09/08/23  0505 09/07/23  0352 09/06/23 0442 09/05/23  1743   SODIUM mmol/L 132* 135 135 132*   POTASSIUM mmol/L 3.7 4.0 3.8 4.0   CHLORIDE mmol/L 101 104 102 99   CO2 mmol/L 23 23 22 22   ANION GAP mmol/L 8 8 11 11   BUN mg/dL 18 24 21 26*   CREATININE mg/dL 0.94 1.12 1.00 1.20   CALCIUM mg/dL 8.5 8.5 8.8 8.9   ALBUMIN g/dL  --   --   --  3.6   TOTAL BILIRUBIN mg/dL  --   --   --  0.48   ALK PHOS U/L  --   --   --  52   ALT U/L  --   --   --  42   AST U/L  --   --   --  110*   EGFR ml/min/1.73sq m 56 45 52 42   GLUCOSE RANDOM mg/dL 98 115 115 134     Results from last 7 days   Lab Units 09/05/23  1743   MAGNESIUM mg/dL 1.6*         Results from last 7 days   Lab Units 09/06/23  0026 09/05/23  1949 09/05/23  1743   HS TNI 0HR ng/L  --   --  107*   HS TNI 2HR ng/L  --  98*  --    HS TNI 4HR ng/L 74*  --   --           Results from last 7 days   Lab Units 09/05/23  1743   LACTIC ACID mmol/L 1.6             Results from last 7 days   Lab Units 09/05/23  1743   TSH 3RD GENERATON uIU/mL 3.654     * I Have Reviewed All Lab Data Listed Above.     Cultures:   Results from last 7 days   Lab Units 09/05/23  1910   URINE CULTURE  >100,000 cfu/ml Enterococcus faecium*  40,000-49,000 cfu/ml Escherichia coli ESBL*           Results from last 7 days   Lab Units 09/08/23  0445   FECAL OCCULT BLOOD DIAGNOSTIC  Negative       Lines/Drains:  Invasive Devices     Peripheral Intravenous Line  Duration           Peripheral IV 09/07/23 Dorsal (posterior); Left Forearm <1 day              Telemetry:      Imaging:  Imaging Reports Reviewed Today Include:   CT head wo contrast    Result Date: 9/7/2023  Impression: No acute intracranial abnormality. Workstation performed: [id number]     XR shoulder 2+ views LEFT    Result Date: 9/6/2023  Impression: No acute osseous abnormality. Workstation performed: [id number]     XR pelvis ap only 1 or 2 vw    Result Date: 9/6/2023  Impression: No acute osseous abnormality. Degenerative changes as described. Workstation performed: [id number]       XR shunt series    Result Date: 9/5/2023  Impression: 1. Intact  shunt catheter. 2. Additional views are necessary to verify shunt catheter pressure setting, if clinically indicated.  Workstation performed: [id number]       Scheduled Meds:  Current Facility-Administered Medications   Medication Dose Route Frequency Provider Last Rate   • acetaminophen  650 mg Oral Q6H PRN Sanford Mckinney, PA-C     • aluminum-magnesium hydroxide-simethicone  30 mL Oral Q6H PRN [place] Mckinney, PA-C     • aspirin  81 mg Oral Daily Sanford Mckinney, PA-C     • atenolol  100 mg Oral Daily [facility] Hank, PA-C     • atorvastatin  40 mg Oral Daily With JAELYN LockeC     • bisacodyl  10 mg Oral Daily PRN Sanford Mckinney, PA-C     • enoxaparin  40 mg Subcutaneous Daily John Hank, PA-C     • escitalopram  10 mg Oral Daily [facility] Hank, PA-C     • levofloxacin  500 mg Intravenous Q24H Crispin Hull,  mg (09/07/23 9159)   • losartan  50 mg Oral BID Sanford Mckinney, PA-C     • magnesium Oxide  400 mg Oral BID [facility] Hank, PA-C     • melatonin  3 mg Oral HS Jackayana Mckinney, PA-C     • OLANZapine  5 mg Oral HS [facility] Hank, PA-C     • ondansetron  4 mg Intravenous Q6H PRN Sanford Mckinney, PA-C     • pantoprazole  40 mg Oral Early Morning Sanford Mckinney, PA-C     • polyethylene glycol  17 g Oral Daily Sanford Mckinney, PA-C     • sodium chloride  2 g Oral BID [place] Mckinney, PA-C     • torsemide  10 mg Oral Daily Sanford Mckinney, PA-C Today, Patient Was Seen By: Ernesto Rivera DO    ** Please Note: Dictation voice to text software may have been used in the creation of this document.  **

## 2023-09-08 NOTE — CASE MANAGEMENT
Case Management Discharge Planning Note    Patient name Dolly Daily  Location [facility] [address] [place] [address] [address] All[address] Shah-* MRN [id number]  : 1940 Date 2023       Current Admission Date: 2023  Current Admission Diagnosis:Ambulatory dysfunction   Patient Active Problem List    Diagnosis Date Noted   • Normocytic anemia 2023   • Elevated troponin 2023   • Incontinence associated dermatitis 2023   • Chest pressure 2023   • Fall 2023   • Persistent proteinuria 2023   • COVID 2022   • Mild recurrent major depression ([address]) 2022   • Non-ST elevation myocardial infarction (NSTEMI) ([address]) 2022   • Chronic obstructive pulmonary disease, unspecified COPD type ([address]) 2022   • Polypharmacy 2021   • Mild dementia ([address]) 2021   • Hyperglycemia 2021   • Primary insomnia 2021   • SIADH (syndrome of inappropriate ADH production) ([address]) 2021   • Medicare annual wellness visit, subsequent 2020   • Osteopenia of multiple sites 2020   • S/P  shunt 10/04/2019   • Recurrent falls 2019   • Hyperkalemia 2019   • Leukocytosis 2019   • Stage 2 chronic kidney disease 2019   • Normal pressure hydrocephalus ([address]) 2018   • Ambulatory dysfunction 12/15/2017   • Positive ROSALINDA (antinuclear antibody) 12/15/2017   • 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   • Anxiety and depression 10/20/2017   • 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   • Splenic infarction 10/11/2017   • Levoscoliosis 2017   • Elevated sed rate 2017   • Vitamin D deficiency 2017   • Radiculopathy 2017   • GERD without esophagitis 2017   • Essential (hemorrhagic) thrombocythemia ([address]) 2017   • Carotid artery plaque 2016   • Chronic hyponatremia 2014   • Hyperlipidemia 2014   • Carotid bruit 2014   • Essential hypertension 2009   • Coronary atherosclerosis 2009      LOS (days): 3  Geometric Mean LOS (GMLOS) (days): 3.80  Days to GMLOS:1     OBJECTIVE:  Risk of Unplanned Readmission Score: 23.58         Current admission status: Inpatient   Preferred Pharmacy:   [facility], [address] [place] [address] Road [facility]  Phone: [phone] Fax: [phone]    Primary Care Provider: Josué Henry PA-C    Primary Insurance: [facility]  Secondary Insurance:     [facility] [address] Number: [id number]

## 2023-09-08 NOTE — ASSESSMENT & PLAN NOTE
History of NPH and hypertension who presents to the hospital with worsening weakness  · Evidence of UTI with enterococcus and MDR E. coli. Antibiotics have been switched to levofloxacin   · PT/OT recommending rehab.   Hopefully discharge to SNF next 24 hours if labs stable    Results from last 7 days   Lab Units 09/08/23  0715 09/07/23  0352 09/06/23  0442 09/05/23  1743   WBC Thousand/uL 11.58* 10.66* 11.28* 11.14*

## 2023-09-09 ENCOUNTER — TELEPHONE (OUTPATIENT)
Dept: FAMILY MEDICINE CLINIC | Facility: CLINIC | Age: 83
End: 2023-09-09

## 2023-09-09 VITALS
HEART RATE: 74 BPM | RESPIRATION RATE: 16 BRPM | DIASTOLIC BLOOD PRESSURE: 51 MMHG | SYSTOLIC BLOOD PRESSURE: 133 MMHG | TEMPERATURE: 98.2 F | OXYGEN SATURATION: 93 %

## 2023-09-09 PROBLEM — R91.1 LUNG NODULE: Status: ACTIVE | Noted: 2023-09-09

## 2023-09-09 LAB
ANION GAP SERPL CALCULATED.3IONS-SCNC: 10 MMOL/L
BACTERIA UR CULT: ABNORMAL
BUN SERPL-MCNC: 17 MG/DL (ref 5–25)
CALCIUM SERPL-MCNC: 9 MG/DL (ref 8.4–10.2)
CHLORIDE SERPL-SCNC: 101 MMOL/L (ref 96–108)
CO2 SERPL-SCNC: 21 MMOL/L (ref 21–32)
CREAT SERPL-MCNC: 0.86 MG/DL (ref 0.6–1.3)
ERYTHROCYTE [DISTWIDTH] IN BLOOD BY AUTOMATED COUNT: 13.5 % (ref 11.6–15.1)
GFR SERPL CREATININE-BSD FRML MDRD: 63 ML/MIN/1.73SQ M
GLUCOSE SERPL-MCNC: 114 MG/DL (ref 65–140)
HCT VFR BLD AUTO: 32.2 % (ref 34.8–46.1)
HGB BLD-MCNC: 10.4 G/DL (ref 11.5–15.4)
MCH RBC QN AUTO: 30.1 PG (ref 26.8–34.3)
MCHC RBC AUTO-ENTMCNC: 32.3 G/DL (ref 31.4–37.4)
MCV RBC AUTO: 93 FL (ref 82–98)
PLATELET # BLD AUTO: 359 THOUSANDS/UL (ref 149–390)
PMV BLD AUTO: 10.3 FL (ref 8.9–12.7)
POTASSIUM SERPL-SCNC: 3.9 MMOL/L (ref 3.5–5.3)
RBC # BLD AUTO: 3.46 MILLION/UL (ref 3.81–5.12)
SODIUM SERPL-SCNC: 132 MMOL/L (ref 135–147)
WBC # BLD AUTO: 12.26 THOUSAND/UL (ref 4.31–10.16)

## 2023-09-09 PROCEDURE — 80048 BASIC METABOLIC PNL TOTAL CA: CPT | Performed by: INTERNAL MEDICINE

## 2023-09-09 PROCEDURE — 99239 HOSP IP/OBS DSCHRG MGMT >30: CPT | Performed by: INTERNAL MEDICINE

## 2023-09-09 PROCEDURE — 85027 COMPLETE CBC AUTOMATED: CPT | Performed by: INTERNAL MEDICINE

## 2023-09-09 RX ORDER — ACETAMINOPHEN 325 MG/1
650 TABLET ORAL EVERY 6 HOURS PRN
Refills: 0 | COMMUNITY
Start: 2023-09-09

## 2023-09-09 RX ORDER — LEVOFLOXACIN 500 MG/1
500 TABLET, FILM COATED ORAL EVERY 24 HOURS
Refills: 0
Start: 2023-09-09 | End: 2023-09-12

## 2023-09-09 RX ORDER — QUINIDINE GLUCONATE 324 MG
240 TABLET, EXTENDED RELEASE ORAL DAILY
Refills: 0
Start: 2023-09-09

## 2023-09-09 RX ADMIN — ASPIRIN 81 MG 81 MG: 81 TABLET ORAL at 08:39

## 2023-09-09 RX ADMIN — PANTOPRAZOLE SODIUM 40 MG: 40 TABLET, DELAYED RELEASE ORAL at 05:25

## 2023-09-09 RX ADMIN — LOSARTAN POTASSIUM 50 MG: 50 TABLET, FILM COATED ORAL at 08:39

## 2023-09-09 RX ADMIN — MAGNESIUM OXIDE TAB 400 MG (241.3 MG ELEMENTAL MG) 400 MG: 400 (241.3 MG) TAB at 08:39

## 2023-09-09 RX ADMIN — ATENOLOL 100 MG: 50 TABLET ORAL at 09:31

## 2023-09-09 RX ADMIN — TORSEMIDE 10 MG: 10 TABLET ORAL at 08:39

## 2023-09-09 RX ADMIN — SODIUM CHLORIDE 2 G: 1 TABLET ORAL at 08:39

## 2023-09-09 RX ADMIN — ENOXAPARIN SODIUM 40 MG: 40 INJECTION SUBCUTANEOUS at 08:38

## 2023-09-09 RX ADMIN — ESCITALOPRAM OXALATE 10 MG: 10 TABLET ORAL at 08:39

## 2023-09-09 RX ADMIN — POLYETHYLENE GLYCOL 3350 17 G: 17 POWDER, FOR SOLUTION ORAL at 08:39

## 2023-09-09 NOTE — ASSESSMENT & PLAN NOTE
· Right upper lobe 4 mm pulmonary nodule, grossly stable in size when comparing to the May 13, 2023 CT cervical spine study.    · A follow-up evaluation in 8 months time is recommended to ensure at least 12 months of stability

## 2023-09-09 NOTE — ASSESSMENT & PLAN NOTE
· Stable continue torsemide and salt tablets    Results from last 7 days   Lab Units 09/09/23  0454 09/08/23  0505 09/07/23  0352 09/06/23  0442   SODIUM mmol/L 132* 132* 135 135

## 2023-09-09 NOTE — ASSESSMENT & PLAN NOTE
· No evidence of blood loss. Stool occult negative. H34 and folic acid within limits iron is low.   · Unable to give venofer due to active infection  · Being discharged with ferrous gluconate to take daily    Results from last 7 days   Lab Units 09/09/23  0454 09/08/23  0715 09/07/23  0352 09/06/23  0442   HEMOGLOBIN g/dL 10.4* 9.1* 9.2* 10.0*     Results from last 7 days   Lab Units 09/07/23  0442   VITAMIN B 12 pg/mL 753   FOLATE ng/mL >22.3   IRON ug/dL 25*   TIBC ug/dL 222*     Results from last 7 days   Lab Units 09/08/23  0445   FECAL OCCULT BLOOD DIAGNOSTIC  Negative

## 2023-09-09 NOTE — DISCHARGE SUMMARY
[address] [facility] [place] [facility]  Discharge- [facility] 1940, 80 y.o. female MRN: [id number]  Unit/Bed#: E5 -01 Encounter: [id number]  Primary Care Provider: Jan Morales PA-C   Date and time admitted to hospital: 9/5/2023  5:07 PM    Admitting Provider:  Joselin Weldon DO  Discharge Provider:  Joselin Weldon DO  Admission Date: 9/5/2023       Discharge Date: 09/09/23   LOS: 4  Primary Care Physician at Discharge: Jan Morales [facility]  * Ambulatory dysfunction  Assessment & Plan  History of NPH and hypertension who presents to the hospital with worsening weakness  · Evidence of UTI with enterococcus and MDR E. coli. Antibiotics have been switched to levofloxacin   · PT/OT recommending rehab. Being discharged to SNF today  · Continue levofloxacin for 3 more days to complete a 5-day course    Results from last 7 days   Lab Units 09/09/23  0454 09/08/23  0715 09/07/23  0352 09/06/23  0442   WBC Thousand/uL 12.26* 11.58* 10.66* 11.28*       Lung nodule  Assessment & Plan  · Right upper lobe 4 mm pulmonary nodule, grossly stable in size when comparing to the May 13, 2023 CT cervical spine study. · A follow-up evaluation in 8 months time is recommended to ensure at least 12 months of stability    Normocytic anemia  Assessment & Plan  · No evidence of blood loss. Stool occult negative. L83 and folic acid within limits iron is low.   · Unable to give venofer due to active infection  · Being discharged with ferrous gluconate to take daily    Results from last 7 days   Lab Units 09/09/23  0454 09/08/23  0715 09/07/23  0352 09/06/23  0442   HEMOGLOBIN g/dL 10.4* 9.1* 9.2* 10.0*     Results from last 7 days   Lab Units 09/07/23  0442   VITAMIN B 12 pg/mL 753   FOLATE ng/mL >22.3   IRON ug/dL 25*   TIBC ug/dL 222*     Results from last 7 days   Lab Units 09/08/23  0445   FECAL OCCULT BLOOD DIAGNOSTIC  Negative       Elevated troponin  Assessment & Plan  · Non-MI elevation troponin without evidence of myocardial injury    Lab Results   Component Value Date    HSTNI0 107 (H) 09/05/2023    HSTNI2 98 (H) 09/05/2023    HSTNI4 74 (H) 09/06/2023    HSTNI 37 (H) 05/18/2023       Mild dementia (HCC)  Assessment & Plan  · Mentation and mood stable. Continue escitalopram and olanzapine    SIADH (syndrome of inappropriate ADH production) ([address])  Assessment & Plan  · Stable continue torsemide and salt tablets    Results from last 7 days   Lab Units 09/09/23  0454 09/08/23  0505 09/07/23  0352 09/06/23  0442   SODIUM mmol/L 132* 132* 135 135         Normal pressure hydrocephalus (HCC)  Assessment & Plan  · Status post  shunt. Intact on imaging. · CT scan this admission negative. · No further leaning when working with PT    Essential hypertension  Assessment & Plan  · Borderline low. Continue losartan and atenolol but discontinued amlodipine    REASON FOR ADMISSION  Fall (Patient fell onto left side getting out of bed this morning. Denies hitting head. Denies pain. +thinners. ) and Medical Problem (Patient's daughter reports patient acting different than normal since the weekend. Reports difficulty walking and weakness on the left side. )    HOSPITAL COURSE:  Faisal Elliott is a 80 y.o. female with a history of SIADH NPH hypertension and dementia who presented to the hospital with fall. The patient came from [facility] due to worsening ambulation/fall. During his hospitalization she was seen by PT and OT recommended rehab. She was found to have a urinary tract infection with leukocytosis. Her blood pressure was normal but borderline low so her amlodipine was discontinued. She was noted to be anemic with iron deficiency. She is going to [facility] to rehab today. The case was discussed with daughter on telephone on day of discharge. Please see problem list listed above.     CONSULTING PROVIDERS   None    PROCEDURES PERFORMED  * No surgery found *    RADIOLOGY RESULTS  CT head wo contrast    Result Date: 9/7/2023  Impression: No acute intracranial abnormality. Workstation performed: [id number]     XR shoulder 2+ views LEFT    Result Date: 9/6/2023  Impression: No acute osseous abnormality. Workstation performed: [id number]     XR pelvis ap only 1 or 2 vw    Result Date: 9/6/2023  Impression: No acute osseous abnormality. Degenerative changes as described. Workstation performed: [id number]     CTA head and neck with and without contrast    Result Date: 9/5/2023  Impression: 1. No acute intracranial abnormality. 2. Stable generalized parenchymal volume loss and findings of normal pressure hydrocephalus with stable degree of ventriculomegaly and unchanged position of the right frontal approach ventricular shunt catheter. 3. 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 Developmentally hypoplastic left P1 segment and mild irregularity of the posterior communicating arteries possibly secondary to atherosclerotic disease versus fibromuscular dysplasia as there are more pronounced changes of fibromuscular dysplasia involving the mid left greater than right cervical ICA segments. 4. No intracranial or cervical carotid aneurysm. 5. Right upper lobe 4 mm pulmonary nodule, grossly stable in size when comparing to the May 13, 2023 CT cervical spine study. A follow-up evaluation in 8 months time is recommended to ensure at least 12 months of stability. 6. Subcentimeter hypodense right thyroid nodule. No additional work-up is recommended at this time. 7. Multilevel cervical spondylosis with notable bilateral foraminal stenosis at C5-C6. Workstation performed: [id number]     XR shunt series    Result Date: 9/5/2023  Impression: 1. Intact  shunt catheter.  2. Additional views are necessary to verify shunt catheter pressure setting, if clinically indicated.  Workstation performed: [id number]       LABS  Results from last 7 days   Lab Units 09/09/23 0454 09/08/23  0715 09/07/23 0352 09/06/23 0442 09/05/23 1743 09/03/23  1739   WBC Thousand/uL 12.26* 11.58* 10.66* 11.28* 11.14* 9.24   HEMOGLOBIN g/dL 10.4* 9.1* 9.2* 10.0* 10.2* 11.1*   HEMATOCRIT % 32.2* 27.3* 28.8* 32.0* 31.5* 35.0   MCV fL 93 92 93 96 94 95   PLATELETS Thousands/uL 359 278 281 283 299 311     Results from last 7 days   Lab Units 09/09/23 0454 09/08/23 0505 09/07/23 0352 09/06/23 0442 09/05/23 1743 09/03/23  1739   SODIUM mmol/L 132* 132* 135 135 132* 133*   POTASSIUM mmol/L 3.9 3.7 4.0 3.8 4.0 4.3   CHLORIDE mmol/L 101 101 104 102 99 100   CO2 mmol/L 21 23 23 22 22 23   BUN mg/dL 17 18 24 21 26* 23   CREATININE mg/dL 0.86 0.94 1.12 1.00 1.20 1.11   CALCIUM mg/dL 9.0 8.5 8.5 8.8 8.9 9.1   ALBUMIN g/dL  --   --   --   --  3.6  --    TOTAL BILIRUBIN mg/dL  --   --   --   --  0.48  --    ALK PHOS U/L  --   --   --   --  52  --    ALT U/L  --   --   --   --  42  --    AST U/L  --   --   --   --  110*  --    EGFR ml/min/1.73sq m 63 56 45 52 42 46   GLUCOSE RANDOM mg/dL 114 98 115 115 134 113         Results from last 7 days   Lab Units 09/06/23 0026 09/05/23 1949 09/05/23  1743   HS TNI 0HR ng/L  --   --  107*   HS TNI 2HR ng/L  --  98*  --    HS TNI 4HR ng/L 74*  --   --          Results from last 7 days   Lab Units 09/05/23  1743   TSH 3RD GENERATON uIU/mL 3.654     Results from last 7 days   Lab Units 09/05/23  1743   LACTIC ACID mmol/L 1.6           Cultures:   Results from last 7 days   Lab Units 09/05/23 1910   COLOR UA  Light Yellow   CLARITY UA  Turbid   SPEC GRAV UA  1.017   PH UA  6.0   LEUKOCYTES UA  Moderate*   NITRITE UA  Negative   GLUCOSE UA mg/dl Negative   KETONES UA mg/dl Negative   BILIRUBIN UA  Negative   BLOOD UA  Trace*      Results from last 7 days   Lab Units 09/05/23 1910   RBC UA /hpf 1-2   WBC UA /hpf 4-10*   EPITHELIAL CELLS WET PREP /hpf Occasional BACTERIA UA /hpf Occasional      Results from last 7 days   Lab Units 09/05/23  1910   URINE CULTURE  >100,000 cfu/ml Enterococcus faecium*  40,000-49,000 cfu/ml Escherichia coli ESBL*         DISCHARGE DAY VISIT AND PHYSICAL EXAM:  Subjective: Patient seen and examined. No complaints    Vitals:   Blood Pressure: 133/51 (09/09/23 0700)  Pulse: 74 (09/08/23 2345)  Temperature: 98.2 °F (36.8 °C) (09/09/23 0700)  Temp Source: Oral (09/09/23 1272)  Respirations: 16 (09/09/23 0658)  SpO2: 93 % (09/08/23 2345)    Physical Exam  Vitals reviewed. Constitutional:       General: She is not in acute distress. Appearance: Normal appearance. HENT:      Head: Atraumatic. Cardiovascular:      Rate and Rhythm: Regular rhythm. Heart sounds: Normal heart sounds. Pulmonary:      Breath sounds: Normal breath sounds. Abdominal:      General: Bowel sounds are normal.      Palpations: Abdomen is soft. Tenderness: There is no guarding or rebound. Musculoskeletal:         General: No swelling. Skin:     General: Skin is warm. Neurological:      Mental Status: She is alert. Mental status is at baseline. Psychiatric:         Mood and Affect: Mood normal.       Planned Re-admission: No  Discharge Disposition: Non [facility] SNF/TCU/SNU  Facility: [facility]    Test Results Pending at Discharge:   Pending Labs     Order Current Status    Urine culture Preliminary result      Incidental findings: Right upper lobe 4 mm pulmonary nodule, grossly stable in size when comparing to the May 13, 2023 CT cervical spine study. A follow-up evaluation in 8 months time is recommended to ensure at least 12 months of stability    Medications   · Discharge Medication List: See after visit summary for reconciled discharge medications.      Diet restrictions: Cardiac diet  Activity restrictions: No strenuous activity  Discharge Condition: stable    Outpatient Follow-Up and Discharge Instructions  See after visit summary section titled Discharge Instructions for information provided to patient and family. Code Status: Level 1 - Full Code  Discharge Statement   I spent 35 minutes discharging the patient. This time was spent on the day of discharge. Greater than 50% of total time was spent with the patient and / or family counseling and / or coordination of care. ** Please Note: This note has been constructed using a voice recognition system.  **

## 2023-09-09 NOTE — TELEPHONE ENCOUNTER
----- Message from Ernesto Rivera DO sent at 9/9/2023  1:36 PM EDT -----  Thank you for allowing us to participate in the care of your patient, Mercedez Flores, who was hospitalized from 9/5/2023 through 9/9/2023 with the admitting diagnosis of fall and weakness. Was found to have UTI. She is anemic appears iron deficient. She is being discharged to [facility] for rehab. Daughter did have questions about recurrent UTI. Perhaps she can take Azo over-the-counter      If you have any additional questions or would like to discuss further, please feel free to contact me.     Ernesto Rivera DO  [facility], Hospitalist  [phone]

## 2023-09-09 NOTE — PROGRESS NOTES
Report given to Rehab facility. All questions answered. Called patient family regarding update on discharge. Reinforced discharge education. All questions answered & family verbalized understanding. Patient belongings and visit summary with patient for discharge.

## 2023-09-09 NOTE — PLAN OF CARE
Problem: Potential for Falls  Goal: Patient will remain free of falls  Description: INTERVENTIONS:  - Educate patient/family on patient safety including physical limitations  - Instruct patient to call for assistance with activity   - Consult OT/PT to assist with strengthening/mobility   - Keep Call bell within reach  - Keep bed low and locked with side rails adjusted as appropriate  - Keep care items and personal belongings within reach  - Initiate and maintain comfort rounds  - Make Fall Risk Sign visible to staff  - Offer Toileting every 2 Hours, in advance of need  - Initiate/Maintain bed alarm  - Obtain necessary fall risk management equipment: walker  - Apply yellow socks and bracelet for high fall risk patients  - Consider moving patient to room near nurses station  Outcome: Progressing     Problem: MOBILITY - ADULT  Goal: Maintain or return to baseline ADL function  Description: INTERVENTIONS:  -  Assess patient's ability to carry out ADLs; assess patient's baseline for ADL function and identify physical deficits which impact ability to perform ADLs (bathing, care of mouth/teeth, toileting, grooming, dressing, etc.)  - Assess/evaluate cause of self-care deficits   - Assess range of motion  - Assess patient's mobility; develop plan if impaired  - Assess patient's need for assistive devices and provide as appropriate  - Encourage maximum independence but intervene and supervise when necessary  - Involve family in performance of ADLs  - Assess for home care needs following discharge   - Consider OT consult to assist with ADL evaluation and planning for discharge  - Provide patient education as appropriate  Outcome: Progressing  Goal: Maintains/Returns to pre admission functional level  Description: INTERVENTIONS:  - Perform BMAT or MOVE assessment daily.   - Set and communicate daily mobility goal to care team and patient/family/caregiver.    - Collaborate with rehabilitation services on mobility goals if consulted  - Perform Range of Motion 3 times a day. - Reposition patient every 2 hours.   - Dangle patient 3 times a day  - Stand patient 3 times a day  - Ambulate patient 3 times a day  - Out of bed to chair 3 times a day   - Out of bed for meals 3 times a day  - Out of bed for toileting  - Record patient progress and toleration of activity level   Outcome: Progressing     Problem: PAIN - ADULT  Goal: Verbalizes/displays adequate comfort level or baseline comfort level  Description: Interventions:  - Encourage patient to monitor pain and request assistance  - Assess pain using appropriate pain scale  - Administer analgesics based on type and severity of pain and evaluate response  - Implement non-pharmacological measures as appropriate and evaluate response  - Consider cultural and social influences on pain and pain management  - Notify physician/advanced practitioner if interventions unsuccessful or patient reports new pain  Outcome: Progressing     Problem: INFECTION - ADULT  Goal: Absence or prevention of progression during hospitalization  Description: INTERVENTIONS:  - Assess and monitor for signs and symptoms of infection  - Monitor lab/diagnostic results  - Monitor all insertion sites, i.e. indwelling lines, tubes, and drains  - Monitor endotracheal if appropriate and nasal secretions for changes in amount and color  - [place] appropriate cooling/warming therapies per order  - Administer medications as ordered  - Instruct and encourage patient and family to use good hand hygiene technique  - Identify and instruct in appropriate isolation precautions for identified infection/condition  Outcome: Progressing  Goal: Absence of fever/infection during neutropenic period  Description: INTERVENTIONS:  - Monitor WBC    Outcome: Progressing     Problem: SAFETY ADULT  Goal: Patient will remain free of falls  Description: INTERVENTIONS:  - Educate patient/family on patient safety including physical limitations  - Instruct patient to call for assistance with activity   - Consult OT/PT to assist with strengthening/mobility   - Keep Call bell within reach  - Keep bed low and locked with side rails adjusted as appropriate  - Keep care items and personal belongings within reach  - Initiate and maintain comfort rounds  - Make Fall Risk Sign visible to staff  - Offer Toileting every 2 Hours, in advance of need  - Initiate/Maintain bed alarm  - Obtain necessary fall risk management equipment: walker   - Apply yellow socks and bracelet for high fall risk patients  - Consider moving patient to room near nurses station  Outcome: Progressing  Goal: Maintain or return to baseline ADL function  Description: INTERVENTIONS:  -  Assess patient's ability to carry out ADLs; assess patient's baseline for ADL function and identify physical deficits which impact ability to perform ADLs (bathing, care of mouth/teeth, toileting, grooming, dressing, etc.)  - Assess/evaluate cause of self-care deficits   - Assess range of motion  - Assess patient's mobility; develop plan if impaired  - Assess patient's need for assistive devices and provide as appropriate  - Encourage maximum independence but intervene and supervise when necessary  - Involve family in performance of ADLs  - Assess for home care needs following discharge   - Consider OT consult to assist with ADL evaluation and planning for discharge  - Provide patient education as appropriate  Outcome: Progressing  Goal: Maintains/Returns to pre admission functional level  Description: INTERVENTIONS:  - Perform BMAT or MOVE assessment daily.   - Set and communicate daily mobility goal to care team and patient/family/caregiver. - Collaborate with rehabilitation services on mobility goals if consulted  - Perform Range of Motion 3 times a day. - Reposition patient every 2 hours.   - Dangle patient 3 times a day  - Stand patient 3 times a day  - Ambulate patient 3 times a day  - Out of bed to chair 3 times a day   - Out of bed for meals 3 times a day  - Out of bed for toileting  - Record patient progress and toleration of activity level   Outcome: Progressing     Problem: DISCHARGE PLANNING  Goal: Discharge to home or other facility with appropriate resources  Description: INTERVENTIONS:  - Identify barriers to discharge w/patient and caregiver  - Arrange for needed discharge resources and transportation as appropriate  - Identify discharge learning needs (meds, wound care, etc.)  - Arrange for interpretive services to assist at discharge as needed  - Refer to Case Management Department for coordinating discharge planning if the patient needs post-hospital services based on physician/advanced practitioner order or complex needs related to functional status, cognitive ability, or social support system  Outcome: Progressing     Problem: Knowledge Deficit  Goal: Patient/family/caregiver demonstrates understanding of disease process, treatment plan, medications, and discharge instructions  Description: Complete learning assessment and assess knowledge base.   Interventions:  - Provide teaching at level of understanding  - Provide teaching via preferred learning methods  Outcome: Progressing     Problem: Prexisting or High Potential for Compromised Skin Integrity  Goal: Skin integrity is maintained or improved  Description: INTERVENTIONS:  - Identify patients at risk for skin breakdown  - Assess and monitor skin integrity  - Assess and monitor nutrition and hydration status  - Monitor labs   - Assess for incontinence   - Turn and reposition patient  - Assist with mobility/ambulation  - Relieve pressure over bony prominences  - Avoid friction and shearing  - Provide appropriate hygiene as needed including keeping skin clean and dry  - Evaluate need for skin moisturizer/barrier cream  - Collaborate with interdisciplinary team   - Patient/family teaching  - Consider wound care consult   Outcome: Progressing

## 2023-09-09 NOTE — PLAN OF CARE
Problem: Potential for Falls  Goal: Patient will remain free of falls  Description: INTERVENTIONS:  - Educate patient/family on patient safety including physical limitations  - Instruct patient to call for assistance with activity   - Consult OT/PT to assist with strengthening/mobility   - Keep Call bell within reach  - Keep bed low and locked with side rails adjusted as appropriate  - Keep care items and personal belongings within reach  - Initiate and maintain comfort rounds  - Make Fall Risk Sign visible to staff  - Offer Toileting   - Apply yellow socks and bracelet for high fall risk patients  - Consider moving patient to room near nurses station  Outcome: Progressing     Problem: MOBILITY - ADULT  Goal: Maintain or return to baseline ADL function  Description: INTERVENTIONS:  -  Assess patient's ability to carry out ADLs; assess patient's baseline for ADL function and identify physical deficits which impact ability to perform ADLs (bathing, care of mouth/teeth, toileting, grooming, dressing, etc.)  - Assess/evaluate cause of self-care deficits   - Assess range of motion  - Assess patient's mobility; develop plan if impaired  - Assess patient's need for assistive devices and provide as appropriate  - Encourage maximum independence but intervene and supervise when necessary  - Involve family in performance of ADLs  - Assess for home care needs following discharge   - Consider OT consult to assist with ADL evaluation and planning for discharge  - Provide patient education as appropriate  Outcome: Progressing  Goal: Maintains/Returns to pre admission functional level  Description: INTERVENTIONS:  - Perform BMAT or MOVE assessment daily.   - Set and communicate daily mobility goal to care team and patient/family/caregiver.    - Collaborate with rehabilitation services on mobility goals if consulted  - Perform Range of Motion  - Out of bed for toileting  - Record patient progress and toleration of activity level Outcome: Progressing     Problem: PAIN - ADULT  Goal: Verbalizes/displays adequate comfort level or baseline comfort level  Description: Interventions:  - Encourage patient to monitor pain and request assistance  - Assess pain using appropriate pain scale  - Administer analgesics based on type and severity of pain and evaluate response  - Implement non-pharmacological measures as appropriate and evaluate response  - Consider cultural and social influences on pain and pain management  - Notify physician/advanced practitioner if interventions unsuccessful or patient reports new pain  Outcome: Progressing     Problem: INFECTION - ADULT  Goal: Absence or prevention of progression during hospitalization  Description: INTERVENTIONS:  - Assess and monitor for signs and symptoms of infection  - Monitor lab/diagnostic results  - Monitor all insertion sites, i.e. indwelling lines, tubes, and drains  - Monitor endotracheal if appropriate and nasal secretions for changes in amount and color  - [place] appropriate cooling/warming therapies per order  - Administer medications as ordered  - Instruct and encourage patient and family to use good hand hygiene technique  - Identify and instruct in appropriate isolation precautions for identified infection/condition  Outcome: Progressing  Goal: Absence of fever/infection during neutropenic period  Description: INTERVENTIONS:  - Monitor WBC    Outcome: Progressing     Problem: SAFETY ADULT  Goal: Patient will remain free of falls  Description: INTERVENTIONS:  - Educate patient/family on patient safety including physical limitations  - Instruct patient to call for assistance with activity   - Consult OT/PT to assist with strengthening/mobility   - Keep Call bell within reach  - Keep bed low and locked with side rails adjusted as appropriate  - Keep care items and personal belongings within reach  - Initiate and maintain comfort rounds  - Make Fall Risk Sign visible to staff  - Offer Toileting  - Apply yellow socks and bracelet for high fall risk patients  - Consider moving patient to room near nurses station  Outcome: Progressing  Goal: Maintain or return to baseline ADL function  Description: INTERVENTIONS:  -  Assess patient's ability to carry out ADLs; assess patient's baseline for ADL function and identify physical deficits which impact ability to perform ADLs (bathing, care of mouth/teeth, toileting, grooming, dressing, etc.)  - Assess/evaluate cause of self-care deficits   - Assess range of motion  - Assess patient's mobility; develop plan if impaired  - Assess patient's need for assistive devices and provide as appropriate  - Encourage maximum independence but intervene and supervise when necessary  - Involve family in performance of ADLs  - Assess for home care needs following discharge   - Consider OT consult to assist with ADL evaluation and planning for discharge  - Provide patient education as appropriate  Outcome: Progressing  Goal: Maintains/Returns to pre admission functional level  Description: INTERVENTIONS:  - Perform BMAT or MOVE assessment daily.   - Set and communicate daily mobility goal to care team and patient/family/caregiver.    - Collaborate with rehabilitation services on mobility goals if consulted  - Perform Range of Motion   - Out of bed for toileting  - Record patient progress and toleration of activity level   Outcome: Progressing     Problem: DISCHARGE PLANNING  Goal: Discharge to home or other facility with appropriate resources  Description: INTERVENTIONS:  - Identify barriers to discharge w/patient and caregiver  - Arrange for needed discharge resources and transportation as appropriate  - Identify discharge learning needs (meds, wound care, etc.)  - Arrange for interpretive services to assist at discharge as needed  - Refer to Case Management Department for coordinating discharge planning if the patient needs post-hospital services based on physician/advanced practitioner order or complex needs related to functional status, cognitive ability, or social support system  Outcome: Progressing     Problem: Knowledge Deficit  Goal: Patient/family/caregiver demonstrates understanding of disease process, treatment plan, medications, and discharge instructions  Description: Complete learning assessment and assess knowledge base.   Interventions:  - Provide teaching at level of understanding  - Provide teaching via preferred learning methods  Outcome: Progressing     Problem: Prexisting or High Potential for Compromised Skin Integrity  Goal: Skin integrity is maintained or improved  Description: INTERVENTIONS:  - Identify patients at risk for skin breakdown  - Assess and monitor skin integrity  - Assess and monitor nutrition and hydration status  - Monitor labs   - Assess for incontinence   - Turn and reposition patient  - Assist with mobility/ambulation  - Relieve pressure over bony prominences  - Avoid friction and shearing  - Provide appropriate hygiene as needed including keeping skin clean and dry  - Evaluate need for skin moisturizer/barrier cream  - Collaborate with interdisciplinary team   - Patient/family teaching  - Consider wound care consult   Outcome: Progressing

## 2023-09-11 ENCOUNTER — TRANSITIONAL CARE MANAGEMENT (OUTPATIENT)
Dept: FAMILY MEDICINE CLINIC | Facility: CLINIC | Age: 83
End: 2023-09-11

## 2023-09-11 NOTE — UTILIZATION REVIEW
NOTIFICATION OF ADMISSION DISCHARGE   This is a Notification of Discharge from [facility]. Please be advised that this patient has been discharge from our facility. Below you will find the admission and discharge date and time including the patient’s disposition. UTILIZATION REVIEW CONTACT:  Mg Monson  Utilization   Network Utilization Review Department  Phone: [phone] x carefully listen to the prompts. All voicemails are confidential.  Email: [email or web address]     ADMISSION INFORMATION  PRESENTATION DATE: 9/5/2023  5:07 PM  OBERVATION ADMISSION DATE:   INPATIENT ADMISSION DATE: 9/5/23  8:56 PM   DISCHARGE DATE: 9/9/2023 11:39 AM   DISPOSITION:Non [facility] SNF/TCU/SNU    IMPORTANT INFORMATION:  Send all requests for admission clinical reviews, approved or denied determinations and any other requests to dedicated fax number below belonging to the campus where the patient is receiving treatment.  List of dedicated fax numbers:  [place] DENIALS (Administrative/Medical Necessity) [phone] 230[address] [facility] (Maternity/NICU/Pediatrics) [phone]   [facility] [phone]   [facility] [phone] [address] [phone]   [address] [place] [address] [phone]   [facility] [phone]   [address] [place] [address] [address] [facility] [phone]   [address] [place] [address] [phone] [address] [facility] [phone] 27[address] [facility] [address] [facility] [phone]

## 2023-09-18 DIAGNOSIS — I10 HYPERTENSION, UNSPECIFIED TYPE: ICD-10-CM

## 2023-09-18 RX ORDER — ATENOLOL 100 MG/1
100 TABLET ORAL DAILY
Qty: 30 TABLET | Refills: 11 | Status: SHIPPED | OUTPATIENT
Start: 2023-09-18

## 2023-09-18 RX ORDER — MULTIVITAMIN
1 TABLET ORAL DAILY
Qty: 30 TABLET | Refills: 11 | Status: SHIPPED | OUTPATIENT
Start: 2023-09-18

## 2023-09-22 ENCOUNTER — TELEPHONE (OUTPATIENT)
Dept: FAMILY MEDICINE CLINIC | Facility: CLINIC | Age: 83
End: 2023-09-22

## 2023-09-22 NOTE — TELEPHONE ENCOUNTER
Jessa from [facility] called in and stated that the patient came to them as a readmit , her daughter brought her back there from [facility] because her mother was off basline. Katie Olvera stated that they believed she suffered a poss stroke so they had to her sent to [facility].  Please advise thank you , Katie Olvera can be reach at [phone]

## 2023-10-09 DIAGNOSIS — F41.9 ANXIETY AND DEPRESSION: Primary | ICD-10-CM

## 2023-10-09 DIAGNOSIS — K59.00 CONSTIPATION, UNSPECIFIED CONSTIPATION TYPE: Primary | ICD-10-CM

## 2023-10-09 DIAGNOSIS — F32.A ANXIETY AND DEPRESSION: Primary | ICD-10-CM

## 2023-10-09 DIAGNOSIS — D64.9 NORMOCYTIC ANEMIA: ICD-10-CM

## 2023-10-09 DIAGNOSIS — I10 ESSENTIAL HYPERTENSION: ICD-10-CM

## 2023-10-09 RX ORDER — METOPROLOL SUCCINATE 25 MG/1
TABLET, EXTENDED RELEASE ORAL
Qty: 30 TABLET | Refills: 11 | Status: SHIPPED | OUTPATIENT
Start: 2023-10-09

## 2023-10-09 RX ORDER — AMLODIPINE BESYLATE 10 MG/1
10 TABLET ORAL DAILY
Qty: 30 TABLET | Refills: 11 | Status: SHIPPED | OUTPATIENT
Start: 2023-10-09

## 2023-10-09 RX ORDER — FERROUS GLUCONATE 240(27)MG
TABLET ORAL
Qty: 30 TABLET | Refills: 11 | Status: SHIPPED | OUTPATIENT
Start: 2023-10-09

## 2023-10-09 RX ORDER — SPIRONOLACTONE 25 MG/1
25 TABLET ORAL DAILY
Qty: 30 TABLET | Refills: 11 | Status: SHIPPED | OUTPATIENT
Start: 2023-10-09

## 2023-10-09 RX ORDER — BISACODYL 5 MG
TABLET, DELAYED RELEASE (ENTERIC COATED) ORAL
Qty: 30 TABLET | Refills: 11 | Status: SHIPPED | OUTPATIENT
Start: 2023-10-09 | End: 2023-10-20

## 2023-10-09 RX ORDER — LOSARTAN POTASSIUM 50 MG/1
TABLET ORAL
Qty: 60 TABLET | Refills: 11 | Status: SHIPPED | OUTPATIENT
Start: 2023-10-09

## 2023-10-09 RX ORDER — OLANZAPINE 2.5 MG/1
TABLET ORAL
Qty: 30 TABLET | Refills: 11 | Status: SHIPPED | OUTPATIENT
Start: 2023-10-09

## 2023-10-20 DIAGNOSIS — K59.00 CONSTIPATION, UNSPECIFIED CONSTIPATION TYPE: ICD-10-CM

## 2023-10-20 RX ORDER — BISACODYL 5 MG
TABLET, DELAYED RELEASE (ENTERIC COATED) ORAL
Qty: 30 TABLET | Refills: 11 | Status: SHIPPED | OUTPATIENT
Start: 2023-10-20

## 2023-10-21 NOTE — PROGRESS NOTES
DEPARTMENT OF NEUROLOGICAL SCIENCES  [address] DISORDERS CLINIC         RETURN PATIENT EVALUATION NOTE    Patient: Liz Escamilla  Medical Record Number: # [id number]  YOB: 1940  Date of visit: 10/24/2018    Referring provider: No ref  provider found    ASSESSMENT     1  Dementia associated with normal pressure hydrocephalus      possible   2  Gait instability       Discussed with patient regarding the MRI images and the presence of marked cortical atrophy with also the presence of larger ventricles than expected for age  There is the question of hydrocephalus ex vacuo but cannot necessarily rule out normal pressure hydrocephalus  She does have the triad of gait instability, memory changes, and also urinary incontinence that was not previously explored (requiring a pad underneath her)  Part of her symptoms may still be deconditioning and by her own apathy, but she would be better served by a NPH evaluation with a high volume spinal tap and opening pressure measurement to rule out this reversible cause of dementia  Discussed the procedure and risks and benefits thereof including pain, bleeding, low pressure headache, and infection  The family agreed that it would potentially help with diagnosis and improve her quality of life if NPH was confirmed and moving onto a neurosurgical consultation for shunt placement  Patient herself wished to defer final decision to proceed until discussing further with her PCP Dr Forrest Baker first  Inquired with office staff and outpatient clinic not set up to accommodate LP - usually done in the [facility] itself  PLAN     · Discussed the risks, benefits and indications of the spinal tap procedure  Patient preferred to speak to Dr Forrest Baker PCP again before arriving at a conclusion  She have an upcoming appt with her 10/31/18   After doing so, they will contact us and we will then schedule an appointment at [facility] for high volume spinal tap procedure (30-60cc CSF removed) with opening pressure measurement and then return to clinic for re-evaluation  Should examination show improvement post-tap, then will proceed with NSGY evaluation for  shunt  · They will continue to obtain the previous lab work ordered: TSH, free T4, PTH, AST, ALT, magnesium, vitamin D, B1, B12, E,   · She should proceed with Physical Therapy if able to  Her daughters will inquire to insurance company if going to a site for PT would be covered instead  This is a very important part of her recovery and safety at home, both in getting motivated to exercise and safe techniques to try at home  · Encouraged adherence to better nutrition and avoidance of tv dinners  · Encouraged continued hydration at home  · The patient has been instructed to call us about any new neurological problems or medication side effects  A total of 30 minutes were spent face-to-face with this patient, of which at least 50% was spent on counseling and coordination of care  We discussed the natural history of the patient's condition, differential diagnosis, level of diagnostic certainty, treatment alternatives and their side effects and possible complications  SUBJECTIVE:  Ms Tran Joe is a 68 y o  right handed female who returns to the [address] [place] [address] for evaluation of gait imbalance and memory; MRI f/u  In the interim, she and her family report no apparent change from before  They are here after provider requested to discuss the MRI results with them       REVIEW OF PAST MEDICAL, SOCIAL AND FAMILY HISTORY:  This is the list of problems as per our Medical Records:    Patient Active Problem List    Diagnosis Date Noted    Balance problem 10/02/2018    Confusion 10/02/2018    Episode of shaking 10/02/2018    Forgetfulness 08/28/2018    Cough 07/25/2018    Dizziness 07/13/2018    Ambulatory dysfunction 12/15/2017    Positive ROSALINDA (antinuclear antibody) 12/15/2017  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    Anxiety 10/20/2017    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    Sinus tachycardia 10/20/2017    Infarction of spleen 10/11/2017    Anemia 09/13/2017    Levoscoliosis 08/26/2017    Elevated sed rate 08/16/2017    Vitamin D deficiency 08/16/2017    Radiculopathy 08/16/2017    Thrombocytosis ([facility] ) 08/16/2017    Fatigue 08/11/2017    GERD without esophagitis 02/07/2017    Carotid artery plaque 04/19/2016    Diabetes mellitus type II, controlled ([place] [address] ) 03/24/2015    Hyponatremia 11/18/2014    Benign essential hypertension 07/17/2014    Hyperlipidemia 07/17/2014    Carotid bruit 07/17/2014    Asthma 11/03/2009    Coronary atherosclerosis 11/03/2009       Past Medical History:   Diagnosis Date    Arthritis     Confusion 10/2/2018    Depression     Displaced fracture of distal phalanx of right thumb     GERD (gastroesophageal reflux disease)     Heart attack ([place] [address] )     Hyperlipidemia     Hypertension         Past Surgical History:   Procedure Laterality Date    APPENDECTOMY      CATARACT EXTRACTION      SEPTOPLASTY          No Known Allergies     Outpatient Encounter Prescriptions as of 10/24/2018   Medication Sig Dispense Refill    ascorbic acid (VITAMIN C) 500 mg tablet Take by mouth      aspirin 81 MG tablet Take 1 tablet by mouth daily      atenolol (TENORMIN) 50 mg tablet Take 1 tablet (50 mg total) by mouth daily 30 tablet 5    atorvastatin (LIPITOR) 40 mg tablet TAKE ONE TABLET BY MOUTH EVERY DAY    OFFICE VISIT NEEDED 90 tablet 3    Calcium Carb-Cholecalciferol (CALCIUM + D3) 600-200 MG-UNIT TABS Take by mouth      escitalopram (LEXAPRO) 5 mg tablet Take 1 tablet (5 mg total) by mouth daily 30 tablet 3    Flaxseed, Linseed, (BL FLAX SEED OIL PO) Take by mouth      LORazepam (ATIVAN) 1 mg tablet Take 0 5 tablets (0 5 mg total) by mouth daily as needed for anxiety 30 tablet 0    losartan (COZAAR) 50 mg tablet Take 1 tablet (50 mg total) by mouth 2 (two) times a day for 30 days 60 tablet 3    multivitamin (THERAGRAN) TABS Take 1 tablet by mouth      Omega-3 Fatty Acids (FISH OIL PO) Take 2 g by mouth      omeprazole (PriLOSEC) 20 mg delayed release capsule TAKE ONE CAPSULE DAILY 30 capsule 5    Vitamin D, Cholecalciferol, 1000 units CAPS Take 1 tablet by mouth       No facility-administered encounter medications on file as of 10/24/2018  Social History   Substance Use Topics    Smoking status: Former Smoker     Quit date: 1996    Smokeless tobacco: Never Used      Comment: no secondhand smoke exposure    Alcohol use Yes      Comment: social   Living alone, retired  worker  High school graduate  Family History   Problem Relation Age of Onset    Heart attack Mother 39    Heart attack Father 61    No Known Problems Other     Breast cancer Sister     Alcohol abuse Daughter         history of        REVIEW OF SYSTEMS:  The patient has entered data on an intake form regarding present illness, past medical and surgical history, medications, allergies, family and social history, and a full review of 14 systems  I have reviewed this form with the patient, and all the relevant information has been included on this note  The full review of systems was negative except as stated in HPI and below  Constitutional: Negative  Negative for appetite change and fever  HENT: Negative  Negative for hearing loss, tinnitus, trouble swallowing and voice change  Eyes: Negative  Negative for photophobia and pain  Respiratory: Negative  Negative for shortness of breath  Cardiovascular: Negative  Negative for palpitations  Gastrointestinal: Negative  Negative for nausea  Endocrine: Negative  Negative for cold intolerance and heat intolerance  Genitourinary: Positive for frequency  Negative for dysuria and urgency          Loss of bladder control     Musculoskeletal: Positive for gait problem (immobility or loss of function, difficulty walking, falls)  Negative for myalgias and neck pain  Skin: Negative  Allergic/Immunologic: Negative  Neurological: Negative for dizziness, tremors, seizures, syncope, facial asymmetry, speech difficulty, weakness and numbness  Memory problems     Hematological: Negative  Does not bruise/bleed easily  Psychiatric/Behavioral: Positive for confusion  Negative for hallucinations  PHYSICAL EXAMINATION:     Vital signs:  /58 (BP Location: Right arm, Patient Position: Sitting, Cuff Size: Standard)   Pulse 84   Ht 5' 2" (1 575 m)   Wt 67 8 kg (149 lb 6 4 oz)   BMI 27 33 kg/m²     General:  Well-appearing, well nourished, pleasant patient in no acute distress  Mood and Fund of Knowledge are appropriate  Head:  Normocephalic, atraumatic  Oropharynx and conjunctiva are clear  Speech  No hypophonia or bradylalia  No scanning speech  Language: Comprehension intact  Neck:  Supple, strong 5/5 forward flexion and retroflexion  Extremities: Range of motion is normal       Cognitive and Mental Exam:  Alert, awake, responsive and follows all commands  Cranial Nerves:    Funduscopic examination reveals no papilledema, discs visualized  Direct and consensual light reflexes were normal  No afferent pupillary defect  Visual fields are full to confrontation  Extraocular movements were full, with normal pursuit and saccades  Pupils were equal, reactive to light symmetrically  Facial sensation to light touch was intact  Face is symmetric with normal strength  Hearing was assessed using the Calibrated Finger Rub Auditory Screening Test (CALFRAST) and was not abnormal (Better than CALFRAST-Strong-70)  Palate is up going bilaterally and symmetrically  Neck muscles are strong  Tongue protrusion is at midline with normal movements  No dysarthria  Motor:    Minimal rigidity, no significant bradykinesia and no tremor       Muscle Strength Right Left  Muscle Strength Right Left   Deltoid 5/5 5/5  Hip Adductors 5/5 5/5   Biceps 5/5 5/5  Hip Abductors 5/5 5/5   Triceps 5/5 5/5  Knee Extensors 5/5 5/5   Wrist Extensors 5/5 5/5  Knee Flexors 5/5 5/5   Wrist Flexors 5/5 5/5  Ankle Extensors 5/5 5/5    5/5 5/5  Ankle Flexors 5/5 5/5   Finger Abductors 5/5 5/5       Hip Flexors 5/5 5/5   Hip Extensors 5/5 5/5     Coordination:  Finger-to-nose-finger: intention tremor bilaterally with distinct phases, slows down when approaches target  Gait:  Normal Rise from chair  Walks with cane, small hesitant steps without swaying, straight path and mild difficulty turning, Pull test of 2  Mildly decreased left arm swing  Reflexes:    Right Left   Biceps 2/4 2/4   Brachioradialis 2/4 2/4   Triceps 2/4 2/4   Knee 2/4 2/4   Ankle 2/4 2/4      Plantar cutaneous reflex:  Right: flexor  Left: flexor      REVIEW OF ANCILLARY TESTS:   MRI brain 10/14/18: 1  Imaging findings highly suggestive of normal pressure hydrocephalus  Mild periventricular T2 prolongation raising the question of mild transependymal flow CSF  2  No pathologic intracranial enhancement or evidence of acute infarction  3  Nonspecific cerebral white matter signal abnormality suggestive of mild chronic small vessel ischemic disease likely related to hypertension and/or diabetes  No

## 2023-10-26 NOTE — ASSESSMENT & PLAN NOTE
Discussed side effects of amitriptyline however given stability of symptoms, risk versus benefits and quality of life, will defer changing medication regimen at this time  Certainly in the future once symptoms remain stable would recommend weaning off amitriptyline given its side effect profile in elderly patients with cognitive deficits as well as findings of chronic lacunar infarct on pt's CT scan      Side effects of amitriptyline including hypertension, drowsiness, dizziness, blurred vision, confusion, disorientation, tremors, stroke, MI  and QT prolongation to name a few  Recommend routine EKG monitoring for QT prolongation as patient is on amitriptyline and Lexapro  Continue social support by family and friends 4 (moderate pain)

## 2023-11-21 DIAGNOSIS — E78.5 HYPERLIPIDEMIA, UNSPECIFIED HYPERLIPIDEMIA TYPE: ICD-10-CM

## 2023-11-21 RX ORDER — ATORVASTATIN CALCIUM 40 MG/1
TABLET, FILM COATED ORAL
Qty: 30 TABLET | Refills: 11 | Status: SHIPPED | OUTPATIENT
Start: 2023-11-21

## 2023-11-22 NOTE — ASSESSMENT & PLAN NOTE
Patient verbalized understanding of results and will follow provider recommendations      W/walker at baseline, cognitive impairment from nph s/p  shunt  Given mechanical fall at independent living facility family was concerned may need higher level of care  Consult pt/ot/cm  Will correct chronic hyponatremia given lower than goal (127 on admit)

## 2023-12-06 ENCOUNTER — VBI (OUTPATIENT)
Dept: ADMINISTRATIVE | Facility: OTHER | Age: 83
End: 2023-12-06

## 2023-12-21 ENCOUNTER — OFFICE VISIT (OUTPATIENT)
Age: 83
End: 2023-12-21
Payer: COMMERCIAL

## 2023-12-21 VITALS
OXYGEN SATURATION: 98 % | BODY MASS INDEX: 27.38 KG/M2 | SYSTOLIC BLOOD PRESSURE: 128 MMHG | DIASTOLIC BLOOD PRESSURE: 60 MMHG | TEMPERATURE: 97.5 F | WEIGHT: 145 LBS | HEART RATE: 89 BPM | HEIGHT: 61 IN

## 2023-12-21 DIAGNOSIS — F41.9 ANXIETY AND DEPRESSION: ICD-10-CM

## 2023-12-21 DIAGNOSIS — F32.A ANXIETY AND DEPRESSION: ICD-10-CM

## 2023-12-21 DIAGNOSIS — R26.2 AMBULATORY DYSFUNCTION: ICD-10-CM

## 2023-12-21 DIAGNOSIS — F03.A0 MILD DEMENTIA WITHOUT BEHAVIORAL DISTURBANCE, PSYCHOTIC DISTURBANCE, MOOD DISTURBANCE, OR ANXIETY, UNSPECIFIED DEMENTIA TYPE (HCC): Primary | ICD-10-CM

## 2023-12-21 DIAGNOSIS — F51.01 PRIMARY INSOMNIA: ICD-10-CM

## 2023-12-21 PROCEDURE — 99214 OFFICE O/P EST MOD 30 MIN: CPT | Performed by: NURSE PRACTITIONER

## 2023-12-21 RX ORDER — QUINIDINE GLUCONATE 324 MG
240 TABLET, EXTENDED RELEASE ORAL
COMMUNITY

## 2023-12-21 NOTE — PROGRESS NOTES
Assessment & Plan:   1. Mild dementia without behavioral disturbance, psychotic disturbance, mood disturbance, or anxiety, unspecified dementia type (HCC)  Assessment & Plan:  MOCA 17/30 - deficits in all domains but naming  Pt is independent with cues adl/dependent iadls.  Lives at [facility]  Pt's current cognitive level is consistent with mild-moderate dementia  Memory medications: none  Mobility:  walker, no recent falls  Continue to stay active.  Current activity level:  improved.  Participating in social, cognitive, physical activity.  Monitor for changes in mood, sleep, pain control.  Notify provider if any concerns  Medication review:  seems appropriate for current conditions  Check with pharmacist/provider before starting any new OTC/prescription medications for potential cognitive side effects  Optimize all acute and chronic conditions.  Continue to follow-up with PCP and specialist as directed for management  Safety concerns:  none, has 24/7 supervision  Caregiver stress:  non  Ensure adequate hydration, good nutrition        2. Ambulatory dysfunction  Assessment & Plan:  Use of walker, had recent hospitalization for fall, now doing better.  Fall precautions      3. Anxiety and depression  Assessment & Plan:  Mood improving.  Pt cont on lexapro and low dose zyprexa  Agitations too much off zyprexa  Cont reassurance, redirection, reorienation  Cont emotional support, relaxation techniques      4. Primary insomnia  Assessment & Plan:  Sleeping ok per patient.  She conts with zyprexa with good result  Cont good sleep hygiene techniques        HPI:  We had the pleasure of evaluating Brenda West who is a 83 y.o. female in Geriatric follow up today.  Previous MOCA:  15/30.  Comorbidities include mild dementia, insomnia, ambulatory dysfunction  She lives at [facility]  Ms. West is in the office with her daughter    Memory update per patient:  Pt doing pretty good.  Feels memory is stable.  Likes the  food at new place.  Room mate is ok.  Enjoys coming out for activities.  Sleep is ok.  Appetite is ok.  Swallow is doing ok - diet is not modified.  No anxiety or depression. Doing ok with keeping track of things.  Remembers events, not so much details.    She has difficulty finding the right word while speaking: Yes  Patient requires repeat information or ask the same question repeatedly: Yes  Do you do your own bathing, dressing, feeding yourself Yes - assist available if needed  Can you make your own meals and do light cleaning/chores No  Do you take care of your own medications No  Do you drive: No       Do you handle your own financial affairs such as balancing your checkbook, paying bills, investments: No  Have you or your family noted any change in your mood or personality:No  Are you currently or have you been treated in the past for depression or anxiety: Yes  Have you noticed any gait or balance disorder: Yes  Uses :Walker: no falls since after hospitalization    Any hallucination or delusion: No  Sleep Issues: No  Urinary/Stool Incontinence: Yes- can toliet, but both sometimes  Hearing and vision issue: No  ADL/IADL:  independent with cues/dependent  Appetite/swallow:  better/ok (does see ST for dysphagia)  Pain:  no    Memory update per family:  Had a fall in September, went to hospital for few days.  Went to [facility] [id number].  They changed her zyprexa to am, she became too sedated, wasn't eating or doing therapy.  She went back into the hospital with hypercalcemia.  She wasn't able to walk at that point.  She is doing much better.  Now she is back on her feet.  She returned early October.  She is in memory care.  She is ambulating, joining activities, eating much better.  In June she moved to [facility], in August went to memory care unit.  She had declined, but overall doing better.  She tried to stop olanzapine, but was too agitated without it.  Low dose is working.  The room mate goes through stuff (dementia).   Didn't know dgt at first, since out of hospital, knows who everyone is.  Does good going to doctor appts.  Got anxious when dgt wasn't with.   Can have some conversation, short phrases, no change in word finding.  Sassy is back.  Delay with process, does fine when given time.    Family Review of Behavior [facility]:    pacing. No    agressive/combative behavior. No    agitated. No   wandering. No - not going into other rooms  resistance to care. No   hoarding/hiding objects.No    withdrawn No  misplacing/losing objects No  personal hygiene problems.No  forgetfulness of actions Yes    ROS: Review of Systems   Constitutional:  Negative for activity change, appetite change, chills and fatigue.   HENT:  Negative for congestion, hearing loss and trouble swallowing.    Eyes:  Negative for visual disturbance.   Respiratory:  Negative for cough and shortness of breath.    Cardiovascular:  Negative for chest pain.   Gastrointestinal:  Negative for abdominal pain, constipation, diarrhea, nausea and vomiting.   Genitourinary:  Negative for difficulty urinating.   Musculoskeletal:  Positive for gait problem. Negative for arthralgias and back pain.   Neurological:  Negative for dizziness and light-headedness.   Psychiatric/Behavioral:  Positive for decreased concentration (forgetful). Negative for dysphoric mood and sleep disturbance. The patient is not nervous/anxious.        Past Medical, surgical, social, medication and allergy history and patients previous records reviewed.  Allergies:   No Known Allergies    Medications:      Current Outpatient Medications:     acetaminophen (TYLENOL) 325 mg tablet, Take 2 tablets (650 mg total) by mouth every 6 (six) hours as needed for mild pain, Disp: , Rfl: 0    amLODIPine (NORVASC) 10 mg tablet, TAKE 1 TABLET ORALLY DAILY (HYPERTENSION), Disp: 30 tablet, Rfl: 11    atorvastatin (LIPITOR) 40 mg tablet, TAKE 1 TABLET ORALLY DAILY (CHOLESTEROL) *NOT ON AUTO/PUT ON AUTO WHEN REORDERED*,  Disp: 30 tablet, Rfl: 11    bisacodyl 5 MG EC tablet, TAKE 1 TABLET ORALLY DAILY AS NEEDED FOR CONSTIPATION *REORDER*, Disp: 30 tablet, Rfl: 11    D3-1000 25 MCG (1000 UT) tablet, TAKE 1 TABLET ORALLY TWICE DAILY (SUPPLEMENT), Disp: 60 tablet, Rfl: 11    Desitin Daily Defense 13 % cream, APPLY TOPICALLY TO SKIN EXCORIATION ON BOTTOM TWICE DAILY (EXCORIATION) *REORDER*;APPLY TOPICALLY TO SKIN EXCORIATION ON BOTTOM AS NEEDED FOR SOILAGE (EXCORIATION) *REORDER*, Disp: 113 g, Rfl: 5    Eliquis 5 MG, TAKE 1 TABLET ORALLY TWICE DAILY (ATRIAL FIBRILLATION), Disp: 60 tablet, Rfl: 11    escitalopram (LEXAPRO) 10 mg tablet, TAKE 1 TABLET ORALLY DAILY (DEPRESSION), Disp: 30 tablet, Rfl: 11    ferrous gluconate (FERGON) 240 (27 FE) MG tablet, Take 240 mg by mouth, Disp: , Rfl:     hydrocortisone (ANUSOL-HC) 2.5 % rectal cream, Apply topically 2 (two) times a day, Disp: 28 g, Rfl: 11    losartan (COZAAR) 50 mg tablet, TAKE 1 TABLET ORALLY TWICE DAILY (LIPIDS), Disp: 60 tablet, Rfl: 11    melatonin 3 mg, TAKE 1 TABLET ORALLY AT BEDTIME AS NEEDED FOR SLEEP AID, Disp: 30 tablet, Rfl: 5    metoprolol succinate (TOPROL-XL) 25 mg 24 hr tablet, TAKE 1 TABLET ORALLY DAILY (DO NOT CRUSH) (HYPERTENSION), Disp: 30 tablet, Rfl: 11    Milk of Magnesia 400 MG/5ML oral suspension, TAKE 30ML ORALLY AS NEEDED FOR CONSTIPATION IF NO BOWEL MOVEMENT IN 3 DAYS. GIVE AT BEDTIME IF NO BOWEL MOVEMENT IN 3 DAYS. SEPARATE BY OTHER MEDICATION BY AT LEAST 2 HOURS, Disp: 473 mL, Rfl: 5    Multiple Vitamin (One-Daily Multi-Vitamin) TABS, TAKE 1 TABLET ORALLY DAILY (SUPPLEMENT), Disp: 30 tablet, Rfl: 11    OLANZapine (ZyPREXA) 2.5 mg tablet, TAKE 1 TABLET ORALLY NIGHTLY (DEPRESSION), Disp: 30 tablet, Rfl: 11    omeprazole (PriLOSEC) 20 mg delayed release capsule, TAKE 1 CAPSULE ORALLY DAILY (CAP MAY BE OPENED & SPRINKLED ON APPLESAUCE) (GASTROESOPHAGEAL REFLUX DISEASE), Disp: 30 capsule, Rfl: 5    Sodium Phosphates (Fleet Enema) ENEM, INSERT 1 ENEMA RECTALLY  AS NEEDED FOR CONSTIPATION IF NO RESULT FROM BISACODYL WITHIN 2 HOURS. IF NO RESULTS FROM ENEMA. CALL PROVIDER FOR FURTHER ORDERS *REORDER*, Disp: 133 mL, Rfl: 5    spironolactone (ALDACTONE) 25 mg tablet, TAKE 1 TABLET ORALLY DAILY (HYPERTENSION), Disp: 30 tablet, Rfl: 11    Vascepa 1 g CAPS, TAKE 2 CAPSULES (2GM) ORALLY TWICE DAILY (CARDIAC HEALTH) *BRAND PER INSURANCE*, Disp: 120 capsule, Rfl: 11    aspirin 81 mg chewable tablet, CHEW 1 TABLET AND SWALLOW ORALLY DAILY (HYPERTENSION) (Patient not taking: Reported on 12/21/2023), Disp: 30 tablet, Rfl: 4    atenolol (TENORMIN) 100 mg tablet, TAKE 1 TABLET ORALLY DAILY (HYPERTENSION) (Patient not taking: Reported on 12/21/2023), Disp: 30 tablet, Rfl: 11    Ferate 240 (27 Fe) MG tablet, TAKE 1 TABLET ORALLY DAILY WITH BREAKFAST (SUPPLEMENT) (Patient not taking: Reported on 12/21/2023), Disp: 30 tablet, Rfl: 11    magnesium Oxide (MAG-OX) 400 mg TABS, TAKE 1 TABLET ORALLY TWICE DAILY (SUPPLEMENT) (Patient not taking: Reported on 12/21/2023), Disp: 60 tablet, Rfl: 5    Mouthwashes (Biotene Dry Mouth) LIQD, RINSE WITH 1 OUNCE ORALLY EVERY MORNING AND AT BEDTIME FOR DRY MOUTH, RINSE FOR 30 SECONDS THEN SPIT, DO NOT SWALLOW *REORDER* (Patient not taking: Reported on 12/21/2023), Disp: 237 mL, Rfl: 11    sodium chloride 1 g tablet, TAKE TWO TABLETS BY MOUTH TWICE A DAY (Patient not taking: Reported on 12/21/2023), Disp: 360 tablet, Rfl: 3    torsemide (DEMADEX) 10 mg tablet, TAKE 1 TABLET ORALLY DAILY (EDEMA) (Patient not taking: Reported on 12/21/2023), Disp: 30 tablet, Rfl: 5    Vitals:  Vitals:    12/21/23 1541   BP: 128/60   Pulse: 89   Temp: 97.5 °F (36.4 °C)   SpO2: 98%       History:  Past Medical History:   Diagnosis Date    Anxiety     Arthritis     Confusion 10/2/2018    Depression     Displaced fracture of distal phalanx of right thumb     GERD (gastroesophageal reflux disease)     Heart attack (HCC)     Hyperlipidemia     Hypertension     Moderate episode of  recurrent major depressive disorder (HCC) 2019    Shortness of breath     Stage 2 chronic kidney disease 2019     Past Surgical History:   Procedure Laterality Date    APPENDECTOMY      CARPAL TUNNEL RELEASE      CATARACT EXTRACTION Bilateral     COLONOSCOPY      FL LUMBAR PUNCTURE DIAGNOSTIC  1/10/2019    FRACTURE SURGERY      FL CRTJ SHUNT [id number] TERMINUS Right 9/3/2019    Procedure: Insertion of frontal ventriculoperitoneal shunt;  Surgeon: Raudel Armas MD;  Location: [facility];  Service: Neurosurgery    SEPTOPLASTY      WRIST FRACTURE SURGERY Right      Family History   Problem Relation Age of Onset    Heart attack Mother 45    Heart attack Father 60    No Known Problems Other     Breast cancer Sister     Alcohol abuse Daughter         history of    Heart attack Brother      Social History     Socioeconomic History    Marital status:      Spouse name: Not on file    Number of children: Not on file    Years of education: Not on file    Highest education level: Not on file   Occupational History    Occupation: Retired   Tobacco Use    Smoking status: Former     Current packs/day: 0.00     Types: Cigarettes     Quit date:      Years since quittin.9    Smokeless tobacco: Never    Tobacco comments:     no secondhand smoke exposure   Vaping Use    Vaping status: Never Used   Substance and Sexual Activity    Alcohol use: Yes     Comment: social    Drug use: No    Sexual activity: Not on file   Other Topics Concern    Not on file   Social History Narrative    Living alone     Social Determinants of Health     Financial Resource Strain: Low Risk  (2023)    Received from [facility]    Overall Financial Resource Strain (CARDIA)     Difficulty of Paying Living Expenses: Not hard at all   Food Insecurity: No Food Insecurity (2023)    Received from [facility]    Hunger Vital Sign     Worried About Running Out of Food in the Last  Year: Never true     Ran Out of Food in the Last Year: Never true   Transportation Needs: No Transportation Needs (9/21/2023)    Received from [facility]    PRAPARE - Transportation     Lack of Transportation (Medical): No     Lack of Transportation (Non-Medical): No   Physical Activity: Not on file   Stress: Not on file   Social Connections: Not on file   Intimate Partner Violence: Not At Risk (9/21/2023)    Received from [facility]    Humiliation, Afraid, Rape, and Kick questionnaire     Fear of Current or Ex-Partner: No     Emotionally Abused: No     Physically Abused: No     Sexually Abused: No   Housing Stability: Low Risk  (9/21/2023)    Received from [facility]    Housing Stability Vital Sign     Unable to Pay for Housing in the Last Year: No     Number of Places Lived in the Last Year: 1     Unstable Housing in the Last Year: No     Past Surgical History:   Procedure Laterality Date    APPENDECTOMY      CARPAL TUNNEL RELEASE      CATARACT EXTRACTION Bilateral     COLONOSCOPY      FL LUMBAR PUNCTURE DIAGNOSTIC  1/10/2019    FRACTURE SURGERY      [place] CRTJ SHUNT [id number] TERMINUS Right 9/3/2019    Procedure: Insertion of frontal ventriculoperitoneal shunt;  Surgeon: Raudel Armas MD;  Location: [facility];  Service: Neurosurgery    SEPTOPLASTY      WRIST FRACTURE SURGERY Right          Physical Exam:  Observed ambulation:  walker, slower, steady   Physical Exam  Vitals and nursing note reviewed.   Constitutional:       General: She is not in acute distress.     Appearance: Normal appearance. She is well-developed. She is not diaphoretic.   HENT:      Head: Normocephalic.   Cardiovascular:      Rate and Rhythm: Normal rate.      Heart sounds: No murmur heard.     No friction rub. No gallop.   Pulmonary:      Effort: Pulmonary effort is normal. No respiratory distress.      Breath sounds: Normal breath sounds. No wheezing or rales.    Abdominal:      General: Bowel sounds are normal. There is no distension.      Palpations: Abdomen is soft.      Tenderness: There is no abdominal tenderness. There is no rebound.   Musculoskeletal:         General: Normal range of motion.   Skin:     General: Skin is warm and dry.   Neurological:      General: No focal deficit present.      Mental Status: She is alert. Mental status is at baseline.      Comments: Oriented to person, partial tps  Pleasant, cooperative, forgetful   Psychiatric:         Mood and Affect: Mood normal.         Behavior: Behavior normal.

## 2023-12-21 NOTE — ASSESSMENT & PLAN NOTE
MOCA 17/30 - deficits in all domains but naming  Pt is independent with cues adl/dependent iadls.  Lives at [facility]  Pt's current cognitive level is consistent with mild-moderate dementia  Memory medications: none  Mobility:  walker, no recent falls  Continue to stay active.  Current activity level:  improved.  Participating in social, cognitive, physical activity.  Monitor for changes in mood, sleep, pain control.  Notify provider if any concerns  Medication review:  seems appropriate for current conditions  Check with pharmacist/provider before starting any new OTC/prescription medications for potential cognitive side effects  Optimize all acute and chronic conditions.  Continue to follow-up with PCP and specialist as directed for management  Safety concerns:  none, has 24/7 supervision  Caregiver stress:  non  Ensure adequate hydration, good nutrition

## 2023-12-21 NOTE — PROGRESS NOTES
[facility]  [address], [address]  [place] [address]  [phone]    Social Work Follow-Up    LSW met with Brenda West for follow up visit. Completed [place] Cognitive Assessment, score 17/30 (previously 15/30 in 6/21/23), and Geriatric Depression Screen, 2/15 (previously 1/15 in 6/21/23).     [place] Cognitive Assessment (MoCA) Version 8.2  Education: HS    Points Earned POSSIBLE Points   Visuospatial/Executive   Alternating Clover Making 0 1   Visuoconstructional skills 0 1   Visuoconstructional skills (clock) 2 3   Naming   Naming Animals 3 3   Attention   Digit Span 2 2   Vigilance (letters) 1 1   Serial 7 subtraction 1 3   Language   Sentence Repetition 1 2   Verbal fluency 0 1   Abstraction   Abstraction (word pairings) 1 2   Delayed recall   Delayed recall 2 5   Memory index score: 7/15   Orientation   Orientation 3 6   TOTAL SCORE: 17/30  (Normal ?26/30)   Additional notes:      LSW to remain available as needed.

## 2023-12-22 NOTE — ASSESSMENT & PLAN NOTE
Mood improving.  Pt cont on lexapro and low dose zyprexa  Agitations too much off zyprexa  Cont reassurance, redirection, reorienation  Cont emotional support, relaxation techniques

## 2023-12-22 NOTE — ASSESSMENT & PLAN NOTE
Sleeping ok per patient.  She conts with zyprexa with good result  Cont good sleep hygiene techniques

## 2024-01-30 ENCOUNTER — VBI (OUTPATIENT)
Dept: ADMINISTRATIVE | Facility: OTHER | Age: 84
End: 2024-01-30

## 2024-02-21 PROBLEM — Z00.00 MEDICARE ANNUAL WELLNESS VISIT, SUBSEQUENT: Status: RESOLVED | Noted: 2020-07-02 | Resolved: 2024-02-21

## 2024-05-08 ENCOUNTER — TELEPHONE (OUTPATIENT)
Age: 84
End: 2024-05-08

## 2024-05-08 NOTE — TELEPHONE ENCOUNTER
Received message from [facility] office regarding rescheduled appointment from 12/23/24 to 12/30/204. Patient's daughter, Bhavna advised she needs to reschedule the 12/30/2024 appointment.     Left message with patient's daughterBhavna to call back to reschedule 12/30/2024 appointment.

## 2024-05-22 ENCOUNTER — OFFICE VISIT (OUTPATIENT)
Dept: CARDIOLOGY CLINIC | Facility: CLINIC | Age: 84
End: 2024-05-22
Payer: COMMERCIAL

## 2024-05-22 VITALS
WEIGHT: 149.2 LBS | SYSTOLIC BLOOD PRESSURE: 124 MMHG | DIASTOLIC BLOOD PRESSURE: 46 MMHG | HEART RATE: 81 BPM | BODY MASS INDEX: 28.5 KG/M2 | OXYGEN SATURATION: 98 %

## 2024-05-22 DIAGNOSIS — J44.9 CHRONIC OBSTRUCTIVE PULMONARY DISEASE, UNSPECIFIED COPD TYPE (HCC): ICD-10-CM

## 2024-05-22 DIAGNOSIS — K55.1 SUPERIOR MESENTERIC ARTERY STENOSIS (HCC): ICD-10-CM

## 2024-05-22 DIAGNOSIS — R60.0 BILATERAL LEG EDEMA: ICD-10-CM

## 2024-05-22 DIAGNOSIS — I21.4 NON-ST ELEVATION MYOCARDIAL INFARCTION (NSTEMI) (HCC): ICD-10-CM

## 2024-05-22 DIAGNOSIS — I25.10 ATHEROSCLEROSIS OF NATIVE CORONARY ARTERY OF NATIVE HEART WITHOUT ANGINA PECTORIS: ICD-10-CM

## 2024-05-22 DIAGNOSIS — I73.9 PERIPHERAL VASCULAR DISEASE, UNSPECIFIED (HCC): ICD-10-CM

## 2024-05-22 DIAGNOSIS — E22.2 SIADH (SYNDROME OF INAPPROPRIATE ADH PRODUCTION) (HCC): ICD-10-CM

## 2024-05-22 DIAGNOSIS — I48.91 ATRIAL FIBRILLATION WITH RVR (HCC): Primary | ICD-10-CM

## 2024-05-22 DIAGNOSIS — I65.29 CAROTID ATHEROSCLEROSIS, UNSPECIFIED LATERALITY: ICD-10-CM

## 2024-05-22 DIAGNOSIS — I10 ESSENTIAL HYPERTENSION: ICD-10-CM

## 2024-05-22 PROCEDURE — 99214 OFFICE O/P EST MOD 30 MIN: CPT

## 2024-05-22 RX ORDER — METOPROLOL SUCCINATE 50 MG/1
50 TABLET, EXTENDED RELEASE ORAL DAILY
Qty: 90 TABLET | Refills: 3 | Status: SHIPPED | OUTPATIENT
Start: 2024-05-22

## 2024-05-22 RX ORDER — DABIGATRAN ETEXILATE 150 MG/1
150 CAPSULE ORAL 2 TIMES DAILY
Qty: 180 CAPSULE | Refills: 3 | Status: SHIPPED | OUTPATIENT
Start: 2024-05-22

## 2024-05-22 NOTE — PROGRESS NOTES
Cardiology   MD Jose Concepcion MD, FACC  Bebeto Man DO, [facility] FACP Ather Mansoor, MD Rujul Patel, DO, [facility]  Dickson Johnston DO, [facility]  -------------------------------------------------------------------  [facility]  [address] [place] [address]  Phone: [phone]  Fax: [phone]  05/22/24  Brenda West  YOB: 1940   MRN: [id number]      Referring Physician: No referring provider defined for this encounter.     HPI: Brenda West is a 83 y.o. female with:   CAD / MI 1998 - no cath/PCI - medical mgmt  SIADH  hypertension  GERD  normal pressure hydrocephalus status post  shunt  Cardiac murmur     Echo 2017, EF 70%  Nuclear stress 2017, nonischemic     Echo September 2021  EF 75 percent, mild-to-moderate LVH, grade 1 diastolic dysfunction  Aortic valve sclerotic with adequate separation  Fibrocalcific changes of mitral valve    Echo September 2023  EF 60 to 65% grade 2 diastolic dysfunction mitral valve with moderately thickened leaflets moderate posterior annular calcification no stenosis  Tricuspid valve trace regurgitation    She presents today for follow-up with cardiology.  She has been having lower extremity edema as of recently.  No significant shortness of breath however.  Blood pressure has been controlled.  She is on amlodipine 10 mg    Review of Systems   Constitutional:  Negative for chills and fever.   HENT:  Negative for facial swelling and sore throat.    Eyes:  Negative for visual disturbance.   Respiratory:  Negative for cough, chest tightness, shortness of breath and wheezing.    Cardiovascular:  Positive for leg swelling. Negative for chest pain and palpitations.   Gastrointestinal:  Negative for abdominal pain, blood in stool, constipation, diarrhea, nausea and vomiting.   Endocrine: Negative for cold intolerance and heat intolerance.   Genitourinary:  Negative for decreased urine volume, difficulty  urinating, dysuria and hematuria.   Musculoskeletal:  Negative for arthralgias, back pain and myalgias.   Skin:  Negative for rash.   Neurological:  Negative for dizziness, syncope, weakness and numbness.   Psychiatric/Behavioral:  Negative for agitation, behavioral problems and confusion. The patient is not nervous/anxious.         OBJECTIVE  Vitals:    05/22/24 1253   BP: (!) 124/46   Pulse: 81   SpO2: 98%       Physical Exam  Constitutional: awake, alert and oriented, in no acute distress, no obvious deformities  Head: Normocephalic, without obvious abnormality, atraumatic  Eyes: conjunctivae clear and moist. Sclera anicteric.  No xanthelasmas. Pupils equal bilaterally.  Extraocular motions are full.  Ear nose mouth and throat: ears are symmetrical bilaterally, hearing appears to be equal bilaterally, no nasal discharge or epistaxis, oropharynx is clear with moist mucous membranes  Neck:  Trachea is midline, neck is supple, no thyromegaly or significant lymphadenopathy, there is full range of motion.  Lungs: clear to auscultation bilaterally, no wheezes, no rales, no rhonchi, no accessory muscle use, breathing is nonlabored  Heart: Regular rhythm with a Normal heart rate, S1, S2 normal, No Murmur, no click, rub or gallop, 2+ pitting bilateral lower extremity edema  Abdomen: soft, non-tender; bowel sounds normal; no masses,  no organomegaly  Psychiatric:  Patient is oriented to time, place, person, mood/affect is negative for depression, anxiety, agitation, appears to have appropriate insight  Skin: Skin is warm, dry, intact. No obvious rashes or lesions on exposed extremities.  Nail beds are pink with no cyanosis or clubbing.    EKG:  No results found for this visit on 05/22/24.     IMPRESSION:  Bilateral lower extremity edema likely venous insufficiency/related to amlodipine use  CAD / MI 1998 - no cath/PCI - medical mgmt  SIADH  hypertension  GERD  normal pressure hydrocephalus status post  shunt  Cardiac  murmur  Paroxysmal atrial fibrillation, on Eliquis     Echo 2017, EF 70%  Nuclear stress 2017, nonischemic     Echo September 2021  EF 75 percent, mild-to-moderate LVH, grade 1 diastolic dysfunction  Aortic valve sclerotic with adequate separation  Fibrocalcific changes of mitral valve    Echo September 2023  EF 60 to 65% grade 2 diastolic dysfunction mitral valve with moderately thickened leaflets moderate posterior annular calcification no stenosis  Tricuspid valve trace regurgitation    DISCUSSION/RECOMMENDATIONS:  Her lower extremity edema appears to be more likely related to the amlodipine and/or venous insufficiency as opposed to heart failure.  Her lungs are clear on exam, echo in September showed no significant TR or right-sided heart failure.  Blood pressure is controlled  Will DC amlodipine  Uptitrate metoprolol to 50 mg  Recommended for knee-high compression stockings, Rx given for these today  Will hold off on repeat cardiac imaging  Eliquis is very expensive for her, will change to Pradaxa today to see if this is more affordable  Triglycerides have improved significantly since starting Vascepa.  Would be okay to substitute for generic icosapent ethyl if available through insurance  Plan for follow-up 3 months to recheck blood pressure and swelling    Dickson Johnston DO, [facility], [facility]  --------------------------------------------------------------------------------  TREADMILL STRESS  No results found for this or any previous visit.     ----------------------------------------------------------------------------------------------  NUCLEAR STRESS TEST: No results found for this or any previous visit.    Results for orders placed during the hospital encounter of 05/13/23    NM Myocardial Perfusion Spect (Pharmacological Induced Stress and/or Rest)    Interpretation Summary  •  Stress ECG: No ST deviation is noted. There were no arrhythmias during recovery. The ECG was not diagnostic due to pharmacological  (vasodilator) stress.  •  Perfusion: There are no perfusion defects.  •  Stress Function: Left ventricular function post-stress is normal. Post-stress ejection fraction is >70 %.  •  Stress Combined Conclusion: Left ventricular perfusion is normal.  No evidence of inducible ischemia or scar.    Normal nuclear pharmacologic stress test.      --------------------------------------------------------------------------------  CATH:  No results found for this or any previous visit.    --------------------------------------------------------------------------------  ECHO:   Results for orders placed during the hospital encounter of 09/10/21    Echo complete with contrast if indicated    [place] [phone]    Transthoracic Echocardiogram  2D, M-mode, Doppler, and Color Doppler    Study date:  10-Sep-2021    Patient: ANA TIPTON  MR number: [id number]  Account number: [id number]  : 1940  Age: 80 years  Gender: Female  Status: Outpatient  Location: [facility] ECHO 3  Height: 62 in  Weight: 155 lb  BP: 128/ 62 mmHg    Indications: Murmur    Diagnoses: R01.1 - Cardiac murmur, unspecified    Sonographer:  Cosme Gray RDCS  Primary Physician:  Alondra GUILLEN  Referring Physician:  Dickson Johnston D.O.  Group:  [facility]  Interpreting Physician:  Bebeto Man DO    SUMMARY    SUMMARY:  1. This is a technically adequate study  2. Left ventricle is normal in size with hyperdynamic systolic function. Left ventricular ejection fraction is estimated at 75%  3. Mild-to-moderate concentric left ventricular hypertrophy  4. Grade 1 diastolic dysfunction  5. Left atrium is mildly dilated  6. Aortic valve sclerotic with adequate separation  7. Trace mitral regurgitation. Fibrocalcific changes of the mitral valve with mild mitral annular calcification  8. Pulmonary artery systolic pressures cannot be estimated due  to lack of tricuspid regurgitation jet  9. There is no study for comparison    SUMMARY MEASUREMENTS  2D measurements:  Unspecified Anatomy:   %FS was 30.3 %.  Ao Diam was 2.6 cm.  EDV(Teich) was 53 ml.  EF(Teich) was 58.6 %.  ESV(Teich) was 21.9 ml.  IVSd was 1 cm.  LA Diam was 3.4 cm.  LAAs A4C was 16.6 cm2.  LAESV A-L A4C was 45.8 ml.  LAESV MOD A4C was  41.5 ml.  LALs A4C was 5.1 cm.  LVEDV MOD A4C was 60.2 ml.  LVEF MOD A4C was 76.8 %.  LVESV MOD A4C was 14 ml.  LVIDd was 3.6 cm.  LVIDs was 2.5 cm.  LVLd A4C was 7 cm.  LVLs A4C was 6 cm.  LVPWd was 1.1 cm.  RAAs A4C was 9 cm2.  RAESV A-L  was 18.6 ml.  RAESV MOD was 17.1 ml.  RALs was 3.7 cm.  RVIDd was 2.8 cm.  SV MOD A4C was 46.2 ml.  SV(Teich) was 31 ml.  CW measurements:  Unspecified Anatomy:   AV Env.Ti was 324.5 ms.  AV VTI was 29.8 cm.  AV Vmax was 1.4 m/s.  AV Vmean was 0.9 m/s.  AV maxPG was 7.7 mmHg.  AV meanPG was 3.9 mmHg.  MV VTI was 35.5 cm.  MV Vmax was 1.1 m/s.  MV Vmean was 0.6 m/s.  MV maxPG  was 5.3 mmHg.  MV meanPG was 1.8 mmHg.  MM measurements:  Unspecified Anatomy:   TAPSE was 2.1 cm.  PW measurements:  Unspecified Anatomy:   DVI was 0.8 .  E' Avg was 0.1 m/s.  E' Lat was 0.1 m/s.  E' Sept was 0.1 m/s.  E/E' Avg was 11.1 .  E/E' Lat was 9.3 .  E/E' Sept was 13.8 .  LVOT Env.Ti was 320.6 ms.  LVOT VTI was 24.1 cm.  LVOT Vmax was 1.1 m/s.  LVOT Vmean was 0.7 m/s.  LVOT maxPG was 4.6 mmHg.  LVOT meanPG was 2.5 mmHg.  MV A Franko was 1.3 m/s.  MV Dec [place] was 2.3 m/s2.  MV DecT was 363.5 ms.  MV E Franko was 0.8 m/s.  MV E/A Ratio was 0.6 .    HISTORY: PRIOR HISTORY: GERD, Hypertension, CKD, Hyperlipidemia, MI, SOB, Former Smoker    PROCEDURE: The study was performed in the [facility] ECHO [facility]. This was a routine study. The transthoracic approach was used. The study included complete 2D imaging, M-mode, complete spectral Doppler, and color Doppler. The heart rate was 75 bpm, at  the start of the study. Images were obtained from the parasternal, apical,  subcostal, and suprasternal notch acoustic windows. Echocardiographic views were limited due to lung interference. This was a technically difficult study.    LEFT VENTRICLE: Left ventricle is normal size with hyperdynamic systolic function. Left ventricular ejection fraction is estimated 75%. Mild-to-moderate concentric left ventricular hypertrophy. Grade 1 diastolic dysfunction. False cord  noted. There are no obvious high-grade regional wall motion abnormalities.    RIGHT VENTRICLE: Right ventricle is normal in size and function.    LEFT ATRIUM: Left atrium is mildly dilated    ATRIAL SEPTUM: The interatrial septum appears to be grossly normal without evidence of shunting by color-flow Doppler.    RIGHT ATRIUM: Right atrium is normal in size.    MITRAL VALVE: Mitral valve opens well. Fibrocalcific changes of the mitral valve. Mild mitral annular calcification. No evidence of mitral stenosis. Trace mitral regurgitation.    AORTIC VALVE: Aortic valve is sclerotic with adequate separation. No evidence of aortic stenosis or aortic regurgitation.    TRICUSPID VALVE: Tricuspid valve opens well. No evidence tricuspid regurgitation. Pulmonary artery systolic pressures cannot be estimated due to lack of tricuspid regurgitation jet.    PULMONIC VALVE: Pulmonic valve is not well visualized. No evidence of pulmonic stenosis or pulmonic regurgitation.    PERICARDIUM: There is no evidence of significant pericardial effusion.    AORTA: Aortic root is normal in size.    SYSTEMIC VEINS: IVC: IVC is normal size with greater than 50% respirophasic collapse.    SYSTEM MEASUREMENT TABLES    2D  %FS: 30.3 %  Ao Diam: 2.6 cm  EDV(Teich): 53 ml  EF(Teich): 58.6 %  ESV(Teich): 21.9 ml  IVSd: 1 cm  LA Diam: 3.4 cm  LAAs A4C: 16.6 cm2  LAESV A-L A4C: 45.8 ml  LAESV MOD A4C: 41.5 ml  LALs A4C: 5.1 cm  LVEDV MOD A4C: 60.2 ml  LVEF MOD A4C: 76.8 %  LVESV MOD A4C: 14 ml  LVIDd: 3.6 cm  LVIDs: 2.5 cm  LVLd A4C: 7 cm  LVLs A4C: 6 cm  LVPWd: 1.1  cm  RAAs A4C: 9 cm2  RAESV A-L: 18.6 ml  RAESV MOD: 17.1 ml  RALs: 3.7 cm  RVIDd: 2.8 cm  SV MOD A4C: 46.2 ml  SV(Teich): 31 ml    CW  AV Env.Ti: 324.5 ms  AV VTI: 29.8 cm  AV Vmax: 1.4 m/s  AV Vmean: 0.9 m/s  AV maxP.7 mmHg  AV meanPG: 3.9 mmHg  MV VTI: 35.5 cm  MV Vmax: 1.1 m/s  MV Vmean: 0.6 m/s  MV maxP.3 mmHg  MV meanP.8 mmHg    MM  TAPSE: 2.1 cm    PW  DVI: 0.8  E' Av.1 m/s  E' Lat: 0.1 m/s  E' Sept: 0.1 m/s  E/E' Av.1  E/E' Lat: 9.3  E/E' Sept: 13.8  LVOT Env.Ti: 320.6 ms  LVOT VTI: 24.1 cm  LVOT Vmax: 1.1 m/s  LVOT Vmean: 0.7 m/s  LVOT maxP.6 mmHg  LVOT meanP.5 mmHg  MV A Franko: 1.3 m/s  MV Dec [place]: 2.3 m/s2  MV DecT: 363.5 ms  MV E Frank[place]: 0.8 m/s  MV E/A Ratio: 0.6    [facility]    Prepared and electronically signed by    Bebeto Man DO  Signed 10-Sep-2021 19:27:25    No results found for this or any previous visit.    --------------------------------------------------------------------------------  HOLTER  No results found for this or any previous visit.    No results found for this or any previous visit.    --------------------------------------------------------------------------------  CAROTIDS  Results for orders placed during the hospital encounter of 16    VAS carotid complete study    Narrative  [facility] REPORT  CLINICAL:  Indications:  Bruit [R09.89].  Asymptomatic.  Risk Factors  The patient has history of HTN, Diabetes, previous remote smoking, and  hyperlipidemia.  Clinical  Left Pressure:  122/60 mm Hg.    FINDINGS:    Right        Impression  PSV  EDV (cm/s)  Ratio  Dist. ICA                140          32   1.12  Mid. ICA                  95          28   0.76  Prox. ICA    1 - 49%     103          27   0.82  Dist CCA                  90          21  Mid CCA                  126          25   0.94  Prox CCA                 133          21  Ext Carotid              122           7   0.97  Prox  Vert                 49           8  Subclavian               142           6    Left         Impression  PSV  EDV (cm/s)  Ratio  Dist. ICA                128          36   1.15  Mid. ICA                 109          31   0.98  Prox. ICA    1 - 49%     102          32   0.92  Dist CCA                 121          30  Mid CCA                  111          24   0.77  Prox CCA                 144          25  Ext Carotid               95           9   0.86  Prox Vert                 38           9  Subclavian               196          18        CONCLUSION:    Impression    RIGHT SIDE: There is a less than 50% stenosis in the internal carotid artery.  Vertebral artery flow is antegrade. No significant subclavian artery disease.  Plaque Structure: Heterogenous    LEFT SIDE: There is a less than 50% stenosis in the internal carotid artery.  Vertebral artery flow is antegrade. No significant subclavian artery disease.  Plaque Structure: Homogenous    In comparison to the study of 7/21/2014, there is no significant interval  change in the disease process.    Internal carotid artery stenosis determination by consensus criteria from:  JAKOB Sage, et.al. Carotid Artery Stenosis: Gray-Scale and Doppler US Diagnosis  - Society of Radiologists in Ultrasound Consensus Conference, Radiology 2003;  229:[id number].    SIGNATURE:  Electronically Signed by: FAITH ROWLAND MD,RVT on 2016-05-11 01:44:25 PM     --------------------------------------------------------------------------------  Diagnoses and all orders for this visit:    Atrial fibrillation with RVR ([facility])  -     dabigatran etexilate (PRADAXA) 150 mg capsu; Take 1 capsule (150 mg total) by mouth 2 (two) times a day    Essential hypertension  -     metoprolol succinate (TOPROL-XL) 50 mg 24 hr tablet; Take 1 tablet (50 mg total) by mouth daily    Peripheral vascular disease, unspecified ([facility])    Bilateral leg edema  -     Compression Stocking    Non-ST elevation myocardial  infarction (NSTEMI) ([facility])    SMA stenosis    Atherosclerosis of native coronary artery of native heart without angina pectoris    Carotid atherosclerosis, unspecified laterality    SIADH (syndrome of inappropriate ADH production) ([facility])    Chronic obstructive pulmonary disease, unspecified COPD type ([facility])       ======================================================    Past Medical History:   Diagnosis Date   • Anxiety    • Arthritis    • Confusion 10/2/2018   • Depression    • Displaced fracture of distal phalanx of right thumb    • GERD (gastroesophageal reflux disease)    • Heart attack ([facility])    • Hyperlipidemia    • Hypertension    • Moderate episode of recurrent major depressive disorder ([facility]) 4/12/2019   • Shortness of breath    • Stage 2 chronic kidney disease 7/17/2019     Past Surgical History:   Procedure Laterality Date   • APPENDECTOMY     • CARPAL TUNNEL RELEASE     • CATARACT EXTRACTION Bilateral    • COLONOSCOPY     • FL LUMBAR PUNCTURE DIAGNOSTIC  1/10/2019   • FRACTURE SURGERY     • AZ CRTJ SHUNT [id number] TERMINUS Right 9/3/2019    Procedure: Insertion of frontal ventriculoperitoneal shunt;  Surgeon: Raudel Armas MD;  Location: [facility];  Service: Neurosurgery   • SEPTOPLASTY     • WRIST FRACTURE SURGERY Right          Medications  Current Outpatient Medications   Medication Sig Dispense Refill   • acetaminophen (TYLENOL) 325 mg tablet Take 2 tablets (650 mg total) by mouth every 6 (six) hours as needed for mild pain  0   • atorvastatin (LIPITOR) 40 mg tablet TAKE 1 TABLET ORALLY DAILY (CHOLESTEROL) *NOT ON AUTO/PUT ON AUTO WHEN REORDERED* 30 tablet 11   • bisacodyl 5 MG EC tablet TAKE 1 TABLET ORALLY DAILY AS NEEDED FOR CONSTIPATION *REORDER* 30 tablet 11   • D3-1000 25 MCG (1000 UT) tablet TAKE 1 TABLET ORALLY TWICE DAILY (SUPPLEMENT) 60 tablet 11   • dabigatran etexilate (PRADAXA) 150 mg capsu Take 1 capsule (150 mg total) by mouth 2 (two) times a day 180 capsule 3   • Desitin  Daily Defense 13 % cream APPLY TOPICALLY TO SKIN EXCORIATION ON BOTTOM TWICE DAILY (EXCORIATION) *REORDER*;APPLY TOPICALLY TO SKIN EXCORIATION ON BOTTOM AS NEEDED FOR SOILAGE (EXCORIATION) *REORDER* 113 g 5   • escitalopram (LEXAPRO) 10 mg tablet TAKE 1 TABLET ORALLY DAILY (DEPRESSION) 30 tablet 11   • ferrous gluconate (FERGON) 240 (27 FE) MG tablet Take 240 mg by mouth     • hydrocortisone (ANUSOL-HC) 2.5 % rectal cream Apply topically 2 (two) times a day 28 g 11   • losartan (COZAAR) 50 mg tablet TAKE 1 TABLET ORALLY TWICE DAILY (LIPIDS) 60 tablet 11   • melatonin 3 mg TAKE 1 TABLET ORALLY AT BEDTIME AS NEEDED FOR SLEEP AID 30 tablet 5   • metoprolol succinate (TOPROL-XL) 50 mg 24 hr tablet Take 1 tablet (50 mg total) by mouth daily 90 tablet 3   • Multiple Vitamin (One-Daily Multi-Vitamin) TABS TAKE 1 TABLET ORALLY DAILY (SUPPLEMENT) 30 tablet 11   • OLANZapine (ZyPREXA) 2.5 mg tablet TAKE 1 TABLET ORALLY NIGHTLY (DEPRESSION) 30 tablet 11   • omeprazole (PriLOSEC) 20 mg delayed release capsule TAKE 1 CAPSULE ORALLY DAILY (CAP MAY BE OPENED & SPRINKLED ON APPLESAUCE) (GASTROESOPHAGEAL REFLUX DISEASE) 30 capsule 5   • Sodium Phosphates (Fleet Enema) ENEM INSERT 1 ENEMA RECTALLY AS NEEDED FOR CONSTIPATION IF NO RESULT FROM BISACODYL WITHIN 2 HOURS. IF NO RESULTS FROM ENEMA. CALL PROVIDER FOR FURTHER ORDERS *REORDER* 133 mL 5   • spironolactone (ALDACTONE) 25 mg tablet TAKE 1 TABLET ORALLY DAILY (HYPERTENSION) 30 tablet 11   • Vascepa 1 g CAPS TAKE 2 CAPSULES (2GM) ORALLY TWICE DAILY (CARDIAC HEALTH) *BRAND PER INSURANCE* 120 capsule 11   • Ferate 240 (27 Fe) MG tablet TAKE 1 TABLET ORALLY DAILY WITH BREAKFAST (SUPPLEMENT) (Patient not taking: Reported on 12/21/2023) 30 tablet 11   • magnesium Oxide (MAG-OX) 400 mg TABS TAKE 1 TABLET ORALLY TWICE DAILY (SUPPLEMENT) (Patient not taking: Reported on 12/21/2023) 60 tablet 5   • Milk of Magnesia 400 MG/5ML oral suspension TAKE 30ML ORALLY AS NEEDED FOR CONSTIPATION IF  NO BOWEL MOVEMENT IN 3 DAYS. GIVE AT BEDTIME IF NO BOWEL MOVEMENT IN 3 DAYS. SEPARATE BY OTHER MEDICATION BY AT LEAST 2 HOURS (Patient not taking: Reported on 2024) 473 mL 5   • Mouthwashes (Biotene Dry Mouth) LIQD RINSE WITH 1 OUNCE ORALLY EVERY MORNING AND AT BEDTIME FOR DRY MOUTH, RINSE FOR 30 SECONDS THEN SPIT, DO NOT SWALLOW *REORDER* (Patient not taking: Reported on 2023) 237 mL 11   • sodium chloride 1 g tablet TAKE TWO TABLETS BY MOUTH TWICE A DAY (Patient not taking: Reported on 2023) 360 tablet 3   • torsemide (DEMADEX) 10 mg tablet TAKE 1 TABLET ORALLY DAILY (EDEMA) (Patient not taking: Reported on 2023) 30 tablet 5     No current facility-administered medications for this visit.        No Known Allergies    Social History     Socioeconomic History   • Marital status:      Spouse name: Not on file   • Number of children: Not on file   • Years of education: Not on file   • Highest education level: Not on file   Occupational History   • Occupation: Retired   Tobacco Use   • Smoking status: Former     Current packs/day: 0.00     Types: Cigarettes     Quit date:      Years since quittin.4   • Smokeless tobacco: Never   • Tobacco comments:     no secondhand smoke exposure   Vaping Use   • Vaping status: Never Used   Substance and Sexual Activity   • Alcohol use: Yes     Comment: social   • Drug use: No   • Sexual activity: Not on file   Other Topics Concern   • Not on file   Social History Narrative    Living alone     Social Determinants of Health     Financial Resource Strain: Low Risk  (2023)    [facility]    Overall Financial Resource Strain (CARDIA)    • Difficulty of Paying Living Expenses: Not hard at all   Food Insecurity: No Food Insecurity (2023)    [facility]    Hunger Vital Sign    • Worried About Running Out of Food in the Last  Year: Never true    • Ran Out of Food in the Last Year: Never true   Transportation Needs: No Transportation Needs (9/21/2023)    [facility]    PRAPARE - Transportation    • Lack of Transportation (Medical): No    • Lack of Transportation (Non-Medical): No   Physical Activity: Not on file   Stress: Not on file   Social Connections: Not on file   Intimate Partner Violence: Not At Risk (9/21/2023)    [facility]    Humiliation, Afraid, Rape, and Kick questionnaire    • Fear of Current or Ex-Partner: No    • Emotionally Abused: No    • Physically Abused: No    • Sexually Abused: No   Housing Stability: Low Risk  (9/21/2023)    [facility]    Housing Stability Vital Sign    • Unable to Pay for Housing in the Last Year: No    • Number of Places Lived in the Last Year: 1    • Unstable Housing in the Last Year: No        Family History   Problem Relation Age of Onset   • Heart attack Mother 45   • Heart attack Father 60   • No Known Problems Other    • Breast cancer Sister    • Alcohol abuse Daughter         history of   • Heart attack Brother        Lab Results   Component Value Date    WBC 12.26 (H) 09/09/2023    HGB 10.4 (L) 09/09/2023    HCT 32.2 (L) 09/09/2023    MCV 93 09/09/2023     09/09/2023      Lab Results   Component Value Date    SODIUM 139 03/20/2024    K 4.7 03/20/2024     03/20/2024    CO2 25 03/20/2024    BUN 27 (H) 03/20/2024    CREATININE 1.25 (H) 03/20/2024    GLUC 104 (H) 03/20/2024    CALCIUM 9.2 03/20/2024      Lab Results   Component Value Date    HGBA1C 6.1 (H) 10/09/2023      Lab Results   Component Value Date    CHOL 165 12/01/2017    CHOL 149 04/19/2017    CHOL 159 10/19/2016     Lab Results   Component Value Date    HDL 45 (L) 02/25/2023    HDL 42 (L) 09/17/2022    HDL 39 (L) 02/12/2022     Lab  "Results   Component Value Date    LDLCALC 54 02/25/2023    LDLCALC 38 09/17/2022    LDLCALC 46 02/12/2022     Lab Results   Component Value Date    TRIG 128 02/25/2023    TRIG 198 (H) 09/17/2022    TRIG 233 (H) 02/12/2022     No results found for: \"CHOLHDL\"   Lab Results   Component Value Date    INR 1.2 09/21/2023    INR 0.97 08/14/2019    INR 0.96 01/10/2019    PROTIME 13.0 08/14/2019    PROTIME 12.5 01/10/2019          Patient Active Problem List    Diagnosis Date Noted   • Atrial fibrillation with RVR ([facility]) 05/22/2024   • Bilateral leg edema 05/22/2024   • Lung nodule 09/09/2023   • Normocytic anemia 09/07/2023   • Elevated troponin 09/06/2023   • Incontinence associated dermatitis 06/22/2023   • Chest pressure 05/18/2023   • Fall 05/14/2023   • Persistent proteinuria 03/01/2023   • COVID 12/20/2022   • Mild recurrent major depression ([facility]) 09/14/2022   • Non-ST elevation myocardial infarction (NSTEMI) ([facility]) 03/16/2022   • Chronic obstructive pulmonary disease, unspecified COPD type ([facility]) 03/08/2022   • Polypharmacy 04/23/2021   • Mild dementia ([facility]) 04/22/2021   • Hyperglycemia 02/16/2021   • Primary insomnia 02/08/2021   • SIADH (syndrome of inappropriate ADH production) ([facility]) 02/08/2021   • Osteopenia of multiple sites 07/02/2020   • S/P  shunt 10/04/2019   • Recurrent falls 08/14/2019   • Hyperkalemia 08/14/2019   • Leukocytosis 08/14/2019   • Stage 2 chronic kidney disease 07/17/2019   • Normal pressure hydrocephalus ([facility]) 11/01/2018   • Ambulatory dysfunction 12/15/2017   • Positive ROSALINDA (antinuclear antibody) 12/15/2017   • 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   • Anxiety and depression 10/20/2017   • 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 Occasional wrong word or \"sound a like\" substitutions may have occurred due to the inherent limitations of voice recognition software. Read the chart carefully and recognize, using context, where substitutions have occurred.    Dickson Johnston DO, [facility]  5/22/2024 1:49 PM          "

## 2024-07-30 ENCOUNTER — HOSPITAL ENCOUNTER (OUTPATIENT)
Dept: CT IMAGING | Facility: HOSPITAL | Age: 84
Discharge: HOME | End: 2024-07-30
Payer: COMMERCIAL

## 2024-07-30 DIAGNOSIS — R10.2 PELVIC AND PERINEAL PAIN: ICD-10-CM

## 2024-07-30 DIAGNOSIS — M25.552 PAIN IN LEFT HIP: ICD-10-CM

## 2024-07-30 PROCEDURE — 73700 CT LOWER EXTREMITY W/O DYE: CPT

## 2024-08-16 ENCOUNTER — TELEPHONE (OUTPATIENT)
Dept: FAMILY MEDICINE CLINIC | Facility: CLINIC | Age: 84
End: 2024-08-16

## 2024-08-16 NOTE — TELEPHONE ENCOUNTER
Please remove PCP from patient chart. Patient is receiving care from [facility] memory care unit in [place]. Please advise. Thank You.

## 2024-08-16 NOTE — TELEPHONE ENCOUNTER
Called patient and daughter stated she is staying at [facility]. Patient is receiving care there. Please advise.

## 2024-08-18 NOTE — TELEPHONE ENCOUNTER
08/17/24 11:41 PM        The office's request has been received, reviewed, and the patient chart updated. The PCP has successfully been removed with a patient attribution note. This message will now be completed.        Thank you  Antione He

## 2024-08-21 ENCOUNTER — TELEPHONE (OUTPATIENT)
Dept: CARDIOLOGY CLINIC | Facility: CLINIC | Age: 84
End: 2024-08-21

## 2024-08-27 DIAGNOSIS — I48.91 ATRIAL FIBRILLATION WITH RVR (HCC): ICD-10-CM

## 2024-08-28 RX ORDER — DABIGATRAN ETEXILATE 150 MG/1
CAPSULE ORAL
Qty: 60 CAPSULE | Refills: 5 | Status: SHIPPED | OUTPATIENT
Start: 2024-08-28

## 2024-08-30 ENCOUNTER — TELEPHONE (OUTPATIENT)
Age: 84
End: 2024-08-30

## 2024-08-30 NOTE — TELEPHONE ENCOUNTER
This RN phoned patient daughter after receiving in basket message request assistance with scheduling yearly NPH follow up.  Patients daughter was requesting the [facility] if possible.  This RN reviewed Dr. Armas's office hours and days along with that the next available appointment is October.  Patient daughter, Bhavna (Shabana) stated that [place] would be okay stating that her mother is at [facility] and the set up at [place] is just nicer for her.  This RN agreed that the set up with PT and Neurosurgery at [place] is most ideal as they are across the lan from one another versus [place] when they are across town from one another.  This RN stated that she would look into things a little further but it would take till next Tuesday as the day is almost done here and we are out of the office on Monday.  Bhavna agreeable to plan to reconnect on Tuesday next week after this RN looks into [facility] doing PT assessment as well as if Dr. Armas has any days at [place].

## 2024-09-03 NOTE — TELEPHONE ENCOUNTER
This RN phoned patient daughter, Bhavna regarding scheduling for her mother.  Answering machine reached. Detailed message left including call back number and this RN name along with question if pt will be getting transport from [facility] or would her daughter be taking her?  This RN is going to get PT assessment from [facility] and just wanted to double check with her regarding transportation and to assist with scheduling with Dr. Armas for a day that works for daughter as well as patient.      Pending call back

## 2024-09-04 NOTE — TELEPHONE ENCOUNTER
This RN attempted to phone patient daughter, Bhavna and reached voice mail.  Decision made to not leave message but to schedule things temporarily with Vish Owusu and Dr. Armas as his next available is 10/9/2024.      This RN then phoned [facility] at [phone] to discuss patients appointment and scheduling.  Faith stated that the PT assessment can be completed at [facility].  This RN to fax assessment form to [phone] ATTN: Faith.  Appointment with Dr. Armas scheduled for 10/9/2024 @1415 at the [address] in [facility] with Faith.      This RN then phoned the patients daughter to give update and determine if this appointment would work for her or reschedule if need be.  This RN informed Bhavna that this RN took the liberty of scheduling with Dr. Armas on his next available which is 10/9/2024.  This RN stated that she scheduled for later in the day, at 1415 in hopes to be mindful of work schedules.  This RN stated that if this appointment does not work that this RN is more than happy to look at something else but felt the need to schedule as it was already a month out and she (Bhavna) seemed to want this addressed sooner than later if possible.      Bhavna looked at her schedule and stated that date and time of 10/9/24 will work for her.  Bhavna updated on conversation with Faith from [facility]. Bhavna stated that she would connect with Faith regarding transportation then as she thinks she will transport her mother.  Bhavna stated that he mother will be more relaxed if she takes her.  Bhavna stated that he mother is more comfortable at [place] and doesn't really want to go to appointment and the fact that it as the [facility] will make her mother a bit more anxious.  So, Bhavna thinks if she transports her that it will be easier on her  but it is being request from care team,  if this RN was understanding correctly.      Bhavna appreciative for assistance and  encouraged to phone back with any additional issues, concerns, or questions.

## 2024-10-09 ENCOUNTER — OFFICE VISIT (OUTPATIENT)
Dept: NEUROSURGERY | Facility: CLINIC | Age: 84
End: 2024-10-09
Payer: COMMERCIAL

## 2024-10-09 VITALS
TEMPERATURE: 97.9 F | RESPIRATION RATE: 17 BRPM | HEIGHT: 61 IN | OXYGEN SATURATION: 98 % | WEIGHT: 149 LBS | SYSTOLIC BLOOD PRESSURE: 136 MMHG | BODY MASS INDEX: 28.13 KG/M2 | DIASTOLIC BLOOD PRESSURE: 74 MMHG | HEART RATE: 79 BPM

## 2024-10-09 DIAGNOSIS — G91.2 NORMAL PRESSURE HYDROCEPHALUS (HCC): Primary | ICD-10-CM

## 2024-10-09 PROCEDURE — 99213 OFFICE O/P EST LOW 20 MIN: CPT | Performed by: NEUROLOGICAL SURGERY

## 2024-10-09 RX ORDER — TRAZODONE HYDROCHLORIDE 50 MG/1
TABLET, FILM COATED ORAL
COMMUNITY
Start: 2024-09-13

## 2024-10-09 RX ORDER — ACETAMINOPHEN 500 MG
500 TABLET ORAL EVERY 6 HOURS PRN
COMMUNITY

## 2024-10-09 RX ORDER — AMMONIUM LACTATE 12 G/100G
LOTION TOPICAL
COMMUNITY
Start: 2024-10-03

## 2024-10-14 NOTE — ASSESSMENT & PLAN NOTE
Further decline in gait and balance cognition, though likely multifactorial.  After discussion with the patient and her daughter, no further adjustment of the shunt.  She will follow-up to this office on a as needed basis.

## 2024-10-29 ENCOUNTER — OFFICE VISIT (OUTPATIENT)
Dept: CARDIOLOGY CLINIC | Facility: CLINIC | Age: 84
End: 2024-10-29
Payer: COMMERCIAL

## 2024-10-29 VITALS
BODY MASS INDEX: 28.65 KG/M2 | SYSTOLIC BLOOD PRESSURE: 130 MMHG | DIASTOLIC BLOOD PRESSURE: 50 MMHG | HEART RATE: 78 BPM | WEIGHT: 150 LBS

## 2024-10-29 DIAGNOSIS — I21.4 NON-ST ELEVATION MYOCARDIAL INFARCTION (NSTEMI) (HCC): ICD-10-CM

## 2024-10-29 DIAGNOSIS — I25.10 ATHEROSCLEROSIS OF NATIVE CORONARY ARTERY OF NATIVE HEART WITHOUT ANGINA PECTORIS: ICD-10-CM

## 2024-10-29 DIAGNOSIS — J44.9 CHRONIC OBSTRUCTIVE PULMONARY DISEASE, UNSPECIFIED COPD TYPE (HCC): ICD-10-CM

## 2024-10-29 DIAGNOSIS — I48.91 ATRIAL FIBRILLATION WITH RVR (HCC): Primary | ICD-10-CM

## 2024-10-29 DIAGNOSIS — I10 ESSENTIAL HYPERTENSION: ICD-10-CM

## 2024-10-29 PROCEDURE — 99214 OFFICE O/P EST MOD 30 MIN: CPT

## 2024-10-29 PROCEDURE — 93000 ELECTROCARDIOGRAM COMPLETE: CPT

## 2024-10-29 RX ORDER — BACITRACIN ZINC 500 [USP'U]/G
1 OINTMENT TOPICAL 2 TIMES DAILY
COMMUNITY

## 2024-10-29 RX ORDER — LOPERAMIDE HYDROCHLORIDE 2 MG/1
2 TABLET ORAL 4 TIMES DAILY PRN
COMMUNITY

## 2024-10-29 NOTE — PROGRESS NOTES
Cardiology   MD Jose Concepcion MD, FACC  Bebeto Man DO, [facility] FACP, FASNC Ather Mansoor, MD Rujul Patel, DO, [facility]  Dickson Johnston DO, [facility]  -------------------------------------------------------------------  [facility]  [address] [place] [address]  Phone: [phone]  Fax: [phone]  10/29/24  Brenda West  YOB: 1940   MRN: [id number]      Referring Physician: No referring provider defined for this encounter.     HPI: Brenda West is a 83 y.o. female with:   Bilateral lower extremity edema likely venous insufficiency/related to amlodipine use  CAD / MI 1998 - no cath/PCI - medical mgmt  SIADH  hypertension  GERD  normal pressure hydrocephalus status post  shunt  Cardiac murmur  Paroxysmal atrial fibrillation, on Eliquis     Echo 2017, EF 70%  Nuclear stress 2017, nonischemic     Echo September 2021  EF 75 percent, mild-to-moderate LVH, grade 1 diastolic dysfunction  Aortic valve sclerotic with adequate separation  Fibrocalcific changes of mitral valve     Echo September 2023  EF 60 to 65% grade 2 diastolic dysfunction mitral valve with moderately thickened leaflets moderate posterior annular calcification no stenosis  Tricuspid valve trace regurgitation    She presents today for follow-up.  She states she is doing well since her last visit, we stopped amlodipine and her swelling has resolved.  Her ECG today is stable.  She has no new acute symptoms.  Last visit we uptitrated the metoprolol her blood pressure is under good control        Review of Systems   Constitutional:  Negative for chills and fever.   HENT:  Negative for facial swelling and sore throat.    Eyes:  Negative for visual disturbance.   Respiratory:  Negative for cough, chest tightness, shortness of breath and wheezing.    Cardiovascular:  Negative for chest pain, palpitations and leg swelling.   Gastrointestinal:  Negative for abdominal pain,  blood in stool, constipation, diarrhea, nausea and vomiting.   Endocrine: Negative for cold intolerance and heat intolerance.   Genitourinary:  Negative for decreased urine volume, difficulty urinating, dysuria and hematuria.   Musculoskeletal:  Negative for arthralgias, back pain and myalgias.   Skin:  Negative for rash.   Neurological:  Negative for dizziness, syncope, weakness and numbness.   Psychiatric/Behavioral:  Negative for agitation, behavioral problems and confusion. The patient is not nervous/anxious.         OBJECTIVE  Vitals:    10/29/24 1617   BP: 130/50   Pulse: 78       Physical Exam  Constitutional: awake, alert and oriented, in no acute distress, no obvious deformities, obese female  Head: Normocephalic, without obvious abnormality, atraumatic  Eyes: conjunctivae clear and moist. Sclera anicteric. No xanthelasmas. Pupils equal bilaterally. Extraocular motions are full.  Ear nose mouth and throat: ears are symmetrical bilaterally, hearing appears to be equal bilaterally, no nasal discharge or epistaxis, oropharynx is clear with moist mucous membranes  Neck: Trachea is midline, neck is supple, no thyromegaly or significant lymphadenopathy, there is full range of motion.  Lungs: clear to auscultation bilaterally, no wheezes, no rales, no rhonchi, no accessory muscle use, breathing is nonlabored  Heart: Regular rhythm with a Normal heart rate, S1, S2 normal, No Murmur, no click, rub or gallop, No lower extremity edema  Abdomen: Obese, soft, non-tender; bowel sounds normal; no masses, no organomegaly  Psychiatric: Patient is oriented to time, place, person, mood/affect is negative for depression, anxiety, agitation, appears to have appropriate insight  Skin: Skin is warm, dry, intact. No obvious rashes or lesions on exposed extremities. Nail beds are pink with no cyanosis or clubbing.     EKG:  Results for orders placed or performed in visit on 10/29/24   POCT ECG    Impression    Normal sinus rhythm,  overall low voltage with possible septal infarct type pattern at 78 bpm        IMPRESSION:  Bilateral lower extremity edema likely venous insufficiency/related to amlodipine use  CAD / MI 1998 - no cath/PCI - medical mgmt  SIADH  hypertension  GERD  normal pressure hydrocephalus status post  shunt  Cardiac murmur  Paroxysmal atrial fibrillation, on Eliquis     Echo 2017, EF 70%  Nuclear stress 2017, nonischemic     Echo September 2021  EF 75 percent, mild-to-moderate LVH, grade 1 diastolic dysfunction  Aortic valve sclerotic with adequate separation  Fibrocalcific changes of mitral valve     Echo September 2023  EF 60 to 65% grade 2 diastolic dysfunction mitral valve with moderately thickened leaflets moderate posterior annular calcification no stenosis  Tricuspid valve trace regurgitation    DISCUSSION/RECOMMENDATIONS:  She is doing well at this visit she has no lower extremity swelling at all today  Continue with current dose metoprolol 50 mg twice a day  Last visit we made the switch from Eliquis to Pradaxa dabigatran would continue with this 150 mg twice daily for anticoagulation for A-fib  Continue with Vascepa for fish oil  Continue with losartan 50 mg for blood pressure  Continue with metoprolol as described above  Continue with spironolactone for diuretic.  Would hold off on repeat cardiac imaging at this time  She is otherwise stable, could reasonably follow-up at this point in 1 year    Dickson Johnston DO, KACY, MARC  --------------------------------------------------------------------------------  TREADMILL STRESS  No results found for this or any previous visit.     ----------------------------------------------------------------------------------------------  NUCLEAR STRESS TEST: No results found for this or any previous visit.    Results for orders placed during the hospital encounter of 05/13/23    NM Myocardial Perfusion Spect (Pharmacological Induced Stress and/or Rest)    Interpretation  Summary    Stress ECG: No ST deviation is noted. There were no arrhythmias during recovery. The ECG was not diagnostic due to pharmacological (vasodilator) stress.    Perfusion: There are no perfusion defects.    Stress Function: Left ventricular function post-stress is normal. Post-stress ejection fraction is >70 %.    Stress Combined Conclusion: Left ventricular perfusion is normal.  No evidence of inducible ischemia or scar.    Normal nuclear pharmacologic stress test.      --------------------------------------------------------------------------------  CATH:  No results found for this or any previous visit.    --------------------------------------------------------------------------------  ECHO:   Results for orders placed during the hospital encounter of 09/10/21    Echo complete with contrast if indicated    Narrative  [place] [id number] [phone]    Transthoracic Echocardiogram  2D, M-mode, Doppler, and Color Doppler    Study date:  10-Sep-2021    Patient: ANA TIPTON  MR number: [id number]  Account number: [id number]  : 1940  Age: 80 years  Gender: Female  Status: Outpatient  Location: [facility]  Height: 62 in  Weight: 155 lb  BP: 128/ 62 mmHg    Indications: Murmur    Diagnoses: R01.1 - Cardiac murmur, unspecified    Sonographer:  Cosme Gray RDCS  Primary Physician:  Alondra GUILLEN  Referring Physician:  Dickson Johnston D.O.  Group:  [facility]  Interpreting Physician:  Bebeto Man DO    SUMMARY    SUMMARY:  1. This is a technically adequate study  2. Left ventricle is normal in size with hyperdynamic systolic function. Left ventricular ejection fraction is estimated at 75%  3. Mild-to-moderate concentric left ventricular hypertrophy  4. Grade 1 diastolic dysfunction  5. Left atrium is mildly dilated  6. Aortic valve sclerotic with adequate separation  7. Trace mitral regurgitation.  Fibrocalcific changes of the mitral valve with mild mitral annular calcification  8. Pulmonary artery systolic pressures cannot be estimated due to lack of tricuspid regurgitation jet  9. There is no study for comparison    SUMMARY MEASUREMENTS  2D measurements:  Unspecified Anatomy:   %FS was 30.3 %.  Ao Diam was 2.6 cm.  EDV(Teich) was 53 ml.  EF(Teich) was 58.6 %.  ESV(Teich) was 21.9 ml.  IVSd was 1 cm.  LA Diam was 3.4 cm.  LAAs A4C was 16.6 cm2.  LAESV A-L A4C was 45.8 ml.  LAESV MOD A4C was  41.5 ml.  LALs A4C was 5.1 cm.  LVEDV MOD A4C was 60.2 ml.  LVEF MOD A4C was 76.8 %.  LVESV MOD A4C was 14 ml.  LVIDd was 3.6 cm.  LVIDs was 2.5 cm.  LVLd A4C was 7 cm.  LVLs A4C was 6 cm.  LVPWd was 1.1 cm.  RAAs A4C was 9 cm2.  RAESV A-L  was 18.6 ml.  RAESV MOD was 17.1 ml.  RALs was 3.7 cm.  RVIDd was 2.8 cm.  SV MOD A4C was 46.2 ml.  SV(Teich) was 31 ml.  CW measurements:  Unspecified Anatomy:   AV Env.Ti was 324.5 ms.  AV VTI was 29.8 cm.  AV Vmax was 1.4 m/s.  AV Vmean was 0.9 m/s.  AV maxPG was 7.7 mmHg.  AV meanPG was 3.9 mmHg.  MV VTI was 35.5 cm.  MV Vmax was 1.1 m/s.  MV Vmean was 0.6 m/s.  MV maxPG  was 5.3 mmHg.  MV meanPG was 1.8 mmHg.  MM measurements:  Unspecified Anatomy:   TAPSE was 2.1 cm.  PW measurements:  Unspecified Anatomy:   DVI was 0.8 .  E' Avg was 0.1 m/s.  E' Lat was 0.1 m/s.  E' Sept was 0.1 m/s.  E/E' Avg was 11.1 .  E/E' Lat was 9.3 .  E/E' Sept was 13.8 .  LVOT Env.Ti was 320.6 ms.  LVOT VTI was 24.1 cm.  LVOT Vmax was 1.1 m/s.  LVOT Vmean was 0.7 m/s.  LVOT maxPG was 4.6 mmHg.  LVOT meanPG was 2.5 mmHg.  MV A Franko was 1.3 m/s.  MV Dec [place] was 2.3 m/s2.  MV DecT was 363.5 ms.  MV E Franko was 0.8 m/s.  MV E/A Ratio was 0.6 .    HISTORY: PRIOR HISTORY: GERD, Hypertension, CKD, Hyperlipidemia, MI, SOB, Former Smoker    PROCEDURE: The study was performed in the [facility]. This was a routine study. The transthoracic approach was used. The study included complete 2D imaging, M-mode, complete  spectral Doppler, and color Doppler. The heart rate was 75 bpm, at  the start of the study. Images were obtained from the parasternal, apical, subcostal, and suprasternal notch acoustic windows. Echocardiographic views were limited due to lung interference. This was a technically difficult study.    LEFT VENTRICLE: Left ventricle is normal size with hyperdynamic systolic function. Left ventricular ejection fraction is estimated 75%. Mild-to-moderate concentric left ventricular hypertrophy. Grade 1 diastolic dysfunction. False cord  noted. There are no obvious high-grade regional wall motion abnormalities.    RIGHT VENTRICLE: Right ventricle is normal in size and function.    LEFT ATRIUM: Left atrium is mildly dilated    ATRIAL SEPTUM: The interatrial septum appears to be grossly normal without evidence of shunting by color-flow Doppler.    RIGHT ATRIUM: Right atrium is normal in size.    MITRAL VALVE: Mitral valve opens well. Fibrocalcific changes of the mitral valve. Mild mitral annular calcification. No evidence of mitral stenosis. Trace mitral regurgitation.    AORTIC VALVE: Aortic valve is sclerotic with adequate separation. No evidence of aortic stenosis or aortic regurgitation.    TRICUSPID VALVE: Tricuspid valve opens well. No evidence tricuspid regurgitation. Pulmonary artery systolic pressures cannot be estimated due to lack of tricuspid regurgitation jet.    PULMONIC VALVE: Pulmonic valve is not well visualized. No evidence of pulmonic stenosis or pulmonic regurgitation.    PERICARDIUM: There is no evidence of significant pericardial effusion.    AORTA: Aortic root is normal in size.    SYSTEMIC VEINS: IVC: IVC is normal size with greater than 50% respirophasic collapse.    SYSTEM MEASUREMENT TABLES    2D  %FS: 30.3 %  Ao Diam: 2.6 cm  EDV(Teich): 53 ml  EF(Teich): 58.6 %  ESV(Teich): 21.9 ml  IVSd: 1 cm  LA Diam: 3.4 cm  LAAs A4C: 16.6 cm2  LAESV A-L A4C: 45.8 ml  LAESV MOD A4C: 41.5 ml  LALs A4C: 5.1  cm  LVEDV MOD A4C: 60.2 ml  LVEF MOD A4C: 76.8 %  LVESV MOD A4C: 14 ml  LVIDd: 3.6 cm  LVIDs: 2.5 cm  LVLd A4C: 7 cm  LVLs A4C: 6 cm  LVPWd: 1.1 cm  RAAs A4C: 9 cm2  RAESV A-L: 18.6 ml  RAESV MOD: 17.1 ml  RALs: 3.7 cm  RVIDd: 2.8 cm  SV MOD A4C: 46.2 ml  SV(Teich): 31 ml    CW  AV Env.Ti: 324.5 ms  AV VTI: 29.8 cm  AV Vmax: 1.4 m/s  AV Vmean: 0.9 m/s  AV maxP.7 mmHg  AV meanPG: 3.9 mmHg  MV VTI: 35.5 cm  MV Vmax: 1.1 m/s  MV Vmean: 0.6 m/s  MV maxP.3 mmHg  MV meanP.8 mmHg    MM  TAPSE: 2.1 cm    PW  DVI: 0.8  E' Av.1 m/s  E' Lat: 0.1 m/s  E' Sept: 0.1 m/s  E/E' Av.1  E/E' Lat: 9.3  E/E' Sept: 13.8  LVOT Env.Ti: 320.6 ms  LVOT VTI: 24.1 cm  LVOT Vmax: 1.1 m/s  LVOT Vmean: 0.7 m/s  LVOT maxP.6 mmHg  LVOT meanP.5 mmHg  MV A Franko: 1.3 m/s  MV Dec [place]: 2.3 m/s2  MV DecT: 363.5 ms  MV E Franko: 0.8 m/s  MV E/A Ratio: 0.6    [facility] Accredited Echocardiography Laboratory    Prepared and electronically signed by    Bebeto Man DO  Signed 10-Sep-2021 19:27:25    No results found for this or any previous visit.    --------------------------------------------------------------------------------  HOLTER  No results found for this or any previous visit.    No results found for this or any previous visit.    --------------------------------------------------------------------------------  CAROTIDS  Results for orders placed during the hospital encounter of 16    VAS carotid complete study    Narrative  [facility] REPORT  CLINICAL:  Indications:  Bruit [R09.89].  Asymptomatic.  Risk Factors  The patient has history of HTN, Diabetes, previous remote smoking, and  hyperlipidemia.  Clinical  Left Pressure:  122/60 mm Hg.    FINDINGS:    Right        Impression  PSV  EDV (cm/s)  Ratio  Dist. ICA                140          32   1.12  Mid. ICA                  95          28   0.76  Prox. ICA    1 - 49%     103          27   0.82  Dist CCA                  90           21  Mid CCA                  126          25   0.94  Prox CCA                 133          21  Ext Carotid              122           7   0.97  Prox Vert                 49           8  Subclavian               142           6    Left         Impression  PSV  EDV (cm/s)  Ratio  Dist. ICA                128          36   1.15  Mid. ICA                 109          31   0.98  Prox. ICA    1 - 49%     102          32   0.92  Dist CCA                 121          30  Mid CCA                  111          24   0.77  Prox CCA                 144          25  Ext Carotid               95           9   0.86  Prox Vert                 38           9  Subclavian               196          18        CONCLUSION:    Impression    RIGHT SIDE: There is a less than 50% stenosis in the internal carotid artery.  Vertebral artery flow is antegrade. No significant subclavian artery disease.  Plaque Structure: Heterogenous    LEFT SIDE: There is a less than 50% stenosis in the internal carotid artery.  Vertebral artery flow is antegrade. No significant subclavian artery disease.  Plaque Structure: Homogenous    In comparison to the study of 7/21/2014, there is no significant interval  change in the disease process.    Internal carotid artery stenosis determination by consensus criteria from:  JAKOB Sage, et.al. Carotid Artery Stenosis: Gray-Scale and Doppler US Diagnosis  - Society of Radiologists in Ultrasound Consensus Conference, Radiology 2003;  229:340-346.    SIGNATURE:  Electronically Signed by: FAITH ROWLAND MD,RVT on 2016-05-11 01:44:25 PM     --------------------------------------------------------------------------------  Diagnoses and all orders for this visit:    Atrial fibrillation with RVR ([facility])  -     POCT ECG    Essential hypertension    Atherosclerosis of native coronary artery of native heart without angina pectoris    Non-ST elevation myocardial infarction (NSTEMI) ([facility])    Chronic obstructive pulmonary disease,  unspecified COPD type (HCC)    Other orders  -     Diclofenac Sodium (VOLTAREN) 1 %; Apply 2 g topically 4 (four) times a day  -     loperamide (Anti-Diarrheal) 2 MG tablet; Take 2 mg by mouth 4 (four) times a day as needed for diarrhea  -     bacitracin ([employer] Bacitracin Zinc) ointment; Apply 1 large application topically 2 (two) times a day       ======================================================    Past Medical History:   Diagnosis Date    Anxiety     Arthritis     Confusion 10/2/2018    Depression     Displaced fracture of distal phalanx of right thumb     GERD (gastroesophageal reflux disease)     Heart attack (HCC)     Hyperlipidemia     Hypertension     Moderate episode of recurrent major depressive disorder ([facility]) 4/12/2019    Shortness of breath     Stage 2 chronic kidney disease 7/17/2019     Past Surgical History:   Procedure Laterality Date    APPENDECTOMY      CARPAL TUNNEL RELEASE      CATARACT EXTRACTION Bilateral     COLONOSCOPY      FL LUMBAR PUNCTURE DIAGNOSTIC  1/10/2019    FRACTURE SURGERY      N[place] CRTJ SHUNT [id number] TERMINUS Right 9/3/2019    Procedure: Insertion of frontal ventriculoperitoneal shunt;  Surgeon: Raudel Armas MD;  Location: [facility];  Service: Neurosurgery    SEPTOPLASTY      WRIST FRACTURE SURGERY Right          Medications  Current Outpatient Medications   Medication Sig Dispense Refill    acetaminophen (TYLENOL) 325 mg tablet Take 2 tablets (650 mg total) by mouth every 6 (six) hours as needed for mild pain  0    acetaminophen (TYLENOL) 500 mg tablet Take 500 mg by mouth every 6 (six) hours as needed for mild pain      ammonium lactate (LAC-HYDRIN) 12 % lotion       atorvastatin (LIPITOR) 40 mg tablet TAKE 1 TABLET ORALLY DAILY (CHOLESTEROL) *NOT ON AUTO/PUT ON AUTO WHEN REORDERED* 30 tablet 11    bacitracin ([employer] Bacitracin Zinc) ointment Apply 1 large application topically 2 (two) times a day      bisacodyl 5 MG EC tablet TAKE 1 TABLET ORALLY DAILY AS  NEEDED FOR CONSTIPATION *REORDER* 30 tablet 11    D3-1000 25 MCG (1000 UT) tablet TAKE 1 TABLET ORALLY TWICE DAILY (SUPPLEMENT) 60 tablet 11    dabigatran etexilate (PRADAXA) 150 mg capsu TAKE 1 CAPSULE ORALLY TWICE DAILY (ATRIAL FIBRILLATION) 60 capsule 5    Desitin Daily Defense 13 % cream APPLY TOPICALLY TO SKIN EXCORIATION ON BOTTOM TWICE DAILY (EXCORIATION) *REORDER*;APPLY TOPICALLY TO SKIN EXCORIATION ON BOTTOM AS NEEDED FOR SOILAGE (EXCORIATION) *REORDER* 113 g 5    Diclofenac Sodium (VOLTAREN) 1 % Apply 2 g topically 4 (four) times a day      escitalopram (LEXAPRO) 10 mg tablet TAKE 1 TABLET ORALLY DAILY (DEPRESSION) 30 tablet 11    ferrous gluconate (FERGON) 240 (27 FE) MG tablet Take 240 mg by mouth      hydrocortisone (ANUSOL-HC) 2.5 % rectal cream Apply topically 2 (two) times a day 28 g 11    loperamide (Anti-Diarrheal) 2 MG tablet Take 2 mg by mouth 4 (four) times a day as needed for diarrhea      losartan (COZAAR) 50 mg tablet TAKE 1 TABLET ORALLY TWICE DAILY (LIPIDS) 60 tablet 11    melatonin 3 mg TAKE 1 TABLET ORALLY AT BEDTIME AS NEEDED FOR SLEEP AID 30 tablet 5    metoprolol succinate (TOPROL-XL) 50 mg 24 hr tablet Take 1 tablet (50 mg total) by mouth daily 90 tablet 3    Multiple Vitamin (One-Daily Multi-Vitamin) TABS TAKE 1 TABLET ORALLY DAILY (SUPPLEMENT) 30 tablet 11    OLANZapine (ZyPREXA) 2.5 mg tablet TAKE 1 TABLET ORALLY NIGHTLY (DEPRESSION) 30 tablet 11    omeprazole (PriLOSEC) 20 mg delayed release capsule TAKE 1 CAPSULE ORALLY DAILY (CAP MAY BE OPENED & SPRINKLED ON APPLESAUCE) (GASTROESOPHAGEAL REFLUX DISEASE) 30 capsule 5    spironolactone (ALDACTONE) 25 mg tablet TAKE 1 TABLET ORALLY DAILY (HYPERTENSION) 30 tablet 11    traZODone (DESYREL) 50 mg tablet       Vascepa 1 g CAPS TAKE 2 CAPSULES (2GM) ORALLY TWICE DAILY (CARDIAC HEALTH) *BRAND PER INSURANCE* 120 capsule 11    Ferate 240 (27 Fe) MG tablet TAKE 1 TABLET ORALLY DAILY WITH BREAKFAST (SUPPLEMENT) (Patient not taking: Reported  on 2023) 30 tablet 11    magnesium Oxide (MAG-OX) 400 mg TABS TAKE 1 TABLET ORALLY TWICE DAILY (SUPPLEMENT) (Patient not taking: Reported on 2023) 60 tablet 5    Milk of Magnesia 400 MG/5ML oral suspension TAKE 30ML ORALLY AS NEEDED FOR CONSTIPATION IF NO BOWEL MOVEMENT IN 3 DAYS. GIVE AT BEDTIME IF NO BOWEL MOVEMENT IN 3 DAYS. SEPARATE BY OTHER MEDICATION BY AT LEAST 2 HOURS (Patient not taking: Reported on 2024) 473 mL 5    Mouthwashes (Biotene Dry Mouth) LIQD RINSE WITH 1 OUNCE ORALLY EVERY MORNING AND AT BEDTIME FOR DRY MOUTH, RINSE FOR 30 SECONDS THEN SPIT, DO NOT SWALLOW *REORDER* (Patient not taking: Reported on 2023) 237 mL 11    sodium chloride 1 g tablet TAKE TWO TABLETS BY MOUTH TWICE A DAY (Patient not taking: Reported on 2023) 360 tablet 3    Sodium Phosphates (Fleet Enema) ENEM INSERT 1 ENEMA RECTALLY AS NEEDED FOR CONSTIPATION IF NO RESULT FROM BISACODYL WITHIN 2 HOURS. IF NO RESULTS FROM ENEMA. CALL PROVIDER FOR FURTHER ORDERS *REORDER* (Patient not taking: Reported on 10/9/2024) 133 mL 5    torsemide (DEMADEX) 10 mg tablet TAKE 1 TABLET ORALLY DAILY (EDEMA) (Patient not taking: Reported on 2023) 30 tablet 5     No current facility-administered medications for this visit.        No Known Allergies    Social History     Socioeconomic History    Marital status:      Spouse name: Not on file    Number of children: Not on file    Years of education: Not on file    Highest education level: Not on file   Occupational History    Occupation: Retired   Tobacco Use    Smoking status: Former     Current packs/day: 0.00     Types: Cigarettes     Quit date:      Years since quittin.8    Smokeless tobacco: Never    Tobacco comments:     no secondhand smoke exposure   Vaping Use    Vaping status: Never Used   Substance and Sexual Activity    Alcohol use: Yes     Comment: social    Drug use: No    Sexual activity: Not on file   Other Topics Concern    Not on file    Social History Narrative    Living alone     Social Determinants of Health     Financial Resource Strain: Low Risk  (9/21/2023)    [facility]    Overall Financial Resource Strain (CARDIA)     Difficulty of Paying Living Expenses: Not hard at all   Food Insecurity: No Food Insecurity (9/21/2023)    [facility]    Hunger Vital Sign     Worried About Running Out of Food in the Last Year: Never true     Ran Out of Food in the Last Year: Never true   Transportation Needs: No Transportation Needs (9/21/2023)    [facility]    PRAPARE - Transportation     Lack of Transportation (Medical): No     Lack of Transportation (Non-Medical): No   Physical Activity: Not on file   Stress: Not on file   Social Connections: Not on file   Intimate Partner Violence: Not At Risk (9/21/2023)    [facility]    Humiliation, Afraid, Rape, and Kick questionnaire     Fear of Current or Ex-Partner: No     Emotionally Abused: No     Physically Abused: No     Sexually Abused: No   Housing Stability: Low Risk  (9/21/2023)    [facility]    Housing Stability Vital Sign     Unable to Pay for Housing in the Last Year: No     Number of Places Lived in the Last Year: 1     Unstable Housing in the Last Year: No        Family History   Problem Relation Age of Onset    Heart attack Mother 45    Heart attack Father 60    No Known Problems Other     Breast cancer Sister     Alcohol abuse Daughter         history of    Heart attack Brother        Lab Results   Component Value Date    WBC 12.26 (H) 09/09/2023    HGB 10.4 (L) 09/09/2023    HCT 32.2 (L) 09/09/2023    MCV 93 09/09/2023     09/09/2023      Lab Results   Component Value Date    SODIUM 138  "07/12/2024    K 4.8 07/12/2024     07/12/2024    CO2 21 07/12/2024    BUN 32 (H) 07/12/2024    CREATININE 0.95 07/12/2024    GLUC 90 07/12/2024    CALCIUM 9.1 07/12/2024      Lab Results   Component Value Date    HGBA1C 6.1 (H) 10/09/2023      Lab Results   Component Value Date    CHOL 165 12/01/2017    CHOL 149 04/19/2017    CHOL 159 10/19/2016     Lab Results   Component Value Date    HDL 45 (L) 02/25/2023    HDL 42 (L) 09/17/2022    HDL 39 (L) 02/12/2022     Lab Results   Component Value Date    LDLCALC 54 02/25/2023    LDLCALC 38 09/17/2022    LDLCALC 46 02/12/2022     Lab Results   Component Value Date    TRIG 128 02/25/2023    TRIG 198 (H) 09/17/2022    TRIG 233 (H) 02/12/2022     No results found for: \"CHOLHDL\"   Lab Results   Component Value Date    INR 1.2 09/21/2023    INR 0.97 08/14/2019    INR 0.96 01/10/2019    PROTIME 13.0 08/14/2019    PROTIME 12.5 01/10/2019          Patient Active Problem List    Diagnosis Date Noted    Atrial fibrillation with RVR ([facility]) 05/22/2024    Bilateral leg edema 05/22/2024    Lung nodule 09/09/2023    Normocytic anemia 09/07/2023    Elevated troponin 09/06/2023    Incontinence associated dermatitis 06/22/2023    Chest pressure 05/18/2023    Fall 05/14/2023    Persistent proteinuria 03/01/2023    COVID 12/20/2022    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/14/2022    Non-ST elevation myocardial infarction (NSTEMI) ([facility]) 03/16/2022    Chronic obstructive pulmonary disease, unspecified COPD type ([facility]) 03/08/2022    Polypharmacy 04/23/2021    Mild dementia ([facility]) 04/22/2021    Hyperglycemia 02/16/2021    Primary insomnia 02/08/2021    SIADH (syndrome of inappropriate ADH production) ([facility]) 02/08/2021    Osteopenia of multiple sites 07/02/2020    S/P  shunt 10/04/2019    Recurrent falls 08/14/2019    Hyperkalemia 08/14/2019    Leukocytosis 08/14/2019    Stage 2 chronic kidney disease 07/17/2019    Normal pressure hydrocephalus (HCC) 11/01/2018    Ambulatory dysfunction " "12/15/2017    Positive ROSALINDA (antinuclear antibody) 12/15/2017    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    Anxiety and depression 10/20/2017    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 Occasional wrong word or \"sound a like\" substitutions may have occurred due to the inherent limitations of voice recognition software. Read the chart carefully and recognize, using context, where substitutions have occurred.    Dickson Johnston DO, [facility]  10/29/2024 4:46 PM          "

## (undated) DEVICE — DRESSING MEPILEX AG BORDER 4 X 4 IN

## (undated) DEVICE — ADHESIVE SKN CLSR HISTOACRYL FLEX 0.5ML LF

## (undated) DEVICE — PENCIL ELECTROSURG E-Z CLEAN -0035H

## (undated) DEVICE — SUT VICRYL 0 UR-6 27 IN J603H

## (undated) DEVICE — DRAPE EQUIPMENT RF WAND

## (undated) DEVICE — TELFA NON-ADHERENT ABSORBENT DRESSING: Brand: TELFA

## (undated) DEVICE — SPONGE PVP SCRUB WING STERILE

## (undated) DEVICE — PASSER 48409 60CM DISP. CATHETER

## (undated) DEVICE — STERILE SURGICAL LUBRICANT,  TUBE: Brand: SURGILUBE

## (undated) DEVICE — SUT MONOCRYL 4-0 PS-2 27 IN Y426H

## (undated) DEVICE — SUT SILK 2-0 18 IN A185H

## (undated) DEVICE — ADHESIVE SKIN HIGH VISCOSITY EXOFIN 1ML

## (undated) DEVICE — ANTIBACTERIAL VIOLET BRAIDED (POLYGLACTIN 910), SYNTHETIC ABSORBABLE SUTURE: Brand: COATED VICRYL

## (undated) DEVICE — BETHLEHEM UNIVERSAL SPINE, KIT: Brand: CARDINAL HEALTH

## (undated) DEVICE — FLOSEAL HEMOSTATIC MATRIX, 5 ML: Brand: FLOSEAL

## (undated) DEVICE — 3M™ IOBAN™ 2 ANTIMICROBIAL INCISE DRAPE 6650EZ: Brand: IOBAN™ 2

## (undated) DEVICE — LIGHT HANDLE COVER SLEEVE DISP BLUE STELLAR

## (undated) DEVICE — GLOVE INDICATOR PI UNDERGLOVE SZ 8.5 BLUE

## (undated) DEVICE — GLOVE SRG BIOGEL 8

## (undated) DEVICE — SUT VICRYL PLUS 3-0 RB-1 CR/8 18 IN VCP713D

## (undated) DEVICE — INTENDED FOR TISSUE SEPARATION, AND OTHER PROCEDURES THAT REQUIRE A SHARP SURGICAL BLADE TO PUNCTURE OR CUT.: Brand: BARD-PARKER SAFETY BLADES SIZE 15, STERILE

## (undated) DEVICE — DISPOSABLE EQUIPMENT COVER: Brand: SMALL TOWEL DRAPE

## (undated) DEVICE — ENDOPATH XCEL UNIVERSAL TROCAR STABLILITY SLEEVES: Brand: ENDOPATH XCEL

## (undated) DEVICE — DRAPE TOWEL: Brand: CONVERTORS

## (undated) DEVICE — SUTURE BOOTS YELLOW

## (undated) DEVICE — TOOL 9MH30 LEGEND 9CM 3MM MH: Brand: MIDAS REX

## (undated) DEVICE — INTENDED FOR TISSUE SEPARATION, AND OTHER PROCEDURES THAT REQUIRE A SHARP SURGICAL BLADE TO PUNCTURE OR CUT.: Brand: BARD-PARKER SAFETY BLADES SIZE 11, STERILE

## (undated) DEVICE — GLOVE SRG BIOGEL 7.5

## (undated) DEVICE — ALLENTOWN LAP CHOLE APP PACK: Brand: CARDINAL HEALTH

## (undated) DEVICE — DRAPE INTESTINAL ISOLATION BAG

## (undated) DEVICE — GLOVE INDICATOR PI UNDERGLOVE SZ 7.5 BLUE

## (undated) DEVICE — 3M™ DURAPORE™ SURGICAL TAPE 1538-3, 3 INCH X 10 YARD (7,5CM X 9,1M), 4 ROLLS/BOX: Brand: 3M™ DURAPORE™

## (undated) DEVICE — INTRO SHEATH  PEEL AWAY 9FR 12CM

## (undated) DEVICE — TIBURON SPLIT SHEET: Brand: CONVERTORS

## (undated) DEVICE — ENDOPATH XCEL BLADELESS TROCARS WITH STABILITY SLEEVES: Brand: ENDOPATH XCEL

## (undated) DEVICE — SUT MONOCRYL 4-0 PS-2 18 IN Y496G

## (undated) DEVICE — INSUFLATION TUBING INSUFLOW (LEXION)

## (undated) DEVICE — INTENDED FOR TISSUE SEPARATION, AND OTHER PROCEDURES THAT REQUIRE A SHARP SURGICAL BLADE TO PUNCTURE OR CUT.: Brand: BARD-PARKER ® CARBON RIB-BACK BLADES

## (undated) DEVICE — UTILITY MARKER,BLACK WITH LABELS: Brand: DEVON

## (undated) DEVICE — PROXIMATE SKIN STAPLERS (35 WIDE) CONTAINS 35 STAINLESS STEEL STAPLES (FIXED HEAD): Brand: PROXIMATE

## (undated) DEVICE — PREP SURGICAL PURPREP 26ML